# Patient Record
Sex: MALE | Race: WHITE | NOT HISPANIC OR LATINO | Employment: OTHER | URBAN - NONMETROPOLITAN AREA
[De-identification: names, ages, dates, MRNs, and addresses within clinical notes are randomized per-mention and may not be internally consistent; named-entity substitution may affect disease eponyms.]

---

## 2019-09-28 ENCOUNTER — APPOINTMENT (OUTPATIENT)
Dept: GENERAL RADIOLOGY | Facility: HOSPITAL | Age: 69
End: 2019-09-28

## 2019-09-28 ENCOUNTER — HOSPITAL ENCOUNTER (INPATIENT)
Facility: HOSPITAL | Age: 69
LOS: 6 days | Discharge: NURSING, REHAB OR LONG-TERM CARE | End: 2019-10-04
Attending: FAMILY MEDICINE | Admitting: INTERNAL MEDICINE

## 2019-09-28 DIAGNOSIS — J44.1 COPD WITH ACUTE EXACERBATION (HCC): ICD-10-CM

## 2019-09-28 DIAGNOSIS — I48.92 ATRIAL FLUTTER, UNSPECIFIED TYPE (HCC): Primary | ICD-10-CM

## 2019-09-28 DIAGNOSIS — R06.00 DYSPNEA, UNSPECIFIED TYPE: ICD-10-CM

## 2019-09-28 DIAGNOSIS — I42.0 DILATED CARDIOMYOPATHY (HCC): ICD-10-CM

## 2019-09-28 LAB
A-A DO2: 71.5 MMHG (ref 0–300)
ALBUMIN SERPL-MCNC: 3.83 G/DL (ref 3.5–5.2)
ALBUMIN/GLOB SERPL: 1.1 G/DL
ALP SERPL-CCNC: 90 U/L (ref 39–117)
ALT SERPL W P-5'-P-CCNC: 16 U/L (ref 1–41)
ANION GAP SERPL CALCULATED.3IONS-SCNC: 11.1 MMOL/L (ref 5–15)
ARTERIAL PATENCY WRIST A: ABNORMAL
AST SERPL-CCNC: 18 U/L (ref 1–40)
ATMOSPHERIC PRESS: 729 MMHG
BASE EXCESS BLDA CALC-SCNC: -0.1 MMOL/L
BASOPHILS # BLD AUTO: 0.04 10*3/MM3 (ref 0–0.2)
BASOPHILS NFR BLD AUTO: 0.6 % (ref 0–1.5)
BDY SITE: ABNORMAL
BILIRUB SERPL-MCNC: 0.8 MG/DL (ref 0.2–1.2)
BODY TEMPERATURE: 98.6 C
BUN BLD-MCNC: 20 MG/DL (ref 8–23)
BUN/CREAT SERPL: 17.2 (ref 7–25)
CALCIUM SPEC-SCNC: 9 MG/DL (ref 8.6–10.5)
CHLORIDE SERPL-SCNC: 101 MMOL/L (ref 98–107)
CK SERPL-CCNC: 102 U/L (ref 20–200)
CO2 SERPL-SCNC: 27.9 MMOL/L (ref 22–29)
COHGB MFR BLD: 3.5 % (ref 0–5)
CREAT BLD-MCNC: 1.16 MG/DL (ref 0.76–1.27)
CRP SERPL-MCNC: 0.5 MG/DL (ref 0–0.5)
DEPRECATED RDW RBC AUTO: 48.8 FL (ref 37–54)
EOSINOPHIL # BLD AUTO: 0.44 10*3/MM3 (ref 0–0.4)
EOSINOPHIL NFR BLD AUTO: 6.6 % (ref 0.3–6.2)
ERYTHROCYTE [DISTWIDTH] IN BLOOD BY AUTOMATED COUNT: 15.2 % (ref 12.3–15.4)
GFR SERPL CREATININE-BSD FRML MDRD: 63 ML/MIN/1.73
GLOBULIN UR ELPH-MCNC: 3.4 GM/DL
GLUCOSE BLD-MCNC: 137 MG/DL (ref 65–99)
HCO3 BLDA-SCNC: 24.1 MMOL/L (ref 22–26)
HCT VFR BLD AUTO: 46.1 % (ref 37.5–51)
HCT VFR BLD CALC: 45 % (ref 42–52)
HGB BLD-MCNC: 14.8 G/DL (ref 13–17.7)
HGB BLDA-MCNC: 15.3 G/DL (ref 12–16)
HOLD SPECIMEN: NORMAL
HOLD SPECIMEN: NORMAL
HOROWITZ INDEX BLD+IHG-RTO: 28 %
IMM GRANULOCYTES # BLD AUTO: 0.01 10*3/MM3 (ref 0–0.05)
IMM GRANULOCYTES NFR BLD AUTO: 0.1 % (ref 0–0.5)
LYMPHOCYTES # BLD AUTO: 1.24 10*3/MM3 (ref 0.7–3.1)
LYMPHOCYTES NFR BLD AUTO: 18.5 % (ref 19.6–45.3)
MAGNESIUM SERPL-MCNC: 2 MG/DL (ref 1.6–2.4)
MCH RBC QN AUTO: 29 PG (ref 26.6–33)
MCHC RBC AUTO-ENTMCNC: 32.1 G/DL (ref 31.5–35.7)
MCV RBC AUTO: 90.4 FL (ref 79–97)
METHGB BLD QL: 0.3 % (ref 0–3)
MODALITY: ABNORMAL
MONOCYTES # BLD AUTO: 0.64 10*3/MM3 (ref 0.1–0.9)
MONOCYTES NFR BLD AUTO: 9.5 % (ref 5–12)
NEUTROPHILS # BLD AUTO: 4.34 10*3/MM3 (ref 1.7–7)
NEUTROPHILS NFR BLD AUTO: 64.7 % (ref 42.7–76)
NT-PROBNP SERPL-MCNC: 9708 PG/ML (ref 5–900)
OXYHGB MFR BLDV: 91 % (ref 85–100)
PCO2 BLDA: 38.3 MM HG (ref 35–45)
PH BLDA: 7.42 PH UNITS (ref 7.35–7.45)
PLATELET # BLD AUTO: 154 10*3/MM3 (ref 140–450)
PMV BLD AUTO: 11.8 FL (ref 6–12)
PO2 BLDA: 74.3 MM HG (ref 80–100)
POTASSIUM BLD-SCNC: 4 MMOL/L (ref 3.5–5.2)
PROT SERPL-MCNC: 7.2 G/DL (ref 6–8.5)
RBC # BLD AUTO: 5.1 10*6/MM3 (ref 4.14–5.8)
SAO2 % BLDCOA: 94.6 % (ref 90–100)
SODIUM BLD-SCNC: 140 MMOL/L (ref 136–145)
TROPONIN T SERPL-MCNC: 0.02 NG/ML (ref 0–0.03)
TSH SERPL DL<=0.05 MIU/L-ACNC: 2.54 UIU/ML (ref 0.27–4.2)
WBC NRBC COR # BLD: 6.71 10*3/MM3 (ref 3.4–10.8)
WHOLE BLOOD HOLD SPECIMEN: NORMAL
WHOLE BLOOD HOLD SPECIMEN: NORMAL

## 2019-09-28 PROCEDURE — 85025 COMPLETE CBC W/AUTO DIFF WBC: CPT | Performed by: EMERGENCY MEDICINE

## 2019-09-28 PROCEDURE — 83880 ASSAY OF NATRIURETIC PEPTIDE: CPT | Performed by: EMERGENCY MEDICINE

## 2019-09-28 PROCEDURE — 99223 1ST HOSP IP/OBS HIGH 75: CPT | Performed by: INTERNAL MEDICINE

## 2019-09-28 PROCEDURE — 25010000002 ENOXAPARIN PER 10 MG: Performed by: INTERNAL MEDICINE

## 2019-09-28 PROCEDURE — 94799 UNLISTED PULMONARY SVC/PX: CPT

## 2019-09-28 PROCEDURE — 84443 ASSAY THYROID STIM HORMONE: CPT | Performed by: FAMILY MEDICINE

## 2019-09-28 PROCEDURE — 25010000002 CEFTRIAXONE: Performed by: FAMILY MEDICINE

## 2019-09-28 PROCEDURE — 71046 X-RAY EXAM CHEST 2 VIEWS: CPT | Performed by: RADIOLOGY

## 2019-09-28 PROCEDURE — 82805 BLOOD GASES W/O2 SATURATION: CPT | Performed by: FAMILY MEDICINE

## 2019-09-28 PROCEDURE — 83050 HGB METHEMOGLOBIN QUAN: CPT | Performed by: FAMILY MEDICINE

## 2019-09-28 PROCEDURE — 71046 X-RAY EXAM CHEST 2 VIEWS: CPT

## 2019-09-28 PROCEDURE — 84145 PROCALCITONIN (PCT): CPT | Performed by: INTERNAL MEDICINE

## 2019-09-28 PROCEDURE — 25010000002 AZITHROMYCIN: Performed by: FAMILY MEDICINE

## 2019-09-28 PROCEDURE — 86140 C-REACTIVE PROTEIN: CPT | Performed by: FAMILY MEDICINE

## 2019-09-28 PROCEDURE — 83735 ASSAY OF MAGNESIUM: CPT | Performed by: FAMILY MEDICINE

## 2019-09-28 PROCEDURE — 82550 ASSAY OF CK (CPK): CPT | Performed by: FAMILY MEDICINE

## 2019-09-28 PROCEDURE — 84484 ASSAY OF TROPONIN QUANT: CPT | Performed by: EMERGENCY MEDICINE

## 2019-09-28 PROCEDURE — 82375 ASSAY CARBOXYHB QUANT: CPT | Performed by: FAMILY MEDICINE

## 2019-09-28 PROCEDURE — 93010 ELECTROCARDIOGRAM REPORT: CPT | Performed by: INTERNAL MEDICINE

## 2019-09-28 PROCEDURE — 80053 COMPREHEN METABOLIC PANEL: CPT | Performed by: EMERGENCY MEDICINE

## 2019-09-28 PROCEDURE — 93005 ELECTROCARDIOGRAM TRACING: CPT | Performed by: FAMILY MEDICINE

## 2019-09-28 PROCEDURE — 36415 COLL VENOUS BLD VENIPUNCTURE: CPT

## 2019-09-28 PROCEDURE — 99284 EMERGENCY DEPT VISIT MOD MDM: CPT

## 2019-09-28 PROCEDURE — 36600 WITHDRAWAL OF ARTERIAL BLOOD: CPT | Performed by: FAMILY MEDICINE

## 2019-09-28 PROCEDURE — 93005 ELECTROCARDIOGRAM TRACING: CPT | Performed by: EMERGENCY MEDICINE

## 2019-09-28 PROCEDURE — 87040 BLOOD CULTURE FOR BACTERIA: CPT | Performed by: FAMILY MEDICINE

## 2019-09-28 PROCEDURE — 25010000002 MORPHINE PER 10 MG: Performed by: FAMILY MEDICINE

## 2019-09-28 PROCEDURE — 94640 AIRWAY INHALATION TREATMENT: CPT

## 2019-09-28 RX ORDER — IPRATROPIUM BROMIDE AND ALBUTEROL SULFATE 2.5; .5 MG/3ML; MG/3ML
3 SOLUTION RESPIRATORY (INHALATION)
Status: DISCONTINUED | OUTPATIENT
Start: 2019-09-28 | End: 2019-09-29

## 2019-09-28 RX ORDER — DILTIAZEM HYDROCHLORIDE 5 MG/ML
2.5 INJECTION INTRAVENOUS ONCE
Status: COMPLETED | OUTPATIENT
Start: 2019-09-28 | End: 2019-09-28

## 2019-09-28 RX ORDER — SODIUM CHLORIDE 0.9 % (FLUSH) 0.9 %
10 SYRINGE (ML) INJECTION AS NEEDED
Status: DISCONTINUED | OUTPATIENT
Start: 2019-09-28 | End: 2019-10-04 | Stop reason: HOSPADM

## 2019-09-28 RX ORDER — PREDNISONE 20 MG/1
40 TABLET ORAL
Status: DISCONTINUED | OUTPATIENT
Start: 2019-09-29 | End: 2019-10-04 | Stop reason: HOSPADM

## 2019-09-28 RX ORDER — SODIUM CHLORIDE 0.9 % (FLUSH) 0.9 %
10 SYRINGE (ML) INJECTION EVERY 12 HOURS SCHEDULED
Status: DISCONTINUED | OUTPATIENT
Start: 2019-09-28 | End: 2019-10-04 | Stop reason: HOSPADM

## 2019-09-28 RX ORDER — BUMETANIDE 0.25 MG/ML
1 INJECTION INTRAMUSCULAR; INTRAVENOUS ONCE
Status: COMPLETED | OUTPATIENT
Start: 2019-09-28 | End: 2019-09-28

## 2019-09-28 RX ORDER — NICOTINE 21 MG/24HR
1 PATCH, TRANSDERMAL 24 HOURS TRANSDERMAL DAILY
Status: DISCONTINUED | OUTPATIENT
Start: 2019-09-28 | End: 2019-10-04 | Stop reason: HOSPADM

## 2019-09-28 RX ADMIN — NICOTINE 1 PATCH: 21 PATCH, EXTENDED RELEASE TRANSDERMAL at 21:48

## 2019-09-28 RX ADMIN — MORPHINE SULFATE 4 MG: 4 INJECTION, SOLUTION INTRAMUSCULAR; INTRAVENOUS at 17:51

## 2019-09-28 RX ADMIN — IPRATROPIUM BROMIDE AND ALBUTEROL SULFATE 3 ML: .5; 3 SOLUTION RESPIRATORY (INHALATION) at 20:33

## 2019-09-28 RX ADMIN — ENOXAPARIN SODIUM 90 MG: 100 INJECTION SUBCUTANEOUS at 21:49

## 2019-09-28 RX ADMIN — AZITHROMYCIN MONOHYDRATE 500 MG: 500 INJECTION, POWDER, LYOPHILIZED, FOR SOLUTION INTRAVENOUS at 19:55

## 2019-09-28 RX ADMIN — BUMETANIDE 1 MG: 0.25 INJECTION, SOLUTION INTRAMUSCULAR; INTRAVENOUS at 17:49

## 2019-09-28 RX ADMIN — METOPROLOL TARTRATE 12.5 MG: 25 TABLET, FILM COATED ORAL at 21:49

## 2019-09-28 RX ADMIN — DILTIAZEM HYDROCHLORIDE 2.5 MG: 5 INJECTION INTRAVENOUS at 17:55

## 2019-09-28 RX ADMIN — CEFTRIAXONE 1 G: 1 INJECTION, POWDER, FOR SOLUTION INTRAMUSCULAR; INTRAVENOUS at 18:55

## 2019-09-29 ENCOUNTER — APPOINTMENT (OUTPATIENT)
Dept: CARDIOLOGY | Facility: HOSPITAL | Age: 69
End: 2019-09-29

## 2019-09-29 LAB
ALBUMIN SERPL-MCNC: 3.58 G/DL (ref 3.5–5.2)
ALBUMIN/GLOB SERPL: 1.1 G/DL
ALP SERPL-CCNC: 85 U/L (ref 39–117)
ALT SERPL W P-5'-P-CCNC: 15 U/L (ref 1–41)
ANION GAP SERPL CALCULATED.3IONS-SCNC: 14.1 MMOL/L (ref 5–15)
ANION GAP SERPL CALCULATED.3IONS-SCNC: 14.2 MMOL/L (ref 5–15)
AST SERPL-CCNC: 20 U/L (ref 1–40)
BASOPHILS # BLD AUTO: 0.08 10*3/MM3 (ref 0–0.2)
BASOPHILS NFR BLD AUTO: 1 % (ref 0–1.5)
BH CV ECHO MEAS - % IVS THICK: 15.1 %
BH CV ECHO MEAS - % LVPW THICK: -9.4 %
BH CV ECHO MEAS - ACS: 1.7 CM
BH CV ECHO MEAS - AI DEC SLOPE: 105 CM/SEC^2
BH CV ECHO MEAS - AI MAX PG: 26.2 MMHG
BH CV ECHO MEAS - AI MAX VEL: 256 CM/SEC
BH CV ECHO MEAS - AI P1/2T: 714.1 MSEC
BH CV ECHO MEAS - AO MAX PG: 2.5 MMHG
BH CV ECHO MEAS - AO MEAN PG: 1 MMHG
BH CV ECHO MEAS - AO ROOT AREA (BSA CORRECTED): 1.8
BH CV ECHO MEAS - AO ROOT AREA: 11.9 CM^2
BH CV ECHO MEAS - AO ROOT DIAM: 3.9 CM
BH CV ECHO MEAS - AO V2 MAX: 79.2 CM/SEC
BH CV ECHO MEAS - AO V2 MEAN: 53.4 CM/SEC
BH CV ECHO MEAS - AO V2 VTI: 11.9 CM
BH CV ECHO MEAS - BSA(HAYCOCK): 2.2 M^2
BH CV ECHO MEAS - BSA: 2.1 M^2
BH CV ECHO MEAS - BZI_BMI: 26 KILOGRAMS/M^2
BH CV ECHO MEAS - BZI_METRIC_HEIGHT: 185.4 CM
BH CV ECHO MEAS - BZI_METRIC_WEIGHT: 89.4 KG
BH CV ECHO MEAS - EDV(CUBED): 204.9 ML
BH CV ECHO MEAS - EDV(MOD-SP4): 95 ML
BH CV ECHO MEAS - EDV(TEICH): 172.9 ML
BH CV ECHO MEAS - EF(CUBED): 28.1 %
BH CV ECHO MEAS - EF(MOD-SP4): 42.1 %
BH CV ECHO MEAS - EF(TEICH): 22.4 %
BH CV ECHO MEAS - ESV(CUBED): 147.2 ML
BH CV ECHO MEAS - ESV(MOD-SP4): 55 ML
BH CV ECHO MEAS - ESV(TEICH): 134.2 ML
BH CV ECHO MEAS - FS: 10.4 %
BH CV ECHO MEAS - IVS/LVPW: 0.76
BH CV ECHO MEAS - IVSD: 1.3 CM
BH CV ECHO MEAS - IVSS: 1.5 CM
BH CV ECHO MEAS - LA DIMENSION: 4.9 CM
BH CV ECHO MEAS - LA/AO: 1.3
BH CV ECHO MEAS - LV DIASTOLIC VOL/BSA (35-75): 44.4 ML/M^2
BH CV ECHO MEAS - LV MASS(C)D: 399.1 GRAMS
BH CV ECHO MEAS - LV MASS(C)DI: 186.7 GRAMS/M^2
BH CV ECHO MEAS - LV MASS(C)S: 341.9 GRAMS
BH CV ECHO MEAS - LV MASS(C)SI: 159.9 GRAMS/M^2
BH CV ECHO MEAS - LV SYSTOLIC VOL/BSA (12-30): 25.7 ML/M^2
BH CV ECHO MEAS - LVIDD: 5.9 CM
BH CV ECHO MEAS - LVIDS: 5.3 CM
BH CV ECHO MEAS - LVLD AP4: 8.3 CM
BH CV ECHO MEAS - LVLS AP4: 7.7 CM
BH CV ECHO MEAS - LVOT AREA (M): 4.5 CM^2
BH CV ECHO MEAS - LVOT AREA: 4.5 CM^2
BH CV ECHO MEAS - LVOT DIAM: 2.4 CM
BH CV ECHO MEAS - LVPWD: 1.7 CM
BH CV ECHO MEAS - LVPWS: 1.5 CM
BH CV ECHO MEAS - MV E MAX VEL: 106 CM/SEC
BH CV ECHO MEAS - PA ACC SLOPE: 1367 CM/SEC^2
BH CV ECHO MEAS - PA ACC TIME: 0.04 SEC
BH CV ECHO MEAS - PA PR(ACCEL): 61.5 MMHG
BH CV ECHO MEAS - RAP SYSTOLE: 10 MMHG
BH CV ECHO MEAS - RVSP: 47.2 MMHG
BH CV ECHO MEAS - SI(AO): 66.5 ML/M^2
BH CV ECHO MEAS - SI(CUBED): 27 ML/M^2
BH CV ECHO MEAS - SI(MOD-SP4): 18.7 ML/M^2
BH CV ECHO MEAS - SI(TEICH): 18.1 ML/M^2
BH CV ECHO MEAS - SV(AO): 142.2 ML
BH CV ECHO MEAS - SV(CUBED): 57.7 ML
BH CV ECHO MEAS - SV(MOD-SP4): 40 ML
BH CV ECHO MEAS - SV(TEICH): 38.7 ML
BH CV ECHO MEAS - TR MAX VEL: 305 CM/SEC
BILIRUB SERPL-MCNC: 0.6 MG/DL (ref 0.2–1.2)
BUN BLD-MCNC: 21 MG/DL (ref 8–23)
BUN BLD-MCNC: 24 MG/DL (ref 8–23)
BUN/CREAT SERPL: 19.4 (ref 7–25)
BUN/CREAT SERPL: 20.7 (ref 7–25)
CALCIUM SPEC-SCNC: 8.7 MG/DL (ref 8.6–10.5)
CALCIUM SPEC-SCNC: 9 MG/DL (ref 8.6–10.5)
CHLORIDE SERPL-SCNC: 101 MMOL/L (ref 98–107)
CHLORIDE SERPL-SCNC: 101 MMOL/L (ref 98–107)
CHOLEST SERPL-MCNC: 141 MG/DL (ref 0–200)
CO2 SERPL-SCNC: 22.8 MMOL/L (ref 22–29)
CO2 SERPL-SCNC: 23.9 MMOL/L (ref 22–29)
CREAT BLD-MCNC: 1.08 MG/DL (ref 0.76–1.27)
CREAT BLD-MCNC: 1.16 MG/DL (ref 0.76–1.27)
DEPRECATED RDW RBC AUTO: 49.5 FL (ref 37–54)
EOSINOPHIL # BLD AUTO: 0.43 10*3/MM3 (ref 0–0.4)
EOSINOPHIL NFR BLD AUTO: 5.5 % (ref 0.3–6.2)
ERYTHROCYTE [DISTWIDTH] IN BLOOD BY AUTOMATED COUNT: 15.3 % (ref 12.3–15.4)
GFR SERPL CREATININE-BSD FRML MDRD: 63 ML/MIN/1.73
GFR SERPL CREATININE-BSD FRML MDRD: 68 ML/MIN/1.73
GLOBULIN UR ELPH-MCNC: 3.3 GM/DL
GLUCOSE BLD-MCNC: 151 MG/DL (ref 65–99)
GLUCOSE BLD-MCNC: 95 MG/DL (ref 65–99)
HBA1C MFR BLD: 5.8 % (ref 4.8–5.6)
HCT VFR BLD AUTO: 45.2 % (ref 37.5–51)
HDLC SERPL-MCNC: 40 MG/DL (ref 40–60)
HGB BLD-MCNC: 14.6 G/DL (ref 13–17.7)
IMM GRANULOCYTES # BLD AUTO: 0.03 10*3/MM3 (ref 0–0.05)
IMM GRANULOCYTES NFR BLD AUTO: 0.4 % (ref 0–0.5)
INR PPP: 1.11 (ref 0.9–1.1)
LDLC SERPL CALC-MCNC: 89 MG/DL (ref 0–100)
LDLC/HDLC SERPL: 2.22 {RATIO}
LYMPHOCYTES # BLD AUTO: 1.68 10*3/MM3 (ref 0.7–3.1)
LYMPHOCYTES NFR BLD AUTO: 21.6 % (ref 19.6–45.3)
MAGNESIUM SERPL-MCNC: 2.1 MG/DL (ref 1.6–2.4)
MAXIMAL PREDICTED HEART RATE: 152 BPM
MCH RBC QN AUTO: 29.4 PG (ref 26.6–33)
MCHC RBC AUTO-ENTMCNC: 32.3 G/DL (ref 31.5–35.7)
MCV RBC AUTO: 90.9 FL (ref 79–97)
MONOCYTES # BLD AUTO: 0.79 10*3/MM3 (ref 0.1–0.9)
MONOCYTES NFR BLD AUTO: 10.1 % (ref 5–12)
NEUTROPHILS # BLD AUTO: 4.78 10*3/MM3 (ref 1.7–7)
NEUTROPHILS NFR BLD AUTO: 61.4 % (ref 42.7–76)
PLATELET # BLD AUTO: 154 10*3/MM3 (ref 140–450)
PMV BLD AUTO: 12.5 FL (ref 6–12)
POTASSIUM BLD-SCNC: 4.3 MMOL/L (ref 3.5–5.2)
POTASSIUM BLD-SCNC: 4.4 MMOL/L (ref 3.5–5.2)
PROCALCITONIN SERPL-MCNC: <0.02 NG/ML (ref 0.1–0.25)
PROT SERPL-MCNC: 6.9 G/DL (ref 6–8.5)
PROTHROMBIN TIME: 14.9 SECONDS (ref 11–15.4)
RBC # BLD AUTO: 4.97 10*6/MM3 (ref 4.14–5.8)
SODIUM BLD-SCNC: 138 MMOL/L (ref 136–145)
SODIUM BLD-SCNC: 139 MMOL/L (ref 136–145)
STRESS TARGET HR: 129 BPM
TRIGL SERPL-MCNC: 62 MG/DL (ref 0–150)
TROPONIN T SERPL-MCNC: 0.06 NG/ML (ref 0–0.03)
TROPONIN T SERPL-MCNC: 0.08 NG/ML (ref 0–0.03)
TROPONIN T SERPL-MCNC: 0.1 NG/ML (ref 0–0.03)
VLDLC SERPL-MCNC: 12.4 MG/DL
WBC NRBC COR # BLD: 7.79 10*3/MM3 (ref 3.4–10.8)

## 2019-09-29 PROCEDURE — 63710000001 PREDNISONE PER 1 MG: Performed by: INTERNAL MEDICINE

## 2019-09-29 PROCEDURE — 83735 ASSAY OF MAGNESIUM: CPT | Performed by: PHYSICIAN ASSISTANT

## 2019-09-29 PROCEDURE — 85025 COMPLETE CBC W/AUTO DIFF WBC: CPT | Performed by: INTERNAL MEDICINE

## 2019-09-29 PROCEDURE — 93005 ELECTROCARDIOGRAM TRACING: CPT | Performed by: INTERNAL MEDICINE

## 2019-09-29 PROCEDURE — 84484 ASSAY OF TROPONIN QUANT: CPT | Performed by: INTERNAL MEDICINE

## 2019-09-29 PROCEDURE — 80053 COMPREHEN METABOLIC PANEL: CPT | Performed by: INTERNAL MEDICINE

## 2019-09-29 PROCEDURE — 80061 LIPID PANEL: CPT | Performed by: INTERNAL MEDICINE

## 2019-09-29 PROCEDURE — 85610 PROTHROMBIN TIME: CPT | Performed by: INTERNAL MEDICINE

## 2019-09-29 PROCEDURE — 99223 1ST HOSP IP/OBS HIGH 75: CPT | Performed by: INTERNAL MEDICINE

## 2019-09-29 PROCEDURE — 25010000002 ENOXAPARIN PER 10 MG: Performed by: INTERNAL MEDICINE

## 2019-09-29 PROCEDURE — 93306 TTE W/DOPPLER COMPLETE: CPT | Performed by: INTERNAL MEDICINE

## 2019-09-29 PROCEDURE — 93010 ELECTROCARDIOGRAM REPORT: CPT | Performed by: INTERNAL MEDICINE

## 2019-09-29 PROCEDURE — 93306 TTE W/DOPPLER COMPLETE: CPT

## 2019-09-29 PROCEDURE — 99233 SBSQ HOSP IP/OBS HIGH 50: CPT | Performed by: INTERNAL MEDICINE

## 2019-09-29 PROCEDURE — 83036 HEMOGLOBIN GLYCOSYLATED A1C: CPT | Performed by: INTERNAL MEDICINE

## 2019-09-29 PROCEDURE — 94799 UNLISTED PULMONARY SVC/PX: CPT

## 2019-09-29 PROCEDURE — 84484 ASSAY OF TROPONIN QUANT: CPT | Performed by: PHYSICIAN ASSISTANT

## 2019-09-29 RX ORDER — ASPIRIN 325 MG
325 TABLET ORAL ONCE
Status: COMPLETED | OUTPATIENT
Start: 2019-09-29 | End: 2019-09-29

## 2019-09-29 RX ORDER — BUMETANIDE 0.25 MG/ML
1 INJECTION INTRAMUSCULAR; INTRAVENOUS EVERY 12 HOURS
Status: DISCONTINUED | OUTPATIENT
Start: 2019-09-29 | End: 2019-09-29

## 2019-09-29 RX ORDER — IPRATROPIUM BROMIDE AND ALBUTEROL SULFATE 2.5; .5 MG/3ML; MG/3ML
3 SOLUTION RESPIRATORY (INHALATION) EVERY 6 HOURS PRN
Status: DISCONTINUED | OUTPATIENT
Start: 2019-09-29 | End: 2019-10-04 | Stop reason: HOSPADM

## 2019-09-29 RX ORDER — BUMETANIDE 0.25 MG/ML
1 INJECTION INTRAMUSCULAR; INTRAVENOUS EVERY 6 HOURS
Status: COMPLETED | OUTPATIENT
Start: 2019-09-29 | End: 2019-09-29

## 2019-09-29 RX ORDER — BACITRACIN ZINC 500 [USP'U]/G
OINTMENT TOPICAL EVERY 12 HOURS SCHEDULED
Status: DISCONTINUED | OUTPATIENT
Start: 2019-09-29 | End: 2019-10-04 | Stop reason: HOSPADM

## 2019-09-29 RX ORDER — BUMETANIDE 0.25 MG/ML
2 INJECTION INTRAMUSCULAR; INTRAVENOUS EVERY 12 HOURS
Status: DISCONTINUED | OUTPATIENT
Start: 2019-09-30 | End: 2019-10-02

## 2019-09-29 RX ADMIN — BACITRACIN: 500 OINTMENT TOPICAL at 14:43

## 2019-09-29 RX ADMIN — METOPROLOL TARTRATE 25 MG: 25 TABLET, FILM COATED ORAL at 21:22

## 2019-09-29 RX ADMIN — ENOXAPARIN SODIUM 90 MG: 100 INJECTION SUBCUTANEOUS at 08:53

## 2019-09-29 RX ADMIN — METOPROLOL TARTRATE 12.5 MG: 25 TABLET, FILM COATED ORAL at 08:53

## 2019-09-29 RX ADMIN — ENOXAPARIN SODIUM 90 MG: 100 INJECTION SUBCUTANEOUS at 21:23

## 2019-09-29 RX ADMIN — BUMETANIDE 1 MG: 0.25 INJECTION, SOLUTION INTRAMUSCULAR; INTRAVENOUS at 16:00

## 2019-09-29 RX ADMIN — BACITRACIN: 500 OINTMENT TOPICAL at 21:23

## 2019-09-29 RX ADMIN — ASPIRIN 325 MG: 325 TABLET ORAL at 09:42

## 2019-09-29 RX ADMIN — BUMETANIDE 1 MG: 0.25 INJECTION, SOLUTION INTRAMUSCULAR; INTRAVENOUS at 21:22

## 2019-09-29 RX ADMIN — PREDNISONE 40 MG: 20 TABLET ORAL at 08:53

## 2019-09-29 RX ADMIN — SACUBITRIL AND VALSARTAN 1 TABLET: 24; 26 TABLET, FILM COATED ORAL at 21:22

## 2019-09-29 RX ADMIN — NICOTINE 1 PATCH: 21 PATCH, EXTENDED RELEASE TRANSDERMAL at 08:53

## 2019-09-30 ENCOUNTER — ANESTHESIA (OUTPATIENT)
Dept: CARDIOLOGY | Facility: HOSPITAL | Age: 69
End: 2019-09-30

## 2019-09-30 ENCOUNTER — ANESTHESIA EVENT (OUTPATIENT)
Dept: CARDIOLOGY | Facility: HOSPITAL | Age: 69
End: 2019-09-30

## 2019-09-30 ENCOUNTER — APPOINTMENT (OUTPATIENT)
Dept: CARDIOLOGY | Facility: HOSPITAL | Age: 69
End: 2019-09-30

## 2019-09-30 LAB
ANION GAP SERPL CALCULATED.3IONS-SCNC: 15.3 MMOL/L (ref 5–15)
APTT PPP: 29.4 SECONDS (ref 23.8–36.1)
APTT PPP: 45.5 SECONDS (ref 23.8–36.1)
BASOPHILS # BLD AUTO: 0.01 10*3/MM3 (ref 0–0.2)
BASOPHILS # BLD AUTO: 0.03 10*3/MM3 (ref 0–0.2)
BASOPHILS NFR BLD AUTO: 0.1 % (ref 0–1.5)
BASOPHILS NFR BLD AUTO: 0.2 % (ref 0–1.5)
BUN BLD-MCNC: 21 MG/DL (ref 8–23)
BUN/CREAT SERPL: 19.4 (ref 7–25)
CALCIUM SPEC-SCNC: 8.9 MG/DL (ref 8.6–10.5)
CHLORIDE SERPL-SCNC: 101 MMOL/L (ref 98–107)
CO2 SERPL-SCNC: 23.7 MMOL/L (ref 22–29)
CREAT BLD-MCNC: 1.08 MG/DL (ref 0.76–1.27)
DEPRECATED RDW RBC AUTO: 48.7 FL (ref 37–54)
DEPRECATED RDW RBC AUTO: 49.3 FL (ref 37–54)
EOSINOPHIL # BLD AUTO: 0.05 10*3/MM3 (ref 0–0.4)
EOSINOPHIL # BLD AUTO: 0.14 10*3/MM3 (ref 0–0.4)
EOSINOPHIL NFR BLD AUTO: 0.4 % (ref 0.3–6.2)
EOSINOPHIL NFR BLD AUTO: 1 % (ref 0.3–6.2)
ERYTHROCYTE [DISTWIDTH] IN BLOOD BY AUTOMATED COUNT: 14.9 % (ref 12.3–15.4)
ERYTHROCYTE [DISTWIDTH] IN BLOOD BY AUTOMATED COUNT: 15.1 % (ref 12.3–15.4)
GFR SERPL CREATININE-BSD FRML MDRD: 68 ML/MIN/1.73
GLUCOSE BLD-MCNC: 106 MG/DL (ref 65–99)
HCT VFR BLD AUTO: 48.1 % (ref 37.5–51)
HCT VFR BLD AUTO: 49.5 % (ref 37.5–51)
HGB BLD-MCNC: 15.6 G/DL (ref 13–17.7)
HGB BLD-MCNC: 16.1 G/DL (ref 13–17.7)
IMM GRANULOCYTES # BLD AUTO: 0.03 10*3/MM3 (ref 0–0.05)
IMM GRANULOCYTES # BLD AUTO: 0.04 10*3/MM3 (ref 0–0.05)
IMM GRANULOCYTES NFR BLD AUTO: 0.2 % (ref 0–0.5)
IMM GRANULOCYTES NFR BLD AUTO: 0.4 % (ref 0–0.5)
INR PPP: 1.07 (ref 0.9–1.1)
LYMPHOCYTES # BLD AUTO: 1.37 10*3/MM3 (ref 0.7–3.1)
LYMPHOCYTES # BLD AUTO: 1.62 10*3/MM3 (ref 0.7–3.1)
LYMPHOCYTES NFR BLD AUTO: 10.2 % (ref 19.6–45.3)
LYMPHOCYTES NFR BLD AUTO: 14.3 % (ref 19.6–45.3)
MAGNESIUM SERPL-MCNC: 1.9 MG/DL (ref 1.6–2.4)
MCH RBC QN AUTO: 29 PG (ref 26.6–33)
MCH RBC QN AUTO: 29.1 PG (ref 26.6–33)
MCHC RBC AUTO-ENTMCNC: 32.4 G/DL (ref 31.5–35.7)
MCHC RBC AUTO-ENTMCNC: 32.5 G/DL (ref 31.5–35.7)
MCV RBC AUTO: 89.4 FL (ref 79–97)
MCV RBC AUTO: 89.5 FL (ref 79–97)
MONOCYTES # BLD AUTO: 0.65 10*3/MM3 (ref 0.1–0.9)
MONOCYTES # BLD AUTO: 1.11 10*3/MM3 (ref 0.1–0.9)
MONOCYTES NFR BLD AUTO: 4.9 % (ref 5–12)
MONOCYTES NFR BLD AUTO: 9.8 % (ref 5–12)
NEUTROPHILS # BLD AUTO: 11.17 10*3/MM3 (ref 1.7–7)
NEUTROPHILS # BLD AUTO: 8.49 10*3/MM3 (ref 1.7–7)
NEUTROPHILS NFR BLD AUTO: 75 % (ref 42.7–76)
NEUTROPHILS NFR BLD AUTO: 83.5 % (ref 42.7–76)
PLATELET # BLD AUTO: 165 10*3/MM3 (ref 140–450)
PLATELET # BLD AUTO: 168 10*3/MM3 (ref 140–450)
PMV BLD AUTO: 12.4 FL (ref 6–12)
PMV BLD AUTO: 12.6 FL (ref 6–12)
POTASSIUM BLD-SCNC: 3.6 MMOL/L (ref 3.5–5.2)
PROTHROMBIN TIME: 14.5 SECONDS (ref 11–15.4)
RBC # BLD AUTO: 5.38 10*6/MM3 (ref 4.14–5.8)
RBC # BLD AUTO: 5.53 10*6/MM3 (ref 4.14–5.8)
SODIUM BLD-SCNC: 140 MMOL/L (ref 136–145)
WBC NRBC COR # BLD: 11.32 10*3/MM3 (ref 3.4–10.8)
WBC NRBC COR # BLD: 13.39 10*3/MM3 (ref 3.4–10.8)

## 2019-09-30 PROCEDURE — 85730 THROMBOPLASTIN TIME PARTIAL: CPT | Performed by: INTERNAL MEDICINE

## 2019-09-30 PROCEDURE — B24BZZ4 ULTRASONOGRAPHY OF HEART WITH AORTA, TRANSESOPHAGEAL: ICD-10-PCS | Performed by: INTERNAL MEDICINE

## 2019-09-30 PROCEDURE — 85610 PROTHROMBIN TIME: CPT | Performed by: INTERNAL MEDICINE

## 2019-09-30 PROCEDURE — 93312 ECHO TRANSESOPHAGEAL: CPT

## 2019-09-30 PROCEDURE — 25010000002 HEPARIN (PORCINE) PER 1000 UNITS: Performed by: INTERNAL MEDICINE

## 2019-09-30 PROCEDURE — 25010000002 PROPOFOL 10 MG/ML EMULSION: Performed by: NURSE ANESTHETIST, CERTIFIED REGISTERED

## 2019-09-30 PROCEDURE — 99232 SBSQ HOSP IP/OBS MODERATE 35: CPT | Performed by: FAMILY MEDICINE

## 2019-09-30 PROCEDURE — 93325 DOPPLER ECHO COLOR FLOW MAPG: CPT | Performed by: INTERNAL MEDICINE

## 2019-09-30 PROCEDURE — 93321 DOPPLER ECHO F-UP/LMTD STD: CPT

## 2019-09-30 PROCEDURE — 93321 DOPPLER ECHO F-UP/LMTD STD: CPT | Performed by: INTERNAL MEDICINE

## 2019-09-30 PROCEDURE — 93312 ECHO TRANSESOPHAGEAL: CPT | Performed by: INTERNAL MEDICINE

## 2019-09-30 PROCEDURE — 85025 COMPLETE CBC W/AUTO DIFF WBC: CPT | Performed by: INTERNAL MEDICINE

## 2019-09-30 PROCEDURE — 25010000002 FENTANYL CITRATE (PF) 100 MCG/2ML SOLUTION: Performed by: NURSE ANESTHETIST, CERTIFIED REGISTERED

## 2019-09-30 PROCEDURE — 83735 ASSAY OF MAGNESIUM: CPT | Performed by: INTERNAL MEDICINE

## 2019-09-30 PROCEDURE — 63710000001 PREDNISONE PER 1 MG: Performed by: INTERNAL MEDICINE

## 2019-09-30 PROCEDURE — 80048 BASIC METABOLIC PNL TOTAL CA: CPT | Performed by: INTERNAL MEDICINE

## 2019-09-30 PROCEDURE — 94799 UNLISTED PULMONARY SVC/PX: CPT

## 2019-09-30 PROCEDURE — 85730 THROMBOPLASTIN TIME PARTIAL: CPT | Performed by: FAMILY MEDICINE

## 2019-09-30 PROCEDURE — 93325 DOPPLER ECHO COLOR FLOW MAPG: CPT

## 2019-09-30 RX ORDER — HEPARIN SODIUM 10000 [USP'U]/100ML
11.2 INJECTION, SOLUTION INTRAVENOUS
Status: DISCONTINUED | OUTPATIENT
Start: 2019-09-30 | End: 2019-10-03

## 2019-09-30 RX ORDER — HEPARIN SODIUM 5000 [USP'U]/ML
2500 INJECTION, SOLUTION INTRAVENOUS; SUBCUTANEOUS AS NEEDED
Status: DISCONTINUED | OUTPATIENT
Start: 2019-09-30 | End: 2019-10-03

## 2019-09-30 RX ORDER — HEPARIN SODIUM 5000 [USP'U]/ML
5000 INJECTION, SOLUTION INTRAVENOUS; SUBCUTANEOUS AS NEEDED
Status: DISCONTINUED | OUTPATIENT
Start: 2019-09-30 | End: 2019-10-03

## 2019-09-30 RX ORDER — FENTANYL CITRATE 50 UG/ML
INJECTION, SOLUTION INTRAMUSCULAR; INTRAVENOUS AS NEEDED
Status: DISCONTINUED | OUTPATIENT
Start: 2019-09-30 | End: 2019-09-30 | Stop reason: SURG

## 2019-09-30 RX ORDER — SODIUM CHLORIDE 9 MG/ML
INJECTION, SOLUTION INTRAVENOUS CONTINUOUS PRN
Status: DISCONTINUED | OUTPATIENT
Start: 2019-09-30 | End: 2019-09-30 | Stop reason: SURG

## 2019-09-30 RX ORDER — LIDOCAINE HYDROCHLORIDE 20 MG/ML
INJECTION, SOLUTION INFILTRATION; PERINEURAL AS NEEDED
Status: DISCONTINUED | OUTPATIENT
Start: 2019-09-30 | End: 2019-09-30 | Stop reason: SURG

## 2019-09-30 RX ORDER — HEPARIN SODIUM 5000 [USP'U]/ML
5000 INJECTION, SOLUTION INTRAVENOUS; SUBCUTANEOUS ONCE
Status: COMPLETED | OUTPATIENT
Start: 2019-09-30 | End: 2019-09-30

## 2019-09-30 RX ORDER — PROPOFOL 10 MG/ML
VIAL (ML) INTRAVENOUS AS NEEDED
Status: DISCONTINUED | OUTPATIENT
Start: 2019-09-30 | End: 2019-09-30 | Stop reason: SURG

## 2019-09-30 RX ADMIN — BUMETANIDE 2 MG: 0.25 INJECTION INTRAMUSCULAR; INTRAVENOUS at 14:35

## 2019-09-30 RX ADMIN — FENTANYL CITRATE 100 MCG: 50 INJECTION INTRAMUSCULAR; INTRAVENOUS at 11:34

## 2019-09-30 RX ADMIN — SODIUM CHLORIDE: 0.9 INJECTION, SOLUTION INTRAVENOUS at 11:34

## 2019-09-30 RX ADMIN — PROPOFOL 80 MG: 10 INJECTION, EMULSION INTRAVENOUS at 11:35

## 2019-09-30 RX ADMIN — LIDOCAINE HYDROCHLORIDE 100 MG: 20 INJECTION, SOLUTION INFILTRATION; PERINEURAL at 11:34

## 2019-09-30 RX ADMIN — HEPARIN SODIUM 5000 UNITS: 5000 INJECTION INTRAVENOUS; SUBCUTANEOUS at 12:00

## 2019-09-30 RX ADMIN — PREDNISONE 40 MG: 20 TABLET ORAL at 08:37

## 2019-09-30 RX ADMIN — SODIUM CHLORIDE, PRESERVATIVE FREE 10 ML: 5 INJECTION INTRAVENOUS at 08:40

## 2019-09-30 RX ADMIN — HEPARIN SODIUM 15.2 UNITS/KG/HR: 10000 INJECTION, SOLUTION INTRAVENOUS at 14:35

## 2019-09-30 RX ADMIN — SODIUM CHLORIDE, PRESERVATIVE FREE 10 ML: 5 INJECTION INTRAVENOUS at 20:21

## 2019-09-30 RX ADMIN — METOPROLOL TARTRATE 25 MG: 25 TABLET, FILM COATED ORAL at 20:14

## 2019-09-30 RX ADMIN — NICOTINE 1 PATCH: 21 PATCH, EXTENDED RELEASE TRANSDERMAL at 08:38

## 2019-09-30 RX ADMIN — BACITRACIN: 500 OINTMENT TOPICAL at 20:19

## 2019-09-30 RX ADMIN — SACUBITRIL AND VALSARTAN 1 TABLET: 24; 26 TABLET, FILM COATED ORAL at 08:37

## 2019-09-30 RX ADMIN — METOPROLOL TARTRATE 25 MG: 25 TABLET, FILM COATED ORAL at 08:37

## 2019-09-30 RX ADMIN — BACITRACIN: 500 OINTMENT TOPICAL at 08:39

## 2019-10-01 LAB
APTT PPP: 57.3 SECONDS (ref 23.8–36.1)
APTT PPP: 59.3 SECONDS (ref 23.8–36.1)
BH CV ECHO MEAS - AO ROOT AREA (BSA CORRECTED): 1.6
BH CV ECHO MEAS - AO ROOT AREA: 9.3 CM^2
BH CV ECHO MEAS - AO ROOT DIAM: 3.4 CM
BH CV ECHO MEAS - BSA(HAYCOCK): 2.2 M^2
BH CV ECHO MEAS - BSA: 2.1 M^2
BH CV ECHO MEAS - BZI_BMI: 26 KILOGRAMS/M^2
BH CV ECHO MEAS - BZI_METRIC_HEIGHT: 185.4 CM
BH CV ECHO MEAS - BZI_METRIC_WEIGHT: 89.4 KG
BH CV ECHO MEAS - LVOT AREA (M): 5.3 CM^2
BH CV ECHO MEAS - LVOT AREA: 5.1 CM^2
BH CV ECHO MEAS - LVOT DIAM: 2.6 CM

## 2019-10-01 PROCEDURE — 99232 SBSQ HOSP IP/OBS MODERATE 35: CPT | Performed by: FAMILY MEDICINE

## 2019-10-01 PROCEDURE — 94799 UNLISTED PULMONARY SVC/PX: CPT

## 2019-10-01 PROCEDURE — 63710000001 PREDNISONE PER 1 MG: Performed by: INTERNAL MEDICINE

## 2019-10-01 PROCEDURE — 99222 1ST HOSP IP/OBS MODERATE 55: CPT | Performed by: NURSE PRACTITIONER

## 2019-10-01 PROCEDURE — 85730 THROMBOPLASTIN TIME PARTIAL: CPT | Performed by: HOSPITALIST

## 2019-10-01 RX ADMIN — IPRATROPIUM BROMIDE AND ALBUTEROL SULFATE 3 ML: .5; 3 SOLUTION RESPIRATORY (INHALATION) at 06:47

## 2019-10-01 RX ADMIN — SACUBITRIL AND VALSARTAN 1 TABLET: 24; 26 TABLET, FILM COATED ORAL at 21:27

## 2019-10-01 RX ADMIN — BACITRACIN: 500 OINTMENT TOPICAL at 07:51

## 2019-10-01 RX ADMIN — SODIUM CHLORIDE, PRESERVATIVE FREE 10 ML: 5 INJECTION INTRAVENOUS at 21:27

## 2019-10-01 RX ADMIN — NICOTINE 1 PATCH: 21 PATCH, EXTENDED RELEASE TRANSDERMAL at 07:44

## 2019-10-01 RX ADMIN — SACUBITRIL AND VALSARTAN 1 TABLET: 24; 26 TABLET, FILM COATED ORAL at 07:44

## 2019-10-01 RX ADMIN — IPRATROPIUM BROMIDE AND ALBUTEROL SULFATE 3 ML: .5; 3 SOLUTION RESPIRATORY (INHALATION) at 18:35

## 2019-10-01 RX ADMIN — METOPROLOL TARTRATE 25 MG: 25 TABLET, FILM COATED ORAL at 21:27

## 2019-10-01 RX ADMIN — METOPROLOL TARTRATE 25 MG: 25 TABLET, FILM COATED ORAL at 07:44

## 2019-10-01 RX ADMIN — BUMETANIDE 2 MG: 0.25 INJECTION INTRAMUSCULAR; INTRAVENOUS at 17:19

## 2019-10-01 RX ADMIN — SODIUM CHLORIDE, PRESERVATIVE FREE 10 ML: 5 INJECTION INTRAVENOUS at 07:45

## 2019-10-01 RX ADMIN — BACITRACIN: 500 OINTMENT TOPICAL at 21:27

## 2019-10-01 RX ADMIN — BUMETANIDE 2 MG: 0.25 INJECTION INTRAMUSCULAR; INTRAVENOUS at 03:48

## 2019-10-01 RX ADMIN — PREDNISONE 40 MG: 20 TABLET ORAL at 07:45

## 2019-10-01 NOTE — PROGRESS NOTES
Discharge Planning Assessment   Joey     Patient Name: Zaid Galarza  MRN: [id number]  Today's Date: 10/1/2019    Admit Date: 9/28/2019        Discharge Plan     Row Name 10/01/19 1654       Plan    Plan  SS noted Physician order for Life Vest.  SS faxed pt information to [facility] and notified Eligio.  SS will follow.             MAGEN Roper

## 2019-10-01 NOTE — PROGRESS NOTES
[facility] HOSPITALIST    PROGRESS NOTE    Name:  Zaid Galarza   Age:  69 y.o.  Sex:  male  :  1950  MRN:  [id number]   Visit Number:  [id number]  Admission Date:  2019  Date Of Service:  10/01/19  Primary Care Physician:  Jonah Booth APRN     LOS: 3 days :  Patient Care Team:  Jonah Booth APRN as PCP - General (Nurse Practitioner)  Provider, No Known as PCP - Family Medicine:    Chief Complaint:      Lethargy  Shortness of breath    Subjective / Interval History:     Patient seen and examined at bedside  No adverse events overnight  Reports some improvement in shortness of breath      Review of Systems:     General ROS: Patient denies any fevers, chills or loss of consciousness.  Respiratory ROS: Denies cough or shortness of breath.  Cardiovascular ROS: Denies chest pain or palpitations. No history of exertional chest pain.  Gastrointestinal ROS: Denies nausea and vomiting. Denies any abdominal pain. No diarrhea.  Neurological ROS: Denies any focal weakness. No loss of consciousness. Denies any numbness. Denies neck pain.  Dermatological ROS: Denies any redness or pruritis.    Vital Signs:    Temp:  [97.5 °F (36.4 °C)-98.4 °F (36.9 °C)] 97.5 °F (36.4 °C)  Heart Rate:  [58-88] 85  Resp:  [14-20] 18  BP: ()/(40-87) 116/71    Intake and output:    I/O last 3 completed shifts:  In: 410 [P.O.:360; I.V.:50]  Out: 4225 [Urine:4225]  I/O this shift:  In: 360 [P.O.:360]  Out: 600 [Urine:600]    Physical Examination:    General Appearance:  Alert and cooperative, not in any acute distress.   Head:  Atraumatic and normocephalic, without obvious abnormality.   Eyes:          PERRLA, conjunctivae and sclerae normal, no Icterus. No pallor. Extra-occular movements are within normal limits.   Neck: Supple, trachea midline, no thyromegaly, no carotid bruit.   Lungs:   Chest shape is normal. Breath sounds heard bilaterally equally.  No crackles + wheezing. No Pleural rub  or bronchial breathing.   Heart:  Normal S1 and S2, no murmur, no gallop, no rub. No JVD   Abdomen:   Normal bowel sounds, no masses, no organomegaly. Soft       non-tender, non-distended, no guarding, no rebound tenderness.   Extremities: Moves all extremities well, no edema, no cyanosis, no            clubbing.   Skin: No bleeding, bruising or rash.   Neurologic: Awake, alert and oriented times 3. Moves all 4 extremities equally.   Laboratory results:    Results from last 7 days   Lab Units 09/30/19  0357 09/29/19  1533 09/29/19  0049 09/28/19  1537   SODIUM mmol/L 140 138 139 140   POTASSIUM mmol/L 3.6 4.4 4.3 4.0   CHLORIDE mmol/L 101 101 101 101   CO2 mmol/L 23.7 22.8 23.9 27.9   BUN mg/dL 21 24* 21 20   CREATININE mg/dL 1.08 1.16 1.08 1.16   CALCIUM mg/dL 8.9 9.0 8.7 9.0   BILIRUBIN mg/dL  --   --  0.6 0.8   ALK PHOS U/L  --   --  85 90   ALT (SGPT) U/L  --   --  15 16   AST (SGOT) U/L  --   --  20 18   GLUCOSE mg/dL 106* 151* 95 137*     Results from last 7 days   Lab Units 09/30/19  1229 09/30/19  0357 09/29/19  0049   WBC 10*3/mm3 13.39* 11.32* 7.79   HEMOGLOBIN g/dL 16.1 15.6 14.6   HEMATOCRIT % 49.5 48.1 45.2   PLATELETS 10*3/mm3 168 165 154     Results from last 7 days   Lab Units 09/30/19  1229 09/29/19  1533   INR  1.07 1.11*     Results from last 7 days   Lab Units 09/29/19  2022 09/29/19  1227 09/29/19  0740 09/28/19  1537   CK TOTAL U/L  --   --   --  102   TROPONIN T ng/mL 0.060* 0.097* 0.078* 0.019     Results from last 7 days   Lab Units 09/28/19  1814   BLOODCX  No growth at 2 days  No growth at 2 days       I have reviewed the patient's laboratory results.    Radiology results:    Imaging Results (last 24 hours)     ** No results found for the last 24 hours. **          I have reviewed the patient's radiology reports.    Medication Review:     I have reviewed the patients active and prn medications.       Atrial flutter (CMS/HCC)      Assessment:   Plan:    Per Cardiology yesterday  "afternoon  \"Pt had ANGELITO Earlier    Found to have significant spontaneous echo contrast in LA and also fresh thrombus in the JAYASHREE and decided to abort cardioversion.   He will be medically managed for his Aflutter and CHF   Will stop Lovenox and start him on Heparin drip   Will need ischemic evaluation\".    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  Placed on Entresto.  Patient had not seen a physician in three years.  Was on no meds at home.  Heart cath pending until diuresis improves heart function.     2) acute exacerbation of #1:  bumex IV 1 mg increased to 2 mg daily.     3) new onset atrial flutter:  Heparin gtt.      4) COPD: chronic, not on O2 at home: will evaluate after all testing and therapies are completed will likely need portable, home and 24/7 supplementation.  Quit smoking in 2009     5)  Lethargy: likely 2/2 1-4.  Ineffective CV output.        6)  Elevated troponin:  Trending downward now 0.06  0.078   0.097    Heart cath after diuresis complete.  Cardiology on board case.          Oracio Slater DO  10/01/19  10:53 AM    "

## 2019-10-01 NOTE — PROGRESS NOTES
LOS: 3 days     Name: Zaid Galarza  Age/Sex: 69 y.o. male  :  1950        PCP: Jonah Booth APRN    Active Problems:    Atrial flutter (CMS/[facility])      Admission Information: Zaid Galarza is a 69 y.o. male with a past medical history significant for AAA, status post repair, uncertain date and surgeon and no regular follow-up with a physician.  He states that last time he saw a provider was 3 to 4 years ago for viral illness he presented to the ED with increased shortness of breath.  He was admitted with new onset heart failure with reduced LVEF of 26 to 30% and atrial flutter with variable conduction.  Cardiology was consulted.    Chief Complaint: Follow-up heart failure and atrial flutter    Interval history: Patient underwent ANGELITO for possible cardioversion on 2019 per Dr. Vamsi Hester.  A fresh thrombus was found in the left atrial appendage.  Cardioversion was aborted.  Patient was placed on heparin.      Pt seen and examined.  He denies CP.  He states that he has had progressively worsening shortness of breath and dyspnea on exertion over the last few months.  He does not recall having any CP during this period of time.        Subjective     Review of Systems   Constitution: Negative for weakness and malaise/fatigue.   Cardiovascular: Negative for chest pain, dyspnea on exertion, irregular heartbeat, leg swelling, near-syncope, orthopnea, palpitations, paroxysmal nocturnal dyspnea and syncope.   Respiratory: Negative for shortness of breath.    Hematologic/Lymphatic: Does not bruise/bleed easily.   Neurological: Negative for dizziness and light-headedness.       Vital Signs  Vital Signs (last 72 hrs)        07  -   0659  07  -   0659  07  -  10/01 0659 10/01 07  -  10/01 1508   Most Recent    Temp (°F) 97.6 -  97.8    97.4 -  98.1    97.7 -  98.4      97.5     97.5 (36.4)    Heart Rate 65 -  110    63 -  111    58 -  88    78 -  85     78     Resp 18 -  20    18 -  20    14 -  20      18     18    /86 -  159/97    120/73 -  159/86    (!)88/62 -  119/72    102/68 -  116/71     102/68    SpO2 (%) 96 -  99    94 -  99    92 -  100    95 -  98     95        Temp:  [97.5 °F (36.4 °C)-98.4 °F (36.9 °C)] 97.5 °F (36.4 °C)  Heart Rate:  [78-88] 78  Resp:  [18-20] 18  BP: ()/(40-82) 102/68  Body mass index is 26.03 kg/m².      Intake/Output Summary (Last 24 hours) at 10/1/2019 1508  Last data filed at 10/1/2019 1239  Gross per 24 hour   Intake 360 ml   Output 2975 ml   Net -2615 ml       Physical Exam   Constitutional: He is oriented to person, place, and time. He appears well-developed and well-nourished. Nasal cannula in place.   Neck: No JVD present. Carotid bruit is not present.   Cardiovascular: Normal rate and regular rhythm.   No lower extremity edema   Pulmonary/Chest: Effort normal. No respiratory distress. He has decreased breath sounds. He has no wheezes. He has no rales.   Abdominal: Soft. Bowel sounds are normal. He exhibits no distension. There is no tenderness.   Neurological: He is alert and oriented to person, place, and time.   Psychiatric: He has a normal mood and affect. Cognition and memory are normal.   Vitals reviewed.      Telemetry:  A flutter 80-90s       Results Review:     Results from last 7 days   Lab Units 09/30/19  1229 09/30/19  0357 09/29/19  0049 09/28/19  1537   WBC 10*3/mm3 13.39* 11.32* 7.79 6.71   HEMOGLOBIN g/dL 16.1 15.6 14.6 14.8   PLATELETS 10*3/mm3 168 165 154 154     Results from last 7 days   Lab Units 09/30/19  0357 09/29/19  1533 09/29/19  0049 09/28/19  1537   SODIUM mmol/L 140 138 139 140   POTASSIUM mmol/L 3.6 4.4 4.3 4.0   CHLORIDE mmol/L 101 101 101 101   CO2 mmol/L 23.7 22.8 23.9 27.9   BUN mg/dL 21 24* 21 20   CREATININE mg/dL 1.08 1.16 1.08 1.16   CALCIUM mg/dL 8.9 9.0 8.7 9.0   GLUCOSE mg/dL 106* 151* 95 137*     Results from last 7 days   Lab Units 09/29/19 2022 09/29/19  1227 09/29/19  0740  09/28/19  1537   CK TOTAL U/L  --   --   --  102   TROPONIN T ng/mL 0.060* 0.097* 0.078* 0.019       Results from last 7 days   Lab Units 09/30/19  1229 09/29/19  1533   INR  1.07 1.11*       I reviewed the patient's new clinical results.  I reviewed the patient's new imaging results and agree with the interpretation.  I personally viewed and interpreted the patient's EKG/Telemetry data      Medication Review:     bacitracin  Topical Q12H   bumetanide 2 mg Intravenous Q12H   metoprolol tartrate 25 mg Oral Q12H   nicotine 1 patch Transdermal Daily   predniSONE 40 mg Oral Daily With Breakfast   sacubitril-valsartan 1 tablet Oral Q12H   sodium chloride 10 mL Intravenous Q12H       heparin (porcine) 11.2 Units/kg/hr Last Rate: 17.2 Units/kg/hr (09/30/19 2023)   Pharmacy Consult         Assessment:  1. New onset atrial flutter, rate controlled.  Patient currently anticoagulated on heparin.  2. NYHA class IV acute combined systolic and diastolic failure with reduced LVEF of 26 to 30% and grade 3 diastolic dysfunction and reduced right ventricular function.  3. Run of nonsustained V. Tach, likely secondary to cardiomyopathy.  Patient has not had any events in the last 24 hours.  4. History of abdominal aortic aneurysm with repair  5. Tobacco use, greater than 3 packs a day.      Recommendations:  1. ANGELITO was initiated for possible cardioversion.  Cardioversion was aborted due to the presence of a thrombus in the left atrial appendage.  Will plan on keeping patient on anticoagulation therapy and rate control for 1 month and then re-try ANGELITO/cardioversion.  2. From a heart failure standpoint, the patient does appear to be compensated.  He has no significant pedal edema.    3. With combined onset of atrial fibrillation/flutter and decreased LVEF patient would benefit from ischemic evaluation.  We will plan to proceed with cardiac catheterization on Thursday.  4. Risks/benefits/alternatives of catheterization were discussed with  the patient including the risk of allergic reaction, kidney injury, hemorrhage, arrhythmia, heart attack and death.  Patient elected to proceed.  5. Pt is on Entresto, tolerating well.  Pt also on metoprolol.  Will add ASA and statin    I discussed the patients findings and my recommendations with patient and family      Christy Malone, TIFFANIE  10/01/19  3:08 PM    Addendum:

## 2019-10-01 NOTE — PLAN OF CARE
Problem: Patient Care Overview  Goal: Plan of Care Review  Outcome: Ongoing (interventions implemented as appropriate)    Goal: Individualization and Mutuality  Outcome: Ongoing (interventions implemented as appropriate)    Goal: Discharge Needs Assessment  Outcome: Ongoing (interventions implemented as appropriate)    Goal: Interprofessional Rounds/Family Conf  Outcome: Ongoing (interventions implemented as appropriate)      Problem: Fall Risk (Adult)  Goal: Identify Related Risk Factors and Signs and Symptoms  Outcome: Ongoing (interventions implemented as appropriate)    Goal: Absence of Fall  Outcome: Ongoing (interventions implemented as appropriate)      Problem: Skin Injury Risk (Adult)  Goal: Identify Related Risk Factors and Signs and Symptoms  Outcome: Ongoing (interventions implemented as appropriate)    Goal: Skin Health and Integrity  Outcome: Ongoing (interventions implemented as appropriate)      Problem: Wound (Includes Pressure Injury) (Adult)  Goal: Signs and Symptoms of Listed Potential Problems Will be Absent, Minimized or Managed (Wound)  Outcome: Ongoing (interventions implemented as appropriate)

## 2019-10-01 NOTE — NURSING NOTE
"Transitional Care Note    Enrolled in [facility] Transitional Care Note    Enrolled in [facility] Transitional Care Services under theTCM Model to be followed for 6 weeks post discharge.  [facility] will assist with support and education at time of transition home from hospital.  Hospital  will follow throughout the stay at [facility].  Home  will visit within 48 hours of discharge and follow with home vitals and telephone contact for 6 weeks.      Patient admitted to [facility] via Emergency Department, complaints of shortness of breath.  Admitted for treatment of Atrial Flutter.    Patient lying in  Bed.  Pleasant and talkative.    Explained transition to home program and Patient is agreeable to home visits.  Home  will be Andreina Gaxiola RN    Clinical Assessment Instrument Scores:  >Short Portable Mental Status 10, normal mental function  >Geriatric Depression Scale 3, normal  >IADL 7, Independent with ADL's  >[place]UZ-ADL 6, Independent with self-care  >Overall Quality of Life \"fair\"  >Subjective Health Rating \"fair\"  >Symptom Bother Scale 17  >General Anxiety Scale  3  >REAL SF 7 indicates reads on 12th grade level   "

## 2019-10-01 NOTE — PLAN OF CARE
Problem: Patient Care Overview  Goal: Plan of Care Review  Outcome: Ongoing (interventions implemented as appropriate)    Goal: Individualization and Mutuality  Outcome: Ongoing (interventions implemented as appropriate)    Goal: Interprofessional Rounds/Family Conf  Outcome: Ongoing (interventions implemented as appropriate)      Problem: Fall Risk (Adult)  Goal: Identify Related Risk Factors and Signs and Symptoms  Outcome: Ongoing (interventions implemented as appropriate)    Goal: Absence of Fall  Outcome: Ongoing (interventions implemented as appropriate)      Problem: Skin Injury Risk (Adult)  Goal: Identify Related Risk Factors and Signs and Symptoms  Outcome: Ongoing (interventions implemented as appropriate)    Goal: Skin Health and Integrity  Outcome: Ongoing (interventions implemented as appropriate)

## 2019-10-02 PROBLEM — I42.0 DILATED CARDIOMYOPATHY (HCC): Status: ACTIVE | Noted: 2019-09-28

## 2019-10-02 LAB
ANION GAP SERPL CALCULATED.3IONS-SCNC: 13.5 MMOL/L (ref 5–15)
APTT PPP: 60.9 SECONDS (ref 23.8–36.1)
BASOPHILS # BLD AUTO: 0.02 10*3/MM3 (ref 0–0.2)
BASOPHILS NFR BLD AUTO: 0.2 % (ref 0–1.5)
BUN BLD-MCNC: 34 MG/DL (ref 8–23)
BUN/CREAT SERPL: 23.8 (ref 7–25)
CALCIUM SPEC-SCNC: 9.2 MG/DL (ref 8.6–10.5)
CHLORIDE SERPL-SCNC: 98 MMOL/L (ref 98–107)
CO2 SERPL-SCNC: 29.5 MMOL/L (ref 22–29)
CREAT BLD-MCNC: 1.43 MG/DL (ref 0.76–1.27)
DEPRECATED RDW RBC AUTO: 49.6 FL (ref 37–54)
EOSINOPHIL # BLD AUTO: 0.07 10*3/MM3 (ref 0–0.4)
EOSINOPHIL NFR BLD AUTO: 0.7 % (ref 0.3–6.2)
ERYTHROCYTE [DISTWIDTH] IN BLOOD BY AUTOMATED COUNT: 15.3 % (ref 12.3–15.4)
GFR SERPL CREATININE-BSD FRML MDRD: 49 ML/MIN/1.73
GLUCOSE BLD-MCNC: 102 MG/DL (ref 65–99)
HCT VFR BLD AUTO: 52.3 % (ref 37.5–51)
HGB BLD-MCNC: 17.2 G/DL (ref 13–17.7)
IMM GRANULOCYTES # BLD AUTO: 0.03 10*3/MM3 (ref 0–0.05)
IMM GRANULOCYTES NFR BLD AUTO: 0.3 % (ref 0–0.5)
LYMPHOCYTES # BLD AUTO: 2.02 10*3/MM3 (ref 0.7–3.1)
LYMPHOCYTES NFR BLD AUTO: 19.3 % (ref 19.6–45.3)
MCH RBC QN AUTO: 29 PG (ref 26.6–33)
MCHC RBC AUTO-ENTMCNC: 32.9 G/DL (ref 31.5–35.7)
MCV RBC AUTO: 88.2 FL (ref 79–97)
MONOCYTES # BLD AUTO: 0.95 10*3/MM3 (ref 0.1–0.9)
MONOCYTES NFR BLD AUTO: 9.1 % (ref 5–12)
NEUTROPHILS # BLD AUTO: 7.36 10*3/MM3 (ref 1.7–7)
NEUTROPHILS NFR BLD AUTO: 70.4 % (ref 42.7–76)
PLATELET # BLD AUTO: 205 10*3/MM3 (ref 140–450)
PMV BLD AUTO: 12.2 FL (ref 6–12)
POTASSIUM BLD-SCNC: 3.3 MMOL/L (ref 3.5–5.2)
RBC # BLD AUTO: 5.93 10*6/MM3 (ref 4.14–5.8)
SODIUM BLD-SCNC: 141 MMOL/L (ref 136–145)
WBC NRBC COR # BLD: 10.45 10*3/MM3 (ref 3.4–10.8)

## 2019-10-02 PROCEDURE — 80048 BASIC METABOLIC PNL TOTAL CA: CPT | Performed by: FAMILY MEDICINE

## 2019-10-02 PROCEDURE — 99232 SBSQ HOSP IP/OBS MODERATE 35: CPT | Performed by: FAMILY MEDICINE

## 2019-10-02 PROCEDURE — 25010000002 HEPARIN (PORCINE) PER 1000 UNITS: Performed by: INTERNAL MEDICINE

## 2019-10-02 PROCEDURE — 63710000001 PREDNISONE PER 5 MG: Performed by: INTERNAL MEDICINE

## 2019-10-02 PROCEDURE — 85730 THROMBOPLASTIN TIME PARTIAL: CPT | Performed by: FAMILY MEDICINE

## 2019-10-02 PROCEDURE — 94799 UNLISTED PULMONARY SVC/PX: CPT

## 2019-10-02 PROCEDURE — 85025 COMPLETE CBC W/AUTO DIFF WBC: CPT | Performed by: FAMILY MEDICINE

## 2019-10-02 RX ORDER — ASPIRIN 81 MG/1
81 TABLET ORAL DAILY
Status: DISCONTINUED | OUTPATIENT
Start: 2019-10-02 | End: 2019-10-04 | Stop reason: HOSPADM

## 2019-10-02 RX ORDER — ROSUVASTATIN CALCIUM 20 MG/1
20 TABLET, COATED ORAL NIGHTLY
Status: DISCONTINUED | OUTPATIENT
Start: 2019-10-02 | End: 2019-10-04 | Stop reason: HOSPADM

## 2019-10-02 RX ADMIN — BACITRACIN: 500 OINTMENT TOPICAL at 08:37

## 2019-10-02 RX ADMIN — SACUBITRIL AND VALSARTAN 1 TABLET: 24; 26 TABLET, FILM COATED ORAL at 08:34

## 2019-10-02 RX ADMIN — ROSUVASTATIN CALCIUM 20 MG: 20 TABLET, FILM COATED ORAL at 20:10

## 2019-10-02 RX ADMIN — BUMETANIDE 2 MG: 0.25 INJECTION INTRAMUSCULAR; INTRAVENOUS at 03:04

## 2019-10-02 RX ADMIN — METOPROLOL TARTRATE 25 MG: 25 TABLET, FILM COATED ORAL at 08:34

## 2019-10-02 RX ADMIN — PREDNISONE 40 MG: 20 TABLET ORAL at 11:20

## 2019-10-02 RX ADMIN — METOPROLOL TARTRATE 25 MG: 25 TABLET, FILM COATED ORAL at 20:09

## 2019-10-02 RX ADMIN — SACUBITRIL AND VALSARTAN 1 TABLET: 24; 26 TABLET, FILM COATED ORAL at 20:10

## 2019-10-02 RX ADMIN — HEPARIN SODIUM 11.2 UNITS/KG/HR: 10000 INJECTION, SOLUTION INTRAVENOUS at 09:16

## 2019-10-02 RX ADMIN — BACITRACIN: 500 OINTMENT TOPICAL at 20:10

## 2019-10-02 RX ADMIN — SODIUM CHLORIDE 1000 ML: 9 INJECTION, SOLUTION INTRAVENOUS at 13:30

## 2019-10-02 RX ADMIN — SODIUM CHLORIDE, PRESERVATIVE FREE 10 ML: 5 INJECTION INTRAVENOUS at 20:10

## 2019-10-02 RX ADMIN — NICOTINE 1 PATCH: 21 PATCH, EXTENDED RELEASE TRANSDERMAL at 08:35

## 2019-10-02 RX ADMIN — ASPIRIN 81 MG: 81 TABLET, COATED ORAL at 12:33

## 2019-10-02 NOTE — PROGRESS NOTES
Discharge Planning Assessment  SCOTT Cook     Patient Name: Zaid Galarza  MRN: [id number]  Today's Date: 10/2/2019    Admit Date: 9/28/2019      Discharge Plan     Row Name 10/02/19 1503       Plan    Plan  SS spoke with pt on this day. Pt states that he doesn't feel that he will be able to return home alone at discharge, and that he will need to go to a SNF facility for rehab. Pt is requesting [facility]. SS contacted Mary Carmen who requested pt's information. SS sent information to Mary Carmen. SS will follow and assist as needed.     Patient/Family in Agreement with Plan  yes        Destination      Service Provider Request Status Selected Services Address Phone Number Fax Number    [facility] Pending - Request Sent N/A [id number] [place] [address] [phone]          [facility]ann[facility]

## 2019-10-02 NOTE — PROGRESS NOTES
[facility] HOSPITALIST    PROGRESS NOTE    Name:  Zaid Galarza   Age:  69 y.o.  Sex:  male  :  1950  MRN:  [id number]   Visit Number:  [id number]  Admission Date:  2019  Date Of Service:  10/02/19  Primary Care Physician:  Jonah Booth APRN     LOS: 4 days :  Patient Care Team:  Jonah Booth APRN as PCP - General (Nurse Practitioner)  Provider, No Known as PCP - Family Medicine:    Chief Complaint:      Shortness of breath    Subjective / Interval History:     Patient seen and examined at bedside  No adverse events overnight however nursing informed me at 11:30 am his blood pressure is very low 80/40 he is being diuresed aggressively for CHF with 2 mg bumex IV bid, will back off now and bolus with 1L IVNS and recheck  Tolerating meals  Improved breathing    Review of Systems:     General ROS: Patient denies any fevers, chills or loss of consciousness.  Respiratory ROS: Denies cough + shortness of breath.  Cardiovascular ROS: Denies chest pain or palpitations. No history of exertional chest pain.  Gastrointestinal ROS: Denies nausea and vomiting. Denies any abdominal pain. No diarrhea.  Neurological ROS: Denies any focal weakness. No loss of consciousness. Denies any numbness. Denies neck pain.  Dermatological ROS: Denies any redness or pruritis.    Vital Signs:    Temp:  [97.3 °F (36.3 °C)-97.6 °F (36.4 °C)] 97.5 °F (36.4 °C)  Heart Rate:  [63-84] 84  Resp:  [18-20] 18  BP: ()/(54-68) 110/62    Intake and output:    I/O last 3 completed shifts:  In: 960 [P.O.:960]  Out: 3125 [Urine:3125]  I/O this shift:  In: 120 [P.O.:120]  Out: -     Physical Examination:    General Appearance:  Alert and cooperative, not in any acute distress.   Head:  Atraumatic and normocephalic, without obvious abnormality.   Eyes:          PERRLA, conjunctivae and sclerae normal, no Icterus. No pallor. Extra-occular movements are within normal limits.   Neck: Supple, trachea midline,  no thyromegaly, no carotid bruit.   Lungs:   Chest shape is normal. Breath sounds heard bilaterally equally.  No crackles + wheezing. No Pleural rub or bronchial breathing.   Heart:  Normal S1 and S2, no murmur, no gallop, no rub. No JVD   Abdomen:   Normal bowel sounds, no masses, no organomegaly. Soft       non-tender, non-distended, no guarding, no rebound tenderness.   Extremities: Moves all extremities well, no edema, no cyanosis, no            clubbing.   Skin: No bleeding, bruising or rash.   Neurologic: Awake, alert and oriented times 3. Moves all 4 extremities equally.   Laboratory results:    Results from last 7 days   Lab Units 09/30/19  0357 09/29/19  1533 09/29/19  0049 09/28/19  1537   SODIUM mmol/L 140 138 139 140   POTASSIUM mmol/L 3.6 4.4 4.3 4.0   CHLORIDE mmol/L 101 101 101 101   CO2 mmol/L 23.7 22.8 23.9 27.9   BUN mg/dL 21 24* 21 20   CREATININE mg/dL 1.08 1.16 1.08 1.16   CALCIUM mg/dL 8.9 9.0 8.7 9.0   BILIRUBIN mg/dL  --   --  0.6 0.8   ALK PHOS U/L  --   --  85 90   ALT (SGPT) U/L  --   --  15 16   AST (SGOT) U/L  --   --  20 18   GLUCOSE mg/dL 106* 151* 95 137*     Results from last 7 days   Lab Units 09/30/19  1229 09/30/19  0357 09/29/19  0049   WBC 10*3/mm3 13.39* 11.32* 7.79   HEMOGLOBIN g/dL 16.1 15.6 14.6   HEMATOCRIT % 49.5 48.1 45.2   PLATELETS 10*3/mm3 168 165 154     Results from last 7 days   Lab Units 09/30/19  1229 09/29/19  1533   INR  1.07 1.11*     Results from last 7 days   Lab Units 09/29/19  2022 09/29/19  1227 09/29/19  0740 09/28/19  1537   CK TOTAL U/L  --   --   --  102   TROPONIN T ng/mL 0.060* 0.097* 0.078* 0.019     Results from last 7 days   Lab Units 09/28/19  1814   BLOODCX  No growth at 3 days  No growth at 3 days       I have reviewed the patient's laboratory results.    Radiology results:    Imaging Results (last 24 hours)     ** No results found for the last 24 hours. **          I have reviewed the patient's radiology reports.    Medication Review:     I  have reviewed the patients active and prn medications.       Atrial flutter (CMS/HCC)      Assessment:    Plan:      ANGELITO showed:  significant spontaneous echo contrast in LA and also fresh thrombus in the JAYASHREE and decided to abort cardioversion.   He will be medically managed for his Aflutter and CHF    Heparin drip still going  Heart cath tomorrow       1) combined systolic and diastolic heart failure newly diagnosed: see Dr Hester note from 9/29/19:  EF 26-30% with grade 3 diastolic dysfunction.  Placed on Entresto.  Patient had not seen a physician in three years.  Was on no meds at home.  Heart cath pending until diuresis improves heart function.     2) acute exacerbation of #1:  bumex IV 1 mg increased to 2 mg daily.     3) new onset atrial flutter:  Heparin gtt.      4) COPD: chronic, not on O2 at home: will evaluate after all testing and therapies are completed will likely need portable, home and 24/7 supplementation.  Quit smoking in 2009     5)  Lethargy: likely 2/2 1-4.  Ineffective CV output.        6)  Elevated troponin:  Trending downward now 0.06  0.078   0.097    Heart cath after diuresis complete.  Cardiology on board case.              Oracio Slater DO  10/02/19  10:59 AM

## 2019-10-02 NOTE — DISCHARGE PLACEMENT REQUEST
"Zaid Salvador (69 y.o. Male)     Date of Birth Social Security Number Address Home Phone MRN    1950  [address] [facility] [address] [phone] [id number]    Confucianist Marital Status          None Single       Admission Date Admission Type Admitting Provider Attending Provider Department, Room/Bed    9/28/19 Emergency Asad Rodgers MD Likens, Dwayne, DO [facility] 3 [facility], 3305/1S    Discharge Date Discharge Disposition Discharge Destination                       Attending Provider:  Oracio Slater DO    Allergies:  No Known Allergies    Isolation:  None   Infection:  None   Code Status:  CPR    Ht:  185.4 cm (73\")   Wt:  86.3 kg (190 lb 3.2 oz)    Admission Cmt:  None   Principal Problem:  Dilated cardiomyopathy (CMS/HCC) [I42.0] More...                 Active Insurance as of 9/28/2019     Primary Coverage     Payor Plan Insurance Group Employer/Plan Group    MEDICARE MEDICARE A & B      Payor Plan Address Payor Plan Phone Number Payor Plan Fax Number Effective Dates    [address] [phone]  9/1/2016 - None Entered    [place] [phone]       Subscriber Name Subscriber Birth Date Member ID       ZAID SALVADOR 1950 [id number]                 Emergency Contacts      (Rel.) Home Phone Work Phone Mobile Phone    EVER MG (Sister) [phone] -- --            Emergency Contact Information     Name Relation Home Work Mobile    EVER MG Sister [phone]            Insurance Information                MEDICARE/MEDICARE A & B Phone: [phone]    Subscriber: Zaid Salvador Subscriber#: [id number]    Group#:  Precert#:           Treatment Team     Provider Relationship Specialty Contact    Oracio Slater DO Attending -- [phone]    Bailey May APRN Nurse Practitioner Nurse Practitioner [phone]    Rosa Wilson, RN Registered Nurse --     Ioana Martínez PCT Patient Care Technician --     Kourtney Phan RN " Registered Nurse --     Asad Rodgers MD Consulting Physician Internal Medicine [phone]    Jackie Sanchez, RN Registered Nurse --     Esperanza Loaiza Patient Care Technician -- [phone]    Zahra Greer, RRT Respiratory Therapist -- [phone]    Vamsi Hester MD Consulting Physician Interventional Cardiology [phone]          Problem List           Codes Noted - [facility]       Hospital    Atrial flutter (CMS/HCC) ICD-10-CM: I48.92  ICD-9-CM: 427.32 2019 - Present    * (Principal) Dilated cardiomyopathy (CMS/HCC) ICD-10-CM: I42.0  ICD-9-CM: 425.4 2019 - Present             History & Physical      Asad Rodgers MD at 19 1858              [facility] HOSPITALIST HISTORY AND PHYSICAL    Patient Identification:  Name:  Zaid Galarza  Age:  69 y.o.  Sex:  male  :  1950  MRN:  [id number]   Visit Number:  [id number]  Room number:  117/17  Primary Care Physician:  Jonah Booth APRN     Subjective     Chief complaint:    Chief Complaint   Patient presents with   • Shortness of Breath       History of presenting illness:  69 y.o. male with hx of AAA repair, 100 pack year smoking history who presents with 3 weeks of worsening shortness of breath. Worse on exertion and some mild orthopnea. Patient has had infrequent palpitations lasting in the minutes. No known history of any heart problems or arhythmias. Patient reports chronic dry cough.     Patient has not seen a medical provider in many years and has had very poor follow-up.     Sisters supportive bedside helping provide history.     Patient has 100 pack year history of smoking and no known diagnosis of COPD.     Denies chest pain. Has not noticed any swelling.     In the ED patient was found to have rate controlled atrial flutter. He was given ceftriaxone and bumex for concern of pneumonia and heart failure.    ---------------------------------------------------------------------------------------------------------------------   Review of Systems   Constitutional: Negative.    HENT: Negative.    Respiratory: Positive for cough and shortness of breath.    Cardiovascular: Negative.    Gastrointestinal: Negative.    Endocrine: Negative.    Genitourinary: Negative.    Musculoskeletal: Negative.    Skin: Negative.    Allergic/Immunologic: Negative.    Neurological: Negative.    Hematological: Negative.    Psychiatric/Behavioral: Negative.      ---------------------------------------------------------------------------------------------------------------------   No past medical history on file.  No past surgical history on file.  No family history on file.  Social History     Socioeconomic History   • Marital status: Single     Spouse name: Not on file   • Number of children: Not on file   • Years of education: Not on file   • Highest education level: Not on file     ---------------------------------------------------------------------------------------------------------------------   Allergies:  Patient has no known allergies.  ---------------------------------------------------------------------------------------------------------------------   Medications below are reported home medications pulling from within the system; at this time, these medications have not been reconciled unless otherwise specified and are in the verification process for further verifcation as current home medications.    Prior to Admission Medications     None        Objective     Vital Signs:  Temp:  [97.6 °F (36.4 °C)] 97.6 °F (36.4 °C)  Heart Rate:  [65-82] 66  Resp:  [18] 18  BP: (133-144)/(80-92) 133/80    No data found.  SpO2:  [99 %] 99 %  on   ;      Body mass index is 24.14 kg/m².    Wt Readings from Last 3 Encounters:   09/28/19 80.7 kg (178 lb)       ---------------------------------------------------------------------------------------------------------------------   Physical Exam:  Constitutional:  Well-developed and well-nourished.  No respiratory distress.      HENT:  Head: Normocephalic and atraumatic.  Mouth:  Moist mucous membranes.    Eyes:  Conjunctivae and EOM are normal.  Pupils are equal, round, and reactive to light.  No scleral icterus.  Cardiovascular:  Normal rate, regular rhythm and normal heart sounds with no murmur.  Pulmonary/Chest:  Mild distress, bilateral expiratory wheezing, no crackles   Abdominal:  Soft. Bowel sounds are normal.  No distension and no tenderness.   Musculoskeletal:  no tenderness, and no deformity.  No red or swollen joints anywhere.    Neurological:  Alert and oriented to person, place, and time.  No cranial nerve deficit.  No focal neurological deficit.   Skin:  Skin is warm and dry.  No rash noted.  No pallor.   Peripheral vascular:  1+ pitting edema, and strong pulses on all 4 extremities.    ---------------------------------------------------------------------------------------------------------------------  EKG:  Personally reviewed. No stemi. Rate controlled atrial flutter.   ECG 12 Lead             Telemetry:      I have personally looked at both the EKG and the telemetry strips.    Last echocardiogram:     --------------------------------------------------------------------------------------------------------------------  Labs:  Results from last 7 days   Lab Units 09/28/19  1537   CRP mg/dL 0.50   WBC 10*3/mm3 6.71   HEMOGLOBIN g/dL 14.8   HEMATOCRIT % 46.1   MCV fL 90.4   MCHC g/dL 32.1   PLATELETS 10*3/mm3 154     Results from last 7 days   Lab Units 09/28/19  1805   PH, ARTERIAL pH units 7.417   PO2 ART mm Hg 74.3*   PCO2, ARTERIAL mm Hg 38.3   HCO3 ART mmol/L 24.1     Results from last 7 days   Lab Units 09/28/19  1537   SODIUM mmol/L 140   POTASSIUM mmol/L 4.0   MAGNESIUM mg/dL 2.0   CHLORIDE mmol/L 101    CO2 mmol/L 27.9   BUN mg/dL 20   CREATININE mg/dL 1.16   EGFR IF NONAFRICN AM mL/min/1.73 63   CALCIUM mg/dL 9.0   GLUCOSE mg/dL 137*   ALBUMIN g/dL 3.83   BILIRUBIN mg/dL 0.8   ALK PHOS U/L 90   AST (SGOT) U/L 18   ALT (SGPT) U/L 16   Estimated Creatinine Clearance: 69.6 mL/min (by C-G formula based on SCr of 1.16 mg/dL).    No results found for: AMMONIA  Results from last 7 days   Lab Units 09/28/19  1537   CK TOTAL U/L 102   TROPONIN T ng/mL 0.019   PROBNP pg/mL 9,708.0*         No results found for: HGBA1C, POCGLU  Lab Results   Component Value Date    TSH 2.540 09/28/2019     No results found for: PREGTESTUR, PREGSERUM, HCG, HCGQUANT  Pain Management Panel     Pain Management Panel Latest Ref Rng & Units 3/8/2016    AMPHETAMINES SCREEN, URINE Negative Negative    BARBITURATES SCREEN Negative Negative    BENZODIAZEPINE SCREEN, URINE Negative Negative    COCAINE SCREEN, URINE Negative Negative    METHADONE SCREEN, URINE Negative Negative        Brief Urine Lab Results     None                      I have personally looked at the labs and they are summarized above.  ----------------------------------------------------------------------------------------------------------------------  Detailed radiology reports for the last 24 hours:    Imaging Results (last 24 hours)     Procedure Component Value Units Date/Time    XR Chest 2 View [[id number]] Collected:  09/28/19 1556     Updated:  09/28/19 9073    Narrative:       EXAMINATION: XR CHEST 2 VW-      CLINICAL INDICATION: SOA Triage Protocol        COMPARISON: None available      TECHNIQUE: PA lateral chest      FINDINGS:   Trace left effusion and bibasilar airspace disease. The appearance is  concerning for pneumonia, particularly in the right lung   Heart and mediastinal contours are unremarkable.   No pneumothorax.   Bony and soft tissue structures are unremarkable.          Impression:       Trace left effusion and bibasilar consolidation     This report was  finalized on 9/28/2019 3:57 PM by Dr. Hossein Lenz MD.           Final impressions for the last 30 days of radiology reports:    Xr Chest 2 View    Result Date: 9/28/2019  Trace left effusion and bibasilar consolidation  This report was finalized on 9/28/2019 3:57 PM by Dr. Hossein Lenz MD.      I have personally looked at the radiology images and read the final radiology report.    Assessment & Plan        #New onset aflutter   - Rate controlled with 4:1 block   - TSH normal, electrolytes normal   - [id number]: 1-2 pending heart failure work-up   - Will start TLov while inpatient as patient may end up needing cardioversion if still dyspnic after treating other potential causes   - Will get echocardiogram   - Will start low dose metoprolol 12.5 mg BID   - Monitor on tele   - If rhythm control needed prior to discharge, will consult cardiology     #Dyspnea   #COPD exacerbation   #Volume overload   - Potential causes of dyspnea: aflutter, heart failure, COPD exacerbation   - ProBNP elevated, mild volume overload on exam and effusion on chest x-ray   - Wheezing on exam along with 100 pack-year smoking history   - Patient may have cardiomyopathy from aflutter, echo pending as above   - 1mg IV bumex given in ED, will monitor response along with renal function and electrolytes   - Will treat for COPD exacerbation with scheduled duonebs, steroids. (Azithromycin held for prolonged QTC)    #Concern for pneumonia   - Bibasilar airspace disease and effusion noted on chest x-ray  - No leukocytosis or fever, no purulent sputum  - Very low suspicion for pneumonia at this juncture  - Will get procalcitonin   - Will not continue antibiotics     #Prolonged QTC  -   - Avoid QT prolonging agents when able     #Thick toenails   - Outpatient f/u     #Tobacco use   - Recommend cessation   - Will offer NRT     #Hx of AAA s/p repair     F: PO  E: Replace as needed   N: Cardiac     Code status: Full     Dispo: Admit to tele     VTE  Prophylaxis:   Mechanical Order History:     None      Pharmalogical Order History:     Ordered     Dose Route Frequency Stop    Signed and Held  Pharmacy to Dose enoxaparin (LOVENOX)      -- XX Continuous PRN --          The patient is high risk due to the following diagnoses/reasons:  New onset aflutter, dyspnea     Asad Rodgers MD  [facility]  09/28/19  6:58 PM    Electronically signed by Asad Rodgers MD at 09/28/19 1921       Vital Signs (last day)     Date/Time   Temp   Temp src   Pulse   Resp   BP   Patient Position   SpO2    10/02/19 1437   97.7 (36.5)   Oral   72   20   131/93   Lying   95    10/02/19 1057   97.7 (36.5)   Oral   71   20   60/40  (Abnormal)    Lying   95    10/02/19 0649   97.5 (36.4)   Oral   84   18   110/62   Lying   97    10/02/19 0642   --   --   --   --   --   --   98    10/02/19 0300   97.6 (36.4)   Oral   63   18   91/54   Lying   97    10/01/19 1852   97.3 (36.3)   Oral   81   18   110/63   Lying   94    10/01/19 1846   --   --   70   20   --   --   96    10/01/19 1835   --   --   71   20   --   --   96    10/01/19 1426   97.5 (36.4)   Oral   78   18   102/68   Lying   95    10/01/19 1032   97.5 (36.4)   Oral   85   18   116/71   Lying   98    10/01/19 0657   --   --   88   20   --   Lying   97    10/01/19 0647   --   --   87   20   --   Lying   97    10/01/19 0620   98.4 (36.9)   Oral   83   20   96/63   Lying   98    10/01/19 0434   --   --   79   20   100/80   Lying   --    10/01/19 0314   98.2 (36.8)   Oral   80   20   114/75   Lying   92              Intake & Output (last day)       10/01 0701 - 10/02 0700 10/02 0701 - 10/03 0700    P.O. 960 360    I.V. (mL/kg)      Total Intake(mL/kg) 960 (11.1) 360 (4.2)    Urine (mL/kg/hr) 1850 (0.9)     Total Output 1850     Net -890 +360              Hospital Medications (active)       Dose Frequency Start End    aspirin EC tablet 81 mg 81 mg Daily 10/2/2019     Sig - Route: Take 1 tablet by mouth Daily. - Oral     bacitracin ointment  Every 12 Hours Scheduled 9/29/2019     Sig - Route: Apply  topically to the appropriate area as directed Every 12 (Twelve) Hours. - Topical    heparin (porcine) 5000 UNIT/ML injection 2,500 Units 2,500 Units As Needed 9/30/2019     Sig - Route: Infuse 0.5 mL into a venous catheter As Needed (Per Heparin Nomogram For PTT 45-55). - Intravenous    heparin (porcine) 5000 UNIT/ML injection 5,000 Units 5,000 Units As Needed 9/30/2019     Sig - Route: Infuse 1 mL into a venous catheter As Needed (Per Heparin Nomogram For PTT Less Than 45). - Intravenous    heparin 31607 units/250 mL (100 units/mL) in 0.45 % NaCl infusion 11.2 Units/kg/hr × 89 kg Titrated 9/30/2019     Sig - Route: Infuse 996 Units/hr into a venous catheter Dose Adjusted By Provider As Needed. - Intravenous    ipratropium-albuterol (DUO-NEB) nebulizer solution 3 mL 3 mL Every 6 Hours PRN 9/29/2019     Sig - Route: Take 3 mL by nebulization Every 6 (Six) Hours As Needed for Shortness of Air. - Nebulization    Cosign for Ordering: Accepted by Asad Rodgers MD on 9/29/2019  7:19 AM    metoprolol tartrate (LOPRESSOR) tablet 25 mg 25 mg Every 12 Hours Scheduled 9/29/2019     Sig - Route: Take 1 tablet by mouth Every 12 (Twelve) Hours. - Oral    nicotine (NICODERM CQ) 21 MG/24HR patch 1 patch 1 patch Daily 9/28/2019     Sig - Route: Place 1 patch on the skin as directed by provider Daily. - Transdermal    predniSONE (DELTASONE) tablet 40 mg 40 mg Daily With Breakfast 9/29/2019     Sig - Route: Take 4 tablets by mouth Daily With Breakfast. - Oral    rosuvastatin (CRESTOR) tablet 20 mg 20 mg Nightly 10/2/2019     Sig - Route: Take 1 tablet by mouth Every Night. - Oral    sacubitril-valsartan (ENTRESTO) 24-26 MG tablet 1 tablet 1 tablet Every 12 Hours Scheduled 9/29/2019     Sig - Route: Take 1 tablet by mouth Every 12 (Twelve) Hours. - Oral    Cosign for Ordering: Accepted by Vamsi Hester MD on 9/30/2019  1:35 PM    sodium  chloride 0.9 % bolus 1,000 mL 1,000 mL Once 10/2/2019     Sig - Route: Infuse 1,000 mL into a venous catheter 1 (One) Time. - Intravenous    sodium chloride 0.9 % flush 10 mL 10 mL As Needed 9/28/2019     Sig - Route: Infuse 10 mL into a venous catheter As Needed for Line Care. - Intravenous    Cosign for Ordering: Accepted by Betina Bhakta DO on 9/29/2019 12:23 AM    sodium chloride 0.9 % flush 10 mL 10 mL Every 12 Hours Scheduled 9/28/2019     Sig - Route: Infuse 10 mL into a venous catheter Every 12 (Twelve) Hours. - Intravenous    sodium chloride 0.9 % flush 10 mL 10 mL As Needed 9/28/2019     Sig - Route: Infuse 10 mL into a venous catheter As Needed for Line Care. - Intravenous    bumetanide (BUMEX) injection 2 mg (Discontinued) 2 mg Every 12 Hours 9/30/2019 10/2/2019    Sig - Route: Infuse 8 mL into a venous catheter Every 12 (Twelve) Hours. - Intravenous    Cosign for Ordering: Accepted by Vamsi Hester MD on 9/30/2019  1:35 PM    Pharmacy Consult (Discontinued)  Continuous PRN 9/29/2019 10/1/2019    Sig - Route: Continuous As Needed for Consult. - Does not apply    Cosign for Ordering: Accepted by Vamsi Hester MD on 9/30/2019  1:35 PM    Pharmacy Consult (Discontinued)  Continuous PRN 10/1/2019 10/1/2019    Sig - Route: Continuous As Needed for Consult. - Does not apply            Lab Results (last 24 hours)     Procedure Component Value Units Date/Time    Basic Metabolic Panel [[id number]]  (Abnormal) Collected:  10/02/19 1115    Specimen:  Blood Updated:  10/02/19 1149     Glucose 102 mg/dL      BUN 34 mg/dL      Creatinine 1.43 mg/dL      Sodium 141 mmol/L      Potassium 3.3 mmol/L      Chloride 98 mmol/L      CO2 29.5 mmol/L      Calcium 9.2 mg/dL      eGFR Non African Amer 49 mL/min/1.73      BUN/Creatinine Ratio 23.8     Anion Gap 13.5 mmol/L     Narrative:       GFR Normal >60  Chronic Kidney Disease <60  Kidney Failure <15    CBC & Differential [[id number]]  Collected:  10/02/19 1115    Specimen:  Blood Updated:  10/02/19 1126    Narrative:       The following orders were created for panel order CBC & Differential.  Procedure                               Abnormality         Status                     ---------                               -----------         ------                     CBC Auto Differential[[id number]]        Abnormal            Final result                 Please view results for these tests on the individual orders.    CBC Auto Differential [[id number]]  (Abnormal) Collected:  10/02/19 1115    Specimen:  Blood Updated:  10/02/19 1126     WBC 10.45 10*3/mm3      RBC 5.93 10*6/mm3      Hemoglobin 17.2 g/dL      Hematocrit 52.3 %      MCV 88.2 fL      MCH 29.0 pg      MCHC 32.9 g/dL      RDW 15.3 %      RDW-SD 49.6 fl      MPV 12.2 fL      Platelets 205 10*3/mm3      Neutrophil % 70.4 %      Lymphocyte % 19.3 %      Monocyte % 9.1 %      Eosinophil % 0.7 %      Basophil % 0.2 %      Immature Grans % 0.3 %      Neutrophils, Absolute 7.36 10*3/mm3      Lymphocytes, Absolute 2.02 10*3/mm3      Monocytes, Absolute 0.95 10*3/mm3      Eosinophils, Absolute 0.07 10*3/mm3      Basophils, Absolute 0.02 10*3/mm3      Immature Grans, Absolute 0.03 10*3/mm3     aPTT [[id number]]  (Abnormal) Collected:  10/02/19 0554    Specimen:  Blood Updated:  10/02/19 0634     PTT 60.9 seconds     Narrative:       PTT Heparin Therapeutic Range:  59 - 95 seconds    Blood Culture - Blood, Arm, Right [[id number]] Collected:  09/28/19 1814    Specimen:  Blood from Arm, Right Updated:  10/01/19 1831     Blood Culture No growth at 3 days    Blood Culture - Blood, Arm, Right [[id number]] Collected:  09/28/19 1814    Specimen:  Blood from Arm, Right Updated:  10/01/19 1831     Blood Culture No growth at 3 days        Imaging Results (last 24 hours)     ** No results found for the last 24 hours. **        ECG/EMG Results (last 24 hours)     ** No results found for the last 24 hours. **         Orders (last 24 hrs)     Start     Ordered    10/03/19 0001  NPO Diet  Diet Effective Midnight      10/02/19 1219    10/02/19 2100  rosuvastatin (CRESTOR) tablet 20 mg  Nightly      10/02/19 1146    10/02/19 1310  Cardiac Catheterization/Vascular Study  Once      10/02/19 1309    10/02/19 1300  aspirin EC tablet 81 mg  Daily      10/02/19 1145    10/02/19 1230  sodium chloride 0.9 % bolus 1,000 mL  Once      10/02/19 1141    10/02/19 1220  IV module  Once      10/02/19 1219    10/02/19 1218  Mr Galarza will be getting a cath tomorrow (Thursday) morning.  Please discontinue the heparin drip at 4am Thursday morning. Thanks.  Nursing Communication  Once     Comments:  Mr Galarza will be getting a cath tomorrow (Thursday) morning.  Please discontinue the heparin drip at 4am Thursday morning. Thanks.    10/02/19 1219    10/02/19 1216  Case Request Cath Lab: Left Heart Cath  Once      10/02/19 1219    10/02/19 1100  CBC & Differential  Daily      10/02/19 1059    10/02/19 1100  Basic Metabolic Panel  Daily      10/02/19 1059    10/02/19 1100  CBC Auto Differential  PROCEDURE ONCE      10/02/19 1059    10/02/19 0600  aPTT  Morning Draw      10/01/19 1947    10/01/19 1521  Pharmacy Consult  Continuous PRN,   Status:  Discontinued      10/01/19 1521    10/01/19 1521  Wearable Cardioverter-Defibrillator  Until Discontinued     Comments:  TIFFANIE Kaur    NPI:  [id number]    10/01/19 1521    09/30/19 1500  bumetanide (BUMEX) injection 2 mg  Every 12 Hours,   Status:  Discontinued      09/29/19 1420    09/30/19 1300  heparin 29742 units/250 mL (100 units/mL) in 0.45 % NaCl infusion  Titrated      09/30/19 1204    09/30/19 1203  CBC & Differential  Every Third Day      09/30/19 1204    09/30/19 1202  heparin (porcine) 5000 UNIT/ML injection 5,000 Units  As Needed      09/30/19 1204    09/30/19 1202  heparin (porcine) 5000 UNIT/ML injection 2,500 Units  As Needed      09/30/19 1204    09/29/19 2100   sacubitril-valsartan (ENTRESTO) 24-26 MG tablet 1 tablet  Every 12 Hours Scheduled      19 1428    19 2100  metoprolol tartrate (LOPRESSOR) tablet 25 mg  Every 12 Hours Scheduled      19 1450    19 1441  Pharmacy Consult  Continuous PRN,   Status:  Discontinued      19 1441    19 1330  bacitracin ointment  Every 12 Hours Scheduled      19 1214    19 0800  predniSONE (DELTASONE) tablet 40 mg  Daily With Breakfast      19 0630  ipratropium-albuterol (DUO-NEB) nebulizer solution 3 mL  Every 6 Hours PRN      19 0627    19 2115  sodium chloride 0.9 % flush 10 mL  Every 12 Hours Scheduled      19 2030  nicotine (NICODERM CQ) 21 MG/24HR patch 1 patch  Daily      19 202  sodium chloride 0.9 % flush 10 mL  As Needed      19 20219 1518  sodium chloride 0.9 % flush 10 mL  As Needed      19 1519    Unscheduled  Up With Assistance  As Needed      19    Unscheduled  aPTT  As Needed      19 1204    Unscheduled  After 2 Consecutive Therapeutic aPTTs, Obtain aPTT Daily.  If a Rate Adjustment is Necessary, Resume Every 6 Hour aPTT Draws  As Needed      19 1204             Physician Progress Notes (last 24 hours) (Notes from 10/01/19 1501 through 10/02/19 1501)      Oracio Slater, DO at 10/02/19 1059              [facility] HOSPITALIST    PROGRESS NOTE    Name:  Zaid Galarza   Age:  69 y.o.  Sex:  male  :  1950  MRN:  [id number]   Visit Number:  [id number]  Admission Date:  2019  Date Of Service:  10/02/19  Primary Care Physician:  Jonah Booth APRN     LOS: 4 days :  Patient Care Team:  Jonah Booth APRN as PCP - General (Nurse Practitioner)  Provider, No Known as PCP - Family Medicine:    Chief Complaint:      Shortness of breath    Subjective / Interval History:     Patient seen and examined at bedside  No  adverse events overnight however nursing informed me at 11:30 am his blood pressure is very low 80/40 he is being diuresed aggressively for CHF with 2 mg bumex IV bid, will back off now and bolus with 1L IVNS and recheck  Tolerating meals  Improved breathing    Review of Systems:     General ROS: Patient denies any fevers, chills or loss of consciousness.  Respiratory ROS: Denies cough + shortness of breath.  Cardiovascular ROS: Denies chest pain or palpitations. No history of exertional chest pain.  Gastrointestinal ROS: Denies nausea and vomiting. Denies any abdominal pain. No diarrhea.  Neurological ROS: Denies any focal weakness. No loss of consciousness. Denies any numbness. Denies neck pain.  Dermatological ROS: Denies any redness or pruritis.    Vital Signs:    Temp:  [97.3 °F (36.3 °C)-97.6 °F (36.4 °C)] 97.5 °F (36.4 °C)  Heart Rate:  [63-84] 84  Resp:  [18-20] 18  BP: ()/(54-68) 110/62    Intake and output:    I/O last 3 completed shifts:  In: 960 [P.O.:960]  Out: 3125 [Urine:3125]  I/O this shift:  In: 120 [P.O.:120]  Out: -     Physical Examination:    General Appearance:  Alert and cooperative, not in any acute distress.   Head:  Atraumatic and normocephalic, without obvious abnormality.   Eyes:          PERRLA, conjunctivae and sclerae normal, no Icterus. No pallor. Extra-occular movements are within normal limits.   Neck: Supple, trachea midline, no thyromegaly, no carotid bruit.   Lungs:   Chest shape is normal. Breath sounds heard bilaterally equally.  No crackles + wheezing. No Pleural rub or bronchial breathing.   Heart:  Normal S1 and S2, no murmur, no gallop, no rub. No JVD   Abdomen:   Normal bowel sounds, no masses, no organomegaly. Soft       non-tender, non-distended, no guarding, no rebound tenderness.   Extremities: Moves all extremities well, no edema, no cyanosis, no            clubbing.   Skin: No bleeding, bruising or rash.   Neurologic: Awake, alert and oriented times 3. Moves  all 4 extremities equally.   Laboratory results:    Results from last 7 days   Lab Units 09/30/19  0357 09/29/19  1533 09/29/19  0049 09/28/19  1537   SODIUM mmol/L 140 138 139 140   POTASSIUM mmol/L 3.6 4.4 4.3 4.0   CHLORIDE mmol/L 101 101 101 101   CO2 mmol/L 23.7 22.8 23.9 27.9   BUN mg/dL 21 24* 21 20   CREATININE mg/dL 1.08 1.16 1.08 1.16   CALCIUM mg/dL 8.9 9.0 8.7 9.0   BILIRUBIN mg/dL  --   --  0.6 0.8   ALK PHOS U/L  --   --  85 90   ALT (SGPT) U/L  --   --  15 16   AST (SGOT) U/L  --   --  20 18   GLUCOSE mg/dL 106* 151* 95 137*     Results from last 7 days   Lab Units 09/30/19  1229 09/30/19  0357 09/29/19  0049   WBC 10*3/mm3 13.39* 11.32* 7.79   HEMOGLOBIN g/dL 16.1 15.6 14.6   HEMATOCRIT % 49.5 48.1 45.2   PLATELETS 10*3/mm3 168 165 154     Results from last 7 days   Lab Units 09/30/19  1229 09/29/19  1533   INR  1.07 1.11*     Results from last 7 days   Lab Units 09/29/19  2022 09/29/19  1227 09/29/19  0740 09/28/19  1537   CK TOTAL U/L  --   --   --  102   TROPONIN T ng/mL 0.060* 0.097* 0.078* 0.019     Results from last 7 days   Lab Units 09/28/19  1814   BLOODCX  No growth at 3 days  No growth at 3 days       I have reviewed the patient's laboratory results.    Radiology results:    Imaging Results (last 24 hours)     ** No results found for the last 24 hours. **          I have reviewed the patient's radiology reports.    Medication Review:     I have reviewed the patients active and prn medications.       Atrial flutter (CMS/HCC)      Assessment:    Plan:      ANGELITO showed:  significant spontaneous echo contrast in LA and also fresh thrombus in the JAYASHREE and decided to abort cardioversion.   He will be medically managed for his Aflutter and CHF    Heparin drip  still going  Heart cath tomorrow       1) combined systolic and diastolic heart failure newly diagnosed: see Dr Hester note from 9/29/19:  EF 26-30% with grade 3 diastolic dysfunction.  Placed on Entresto.  Patient had not seen a  physician in three years.  Was on no meds at home.  Heart cath pending until diuresis improves heart function.     2) acute exacerbation of #1:  bumex IV 1 mg increased to 2 mg daily.     3) new onset atrial flutter:  Heparin gtt.      4) COPD: chronic, not on O2 at home: will evaluate after all testing and therapies are completed will likely need portable, home and 24/7 supplementation.  Quit smoking in      5)  Lethargy: likely 2/2 1-4.  Ineffective CV output.        6)  Elevated troponin:  Trending downward now 0.06  0.078   0.097    Heart cath after diuresis complete.  Cardiology on board case.              Oracio Slater DO  10/02/19  10:59 AM      Electronically signed by Oracio Slater DO at 10/02/19 1143     Christy Malone APRN at 10/01/19 1507             LOS: 3 days     Name: Zaid Galarza  Age/Sex: 69 y.o. male  :  1950        PCP: Jonah Booth APRN    Active Problems:    Atrial flutter (CMS/[facility])      Admission Information: Zaid Galarza is a 69 y.o. male with a past medical history significant for AAA, status post repair, uncertain date and surgeon and no regular follow-up with a physician.  He states that last time he saw a provider was 3 to 4 years ago for viral illness he presented to the ED with increased shortness of breath.  He was admitted with new onset heart failure with reduced LVEF of 26 to 30% and atrial flutter with variable conduction.  Cardiology was consulted.    Chief Complaint: Follow-up heart failure and atrial flutter    Interval history: Patient underwent ANGELITO for possible cardioversion on 2019 per Dr. Vamsi Hester.  A fresh thrombus was found in the left atrial appendage.  Cardioversion was aborted.  Patient was placed on heparin.      Pt seen and examined.  He denies CP.  He states that he has had progressively worsening shortness of breath and dyspnea on exertion over the last few months.  He does not recall having any CP during this period  of time.        Subjective     Review of Systems   Constitution: Negative for weakness and malaise/fatigue.   Cardiovascular: Negative for chest pain, dyspnea on exertion, irregular heartbeat, leg swelling, near-syncope, orthopnea, palpitations, paroxysmal nocturnal dyspnea and syncope.   Respiratory: Negative for shortness of breath.    Hematologic/Lymphatic: Does not bruise/bleed easily.   Neurological: Negative for dizziness and light-headedness.       Vital Signs  Vital Signs (last 72 hrs)       09/28 0700  -  09/29 0659 09/29 0700  -  09/30 0659 09/30 0700  -  10/01 0659 10/01 0700  -  10/01 1508   Most Recent    Temp (°F) 97.6 -  97.8    97.4 -  98.1    97.7 -  98.4      97.5     97.5 (36.4)    Heart Rate 65 -  110    63 -  111    58 -  88    78 -  85     78    Resp 18 -  20    18 -  20    14 -  20      18     18    /86 -  159/97    120/73 -  159/86    (!)88/62 -  119/72    102/68 -  116/71     102/68    SpO2 (%) 96 -  99    94 -  99    92 -  100    95 -  98     95        Temp:  [97.5 °F (36.4 °C)-98.4 °F (36.9 °C)] 97.5 °F (36.4 °C)  Heart Rate:  [78-88] 78  Resp:  [18-20] 18  BP: ()/(40-82) 102/68  Body mass index is 26.03 kg/m².      Intake/Output Summary (Last 24 hours) at 10/1/2019 1508  Last data filed at 10/1/2019 1239  Gross per 24 hour   Intake 360 ml   Output 2975 ml   Net -2615 ml       Physical Exam   Constitutional: He is oriented to person, place, and time. He appears well-developed and well-nourished. Nasal cannula in place.   Neck: No JVD present. Carotid bruit is not present.   Cardiovascular: Normal rate and regular rhythm.   No lower extremity edema   Pulmonary/Chest: Effort normal. No respiratory distress. He has decreased breath sounds. He has no wheezes. He has no rales.   Abdominal: Soft. Bowel sounds are normal. He exhibits no distension. There is no tenderness.   Neurological: He is alert and oriented to person, place, and time.   Psychiatric: He has a normal mood and  affect. Cognition and memory are normal.   Vitals reviewed.      Telemetry:  A flutter 80-90s       Results Review:     Results from last 7 days   Lab Units 09/30/19  1229 09/30/19  0357 09/29/19  0049 09/28/19  1537   WBC 10*3/mm3 13.39* 11.32* 7.79 6.71   HEMOGLOBIN g/dL 16.1 15.6 14.6 14.8   PLATELETS 10*3/mm3 168 165 154 154     Results from last 7 days   Lab Units 09/30/19  0357 09/29/19  1533 09/29/19  0049 09/28/19  1537   SODIUM mmol/L 140 138 139 140   POTASSIUM mmol/L 3.6 4.4 4.3 4.0   CHLORIDE mmol/L 101 101 101 101   CO2 mmol/L 23.7 22.8 23.9 27.9   BUN mg/dL 21 24* 21 20   CREATININE mg/dL 1.08 1.16 1.08 1.16   CALCIUM mg/dL 8.9 9.0 8.7 9.0   GLUCOSE mg/dL 106* 151* 95 137*     Results from last 7 days   Lab Units 09/29/19 2022 09/29/19  1227 09/29/19  0740 09/28/19  1537   CK TOTAL U/L  --   --   --  102   TROPONIN T ng/mL 0.060* 0.097* 0.078* 0.019       Results from last 7 days   Lab Units 09/30/19  1229 09/29/19  1533   INR  1.07 1.11*       I reviewed the patient's new clinical results.  I reviewed the patient's new imaging results and agree with the interpretation.  I personally viewed and interpreted the patient's EKG/Telemetry data      Medication Review:     bacitracin  Topical Q12H   bumetanide 2 mg Intravenous Q12H   metoprolol tartrate 25 mg Oral Q12H   nicotine 1 patch Transdermal Daily   predniSONE 40 mg Oral Daily With Breakfast   sacubitril-valsartan 1 tablet Oral Q12H   sodium chloride 10 mL Intravenous Q12H       heparin (porcine) 11.2 Units/kg/hr Last Rate: 17.2 Units/kg/hr (09/30/19 2023)   Pharmacy Consult         Assessment:  1. New onset atrial flutter, rate controlled.  Patient currently anticoagulated on heparin.  2. NYHA class IV acute combined systolic and diastolic failure with reduced LVEF of 26 to 30% and grade 3 diastolic dysfunction and reduced right ventricular function.  3. Run of nonsustained V. Tach, likely secondary to cardiomyopathy.  Patient has not had any events  in the last 24 hours.  4. History of abdominal aortic aneurysm with repair  5. Tobacco use, greater than 3 packs a day.      Recommendations:  1. ANGELITO was initiated for possible cardioversion.  Cardioversion was aborted due to the presence of a thrombus in the left atrial appendage.  Will plan on keeping patient on anticoagulation therapy and rate control for 1 month and then re-try ANGELITO/cardioversion.  2. From a heart failure standpoint, the patient does appear to be compensated.  He has no significant pedal edema.    3. With combined onset of atrial fibrillation/flutter and decreased LVEF patient would benefit from ischemic evaluation.  We will plan to proceed with cardiac catheterization on Thursday.  4. Risks/benefits/alternatives of catheterization were discussed with the patient including the risk of allergic reaction, kidney injury, hemorrhage, arrhythmia, heart attack and death.  Patient elected to proceed.  5. Pt is on Entresto, tolerating well.  Pt also on metoprolol.  Will add ASA and statin    I discussed the patients findings and my recommendations with patient and family      TIFFANIE Kaur  10/01/19  3:08 PM    Addendum:           Electronically signed by Christy Malone APRN at 10/02/19 3323       Physical Therapy Notes (most recent note)     No notes of this type exist for this encounter.        Occupational Therapy Notes (most recent note)     No notes of this type exist for this encounter.

## 2019-10-02 NOTE — PLAN OF CARE
Problem: Patient Care Overview  Goal: Plan of Care Review  Outcome: Ongoing (interventions implemented as appropriate)   10/02/19 0730   Coping/Psychosocial   Plan of Care Reviewed With patient     Goal: Individualization and Mutuality  Outcome: Ongoing (interventions implemented as appropriate)    Goal: Discharge Needs Assessment  Outcome: Ongoing (interventions implemented as appropriate)   09/28/19 1910 09/28/19 2047 09/28/19 2053   Discharge Needs Assessment   Readmission Within the Last 30 Days --  --  --    Concerns to be Addressed no discharge needs identified;denies needs/concerns at this time --  --    Patient/Family Anticipates Transition to --  --  home   Patient/Family Anticipated Services at Transition --  --     Transportation Anticipated --  --  family or friend will provide   Anticipated Changes Related to Illness none --  --    Equipment Needed After Discharge none --  --    Disability   Equipment Currently Used at Home --  none --     09/30/19 0733   Discharge Needs Assessment   Readmission Within the Last 30 Days no previous admission in last 30 days   Concerns to be Addressed --    Patient/Family Anticipates Transition to --    Patient/Family Anticipated Services at Transition --    Transportation Anticipated --    Anticipated Changes Related to Illness --    Equipment Needed After Discharge --    Disability   Equipment Currently Used at Home --      Goal: Interprofessional Rounds/Family Conf  Outcome: Ongoing (interventions implemented as appropriate)      Problem: Fall Risk (Adult)  Goal: Identify Related Risk Factors and Signs and Symptoms  Outcome: Ongoing (interventions implemented as appropriate)    Goal: Absence of Fall  Outcome: Ongoing (interventions implemented as appropriate)   10/01/19 1934   Fall Risk (Adult)   Absence of Fall making progress toward outcome       Problem: Skin Injury Risk (Adult)  Goal: Identify Related Risk Factors and Signs and Symptoms  Outcome:  Ongoing (interventions implemented as appropriate)    Goal: Skin Health and Integrity  Outcome: Ongoing (interventions implemented as appropriate)   10/01/19 1934   Skin Injury Risk (Adult)   Skin Health and Integrity making progress toward outcome       Problem: Wound (Includes Pressure Injury) (Adult)  Goal: Signs and Symptoms of Listed Potential Problems Will be Absent, Minimized or Managed (Wound)  Outcome: Ongoing (interventions implemented as appropriate)

## 2019-10-03 LAB
ANION GAP SERPL CALCULATED.3IONS-SCNC: 13.3 MMOL/L (ref 5–15)
APTT PPP: 29.3 SECONDS (ref 23.8–36.1)
BACTERIA SPEC AEROBE CULT: NORMAL
BACTERIA SPEC AEROBE CULT: NORMAL
BASOPHILS # BLD AUTO: 0.03 10*3/MM3 (ref 0–0.2)
BASOPHILS NFR BLD AUTO: 0.3 % (ref 0–1.5)
BUN BLD-MCNC: 30 MG/DL (ref 8–23)
BUN/CREAT SERPL: 26.3 (ref 7–25)
CALCIUM SPEC-SCNC: 8.6 MG/DL (ref 8.6–10.5)
CHLORIDE SERPL-SCNC: 99 MMOL/L (ref 98–107)
CO2 SERPL-SCNC: 24.7 MMOL/L (ref 22–29)
CREAT BLD-MCNC: 1.14 MG/DL (ref 0.76–1.27)
DEPRECATED RDW RBC AUTO: 48.5 FL (ref 37–54)
EOSINOPHIL # BLD AUTO: 0.05 10*3/MM3 (ref 0–0.4)
EOSINOPHIL NFR BLD AUTO: 0.4 % (ref 0.3–6.2)
ERYTHROCYTE [DISTWIDTH] IN BLOOD BY AUTOMATED COUNT: 15 % (ref 12.3–15.4)
GFR SERPL CREATININE-BSD FRML MDRD: 64 ML/MIN/1.73
GLUCOSE BLD-MCNC: 96 MG/DL (ref 65–99)
HCT VFR BLD AUTO: 49.4 % (ref 37.5–51)
HGB BLD-MCNC: 16.1 G/DL (ref 13–17.7)
IMM GRANULOCYTES # BLD AUTO: 0.04 10*3/MM3 (ref 0–0.05)
IMM GRANULOCYTES NFR BLD AUTO: 0.3 % (ref 0–0.5)
LYMPHOCYTES # BLD AUTO: 2.58 10*3/MM3 (ref 0.7–3.1)
LYMPHOCYTES NFR BLD AUTO: 22.1 % (ref 19.6–45.3)
MCH RBC QN AUTO: 28.9 PG (ref 26.6–33)
MCHC RBC AUTO-ENTMCNC: 32.6 G/DL (ref 31.5–35.7)
MCV RBC AUTO: 88.7 FL (ref 79–97)
MONOCYTES # BLD AUTO: 1.12 10*3/MM3 (ref 0.1–0.9)
MONOCYTES NFR BLD AUTO: 9.6 % (ref 5–12)
NEUTROPHILS # BLD AUTO: 7.85 10*3/MM3 (ref 1.7–7)
NEUTROPHILS NFR BLD AUTO: 67.3 % (ref 42.7–76)
PLATELET # BLD AUTO: 170 10*3/MM3 (ref 140–450)
PMV BLD AUTO: 12.8 FL (ref 6–12)
POTASSIUM BLD-SCNC: 3.6 MMOL/L (ref 3.5–5.2)
RBC # BLD AUTO: 5.57 10*6/MM3 (ref 4.14–5.8)
SODIUM BLD-SCNC: 137 MMOL/L (ref 136–145)
WBC NRBC COR # BLD: 11.67 10*3/MM3 (ref 3.4–10.8)

## 2019-10-03 PROCEDURE — 25010000002 FENTANYL CITRATE (PF) 100 MCG/2ML SOLUTION: Performed by: INTERNAL MEDICINE

## 2019-10-03 PROCEDURE — B2111ZZ FLUOROSCOPY OF MULTIPLE CORONARY ARTERIES USING LOW OSMOLAR CONTRAST: ICD-10-PCS | Performed by: INTERNAL MEDICINE

## 2019-10-03 PROCEDURE — 93458 L HRT ARTERY/VENTRICLE ANGIO: CPT | Performed by: INTERNAL MEDICINE

## 2019-10-03 PROCEDURE — 94799 UNLISTED PULMONARY SVC/PX: CPT

## 2019-10-03 PROCEDURE — 63710000001 PREDNISONE PER 5 MG: Performed by: INTERNAL MEDICINE

## 2019-10-03 PROCEDURE — 0 IOPAMIDOL PER 1 ML: Performed by: INTERNAL MEDICINE

## 2019-10-03 PROCEDURE — 99152 MOD SED SAME PHYS/QHP 5/>YRS: CPT | Performed by: INTERNAL MEDICINE

## 2019-10-03 PROCEDURE — 85025 COMPLETE CBC W/AUTO DIFF WBC: CPT | Performed by: FAMILY MEDICINE

## 2019-10-03 PROCEDURE — 25010000002 MIDAZOLAM PER 1 MG: Performed by: INTERNAL MEDICINE

## 2019-10-03 PROCEDURE — 85730 THROMBOPLASTIN TIME PARTIAL: CPT | Performed by: FAMILY MEDICINE

## 2019-10-03 PROCEDURE — C1760 CLOSURE DEV, VASC: HCPCS | Performed by: INTERNAL MEDICINE

## 2019-10-03 PROCEDURE — 80048 BASIC METABOLIC PNL TOTAL CA: CPT | Performed by: FAMILY MEDICINE

## 2019-10-03 PROCEDURE — C1894 INTRO/SHEATH, NON-LASER: HCPCS | Performed by: INTERNAL MEDICINE

## 2019-10-03 PROCEDURE — 4A023N7 MEASUREMENT OF CARDIAC SAMPLING AND PRESSURE, LEFT HEART, PERCUTANEOUS APPROACH: ICD-10-PCS | Performed by: INTERNAL MEDICINE

## 2019-10-03 PROCEDURE — C1769 GUIDE WIRE: HCPCS | Performed by: INTERNAL MEDICINE

## 2019-10-03 PROCEDURE — 99232 SBSQ HOSP IP/OBS MODERATE 35: CPT | Performed by: FAMILY MEDICINE

## 2019-10-03 RX ORDER — FENTANYL CITRATE 50 UG/ML
INJECTION, SOLUTION INTRAMUSCULAR; INTRAVENOUS AS NEEDED
Status: DISCONTINUED | OUTPATIENT
Start: 2019-10-03 | End: 2019-10-03 | Stop reason: HOSPADM

## 2019-10-03 RX ORDER — SODIUM CHLORIDE 9 MG/ML
INJECTION, SOLUTION INTRAVENOUS CONTINUOUS PRN
Status: COMPLETED | OUTPATIENT
Start: 2019-10-03 | End: 2019-10-03

## 2019-10-03 RX ORDER — SODIUM CHLORIDE 9 MG/ML
100 INJECTION, SOLUTION INTRAVENOUS CONTINUOUS
Status: DISCONTINUED | OUTPATIENT
Start: 2019-10-03 | End: 2019-10-04 | Stop reason: HOSPADM

## 2019-10-03 RX ORDER — LIDOCAINE HYDROCHLORIDE 20 MG/ML
INJECTION, SOLUTION INFILTRATION; PERINEURAL AS NEEDED
Status: DISCONTINUED | OUTPATIENT
Start: 2019-10-03 | End: 2019-10-03 | Stop reason: HOSPADM

## 2019-10-03 RX ORDER — SODIUM CHLORIDE 9 MG/ML
3 INJECTION, SOLUTION INTRAVENOUS CONTINUOUS
Status: ACTIVE | OUTPATIENT
Start: 2019-10-03 | End: 2019-10-03

## 2019-10-03 RX ORDER — MIDAZOLAM HYDROCHLORIDE 1 MG/ML
INJECTION INTRAMUSCULAR; INTRAVENOUS AS NEEDED
Status: DISCONTINUED | OUTPATIENT
Start: 2019-10-03 | End: 2019-10-03 | Stop reason: HOSPADM

## 2019-10-03 RX ADMIN — NICOTINE 1 PATCH: 21 PATCH, EXTENDED RELEASE TRANSDERMAL at 12:36

## 2019-10-03 RX ADMIN — METOPROLOL TARTRATE 25 MG: 25 TABLET, FILM COATED ORAL at 12:34

## 2019-10-03 RX ADMIN — SACUBITRIL AND VALSARTAN 1 TABLET: 24; 26 TABLET, FILM COATED ORAL at 12:35

## 2019-10-03 RX ADMIN — SODIUM CHLORIDE, PRESERVATIVE FREE 10 ML: 5 INJECTION INTRAVENOUS at 20:45

## 2019-10-03 RX ADMIN — ROSUVASTATIN CALCIUM 20 MG: 20 TABLET, FILM COATED ORAL at 20:45

## 2019-10-03 RX ADMIN — BACITRACIN: 500 OINTMENT TOPICAL at 20:45

## 2019-10-03 RX ADMIN — PREDNISONE 40 MG: 20 TABLET ORAL at 12:35

## 2019-10-03 RX ADMIN — SACUBITRIL AND VALSARTAN 1 TABLET: 24; 26 TABLET, FILM COATED ORAL at 20:45

## 2019-10-03 RX ADMIN — APIXABAN 5 MG: 5 TABLET, FILM COATED ORAL at 20:45

## 2019-10-03 RX ADMIN — METOPROLOL TARTRATE 25 MG: 25 TABLET, FILM COATED ORAL at 20:45

## 2019-10-03 RX ADMIN — APIXABAN 5 MG: 5 TABLET, FILM COATED ORAL at 13:26

## 2019-10-03 RX ADMIN — SODIUM CHLORIDE 3 ML/KG/HR: 9 INJECTION, SOLUTION INTRAVENOUS at 12:36

## 2019-10-03 RX ADMIN — BACITRACIN: 500 OINTMENT TOPICAL at 13:26

## 2019-10-03 NOTE — PROGRESS NOTES
[facility] HOSPITALIST    PROGRESS NOTE    Name:  Zaid Galarza   Age:  69 y.o.  Sex:  male  :  1950  MRN:  [id number]   Visit Number:  [id number]  Admission Date:  2019  Date Of Service:  10/03/19  Primary Care Physician:  Jonah Booth APRN     LOS: 5 days :  Patient Care Team:  Jonah Booth APRN as PCP - General (Nurse Practitioner)  Provider, No Known as PCP - Family Medicine:    Chief Complaint:      Shortness of breath    Subjective / Interval History:     Patient seen and examined at bedside  No adverse events overnight  Had his heart cath this morning.    Review of Systems:     General ROS: Patient denies any fevers, chills or loss of consciousness.  Respiratory ROS: Denies cough or shortness of breath.  Cardiovascular ROS: Denies chest pain or palpitations. No history of exertional chest pain.  Gastrointestinal ROS: Denies nausea and vomiting. Denies any abdominal pain. No diarrhea.  Neurological ROS: Denies any focal weakness. No loss of consciousness. Denies any numbness. Denies neck pain.  Dermatological ROS: Denies any redness or pruritis.    Vital Signs:    Temp:  [97.3 °F (36.3 °C)-97.8 °F (36.6 °C)] 97.8 °F (36.6 °C)  Heart Rate:  [68-84] 82  Resp:  [16-20] 18  BP: ()/(40-93) 124/87    Intake and output:    I/O last 3 completed shifts:  In: 840 [P.O.:840]  Out: 1800 [Urine:1800]  I/O this shift:  In: 288.6 [I.V.:288.6]  Out: -     Physical Examination:    General Appearance:  Alert and cooperative, not in any acute distress.   Head:  Atraumatic and normocephalic, without obvious abnormality.   Eyes:          PERRLA, conjunctivae and sclerae normal, no Icterus. No pallor. Extra-occular movements are within normal limits.   Neck: Supple, trachea midline, no thyromegaly, no carotid bruit.   Lungs:   Chest shape is normal. Breath sounds heard bilaterally equally.  No crackles or wheezing. No Pleural rub or bronchial breathing.   Heart:  Normal S1  and S2, no murmur, no gallop, no rub. No JVD   Abdomen:   Normal bowel sounds, no masses, no organomegaly. Soft       non-tender, non-distended, no guarding, no rebound tenderness.   Extremities: Moves all extremities well, no edema, no cyanosis, no            clubbing.   Skin: No bleeding, bruising or rash.   Neurologic: Awake, alert and oriented times 3. Moves all 4 extremities equally.   Laboratory results:    Results from last 7 days   Lab Units 10/03/19  0529 10/02/19  1115 09/30/19  0357  09/29/19  0049 09/28/19  1537   SODIUM mmol/L 137 141 140   < > 139 140   POTASSIUM mmol/L 3.6 3.3* 3.6   < > 4.3 4.0   CHLORIDE mmol/L 99 98 101   < > 101 101   CO2 mmol/L 24.7 29.5* 23.7   < > 23.9 27.9   BUN mg/dL 30* 34* 21   < > 21 20   CREATININE mg/dL 1.14 1.43* 1.08   < > 1.08 1.16   CALCIUM mg/dL 8.6 9.2 8.9   < > 8.7 9.0   BILIRUBIN mg/dL  --   --   --   --  0.6 0.8   ALK PHOS U/L  --   --   --   --  85 90   ALT (SGPT) U/L  --   --   --   --  15 16   AST (SGOT) U/L  --   --   --   --  20 18   GLUCOSE mg/dL 96 102* 106*   < > 95 137*    < > = values in this interval not displayed.     Results from last 7 days   Lab Units 10/03/19  0529 10/02/19  1115 09/30/19  1229   WBC 10*3/mm3 11.67* 10.45 13.39*   HEMOGLOBIN g/dL 16.1 17.2 16.1   HEMATOCRIT % 49.4 52.3* 49.5   PLATELETS 10*3/mm3 170 205 168     Results from last 7 days   Lab Units 09/30/19  1229 09/29/19  1533   INR  1.07 1.11*     Results from last 7 days   Lab Units 09/29/19  2022 09/29/19  1227 09/29/19  0740 09/28/19  1537   CK TOTAL U/L  --   --   --  102   TROPONIN T ng/mL 0.060* 0.097* 0.078* 0.019     Results from last 7 days   Lab Units 09/28/19  1814   BLOODCX  No growth at 4 days  No growth at 4 days       I have reviewed the patient's laboratory results.    Radiology results:    Imaging Results (last 24 hours)     ** No results found for the last 24 hours. **          I have reviewed the patient's radiology reports.    Medication Review:     I have  reviewed the patients active and prn medications.       Dilated cardiomyopathy (CMS/HCC)    Atrial flutter (CMS/HCC)      Assessment:   Plan:    Wants to go to [facility] for rehab  His mom was there at one point he likes their staff and rehab program    ANGELITO showed:  significant spontaneous echo contrast in LA and also fresh thrombus in the JAYASHREE and decided to abort cardioversion.   He will be medically managed for his Aflutter and CHF    Heparin drip still going  Heart cath today shows  Left main: Normal     LAD: 20 to 30% diffuse disease in LADLeft circumflex: 30% focal mid circumflex   RCA: Large size dominant vessel proximal 40% and distal 40% focal stenosisPDA: Mild 20%     Final impression:   Mild to moderate CAD   3+ mitral regurgitation  RECOMMENDATIONS:   Aspirin  Beta-blocker  Statin  ACE inhibitor  Continue anticoagulant  Repeat ANGELITO after 6 to 8 weeks  If atrial thrombus resolved then proceed with  Ablation  I believe once the arrhythmia is controlled patient ejection fraction will tend to increase        1) combined systolic and diastolic heart failure newly diagnosed: see Dr Hester note from 9/29/19:  EF 26-30% with grade 3 diastolic dysfunction.  Placed on Entresto.  Patient had not seen a physician in three years.  Was on no meds at home.  Heart cath pending until diuresis improves heart function.     2) acute exacerbation of #1:  bumex IV 1 mg increased to 2 mg daily.     3) new onset atrial flutter:  Heparin gtt.      4) COPD: chronic, not on O2 at home: will evaluate after all testing and therapies are completed will likely need portable, home and 24/7 supplementation.  Quit smoking in 2009     5)  Lethargy: likely 2/2 1-4.  Ineffective CV output.        6)  Elevated troponin:  Trending downward now 0.06  0.078   0.097    Heart cath after diuresis complete.  Cardiology on board case.            Oracio Slater DO  10/03/19  10:52 AM

## 2019-10-03 NOTE — NURSING NOTE
Cardiac Rehab referral received on patient. After reviewing patient information there is no qualifying diagnosis noted at this time .Qualifications for Cardiac Rehab include Coronary Stenting, AMI (NSTEMI, STEMI), Stable Angina, CABG, Heart Valve Repair/Replacement, Heart Transplant or Stable CHF (with these specific qualifications, Left Ventricular Ejection Fraction of 35% or less, NYHA class II to IV symptoms despite optimal heart failure therapy for at least 6 weeks and has not had a recent (less than or equal to 6 weeks) or planned (less than or equal to 6 months) major cardiovascular hospitalizations or procedures. Due to patient being in the hospital, does not meet criteria at this time) Please contact us at [phone] with questions.    If the diagnosis is CHF when the patient meets the CHF criteria for Cardiac Rehab with a new order we will gladly schedule them.

## 2019-10-03 NOTE — PROGRESS NOTES
CARDIAC CATHETERIZATION / INTERVENTION REPORT     DATE OF PROCEDURE: 10/3/2019     INDICATION FOR PROCEDURE: {Cardiomyopathy, severe LV dysfunction, fib , flutter    PROCEDURE PERFORMED:     1. Coronary Angiogram  2. Left heart catheretization       PROCEDURE COMMENTS:    Zaid Galarza was brought to the cath lab and placed on the cardiac catherization table in the postabsortive state. The patient was prepped and draped according to the cath lab protocol under strict aseptic and sterile condition. Patient's right femoral artery sight was prepped and draped. Under fluoroscopic guidance the right femoral artery was punctured using a 21 gauge needle utilizing the modified Seldinger technique. 6 Turkmen sheath was introduced over a wire. After aspirating for blood it was flushed with heparinized saline.     We advanced Torres type diagnostic catheters over the wire. The  left and right coronary arteries  were selectively engaged. Left and right coronary angiogram were obtained in multiple orthogonal projections.     After completion of the procedure the femoral sheath was removed in the cath lab and hemostasis was achieved by Angioseal. The patient tolerated the procedure without complications.         FINDINGS:     1. HEMODYNAMICS:  LVEDP 10 mmHg, no gradient across the aortic valve.       2. CORONARY ANGIOGRAPHY: Dominant dominant coronary artery system.    The left main coronary artery bifurcated into the LAD and left circumflex coronary.  The LAD coursed in the anterior interventricular groove, gave rise to diagonal branches and reached the apex.    Left circumflex coursed in the left atrial ventricular groove and gives rise to several marginal branches.    The right coronary artery course in the right atrial ventricular groove I gave rise to several acute marginal branches.     Left main: Normal     LAD: 20 to 30% diffuse disease in LADLeft circumflex: 30% focal mid circumflex   RCA: Large size dominant vessel  proximal 40% and distal 40% focal stenosisPDA: Mild 20%     Final impression:   Mild to moderate CAD   3+ mitral regurgitation  RECOMMENDATIONS:   Aspirin  Beta-blocker  Statin  ACE inhibitor  Continue anticoagulant  Repeat ANGELITO after 6 to 8 weeks  If atrial thrombus resolved then proceed with  Ablation  I believe once the arrhythmia is controlled patient ejection fraction will tend to increase    Vincent Phillips MD  10/03/19  9:38 AM

## 2019-10-03 NOTE — PROGRESS NOTES
Discharge Planning Assessment   [place]     Patient Name: Zaid Galarza  MRN: [id number]  Today's Date: 10/3/2019    Admit Date: 9/28/2019        Discharge Plan     Row Name 10/03/19 1143       Plan    Plan  SS faxed pt updated information to [facility] for review and notified Mary Carmen.  SS will follow.     Patient/Family in Agreement with Plan  yes        Destination      Service Provider Request Status Selected Services Address Phone Number Fax Number    [facility] Pending - Request Sent N/A [id number] [place] [address] [phone]   Dialysis/Infusion      No service coordination in this encounter.      Home Medical Care      No service coordination in this encounter.      Therapy      No service coordination in this encounter.      Community Resources      No service coordination in this encounter.          Demographic Summary    No documentation.       Functional Status    No documentation.       Psychosocial    No documentation.       Abuse/Neglect    No documentation.       Legal    No documentation.       Substance Abuse    No documentation.       Patient Forms    No documentation.           Maddy Ortega, NGOCW

## 2019-10-03 NOTE — DISCHARGE PLACEMENT REQUEST
"Zaid Salvador (69 y.o. Male)     Date of Birth Social Security Number Address Home Phone MRN    1950  [address] [id number] [phone] [id number]    [facility] Marital Status          None Single       Admission Date Admission Type Admitting Provider Attending Provider Department, Room/Bed    9/28/19 Emergency Asad Rodgers MD Likens, Dwayne, DO [facility] 3 [facility], 3305/1S    Discharge Date Discharge Disposition Discharge Destination                       Attending Provider:  Oracio Slater DO    Allergies:  No Known Allergies    Isolation:  None   Infection:  None   Code Status:  CPR    Ht:  185.4 cm (73\")   Wt:  86.5 kg (190 lb 9.6 oz)    Admission Cmt:  None   Principal Problem:  Dilated cardiomyopathy (CMS/HCC) [I42.0] More...                 Active Insurance as of 9/28/2019     Primary Coverage     Payor Plan Insurance Group Employer/Plan Group    MEDICARE MEDICARE A & B      Payor Plan Address Payor Plan Phone Number Payor Plan Fax Number Effective Dates    [address] [phone]  9/1/2016 - None Entered    [place] [address]       Subscriber Name Subscriber Birth Date Member ID       ZAID SALVADOR 1950 [id number]                 Emergency Contacts      (Rel.) Home Phone Work Phone Mobile Phone    EVER MG (Sister) [phone] -- --    Esequiel Salvador (Brother) -- -- [phone]            Emergency Contact Information     Name Relation Home Work Mobile    EVER MG Sister [phone]      Esequiel Salvador Brother   [phone]          Insurance Information                MEDICARE/MEDICARE A & B Phone: [phone]    Subscriber: Zaid Salvador Subscriber#: [id number]    Group#:  Precert#:           Treatment Team     Provider Relationship Specialty Contact    Oracio Slater DO Attending -- [phone]    Bailey May APRN Nurse Practitioner Nurse Practitioner [phone]    Rosa Wilson RN " Registered Nurse --     Asad Rodgers MD Consulting Physician Internal Medicine [phone]    Esperanza Loaiza Patient Care Technician -- [phone]    Zahra Greer, RRT Respiratory Therapist -- [phone]    Vamsi Hester MD Consulting Physician Interventional Cardiology [phone]    Debby Chahal, RN Registered Nurse --           Problem List           Codes Noted - [facility]       Hospital    Atrial flutter (CMS/HCC) ICD-10-CM: I48.92  ICD-9-CM: 427.32 2019 - Present    * (Principal) Dilated cardiomyopathy (CMS/HCC) ICD-10-CM: I42.0  ICD-9-CM: 425.4 2019 - Present             History & Physical      Asad Rodgers MD at 19 1858              [facility]    Patient Identification:  Name:  Zaid Galarza  Age:  69 y.o.  Sex:  male  :  1950  MRN:  [id number]   Visit Number:  [id number]  Room number:  117/17  Primary Care Physician:  Jonah Booth APRN     Subjective     Chief complaint:    Chief Complaint   Patient presents with   • Shortness of Breath       History of presenting illness:  69 y.o. male with hx of AAA repair, 100 pack year smoking history who presents with 3 weeks of worsening shortness of breath. Worse on exertion and some mild orthopnea. Patient has had infrequent palpitations lasting in the minutes. No known history of any heart problems or arhythmias. Patient reports chronic dry cough.     Patient has not seen a medical provider in many years and has had very poor follow-up.     Sisters supportive bedside helping provide history.     Patient has 100 pack year history of smoking and no known diagnosis of COPD.     Denies chest pain. Has not noticed any swelling.     In the ED patient was found to have rate controlled atrial flutter. He was given ceftriaxone and bumex for concern of pneumonia and heart failure.    ---------------------------------------------------------------------------------------------------------------------   Review of Systems   Constitutional: Negative.    HENT: Negative.    Respiratory: Positive for cough and shortness of breath.    Cardiovascular: Negative.    Gastrointestinal: Negative.    Endocrine: Negative.    Genitourinary: Negative.    Musculoskeletal: Negative.    Skin: Negative.    Allergic/Immunologic: Negative.    Neurological: Negative.    Hematological: Negative.    Psychiatric/Behavioral: Negative.      ---------------------------------------------------------------------------------------------------------------------   No past medical history on file.  No past surgical history on file.  No family history on file.  Social History     Socioeconomic History   • Marital status: Single     Spouse name: Not on file   • Number of children: Not on file   • Years of education: Not on file   • Highest education level: Not on file     ---------------------------------------------------------------------------------------------------------------------   Allergies:  Patient has no known allergies.  ---------------------------------------------------------------------------------------------------------------------   Medications below are reported home medications pulling from within the system; at this time, these medications have not been reconciled unless otherwise specified and are in the verification process for further verifcation as current home medications.    Prior to Admission Medications     None        Objective     Vital Signs:  Temp:  [97.6 °F (36.4 °C)] 97.6 °F (36.4 °C)  Heart Rate:  [65-82] 66  Resp:  [18] 18  BP: (133-144)/(80-92) 133/80    No data found.  SpO2:  [99 %] 99 %  on   ;      Body mass index is 24.14 kg/m².    Wt Readings from Last 3 Encounters:   09/28/19 80.7 kg (178 lb)       ---------------------------------------------------------------------------------------------------------------------   Physical Exam:  Constitutional:  Well-developed and well-nourished.  No respiratory distress.      HENT:  Head: Normocephalic and atraumatic.  Mouth:  Moist mucous membranes.    Eyes:  Conjunctivae and EOM are normal.  Pupils are equal, round, and reactive to light.  No scleral icterus.  Cardiovascular:  Normal rate, regular rhythm and normal heart sounds with no murmur.  Pulmonary/Chest:  Mild distress, bilateral expiratory wheezing, no crackles   Abdominal:  Soft. Bowel sounds are normal.  No distension and no tenderness.   Musculoskeletal:  no tenderness, and no deformity.  No red or swollen joints anywhere.    Neurological:  Alert and oriented to person, place, and time.  No cranial nerve deficit.  No focal neurological deficit.   Skin:  Skin is warm and dry.  No rash noted.  No pallor.   Peripheral vascular:  1+ pitting edema, and strong pulses on all 4 extremities.    ---------------------------------------------------------------------------------------------------------------------  EKG:  Personally reviewed. No stemi. Rate controlled atrial flutter.   ECG 12 Lead             Telemetry:      I have personally looked at both the EKG and the telemetry strips.    Last echocardiogram:     --------------------------------------------------------------------------------------------------------------------  Labs:  Results from last 7 days   Lab Units 09/28/19  1537   CRP mg/dL 0.50   WBC 10*3/mm3 6.71   HEMOGLOBIN g/dL 14.8   HEMATOCRIT % 46.1   MCV fL 90.4   MCHC g/dL 32.1   PLATELETS 10*3/mm3 154     Results from last 7 days   Lab Units 09/28/19  1805   PH, ARTERIAL pH units 7.417   PO2 ART mm Hg 74.3*   PCO2, ARTERIAL mm Hg 38.3   HCO3 ART mmol/L 24.1     Results from last 7 days   Lab Units 09/28/19  1537   SODIUM mmol/L 140   POTASSIUM mmol/L 4.0   MAGNESIUM mg/dL 2.0   CHLORIDE mmol/L 101    CO2 mmol/L 27.9   BUN mg/dL 20   CREATININE mg/dL 1.16   EGFR IF NONAFRICN AM mL/min/1.73 63   CALCIUM mg/dL 9.0   GLUCOSE mg/dL 137*   ALBUMIN g/dL 3.83   BILIRUBIN mg/dL 0.8   ALK PHOS U/L 90   AST (SGOT) U/L 18   ALT (SGPT) U/L 16   Estimated Creatinine Clearance: 69.6 mL/min (by C-G formula based on SCr of 1.16 mg/dL).    No results found for: AMMONIA  Results from last 7 days   Lab Units 09/28/19  1537   CK TOTAL U/L 102   TROPONIN T ng/mL 0.019   PROBNP pg/mL 9,708.0*         No results found for: HGBA1C, POCGLU  Lab Results   Component Value Date    TSH 2.540 09/28/2019     No results found for: PREGTESTUR, PREGSERUM, HCG, HCGQUANT  Pain Management Panel     Pain Management Panel Latest Ref Rng & Units 3/8/2016    AMPHETAMINES SCREEN, URINE Negative Negative    BARBITURATES SCREEN Negative Negative    BENZODIAZEPINE SCREEN, URINE Negative Negative    COCAINE SCREEN, URINE Negative Negative    METHADONE SCREEN, URINE Negative Negative        Brief Urine Lab Results     None                      I have personally looked at the labs and they are summarized above.  ----------------------------------------------------------------------------------------------------------------------  Detailed radiology reports for the last 24 hours:    Imaging Results (last 24 hours)     Procedure Component Value Units Date/Time    XR Chest 2 View [[id number]] Collected:  09/28/19 1556     Updated:  09/28/19 3986    Narrative:       EXAMINATION: XR CHEST 2 VW-      CLINICAL INDICATION: SOA Triage Protocol        COMPARISON: None available      TECHNIQUE: PA lateral chest      FINDINGS:   Trace left effusion and bibasilar airspace disease. The appearance is  concerning for pneumonia, particularly in the right lung   Heart and mediastinal contours are unremarkable.   No pneumothorax.   Bony and soft tissue structures are unremarkable.          Impression:       Trace left effusion and bibasilar consolidation     This report was  finalized on 9/28/2019 3:57 PM by Dr. Hossein Lenz MD.           Final impressions for the last 30 days of radiology reports:    Xr Chest 2 View    Result Date: 9/28/2019  Trace left effusion and bibasilar consolidation  This report was finalized on 9/28/2019 3:57 PM by Dr. Hossein Lenz MD.      I have personally looked at the radiology images and read the final radiology report.    Assessment & Plan        #New onset aflutter   - Rate controlled with 4:1 block   - TSH normal, electrolytes normal   - [id number]: 1-2 pending heart failure work-up   - Will start TLov while inpatient as patient may end up needing cardioversion if still dyspnic after treating other potential causes   - Will get echocardiogram   - Will start low dose metoprolol 12.5 mg BID   - Monitor on tele   - If rhythm control needed prior to discharge, will consult cardiology     #Dyspnea   #COPD exacerbation   #Volume overload   - Potential causes of dyspnea: aflutter, heart failure, COPD exacerbation   - ProBNP elevated, mild volume overload on exam and effusion on chest x-ray   - Wheezing on exam along with 100 pack-year smoking history   - Patient may have cardiomyopathy from aflutter, echo pending as above   - 1mg IV bumex given in ED, will monitor response along with renal function and electrolytes   - Will treat for COPD exacerbation with scheduled duonebs, steroids. (Azithromycin held for prolonged QTC)    #Concern for pneumonia   - Bibasilar airspace disease and effusion noted on chest x-ray  - No leukocytosis or fever, no purulent sputum  - Very low suspicion for pneumonia at this juncture  - Will get procalcitonin   - Will not continue antibiotics     #Prolonged QTC  -   - Avoid QT prolonging agents when able     #Thick toenails   - Outpatient f/u     #Tobacco use   - Recommend cessation   - Will offer NRT     #Hx of AAA s/p repair     F: PO  E: Replace as needed   N: Cardiac     Code status: Full     Dispo: Admit to tele     VTE  Prophylaxis:   Mechanical Order History:     None      Pharmalogical Order History:     Ordered     Dose Route Frequency Stop    Signed and Held  Pharmacy to Dose enoxaparin (LOVENOX)      -- XX Continuous PRN --          The patient is high risk due to the following diagnoses/reasons:  New onset aflutter, dyspnea     Asad Rodgers MD  [facility]  09/28/19  6:58 PM    Electronically signed by Asad Rodgers MD at 09/28/19 1921       Vital Signs (last day)     Date/Time   Temp   Temp src   Pulse   Resp   BP   Patient Position   SpO2    10/03/19 1123   97.5 (36.4)   Oral   80   18   148/95   Lying   99    10/03/19 1045   97.8 (36.6)   Oral   82   18   124/87   Lying   97    10/03/19 1032   97.4 (36.3)   Oral   79   18   125/88   Lying   99    10/03/19 1015   97.5 (36.4)   Oral   84   18   115/83   Lying   99    10/03/19 0955   97.7 (36.5)   Oral   77   16   111/72   Lying   99    10/03/19 0935   --   --   --   16   --   --   --    10/03/19 0930   --   --   --   16   --   --   --    10/03/19 09:25:27   --   --   --   16   --   --   --    10/03/19 09:15:21   --   --   --   16   --   --   --    10/03/19 09:10:43   --   --   --   18   --   --   --    10/03/19 09:00:17   --   --   --   18   --   --   --    10/03/19 0855   --   --   --   18   --   --   --    10/03/19 08:51:55   --   --   --   20   125/87   --   --    10/03/19 0642   97.4 (36.3)   Oral   68   20   113/73   Lying   96    10/03/19 0634   --   --   --   --   --   --   96    10/03/19 0300   97.3 (36.3)   Oral   82   20   133/87   Lying   96    10/02/19 1900   97.8 (36.6)   Oral   81   20   117/76   Lying   95    10/02/19 1832   --   --   --   --   --   --   95    10/02/19 1437   97.7 (36.5)   Oral   72   20   131/93   Lying   95    10/02/19 1057   97.7 (36.5)   Oral   71   20   60/40  (Abnormal)    Lying   95    10/02/19 0649   97.5 (36.4)   Oral   84   18   110/62   Lying   97    10/02/19 0642   --   --   --   --   --   --   98     10/02/19 0300   97.6 (36.4)   Oral   63   18   91/54   Lying   97              Lines, Drains & Airways    Active LDAs     Name:   Placement date:   Placement time:   Site:   Days:    Peripheral IV 10/02/19 0200 Posterior;Right Hand   10/02/19    0200    Hand   1                Hospital Medications (active)       Dose Frequency Start End    aspirin EC tablet 81 mg (MAR Hold) ((MAR Hold) since 10/3/2019  8:40 AM) 81 mg Daily 10/2/2019     Sig - Route: Take 1 tablet by mouth Daily. - Oral    bacitracin ointment (MAR Hold) ((MAR Hold) since 10/3/2019  8:40 AM)  Every 12 Hours Scheduled 9/29/2019     Sig - Route: Apply  topically to the appropriate area as directed Every 12 (Twelve) Hours. - Topical    heparin (porcine) 5000 UNIT/ML injection 2,500 Units (MAR Hold) ((MAR Hold) since 10/3/2019  8:40 AM) 2,500 Units As Needed 9/30/2019     Sig - Route: Infuse 0.5 mL into a venous catheter As Needed (Per Heparin Nomogram For PTT 45-55). - Intravenous    heparin (porcine) 5000 UNIT/ML injection 5,000 Units (MAR Hold) ((MAR Hold) since 10/3/2019  8:40 AM) 5,000 Units As Needed 9/30/2019     Sig - Route: Infuse 1 mL into a venous catheter As Needed (Per Heparin Nomogram For PTT Less Than 45). - Intravenous    heparin 72747 units/250 mL (100 units/mL) in 0.45 % NaCl infusion 11.2 Units/kg/hr × 89 kg Titrated 9/30/2019     Sig - Route: Infuse 996 Units/hr into a venous catheter Dose Adjusted By Provider As Needed. - Intravenous    ipratropium-albuterol (DUO-NEB) nebulizer solution 3 mL (MAR Hold) ((MAR Hold) since 10/3/2019  8:40 AM) 3 mL Every 6 Hours PRN 9/29/2019     Sig - Route: Take 3 mL by nebulization Every 6 (Six) Hours As Needed for Shortness of Air. - Nebulization    Cosign for Ordering: Accepted by Asad Rodgers MD on 9/29/2019  7:19 AM    metoprolol tartrate (LOPRESSOR) tablet 25 mg 25 mg Every 12 Hours Scheduled 9/29/2019     Sig - Route: Take 1 tablet by mouth Every 12 (Twelve) Hours. - Oral    nicotine (NICODERM  CQ) 21 MG/24HR patch 1 patch (MAR Hold) ((MAR Hold) since 10/3/2019  8:40 AM) 1 patch Daily 9/28/2019     Sig - Route: Place 1 patch on the skin as directed by provider Daily. - Transdermal    predniSONE (DELTASONE) tablet 40 mg (MAR Hold) ((MAR Hold) since 10/3/2019  8:40 AM) 40 mg Daily With Breakfast 9/29/2019     Sig - Route: Take 2 tablets by mouth Daily With Breakfast. - Oral    rosuvastatin (CRESTOR) tablet 20 mg (MAR Hold) ((MAR Hold) since 10/3/2019  8:40 AM) 20 mg Nightly 10/2/2019     Sig - Route: Take 1 tablet by mouth Every Night. - Oral    sacubitril-valsartan (ENTRESTO) 24-26 MG tablet 1 tablet (MAR Hold) ((MAR Hold) since 10/3/2019  8:40 AM) 1 tablet Every 12 Hours Scheduled 9/29/2019     Sig - Route: Take 1 tablet by mouth Every 12 (Twelve) Hours. - Oral    Cosign for Ordering: Accepted by Vamsi Hester MD on 9/30/2019  1:35 PM    sodium chloride 0.9 % bolus 1,000 mL 1,000 mL Once 10/2/2019 10/2/2019    Sig - Route: Infuse 1,000 mL into a venous catheter 1 (One) Time. - Intravenous    sodium chloride 0.9 % flush 10 mL (MAR Hold) ((MAR Hold) since 10/3/2019  8:40 AM) 10 mL As Needed 9/28/2019     Sig - Route: Infuse 10 mL into a venous catheter As Needed for Line Care. - Intravenous    Cosign for Ordering: Accepted by Betina Bhakta DO on 9/29/2019 12:23 AM    sodium chloride 0.9 % flush 10 mL (MAR Hold) ((MAR Hold) since 10/3/2019  8:40 AM) 10 mL Every 12 Hours Scheduled 9/28/2019     Sig - Route: Infuse 10 mL into a venous catheter Every 12 (Twelve) Hours. - Intravenous    sodium chloride 0.9 % flush 10 mL (MAR Hold) ((MAR Hold) since 10/3/2019  8:40 AM) 10 mL As Needed 9/28/2019     Sig - Route: Infuse 10 mL into a venous catheter As Needed for Line Care. - Intravenous    sodium chloride 0.9 % infusion  Continuous PRN 10/3/2019 10/3/2019    Sig: Continuous As Needed.    sodium chloride 0.9 % infusion 100 mL/hr Continuous 10/3/2019     Sig - Route: Infuse 100 mL/hr into a venous  catheter Continuous. - Intravenous    sodium chloride 0.9 % infusion 3 mL/kg/hr × 86.5 kg Continuous 10/3/2019 10/3/2019    Sig - Route: Infuse 259.5 mL/hr into a venous catheter Continuous. - Intravenous    fentaNYL citrate (PF) (SUBLIMAZE) injection (Discontinued)  As Needed 10/3/2019 10/3/2019    Sig: As Needed.    Reason for Discontinue: Patient Discharge    iopamidol (ISOVUE-370) 76 % injection (Discontinued)  As Needed 10/3/2019 10/3/2019    Sig: As Needed.    Reason for Discontinue: Patient Discharge    lidocaine (XYLOCAINE) 2% injection (Discontinued)  As Needed 10/3/2019 10/3/2019    Sig: As Needed.    Reason for Discontinue: Patient Discharge    midazolam (VERSED) injection (Discontinued)  As Needed 10/3/2019 10/3/2019    Sig: As Needed.    Reason for Discontinue: Patient Discharge            Lab Results (last 24 hours)     Procedure Component Value Units Date/Time    Basic Metabolic Panel [[id number]]  (Abnormal) Collected:  10/03/19 0529    Specimen:  Blood Updated:  10/03/19 0638     Glucose 96 mg/dL      BUN 30 mg/dL      Creatinine 1.14 mg/dL      Sodium 137 mmol/L      Potassium 3.6 mmol/L      Chloride 99 mmol/L      CO2 24.7 mmol/L      Calcium 8.6 mg/dL      eGFR Non African Amer 64 mL/min/1.73      BUN/Creatinine Ratio 26.3     Anion Gap 13.3 mmol/L     Narrative:       GFR Normal >60  Chronic Kidney Disease <60  Kidney Failure <15    aPTT [[id number]]  (Normal) Collected:  10/03/19 0529    Specimen:  Blood Updated:  10/03/19 0628     PTT 29.3 seconds     Narrative:       PTT Heparin Therapeutic Range:  59 - 95 seconds    CBC & Differential [[id number]] Collected:  10/03/19 0529    Specimen:  Blood Updated:  10/03/19 0619    Narrative:       The following orders were created for panel order CBC & Differential.  Procedure                               Abnormality         Status                     ---------                               -----------         ------                     CBC Auto  Differential[[id number]]        Abnormal            Final result                 Please view results for these tests on the individual orders.    CBC Auto Differential [[id number]]  (Abnormal) Collected:  10/03/19 0529    Specimen:  Blood Updated:  10/03/19 0619     WBC 11.67 10*3/mm3      RBC 5.57 10*6/mm3      Hemoglobin 16.1 g/dL      Hematocrit 49.4 %      MCV 88.7 fL      MCH 28.9 pg      MCHC 32.6 g/dL      RDW 15.0 %      RDW-SD 48.5 fl      MPV 12.8 fL      Platelets 170 10*3/mm3      Neutrophil % 67.3 %      Lymphocyte % 22.1 %      Monocyte % 9.6 %      Eosinophil % 0.4 %      Basophil % 0.3 %      Immature Grans % 0.3 %      Neutrophils, Absolute 7.85 10*3/mm3      Lymphocytes, Absolute 2.58 10*3/mm3      Monocytes, Absolute 1.12 10*3/mm3      Eosinophils, Absolute 0.05 10*3/mm3      Basophils, Absolute 0.03 10*3/mm3      Immature Grans, Absolute 0.04 10*3/mm3     Blood Culture - Blood, Arm, Right [[id number]] Collected:  09/28/19 1814    Specimen:  Blood from Arm, Right Updated:  10/02/19 1845     Blood Culture No growth at 4 days    Blood Culture - Blood, Arm, Right [[id number]] Collected:  09/28/19 1814    Specimen:  Blood from Arm, Right Updated:  10/02/19 1845     Blood Culture No growth at 4 days    Basic Metabolic Panel [[id number]]  (Abnormal) Collected:  10/02/19 1115    Specimen:  Blood Updated:  10/02/19 1149     Glucose 102 mg/dL      BUN 34 mg/dL      Creatinine 1.43 mg/dL      Sodium 141 mmol/L      Potassium 3.3 mmol/L      Chloride 98 mmol/L      CO2 29.5 mmol/L      Calcium 9.2 mg/dL      eGFR Non African Amer 49 mL/min/1.73      BUN/Creatinine Ratio 23.8     Anion Gap 13.5 mmol/L     Narrative:       GFR Normal >60  Chronic Kidney Disease <60  Kidney Failure <15        Imaging Results (last 24 hours)     ** No results found for the last 24 hours. **        ECG/EMG Results (last 24 hours)     ** No results found for the last 24 hours. **        Orders (last 24 hrs)     Start     Ordered     10/04/19 0600  CBC (No Diff)  Morning Draw      10/03/19 1000    10/04/19 0600  Basic Metabolic Panel  Morning Draw      10/03/19 1000    10/04/19 0600  Hemoglobin A1c  Morning Draw      10/03/19 1000    10/04/19 0600  Lipid Panel  Morning Draw     Comments:  Fasting      10/03/19 1000    10/03/19 1200  Strict intake and output  Every 4 Hours      10/03/19 0938    10/03/19 1045  sodium chloride 0.9 % infusion  Continuous      10/03/19 1000    10/03/19 1000  Verify Discontinuation of enoxaparin (LOVENOX) and / or heparin  Once      10/03/19 1000    10/03/19 1000  Oxygen Therapy- Nasal Cannula; Titrate for SPO2: equal to or greater than, 92%, per policy  Continuous      10/03/19 1000    10/03/19 1000  Continuous Pulse Oximetry  Continuous      10/03/19 1000    10/03/19 1000  Incentive Spirometry  Every 2 Hours While Awake      10/03/19 1000    10/03/19 1000  Advance Diet as Tolerated  Until Discontinued      10/03/19 1000    10/03/19 1000  Diet Regular; Consistent Carbohydrate, Cardiac  Diet Effective Now      10/03/19 1000    10/03/19 0940  sodium chloride 0.9 % infusion  Continuous      10/03/19 0938    10/03/19 0937  Closure Device Used  Once      10/03/19 0938    10/03/19 0937  Assess Puncture Site, Vital Signs, Distal Pulses & Observe Site for Bleeding or Swelling  Per Order Details     Comments:  Every 15 Minutes x4, Every 30 Minutes x4, & Every 1 Hour x2    10/03/19 0938    10/03/19 0936  Vital Signs  Per Hospital Policy      10/03/19 0938    10/03/19 0935  Vital Signs and Check distal extremity for warmth, color, sensation and pulses with each vital sign and site check.  Per Order Details     Comments:  Check distal extremity for warmth, color, sensation and pulses with each vital sign and site check.    10/03/19 0938    10/03/19 0935  Obtain STAT EKG during chest pain. Notify MD of any change in rhythm, ST segments or complaints of chest pain.  Until Discontinued      10/03/19 0938    10/03/19 0935  Encourage  fluids  Until Discontinued      10/03/19 0938    10/03/19 0935  Notify MD if platelet count is less than 100,000, is less than 1/2 baseline, or if Hgb drops by more than 3mg/dl.  Until Discontinued      10/03/19 0938    10/03/19 0935  Notify MD of hypotension (SBP less than 95), bleeding, or dysrythmia and follow Sheath Removal Policy if needed.  Continuous      10/03/19 0938    10/03/19 0935  Cardiac Rehab Evaluation and Enrollment  Once     Provider:  (Not yet assigned)    10/03/19 0938    10/03/19 0926  iopamidol (ISOVUE-370) 76 % injection  As Needed,   Status:  Discontinued      10/03/19 0926    10/03/19 0908  fentaNYL citrate (PF) (SUBLIMAZE) injection  As Needed,   Status:  Discontinued      10/03/19 0908    10/03/19 0908  midazolam (VERSED) injection  As Needed,   Status:  Discontinued      10/03/19 0908    10/03/19 0906  lidocaine (XYLOCAINE) 2% injection  As Needed,   Status:  Discontinued      10/03/19 0906    10/03/19 0853  sodium chloride 0.9 % infusion  Continuous PRN      10/03/19 0853    10/03/19 0600  aPTT  Morning Draw      10/02/19 1755    10/03/19 0600  CBC Auto Differential  PROCEDURE ONCE      10/03/19 0002    10/03/19 0001  NPO Diet  Diet Effective Midnight,   Status:  Canceled      10/02/19 1219    10/02/19 2100  [MAR Hold]  rosuvastatin (CRESTOR) tablet 20 mg  Nightly     (MAR Hold since 10/03/19 0840)    10/02/19 1146    10/02/19 1310  Cardiac Catheterization/Vascular Study  Once      10/02/19 1309    10/02/19 1300  [MAR Hold]  aspirin EC tablet 81 mg  Daily     (MAR Hold since 10/03/19 0840)    10/02/19 1145    10/02/19 1230  sodium chloride 0.9 % bolus 1,000 mL  Once      10/02/19 1141    10/02/19 1220  IV module  Once      10/02/19 1219    10/02/19 1218  Mr Galarza will be getting a cath tomorrow (Thursday) morning.  Please discontinue the heparin drip at 4am Thursday morning. Thanks.  Nursing Communication  Once     Comments:  Mr Galarza will be getting a cath tomorrow (Thursday)  morning.  Please discontinue the heparin drip at 4am Thursday morning. Thanks.    10/02/19 1219    10/02/19 1216  Case Request Cath Lab: Left Heart Cath  Once      10/02/19 1219    10/02/19 1100  CBC & Differential  Daily      10/02/19 1059    10/02/19 1100  Basic Metabolic Panel  Daily      10/02/19 1059    09/30/19 1300  heparin 45491 units/250 mL (100 units/mL) in 0.45 % NaCl infusion  Titrated      09/30/19 1204    09/30/19 1203  CBC & Differential  Every Third Day      09/30/19 1204    09/30/19 1202  [MAR Hold]  heparin (porcine) 5000 UNIT/ML injection 5,000 Units  As Needed     (MAR Hold since 10/03/19 0840)    09/30/19 1204    09/30/19 1202  [MAR Hold]  heparin (porcine) 5000 UNIT/ML injection 2,500 Units  As Needed     (MAR Hold since 10/03/19 0840)    09/30/19 1204    09/29/19 2100  [MAR Hold]  sacubitril-valsartan (ENTRESTO) 24-26 MG tablet 1 tablet  Every 12 Hours Scheduled     (MAR Hold since 10/03/19 0840)    09/29/19 1428    09/29/19 2100  metoprolol tartrate (LOPRESSOR) tablet 25 mg  Every 12 Hours Scheduled      09/29/19 1450    09/29/19 1330  [MAR Hold]  bacitracin ointment  Every 12 Hours Scheduled     (MAR Hold since 10/03/19 0840)    09/29/19 1214    09/29/19 0800  [MAR Hold]  predniSONE (DELTASONE) tablet 40 mg  Daily With Breakfast     (MAR Hold since 10/03/19 0840)    09/28/19 2021 09/29/19 0630  [MAR Hold]  ipratropium-albuterol (DUO-NEB) nebulizer solution 3 mL  Every 6 Hours PRN     (MAR Hold since 10/03/19 0840)    09/29/19 0627 09/28/19 2115  [MAR Hold]  sodium chloride 0.9 % flush 10 mL  Every 12 Hours Scheduled     (MAR Hold since 10/03/19 0840)    09/28/19 2021 09/28/19 2030  [MAR Hold]  nicotine (NICODERM CQ) 21 MG/24HR patch 1 patch  Daily     (MAR Hold since 10/03/19 0840)    09/28/19 2021 09/28/19 2021  [MAR Hold]  sodium chloride 0.9 % flush 10 mL  As Needed     (MAR Hold since 10/03/19 0840)    09/28/19 2021 09/28/19 1518  [MAR Hold]  sodium chloride 0.9 % flush 10  mL  As Needed     (MAR Hold since 10/03/19 0840)    19 1519    Unscheduled  Up With Assistance  As Needed      19    Unscheduled  aPTT  As Needed      19 1204    Unscheduled  After 2 Consecutive Therapeutic aPTTs, Obtain aPTT Daily.  If a Rate Adjustment is Necessary, Resume Every 6 Hour aPTT Draws  As Needed      19 1204    Unscheduled  Change site dressing  As Needed      10/03/19 0938    Unscheduled  Head of Bed: Flat; 2 Hours; Elevate Head of Bed: 30 Degrees; Keep Affected Extremity/ Extremities Straight  As Needed      10/03/19 0938          Operative/Procedure Notes (last 24 hours) (Notes from 10/02/19 1141 through 10/03/19 1141)     No notes of this type exist for this encounter.           Physician Progress Notes (last 24 hours) (Notes from 10/02/19 1141 through 10/03/19 1141)      Oracio Slater DO at 10/03/19 1052              [facility]    [facility]ESS [facility]    Name:  Zaid Galarza   Age:  69 y.o.  Sex:  male  :  1950  MRN:  [id number]   Visit Number:  [id number]  Admission Date:  2019  Date Of Service:  10/03/19  Primary Care Physician:  Jonah Booth APRN     LOS: 5 days :  Patient Care Team:  Jonah Booth APRN as PCP - General (Nurse Practitioner)  Provider, No Known as PCP - Family Medicine:    Chief Complaint:      Shortness of breath    Subjective / Interval History:     Patient seen and examined at bedside  No adverse events overnight  Had his heart cath this morning.    Review of Systems:     General ROS: Patient denies any fevers, chills or loss of consciousness.  Respiratory ROS: Denies cough or shortness of breath.  Cardiovascular ROS: Denies chest pain or palpitations. No history of exertional chest pain.  Gastrointestinal ROS: Denies nausea and vomiting. Denies any abdominal pain. No diarrhea.  Neurological ROS: Denies any focal weakness. No loss of consciousness. Denies any numbness. Denies neck  pain.  Dermatological ROS: Denies any redness or pruritis.    Vital Signs:    Temp:  [97.3 °F (36.3 °C)-97.8 °F (36.6 °C)] 97.8 °F (36.6 °C)  Heart Rate:  [68-84] 82  Resp:  [16-20] 18  BP: ()/(40-93) 124/87    Intake and output:    I/O last 3 completed shifts:  In: 840 [P.O.:840]  Out: 1800 [Urine:1800]  I/O this shift:  In: 288.6 [I.V.:288.6]  Out: -     Physical Examination:    General Appearance:  Alert and cooperative, not in any acute distress.   Head:  Atraumatic and normocephalic, without obvious abnormality.   Eyes:          PERRLA, conjunctivae and sclerae normal, no Icterus. No pallor. Extra-occular movements are within normal limits.   Neck: Supple, trachea midline, no thyromegaly, no carotid bruit.   Lungs:   Chest shape is normal. Breath sounds heard bilaterally equally.  No crackles or wheezing. No Pleural rub or bronchial breathing.   Heart:  Normal S1 and S2, no murmur, no gallop, no rub. No JVD   Abdomen:   Normal bowel sounds, no masses, no organomegaly. Soft       non-tender, non-distended, no guarding, no rebound tenderness.   Extremities: Moves all extremities well, no edema, no cyanosis, no            clubbing.   Skin: No bleeding, bruising or rash.   Neurologic: Awake, alert and oriented times 3. Moves all 4 extremities equally.   Laboratory results:    Results from last 7 days   Lab Units 10/03/19  0529 10/02/19  1115 09/30/19  0357  09/29/19  0049 09/28/19  1537   SODIUM mmol/L 137 141 140   < > 139 140   POTASSIUM mmol/L 3.6 3.3* 3.6   < > 4.3 4.0   CHLORIDE mmol/L 99 98 101   < > 101 101   CO2 mmol/L 24.7 29.5* 23.7   < > 23.9 27.9   BUN mg/dL 30* 34* 21   < > 21 20   CREATININE mg/dL 1.14 1.43* 1.08   < > 1.08 1.16   CALCIUM mg/dL 8.6 9.2 8.9   < > 8.7 9.0   BILIRUBIN mg/dL  --   --   --   --  0.6 0.8   ALK PHOS U/L  --   --   --   --  85 90   ALT (SGPT) U/L  --   --   --   --  15 16   AST (SGOT) U/L  --   --   --   --  20 18   GLUCOSE mg/dL 96 102* 106*   < > 95 137*    < > =  values in this interval not displayed.     Results from last 7 days   Lab Units 10/03/19  0529 10/02/19  1115 09/30/19  1229   WBC 10*3/mm3 11.67* 10.45 13.39*   HEMOGLOBIN g/dL 16.1 17.2 16.1   HEMATOCRIT % 49.4 52.3* 49.5   PLATELETS 10*3/mm3 170 205 168     Results from last 7 days   Lab Units 09/30/19  1229 09/29/19  1533   INR  1.07 1.11*     Results from last 7 days   Lab Units 09/29/19  2022 09/29/19  1227 09/29/19  0740 09/28/19  1537   CK TOTAL U/L  --   --   --  102   TROPONIN T ng/mL 0.060* 0.097* 0.078* 0.019     Results from last 7 days   Lab Units 09/28/19  1814   BLOODCX  No growth at 4 days  No growth at 4 days       I have reviewed the patient's laboratory results.    Radiology results:    Imaging Results (last 24 hours)     ** No results found for the last 24 hours. **          I have reviewed the patient's radiology reports.    Medication Review:     I have reviewed the patients active and prn medications.       Dilated cardiomyopathy (CMS/HCC)    Atrial flutter (CMS/HCC)      Assessment:   Plan:    Wants to go to [facility] for rehab  His mom was there at one point he likes their staff and rehab program    ANGELITO showed:  significant spontaneous echo contrast in LA and also fresh thrombus in the JAYASHREE and decided to abort cardioversion.   He will be medically managed for his Aflutter and CHF    Heparin drip still going  Heart cath today shows  Left main: Normal     LAD: 20 to 30% diffuse disease in LADLeft circumflex: 30% focal mid circumflex   RCA: Large size dominant vessel proximal 40% and distal 40% focal stenosisPDA: Mild 20%     Final impression:   Mild to moderate CAD   3+ mitral regurgitation  RECOMMENDATIONS:   Aspirin  Beta-blocker  Statin  ACE inhibitor  Continue anticoagulant  Repeat ANGELITO after 6 to 8 weeks  If atrial thrombus resolved then proceed with  Ablation  I believe once the arrhythmia is controlled patient ejection fraction will tend to increase        1) 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  Placed on Entresto.  Patient had not seen a physician in three years.  Was on no meds at home.  Heart cath pending until diuresis improves heart function.     2) acute exacerbation of #1:  bumex IV 1 mg increased to 2 mg daily.     3) new onset atrial flutter:  Heparin gtt.      4) COPD: chronic, not on O2 at home: will evaluate after all testing and therapies are completed will likely need portable, home and 24/7 supplementation.  Quit smoking in 2009     5)  Lethargy: likely 2/2 1-4.  Ineffective CV output.        6)  Elevated troponin:  Trending downward now 0.06  0.078   0.097    Heart cath after diuresis complete.  Cardiology on board case.            Oracio Slater DO  10/03/19  10:52 AM      Electronically signed by Oracio Slater DO at 10/03/19 4216     Vincent Phillips MD at 10/03/19 0903        CARDIAC CATHETERIZATION / INTERVENTION REPORT     DATE OF PROCEDURE: 10/3/2019     INDICATION FOR PROCEDURE: { Cardiomyopathy, severe LV dysfunction, fib , flutter    PROCEDURE PERFORMED:     1. Coronary Angiogram  2. Left heart catheretization       PROCEDURE COMMENTS:    Zaid Galarza was brought to the cath lab and placed on the cardiac catherization table in the postabsortive state. The patient was prepped and draped according to the cath lab protocol under strict aseptic and sterile condition. Patient's right femoral artery sight was prepped and draped. Under fluoroscopic guidance the right femoral artery was punctured using a 21 gauge needle utilizing the modified Seldinger technique. 6 Malay sheath was introduced over a wire. After aspirating for blood it was flushed with heparinized saline.     We advanced Torres type diagnostic catheters over the wire. The  left and right coronary arteries  were selectively engaged. Left and right coronary angiogram were obtained in multiple  orthogonal projections.     After completion of the procedure the femoral sheath was removed in the cath lab and hemostasis was achieved by Angioseal. The patient tolerated the procedure without complications.         FINDINGS:     1. HEMODYNAMICS:  LVEDP 10 mmHg, no gradient across the aortic valve.       2. CORONARY ANGIOGRAPHY: Dominant dominant coronary artery system.    The left main coronary artery bifurcated into the LAD and left circumflex coronary.  The LAD coursed in the anterior interventricular groove, gave rise to diagonal branches and reached the apex.    Left circumflex coursed in the left atrial ventricular groove and gives rise to several marginal branches.    The right coronary artery course in the right atrial ventricular groove I gave rise to several acute marginal branches.     Left main: Normal     LAD: 20 to 30% diffuse disease in LADLeft circumflex: 30% focal mid circumflex   RCA: Large size dominant vessel proximal 40% and distal 40% focal stenosisPDA: Mild 20%     Final impression:   Mild to moderate CAD   3+ mitral regurgitation  RECOMMENDATIONS:   Aspirin  Beta-blocker  Statin  ACE inhibitor  Continue anticoagulant  Repeat ANGELITO after 6 to 8 weeks  If atrial thrombus resolved then proceed with  Ablation  I believe once the arrhythmia is controlled patient ejection fraction will tend to increase    Vincent Phillips MD  10/03/19  9:38 AM      Electronically signed by Vincent Phillips MD at 10/03/19 0954       Consult Notes (last 24 hours) (Notes from 10/02/19 1141 through 10/03/19 1141)     No notes of this type exist for this encounter.        Physical Therapy Notes (last 24 hours) (Notes from 10/02/19 1141 through 10/03/19 1141)     No notes of this type exist for this encounter.        Occupational Therapy Notes (last 24 hours) (Notes from 10/02/19 1141 through 10/03/19 1141)     No notes of this type exist for this encounter.        Speech Language Pathology Notes (last 24 hours) (Notes from  10/02/19 1141 through 10/03/19 1141)     No notes of this type exist for this encounter.

## 2019-10-03 NOTE — PROGRESS NOTES
Discharge Planning Assessment   Joey     Patient Name: Zaid Galarza  MRN: [id number]  Today's Date: 10/3/2019    Admit Date: 9/28/2019      Discharge Plan     Row Name 10/03/19 1623       Plan    Plan  [facility] may have bed available in am per Mary Carmen.  Mary Carmen attempting to locate accepting Physician.  SS will follow.         Destination      Service Provider Request Status Selected Services Address Phone Number Fax Number    [facility] Pending - Request Sent N/A [id number] [place] [address] [phone]                 Maddy Ortega, NGOCW

## 2019-10-03 NOTE — PLAN OF CARE
Problem: Patient Care Overview  Goal: Plan of Care Review  Outcome: Ongoing (interventions implemented as appropriate)   10/03/19 0730   Coping/Psychosocial   Plan of Care Reviewed With patient     Goal: Discharge Needs Assessment  Outcome: Ongoing (interventions implemented as appropriate)   09/28/19 1910 09/28/19 2047 09/28/19 2053   Discharge Needs Assessment   Readmission Within the Last 30 Days --  --  --    Concerns to be Addressed no discharge needs identified;denies needs/concerns at this time --  --    Patient/Family Anticipates Transition to --  --  home   Patient/Family Anticipated Services at Transition --  --     Transportation Anticipated --  --  family or friend will provide   Anticipated Changes Related to Illness none --  --    Equipment Needed After Discharge none --  --    Disability   Equipment Currently Used at Home --  none --     09/30/19 0733   Discharge Needs Assessment   Readmission Within the Last 30 Days no previous admission in last 30 days   Concerns to be Addressed --    Patient/Family Anticipates Transition to --    Patient/Family Anticipated Services at Transition --    Transportation Anticipated --    Anticipated Changes Related to Illness --    Equipment Needed After Discharge --    Disability   Equipment Currently Used at Home --        Problem: Fall Risk (Adult)  Goal: Identify Related Risk Factors and Signs and Symptoms  Outcome: Ongoing (interventions implemented as appropriate)   10/03/19 1154   Fall Risk (Adult)   Related Risk Factors (Fall Risk) gait/mobility problems;environment unfamiliar   Signs and Symptoms (Fall Risk) presence of risk factors     Goal: Absence of Fall  Outcome: Ongoing (interventions implemented as appropriate)   10/02/19 2324   Fall Risk (Adult)   Absence of Fall making progress toward outcome       Problem: Skin Injury Risk (Adult)  Goal: Identify Related Risk Factors and Signs and Symptoms  Outcome: Ongoing (interventions implemented as  appropriate)    Goal: Skin Health and Integrity  Outcome: Ongoing (interventions implemented as appropriate)   10/03/19 0944   Skin Injury Risk (Adult)   Skin Health and Integrity making progress toward outcome       Problem: Wound (Includes Pressure Injury) (Adult)  Goal: Signs and Symptoms of Listed Potential Problems Will be Absent, Minimized or Managed (Wound)  Outcome: Ongoing (interventions implemented as appropriate)   10/03/19 0944   Goal/Outcome Evaluation   Problems Assessed (Wound) all

## 2019-10-04 VITALS
HEIGHT: 73 IN | HEART RATE: 76 BPM | SYSTOLIC BLOOD PRESSURE: 129 MMHG | WEIGHT: 192 LBS | BODY MASS INDEX: 25.45 KG/M2 | OXYGEN SATURATION: 97 % | DIASTOLIC BLOOD PRESSURE: 70 MMHG | RESPIRATION RATE: 18 BRPM | TEMPERATURE: 97.6 F

## 2019-10-04 LAB
ANION GAP SERPL CALCULATED.3IONS-SCNC: 8.8 MMOL/L (ref 5–15)
BASOPHILS # BLD AUTO: 0.01 10*3/MM3 (ref 0–0.2)
BASOPHILS NFR BLD AUTO: 0.1 % (ref 0–1.5)
BUN BLD-MCNC: 29 MG/DL (ref 8–23)
BUN/CREAT SERPL: 26.4 (ref 7–25)
CALCIUM SPEC-SCNC: 8.4 MG/DL (ref 8.6–10.5)
CHLORIDE SERPL-SCNC: 104 MMOL/L (ref 98–107)
CHOLEST SERPL-MCNC: 129 MG/DL (ref 0–200)
CO2 SERPL-SCNC: 24.2 MMOL/L (ref 22–29)
CREAT BLD-MCNC: 1.1 MG/DL (ref 0.76–1.27)
DEPRECATED RDW RBC AUTO: 49.5 FL (ref 37–54)
EOSINOPHIL # BLD AUTO: 0.01 10*3/MM3 (ref 0–0.4)
EOSINOPHIL NFR BLD AUTO: 0.1 % (ref 0.3–6.2)
ERYTHROCYTE [DISTWIDTH] IN BLOOD BY AUTOMATED COUNT: 15.2 % (ref 12.3–15.4)
GFR SERPL CREATININE-BSD FRML MDRD: 67 ML/MIN/1.73
GLUCOSE BLD-MCNC: 121 MG/DL (ref 65–99)
HCT VFR BLD AUTO: 46.6 % (ref 37.5–51)
HDLC SERPL-MCNC: 46 MG/DL (ref 40–60)
HGB BLD-MCNC: 14.8 G/DL (ref 13–17.7)
IMM GRANULOCYTES # BLD AUTO: 0.05 10*3/MM3 (ref 0–0.05)
IMM GRANULOCYTES NFR BLD AUTO: 0.4 % (ref 0–0.5)
LDLC SERPL CALC-MCNC: 72 MG/DL (ref 0–100)
LDLC/HDLC SERPL: 1.56 {RATIO}
LYMPHOCYTES # BLD AUTO: 1.71 10*3/MM3 (ref 0.7–3.1)
LYMPHOCYTES NFR BLD AUTO: 13.7 % (ref 19.6–45.3)
MCH RBC QN AUTO: 28.9 PG (ref 26.6–33)
MCHC RBC AUTO-ENTMCNC: 31.8 G/DL (ref 31.5–35.7)
MCV RBC AUTO: 91 FL (ref 79–97)
MONOCYTES # BLD AUTO: 1.06 10*3/MM3 (ref 0.1–0.9)
MONOCYTES NFR BLD AUTO: 8.5 % (ref 5–12)
NEUTROPHILS # BLD AUTO: 9.61 10*3/MM3 (ref 1.7–7)
NEUTROPHILS NFR BLD AUTO: 77.2 % (ref 42.7–76)
PLATELET # BLD AUTO: 163 10*3/MM3 (ref 140–450)
PMV BLD AUTO: 12.3 FL (ref 6–12)
POTASSIUM BLD-SCNC: 4.5 MMOL/L (ref 3.5–5.2)
RBC # BLD AUTO: 5.12 10*6/MM3 (ref 4.14–5.8)
SODIUM BLD-SCNC: 137 MMOL/L (ref 136–145)
TRIGL SERPL-MCNC: 57 MG/DL (ref 0–150)
VLDLC SERPL-MCNC: 11.4 MG/DL
WBC NRBC COR # BLD: 12.45 10*3/MM3 (ref 3.4–10.8)

## 2019-10-04 PROCEDURE — 80061 LIPID PANEL: CPT | Performed by: INTERNAL MEDICINE

## 2019-10-04 PROCEDURE — 94799 UNLISTED PULMONARY SVC/PX: CPT

## 2019-10-04 PROCEDURE — 85025 COMPLETE CBC W/AUTO DIFF WBC: CPT | Performed by: FAMILY MEDICINE

## 2019-10-04 PROCEDURE — 90674 CCIIV4 VAC NO PRSV 0.5 ML IM: CPT | Performed by: FAMILY MEDICINE

## 2019-10-04 PROCEDURE — 99239 HOSP IP/OBS DSCHRG MGMT >30: CPT | Performed by: FAMILY MEDICINE

## 2019-10-04 PROCEDURE — 25010000002 INFLUENZA VAC SUBUNIT QUAD 0.5 ML SUSPENSION PREFILLED SYRINGE: Performed by: FAMILY MEDICINE

## 2019-10-04 PROCEDURE — 80048 BASIC METABOLIC PNL TOTAL CA: CPT | Performed by: FAMILY MEDICINE

## 2019-10-04 PROCEDURE — G0008 ADMIN INFLUENZA VIRUS VAC: HCPCS | Performed by: FAMILY MEDICINE

## 2019-10-04 PROCEDURE — 63710000001 PREDNISONE PER 1 MG: Performed by: INTERNAL MEDICINE

## 2019-10-04 RX ORDER — ROSUVASTATIN CALCIUM 20 MG/1
20 TABLET, COATED ORAL NIGHTLY
Qty: 30 TABLET | Refills: 0 | Status: SHIPPED | OUTPATIENT
Start: 2019-10-04 | End: 2020-02-11 | Stop reason: SDUPTHER

## 2019-10-04 RX ORDER — POTASSIUM CHLORIDE 20 MEQ/1
20 TABLET, EXTENDED RELEASE ORAL
Qty: 15 TABLET | Refills: 0 | Status: SHIPPED | OUTPATIENT
Start: 2019-10-04 | End: 2019-11-03

## 2019-10-04 RX ORDER — BUDESONIDE AND FORMOTEROL FUMARATE DIHYDRATE 160; 4.5 UG/1; UG/1
2 AEROSOL RESPIRATORY (INHALATION)
Qty: 20.4 G | Refills: 12 | Status: ON HOLD | OUTPATIENT
Start: 2019-10-04 | End: 2022-09-02

## 2019-10-04 RX ORDER — ALBUTEROL SULFATE 0.63 MG/3ML
1 SOLUTION RESPIRATORY (INHALATION) EVERY 6 HOURS PRN
Qty: 360 ML | Refills: 12 | Status: SHIPPED | OUTPATIENT
Start: 2019-10-04 | End: 2019-11-03

## 2019-10-04 RX ORDER — PREDNISONE 10 MG/1
TABLET ORAL
Qty: 48 TABLET | Refills: 0 | Status: SHIPPED | OUTPATIENT
Start: 2019-10-04 | End: 2021-08-10 | Stop reason: ALTCHOICE

## 2019-10-04 RX ORDER — ASPIRIN 81 MG/1
81 TABLET ORAL DAILY
Qty: 30 TABLET | Refills: 0 | Status: SHIPPED | OUTPATIENT
Start: 2019-10-05 | End: 2020-02-11 | Stop reason: SDUPTHER

## 2019-10-04 RX ORDER — FUROSEMIDE 20 MG/1
20 TABLET ORAL DAILY
Qty: 30 TABLET | Refills: 0 | Status: SHIPPED | OUTPATIENT
Start: 2019-10-04 | End: 2020-02-11 | Stop reason: SDUPTHER

## 2019-10-04 RX ORDER — NICOTINE 21 MG/24HR
1 PATCH, TRANSDERMAL 24 HOURS TRANSDERMAL DAILY
Qty: 30 PATCH | Refills: 2 | Status: SHIPPED | OUTPATIENT
Start: 2019-10-05 | End: 2020-02-20 | Stop reason: SDUPTHER

## 2019-10-04 RX ORDER — DILTIAZEM HYDROCHLORIDE 120 MG/1
120 CAPSULE, COATED, EXTENDED RELEASE ORAL DAILY
Qty: 30 CAPSULE | Refills: 0 | Status: SHIPPED | OUTPATIENT
Start: 2019-10-04 | End: 2019-11-03

## 2019-10-04 RX ADMIN — SODIUM CHLORIDE, PRESERVATIVE FREE 10 ML: 5 INJECTION INTRAVENOUS at 08:05

## 2019-10-04 RX ADMIN — METOPROLOL TARTRATE 25 MG: 25 TABLET, FILM COATED ORAL at 08:04

## 2019-10-04 RX ADMIN — BACITRACIN: 500 OINTMENT TOPICAL at 08:05

## 2019-10-04 RX ADMIN — NICOTINE 1 PATCH: 21 PATCH, EXTENDED RELEASE TRANSDERMAL at 08:02

## 2019-10-04 RX ADMIN — SACUBITRIL AND VALSARTAN 1 TABLET: 24; 26 TABLET, FILM COATED ORAL at 08:03

## 2019-10-04 RX ADMIN — PREDNISONE 40 MG: 20 TABLET ORAL at 08:05

## 2019-10-04 RX ADMIN — INFLUENZA A VIRUS A/IDAHO/07/2018 (H1N1) ANTIGEN (MDCK CELL DERIVED, PROPIOLACTONE INACTIVATED, INFLUENZA A VIRUS A/INDIANA/08/2018 (H3N2) ANTIGEN (MDCK CELL DERIVED, PROPIOLACTONE INACTIVATED), INFLUENZA B VIRUS B/SINGAPORE/INFTT-16-0610/2016 ANTIGEN (MDCK CELL DERIVED, PROPIOLACTONE INACTIVATED), INFLUENZA B VIRUS B/IOWA/06/2017 ANTIGEN (MDCK CELL DERIVED, PROPIOLACTONE INACTIVATED) 0.5 ML: 15; 15; 15; 15 INJECTION, SUSPENSION INTRAMUSCULAR at 15:02

## 2019-10-04 RX ADMIN — APIXABAN 5 MG: 5 TABLET, FILM COATED ORAL at 08:03

## 2019-10-04 RX ADMIN — ASPIRIN 81 MG: 81 TABLET, COATED ORAL at 08:02

## 2019-10-04 NOTE — PHARMACY PATIENT ASSISTANCE
Pharmacy has confirmed patient's co-pay for Eliquis as $91.30. Pharmacy has spoken to patient about possible financial assistance based on income. Patient stated he will bring income statements to apply for assistance once discharged.   Patient is going to nursing home for rehab.  We will give his pharmacy a trial card to cover the cost when he gets the prescription at discharge from the nursing home rehab.  No further needs identified at this time.     Thank you,    Jamey Rome, Pharmacy Intern  10/04/19  3:22 PM

## 2019-10-04 NOTE — DISCHARGE SUMMARY
[facility] HOSPITALIST   DISCHARGE SUMMARY      Name:  Zaid Galarza   Age:  69 y.o.  Sex:  male  :  1950  MRN:  [id number]   Visit Number:  [id number]    Admission Date:  2019  Date of Discharge:  10/4/2019  Primary Care Physician:  Jonah Booth APRN    Discharge Diagnoses:   Systolic heart failure and exacerbation on admission  COPD  Tobacco dependence continuous        Dilated cardiomyopathy (CMS/HCC)    Atrial flutter (CMS/HCC)      Presenting Problem:    Atrial flutter, unspecified type (CMS/HCC) [I48.92]     Consults:     Consults     Date and Time Order Name Status Description    2019 1222 Inpatient Cardiology Consult          Consulting Physician(s)     Provider Relationship Specialty    Asad Rodgers MD Consulting Physician Internal Medicine    Vamsi Hester MD Consulting Physician Interventional Cardiology          Procedures Performed:    Procedure(s):  Left Heart Cath         History of presenting illness:    69 y.o. male with hx of AAA repair, 100 pack year smoking history who presents with 3 weeks of worsening shortness of breath. Worse on exertion and some mild orthopnea. Patient has had infrequent palpitations lasting in the minutes. No known history of any heart problems or arhythmias. Patient reports chronic dry cough.      Patient has not seen a medical provider in many years and has had very poor follow-up.      Sisters supportive bedside helping provide history.      Patient has 100 pack year history of smoking and no known diagnosis of COPD.      Denies chest pain. Has not noticed any swelling.      In the ED patient was found to have rate controlled atrial flutter. He was given ceftriaxone and bumex for concern of pneumonia and heart failure.     Hospital Course:  Patient was on no home medicines prior to coming to the hospital  He was a  and smoked for more than 50 years.  He is never been treated by  for COPD or  any heart problems.  He was found to be in overt heart failure upon admission to the hospital was aggressively diuresed seen by cardiology had an echocardiogram that showed severely reduced ejection fraction of 30%  Was started on Entresto  1.  Significant a spontaneous echo contrast with evidence of fresh thrombi in the left atrial appendage.  Cardioversion was aborted secondary due to the presence of left atrial appendage thrombus.  2.  Severely reduced LV systolic function.  EF 30%  3.  No significant functional valvular abnormalities noted.  4.  No evidence of pericardial effusion.  1. HEMODYNAMICS:  LVEDP 10 mmHg, no gradient across the aortic valve.        2. CORONARY ANGIOGRAPHY: Dominant dominant coronary artery system.     The left main coronary artery bifurcated into the LAD and left circumflex coronary.  The LAD coursed in the anterior interventricular groove, gave rise to diagonal branches and reached the apex.     Left circumflex coursed in the left atrial ventricular groove and gives rise to several marginal branches.     The right coronary artery course in the right atrial ventricular groove I gave rise to several acute marginal branches.     Left main: Normal     LAD: 20 to 30% diffuse disease in LADLeft circumflex: 30% focal mid circumflex   RCA: Large size dominant vessel proximal 40% and distal 40% focal stenosisPDA: Mild 20%     Final impression:   Mild to moderate CAD   3+ mitral regurgitation  RECOMMENDATIONS:   Aspirin  Beta-blocker  Statin  ACE inhibitor  Continue anticoagulant  Repeat ANGELITO after 6 to 8 weeks  If atrial thrombus resolved then proceed with  Ablation  I believe once the arrhythmia is controlled patient ejection fraction will tend to increase     Vincent Phillips MD  10/03/19  9:38 AM             Vital Signs:    Temp:  [97.4 °F (36.3 °C)-98.4 °F (36.9 °C)] 97.9 °F (36.6 °C)  Heart Rate:  [62-87] 76  Resp:  [18-20] 18  BP: ()/(52-76) 137/74    Physical Exam:    General  Appearance:  Alert and cooperative, not in any acute distress.   Head:  Atraumatic and normocephalic, without obvious abnormality.   Eyes:          PERRLA, conjunctivae and sclerae normal, no Icterus. No pallor. Extra-occular movements are within normal limits.   Ears:  Ears appear intact with no abnormalities noted.   Throat: No oral lesions, no thrush, oral mucosa moist.   Neck: Supple, trachea midline, no thyromegaly, no carotid bruit.   Back:   No kyphoscoliosis present. No tenderness to palpation,   range of motion normal.   Lungs:   Chest shape is normal. Breath sounds heard bilaterally equally.  Diminished with wheezing. No Pleural rub or bronchial breathing.   Heart:  Normal S1 and S2, no murmur, no gallop, no rub. No JVD.   Abdomen:   Normal bowel sounds, no masses, no organomegaly. Soft     non-tender, non-distended, no guarding, no rebound tenderness.   Extremities: Moves all extremities well, no edema, no cyanosis, no clubbing.   Pulses: Pulses palpable and equal bilaterally.   Skin: No bleeding, bruising or rash.  Multiple tattoos chest arms   Lymph nodes: No palpable adenopathy.   Neurologic: Alert and oriented x 3. Moves all four limbs equally. No tremors. No facial asymetry.       Pertinent Lab Results:     Results from last 7 days   Lab Units 10/04/19  0258 10/03/19  0529 10/02/19  1115  09/29/19  0049 09/28/19  1537   SODIUM mmol/L 137 137 141   < > 139 140   POTASSIUM mmol/L 4.5 3.6 3.3*   < > 4.3 4.0   CHLORIDE mmol/L 104 99 98   < > 101 101   CO2 mmol/L 24.2 24.7 29.5*   < > 23.9 27.9   BUN mg/dL 29* 30* 34*   < > 21 20   CREATININE mg/dL 1.10 1.14 1.43*   < > 1.08 1.16   CALCIUM mg/dL 8.4* 8.6 9.2   < > 8.7 9.0   BILIRUBIN mg/dL  --   --   --   --  0.6 0.8   ALK PHOS U/L  --   --   --   --  85 90   ALT (SGPT) U/L  --   --   --   --  15 16   AST (SGOT) U/L  --   --   --   --  20 18   GLUCOSE mg/dL 121* 96 102*   < > 95 137*    < > = values in this interval not displayed.     Results from last 7  days   Lab Units 10/04/19  0258 10/03/19  0529 10/02/19  1115   WBC 10*3/mm3 12.45* 11.67* 10.45   HEMOGLOBIN g/dL 14.8 16.1 17.2   HEMATOCRIT % 46.6 49.4 52.3*   PLATELETS 10*3/mm3 163 170 205     Results from last 7 days   Lab Units 09/30/19  1229 09/29/19  1533   INR  1.07 1.11*     Results from last 7 days   Lab Units 09/29/19  2022 09/29/19  1227 09/29/19  0740 09/28/19  1537   CK TOTAL U/L  --   --   --  102   TROPONIN T ng/mL 0.060* 0.097* 0.078* 0.019     Results from last 7 days   Lab Units 09/28/19  1537   PROBNP pg/mL 9,708.0*             Results from last 7 days   Lab Units 09/28/19  1805   PH, ARTERIAL pH units 7.417   PO2 ART mm Hg 74.3*   PCO2, ARTERIAL mm Hg 38.3   HCO3 ART mmol/L 24.1           Invalid input(s): USDES,  BLOODU, NITRITITE, BACT, EP  Pain Management Panel     Pain Management Panel Latest Ref Rng & Units 3/8/2016    AMPHETAMINES SCREEN, URINE Negative Negative    BARBITURATES SCREEN Negative Negative    BENZODIAZEPINE SCREEN, URINE Negative Negative    COCAINE SCREEN, URINE Negative Negative    METHADONE SCREEN, URINE Negative Negative        Results from last 7 days   Lab Units 09/28/19  1814   BLOODCX  No growth at 5 days  No growth at 5 days       Pertinent Radiology Results:    Imaging Results (all)     Procedure Component Value Units Date/Time    XR Chest 2 View [[id number]] Collected:  09/28/19 1556     Updated:  09/28/19 1559    Narrative:       EXAMINATION: XR CHEST 2 VW-      CLINICAL INDICATION: SOA Triage Protocol        COMPARISON: None available      TECHNIQUE: PA lateral chest      FINDINGS:   Trace left effusion and bibasilar airspace disease. The appearance is  concerning for pneumonia, particularly in the right lung   Heart and mediastinal contours are unremarkable.   No pneumothorax.   Bony and soft tissue structures are unremarkable.          Impression:       Trace left effusion and bibasilar consolidation     This report was finalized on 9/28/2019 3:57 PM by   Hossein Lenz MD.             Condition on Discharge:      Stable.    Code status during the hospital stay:    Code Status and Medical Interventions:   Ordered at: 09/28/19 8552     Level Of Support Discussed With:    Patient     Code Status:    CPR     Medical Interventions (Level of Support Prior to Arrest):    Full       Discharge Disposition:    Skilled Nursing Facility (DC - External)    Discharge Medications:       Discharge Medications      New Medications      Instructions Start Date   albuterol 0.63 MG/3ML nebulizer solution  Commonly known as:  ACCUNEB   1 ampule, Nebulization, Every 6 Hours PRN      aspirin 81 MG EC tablet   81 mg, Oral, Daily   Start Date:  10/5/2019     budesonide-formoterol 160-4.5 MCG/ACT inhaler  Commonly known as:  SYMBICORT   2 puffs, Inhalation, 2 Times Daily - RT      dilTIAZem  MG 24 hr capsule  Commonly known as:  CARDIZEM CD   120 mg, Oral, Daily      furosemide 20 MG tablet  Commonly known as:  LASIX   20 mg, Oral, Daily      metoprolol tartrate 25 MG tablet  Commonly known as:  LOPRESSOR   25 mg, Oral, Every 12 Hours Scheduled      nicotine 21 MG/24HR patch  Commonly known as:  NICODERM CQ   1 patch, Transdermal, Daily   Start Date:  10/5/2019     potassium chloride 20 MEQ CR tablet  Commonly known as:  K-DUR,KLOR-CON   20 mEq, Oral, Every 48 Hours      predniSONE 10 MG (48) tablet pack  Commonly known as:  DELTASONE   Take as directed      rosuvastatin 20 MG tablet  Commonly known as:  CRESTOR   20 mg, Oral, Nightly      sacubitril-valsartan 24-26 MG tablet  Commonly known as:  ENTRESTO   1 tablet, Oral, Every 12 Hours Scheduled             Discharge Diet:     Cardiac low sodium  Fluid restriction 1.5 L per day      Activity at Discharge:     as tolerated    Follow-up Appointments:    Follow-up Information     Jonah Booth, APRN .    Specialty:  Nurse Practitioner  Contact information:  [address]  [facility] [address] [phone]              Provider, No Known .    Contact information:  [facility] [address]                   No future appointments.    2 weeks follow up with Dr Phillips cardiologist    Test Results Pending at Discharge:    none       Oracio Slater DO  10/04/19  12:00 PM    Time spent: 45 min

## 2019-10-04 NOTE — PROGRESS NOTES
Discharge Planning Assessment   Joey     Patient Name: Zaid Galraza  MRN: [id number]  Today's Date: 10/4/2019    Admit Date: 9/28/2019        Discharge Plan     Row Name 10/04/19 1432       Plan    Final Discharge Disposition Code  03 - skilled nursing facility (SNF)    Final Note  Pt to be discharged to [facility] on this date.  Facility aware and agreeable per Mary Carmen.  Pt and family aware and agreeable.  Pt to be transported via private auto per family.          Destination      Service Provider Request Status Selected Services Address Phone Number Fax Number    [facility] Pending - Request Sent N/A [address] [place] [address] [phone]       [facility]

## 2019-10-14 NOTE — ANESTHESIA POSTPROCEDURE EVALUATION
Patient: Zaid Galarza    Procedure Summary     Date:  09/30/19 Room / Location:  [facility] NONINVASIVE LAB    Anesthesia Start:  1134 Anesthesia Stop:  1151    Procedure:  ADULT TRANSESOPHAGEAL ECHO (ANGELITO) W/ CONT IF NECESSARY PER PROTOCOL Diagnosis:  (Cardiac Source of Emboli)    Scheduled Providers:  Vamsi Hester MD Provider:  Librado Keita MD    Anesthesia Type:  general ASA Status:  3          Anesthesia Type: general  Last vitals  BP   129/70 (10/04/19 1445)   Temp   97.6 °F (36.4 °C) (10/04/19 1445)   Pulse   76 (10/04/19 1053)   Resp   18 (10/04/19 1445)     SpO2   97 % (10/04/19 1053)     Post Anesthesia Care and Evaluation    Patient location during evaluation: PHASE II  Patient participation: complete - patient participated  Level of consciousness: awake and alert  Pain score: 0  Pain management: adequate  Airway patency: patent  Anesthetic complications: No anesthetic complications    Cardiovascular status: acceptable  Respiratory status: acceptable  Hydration status: acceptable

## 2019-11-04 ENCOUNTER — TRANSCRIBE ORDERS (OUTPATIENT)
Dept: ADMINISTRATIVE | Facility: HOSPITAL | Age: 69
End: 2019-11-04

## 2019-11-04 DIAGNOSIS — I71.40 ABDOMINAL AORTIC ANEURYSM WITHOUT RUPTURE (HCC): Primary | ICD-10-CM

## 2019-11-05 ENCOUNTER — HOSPITAL ENCOUNTER (OUTPATIENT)
Dept: ULTRASOUND IMAGING | Facility: HOSPITAL | Age: 69
Discharge: HOME | End: 2019-11-05
Admitting: NURSE PRACTITIONER

## 2019-11-05 DIAGNOSIS — I71.40 ABDOMINAL AORTIC ANEURYSM WITHOUT RUPTURE (HCC): ICD-10-CM

## 2019-11-05 PROCEDURE — 76706 US ABDL AORTA SCREEN AAA: CPT

## 2019-11-05 PROCEDURE — 76706 US ABDL AORTA SCREEN AAA: CPT | Performed by: RADIOLOGY

## 2020-02-11 ENCOUNTER — OFFICE VISIT (OUTPATIENT)
Dept: CARDIOLOGY | Facility: CLINIC | Age: 70
End: 2020-02-11

## 2020-02-11 VITALS
SYSTOLIC BLOOD PRESSURE: 142 MMHG | DIASTOLIC BLOOD PRESSURE: 78 MMHG | BODY MASS INDEX: 27.96 KG/M2 | HEART RATE: 52 BPM | HEIGHT: 72 IN | OXYGEN SATURATION: 98 % | WEIGHT: 206.4 LBS

## 2020-02-11 DIAGNOSIS — E78.5 DYSLIPIDEMIA: ICD-10-CM

## 2020-02-11 DIAGNOSIS — I48.3 TYPICAL ATRIAL FLUTTER (HCC): ICD-10-CM

## 2020-02-11 DIAGNOSIS — I42.0 DILATED CARDIOMYOPATHY (HCC): Primary | ICD-10-CM

## 2020-02-11 PROCEDURE — 99214 OFFICE O/P EST MOD 30 MIN: CPT | Performed by: NURSE PRACTITIONER

## 2020-02-11 RX ORDER — ASPIRIN 81 MG/1
81 TABLET ORAL DAILY
Qty: 30 TABLET | Refills: 0 | Status: SHIPPED | OUTPATIENT
Start: 2020-02-11 | End: 2020-05-04 | Stop reason: SDUPTHER

## 2020-02-11 RX ORDER — ROSUVASTATIN CALCIUM 20 MG/1
20 TABLET, COATED ORAL NIGHTLY
Qty: 30 TABLET | Refills: 0 | Status: SHIPPED | OUTPATIENT
Start: 2020-02-11 | End: 2020-05-04 | Stop reason: SDUPTHER

## 2020-02-11 RX ORDER — FUROSEMIDE 20 MG/1
20 TABLET ORAL DAILY
Qty: 30 TABLET | Refills: 0 | Status: SHIPPED | OUTPATIENT
Start: 2020-02-11 | End: 2020-05-04 | Stop reason: SDUPTHER

## 2020-02-11 NOTE — PROGRESS NOTES
Subjective     Chief Complaint: Cardiomyopathy; Shortness of Breath; and Atrial Flutter    History of Present Illness   Zaid Galarza is a 69 y.o. male who presents with a past medical history significant for AAA, status post repair, dilated cardiomyopathy with LVEF of 26-30% per echo of 9/29/2019, atrial flutter with RVR, left atrial appendage thrombus noted on ANGELITO of 9/30/2019, mild to moderate CAD per catheterization of 10/3/2019,     Pt states that he has not been able to afford his medications and has not taken any medications for at least two months, perhaps a little longer. He has not worn his LifeVest, states that his PCP said he did not need it.             Current Outpatient Medications:   •  apixaban (ELIQUIS) 5 MG tablet tablet, Take 1 tablet by mouth Every 12 (Twelve) Hours., Disp: 60 tablet, Rfl: 0  •  aspirin 81 MG EC tablet, Take 1 tablet by mouth Daily., Disp: 30 tablet, Rfl: 0  •  budesonide-formoterol (SYMBICORT) 160-4.5 MCG/ACT inhaler, Inhale 2 puffs by mouth 2 (Two) Times a Day., Disp: 20.4 g, Rfl: 12  •  furosemide (LASIX) 20 MG tablet, Take 1 tablet by mouth Daily., Disp: 30 tablet, Rfl: 0  •  nicotine (NICODERM CQ) 21 MG/24HR patch, Place 1 patch on the skin as directed by provider Daily., Disp: 30 patch, Rfl: 2  •  predniSONE (DELTASONE) 10 MG tablet, Take as directed on paper., Disp: 48 tablet, Rfl: 0  •  rosuvastatin (CRESTOR) 20 MG tablet, Take 1 tablet by mouth Every Night., Disp: 30 tablet, Rfl: 0     On this date:  The following portions of the patient's history were reviewed and updated as appropriate: allergies, current medications, past family history, past medical history, past social history, past surgical history and problem list.    Review of Systems   Constitution: Positive for decreased appetite.   HENT: Negative.    Eyes: Negative.    Cardiovascular: Negative.    Respiratory: Positive for shortness of breath.    Endocrine: Negative.    Skin: Negative.    Musculoskeletal:  "Negative.    Gastrointestinal: Negative.    Genitourinary: Negative.    Neurological: Positive for weakness.   Psychiatric/Behavioral: Negative.    Allergic/Immunologic: Negative.          Objective     /78 (BP Location: Left arm)   Pulse 52   Ht 182.9 cm (72\")   Wt 93.6 kg (206 lb 6.4 oz)   SpO2 98%   BMI 27.99 kg/m²     Physical Exam   Constitutional: He is oriented to person, place, and time. He appears well-developed and well-nourished.   HENT:   Head: Normocephalic and atraumatic.   Eyes: Pupils are equal, round, and reactive to light.   Neck: No JVD present.   Cardiovascular: Normal rate, regular rhythm and intact distal pulses. Exam reveals no gallop and no friction rub.   No murmur heard.  No lower extremity swelling   Pulmonary/Chest: Effort normal. No respiratory distress. He has no wheezes. He has rhonchi. He has no rales.   Neurological: He is alert and oriented to person, place, and time.   Skin: Skin is warm and dry.   Psychiatric: He has a normal mood and affect.   Vitals reviewed.      Procedures      Assessment/Plan       Zaid was seen today for cardiomyopathy, shortness of breath and atrial flutter.    Diagnoses and all orders for this visit:    Dilated cardiomyopathy (CMS/HCC)  -     Adult Transthoracic Echo Limited W/ Cont if Necessary Per Protocol; Future  -     furosemide (LASIX) 20 MG tablet; Take 1 tablet by mouth Daily.  -     aspirin 81 MG EC tablet; Take 1 tablet by mouth Daily.    Typical atrial flutter (CMS/HCC)    Dyslipidemia  -     rosuvastatin (CRESTOR) 20 MG tablet; Take 1 tablet by mouth Every Night.          Plan of care was reviewed.  Medication changes were made as noted below.  Patient agreed with plan of care.    I reviewed his medications with him and explained to him the importance of continuing all of them.  I explained that the purpose of the Eliquis for stroke prophylaxis in the presence of atrial fibrillation/flutter.    We have checked with his insurance " company on the cost of his medications.  He has a $4 co-pay on his one-month supply of rosuvastatin and a $90 co-pay of his Eliquis for 1 month or $95 for 3 months.  This information was given to Mr. Galarza and his siblings who accompanied him to today's visit.  Samples of Eliquis were given and a prescription was sent to [facility].  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  The patient and his siblings were in agreement with this plan.    He is to return has LifeVest to the company since he is not wearing it.    I have ordered a echocardiogram to evaluate LVEF.  He will need referral to EP.  This was discussed with the patient and his siblings.    I will see him next week in clinic to check on his compliance and review his medications with him again.    With regard to patient's cardiomyopathy, he appears to be well compensated, there is no swelling and no JVD.    Risk factor modification: Smoking cessation counseling was given.  Patient advised regarding the correlation between cigarette smoking and the use of e-cigarettes/vaping and coronary artery disease, as well as lung disease, cancer.  Patient was offered strategies to quit, including medication.  The patient is  interested in quitting.      Return in about 1 week (around 2/18/2020).      TIFFANIE Walker

## 2020-02-14 ENCOUNTER — HOSPITAL ENCOUNTER (OUTPATIENT)
Dept: CARDIOLOGY | Facility: HOSPITAL | Age: 70
Discharge: HOME | End: 2020-02-14
Admitting: NURSE PRACTITIONER

## 2020-02-14 DIAGNOSIS — I42.0 DILATED CARDIOMYOPATHY (HCC): ICD-10-CM

## 2020-02-14 PROCEDURE — 93308 TTE F-UP OR LMTD: CPT

## 2020-02-14 PROCEDURE — 93321 DOPPLER ECHO F-UP/LMTD STD: CPT

## 2020-02-14 PROCEDURE — 93308 TTE F-UP OR LMTD: CPT | Performed by: INTERNAL MEDICINE

## 2020-02-14 PROCEDURE — 93321 DOPPLER ECHO F-UP/LMTD STD: CPT | Performed by: INTERNAL MEDICINE

## 2020-02-17 LAB
BH CV ECHO MEAS - % IVS THICK: -13.5 %
BH CV ECHO MEAS - % LVPW THICK: 0.96 %
BH CV ECHO MEAS - BSA(HAYCOCK): 2.2 M^2
BH CV ECHO MEAS - BSA: 2.2 M^2
BH CV ECHO MEAS - BZI_BMI: 27.9 KILOGRAMS/M^2
BH CV ECHO MEAS - BZI_METRIC_HEIGHT: 182.9 CM
BH CV ECHO MEAS - BZI_METRIC_WEIGHT: 93.4 KG
BH CV ECHO MEAS - EDV(CUBED): 151.7 ML
BH CV ECHO MEAS - EDV(MOD-SP4): 37.8 ML
BH CV ECHO MEAS - EDV(TEICH): 137.3 ML
BH CV ECHO MEAS - EF(CUBED): 41.3 %
BH CV ECHO MEAS - EF(MOD-SP4): 34.9 %
BH CV ECHO MEAS - EF(TEICH): 33.8 %
BH CV ECHO MEAS - ESV(CUBED): 89.1 ML
BH CV ECHO MEAS - ESV(MOD-SP4): 24.6 ML
BH CV ECHO MEAS - ESV(TEICH): 90.8 ML
BH CV ECHO MEAS - FS: 16.3 %
BH CV ECHO MEAS - IVS/LVPW: 0.87
BH CV ECHO MEAS - IVSD: 1.4 CM
BH CV ECHO MEAS - IVSS: 1.2 CM
BH CV ECHO MEAS - LV DIASTOLIC VOL/BSA (35-75): 17.5 ML/M^2
BH CV ECHO MEAS - LV MASS(C)D: 345.5 GRAMS
BH CV ECHO MEAS - LV MASS(C)DI: 160.2 GRAMS/M^2
BH CV ECHO MEAS - LV MASS(C)S: 241.8 GRAMS
BH CV ECHO MEAS - LV MASS(C)SI: 112.1 GRAMS/M^2
BH CV ECHO MEAS - LV SYSTOLIC VOL/BSA (12-30): 11.4 ML/M^2
BH CV ECHO MEAS - LVIDD: 5.3 CM
BH CV ECHO MEAS - LVIDS: 4.5 CM
BH CV ECHO MEAS - LVLD AP4: 5.8 CM
BH CV ECHO MEAS - LVLS AP4: 5.9 CM
BH CV ECHO MEAS - LVPWD: 1.6 CM
BH CV ECHO MEAS - LVPWS: 1.6 CM
BH CV ECHO MEAS - SI(CUBED): 29 ML/M^2
BH CV ECHO MEAS - SI(MOD-SP4): 6.1 ML/M^2
BH CV ECHO MEAS - SI(TEICH): 21.5 ML/M^2
BH CV ECHO MEAS - SV(CUBED): 62.6 ML
BH CV ECHO MEAS - SV(MOD-SP4): 13.2 ML
BH CV ECHO MEAS - SV(TEICH): 46.5 ML
MAXIMAL PREDICTED HEART RATE: 152 BPM
STRESS TARGET HR: 129 BPM

## 2020-02-20 ENCOUNTER — OFFICE VISIT (OUTPATIENT)
Dept: CARDIOLOGY | Facility: CLINIC | Age: 70
End: 2020-02-20

## 2020-02-20 ENCOUNTER — TELEPHONE (OUTPATIENT)
Dept: CARDIOLOGY | Facility: CLINIC | Age: 70
End: 2020-02-20

## 2020-02-20 VITALS
BODY MASS INDEX: 29.09 KG/M2 | SYSTOLIC BLOOD PRESSURE: 155 MMHG | WEIGHT: 214.8 LBS | OXYGEN SATURATION: 96 % | DIASTOLIC BLOOD PRESSURE: 88 MMHG | HEIGHT: 72 IN | HEART RATE: 87 BPM

## 2020-02-20 DIAGNOSIS — I42.0 DILATED CARDIOMYOPATHY (HCC): Primary | ICD-10-CM

## 2020-02-20 DIAGNOSIS — I10 ESSENTIAL HYPERTENSION: ICD-10-CM

## 2020-02-20 DIAGNOSIS — I48.3 TYPICAL ATRIAL FLUTTER (HCC): ICD-10-CM

## 2020-02-20 DIAGNOSIS — Z72.0 TOBACCO USE: ICD-10-CM

## 2020-02-20 DIAGNOSIS — I25.10 CORONARY ARTERY DISEASE INVOLVING NATIVE CORONARY ARTERY OF NATIVE HEART WITHOUT ANGINA PECTORIS: ICD-10-CM

## 2020-02-20 PROCEDURE — 99214 OFFICE O/P EST MOD 30 MIN: CPT | Performed by: NURSE PRACTITIONER

## 2020-02-20 RX ORDER — POTASSIUM CHLORIDE 750 MG/1
10 TABLET, FILM COATED, EXTENDED RELEASE ORAL 2 TIMES DAILY
Qty: 60 TABLET | Refills: 11 | Status: SHIPPED | OUTPATIENT
Start: 2020-02-20 | End: 2020-05-04 | Stop reason: SDUPTHER

## 2020-02-20 RX ORDER — NICOTINE 21 MG/24HR
1 PATCH, TRANSDERMAL 24 HOURS TRANSDERMAL DAILY
Qty: 84 PATCH | Refills: 1 | Status: SHIPPED | OUTPATIENT
Start: 2020-02-20 | End: 2021-08-10 | Stop reason: ALTCHOICE

## 2020-02-20 NOTE — TELEPHONE ENCOUNTER
----- Message from TIFFANIE Cox sent at 2/18/2020  6:03 PM EST -----  These call the patient.  There has been a slight improvement in the pumping action of his heart.  He will not need referral to electrophysiologist for a implanted defibrillator.     Also check to see if he has brought in his financial statements so that he can get financial assistance for his medications.  Remind him of his appointment this week with me.  You may need to call his sister with this information regarding his echocardiogram and the financial papers.    Thanks, Christy

## 2020-02-20 NOTE — TELEPHONE ENCOUNTER
Patient was seen today in office 2.20.20 and results were discussed. Sister also brought in financial documents after appointment for me to make copies. I have sent these in to [email or web address] today.

## 2020-02-20 NOTE — PROGRESS NOTES
Subjective     Chief Complaint: Cardiomyopathy; Dyslipidemia; and Atrial Flutter    History of Present Illness   aZid Galarza is a 69 y.o. male who presents with a past medical history significant for AAA, status post repair, dilated cardiomyopathy with mildly recovered LVEF of 36-40% per echo of 2/14/2020, atrial flutter with RVR, left atrial appendage thrombus noted on ANGELITO of 9/30/2019, mild to moderate CAD per catheterization of 10/3/2019, and tobacco use.  He presents today for follow up.      He denies chest pain, palpitations.  He reports shortness of breath and cough, chronic and non worsening.      Current Outpatient Medications:   •  apixaban (ELIQUIS) 5 MG tablet tablet, Take 1 tablet by mouth Every 12 (Twelve) Hours., Disp: 60 tablet, Rfl: 0  •  aspirin 81 MG EC tablet, Take 1 tablet by mouth Daily., Disp: 30 tablet, Rfl: 0  •  budesonide-formoterol (SYMBICORT) 160-4.5 MCG/ACT inhaler, Inhale 2 puffs by mouth 2 (Two) Times a Day., Disp: 20.4 g, Rfl: 12  •  furosemide (LASIX) 20 MG tablet, Take 1 tablet by mouth Daily., Disp: 30 tablet, Rfl: 0  •  nicotine (NICODERM CQ) 21 MG/24HR patch, Place 1 patch on the skin as directed by provider Daily., Disp: 84 patch, Rfl: 1  •  predniSONE (DELTASONE) 10 MG tablet, Take as directed on paper., Disp: 48 tablet, Rfl: 0  •  rosuvastatin (CRESTOR) 20 MG tablet, Take 1 tablet by mouth Every Night., Disp: 30 tablet, Rfl: 0  •  sacubitril-valsartan (ENTRESTO) 24-26 MG tablet, Take 1 tablet by mouth 2 (Two) Times a Day., Disp: 60 tablet, Rfl: 11  •  potassium chloride (K-DUR) 10 MEQ CR tablet, Take 1 tablet by mouth 2 (Two) Times a Day., Disp: 60 tablet, Rfl: 11     On this date:  The following portions of the patient's history were reviewed and updated as appropriate: allergies, current medications, past family history, past medical history, past social history, past surgical history and problem list.    Review of Systems   Constitution: Negative for malaise/fatigue.  "  Cardiovascular: Negative for chest pain, dyspnea on exertion, irregular heartbeat, leg swelling, near-syncope, orthopnea, palpitations, paroxysmal nocturnal dyspnea and syncope.   Respiratory: Positive for cough and shortness of breath.    Hematologic/Lymphatic: Does not bruise/bleed easily.   Neurological: Negative for dizziness, light-headedness and weakness.         Objective     /88 (BP Location: Right arm)   Pulse 87   Ht 182.9 cm (72\")   Wt 97.4 kg (214 lb 12.8 oz)   SpO2 96%   BMI 29.13 kg/m²     Physical Exam   Constitutional: He is oriented to person, place, and time. He appears well-developed and well-nourished.   HENT:   Head: Normocephalic and atraumatic.   Eyes: Pupils are equal, round, and reactive to light.   Neck: No JVD present.   Cardiovascular: Normal rate, regular rhythm and intact distal pulses. Exam reveals no gallop and no friction rub.   No murmur heard.  Pulmonary/Chest: Effort normal. No respiratory distress. He has decreased breath sounds. He has wheezes. He has no rales.   Neurological: He is alert and oriented to person, place, and time.   Skin: Skin is warm and dry.   Psychiatric: He has a normal mood and affect.   Vitals reviewed.      Procedures      Assessment/Plan       Zaid was seen today for cardiomyopathy, dyslipidemia and atrial flutter.    Diagnoses and all orders for this visit:    Dilated cardiomyopathy (CMS/HCC)  -     potassium chloride (K-DUR) 10 MEQ CR tablet; Take 1 tablet by mouth 2 (Two) Times a Day.  -     sacubitril-valsartan (ENTRESTO) 24-26 MG tablet; Take 1 tablet by mouth 2 (Two) Times a Day.    Nonobstructive CAD per cath 10/3/2019    Typical atrial flutter (CMS/HCC)    Tobacco use  -     nicotine (NICODERM CQ) 21 MG/24HR patch; Place 1 patch on the skin as directed by provider Daily.    Essential hypertension          Plan of care was reviewed.  Medication changes were made as noted below.  Patient agreed with plan of care.    Cardiomyopathy is " stable.  Continue Entresto.  Will increase after next visit.  Continue Lasix, added potassium supplement.      CAD is stable.  Continue ASA, rosuvastatin.     Atrial flutter.  Continue Eliquis for stroke prophylaxis.  Rate controlled.    Hypertension.  Monitor BP at home.      Risk factor modification: Patient counseled regarding diet and exercise.  Patient encouraged to follow Mediterranean diet.  Handout given.  Patient counseled to participate in physical activity 30 minutes a day most days of the week.    Risk factor modification: Smoking cessation counseling was given.  Patient advised regarding the correlation between cigarette smoking and coronary artery disease, as well as lung disease, cancer.  Patient was offered strategies to quit, including medication.  The patient is  interested in quitting.  Nicotine patch given.  Strategies for quitting discussed.      Return in about 2 weeks (around 3/5/2020).      TIFFANIE Walker

## 2020-02-20 NOTE — TELEPHONE ENCOUNTER
Called insurance plan to get price estimates for [facility] mail [facility] versus local pharmacy    ASA: $0 @ local  Crestor: $0 @ local or [facility]  Lasix: $3 @ [facility]  Eliquis: $90 for 90 day supply @ [facility] vs $95 for 30 day supply

## 2020-02-22 PROBLEM — Z72.0 TOBACCO USE: Status: ACTIVE | Noted: 2020-02-22

## 2020-02-22 PROBLEM — F41.9 ANXIETY DISORDER: Status: ACTIVE | Noted: 2020-02-22

## 2020-02-22 PROBLEM — I10 ESSENTIAL HYPERTENSION: Status: ACTIVE | Noted: 2020-02-22

## 2020-03-03 ENCOUNTER — OFFICE VISIT (OUTPATIENT)
Dept: CARDIOLOGY | Facility: CLINIC | Age: 70
End: 2020-03-03

## 2020-03-03 VITALS
BODY MASS INDEX: 28.06 KG/M2 | WEIGHT: 207.2 LBS | HEIGHT: 72 IN | DIASTOLIC BLOOD PRESSURE: 68 MMHG | HEART RATE: 82 BPM | OXYGEN SATURATION: 92 % | SYSTOLIC BLOOD PRESSURE: 116 MMHG

## 2020-03-03 DIAGNOSIS — I25.10 CORONARY ARTERY DISEASE INVOLVING NATIVE CORONARY ARTERY OF NATIVE HEART WITHOUT ANGINA PECTORIS: ICD-10-CM

## 2020-03-03 DIAGNOSIS — I42.0 DILATED CARDIOMYOPATHY (HCC): Primary | ICD-10-CM

## 2020-03-03 DIAGNOSIS — I10 ESSENTIAL HYPERTENSION: ICD-10-CM

## 2020-03-03 DIAGNOSIS — I48.3 TYPICAL ATRIAL FLUTTER (HCC): ICD-10-CM

## 2020-03-03 PROCEDURE — 99213 OFFICE O/P EST LOW 20 MIN: CPT | Performed by: NURSE PRACTITIONER

## 2020-03-03 RX ORDER — DILTIAZEM HYDROCHLORIDE 120 MG/1
CAPSULE, EXTENDED RELEASE ORAL
COMMUNITY
Start: 2020-02-19 | End: 2020-05-04 | Stop reason: SDUPTHER

## 2020-03-03 NOTE — PROGRESS NOTES
Subjective     Chief Complaint: Cardiomyopathy; Coronary Artery Disease; and Hypertension    History of Present Illness   Zaid Galarza is a 69 y.o. male who presents with a past medical history significant for AAA, status post repair, dilated cardiomyopathy with mildly recovered LVEF of 36-40% per echo of 2/14/2020, atrial flutter with RVR, left atrial appendage thrombus noted on ANGELITO of 9/30/2019, mild to moderate CAD per catheterization of 10/3/2019, and tobacco use.  He presents today for follow up.      Mr Galarza is accompanied by his brother for today's visit.  Mr Galarza denies CP, SOA, LOERA, swelling.  He has been doing well.        Current Outpatient Medications:   •  apixaban (ELIQUIS) 5 MG tablet tablet, Take 1 tablet by mouth Every 12 (Twelve) Hours., Disp: 60 tablet, Rfl: 0  •  aspirin 81 MG EC tablet, Take 1 tablet by mouth Daily., Disp: 30 tablet, Rfl: 0  •  budesonide-formoterol (SYMBICORT) 160-4.5 MCG/ACT inhaler, Inhale 2 puffs by mouth 2 (Two) Times a Day., Disp: 20.4 g, Rfl: 12  •  dilTIAZem SR (CARDIZEM SR) 120 MG 12 hr capsule, , Disp: , Rfl:   •  furosemide (LASIX) 20 MG tablet, Take 1 tablet by mouth Daily., Disp: 30 tablet, Rfl: 0  •  nicotine (NICODERM CQ) 21 MG/24HR patch, Place 1 patch on the skin as directed by provider Daily., Disp: 84 patch, Rfl: 1  •  potassium chloride (K-DUR) 10 MEQ CR tablet, Take 1 tablet by mouth 2 (Two) Times a Day., Disp: 60 tablet, Rfl: 11  •  predniSONE (DELTASONE) 10 MG tablet, Take as directed on paper., Disp: 48 tablet, Rfl: 0  •  rosuvastatin (CRESTOR) 20 MG tablet, Take 1 tablet by mouth Every Night., Disp: 30 tablet, Rfl: 0  •  sacubitril-valsartan (ENTRESTO) 49-51 MG tablet, Take 1 tablet by mouth 2 (Two) Times a Day., Disp: , Rfl:      On this date:  The following portions of the patient's history were reviewed and updated as appropriate: allergies, current medications, past family history, past medical history, past social history, past surgical  "history and problem list.    Review of Systems   Constitution: Negative for malaise/fatigue.   Cardiovascular: Negative for chest pain, dyspnea on exertion, irregular heartbeat, leg swelling, near-syncope, orthopnea, palpitations, paroxysmal nocturnal dyspnea and syncope.   Respiratory: Negative for shortness of breath.    Hematologic/Lymphatic: Does not bruise/bleed easily.   Neurological: Negative for dizziness, light-headedness and weakness.         Objective     /68 (BP Location: Left arm, Patient Position: Sitting)   Pulse 82   Ht 182.9 cm (72\")   Wt 94 kg (207 lb 3.2 oz)   SpO2 92%   BMI 28.10 kg/m²     Physical Exam   Constitutional: He is oriented to person, place, and time. He appears well-developed and well-nourished.   HENT:   Head: Normocephalic and atraumatic.   Eyes: Pupils are equal, round, and reactive to light.   Neck: No JVD present.   Cardiovascular: Normal rate, regular rhythm and intact distal pulses. Exam reveals no gallop and no friction rub.   No murmur heard.  Pulmonary/Chest: Effort normal and breath sounds normal. No respiratory distress. He has no wheezes. He has no rales.   Abdominal: Soft. He exhibits no mass. There is no tenderness. No hernia.   Neurological: He is alert and oriented to person, place, and time.   Skin: Skin is warm and dry.   Psychiatric: He has a normal mood and affect.   Vitals reviewed.      Procedures      Assessment/Plan       Zaid was seen today for cardiomyopathy, coronary artery disease and hypertension.    Diagnoses and all orders for this visit:    Dilated cardiomyopathy (CMS/HCC)    Nonobstructive CAD per cath 10/3/2019    Essential hypertension    Typical atrial flutter (CMS/HCC)          Plan of care was reviewed.  Medication changes were made as noted below.  Patient agreed with plan of care.    Cardiomyopathy is stable.  Continue Entresto, Lasix, potassium    Non obstructive CAD, stable. Continue ASA and rosuvastatin    Atrial flutter, " continue Eliquis.    Hypertension, controlled    Risk factor modification: Smoking cessation counseling was given.  Patient advised regarding the correlation between cigarette smoking and coronary artery disease, as well as lung disease, cancer.  Patient was offered strategies to quit, including medication.  The patient is interested in quitting.    Return in about 2 months (around 5/3/2020).      Christy Malone, APRN

## 2020-05-04 ENCOUNTER — OFFICE VISIT (OUTPATIENT)
Dept: CARDIOLOGY | Facility: CLINIC | Age: 70
End: 2020-05-04

## 2020-05-04 DIAGNOSIS — I10 ESSENTIAL HYPERTENSION: ICD-10-CM

## 2020-05-04 DIAGNOSIS — I42.0 DILATED CARDIOMYOPATHY (HCC): Primary | ICD-10-CM

## 2020-05-04 DIAGNOSIS — E78.5 DYSLIPIDEMIA: ICD-10-CM

## 2020-05-04 DIAGNOSIS — I48.3 TYPICAL ATRIAL FLUTTER (HCC): ICD-10-CM

## 2020-05-04 DIAGNOSIS — I25.10 CORONARY ARTERY DISEASE INVOLVING NATIVE CORONARY ARTERY OF NATIVE HEART WITHOUT ANGINA PECTORIS: ICD-10-CM

## 2020-05-04 PROCEDURE — 99442 PR PHYS/QHP TELEPHONE EVALUATION 11-20 MIN: CPT | Performed by: NURSE PRACTITIONER

## 2020-05-04 RX ORDER — POTASSIUM CHLORIDE 750 MG/1
10 TABLET, FILM COATED, EXTENDED RELEASE ORAL 2 TIMES DAILY
Qty: 180 TABLET | Refills: 3 | Status: SHIPPED | OUTPATIENT
Start: 2020-05-04 | End: 2020-08-10 | Stop reason: SDUPTHER

## 2020-05-04 RX ORDER — ASPIRIN 81 MG/1
81 TABLET ORAL DAILY
Qty: 90 TABLET | Refills: 3 | Status: SHIPPED | OUTPATIENT
Start: 2020-05-04 | End: 2020-08-10 | Stop reason: SDUPTHER

## 2020-05-04 RX ORDER — FUROSEMIDE 20 MG/1
20 TABLET ORAL DAILY
Qty: 90 TABLET | Refills: 3 | Status: SHIPPED | OUTPATIENT
Start: 2020-05-04 | End: 2020-08-10 | Stop reason: SDUPTHER

## 2020-05-04 RX ORDER — ROSUVASTATIN CALCIUM 20 MG/1
20 TABLET, COATED ORAL NIGHTLY
Qty: 90 TABLET | Refills: 3 | Status: SHIPPED | OUTPATIENT
Start: 2020-05-04 | End: 2020-08-10 | Stop reason: SDUPTHER

## 2020-05-04 RX ORDER — DILTIAZEM HYDROCHLORIDE 120 MG/1
120 CAPSULE, EXTENDED RELEASE ORAL 2 TIMES DAILY
Qty: 180 CAPSULE | Refills: 3 | Status: SHIPPED | OUTPATIENT
Start: 2020-05-04 | End: 2020-08-10 | Stop reason: SDUPTHER

## 2020-05-04 NOTE — PROGRESS NOTES
"Subjective       1. You have chosen to receive care through the use of telemedicine. Telemedicine enables health care providers at different locations to provide safe, effective, and convenient care through the use of technology. As with any health care service, there are risks associated with the use of telemedicine, including equipment failure, poor connections, and  issues.       2. Do you understand the risks and benefits of telemedicine as I have explained them to you?  YES      3. Have your questions regarding telemedicine been answered?  YES      4. Do you consent to the use of telemedicine in your medical care today?  YES      Chief Complaint: Coronary Artery Disease and Cardiomyopathy    History of Present Illness   Zaid Galarza is a 69 y.o. male who presents with a past medical history significant for AAA, status post repair, dilated cardiomyopathy with mildly recovered LVEF of 36-40% per echo of 2/14/2020, atrial flutter with RVR, left atrial appendage thrombus noted on ANGELITO of 9/30/2019, mild to moderate CAD per catheterization of 10/3/2019, and tobacco use.  He presents today via phone for follow up.      Mr Galarza denies chest pain.  He does state that he has shortness of breath with exertion.  He is not exercising regularly.  He reports that in the last 4 weeks he has missed his medication \"once or twice\".        Current Outpatient Medications:   •  apixaban (ELIQUIS) 5 MG tablet tablet, Take 1 tablet by mouth Every 12 (Twelve) Hours., Disp: 60 tablet, Rfl: 0  •  aspirin 81 MG EC tablet, Take 1 tablet by mouth Daily., Disp: 90 tablet, Rfl: 3  •  budesonide-formoterol (SYMBICORT) 160-4.5 MCG/ACT inhaler, Inhale 2 puffs by mouth 2 (Two) Times a Day., Disp: 20.4 g, Rfl: 12  •  dilTIAZem SR (CARDIZEM SR) 120 MG 12 hr capsule, Take 1 capsule by mouth 2 (Two) Times a Day., Disp: 180 capsule, Rfl: 3  •  furosemide (Lasix) 20 MG tablet, Take 1 tablet by mouth Daily., Disp: 90 tablet, " Rfl: 3  •  nicotine (NICODERM CQ) 21 MG/24HR patch, Place 1 patch on the skin as directed by provider Daily., Disp: 84 patch, Rfl: 1  •  potassium chloride (K-DUR) 10 MEQ CR tablet, Take 1 tablet by mouth 2 (Two) Times a Day., Disp: 180 tablet, Rfl: 3  •  predniSONE (DELTASONE) 10 MG tablet, Take as directed on paper., Disp: 48 tablet, Rfl: 0  •  rosuvastatin (CRESTOR) 20 MG tablet, Take 1 tablet by mouth Every Night., Disp: 90 tablet, Rfl: 3  •  sacubitril-valsartan (ENTRESTO) 49-51 MG tablet, Take 1 tablet by mouth 2 (Two) Times a Day., Disp: 180 tablet, Rfl: 3     On this date:  The following portions of the patient's history were reviewed and updated as appropriate: allergies, current medications, past family history, past medical history, past social history, past surgical history and problem list.    Review of Systems   Constitution: Negative for malaise/fatigue.   Cardiovascular: Positive for dyspnea on exertion. Negative for chest pain, irregular heartbeat, leg swelling, near-syncope, orthopnea, palpitations, paroxysmal nocturnal dyspnea and syncope.   Respiratory: Negative for shortness of breath.    Hematologic/Lymphatic: Does not bruise/bleed easily.   Neurological: Negative for dizziness, light-headedness and weakness.         Objective     There were no vitals taken for this visit.    Physical Exam    This was a telephone encounter.     Procedures      Assessment/Plan       Zaid was seen today for coronary artery disease and cardiomyopathy.    Diagnoses and all orders for this visit:    Dilated cardiomyopathy (CMS/HCC)  -     Adult Transthoracic Echo Limited W/ Cont if Necessary Per Protocol; Future  -     potassium chloride (K-DUR) 10 MEQ CR tablet; Take 1 tablet by mouth 2 (Two) Times a Day.  -     furosemide (Lasix) 20 MG tablet; Take 1 tablet by mouth Daily.  -     aspirin 81 MG EC tablet; Take 1 tablet by mouth Daily.  -     sacubitril-valsartan (ENTRESTO) 49-51 MG tablet; Take 1 tablet by mouth 2  (Two) Times a Day.    Typical atrial flutter (CMS/HCC)  -     dilTIAZem SR (CARDIZEM SR) 120 MG 12 hr capsule; Take 1 capsule by mouth 2 (Two) Times a Day.    Nonobstructive CAD per cath 10/3/2019    Essential hypertension    Dyslipidemia  -     rosuvastatin (CRESTOR) 20 MG tablet; Take 1 tablet by mouth Every Night.          Plan of care was reviewed.  Medication changes were made as noted below.  Patient agreed with plan of care.    For dilated cardiomyopathy, will order limited echocardiogram to evaluate LVEF.  Patient is to continue Entresto, Lasix, potassium.    Patient's CHADsVASc is at least 4 for CHF, hypertension, CAD, age.  He is to continue Eliquis.  Continue Cardizem.    Nonobstructive CAD, stable.  Patient to continue aspirin.  He does need a beta-blocker, however since he is unable to report his blood pressures, I do not feel as though I can add this to his medication regimen at this time.  Hopefully, when he returns in 3 months, I will be able to add beta-blocker.    Continue rosuvastatin for dyslipidemia.  Will need labs drawn after next visit.    Hypertension, controlled with Entresto and diltiazem.  As stated above he will need a beta-blocker.  However I do not feel comfortable adding this medication to his regimen without knowing his blood pressure.  We will plan on adding low-dose when he returns in 3 months.    Risk factor modification: Smoking cessation counseling was given.  Patient advised regarding the correlation between cigarette smoking and coronary artery disease, as well as lung disease, cancer.  Patient was offered strategies to quit, including medication.  The patient is trying to quit smoking.      This telephone encounter lasted 15 minutes    Return in about 3 months (around 8/4/2020).      TIFFANIE Walker

## 2020-05-06 ENCOUNTER — HOSPITAL ENCOUNTER (OUTPATIENT)
Dept: CARDIOLOGY | Facility: HOSPITAL | Age: 70
Discharge: HOME | End: 2020-05-06
Admitting: NURSE PRACTITIONER

## 2020-05-06 DIAGNOSIS — I42.0 DILATED CARDIOMYOPATHY (HCC): ICD-10-CM

## 2020-05-06 LAB
BH CV ECHO MEAS - % IVS THICK: -12.7 %
BH CV ECHO MEAS - % LVPW THICK: 5.7 %
BH CV ECHO MEAS - BSA(HAYCOCK): 2.2 M^2
BH CV ECHO MEAS - BSA: 2.2 M^2
BH CV ECHO MEAS - BZI_BMI: 28.1 KILOGRAMS/M^2
BH CV ECHO MEAS - BZI_METRIC_HEIGHT: 182.9 CM
BH CV ECHO MEAS - BZI_METRIC_WEIGHT: 93.9 KG
BH CV ECHO MEAS - EDV(CUBED): 162.3 ML
BH CV ECHO MEAS - EDV(MOD-SP4): 61.2 ML
BH CV ECHO MEAS - EDV(TEICH): 144.7 ML
BH CV ECHO MEAS - EF(CUBED): 50 %
BH CV ECHO MEAS - EF(MOD-SP4): 37.9 %
BH CV ECHO MEAS - EF(TEICH): 41.6 %
BH CV ECHO MEAS - ESV(CUBED): 81.2 ML
BH CV ECHO MEAS - ESV(MOD-SP4): 38 ML
BH CV ECHO MEAS - ESV(TEICH): 84.4 ML
BH CV ECHO MEAS - FS: 20.6 %
BH CV ECHO MEAS - IVS/LVPW: 0.98
BH CV ECHO MEAS - IVSD: 1.3 CM
BH CV ECHO MEAS - IVSS: 1.1 CM
BH CV ECHO MEAS - LV DIASTOLIC VOL/BSA (35-75): 28.3 ML/M^2
BH CV ECHO MEAS - LV MASS(C)D: 301.9 GRAMS
BH CV ECHO MEAS - LV MASS(C)DI: 139.7 GRAMS/M^2
BH CV ECHO MEAS - LV MASS(C)S: 200.5 GRAMS
BH CV ECHO MEAS - LV MASS(C)SI: 92.7 GRAMS/M^2
BH CV ECHO MEAS - LV SYSTOLIC VOL/BSA (12-30): 17.6 ML/M^2
BH CV ECHO MEAS - LVIDD: 5.5 CM
BH CV ECHO MEAS - LVIDS: 4.3 CM
BH CV ECHO MEAS - LVLD AP4: 7 CM
BH CV ECHO MEAS - LVLS AP4: 6.5 CM
BH CV ECHO MEAS - LVPWD: 1.3 CM
BH CV ECHO MEAS - LVPWS: 1.4 CM
BH CV ECHO MEAS - SI(CUBED): 37.5 ML/M^2
BH CV ECHO MEAS - SI(MOD-SP4): 10.7 ML/M^2
BH CV ECHO MEAS - SI(TEICH): 27.9 ML/M^2
BH CV ECHO MEAS - SV(CUBED): 81.1 ML
BH CV ECHO MEAS - SV(MOD-SP4): 23.2 ML
BH CV ECHO MEAS - SV(TEICH): 60.2 ML

## 2020-05-06 PROCEDURE — 93321 DOPPLER ECHO F-UP/LMTD STD: CPT

## 2020-05-06 PROCEDURE — 93308 TTE F-UP OR LMTD: CPT | Performed by: INTERNAL MEDICINE

## 2020-05-06 PROCEDURE — 93321 DOPPLER ECHO F-UP/LMTD STD: CPT | Performed by: INTERNAL MEDICINE

## 2020-05-06 PROCEDURE — 93308 TTE F-UP OR LMTD: CPT

## 2020-05-21 ENCOUNTER — TELEPHONE (OUTPATIENT)
Dept: CARDIOLOGY | Facility: CLINIC | Age: 70
End: 2020-05-21

## 2020-05-21 NOTE — TELEPHONE ENCOUNTER
----- Message from TIFFANIE Cox sent at 5/8/2020  8:56 AM EDT -----  Please call patient.  Normal results.  Heart pumping function has now improved significantly and is the normal range.  Please continue to take all medications as prescribed.      Thanks, Christy      Patient notified of results. 5- 5:04 pm/rayshawn

## 2020-08-10 ENCOUNTER — OFFICE VISIT (OUTPATIENT)
Dept: CARDIOLOGY | Facility: CLINIC | Age: 70
End: 2020-08-10

## 2020-08-10 VITALS
HEART RATE: 48 BPM | BODY MASS INDEX: 27.44 KG/M2 | DIASTOLIC BLOOD PRESSURE: 76 MMHG | HEIGHT: 72 IN | WEIGHT: 202.6 LBS | OXYGEN SATURATION: 93 % | SYSTOLIC BLOOD PRESSURE: 112 MMHG

## 2020-08-10 DIAGNOSIS — I10 ESSENTIAL HYPERTENSION: ICD-10-CM

## 2020-08-10 DIAGNOSIS — E78.5 DYSLIPIDEMIA: ICD-10-CM

## 2020-08-10 DIAGNOSIS — I48.3 TYPICAL ATRIAL FLUTTER (HCC): ICD-10-CM

## 2020-08-10 DIAGNOSIS — I25.10 CORONARY ARTERY DISEASE INVOLVING NATIVE CORONARY ARTERY OF NATIVE HEART WITHOUT ANGINA PECTORIS: ICD-10-CM

## 2020-08-10 DIAGNOSIS — Z72.0 TOBACCO USE: ICD-10-CM

## 2020-08-10 DIAGNOSIS — I42.0 DILATED CARDIOMYOPATHY (HCC): Primary | ICD-10-CM

## 2020-08-10 PROCEDURE — 99213 OFFICE O/P EST LOW 20 MIN: CPT | Performed by: NURSE PRACTITIONER

## 2020-08-10 RX ORDER — ROSUVASTATIN CALCIUM 20 MG/1
20 TABLET, COATED ORAL NIGHTLY
Qty: 90 TABLET | Refills: 3 | Status: SHIPPED | OUTPATIENT
Start: 2020-08-10 | End: 2021-08-09 | Stop reason: SDUPTHER

## 2020-08-10 RX ORDER — POTASSIUM CHLORIDE 750 MG/1
10 TABLET, FILM COATED, EXTENDED RELEASE ORAL 2 TIMES DAILY
Qty: 180 TABLET | Refills: 3 | Status: SHIPPED | OUTPATIENT
Start: 2020-08-10 | End: 2021-08-10 | Stop reason: ALTCHOICE

## 2020-08-10 RX ORDER — DILTIAZEM HYDROCHLORIDE 120 MG/1
120 CAPSULE, EXTENDED RELEASE ORAL 2 TIMES DAILY
Qty: 180 CAPSULE | Refills: 3 | Status: SHIPPED | OUTPATIENT
Start: 2020-08-10 | End: 2021-08-09 | Stop reason: SDUPTHER

## 2020-08-10 RX ORDER — ASPIRIN 81 MG/1
81 TABLET ORAL DAILY
Qty: 90 TABLET | Refills: 3 | Status: SHIPPED | OUTPATIENT
Start: 2020-08-10 | End: 2021-08-09 | Stop reason: SDUPTHER

## 2020-08-10 RX ORDER — FUROSEMIDE 20 MG/1
20 TABLET ORAL DAILY
Qty: 90 TABLET | Refills: 3 | Status: SHIPPED | OUTPATIENT
Start: 2020-08-10 | End: 2021-08-09 | Stop reason: SDUPTHER

## 2020-08-10 NOTE — PROGRESS NOTES
Subjective     Chief Complaint: Cardiomyopathy; Atrial Flutter; Coronary Artery Disease; Hypertension; and Dyslipidemia    History of Present Illness   Zaid Galarza is a 69 y.o. male who presents with a past medical history significant for AAA, status post repair, dilated cardiomyopathy with mildly recovered LVEF of 36-40% per echo of 2/14/2020, atrial flutter with RVR, left atrial appendage thrombus noted on ANGELITO of 9/30/2019, mild to moderate CAD per catheterization of 10/3/2019, and tobacco use.  He presents today via phone for follow up.      At today's visit Mr Galarza denies chest pain, palpitations.  He does report shortness of breath and dyspnea on exertion; this is chronic and non worsening.      Current Outpatient Medications:   •  apixaban (ELIQUIS) 5 MG tablet tablet, Take 1 tablet by mouth Every 12 (Twelve) Hours. Indications: DVT/PE (active thrombosis), Disp: 60 tablet, Rfl: 0  •  aspirin (aspirin) 81 MG EC tablet, Take 1 tablet by mouth Daily., Disp: 90 tablet, Rfl: 3  •  budesonide-formoterol (SYMBICORT) 160-4.5 MCG/ACT inhaler, Inhale 2 puffs by mouth 2 (Two) Times a Day., Disp: 20.4 g, Rfl: 12  •  dilTIAZem SR (CARDIZEM SR) 120 MG 12 hr capsule, Take 1 capsule by mouth 2 (Two) Times a Day., Disp: 180 capsule, Rfl: 3  •  furosemide (Lasix) 20 MG tablet, Take 1 tablet by mouth Daily., Disp: 90 tablet, Rfl: 3  •  nicotine (NICODERM CQ) 21 MG/24HR patch, Place 1 patch on the skin as directed by provider Daily., Disp: 84 patch, Rfl: 1  •  potassium chloride (K-DUR) 10 MEQ CR tablet, Take 1 tablet by mouth 2 (Two) Times a Day., Disp: 180 tablet, Rfl: 3  •  predniSONE (DELTASONE) 10 MG tablet, Take as directed on paper., Disp: 48 tablet, Rfl: 0  •  rosuvastatin (CRESTOR) 20 MG tablet, Take 1 tablet by mouth Every Night., Disp: 90 tablet, Rfl: 3  •  sacubitril-valsartan (ENTRESTO) 49-51 MG tablet, Take 1 tablet by mouth 2 (Two) Times a Day., Disp: 180 tablet, Rfl: 3     On this date:  The following  "portions of the patient's history were reviewed and updated as appropriate: allergies, current medications, past family history, past medical history, past social history, past surgical history and problem list.    Review of Systems   Constitution: Negative for malaise/fatigue.   Cardiovascular: Positive for dyspnea on exertion. Negative for chest pain, irregular heartbeat, leg swelling, near-syncope, orthopnea, palpitations, paroxysmal nocturnal dyspnea and syncope.   Respiratory: Positive for shortness of breath.    Hematologic/Lymphatic: Does not bruise/bleed easily.   Neurological: Negative for dizziness, light-headedness and weakness.         Objective     /76 (BP Location: Left arm)   Pulse (!) 48   Ht 182.9 cm (72\")   Wt 91.9 kg (202 lb 9.6 oz)   SpO2 93%   BMI 27.48 kg/m²     Physical Exam   Constitutional: He is oriented to person, place, and time. He appears well-developed and well-nourished.   HENT:   Head: Normocephalic and atraumatic.   Eyes: Pupils are equal, round, and reactive to light.   Neck: No JVD present.   Cardiovascular: Normal rate, regular rhythm and intact distal pulses. Exam reveals no gallop and no friction rub.   No murmur heard.  Pulmonary/Chest: Effort normal and breath sounds normal. No respiratory distress. He has no wheezes. He has no rales.   Neurological: He is alert and oriented to person, place, and time.   Skin: Skin is warm and dry.   Psychiatric: He has a normal mood and affect.       Procedures      Assessment/Plan       Zaid was seen today for cardiomyopathy, atrial flutter, coronary artery disease, hypertension and dyslipidemia.    Diagnoses and all orders for this visit:    Dilated cardiomyopathy (CMS/HCC)  -     sacubitril-valsartan (ENTRESTO) 49-51 MG tablet; Take 1 tablet by mouth 2 (Two) Times a Day.  -     potassium chloride (K-DUR) 10 MEQ CR tablet; Take 1 tablet by mouth 2 (Two) Times a Day.  -     furosemide (Lasix) 20 MG tablet; Take 1 tablet by mouth " Daily.  -     aspirin (aspirin) 81 MG EC tablet; Take 1 tablet by mouth Daily.    Nonobstructive CAD per cath 10/3/2019    Typical atrial flutter (CMS/HCC)  -     dilTIAZem SR (CARDIZEM SR) 120 MG 12 hr capsule; Take 1 capsule by mouth 2 (Two) Times a Day.  -     apixaban (ELIQUIS) 5 MG tablet tablet; Take 1 tablet by mouth Every 12 (Twelve) Hours. Indications: DVT/PE (active thrombosis)    Essential hypertension    Tobacco use    Dyslipidemia  -     rosuvastatin (CRESTOR) 20 MG tablet; Take 1 tablet by mouth Every Night.          Plan of care was reviewed.  Medication changes were made as noted below.  Patient agreed with plan of care.    With regard to patient's dilated cardiomyopathy, he does appear euvolemic today.  He is to continue Entresto, Lasix, potassium supplement.    With regard to his nonobstructive coronary artery disease, continue aspirin    Patient will continue Cardizem 120 mg for his atrial flutter.  He is also to continue Eliquis.    Hypertension is controlled.    With regard to dyslipidemia, on 10/4/2019 total cholesterol is 129, triglycerides 57 and LDL cholesterol 72.  We will plan on repeat lipid panel at next visit.    Risk factor modification: Tobacco cessation counseling was given.  Patient advised regarding the correlation between smoking cigarettes and coronary artery disease, as well as lung disease, cancer.  Patient was offered strategies to quit, including medication.  The patient is interested in quitting.  He is currently wear ing a patch.      Return in about 6 months (around 2/10/2021).      TIFFANIE Walker

## 2021-02-08 PROBLEM — I25.10 CORONARY ARTERY DISEASE INVOLVING NATIVE CORONARY ARTERY OF NATIVE HEART WITHOUT ANGINA PECTORIS: Chronic | Status: ACTIVE | Noted: 2020-02-20

## 2021-02-08 PROBLEM — I48.92 ATRIAL FLUTTER: Chronic | Status: ACTIVE | Noted: 2019-09-28

## 2021-02-08 PROBLEM — I42.0 DILATED CARDIOMYOPATHY (HCC): Chronic | Status: ACTIVE | Noted: 2019-09-28

## 2021-02-08 PROBLEM — Z72.0 TOBACCO USE: Chronic | Status: ACTIVE | Noted: 2020-02-22

## 2021-02-08 PROBLEM — I10 ESSENTIAL HYPERTENSION: Chronic | Status: ACTIVE | Noted: 2020-02-22

## 2021-02-08 NOTE — PROGRESS NOTES
"Chief Complaint  Coronary Artery Disease, Atrial Fibrillation, Hypertension, Dyslipidemia, and Cardiomyopathy    Subjective          Zaid Galarza presents to [facility] for his usual follow up.    History of Present Illness    Patient denies chest pain.  He denies palpitations and shortness of breath.  He denies lower extremity swelling.  He does report that he occasionally will stand up quickly and will get dizzy.    Objective     Vital Signs:   /64 (BP Location: Right arm)   Pulse 73   Ht 182.9 cm (72\")   Wt 91.8 kg (202 lb 6.4 oz)   SpO2 95%   BMI 27.45 kg/m²       Physical Exam  Constitutional:       General: He is not in acute distress.  Eyes:      General: Lids are normal.      Conjunctiva/sclera: Conjunctivae normal.   Cardiovascular:      Rate and Rhythm: Normal rate.   Pulmonary:      Effort: Pulmonary effort is normal.   Neurological:      Mental Status: He is alert.          Result Review :                     Assessment and Plan    Problem List Items Addressed This Visit        Cardiac and Vasculature    Dilated cardiomyopathy (CMS/HCC) - Primary (Chronic)    Overview     · TTE: LVEF 26 to 30%, RV and LV mildly dilated, grade 3 diastolic dysfunction consistent with fixed restrictive pattern, mild TR, left atrial cavity mildly dilated right atrial cavity moderately dilated, moderate pulmonary hypertension with RVSP 47 mmHg  · 9/30/2019 ANGELITO: Severe cardiomyopathy likely from atrial flutter  · 2/11/2020 TTE LVEF 36 to 40%  · 5/6/2020 TTE LVEF 56 to 60%, mild concentric LVH         Atrial flutter (CMS/HCC) (Chronic)    Nonobstructive CAD per cath 10/3/2019 (Chronic)    Overview     · 10/4/2019 LHC: Nonobstructive coronary artery disease         Relevant Orders    CBC (No Diff) (Completed)    Comprehensive Metabolic Panel    Lipid Panel    Essential hypertension (Chronic)    Abdominal aortic aneurysm, without rupture (CMS/HCC)    Relevant Orders    US AAA Screen " Limited       Tobacco    Tobacco use (Chronic)            Follow Up     Continue current medications.  Patient is not currently on a beta-blocker.  He is on Entresto.  I hesitate to add a beta-blocker due to his already low blood pressure and his report of dizziness when rising quickly.    Patient has history of a abdominal aortic aneurysm.  It has been over a year since his last ultrasound scan.  Jethro mccartney.    Labs ordered and patient instructed to have labs drawn when he has his ultrasound.    Return in about 6 months (around 8/9/2021).  Patient was given instructions and counseling regarding his condition or for health maintenance advice. Please see specific information pulled into the AVS if appropriate.

## 2021-02-09 ENCOUNTER — LAB (OUTPATIENT)
Dept: LAB | Facility: HOSPITAL | Age: 71
End: 2021-02-09

## 2021-02-09 ENCOUNTER — OFFICE VISIT (OUTPATIENT)
Dept: CARDIOLOGY | Facility: CLINIC | Age: 71
End: 2021-02-09

## 2021-02-09 ENCOUNTER — TELEPHONE (OUTPATIENT)
Dept: CARDIOLOGY | Facility: CLINIC | Age: 71
End: 2021-02-09

## 2021-02-09 VITALS
HEART RATE: 73 BPM | OXYGEN SATURATION: 95 % | WEIGHT: 202.4 LBS | SYSTOLIC BLOOD PRESSURE: 117 MMHG | DIASTOLIC BLOOD PRESSURE: 64 MMHG | BODY MASS INDEX: 27.41 KG/M2 | HEIGHT: 72 IN

## 2021-02-09 DIAGNOSIS — I48.3 TYPICAL ATRIAL FLUTTER (HCC): Chronic | ICD-10-CM

## 2021-02-09 DIAGNOSIS — Z72.0 TOBACCO USE: Chronic | ICD-10-CM

## 2021-02-09 DIAGNOSIS — I25.10 CORONARY ARTERY DISEASE INVOLVING NATIVE CORONARY ARTERY OF NATIVE HEART WITHOUT ANGINA PECTORIS: Chronic | ICD-10-CM

## 2021-02-09 DIAGNOSIS — I42.0 DILATED CARDIOMYOPATHY (HCC): Primary | Chronic | ICD-10-CM

## 2021-02-09 DIAGNOSIS — I10 ESSENTIAL HYPERTENSION: Chronic | ICD-10-CM

## 2021-02-09 DIAGNOSIS — I71.40 ABDOMINAL AORTIC ANEURYSM, WITHOUT RUPTURE: ICD-10-CM

## 2021-02-09 LAB
ALBUMIN SERPL-MCNC: 3.62 G/DL (ref 3.5–5.2)
ALBUMIN/GLOB SERPL: 0.9 G/DL
ALP SERPL-CCNC: 127 U/L (ref 39–117)
ALT SERPL W P-5'-P-CCNC: 6 U/L (ref 1–41)
ANION GAP SERPL CALCULATED.3IONS-SCNC: 9.4 MMOL/L (ref 5–15)
AST SERPL-CCNC: 12 U/L (ref 1–40)
BILIRUB SERPL-MCNC: 0.3 MG/DL (ref 0–1.2)
BUN SERPL-MCNC: 18 MG/DL (ref 8–23)
BUN/CREAT SERPL: 15.3 (ref 7–25)
CALCIUM SPEC-SCNC: 9 MG/DL (ref 8.6–10.5)
CHLORIDE SERPL-SCNC: 103 MMOL/L (ref 98–107)
CHOLEST SERPL-MCNC: 160 MG/DL (ref 0–200)
CO2 SERPL-SCNC: 24.6 MMOL/L (ref 22–29)
CREAT SERPL-MCNC: 1.18 MG/DL (ref 0.76–1.27)
DEPRECATED RDW RBC AUTO: 45 FL (ref 37–54)
ERYTHROCYTE [DISTWIDTH] IN BLOOD BY AUTOMATED COUNT: 14.6 % (ref 12.3–15.4)
GFR SERPL CREATININE-BSD FRML MDRD: 61 ML/MIN/1.73
GLOBULIN UR ELPH-MCNC: 4.1 GM/DL
GLUCOSE SERPL-MCNC: 108 MG/DL (ref 65–99)
HCT VFR BLD AUTO: 43.4 % (ref 37.5–51)
HDLC SERPL-MCNC: 42 MG/DL (ref 40–60)
HGB BLD-MCNC: 13.9 G/DL (ref 13–17.7)
LDLC SERPL CALC-MCNC: 97 MG/DL (ref 0–100)
LDLC/HDLC SERPL: 2.27 {RATIO}
MCH RBC QN AUTO: 27 PG (ref 26.6–33)
MCHC RBC AUTO-ENTMCNC: 32 G/DL (ref 31.5–35.7)
MCV RBC AUTO: 84.4 FL (ref 79–97)
PLATELET # BLD AUTO: 245 10*3/MM3 (ref 140–450)
PMV BLD AUTO: 11.4 FL (ref 6–12)
POTASSIUM SERPL-SCNC: 4.2 MMOL/L (ref 3.5–5.2)
PROT SERPL-MCNC: 7.7 G/DL (ref 6–8.5)
RBC # BLD AUTO: 5.14 10*6/MM3 (ref 4.14–5.8)
SODIUM SERPL-SCNC: 137 MMOL/L (ref 136–145)
TRIGL SERPL-MCNC: 113 MG/DL (ref 0–150)
VLDLC SERPL-MCNC: 21 MG/DL (ref 5–40)
WBC # BLD AUTO: 10.32 10*3/MM3 (ref 3.4–10.8)

## 2021-02-09 PROCEDURE — 80061 LIPID PANEL: CPT | Performed by: NURSE PRACTITIONER

## 2021-02-09 PROCEDURE — 80053 COMPREHEN METABOLIC PANEL: CPT

## 2021-02-09 PROCEDURE — 85027 COMPLETE CBC AUTOMATED: CPT

## 2021-02-09 PROCEDURE — 36415 COLL VENOUS BLD VENIPUNCTURE: CPT

## 2021-02-09 PROCEDURE — 99214 OFFICE O/P EST MOD 30 MIN: CPT | Performed by: NURSE PRACTITIONER

## 2021-02-09 RX ORDER — EZETIMIBE 10 MG/1
10 TABLET ORAL DAILY
Qty: 30 TABLET | Refills: 11 | Status: SHIPPED | OUTPATIENT
Start: 2021-02-09 | End: 2021-08-09 | Stop reason: SDUPTHER

## 2021-02-09 NOTE — TELEPHONE ENCOUNTER
Called patient and was able to let him know that his CBC and CMP were both normal and that is lipid panel was a little abnormal because of his LDL cholesterol being elevated per Christy. I was able to let him know that his total cholesterol was normal at 160 and his triglycerides were normal at 113. She wanted him to start a new medication called zetia and she had already called it in to the pharmacy. He was understanding.

## 2021-02-09 NOTE — TELEPHONE ENCOUNTER
----- Message from TIFFANIE Cox sent at 2/9/2021  1:49 PM EST -----  Please call the patient regarding his abnormal result.    Total cholesterol is normal at 160, goal is for it to be less than 200, triglycerides are normal at 113, goal is for it to be less than 150, LDL cholesterol however is elevated at 97 and we would like this to be less than 70.  I am adding a new drug, called Zetia.  I have sent the Rx to [facility].  If he has any trouble getting it, please tell him to call us.      Thanks, Christy

## 2021-02-25 DIAGNOSIS — Z23 IMMUNIZATION DUE: ICD-10-CM

## 2021-06-24 DIAGNOSIS — I42.0 DILATED CARDIOMYOPATHY (HCC): ICD-10-CM

## 2021-07-08 ENCOUNTER — HOSPITAL ENCOUNTER (OUTPATIENT)
Dept: ULTRASOUND IMAGING | Facility: HOSPITAL | Age: 71
Discharge: HOME | End: 2021-07-08
Admitting: NURSE PRACTITIONER

## 2021-07-08 DIAGNOSIS — I71.40 ABDOMINAL AORTIC ANEURYSM, WITHOUT RUPTURE: ICD-10-CM

## 2021-07-08 PROCEDURE — 76706 US ABDL AORTA SCREEN AAA: CPT

## 2021-07-08 PROCEDURE — 76706 US ABDL AORTA SCREEN AAA: CPT | Performed by: RADIOLOGY

## 2021-08-09 ENCOUNTER — OFFICE VISIT (OUTPATIENT)
Dept: CARDIOLOGY | Facility: CLINIC | Age: 71
End: 2021-08-09

## 2021-08-09 VITALS
BODY MASS INDEX: 27.93 KG/M2 | HEART RATE: 80 BPM | DIASTOLIC BLOOD PRESSURE: 113 MMHG | WEIGHT: 206.2 LBS | SYSTOLIC BLOOD PRESSURE: 159 MMHG | HEIGHT: 72 IN | OXYGEN SATURATION: 97 %

## 2021-08-09 DIAGNOSIS — I25.10 CORONARY ARTERY DISEASE INVOLVING NATIVE CORONARY ARTERY OF NATIVE HEART WITHOUT ANGINA PECTORIS: Primary | Chronic | ICD-10-CM

## 2021-08-09 DIAGNOSIS — E78.5 DYSLIPIDEMIA: ICD-10-CM

## 2021-08-09 DIAGNOSIS — I48.3 TYPICAL ATRIAL FLUTTER (HCC): Chronic | ICD-10-CM

## 2021-08-09 DIAGNOSIS — I71.40 ABDOMINAL AORTIC ANEURYSM, WITHOUT RUPTURE: Chronic | ICD-10-CM

## 2021-08-09 DIAGNOSIS — I42.0 DILATED CARDIOMYOPATHY (HCC): Chronic | ICD-10-CM

## 2021-08-09 DIAGNOSIS — I10 ESSENTIAL HYPERTENSION: Chronic | ICD-10-CM

## 2021-08-09 PROCEDURE — 99214 OFFICE O/P EST MOD 30 MIN: CPT | Performed by: NURSE PRACTITIONER

## 2021-08-09 RX ORDER — METOPROLOL SUCCINATE 25 MG/1
25 TABLET, EXTENDED RELEASE ORAL DAILY
Qty: 90 TABLET | Refills: 2 | Status: SHIPPED | OUTPATIENT
Start: 2021-08-09 | End: 2021-09-09 | Stop reason: SDUPTHER

## 2021-08-09 RX ORDER — ROSUVASTATIN CALCIUM 20 MG/1
20 TABLET, COATED ORAL NIGHTLY
Qty: 90 TABLET | Refills: 2 | Status: SHIPPED | OUTPATIENT
Start: 2021-08-09 | End: 2021-09-09 | Stop reason: SDUPTHER

## 2021-08-09 RX ORDER — ASPIRIN 81 MG/1
81 TABLET ORAL DAILY
Qty: 90 TABLET | Refills: 2 | Status: SHIPPED | OUTPATIENT
Start: 2021-08-09 | End: 2021-09-09 | Stop reason: SDUPTHER

## 2021-08-09 RX ORDER — FUROSEMIDE 20 MG/1
20 TABLET ORAL DAILY
Qty: 90 TABLET | Refills: 2 | Status: SHIPPED | OUTPATIENT
Start: 2021-08-09 | End: 2021-09-09 | Stop reason: SDUPTHER

## 2021-08-09 RX ORDER — DILTIAZEM HYDROCHLORIDE 120 MG/1
120 CAPSULE, EXTENDED RELEASE ORAL 2 TIMES DAILY
Qty: 180 CAPSULE | Refills: 2 | Status: SHIPPED | OUTPATIENT
Start: 2021-08-09 | End: 2021-09-09 | Stop reason: SDUPTHER

## 2021-08-09 RX ORDER — EZETIMIBE 10 MG/1
10 TABLET ORAL DAILY
Qty: 90 TABLET | Refills: 2 | Status: SHIPPED | OUTPATIENT
Start: 2021-08-09 | End: 2021-09-09 | Stop reason: SDUPTHER

## 2021-08-09 NOTE — PROGRESS NOTES
"Chief Complaint  Cardiomyopathy    Subjective          Zaid Galarza presents to [facility] for follow-up.    History of Present Illness    Mr. Galarza denies any chest pain, palpitations, shortness of breath and dyspnea on exertion.  He denies orthopnea and PND.  He does report to me that he has not had his Entresto in several months.    Objective     Vital Signs:   BP (!) 159/113 (BP Location: Right arm, Patient Position: Sitting)   Pulse 80   Ht 182.9 cm (72\")   Wt 93.5 kg (206 lb 3.2 oz)   SpO2 97%   BMI 27.97 kg/m²       Physical Exam  Constitutional:       Appearance: Normal appearance. He is well-developed.   Cardiovascular:      Rate and Rhythm: Normal rate and regular rhythm.      Heart sounds: No murmur heard.   No friction rub. No gallop.    Pulmonary:      Effort: Pulmonary effort is normal. No respiratory distress.      Breath sounds: Normal breath sounds. No wheezing or rales.   Skin:     General: Skin is warm and dry.   Neurological:      Mental Status: He is alert and oriented to person, place, and time.   Psychiatric:         Mood and Affect: Mood normal.         Behavior: Behavior normal.          Result Review :            ECG 12 Lead    Date/Time: 8/12/2021 9:21 AM  Performed by: Christy Malone APRN  Authorized by: Christy Malone APRN   Comparison: compared with previous ECG from 9/29/2019  Comparison to previous ECG: Atrial flutter with variable AV block  Rhythm: atrial flutter  BPM: 78  Comments: Nonspecific ST and T wave abnormality,              Current Outpatient Medications   Medication Sig Dispense Refill   • apixaban (ELIQUIS) 5 MG tablet tablet Take 1 tablet by mouth Every 12 (Twelve) Hours. Indications: DVT/PE (active thrombosis) 180 tablet 2   • aspirin (aspirin) 81 MG EC tablet Take 1 tablet by mouth Daily. 90 tablet 2   • budesonide-formoterol (SYMBICORT) 160-4.5 MCG/ACT inhaler Inhale 2 puffs by mouth 2 (Two) Times a Day. 20.4 g 12 "   • dilTIAZem SR (CARDIZEM SR) 120 MG 12 hr capsule Take 1 capsule by mouth 2 (Two) Times a Day. 180 capsule 2   • ezetimibe (ZETIA) 10 MG tablet Take 1 tablet by mouth Daily. 90 tablet 2   • furosemide (Lasix) 20 MG tablet Take 1 tablet by mouth Daily. 90 tablet 2   • rosuvastatin (CRESTOR) 20 MG tablet Take 1 tablet by mouth Every Night. 90 tablet 2   • sacubitril-valsartan (ENTRESTO) 49-51 MG tablet Take 1 tablet by mouth 2 (Two) Times a Day. 180 tablet 2   • metoprolol succinate XL (TOPROL-XL) 25 MG 24 hr tablet Take 1 tablet by mouth Daily. 90 tablet 2     No current facility-administered medications for this visit.            Assessment and Plan    Problem List Items Addressed This Visit        Cardiac and Vasculature    Dilated cardiomyopathy (CMS/HCC) (Chronic)    Overview     · TTE: LVEF 26 to 30%, RV and LV mildly dilated, grade 3 diastolic dysfunction consistent with fixed restrictive pattern, mild TR, left atrial cavity mildly dilated right atrial cavity moderately dilated, moderate pulmonary hypertension with RVSP 47 mmHg  · 9/30/2019 [facility]: Severe cardiomyopathy likely from atrial flutter  · 2/11/2020 TTE LVEF 36 to 40%  · 5/6/2020 TTE LVEF 56 to 60%, mild concentric LVH         Relevant Medications    sacubitril-valsartan (ENTRESTO) 49-51 MG tablet    furosemide (Lasix) 20 MG tablet    aspirin (aspirin) 81 MG EC tablet    dilTIAZem SR (CARDIZEM SR) 120 MG 12 hr capsule    metoprolol succinate XL (TOPROL-XL) 25 MG 24 hr tablet    Atrial flutter (CMS/HCC) (Chronic)    Relevant Medications    sacubitril-valsartan (ENTRESTO) 49-51 MG tablet    dilTIAZem SR (CARDIZEM SR) 120 MG 12 hr capsule    apixaban (ELIQUIS) 5 MG tablet tablet    metoprolol succinate XL (TOPROL-XL) 25 MG 24 hr tablet    Nonobstructive CAD per cath 10/3/2019 - Primary (Chronic)    Overview     · 10/4/2019 [facility]: Nonobstructive coronary artery disease         Relevant Medications    sacubitril-valsartan (ENTRESTO) 49-51 MG tablet     dilTIAZem SR (CARDIZEM SR) 120 MG 12 hr capsule    metoprolol succinate XL (TOPROL-XL) 25 MG 24 hr tablet    Essential hypertension (Chronic)    Relevant Medications    furosemide (Lasix) 20 MG tablet    dilTIAZem SR (CARDIZEM SR) 120 MG 12 hr capsule    metoprolol succinate XL (TOPROL-XL) 25 MG 24 hr tablet    Abdominal aortic aneurysm, without rupture (CMS/HCC) (Chronic)      Other Visit Diagnoses     Dyslipidemia        Relevant Medications    rosuvastatin (CRESTOR) 20 MG tablet    ezetimibe (ZETIA) 10 MG tablet              Follow Up     Medications were reviewed with the patient.    I have added back his Entresto.  I have instructed the patient that if he is unable to get the Entresto due to financial concerns, he is to call our office.  He stated understanding.    I have also started him on a low-dose of metoprolol succinate.    He is to continue his aspirin and Zetia.  Continue rosuvastatin and diltiazem.  Continue Eliquis.    Risk factor modification: Smoking cessation counseling was given.  Patient advised regarding the correlation between cigarette smoking and coronary artery disease, as well as lung disease, cancer.  Patient was offered strategies to quit, including medication.  The patient is not interested in quitting.    Return in about 4 weeks (around 9/6/2021).    Patient was given instructions and counseling regarding his condition or for health maintenance advice. Please see specific information pulled into the AVS if appropriate.        negative

## 2021-08-12 PROCEDURE — 93000 ELECTROCARDIOGRAM COMPLETE: CPT | Performed by: NURSE PRACTITIONER

## 2021-09-09 ENCOUNTER — OFFICE VISIT (OUTPATIENT)
Dept: CARDIOLOGY | Facility: CLINIC | Age: 71
End: 2021-09-09

## 2021-09-09 VITALS
HEART RATE: 82 BPM | OXYGEN SATURATION: 98 % | BODY MASS INDEX: 27.63 KG/M2 | DIASTOLIC BLOOD PRESSURE: 81 MMHG | SYSTOLIC BLOOD PRESSURE: 153 MMHG | WEIGHT: 204 LBS | HEIGHT: 72 IN

## 2021-09-09 DIAGNOSIS — I42.0 DILATED CARDIOMYOPATHY (HCC): Primary | Chronic | ICD-10-CM

## 2021-09-09 DIAGNOSIS — I71.40 ABDOMINAL AORTIC ANEURYSM, WITHOUT RUPTURE: Chronic | ICD-10-CM

## 2021-09-09 DIAGNOSIS — E78.5 DYSLIPIDEMIA: ICD-10-CM

## 2021-09-09 DIAGNOSIS — Z72.0 TOBACCO USE: Chronic | ICD-10-CM

## 2021-09-09 DIAGNOSIS — I10 ESSENTIAL HYPERTENSION: Chronic | ICD-10-CM

## 2021-09-09 DIAGNOSIS — I48.3 TYPICAL ATRIAL FLUTTER (HCC): Chronic | ICD-10-CM

## 2021-09-09 DIAGNOSIS — I25.10 CORONARY ARTERY DISEASE INVOLVING NATIVE CORONARY ARTERY OF NATIVE HEART WITHOUT ANGINA PECTORIS: Chronic | ICD-10-CM

## 2021-09-09 PROCEDURE — 99214 OFFICE O/P EST MOD 30 MIN: CPT | Performed by: NURSE PRACTITIONER

## 2021-09-09 PROCEDURE — 93000 ELECTROCARDIOGRAM COMPLETE: CPT | Performed by: NURSE PRACTITIONER

## 2021-09-09 RX ORDER — METOPROLOL SUCCINATE 25 MG/1
12.5 TABLET, EXTENDED RELEASE ORAL DAILY
Qty: 45 TABLET | Refills: 3 | Status: SHIPPED | OUTPATIENT
Start: 2021-09-09 | End: 2021-12-09 | Stop reason: SDUPTHER

## 2021-09-09 RX ORDER — FUROSEMIDE 20 MG/1
20 TABLET ORAL DAILY
Qty: 90 TABLET | Refills: 3 | Status: SHIPPED | OUTPATIENT
Start: 2021-09-09 | End: 2021-12-09 | Stop reason: SDUPTHER

## 2021-09-09 RX ORDER — DILTIAZEM HYDROCHLORIDE 120 MG/1
120 CAPSULE, EXTENDED RELEASE ORAL 2 TIMES DAILY
Qty: 180 CAPSULE | Refills: 3 | Status: SHIPPED | OUTPATIENT
Start: 2021-09-09 | End: 2021-12-09 | Stop reason: SDUPTHER

## 2021-09-09 RX ORDER — ASPIRIN 81 MG/1
81 TABLET ORAL DAILY
Qty: 90 TABLET | Refills: 3 | Status: SHIPPED | OUTPATIENT
Start: 2021-09-09 | End: 2021-12-09 | Stop reason: SDUPTHER

## 2021-09-09 RX ORDER — ROSUVASTATIN CALCIUM 20 MG/1
20 TABLET, COATED ORAL NIGHTLY
Qty: 90 TABLET | Refills: 3 | Status: SHIPPED | OUTPATIENT
Start: 2021-09-09 | End: 2021-12-09 | Stop reason: SDUPTHER

## 2021-09-09 RX ORDER — EZETIMIBE 10 MG/1
10 TABLET ORAL DAILY
Qty: 90 TABLET | Refills: 3 | Status: SHIPPED | OUTPATIENT
Start: 2021-09-09 | End: 2021-12-09 | Stop reason: SDUPTHER

## 2021-09-09 NOTE — PROGRESS NOTES
"Chief Complaint  Follow-up (Patient didn't bring medications) and Hypertension (Patient states no changes in medications)    Subjective          Zaid Galarza presents to [facility] for follow up of his blood pressure    History of Present Illness     At today's visit Zaid denies any chest pain or palpitations.  He does report occasional lightheadedness.  He denies any syncope or near syncopal event.  He does report that he has been compliant with his medications.  He reports blood pressures at home in the 150s and 160s systolically.  And, he also reports low heart rate at home.    Objective     Vital Signs:   /81 (BP Location: Left arm, Patient Position: Sitting, Cuff Size: Adult)   Pulse 82   Ht 182.9 cm (72\")   Wt 92.5 kg (204 lb)   SpO2 98%   BMI 27.67 kg/m²       Physical Exam  Constitutional:       Appearance: Normal appearance. He is well-developed.   Cardiovascular:      Rate and Rhythm: Normal rate and regular rhythm.      Heart sounds: No murmur heard.   No friction rub. No gallop.    Pulmonary:      Effort: Pulmonary effort is normal. No respiratory distress.      Breath sounds: Normal breath sounds. No wheezing or rales.   Skin:     General: Skin is warm and dry.   Neurological:      Mental Status: He is alert and oriented to person, place, and time.   Psychiatric:         Mood and Affect: Mood normal.         Behavior: Behavior normal.          Result Review :            ECG 12 Lead    Date/Time: 9/9/2021 10:33 AM  Performed by: Christy Malone APRN  Authorized by: Christy Malone APRN   Comparison: compared with previous ECG from 8/9/2021  Comparison to previous ECG: Atrial flutter  Rhythm: sinus bradycardia  Rate: bradycardic  BPM: 48  ST Depression: III  ST Flattening: V2 and V3  Comments: Sinus bradycardia with sinus arrhythmia and a shortened WA interval has replaced atrial flutter             Current Outpatient Medications   Medication Sig Dispense " Refill   • apixaban (ELIQUIS) 5 MG tablet tablet Take 1 tablet by mouth Every 12 (Twelve) Hours. Indications: DVT/PE (active thrombosis) 180 tablet 3   • aspirin (aspirin) 81 MG EC tablet Take 1 tablet by mouth Daily. 90 tablet 3   • budesonide-formoterol (SYMBICORT) 160-4.5 MCG/ACT inhaler Inhale 2 puffs by mouth 2 (Two) Times a Day. 20.4 g 12   • dilTIAZem SR (CARDIZEM SR) 120 MG 12 hr capsule Take 1 capsule by mouth 2 (Two) Times a Day. 180 capsule 3   • ezetimibe (ZETIA) 10 MG tablet Take 1 tablet by mouth Daily. 90 tablet 3   • furosemide (Lasix) 20 MG tablet Take 1 tablet by mouth Daily. 90 tablet 3   • metoprolol succinate XL (TOPROL-XL) 25 MG 24 hr tablet Take 0.5 tablets by mouth Daily. 45 tablet 3   • rosuvastatin (CRESTOR) 20 MG tablet Take 1 tablet by mouth Every Night. 90 tablet 3   • sacubitril-valsartan (ENTRESTO)  MG tablet Take 1 tablet by mouth 2 (Two) Times a Day. 180 tablet 3     No current facility-administered medications for this visit.            Assessment and Plan    Problem List Items Addressed This Visit        Cardiac and Vasculature    Dilated cardiomyopathy (CMS/HCC) - Primary (Chronic)    Overview     · TTE: LVEF 26 to 30%, RV and LV mildly dilated, grade 3 diastolic dysfunction consistent with fixed restrictive pattern, mild TR, left atrial cavity mildly dilated right atrial cavity moderately dilated, moderate pulmonary hypertension with RVSP 47 mmHg  · 9/30/2019 [facility]: Severe cardiomyopathy likely from atrial flutter  · 2/11/2020 TTE LVEF 36 to 40%  · 5/6/2020 TTE LVEF 56 to 60%, mild concentric LVH         Relevant Medications    metoprolol succinate XL (TOPROL-XL) 25 MG 24 hr tablet    sacubitril-valsartan (ENTRESTO)  MG tablet    dilTIAZem SR (CARDIZEM SR) 120 MG 12 hr capsule    furosemide (Lasix) 20 MG tablet    aspirin (aspirin) 81 MG EC tablet    Atrial flutter (CMS/HCC) (Chronic)    Relevant Medications    metoprolol succinate XL (TOPROL-XL) 25 MG 24 hr tablet     sacubitril-valsartan (ENTRESTO)  MG tablet    apixaban (ELIQUIS) 5 MG tablet tablet    dilTIAZem SR (CARDIZEM SR) 120 MG 12 hr capsule    Other Relevant Orders    ECG 12 Lead    Nonobstructive CAD per cath 10/3/2019 (Chronic)    Overview     · 10/4/2019 [facility]: Nonobstructive coronary artery disease         Relevant Medications    metoprolol succinate XL (TOPROL-XL) 25 MG 24 hr tablet    sacubitril-valsartan (ENTRESTO)  MG tablet    dilTIAZem SR (CARDIZEM SR) 120 MG 12 hr capsule    Essential hypertension (Chronic)    Relevant Medications    metoprolol succinate XL (TOPROL-XL) 25 MG 24 hr tablet    dilTIAZem SR (CARDIZEM SR) 120 MG 12 hr capsule    furosemide (Lasix) 20 MG tablet    Abdominal aortic aneurysm, without rupture (CMS/HCC) (Chronic)       Tobacco    Tobacco use (Chronic)      Other Visit Diagnoses     Dyslipidemia        Relevant Medications    rosuvastatin (CRESTOR) 20 MG tablet    ezetimibe (ZETIA) 10 MG tablet              Follow Up     Medications were reviewed with the patient.    I am decreasing Zaid's metoprolol today and increasing his Entresto.  He is to continue Cardizem, Zetia, Crestor, aspirin, and Eliquis.    Risk factor modification: Smoking cessation counseling was given.  Patient advised regarding the correlation between cigarette smoking and coronary artery disease, as well as lung disease, cancer.  Patient was offered strategies to quit, including medication.  The patient is not interested in quitting.    Return in about 3 months (around 12/9/2021).    Patient was given instructions and counseling regarding his condition or for health maintenance advice. Please see specific information pulled into the AVS if appropriate.

## 2021-11-02 ENCOUNTER — TELEPHONE (OUTPATIENT)
Dept: CARDIOLOGY | Facility: CLINIC | Age: 71
End: 2021-11-02

## 2021-11-02 NOTE — TELEPHONE ENCOUNTER
Tried to call Pt, unable to leave message. Pt needs to bring in  A copy of his SS, monthly household income for Assistance program, I will try to call PTs sisters

## 2021-11-02 NOTE — TELEPHONE ENCOUNTER
PATIENT CALLED BACK AND GAVE HIM THE MESSAGE TO BRING IN A COPY OF SS, AND INCOME FOR HOUSEHOLD. PATIENT STATES HE WILL BRING IN ASAP.

## 2021-12-09 ENCOUNTER — OFFICE VISIT (OUTPATIENT)
Dept: CARDIOLOGY | Facility: CLINIC | Age: 71
End: 2021-12-09

## 2021-12-09 VITALS
DIASTOLIC BLOOD PRESSURE: 72 MMHG | HEIGHT: 72 IN | HEART RATE: 85 BPM | SYSTOLIC BLOOD PRESSURE: 115 MMHG | BODY MASS INDEX: 29.53 KG/M2 | WEIGHT: 218 LBS | OXYGEN SATURATION: 98 %

## 2021-12-09 DIAGNOSIS — I10 ESSENTIAL HYPERTENSION: Chronic | ICD-10-CM

## 2021-12-09 DIAGNOSIS — Z72.0 TOBACCO USE: Chronic | ICD-10-CM

## 2021-12-09 DIAGNOSIS — E78.5 DYSLIPIDEMIA, GOAL LDL BELOW 100: Chronic | ICD-10-CM

## 2021-12-09 DIAGNOSIS — I25.10 CORONARY ARTERY DISEASE INVOLVING NATIVE CORONARY ARTERY OF NATIVE HEART WITHOUT ANGINA PECTORIS: Primary | Chronic | ICD-10-CM

## 2021-12-09 DIAGNOSIS — I48.3 TYPICAL ATRIAL FLUTTER (HCC): Chronic | ICD-10-CM

## 2021-12-09 DIAGNOSIS — I42.0 DILATED CARDIOMYOPATHY (HCC): Chronic | ICD-10-CM

## 2021-12-09 DIAGNOSIS — Z98.890 HISTORY OF ABDOMINAL AORTIC ANEURYSM (AAA) REPAIR: ICD-10-CM

## 2021-12-09 DIAGNOSIS — E78.5 DYSLIPIDEMIA: ICD-10-CM

## 2021-12-09 PROCEDURE — 99214 OFFICE O/P EST MOD 30 MIN: CPT | Performed by: NURSE PRACTITIONER

## 2021-12-09 RX ORDER — POTASSIUM CHLORIDE 750 MG/1
10 TABLET, FILM COATED, EXTENDED RELEASE ORAL DAILY
COMMUNITY
End: 2022-09-14 | Stop reason: HOSPADM

## 2021-12-09 RX ORDER — FUROSEMIDE 20 MG/1
20 TABLET ORAL DAILY
Qty: 90 TABLET | Refills: 3 | Status: SHIPPED | OUTPATIENT
Start: 2021-12-09 | End: 2022-06-09 | Stop reason: SDUPTHER

## 2021-12-09 RX ORDER — METOPROLOL SUCCINATE 25 MG/1
12.5 TABLET, EXTENDED RELEASE ORAL DAILY
Qty: 45 TABLET | Refills: 3 | Status: SHIPPED | OUTPATIENT
Start: 2021-12-09 | End: 2022-06-09 | Stop reason: SDUPTHER

## 2021-12-09 RX ORDER — DILTIAZEM HYDROCHLORIDE 120 MG/1
120 CAPSULE, EXTENDED RELEASE ORAL 2 TIMES DAILY
Qty: 180 CAPSULE | Refills: 3 | Status: SHIPPED | OUTPATIENT
Start: 2021-12-09 | End: 2022-06-09 | Stop reason: SDUPTHER

## 2021-12-09 RX ORDER — ASPIRIN 81 MG/1
81 TABLET ORAL DAILY
Qty: 90 TABLET | Refills: 3 | Status: SHIPPED | OUTPATIENT
Start: 2021-12-09 | End: 2022-06-09 | Stop reason: SDUPTHER

## 2021-12-09 RX ORDER — PREDNISONE 10 MG/1
10 TABLET ORAL DAILY
COMMUNITY
End: 2023-02-08

## 2021-12-09 RX ORDER — ROSUVASTATIN CALCIUM 20 MG/1
20 TABLET, COATED ORAL NIGHTLY
Qty: 90 TABLET | Refills: 3 | Status: SHIPPED | OUTPATIENT
Start: 2021-12-09 | End: 2022-06-09 | Stop reason: SDUPTHER

## 2021-12-09 RX ORDER — NICOTINE 21 MG/24HR
1 PATCH, TRANSDERMAL 24 HOURS TRANSDERMAL EVERY 24 HOURS
Status: ON HOLD | COMMUNITY
End: 2022-09-01

## 2021-12-09 RX ORDER — EZETIMIBE 10 MG/1
10 TABLET ORAL DAILY
Qty: 90 TABLET | Refills: 3 | Status: SHIPPED | OUTPATIENT
Start: 2021-12-09 | End: 2022-06-09 | Stop reason: SDUPTHER

## 2021-12-09 NOTE — PROGRESS NOTES
"Chief Complaint  Follow-up    Subjective          Zaid Galarza presents to [facility] for FOLLOW UP.    History of Present Illness    She was last seen on 9/9/2021.  His last visit his beta-blocker was decreased due to low heart rate and his Entresto was increased.    At today's visit Mr. Galarza denies complaint.  He denies any chest pain or palpitations.  He denies any shortness of breath or dyspnea on exertion.  He denies syncope or near syncopal event.  He denies dizziness or lightheadedness.  He states that he has been well.  He reports medication compliance.    Objective     Vital Signs:   /72 (BP Location: Left arm, Patient Position: Sitting, Cuff Size: Adult)   Pulse 85   Ht 182.9 cm (72\")   Wt 98.9 kg (218 lb)   SpO2 98%   BMI 29.57 kg/m²       Physical Exam  Constitutional:       Appearance: Normal appearance. He is well-developed.   Cardiovascular:      Rate and Rhythm: Normal rate and regular rhythm.      Heart sounds: No murmur heard.  No friction rub. No gallop.    Pulmonary:      Effort: Pulmonary effort is normal. No respiratory distress.      Breath sounds: Normal breath sounds. No wheezing or rales.   Skin:     General: Skin is warm and dry.   Neurological:      Mental Status: He is alert and oriented to person, place, and time.   Psychiatric:         Mood and Affect: Mood normal.         Behavior: Behavior normal.          Result Review :                Current Outpatient Medications   Medication Sig Dispense Refill   • apixaban (ELIQUIS) 5 MG tablet tablet Take 1 tablet by mouth Every 12 (Twelve) Hours. Indications: DVT/PE (active thrombosis) 180 tablet 3   • aspirin (aspirin) 81 MG EC tablet Take 1 tablet by mouth Daily. 90 tablet 3   • budesonide-formoterol (SYMBICORT) 160-4.5 MCG/ACT inhaler Inhale 2 puffs by mouth 2 (Two) Times a Day. 20.4 g 12   • dilTIAZem SR (CARDIZEM SR) 120 MG 12 hr capsule Take 1 capsule by mouth 2 (Two) Times a Day. 180 " capsule 3   • ezetimibe (ZETIA) 10 MG tablet Take 1 tablet by mouth Daily. 90 tablet 3   • furosemide (Lasix) 20 MG tablet Take 1 tablet by mouth Daily. 90 tablet 3   • metoprolol succinate XL (TOPROL-XL) 25 MG 24 hr tablet Take 0.5 tablets by mouth Daily. 45 tablet 3   • nicotine (NICODERM CQ) 21 MG/24HR patch Place 1 patch on the skin as directed by provider Daily.     • potassium chloride 10 MEQ CR tablet Take 10 mEq by mouth 2 (Two) Times a Day.     • predniSONE (DELTASONE) 10 MG tablet Take 10 mg by mouth Daily.     • rosuvastatin (CRESTOR) 20 MG tablet Take 1 tablet by mouth Every Night. 90 tablet 3   • sacubitril-valsartan (ENTRESTO)  MG tablet Take 1 tablet by mouth 2 (Two) Times a Day. 180 tablet 3     No current facility-administered medications for this visit.            Assessment and Plan    Problem List Items Addressed This Visit        Cardiac and Vasculature    Dilated cardiomyopathy (HCC) (Chronic)    Overview     · TTE: LVEF 26 to 30%, RV and LV mildly dilated, grade 3 diastolic dysfunction consistent with fixed restrictive pattern, mild TR, left atrial cavity mildly dilated right atrial cavity moderately dilated, moderate pulmonary hypertension with RVSP 47 mmHg  · 9/30/2019 [facility]: Severe cardiomyopathy likely from atrial flutter  · 2/11/2020 TTE LVEF 36 to 40%  · 5/6/2020 TTE LVEF 56 to 60%, mild concentric LVH         Relevant Medications    furosemide (Lasix) 20 MG tablet    aspirin (aspirin) 81 MG EC tablet    metoprolol succinate XL (TOPROL-XL) 25 MG 24 hr tablet    dilTIAZem SR (CARDIZEM SR) 120 MG 12 hr capsule    sacubitril-valsartan (ENTRESTO)  MG tablet    Atrial flutter (HCC) (Chronic)    Relevant Medications    metoprolol succinate XL (TOPROL-XL) 25 MG 24 hr tablet    dilTIAZem SR (CARDIZEM SR) 120 MG 12 hr capsule    sacubitril-valsartan (ENTRESTO)  MG tablet    Nonobstructive CAD per cath 10/3/2019 - Primary (Chronic)    Overview     · 10/4/2019 [facility]: Nonobstructive  coronary artery disease         Relevant Medications    metoprolol succinate XL (TOPROL-XL) 25 MG 24 hr tablet    dilTIAZem SR (CARDIZEM SR) 120 MG 12 hr capsule    sacubitril-valsartan (ENTRESTO)  MG tablet    Essential hypertension (Chronic)    Relevant Medications    furosemide (Lasix) 20 MG tablet    metoprolol succinate XL (TOPROL-XL) 25 MG 24 hr tablet    dilTIAZem SR (CARDIZEM SR) 120 MG 12 hr capsule    History of abdominal aortic aneurysm (AAA) repair (Chronic)    Dyslipidemia, goal LDL below 100 (Chronic)    Relevant Medications    ezetimibe (ZETIA) 10 MG tablet    rosuvastatin (CRESTOR) 20 MG tablet       Tobacco    Tobacco use (Chronic)      Other Visit Diagnoses     Dyslipidemia        Relevant Medications    ezetimibe (ZETIA) 10 MG tablet    rosuvastatin (CRESTOR) 20 MG tablet              Follow Up     Medications were reviewed with the patient.    Cardiomyopathy is stable.  Patient appears to be euvolemic.  Continue Entresto, metoprolol succinate, and Lasix.    Hypertension is well controlled.    Nonobstructive coronary artery disease is stable.  Continue aspirin, rosuvastatin and Zetia.    With regard to dyslipidemia, continue rosuvastatin and Zetia.  Will redraw labs at next visit.    Atrial flutter is stable.  Patient to continue Eliquis for stroke prophylaxis and diltiazem for rate control    Risk factor modification: Smoking cessation counseling was given.  Patient advised regarding the correlation between cigarette smoking and coronary artery disease, as well as lung disease, cancer.  Patient was offered strategies to quit, including medication.  The patient is trying to quit.    Return in about 6 months (around 6/9/2022).    Patient was given instructions and counseling regarding his condition or for health maintenance advice. Please see specific information pulled into the AVS if appropriate.

## 2021-12-11 PROBLEM — E78.5 DYSLIPIDEMIA, GOAL LDL BELOW 100: Status: ACTIVE | Noted: 2021-12-11

## 2021-12-11 PROBLEM — Z98.890 HISTORY OF ABDOMINAL AORTIC ANEURYSM (AAA) REPAIR: Chronic | Status: ACTIVE | Noted: 2021-02-09

## 2021-12-11 PROBLEM — Z98.890 HISTORY OF ABDOMINAL AORTIC ANEURYSM (AAA) REPAIR: Status: ACTIVE | Noted: 2021-02-09

## 2021-12-11 PROBLEM — E78.5 DYSLIPIDEMIA, GOAL LDL BELOW 100: Chronic | Status: ACTIVE | Noted: 2021-12-11

## 2022-06-09 ENCOUNTER — OFFICE VISIT (OUTPATIENT)
Dept: CARDIOLOGY | Facility: CLINIC | Age: 72
End: 2022-06-09

## 2022-06-09 VITALS
SYSTOLIC BLOOD PRESSURE: 176 MMHG | HEIGHT: 72 IN | WEIGHT: 239.4 LBS | BODY MASS INDEX: 32.43 KG/M2 | DIASTOLIC BLOOD PRESSURE: 102 MMHG | HEART RATE: 90 BPM | OXYGEN SATURATION: 98 %

## 2022-06-09 DIAGNOSIS — I48.3 TYPICAL ATRIAL FLUTTER: Chronic | ICD-10-CM

## 2022-06-09 DIAGNOSIS — I10 ESSENTIAL HYPERTENSION: Chronic | ICD-10-CM

## 2022-06-09 DIAGNOSIS — E78.5 DYSLIPIDEMIA: ICD-10-CM

## 2022-06-09 DIAGNOSIS — I42.0 DILATED CARDIOMYOPATHY: Chronic | ICD-10-CM

## 2022-06-09 DIAGNOSIS — I42.0 DILATED CARDIOMYOPATHY: Primary | ICD-10-CM

## 2022-06-09 PROCEDURE — 99214 OFFICE O/P EST MOD 30 MIN: CPT | Performed by: NURSE PRACTITIONER

## 2022-06-09 RX ORDER — DILTIAZEM HYDROCHLORIDE 120 MG/1
120 CAPSULE, EXTENDED RELEASE ORAL 2 TIMES DAILY
Qty: 180 CAPSULE | Refills: 3 | Status: SHIPPED | OUTPATIENT
Start: 2022-06-09 | End: 2022-09-14 | Stop reason: HOSPADM

## 2022-06-09 RX ORDER — EZETIMIBE 10 MG/1
10 TABLET ORAL DAILY
Qty: 90 TABLET | Refills: 3 | Status: SHIPPED | OUTPATIENT
Start: 2022-06-09 | End: 2022-09-14 | Stop reason: HOSPADM

## 2022-06-09 RX ORDER — ASPIRIN 81 MG/1
81 TABLET ORAL DAILY
Qty: 90 TABLET | Refills: 3 | Status: SHIPPED | OUTPATIENT
Start: 2022-06-09 | End: 2023-02-11 | Stop reason: HOSPADM

## 2022-06-09 RX ORDER — ROSUVASTATIN CALCIUM 20 MG/1
20 TABLET, COATED ORAL NIGHTLY
Qty: 90 TABLET | Refills: 3 | Status: SHIPPED | OUTPATIENT
Start: 2022-06-09 | End: 2022-09-14 | Stop reason: HOSPADM

## 2022-06-09 RX ORDER — FUROSEMIDE 20 MG/1
20 TABLET ORAL DAILY
Qty: 90 TABLET | Refills: 3 | Status: SHIPPED | OUTPATIENT
Start: 2022-06-09 | End: 2022-09-14 | Stop reason: HOSPADM

## 2022-06-09 RX ORDER — METOPROLOL SUCCINATE 25 MG/1
12.5 TABLET, EXTENDED RELEASE ORAL DAILY
Qty: 45 TABLET | Refills: 3 | Status: SHIPPED | OUTPATIENT
Start: 2022-06-09 | End: 2022-09-14 | Stop reason: HOSPADM

## 2022-06-09 NOTE — PROGRESS NOTES
"Chief Complaint  Coronary Artery Disease (Patient here for 6 month follow up  patient states short of breath , weight gain, bruises easily )    Subjective          Zaid Galarza presents to [facility] for follow up.    History of Present Illness    Mr. Galarza was last seen in clinic on 12/9/2021.  Patient was doing well at that visit and no changes were made to his medications.    At today's visit Mr. Galarza reports that he has not had his Entresto for about 1 month.  He denies any chest pain or palpitations.  He does report some shortness of breath.  He denies lower extremity swelling.  He states that he bruises easily however denies bright red blood per rectum or black tarry stools.    Objective     Vital Signs:   BP (!) 176/102 (BP Location: Left arm, Patient Position: Sitting, Cuff Size: Adult)   Pulse 90   Ht 182.9 cm (72\")   Wt 109 kg (239 lb 6.4 oz)   SpO2 98%   BMI 32.47 kg/m²       Physical Exam  Vitals reviewed.   Constitutional:       Appearance: Normal appearance. He is well-developed.   Cardiovascular:      Rate and Rhythm: Normal rate and regular rhythm.      Heart sounds: No murmur heard.    No friction rub. No gallop.   Pulmonary:      Effort: Pulmonary effort is normal. No respiratory distress.      Breath sounds: Normal breath sounds. No wheezing or rales.   Skin:     General: Skin is warm and dry.   Neurological:      Mental Status: He is alert and oriented to person, place, and time.   Psychiatric:         Mood and Affect: Mood normal.         Behavior: Behavior normal.          Result Review :                Current Outpatient Medications   Medication Sig Dispense Refill   • apixaban (ELIQUIS) 5 MG tablet tablet Take 1 tablet by mouth Every 12 (Twelve) Hours. Indications: DVT/PE (active thrombosis) 180 tablet 3   • aspirin (aspirin) 81 MG EC tablet Take 1 tablet by mouth Daily. 90 tablet 3   • budesonide-formoterol (SYMBICORT) 160-4.5 MCG/ACT inhaler " Inhale 2 puffs by mouth 2 (Two) Times a Day. 20.4 g 12   • dilTIAZem SR (CARDIZEM SR) 120 MG 12 hr capsule Take 1 capsule by mouth 2 (Two) Times a Day. 180 capsule 3   • ezetimibe (ZETIA) 10 MG tablet Take 1 tablet by mouth Daily. 90 tablet 3   • furosemide (Lasix) 20 MG tablet Take 1 tablet by mouth Daily. 90 tablet 3   • metoprolol succinate XL (TOPROL-XL) 25 MG 24 hr tablet Take 0.5 tablets by mouth Daily. 45 tablet 3   • potassium chloride 10 MEQ CR tablet Take 10 mEq by mouth 2 (Two) Times a Day.     • predniSONE (DELTASONE) 10 MG tablet Take 10 mg by mouth Daily.     • rosuvastatin (CRESTOR) 20 MG tablet Take 1 tablet by mouth Every Night. 90 tablet 3   • sacubitril-valsartan (ENTRESTO)  MG tablet Take 1 tablet by mouth 2 (Two) Times a Day. 180 tablet 3   • nicotine (NICODERM CQ) 21 MG/24HR patch Place 1 patch on the skin as directed by provider Daily.       No current facility-administered medications for this visit.            Assessment and Plan    Problem List Items Addressed This Visit        Cardiac and Vasculature    Dilated cardiomyopathy (HCC) (Chronic)    Overview     · TTE: LVEF 26 to 30%, RV and LV mildly dilated, grade 3 diastolic dysfunction consistent with fixed restrictive pattern, mild TR, left atrial cavity mildly dilated right atrial cavity moderately dilated, moderate pulmonary hypertension with RVSP 47 mmHg  · 9/30/2019 [facility]: Severe cardiomyopathy likely from atrial flutter  · 2/11/2020 TTE LVEF 36 to 40%  · 5/6/2020 TTE LVEF 56 to 60%, mild concentric LVH           Relevant Medications    sacubitril-valsartan (ENTRESTO)  MG tablet    aspirin (aspirin) 81 MG EC tablet    furosemide (Lasix) 20 MG tablet    metoprolol succinate XL (TOPROL-XL) 25 MG 24 hr tablet    dilTIAZem SR (CARDIZEM SR) 120 MG 12 hr capsule    Atrial flutter (HCC) (Chronic)    Relevant Medications    sacubitril-valsartan (ENTRESTO)  MG tablet    metoprolol succinate XL (TOPROL-XL) 25 MG 24 hr tablet     apixaban (ELIQUIS) 5 MG tablet tablet    dilTIAZem SR (CARDIZEM SR) 120 MG 12 hr capsule    Essential hypertension (Chronic)    Relevant Medications    furosemide (Lasix) 20 MG tablet    metoprolol succinate XL (TOPROL-XL) 25 MG 24 hr tablet    dilTIAZem SR (CARDIZEM SR) 120 MG 12 hr capsule      Other Visit Diagnoses     Dyslipidemia        Relevant Medications    ezetimibe (ZETIA) 10 MG tablet    rosuvastatin (CRESTOR) 20 MG tablet              Follow Up     Medications were reviewed with the patient.    Atrial flutter is stable.  Patient is to continue Eliquis for stroke prophylaxis.    With regard to patient's nonischemic cardiomyopathy, I do suspect that he has shortness of breath is likely secondary to not only deconditioning but possibly due to the fact that he has not had Entresto for about 1 month.  He does appear to be euvolemic today otherwise.    Hypertension is not well controlled, likely secondary to not having had Entresto for a month.  New prescription was written.  Unfortunately we do not have samples of his prescription.    Continue Crestor and Zetia for dyslipidemia.    I did discuss with him the importance of medication compliance.  It appears that he may not have picked up his Entresto due to cost.  I will refer him to the heart failure clinic for assistance.    Return in about 6 months (around 12/9/2022).    Patient was given instructions and counseling regarding his condition or for health maintenance advice. Please see specific information pulled into the AVS if appropriate.

## 2022-09-01 ENCOUNTER — APPOINTMENT (OUTPATIENT)
Dept: CT IMAGING | Facility: HOSPITAL | Age: 72
End: 2022-09-01

## 2022-09-01 ENCOUNTER — APPOINTMENT (OUTPATIENT)
Dept: GENERAL RADIOLOGY | Facility: HOSPITAL | Age: 72
End: 2022-09-01

## 2022-09-01 ENCOUNTER — HOSPITAL ENCOUNTER (INPATIENT)
Facility: HOSPITAL | Age: 72
LOS: 4 days | End: 2022-09-07
Attending: STUDENT IN AN ORGANIZED HEALTH CARE EDUCATION/TRAINING PROGRAM | Admitting: INTERNAL MEDICINE

## 2022-09-01 DIAGNOSIS — R53.1 LEFT-SIDED WEAKNESS: ICD-10-CM

## 2022-09-01 DIAGNOSIS — I63.9 CEREBROVASCULAR ACCIDENT (CVA), UNSPECIFIED MECHANISM: Primary | ICD-10-CM

## 2022-09-01 LAB
ABO GROUP BLD: NORMAL
ABO GROUP BLD: NORMAL
ALBUMIN SERPL-MCNC: 3.71 G/DL (ref 3.5–5.2)
ALBUMIN/GLOB SERPL: 1.1 G/DL
ALP SERPL-CCNC: 121 U/L (ref 39–117)
ALT SERPL W P-5'-P-CCNC: 6 U/L (ref 1–41)
ANION GAP SERPL CALCULATED.3IONS-SCNC: 8.4 MMOL/L (ref 5–15)
AST SERPL-CCNC: 11 U/L (ref 1–40)
BASOPHILS # BLD AUTO: 0.05 10*3/MM3 (ref 0–0.2)
BASOPHILS NFR BLD AUTO: 0.5 % (ref 0–1.5)
BILIRUB SERPL-MCNC: 0.6 MG/DL (ref 0–1.2)
BLD GP AB SCN SERPL QL: NEGATIVE
BUN SERPL-MCNC: 11 MG/DL (ref 8–23)
BUN/CREAT SERPL: 9.1 (ref 7–25)
CALCIUM SPEC-SCNC: 8.9 MG/DL (ref 8.6–10.5)
CHLORIDE SERPL-SCNC: 101 MMOL/L (ref 98–107)
CO2 SERPL-SCNC: 25.6 MMOL/L (ref 22–29)
CREAT BLDA-MCNC: 1.2 MG/DL (ref 0.6–1.3)
CREAT SERPL-MCNC: 1.21 MG/DL (ref 0.76–1.27)
DEPRECATED RDW RBC AUTO: 43.3 FL (ref 37–54)
EGFRCR SERPLBLD CKD-EPI 2021: 64.4 ML/MIN/1.73
EOSINOPHIL # BLD AUTO: 0.32 10*3/MM3 (ref 0–0.4)
EOSINOPHIL NFR BLD AUTO: 3.2 % (ref 0.3–6.2)
ERYTHROCYTE [DISTWIDTH] IN BLOOD BY AUTOMATED COUNT: 14 % (ref 12.3–15.4)
FLUAV RNA RESP QL NAA+PROBE: NOT DETECTED
FLUBV RNA RESP QL NAA+PROBE: NOT DETECTED
GLOBULIN UR ELPH-MCNC: 3.5 GM/DL
GLUCOSE BLDC GLUCOMTR-MCNC: 115 MG/DL (ref 70–130)
GLUCOSE BLDC GLUCOMTR-MCNC: 115 MG/DL (ref 70–130)
GLUCOSE SERPL-MCNC: 120 MG/DL (ref 65–99)
HCT VFR BLD AUTO: 43.3 % (ref 37.5–51)
HGB BLD-MCNC: 14.6 G/DL (ref 13–17.7)
HOLD SPECIMEN: NORMAL
HOLD SPECIMEN: NORMAL
IMM GRANULOCYTES # BLD AUTO: 0.04 10*3/MM3 (ref 0–0.05)
IMM GRANULOCYTES NFR BLD AUTO: 0.4 % (ref 0–0.5)
INR PPP: 1.12 (ref 0.9–1.1)
LYMPHOCYTES # BLD AUTO: 2.25 10*3/MM3 (ref 0.7–3.1)
LYMPHOCYTES NFR BLD AUTO: 22.3 % (ref 19.6–45.3)
MCH RBC QN AUTO: 29 PG (ref 26.6–33)
MCHC RBC AUTO-ENTMCNC: 33.7 G/DL (ref 31.5–35.7)
MCV RBC AUTO: 86.1 FL (ref 79–97)
MONOCYTES # BLD AUTO: 0.8 10*3/MM3 (ref 0.1–0.9)
MONOCYTES NFR BLD AUTO: 7.9 % (ref 5–12)
NEUTROPHILS NFR BLD AUTO: 6.61 10*3/MM3 (ref 1.7–7)
NEUTROPHILS NFR BLD AUTO: 65.7 % (ref 42.7–76)
NRBC BLD AUTO-RTO: 0 /100 WBC (ref 0–0.2)
PLATELET # BLD AUTO: 164 10*3/MM3 (ref 140–450)
PMV BLD AUTO: 11.6 FL (ref 6–12)
POTASSIUM SERPL-SCNC: 4 MMOL/L (ref 3.5–5.2)
PROT SERPL-MCNC: 7.2 G/DL (ref 6–8.5)
PROTHROMBIN TIME: 14.7 SECONDS (ref 12.1–14.7)
QT INTERVAL: 398 MS
QTC INTERVAL: 420 MS
RBC # BLD AUTO: 5.03 10*6/MM3 (ref 4.14–5.8)
RH BLD: POSITIVE
RH BLD: POSITIVE
SARS-COV-2 RNA RESP QL NAA+PROBE: NOT DETECTED
SODIUM SERPL-SCNC: 135 MMOL/L (ref 136–145)
T&S EXPIRATION DATE: NORMAL
TROPONIN T SERPL-MCNC: <0.01 NG/ML (ref 0–0.03)
WBC NRBC COR # BLD: 10.07 10*3/MM3 (ref 3.4–10.8)
WHOLE BLOOD HOLD COAG: NORMAL
WHOLE BLOOD HOLD SPECIMEN: NORMAL

## 2022-09-01 PROCEDURE — 70498 CT ANGIOGRAPHY NECK: CPT

## 2022-09-01 PROCEDURE — 82565 ASSAY OF CREATININE: CPT

## 2022-09-01 PROCEDURE — 86850 RBC ANTIBODY SCREEN: CPT | Performed by: STUDENT IN AN ORGANIZED HEALTH CARE EDUCATION/TRAINING PROGRAM

## 2022-09-01 PROCEDURE — 86900 BLOOD TYPING SEROLOGIC ABO: CPT

## 2022-09-01 PROCEDURE — 71045 X-RAY EXAM CHEST 1 VIEW: CPT

## 2022-09-01 PROCEDURE — 71045 X-RAY EXAM CHEST 1 VIEW: CPT | Performed by: RADIOLOGY

## 2022-09-01 PROCEDURE — 86901 BLOOD TYPING SEROLOGIC RH(D): CPT | Performed by: STUDENT IN AN ORGANIZED HEALTH CARE EDUCATION/TRAINING PROGRAM

## 2022-09-01 PROCEDURE — 93005 ELECTROCARDIOGRAM TRACING: CPT | Performed by: PHYSICIAN ASSISTANT

## 2022-09-01 PROCEDURE — 99285 EMERGENCY DEPT VISIT HI MDM: CPT

## 2022-09-01 PROCEDURE — 99223 1ST HOSP IP/OBS HIGH 75: CPT | Performed by: INTERNAL MEDICINE

## 2022-09-01 PROCEDURE — 70450 CT HEAD/BRAIN W/O DYE: CPT | Performed by: RADIOLOGY

## 2022-09-01 PROCEDURE — 87636 SARSCOV2 & INF A&B AMP PRB: CPT | Performed by: STUDENT IN AN ORGANIZED HEALTH CARE EDUCATION/TRAINING PROGRAM

## 2022-09-01 PROCEDURE — G0378 HOSPITAL OBSERVATION PER HR: HCPCS

## 2022-09-01 PROCEDURE — 85610 PROTHROMBIN TIME: CPT | Performed by: STUDENT IN AN ORGANIZED HEALTH CARE EDUCATION/TRAINING PROGRAM

## 2022-09-01 PROCEDURE — 80053 COMPREHEN METABOLIC PANEL: CPT | Performed by: STUDENT IN AN ORGANIZED HEALTH CARE EDUCATION/TRAINING PROGRAM

## 2022-09-01 PROCEDURE — 0 IOPAMIDOL PER 1 ML: Performed by: STUDENT IN AN ORGANIZED HEALTH CARE EDUCATION/TRAINING PROGRAM

## 2022-09-01 PROCEDURE — 86901 BLOOD TYPING SEROLOGIC RH(D): CPT

## 2022-09-01 PROCEDURE — 93005 ELECTROCARDIOGRAM TRACING: CPT | Performed by: STUDENT IN AN ORGANIZED HEALTH CARE EDUCATION/TRAINING PROGRAM

## 2022-09-01 PROCEDURE — 82962 GLUCOSE BLOOD TEST: CPT

## 2022-09-01 PROCEDURE — 85025 COMPLETE CBC W/AUTO DIFF WBC: CPT | Performed by: STUDENT IN AN ORGANIZED HEALTH CARE EDUCATION/TRAINING PROGRAM

## 2022-09-01 PROCEDURE — 4A03X5D MEASUREMENT OF ARTERIAL FLOW, INTRACRANIAL, EXTERNAL APPROACH: ICD-10-PCS | Performed by: RADIOLOGY

## 2022-09-01 PROCEDURE — 86900 BLOOD TYPING SEROLOGIC ABO: CPT | Performed by: STUDENT IN AN ORGANIZED HEALTH CARE EDUCATION/TRAINING PROGRAM

## 2022-09-01 PROCEDURE — 70496 CT ANGIOGRAPHY HEAD: CPT | Performed by: RADIOLOGY

## 2022-09-01 PROCEDURE — 99204 OFFICE O/P NEW MOD 45 MIN: CPT | Performed by: PSYCHIATRY & NEUROLOGY

## 2022-09-01 PROCEDURE — 70450 CT HEAD/BRAIN W/O DYE: CPT

## 2022-09-01 PROCEDURE — 70498 CT ANGIOGRAPHY NECK: CPT | Performed by: RADIOLOGY

## 2022-09-01 PROCEDURE — 70496 CT ANGIOGRAPHY HEAD: CPT

## 2022-09-01 PROCEDURE — 84484 ASSAY OF TROPONIN QUANT: CPT | Performed by: PHYSICIAN ASSISTANT

## 2022-09-01 RX ORDER — ASPIRIN 300 MG/1
300 SUPPOSITORY RECTAL DAILY
Status: DISCONTINUED | OUTPATIENT
Start: 2022-09-02 | End: 2022-09-06

## 2022-09-01 RX ORDER — SODIUM CHLORIDE 0.9 % (FLUSH) 0.9 %
10 SYRINGE (ML) INJECTION EVERY 12 HOURS SCHEDULED
Status: DISCONTINUED | OUTPATIENT
Start: 2022-09-01 | End: 2022-09-07 | Stop reason: HOSPADM

## 2022-09-01 RX ORDER — ASPIRIN 81 MG/1
81 TABLET, CHEWABLE ORAL ONCE
Status: COMPLETED | OUTPATIENT
Start: 2022-09-01 | End: 2022-09-01

## 2022-09-01 RX ORDER — ASPIRIN 81 MG/1
81 TABLET, CHEWABLE ORAL DAILY
Status: DISCONTINUED | OUTPATIENT
Start: 2022-09-02 | End: 2022-09-07 | Stop reason: HOSPADM

## 2022-09-01 RX ORDER — CLOPIDOGREL BISULFATE 75 MG/1
75 TABLET ORAL ONCE
Status: COMPLETED | OUTPATIENT
Start: 2022-09-01 | End: 2022-09-01

## 2022-09-01 RX ORDER — SODIUM CHLORIDE 0.9 % (FLUSH) 0.9 %
10 SYRINGE (ML) INJECTION AS NEEDED
Status: DISCONTINUED | OUTPATIENT
Start: 2022-09-01 | End: 2022-09-07 | Stop reason: HOSPADM

## 2022-09-01 RX ORDER — ATORVASTATIN CALCIUM 40 MG/1
80 TABLET, FILM COATED ORAL NIGHTLY
Status: DISCONTINUED | OUTPATIENT
Start: 2022-09-01 | End: 2022-09-07 | Stop reason: HOSPADM

## 2022-09-01 RX ORDER — NICOTINE 21 MG/24HR
1 PATCH, TRANSDERMAL 24 HOURS TRANSDERMAL
Status: DISCONTINUED | OUTPATIENT
Start: 2022-09-02 | End: 2022-09-07 | Stop reason: HOSPADM

## 2022-09-01 RX ORDER — ATORVASTATIN CALCIUM 40 MG/1
40 TABLET, FILM COATED ORAL ONCE
Status: COMPLETED | OUTPATIENT
Start: 2022-09-01 | End: 2022-09-01

## 2022-09-01 RX ADMIN — ATORVASTATIN CALCIUM 40 MG: 40 TABLET, FILM COATED ORAL at 18:16

## 2022-09-01 RX ADMIN — SODIUM CHLORIDE 1000 ML: 9 INJECTION, SOLUTION INTRAVENOUS at 17:12

## 2022-09-01 RX ADMIN — Medication 10 ML: at 23:50

## 2022-09-01 RX ADMIN — ASPIRIN 81 MG: 81 TABLET, CHEWABLE ORAL at 18:16

## 2022-09-01 RX ADMIN — CLOPIDOGREL 75 MG: 75 TABLET, FILM COATED ORAL at 18:16

## 2022-09-01 RX ADMIN — IOPAMIDOL 90 ML: 755 INJECTION, SOLUTION INTRAVENOUS at 16:56

## 2022-09-01 NOTE — ED PROVIDER NOTES
[facility]  emergency department encounter        Pt Name: Zaid Galarza  MRN: [id number]  Birthdate 1950  Date of evaluation: 9/1/2022    Chief Complaint   Patient presents with   • Stroke             HISTORY OF PRESENT ILLNESS:   Zaid Galarza is a 71 y.o. male presents by EMS for left-sided weakness.    Acute onset at 10 PM last night.  Endorses mild leg weakness as well but most severe in the left upper extremity.  Additionally endorses mild numbness to the left hand.  Symptoms constant.  Denies headache vision changes nausea vomiting and ROS otherwise.      No other exacerbating or alleviating factors other than as noted above.  Severity: Severe    PCP: Rosi Thacker APRN          REVIEW OF SYSTEMS:     Review of Systems   Constitutional: Negative for fever.   HENT: Negative for congestion and rhinorrhea.    Eyes: Negative for visual disturbance.   Respiratory: Negative for cough and shortness of breath.    Cardiovascular: Negative for chest pain.   Gastrointestinal: Negative for abdominal pain, nausea and vomiting.   Genitourinary: Negative for dysuria.   Musculoskeletal: Negative for myalgias.   Skin: Negative for rash.   Neurological: Positive for weakness and numbness. Negative for headaches.   Psychiatric/Behavioral: Negative for confusion.       Review of systems otherwise as per HPI.          PREVIOUS HISTORY:         Past Medical History:   Diagnosis Date   • Atrial fibrillation (HCC)    • Coronary artery disease    • Hypertension            Past Surgical History:   Procedure Laterality Date   • CARDIAC CATHETERIZATION N/A 10/3/2019    Procedure: Left Heart Cath;  Surgeon: Vincent Phillips MD;  Location: [facility] INVASIVE LOCATION;  Service: Cardiology           Social History     Socioeconomic History   • Marital status: Single   Tobacco Use   • Smoking status: Current Every Day Smoker     Packs/day: 1.00     Types: Cigarettes   • Smokeless tobacco: Never Used   •  Tobacco comment: down to 2 a day   Vaping Use   • Vaping Use: Never used   Substance and Sexual Activity   • Alcohol use: No   • Drug use: No           Family History   Family history unknown: Yes         Current Outpatient Medications   Medication Instructions   • apixaban (ELIQUIS) 5 mg, Oral, Every 12 Hours Scheduled   • aspirin 81 mg, Oral, Daily   • budesonide-formoterol (SYMBICORT) 160-4.5 MCG/ACT inhaler Inhale 2 puffs by mouth 2 (Two) Times a Day.   • dilTIAZem SR (CARDIZEM SR) 120 mg, Oral, 2 Times Daily   • ezetimibe (ZETIA) 10 mg, Oral, Daily   • furosemide (LASIX) 20 mg, Oral, Daily   • metoprolol succinate XL (TOPROL-XL) 12.5 mg, Oral, Daily   • nicotine (NICODERM CQ) 21 MG/24HR patch 1 patch, Transdermal, Every 24 Hours   • potassium chloride 10 MEQ CR tablet 10 mEq, Oral, 2 Times Daily   • predniSONE (DELTASONE) 10 mg, Oral, Daily   • rosuvastatin (CRESTOR) 20 mg, Oral, Nightly   • sacubitril-valsartan (ENTRESTO)  MG tablet 1 tablet, Oral, 2 Times Daily         Allergies:  Patient has no known allergies.    Medications, allergies and past medical, surgical, family, and social history reviewed.        PHYSICAL EXAM:     Physical Exam  Vitals and nursing note reviewed.   Constitutional:       General: He is not in acute distress.     Appearance: Normal appearance.   HENT:      Head: Normocephalic and atraumatic.   Eyes:      Extraocular Movements: Extraocular movements intact.      Conjunctiva/sclera: Conjunctivae normal.   Pulmonary:      Effort: Pulmonary effort is normal. No respiratory distress.   Abdominal:      General: Abdomen is flat. There is no distension.   Musculoskeletal:         General: No deformity. Normal range of motion.      Cervical back: Normal range of motion. No rigidity.   Neurological:      General: No focal deficit present.      Mental Status: He is alert and oriented to person, place, and time.      Comments: Patient alert and oriented to age and month.  Follows commands  well, blink  intact.  Extraocular movement intact without nystagmus.  Visual fields full.  Speech mildly slurred but reported baseline.  Object naming intact.  Repetition intact.  No facial asymmetry on smile.  Left upper extremity significant drift, does not hit bed.  No drift in bilateral lower extremities.  No ataxia on FTN or HTS.  Sensation intact light touch in all extremities without asymmetry, no extinction.   Psychiatric:         Mood and Affect: Mood normal.         Behavior: Behavior normal.             COMPLETED DIAGNOSTIC STUDIES AND INTERVENTIONS:     Lab Results (last 24 hours)     Procedure Component Value Units Date/Time    POC Glucose Once [[id number]]  (Normal) Collected: 09/01/22 1639    Specimen: Blood Updated: 09/01/22 1645     Glucose 115 mg/dL      Comment: Meter: [id number] : [id number] karyn blue       POC Creatinine [[id number]]  (Normal) Collected: 09/01/22 1642    Specimen: Blood Updated: 09/01/22 1701     Creatinine 1.20 mg/dL      Comment: Serial Number: [id number]:  [id number]       CBC & Differential [[id number]]  (Normal) Collected: 09/01/22 1643    Specimen: Blood Updated: 09/01/22 1704    Narrative:      The following orders were created for panel order CBC & Differential.  Procedure                               Abnormality         Status                     ---------                               -----------         ------                     CBC Auto Differential[[id number]]        Normal              Final result                 Please view results for these tests on the individual orders.    Comprehensive Metabolic Panel [[id number]]  (Abnormal) Collected: 09/01/22 1643    Specimen: Blood Updated: 09/01/22 1723     Glucose 120 mg/dL      BUN 11 mg/dL      Creatinine 1.21 mg/dL      Sodium 135 mmol/L      Potassium 4.0 mmol/L      Chloride 101 mmol/L      CO2 25.6 mmol/L      Calcium 8.9 mg/dL      Total Protein 7.2 g/dL      Albumin 3.71 g/dL      ALT (SGPT) 6 U/L       AST (SGOT) 11 U/L      Alkaline Phosphatase 121 U/L      Total Bilirubin 0.6 mg/dL      Globulin 3.5 gm/dL      A/G Ratio 1.1 g/dL      BUN/Creatinine Ratio 9.1     Anion Gap 8.4 mmol/L      eGFR 64.4 mL/min/1.73      Comment: National Kidney Foundation and American Society of Nephrology (ASN) Task Force recommended calculation based on the Chronic Kidney Disease Epidemiology Collaboration (CKD-EPI) equation refit without adjustment for race.       Narrative:      GFR Normal >60  Chronic Kidney Disease <60  Kidney Failure <15      Protime-INR [[id number]]  (Abnormal) Collected: 09/01/22 1643    Specimen: Blood Updated: 09/01/22 1722     Protime 14.7 Seconds      INR 1.12    Narrative:      Suggested INR therapeutic range for stable oral anticoagulant therapy:    Low Intensity therapy:   1.5-2.0  Moderate Intensity therapy:   2.0-3.0  High Intensity therapy:   2.5-4.0    CBC Auto Differential [[id number]]  (Normal) Collected: 09/01/22 1643    Specimen: Blood Updated: 09/01/22 1704     WBC 10.07 10*3/mm3      RBC 5.03 10*6/mm3      Hemoglobin 14.6 g/dL      Hematocrit 43.3 %      MCV 86.1 fL      MCH 29.0 pg      MCHC 33.7 g/dL      RDW 14.0 %      RDW-SD 43.3 fl      MPV 11.6 fL      Platelets 164 10*3/mm3      Neutrophil % 65.7 %      Lymphocyte % 22.3 %      Monocyte % 7.9 %      Eosinophil % 3.2 %      Basophil % 0.5 %      Immature Grans % 0.4 %      Neutrophils, Absolute 6.61 10*3/mm3      Lymphocytes, Absolute 2.25 10*3/mm3      Monocytes, Absolute 0.80 10*3/mm3      Eosinophils, Absolute 0.32 10*3/mm3      Basophils, Absolute 0.05 10*3/mm3      Immature Grans, Absolute 0.04 10*3/mm3      nRBC 0.0 /100 WBC     POC Glucose Once [[id number]]  (Normal) Resulted: 09/01/22 1646    Specimen: Blood Updated: 09/01/22 1646     Glucose 115 mg/dL     COVID PRE-OP / PRE-PROCEDURE SCREENING ORDER (NO ISOLATION) - Swab, Nasopharynx [[id number]]  (Normal) Collected: 09/01/22 9485    Specimen: Swab from Nasopharynx Updated:  09/01/22 1821    Narrative:      The following orders were created for panel order COVID PRE-OP / PRE-PROCEDURE SCREENING ORDER (NO ISOLATION) - Swab, Nasopharynx.  Procedure                               Abnormality         Status                     ---------                               -----------         ------                     COVID-19 and FLU A/B PCR...[[id number]]  Normal              Final result                 Please view results for these tests on the individual orders.    COVID-19 and FLU A/B PCR - Swab, Nasopharynx [[id number]]  (Normal) Collected: 09/01/22 1712    Specimen: Swab from Nasopharynx Updated: 09/01/22 1821     COVID19 Not Detected     Influenza A PCR Not Detected     Influenza B PCR Not Detected    Narrative:      Fact sheet for providers: https://www.fda.gov/media/244162/download    Fact sheet for patients: https://www.fda.gov/media/895556/download    Test performed by PCR.            XR Chest 1 View   Final Result   No radiographic evidence of acute cardiac or pulmonary disease.       This report was finalized on 9/1/2022 5:54 PM by Dr. Hossein Lenz MD.          CT Angiogram Head w AI Analysis of LVO   Final Result   No evidence of large vessel occlusion       This report was finalized on 9/1/2022 5:22 PM by Dr. Hossein Lenz MD.          CT Angiogram Neck   Final Result   Multifocal plaque, but no evidence of large vessel occlusion.       This report was finalized on 9/1/2022 5:16 PM by Dr. Hossein Lenz MD.          CT Head Without Contrast Stroke Protocol   Final Result        1. No evidence of an acute ischemic event   2. Cerebral atrophy   3. No parenchymal mass, hemorrhage, or midline shift.       Results were relayed to the emergency department at 4:37 PM       This report was finalized on 9/1/2022 4:37 PM by Dr. Hossein Lenz MD.                New Medications Ordered This Visit   Medications   • atorvastatin (LIPITOR) tablet 40 mg   • sodium chloride 0.9 % flush 10 mL   •  sodium chloride 0.9 % bolus 1,000 mL   • clopidogrel (PLAVIX) tablet 75 mg   • aspirin chewable tablet 81 mg   • iopamidol (ISOVUE-370) 76 % injection 100 mL         Procedures            MEDICAL DECISION-MAKING AND ED COURSE:     ED Course as of 09/01/22 1828   Thu Sep 01, 2022   1632 MDM: 71-year-old male with acute onset left-sided weakness NIH score 1.  Discussed with Dr. Cristina.  Recommended CT, CTAs, hold perfusion.  Recommended clopidogrel 75 mg, ASA 81 mg, full dose statin, MRI and admit.  Will see later today. []   1716 EKG at 1716 intrafibrillation 67 bpm, QRS 92, QTc 420, regular axis, no significant ST deviation or T wave abnormalities concerning for acute ischemia. []   1746 Admit to Dr. Saavedra. []      ED Course User Index  [] Dequan Ramos MD       ?      FINAL IMPRESSION:       1. Cerebrovascular accident (CVA), unspecified mechanism (HCC)    2. Left-sided weakness         The complaints listed here are new problems to this examiner.      FOLLOW-UP  No follow-up provider specified.    DISPOSITION  ED Disposition     ED Disposition   Decision to Admit    Condition   --    Comment   Level of Care: Telemetry [5]   Diagnosis: CVA (cerebral vascular accident) (HCC) [246968]   Admitting Physician: WILLIAMS SAAVEDRA [[id number]]                     This care is provided during an unprecedented national emergency due to the Novel Coronavirus (COVID-19). COVID-19 infections and transmission risks place heavy strains on healthcare resources. As this pandemic evolves, the Hospital and providers strive to respond fluidly, to remain operational, and to provide care relative to available resources and information. Outcomes are unpredictable and treatments are without well-defined guidelines. Further, the impact of COVID-19 on all aspects of emergency care, including the impact to patients seeking care for reasons other than COVID-19, is unavoidable during this national emergency.    This note was  dictated using a [id number]-maxp tool. Occasional wrong-word or 'sound-a-like' substitutions may have occurred due to the inherent limitations of voice recognition software. ?Read the chart carefully and recognize, using context, where substitutions have occurred.    Dequan Ramos MD  18:28 EDT  9/1/2022             Dequan Ramos MD  09/01/22 1822

## 2022-09-01 NOTE — H&P
[facility] HOSPITALIST HISTORY AND PHYSICAL      Admit Date: 9/1/2022       Chief complaint: Left-sided weakness    Subjective     Zaid Galarza is a 71 y.o. male who presented to the ED at [facility] for evaluation of left-sided weakness. He reported onset of symptoms at approx 10pm last night. He reports mild left leg weakness with more severe weakness in left arm with mild numbness in LUE as well. He states it felt like his face felt funny and sounded like he was talking funny. His symptoms persisted today with unsteady gait resulting in a fall. He denies head trauma or LOC. Denies CP. Reports baseline dyspnea. Denies dizziness or lightheadedness. Denies any recent missed doses of his home Eliquis. He states his neighbor visited today and told him he looked pale and unwell and he ultimately decided to come to the ED for further evaluation.     In the ED, he was afebrile with elevated BP with SBP in the 160's. EKG revealed rate controlled Afib. Routine labs were stable. CT head revealed cerebral atrophy with no acute abnormalities noted. CTA head was negative for large vessel occlusion. CTA neck revealed multifocal plaque with no evidence of large vessel occlusion. Teleneurology was contacted with recs for ASA, Plavix and a statin. He is being admitted to the telemetry floor for further evaluation and treatment.       History  Past Medical History:   Diagnosis Date   • Atrial fibrillation (HCC)    • CHF (congestive heart failure) (HCC)    • COPD (chronic obstructive pulmonary disease) (HCC)    • Coronary artery disease    • Hypertension    • Tobacco abuse      Past Surgical History:   Procedure Laterality Date   • ABDOMINAL AORTIC ANEURYSM REPAIR     • CARDIAC CATHETERIZATION N/A 10/03/2019    Procedure: Left Heart Cath;  Surgeon: Vincent Phillips MD;  Location: [facility] CATH INVASIVE LOCATION;  Service: Cardiology     Family History   Problem Relation Age of Onset   • Heart disease Mother    • Stroke  Father    • Heart disease Father      Social History     Tobacco Use   • Smoking status: Current Every Day Smoker     Packs/day: 1.00     Types: Cigarettes   • Smokeless tobacco: Never Used   • Tobacco comment: down to 2 a day   Vaping Use   • Vaping Use: Never used   Substance Use Topics   • Alcohol use: No   • Drug use: No     Medications Prior to Admission   Medication Sig Dispense Refill Last Dose   • apixaban (ELIQUIS) 5 MG tablet tablet Take 1 tablet by mouth Every 12 (Twelve) Hours. Indications: DVT/PE (active thrombosis) 180 tablet 3    • aspirin (aspirin) 81 MG EC tablet Take 1 tablet by mouth Daily. 90 tablet 3    • budesonide-formoterol (SYMBICORT) 160-4.5 MCG/ACT inhaler Inhale 2 puffs by mouth 2 (Two) Times a Day. 20.4 g 12    • dilTIAZem SR (CARDIZEM SR) 120 MG 12 hr capsule Take 1 capsule by mouth 2 (Two) Times a Day. 180 capsule 3    • ezetimibe (ZETIA) 10 MG tablet Take 1 tablet by mouth Daily. 90 tablet 3    • furosemide (Lasix) 20 MG tablet Take 1 tablet by mouth Daily. 90 tablet 3    • metoprolol succinate XL (TOPROL-XL) 25 MG 24 hr tablet Take 0.5 tablets by mouth Daily. 45 tablet 3    • nicotine (NICODERM CQ) 21 MG/24HR patch Place 1 patch on the skin as directed by provider Daily.      • potassium chloride 10 MEQ CR tablet Take 10 mEq by mouth 2 (Two) Times a Day.      • predniSONE (DELTASONE) 10 MG tablet Take 10 mg by mouth Daily.      • rosuvastatin (CRESTOR) 20 MG tablet Take 1 tablet by mouth Every Night. 90 tablet 3    • sacubitril-valsartan (ENTRESTO)  MG tablet Take 1 tablet by mouth 2 (Two) Times a Day. 180 tablet 3      Allergies:  Patient has no known allergies.      Review of Systems    Review of Systems   Constitutional: Positive for activity change. Negative for chills, diaphoresis and fever.   HENT: Negative for hearing loss, nosebleeds and trouble swallowing.    Eyes: Negative for photophobia and visual disturbance.   Respiratory: Positive for cough and shortness of  breath. Negative for chest tightness and wheezing.    Cardiovascular: Positive for palpitations and leg swelling. Negative for chest pain.   Gastrointestinal: Positive for diarrhea. Negative for abdominal pain, nausea and vomiting.   Endocrine: Negative for polydipsia, polyphagia and polyuria.   Genitourinary: Negative for dysuria, flank pain and hematuria.   Musculoskeletal: Positive for gait problem. Negative for neck pain and neck stiffness.   Skin: Negative for rash and wound.   Neurological: Positive for facial asymmetry, speech difficulty, weakness and numbness. Negative for dizziness, seizures, syncope and headaches.   Psychiatric/Behavioral: Negative for behavioral problems and confusion. The patient is not nervous/anxious.         Objective     Vital Signs  Temp:  [98.1 °F (36.7 °C)-98.2 °F (36.8 °C)] 98.1 °F (36.7 °C)  Heart Rate:  [60-80] 68  Resp:  [18] 18  BP: (140-167)/() 146/98    Physical Exam:  General:    Awake, alert, in no acute distress   Head:    Normocephalic, atraumatic   Eyes:           PERRLA, EOMI. Conjunctivae and sclerae normal. No icterus or pallor.   Ears:    Ears appear intact with no abnormalities noted.   Throat:   No oral lesions or thrush. Oral mucosa moist   Heart:      Normal S1 and S2. Irregularly irregular. No significant murmur appreciated.    Lungs:     Respirations regular, even and unlabored. Lungs clear to auscultation B/L. No wheezes, rales or rhonchi.   Abdomen:   Soft and nontender. No guarding, rebound tenderness or  organomegaly noted. Bowel sounds present x 4.   MS:   Slightly decreased  strength in left hand. Mild weakness in LUE (3-4/5). No joint swelling, deformity, or tenderness.   Extremities:  Trace bilateral lower extremity edema.    Pulses:   Pulses palpable and equal bilaterally.   Skin:   No bleeding, bruising or rash.   Lymph nodes:   No palpable adenopathy   Neurologic:   Follows commands appropriately. Mildly dysarthric. No facial asymmetry  appreciated. Sensation intact. Drift noted LUE but does not hit bed. Decreased MS in LUE (3-4/5) Sensation intact.        Results Review:     I reviewed the patient's new imaging results and agree with the interpretation.  I reviewed the patient's other test results and agree with the interpretation.    Results from last 7 days   Lab Units 09/01/22  1643   WBC 10*3/mm3 10.07   HEMOGLOBIN g/dL 14.6   PLATELETS 10*3/mm3 164     Results from last 7 days   Lab Units 09/01/22  1643 09/01/22  1642   SODIUM mmol/L 135*  --    POTASSIUM mmol/L 4.0  --    CHLORIDE mmol/L 101  --    CO2 mmol/L 25.6  --    BUN mg/dL 11  --    CREATININE mg/dL 1.21 1.20   CALCIUM mg/dL 8.9  --    GLUCOSE mg/dL 120*  --      Results from last 7 days   Lab Units 09/01/22  1643   BILIRUBIN mg/dL 0.6   ALK PHOS U/L 121*   AST (SGOT) U/L 11   ALT (SGPT) U/L 6         Results from last 7 days   Lab Units 09/01/22  1643   INR  1.12*           Imaging Results (Last 24 Hours)     Procedure Component Value Units Date/Time    XR Chest 1 View [[id number]] Collected: 09/01/22 1754     Updated: 09/01/22 1803    Narrative:      XR CHEST 1 VW-     CLINICAL INDICATION: Stroke Protocol (Onset > 12 hrs)        COMPARISON: 09/28/2019      TECHNIQUE: Single frontal view of the chest.     FINDINGS:      LUNGS: Lungs are adequately aerated.      HEART AND MEDIASTINUM: Heart and mediastinal contours are unremarkable        SKELETON: Bony and soft tissue structures are unremarkable.             Impression:      No radiographic evidence of acute cardiac or pulmonary disease.     This report was finalized on 9/1/2022 5:54 PM by Dr. Hossein Lenz MD.       CT Angiogram Head w AI Analysis of LVO [[id number]] Collected: 09/01/22 1719     Updated: 09/01/22 1724    Narrative:      EXAM:    CT Angiography Head With Intravenous Contrast     EXAM DATE:    9/1/2022 4:36 PM     CLINICAL HISTORY:    Acute Stroke     TECHNIQUE:    Axial computed tomographic angiography images of the  head with  intravenous contrast. LVO analysis was performed with [email or web address] software.   This CT exam was performed using one or more of the following dose  reduction techniques:  automated exposure control, adjustment of the mA  and/or kV according to patient size, and/or use of iterative  reconstruction technique.    MIP reconstructed images were created and reviewed.     COMPARISON:    No relevant prior studies available.     FINDINGS:    RIGHT INTERNAL CAROTID ARTERY:  No acute findings.  Intracranial  segment is patent with no significant stenosis.  No aneurysm.    RIGHT ANTERIOR CEREBRAL ARTERY:  Unremarkable.  No occlusion or  significant stenosis.  No aneurysm.    RIGHT MIDDLE CEREBRAL ARTERY:  Unremarkable.  No occlusion or  significant stenosis.  No aneurysm.    RIGHT POSTERIOR CEREBRAL ARTERY:  Unremarkable.  No occlusion or  significant stenosis.  No aneurysm.    RIGHT VERTEBRAL ARTERY:  Unremarkable as visualized.       LEFT INTERNAL CAROTID ARTERY:  No acute findings.  Intracranial  segment is patent with no significant stenosis.  No aneurysm.    LEFT ANTERIOR CEREBRAL ARTERY:  Unremarkable.  No occlusion or  significant stenosis.  No aneurysm.    LEFT MIDDLE CEREBRAL ARTERY:  Unremarkable.  No occlusion or  significant stenosis.  No aneurysm.    LEFT POSTERIOR CEREBRAL ARTERY:  Unremarkable.  No occlusion or  significant stenosis.  No aneurysm.    LEFT VERTEBRAL ARTERY:  Unremarkable as visualized.       BASILAR ARTERY:  Unremarkable.  No occlusion or significant stenosis.   No aneurysm.       Impression:      No evidence of large vessel occlusion     This report was finalized on 9/1/2022 5:22 PM by Dr. Hossein Lenz MD.       CT Angiogram Neck [[id number]] Collected: 09/01/22 1713     Updated: 09/01/22 1718    Narrative:      EXAM:    CT Angiography Neck With Intravenous Contrast     EXAM DATE:    9/1/2022 4:35 PM     CLINICAL HISTORY:    Stroke, follow up     TECHNIQUE:    Axial computed tomographic  angiography images of the neck with  intravenous contrast.  This CT exam was performed using one or more of  the following dose reduction techniques:  automated exposure control,  adjustment of the mA and/or kV according to patient size, and/or use of  iterative reconstruction technique.    MIP reconstructed images were created and reviewed.     COMPARISON:    No relevant prior studies available.     FINDINGS:      VASCULATURE:    RIGHT COMMON CAROTID ARTERY:  Unremarkable.  No significant stenosis.   No dissection or occlusion.    RIGHT INTERNAL CAROTID ARTERY:  Unremarkable.  Extracranial segment is  patent with no significant stenosis.  No dissection or occlusion.    RIGHT EXTERNAL CAROTID ARTERY:  Unremarkable.  No occlusion.    RIGHT VERTEBRAL ARTERY:  Unremarkable.  No significant stenosis.  No  dissection or occlusion.       LEFT COMMON CAROTID ARTERY:  Unremarkable.  No significant stenosis.   No dissection or occlusion.    LEFT INTERNAL CAROTID ARTERY:  Unremarkable.  Extracranial segment is  patent with no significant stenosis.  No dissection or occlusion.    LEFT EXTERNAL CAROTID ARTERY:  Unremarkable.  No occlusion.    LEFT VERTEBRAL ARTERY:  Unremarkable.  No significant stenosis.  No  dissection or occlusion.      NECK:    BONES/JOINTS:  No acute fracture.  No dislocation.    SOFT TISSUES: Subcutaneous nodule in the left parotid..      CAROTID STENOSIS REFERENCE USING NASCET CRITERIA:    % ICA stenosis = (1 - narrowest ICA diameter/diameter of distal  cervical ICA) x 100.    Mild - <50% stenosis.    Moderate - 50-69% stenosis.    Severe - 70-94% stenosis.    Near occlusion - 95-99% stenosis.    Occluded - 100% stenosis.       Impression:      Multifocal plaque, but no evidence of large vessel occlusion.     This report was finalized on 9/1/2022 5:16 PM by Dr. Hossein Lenz MD.       CT Head Without Contrast Stroke Protocol [[id number]] Collected: 09/01/22 1636     Updated: 09/01/22 1639    Narrative:       CT HEAD WO CONTRAST STROKE PROTOCOL-     CLINICAL INDICATION: Stroke, follow up        COMPARISON: 03/09/2016      TECHNIQUE: Axial images of the brain were obtained with out intravenous  contrast.  Reformatted images were created in the sagittal and coronal  planes.     DOSE:     Radiation dose reduction techniques were utilized per ALARA protocol.  Automated exposure control was initiated through either or [employer] or  [employer] software packages by  protocol.        FINDINGS:    BRAIN:  Unremarkable.  No hemorrhage.  No significant white matter  disease.  No edema.       VENTRICLES:  Atrophy    BONES/JOINTS:  Unremarkable.  No acute fracture.       SOFT TISSUES:  Unremarkable.       SINUSES:  no air fluid levels       MASTOID AIR CELLS:  Unremarkable as visualized.  No mastoid effusion.          Impression:          1. No evidence of an acute ischemic event  2. Cerebral atrophy  3. No parenchymal mass, hemorrhage, or midline shift.     Results were relayed to the emergency department at 4:37 PM     This report was finalized on 9/1/2022 4:37 PM by Dr. Hossein Lenz MD.                 Assessment & Plan   Left-sided weakness with concern for CVA: CT head revealed cerebral atrophy with no acute abnormalities noted. CTA head was negative for large vessel occlusion. CTA neck revealed multifocal plaque with no evidence of large vessel occlusion. Received ASA, Plavix and statin in the ED. Cont ASA and statin upon admission. Await further input from neurology regarding Plavix as pt is chronically anticoagulated with Eliquis. Order echo with bubble study, carotid US and MRI brain. Check lipid panel and HgbA1c. PT/OT and SLP consulted. Monitor on telemetry. Consult teleneurology.     Chronic Afib/flutter: Currently rate controlled. Plan to resume home meds including Eliquis once confirmed by pharmacy. Monitor on telemetry.     Chronic combined systolic and diastolic CHF with dilated cardiomyopathy: Echo in  5/20 revealed an EF of 56-60% (previously <30%) and mild LVH. Appears compensated at present. Review home meds. Monitor volume status.     Essential HTN: BP elevated in the ED but overall stable. Will allow persmissive HTN for now. Cont to monitor.     COPD: Appears stable without an acute exacerbation at present.     Tobacco abuse: Order NRT and encourage smoking cessation.     DVT PPX: Plan to resume home Eliquis.     I discussed the patient's findings and my recommendations with patient and family.       Disposition: Likely home when medically stable.       Liang Covarrubias DO  09/01/22  19:48 EDT

## 2022-09-02 ENCOUNTER — APPOINTMENT (OUTPATIENT)
Dept: CARDIOLOGY | Facility: HOSPITAL | Age: 72
End: 2022-09-02

## 2022-09-02 ENCOUNTER — APPOINTMENT (OUTPATIENT)
Dept: GENERAL RADIOLOGY | Facility: HOSPITAL | Age: 72
End: 2022-09-02

## 2022-09-02 ENCOUNTER — APPOINTMENT (OUTPATIENT)
Dept: MRI IMAGING | Facility: HOSPITAL | Age: 72
End: 2022-09-02

## 2022-09-02 ENCOUNTER — APPOINTMENT (OUTPATIENT)
Dept: ULTRASOUND IMAGING | Facility: HOSPITAL | Age: 72
End: 2022-09-02

## 2022-09-02 LAB
ALBUMIN SERPL-MCNC: 3.06 G/DL (ref 3.5–5.2)
ALBUMIN/GLOB SERPL: 0.9 G/DL
ALP SERPL-CCNC: 109 U/L (ref 39–117)
ALT SERPL W P-5'-P-CCNC: 5 U/L (ref 1–41)
ANION GAP SERPL CALCULATED.3IONS-SCNC: 9.8 MMOL/L (ref 5–15)
AST SERPL-CCNC: 14 U/L (ref 1–40)
BASOPHILS # BLD AUTO: 0.05 10*3/MM3 (ref 0–0.2)
BASOPHILS NFR BLD AUTO: 0.6 % (ref 0–1.5)
BH CV ECHO MEAS - ACS: 2 CM
BH CV ECHO MEAS - AO MAX PG: 4.5 MMHG
BH CV ECHO MEAS - AO MEAN PG: 3 MMHG
BH CV ECHO MEAS - AO ROOT DIAM: 4 CM
BH CV ECHO MEAS - AO V2 MAX: 106 CM/SEC
BH CV ECHO MEAS - AO V2 VTI: 21.2 CM
BH CV ECHO MEAS - EDV(CUBED): 65.9 ML
BH CV ECHO MEAS - EDV(MOD-SP4): 34.3 ML
BH CV ECHO MEAS - EF(MOD-SP4): 75.3 %
BH CV ECHO MEAS - ESV(CUBED): 23.9 ML
BH CV ECHO MEAS - ESV(MOD-SP4): 8.5 ML
BH CV ECHO MEAS - FS: 28.7 %
BH CV ECHO MEAS - IVS/LVPW: 0.9 CM
BH CV ECHO MEAS - IVSD: 1.56 CM
BH CV ECHO MEAS - LA DIMENSION: 4.5 CM
BH CV ECHO MEAS - LAT PEAK E' VEL: 4.5 CM/SEC
BH CV ECHO MEAS - LV DIASTOLIC VOL/BSA (35-75): 15.3 CM2
BH CV ECHO MEAS - LV MASS(C)D: 273.4 GRAMS
BH CV ECHO MEAS - LV SYSTOLIC VOL/BSA (12-30): 3.8 CM2
BH CV ECHO MEAS - LVIDD: 4 CM
BH CV ECHO MEAS - LVIDS: 2.9 CM
BH CV ECHO MEAS - LVOT AREA: 3.5 CM2
BH CV ECHO MEAS - LVOT DIAM: 2.1 CM
BH CV ECHO MEAS - LVPWD: 1.73 CM
BH CV ECHO MEAS - MED PEAK E' VEL: 5.3 CM/SEC
BH CV ECHO MEAS - MV A MAX VEL: 33.4 CM/SEC
BH CV ECHO MEAS - MV E MAX VEL: 75.8 CM/SEC
BH CV ECHO MEAS - MV E/A: 2.27
BH CV ECHO MEAS - SI(MOD-SP4): 11.5 ML/M2
BH CV ECHO MEAS - SV(MOD-SP4): 25.8 ML
BH CV ECHO MEAS - TAPSE (>1.6): 2.6 CM
BH CV ECHO MEASUREMENTS AVERAGE E/E' RATIO: 15.47
BILIRUB SERPL-MCNC: 0.7 MG/DL (ref 0–1.2)
BUN SERPL-MCNC: 9 MG/DL (ref 8–23)
BUN/CREAT SERPL: 8.8 (ref 7–25)
CALCIUM SPEC-SCNC: 8.5 MG/DL (ref 8.6–10.5)
CHLORIDE SERPL-SCNC: 104 MMOL/L (ref 98–107)
CHOLEST SERPL-MCNC: 104 MG/DL (ref 0–200)
CO2 SERPL-SCNC: 22.2 MMOL/L (ref 22–29)
CREAT SERPL-MCNC: 1.02 MG/DL (ref 0.76–1.27)
DEPRECATED RDW RBC AUTO: 44.7 FL (ref 37–54)
EGFRCR SERPLBLD CKD-EPI 2021: 79.1 ML/MIN/1.73
EOSINOPHIL # BLD AUTO: 0.29 10*3/MM3 (ref 0–0.4)
EOSINOPHIL NFR BLD AUTO: 3.7 % (ref 0.3–6.2)
ERYTHROCYTE [DISTWIDTH] IN BLOOD BY AUTOMATED COUNT: 14.1 % (ref 12.3–15.4)
GLOBULIN UR ELPH-MCNC: 3.4 GM/DL
GLUCOSE BLDC GLUCOMTR-MCNC: 101 MG/DL (ref 70–130)
GLUCOSE BLDC GLUCOMTR-MCNC: 110 MG/DL (ref 70–130)
GLUCOSE BLDC GLUCOMTR-MCNC: 132 MG/DL (ref 70–130)
GLUCOSE BLDC GLUCOMTR-MCNC: 211 MG/DL (ref 70–130)
GLUCOSE BLDC GLUCOMTR-MCNC: 85 MG/DL (ref 70–130)
GLUCOSE SERPL-MCNC: 86 MG/DL (ref 65–99)
HBA1C MFR BLD: 6 % (ref 4.8–5.6)
HCT VFR BLD AUTO: 44.6 % (ref 37.5–51)
HDLC SERPL-MCNC: 31 MG/DL (ref 40–60)
HGB BLD-MCNC: 14.4 G/DL (ref 13–17.7)
IMM GRANULOCYTES # BLD AUTO: 0.04 10*3/MM3 (ref 0–0.05)
IMM GRANULOCYTES NFR BLD AUTO: 0.5 % (ref 0–0.5)
LDLC SERPL CALC-MCNC: 55 MG/DL (ref 0–100)
LDLC/HDLC SERPL: 1.78 {RATIO}
LEFT ATRIUM VOLUME INDEX: 32 ML/M2
LV EF 2D ECHO EST: 55 %
LYMPHOCYTES # BLD AUTO: 1.75 10*3/MM3 (ref 0.7–3.1)
LYMPHOCYTES NFR BLD AUTO: 22.5 % (ref 19.6–45.3)
MAXIMAL PREDICTED HEART RATE: 150 BPM
MCH RBC QN AUTO: 28.4 PG (ref 26.6–33)
MCHC RBC AUTO-ENTMCNC: 32.3 G/DL (ref 31.5–35.7)
MCV RBC AUTO: 88 FL (ref 79–97)
MONOCYTES # BLD AUTO: 0.66 10*3/MM3 (ref 0.1–0.9)
MONOCYTES NFR BLD AUTO: 8.5 % (ref 5–12)
NEUTROPHILS NFR BLD AUTO: 4.99 10*3/MM3 (ref 1.7–7)
NEUTROPHILS NFR BLD AUTO: 64.2 % (ref 42.7–76)
NRBC BLD AUTO-RTO: 0 /100 WBC (ref 0–0.2)
PLATELET # BLD AUTO: 141 10*3/MM3 (ref 140–450)
PMV BLD AUTO: 12 FL (ref 6–12)
POTASSIUM SERPL-SCNC: 4 MMOL/L (ref 3.5–5.2)
PROT SERPL-MCNC: 6.5 G/DL (ref 6–8.5)
QT INTERVAL: 444 MS
QTC INTERVAL: 428 MS
RBC # BLD AUTO: 5.07 10*6/MM3 (ref 4.14–5.8)
SODIUM SERPL-SCNC: 136 MMOL/L (ref 136–145)
STRESS TARGET HR: 128 BPM
TRIGL SERPL-MCNC: 89 MG/DL (ref 0–150)
VLDLC SERPL-MCNC: 18 MG/DL (ref 5–40)
WBC NRBC COR # BLD: 7.78 10*3/MM3 (ref 3.4–10.8)

## 2022-09-02 PROCEDURE — 99214 OFFICE O/P EST MOD 30 MIN: CPT | Performed by: PSYCHIATRY & NEUROLOGY

## 2022-09-02 PROCEDURE — G0378 HOSPITAL OBSERVATION PER HR: HCPCS

## 2022-09-02 PROCEDURE — 83036 HEMOGLOBIN GLYCOSYLATED A1C: CPT | Performed by: INTERNAL MEDICINE

## 2022-09-02 PROCEDURE — 80061 LIPID PANEL: CPT | Performed by: INTERNAL MEDICINE

## 2022-09-02 PROCEDURE — 80053 COMPREHEN METABOLIC PANEL: CPT | Performed by: INTERNAL MEDICINE

## 2022-09-02 PROCEDURE — 74230 X-RAY XM SWLNG FUNCJ C+: CPT | Performed by: RADIOLOGY

## 2022-09-02 PROCEDURE — 70551 MRI BRAIN STEM W/O DYE: CPT

## 2022-09-02 PROCEDURE — 92611 MOTION FLUOROSCOPY/SWALLOW: CPT

## 2022-09-02 PROCEDURE — 99214 OFFICE O/P EST MOD 30 MIN: CPT | Performed by: NURSE PRACTITIONER

## 2022-09-02 PROCEDURE — 74230 X-RAY XM SWLNG FUNCJ C+: CPT

## 2022-09-02 PROCEDURE — 99232 SBSQ HOSP IP/OBS MODERATE 35: CPT | Performed by: INTERNAL MEDICINE

## 2022-09-02 PROCEDURE — 93306 TTE W/DOPPLER COMPLETE: CPT | Performed by: INTERNAL MEDICINE

## 2022-09-02 PROCEDURE — 63710000001 PREDNISONE PER 5 MG: Performed by: INTERNAL MEDICINE

## 2022-09-02 PROCEDURE — 85025 COMPLETE CBC W/AUTO DIFF WBC: CPT | Performed by: INTERNAL MEDICINE

## 2022-09-02 PROCEDURE — 70551 MRI BRAIN STEM W/O DYE: CPT | Performed by: RADIOLOGY

## 2022-09-02 PROCEDURE — 97162 PT EVAL MOD COMPLEX 30 MIN: CPT

## 2022-09-02 PROCEDURE — 93306 TTE W/DOPPLER COMPLETE: CPT

## 2022-09-02 PROCEDURE — 82962 GLUCOSE BLOOD TEST: CPT

## 2022-09-02 PROCEDURE — 93880 EXTRACRANIAL BILAT STUDY: CPT

## 2022-09-02 PROCEDURE — 93880 EXTRACRANIAL BILAT STUDY: CPT | Performed by: RADIOLOGY

## 2022-09-02 RX ORDER — POTASSIUM CHLORIDE 750 MG/1
10 TABLET, FILM COATED, EXTENDED RELEASE ORAL DAILY
Status: DISCONTINUED | OUTPATIENT
Start: 2022-09-02 | End: 2022-09-07 | Stop reason: HOSPADM

## 2022-09-02 RX ORDER — ASPIRIN 81 MG/1
81 TABLET ORAL DAILY
Status: CANCELLED | OUTPATIENT
Start: 2022-09-02

## 2022-09-02 RX ORDER — FUROSEMIDE 20 MG/1
20 TABLET ORAL DAILY
Status: DISCONTINUED | OUTPATIENT
Start: 2022-09-02 | End: 2022-09-06

## 2022-09-02 RX ORDER — ROSUVASTATIN CALCIUM 20 MG/1
20 TABLET, COATED ORAL NIGHTLY
Status: CANCELLED | OUTPATIENT
Start: 2022-09-02

## 2022-09-02 RX ORDER — SERTRALINE HYDROCHLORIDE 100 MG/1
100 TABLET, FILM COATED ORAL DAILY
Status: ON HOLD | COMMUNITY
End: 2022-09-14 | Stop reason: SDUPTHER

## 2022-09-02 RX ORDER — FUROSEMIDE 20 MG/1
20 TABLET ORAL DAILY
Status: CANCELLED | OUTPATIENT
Start: 2022-09-02

## 2022-09-02 RX ORDER — METOPROLOL SUCCINATE 25 MG/1
12.5 TABLET, EXTENDED RELEASE ORAL DAILY
Status: CANCELLED | OUTPATIENT
Start: 2022-09-02

## 2022-09-02 RX ORDER — DILTIAZEM HYDROCHLORIDE 240 MG/1
240 CAPSULE, COATED, EXTENDED RELEASE ORAL
Refills: 3 | Status: CANCELLED | OUTPATIENT
Start: 2022-09-02

## 2022-09-02 RX ORDER — PREDNISONE 10 MG/1
10 TABLET ORAL DAILY
Status: DISCONTINUED | OUTPATIENT
Start: 2022-09-02 | End: 2022-09-07 | Stop reason: HOSPADM

## 2022-09-02 RX ADMIN — APIXABAN 5 MG: 5 TABLET, FILM COATED ORAL at 13:58

## 2022-09-02 RX ADMIN — POTASSIUM CHLORIDE 10 MEQ: 750 TABLET, FILM COATED, EXTENDED RELEASE ORAL at 13:57

## 2022-09-02 RX ADMIN — SERTRALINE 100 MG: 50 TABLET, FILM COATED ORAL at 13:57

## 2022-09-02 RX ADMIN — ASPIRIN 81 MG: 81 TABLET, CHEWABLE ORAL at 13:58

## 2022-09-02 RX ADMIN — APIXABAN 5 MG: 5 TABLET, FILM COATED ORAL at 21:17

## 2022-09-02 RX ADMIN — Medication 10 ML: at 09:15

## 2022-09-02 RX ADMIN — Medication 10 ML: at 21:17

## 2022-09-02 RX ADMIN — PREDNISONE 10 MG: 10 TABLET ORAL at 13:58

## 2022-09-02 RX ADMIN — SACUBITRIL AND VALSARTAN 1 TABLET: 97; 103 TABLET, FILM COATED ORAL at 13:58

## 2022-09-02 RX ADMIN — SACUBITRIL AND VALSARTAN 1 TABLET: 97; 103 TABLET, FILM COATED ORAL at 21:17

## 2022-09-02 RX ADMIN — NICOTINE 1 PATCH: 14 PATCH, EXTENDED RELEASE TRANSDERMAL at 13:58

## 2022-09-02 RX ADMIN — FUROSEMIDE 20 MG: 20 TABLET ORAL at 13:58

## 2022-09-02 RX ADMIN — ATORVASTATIN CALCIUM 80 MG: 40 TABLET, FILM COATED ORAL at 21:17

## 2022-09-02 NOTE — PROGRESS NOTES
[facility] HOSPITALIST PROGRESS NOTE    Subjective     History:   Zaid Galarza is a 71 y.o. male admitted on 9/1/2022 secondary to <principal problem not specified>     Procedures: None    CC: Follow up CVA, Afib    Patient seen and examined with AYAN Rabago. Awake and alert. Continues to report left-sided weakness. Reports some improvement in his dysarthria. No reported CP, dyspnea or palpitations. No reported nausea or vomiting. Episodes of >3s pauses noted overnight. No acute events overnight per RN.     History taken from: patient, chart, and RN.      Objective     Vital Signs  Temp:  [97.6 °F (36.4 °C)-98.1 °F (36.7 °C)] 97.6 °F (36.4 °C)  Heart Rate:  [56-87] 87  Resp:  [16-20] 18  BP: (103-164)/() 150/97    Intake/Output Summary (Last 24 hours) at 9/2/2022 1816  Last data filed at 9/2/2022 0115  Gross per 24 hour   Intake --   Output 550 ml   Net -550 ml         Physical Exam:  General:    Awake, alert, in no acute distress    Heart:      Normal S1 and S2. Irregularly irregular. No significant murmur appreciated.    Lungs:     Respirations regular, even and unlabored. Lungs clear to auscultation B/L. No wheezes, rales or rhonchi.   Abdomen:   Soft and nontender. No guarding, rebound tenderness or  organomegaly noted. Bowel sounds present x 4.   Extremities:  No clubbing, cyanosis or edema noted. Mild LUE weakness but improved.      Results Review:    Results from last 7 days   Lab Units 09/02/22  0534 09/01/22  1643   WBC 10*3/mm3 7.78 10.07   HEMOGLOBIN g/dL 14.4 14.6   PLATELETS 10*3/mm3 141 164     Results from last 7 days   Lab Units 09/02/22  0534 09/01/22  1643 09/01/22  1642   SODIUM mmol/L 136 135*  --    POTASSIUM mmol/L 4.0 4.0  --    CHLORIDE mmol/L 104 101  --    CO2 mmol/L 22.2 25.6  --    BUN mg/dL 9 11  --    CREATININE mg/dL 1.02 1.21 1.20   CALCIUM mg/dL 8.5* 8.9  --    GLUCOSE mg/dL 86 120*  --      Results from last 7 days   Lab Units 09/02/22  0534 09/01/22  8711    BILIRUBIN mg/dL 0.7 0.6   ALK PHOS U/L 109 121*   AST (SGOT) U/L 14 11   ALT (SGPT) U/L 5 6         Results from last 7 days   Lab Units 09/01/22  1643   INR  1.12*     Results from last 7 days   Lab Units 09/01/22  2103   TROPONIN T ng/mL <0.010       Imaging Results (Last 24 Hours)     Procedure Component Value Units Date/Time    FL Video Swallow Single Contrast [[id number]] Collected: 09/02/22 1308     Updated: 09/02/22 1418    Narrative:      FL VIDEO SWALLOW SINGLE-CONTRAST-     CLINICAL INDICATION: aspiration r/o; I63.9-Cerebral infarction,  unspecified; R53.1-Weakness        TECHNIQUE:   Speech therapy service was present. The patient was examined in the  sitting lateral position and was given several consistencies of barium.     FINDINGS: Gross aspiration with thin liquids. Aspiration with all thin  consistencies.     FLUOROSCOPY TIME: 2.9 minutes     Images: Cine loop was acquired       Impression:      Aspiration with thin liquids.     For additional information please see the report provided by the speech  therapy service     This report was finalized on 9/2/2022 2:16 PM by Dr. Hossein Lenz MD.       MRI Brain Without Contrast [[id number]] Collected: 09/02/22 1403     Updated: 09/02/22 1418    Narrative:      MRI BRAIN WITHOUT CONTRAST-     CLINICAL INDICATION: Left-sided weakness, concern for CVA;  I63.9-Cerebral infarction, unspecified; R53.1-Weakness        COMPARISON: CT of the brain dated 09/01/2022.     PROCEDURE:  Multiple planar images of the brain were acquired in a high field  strength magnet. Several pulse sequences were used to acquire the study.  Gadolinium  was not  administered.     FINDINGS:  The diffusion weighted images show focal signal abnormality in the right  basal ganglia region. This shows diffusion restriction and is most  suggestive of recent focus of acute ischemia. It does not appear to be  hyperacute.     The remaining pulse sequences show no mass, hemorrhage, or  midline  shift.  The ventricles, cisterns, and sulci are unremarkable. There is no  hydrocephalus.     Increased T2 and FLAIR signal in the periventricular and parietal white  matter compatible with chronic microangiopathic change.     The sagittal view show no sellar or parasellar mass.            There is no tectal or pineal lesion.     Coronal imaging demonstrates a symmetric appearance of the visualized  portions of the optic nerves and extraocular muscles.       Impression:         1. Focus of recent ischemia in the right basal ganglia/internal capsule  region.  2. Chronic microangiopathic change in the periventricular and parietal  white matter.  3. No parenchymal mass, hemorrhage, or midline shift     This report was finalized on 9/2/2022 2:16 PM by Dr. Hossein Lenz MD.       US Carotid Bilateral [[id number]] Collected: 09/02/22 1247     Updated: 09/02/22 1249    Narrative:      EXAM:    US Duplex Bilateral Extracranial Arteries     CLINICAL HISTORY:    Plaque, suspected CVA; I63.9-Cerebral infarction, unspecified;  R53.1-Weakness     TECHNIQUE:    Real-time duplex ultrasound scan of the extracranial arteries  integrating B-mode two-dimensional vascular structure, Doppler spectral  analysis and color flow Doppler imaging.     COMPARISON:    No relevant prior studies available.     FINDINGS:    RIGHT COMMON CAROTID ARTERY:  Unremarkable.  No occlusion or  significant stenosis on color flow and spectral Doppler imaging.    RIGHT INTERNAL CAROTID ARTERY:  Unremarkable.  No occlusion or  significant stenosis on color flow and spectral Doppler imaging.    RIGHT EXTERNAL CAROTID ARTERY:  Unremarkable.  No occlusion or  significant stenosis on color flow and spectral Doppler imaging.    RIGHT VERTEBRAL ARTERY:  Unremarkable.  Antegrade flow.    RIGHT ICA/CCA RATIO:  Unremarkable.  Within normal limits.       LEFT COMMON CAROTID ARTERY:  Unremarkable.  No occlusion or  significant stenosis on color flow and spectral  Doppler imaging.    LEFT INTERNAL CAROTID ARTERY:  Unremarkable.  No occlusion or  significant stenosis on color flow and spectral Doppler imaging.    LEFT EXTERNAL CAROTID ARTERY:  Unremarkable.  No occlusion or  significant stenosis on color flow and spectral Doppler imaging.    LEFT VERTEBRAL ARTERY:  Unremarkable.  Antegrade flow.    LEFT ICA/CCA RATIO:  Unremarkable.  Within normal limits.       LYMPH NODES:  Unremarkable.  No lymphadenopathy.      CAROTID STENOSIS REFERENCE USING SRU CRITERIA:    Mild - <50% stenosis. ICA PSV is less than 125 cm/second and plaque or  intimal thickening is visible.    Moderate - 50-69% stenosis. ICA PSV is 125 to 230 cm/second and plaque  is visible.    Severe - 70-94% stenosis. ICA PSV is more than 230 cm/second and  visible plaque with lumen narrowing is seen.    Near occlusion - 95-99% stenosis. ICA PSV is variable and significant  plaque with luminal narrowing is seen.    Occluded - 100% stenosis. No flow identified.       Impression:        No acute findings in the arteries of the neck.     This report was finalized on 9/2/2022 12:47 PM by Dr. Daniel Bateman MD.               Medications:  apixaban, 5 mg, Oral, Q12H  aspirin, 81 mg, Oral, Daily   Or  aspirin, 300 mg, Rectal, Daily  atorvastatin, 80 mg, Oral, Nightly  furosemide, 20 mg, Oral, Daily  nicotine, 1 patch, Transdermal, Q24H  potassium chloride, 10 mEq, Oral, Daily  predniSONE, 10 mg, Oral, Daily  sacubitril-valsartan, 1 tablet, Oral, BID  sertraline, 100 mg, Oral, Daily  sodium chloride, 10 mL, Intravenous, Q12H               Assessment & Plan   Left-sided weakness with concern for CVA: CT head revealed cerebral atrophy with no acute abnormalities noted. CTA head was negative for large vessel occlusion. CTA neck revealed multifocal plaque with no evidence of large vessel occlusion. MRI obtained today revealing focus of recent ischemia in the right basal ganglia/internal capsule region in addition to chronic  microangiopathic change in the periventricular and parietal white matter. No acute findings on carotid US.  Echo reveals an EF of 51-55%, normal diastolic function and mildly enlarged left atrium. Received ASA, Plavix and statin in the ED. Cont ASA and statin. PT/OT and SLP consulted. Monitor on telemetry. Neurology input appreciated.       Chronic Afib/flutter with episodes of slow ventricular response: Episodes of SVR with >3s pauses overnight. Holding home Cardizem and metoprolol. Cardiology consulted. Monitor on telemetry. Cardiology input appreciated.      Chronic combined systolic and diastolic CHF with dilated cardiomyopathy: Echo in 5/20 revealed an EF of 56-60% (previously <30%) and mild LVH. Repeat echo reveals EF of 51-55% as above. Appears compensated at present. Cont home Entresto and Lasix. Holding beta blocker as above. Monitor volume status.      Essential HTN: BP has fluctuated but overall stable. Cont home Entresto and Lasix while holding Cardizem and metoprolol as above. Cont to monitor.      COPD: Appears stable without an acute exacerbation at present.      Tobacco abuse: Cont NRT and encourage smoking cessation.      DVT PPX: Eliquis.     Disposition: Pt currently preferring home at discharge but may benefit from short term rehab as he lives alone.      Liang Covarrubias,   09/02/22  18:16 EDT

## 2022-09-02 NOTE — THERAPY EVALUATION
Acute Care - Physical Therapy Initial Evaluation   Joey     Patient Name: Zaid Galarza  : 1950  MRN: [id number]  Today's Date: 2022   Onset of Illness/Injury or Date of Surgery: (P) 22 (admission date)  Visit Dx:     ICD-10-CM ICD-9-CM   1. Cerebrovascular accident (CVA), unspecified mechanism (HCC)  I63.9 434.91   2. Left-sided weakness  R53.1 728.87     Patient Active Problem List   Diagnosis   • Atrial flutter (HCC)   • Dilated cardiomyopathy (HCC)   • Nonobstructive CAD per cath 10/3/2019   • Anxiety disorder   • Essential hypertension   • Tobacco use   • History of abdominal aortic aneurysm (AAA) repair   • Dyslipidemia, goal LDL below 100   • CVA (cerebral vascular accident) (HCC)     Past Medical History:   Diagnosis Date   • Atrial fibrillation (HCC)    • CHF (congestive heart failure) (HCC)    • COPD (chronic obstructive pulmonary disease) (HCC)    • Coronary artery disease    • Hypertension    • Tobacco abuse      Past Surgical History:   Procedure Laterality Date   • ABDOMINAL AORTIC ANEURYSM REPAIR     • CARDIAC CATHETERIZATION N/A 10/03/2019    Procedure: Left Heart Cath;  Surgeon: Vincent Phillips MD;  Location: [facility] INVASIVE LOCATION;  Service: Cardiology     PT Assessment (last 12 hours)     PT Evaluation and Treatment     Row Name 22 1404 22 0856       Physical Therapy Time and Intention    Document Type -- (P)   -TN evaluation (P)   -TN    Mode of Treatment -- (P)   -TN physical therapy (P)   -TN    Patient Effort -- (P)   -TN good (P)   -TN    Symptoms Noted During/After Treatment -- (P)   -TN shortness of breath (P)   -TN    Comment -- Pt was cooperative and pleasant during evaluation. Pt. states he may have one neighbor he could call if he needs anything, but no family lives nearby to assist. He reports a fall one yr ago at home, another fall several days ago at home after sx began. (P)   -TN    Row Name 22 0856          General Information     "Patient Profile Reviewed yes (P)   -TN     Onset of Illness/Injury or Date of Surgery 09/01/22 (P)   admission date  -TN     General Observations of Patient Pt found supine in bed upon entry. (P)   -TN     Prior Level of Function independent:;all household mobility;community mobility (P)   -TN     Equipment Currently Used at Home shower chair (P)   -TN     Pertinent History of Current Functional Problem L sided weakness onset around 3 days ago. He states it felt like he was pulling \"a ton of weight\" during ambulation. (P)   -TN     Equipment Issued to Patient gait belt (P)   -TN     Row Name 09/02/22 0856          Previous Level of Function/Home Environm    Household Ambulation, Premorbid Functional Level independent (P)   -TN     Community Ambulation, Premorbid Functional Level independent (P)   -TN     Row Name 09/02/22 0856          Living Environment    Current Living Arrangements home (P)   -TN     Home Accessibility stairs within home (P)   -TN     Primary Care Provided by self (P)   -TN     Row Name 09/02/22 0856          Stairs Within Home, Primary    Stairs, Within Home, Primary 12 to the basement (P)   -TN     Stair Railings, Within Home, Primary railing on right side (ascending) (P)   -TN     Row Name 09/02/22 0856          Cognition    Orientation Status (Cognition) oriented x 3;oriented to;person;place;time (P)   -TN     Row Name 09/02/22 0856          Range of Motion Comprehensive    Comment, General Range of Motion ROM WFL BLE. (P)   -TN     Row Name 09/02/22 0856          Strength Comprehensive (MMT)    Comment, General Manual Muscle Testing (MMT) Assessment 5/5 MMT all planes tested. (P)   -TN     Row Name 09/02/22 0856          Bed Mobility    Bed Mobility supine-sit (P)   -TN     Supine-Sit Will (Bed Mobility) modified independence;standby assist (P)   -TN     Assistive Device (Bed Mobility) bed rails;head of bed elevated (P)   -TN     Row Name 09/02/22 0856          Transfers    Transfers " sit-stand transfer;stand-sit transfer (P)   -TN     Sit-Stand [place] (Transfers) contact guard (P)   -TN     Stand-Sit [place] (Transfers) contact guard (P)   -TN     Row Name 09/02/22 0856          Stand-Sit Transfer    Assistive Device (Stand-Sit Transfers) walker, front-wheeled (P)   -TN     Row Name 09/02/22 0856          Gait/Stairs (Locomotion)    Gait/Stairs Locomotion gait/ambulation assistive device (P)   -TN     [place] Level (Gait) contact guard;minimum assist (75% patient effort) (P)   -TN     Assistive Device (Gait) walker, front-wheeled (P)   -TN     Distance in Feet (Gait) grossly 60-65 ft (P)   -TN     Deviations/Abnormal Patterns (Gait) base of support, narrow (P)   -TN     Comment, (Gait/Stairs) Pt initially attempted ambulation w/o AD, but demonstrated unsteadiness & tried to use walls to help balance. He was then administered a RW to increase support; pt demonstrated SOA and narrow base of support while ambulation. He increased his gait speed as he became more unsteady, and was prompted by SPT to take slower, more controlled steps. (P)   -TN     Row Name 09/02/22 0856          Safety Issues, Functional Mobility    Impairments Affecting Function (Mobility) endurance/activity tolerance;balance;shortness of breath (P)   -TN     Row Name 09/02/22 0856          Balance    Balance Assessment sitting static balance;standing dynamic balance (P)   -TN     Static Sitting Balance standby assist (P)   -TN     Position, Sitting Balance sitting edge of bed (P)   -TN     Dynamic Standing Balance contact guard;minimal assist (P)   -TN     Position/Device Used, Standing Balance walker, front-wheeled (P)   -TN     Row Name 09/02/22 0856          Plan of Care Review    Outcome Evaluation Pt evaluated on this date by SPT and PT. He was pleasant and cooperative with therapy. His strength was assessed while supine in bed, then pt. ambulated in room & hallway roughly 60'. He was mod(I) for supine to  sit, CGA for sit<>stand, CGA/Jana for ambulation. He demonstrated a narrow base of support during ambulation and required a rest break after roughly 30'. Pt also demonstrated one instance of toe drag with LLE while ambulating in hallway. D/c recommendation IPR, SNF, extended care. Home w/ home health, home w/ supervision would be appropriate for this pt, however, at this time he is a fall risk and does not have anyone at home who could assist if needed. (P)   -TN     Row Name 09/02/22 0856          Positioning and Restraints    Pre-Treatment Position in bed (P)   -TN     Post Treatment Position chair (P)   -TN     In Chair sitting;call light within reach;encouraged to call for assist;with family/caregiver (P)   -TN     Row Name 09/02/22 0856          Therapy Assessment/Plan (PT)    PT Diagnosis (PT) Decreased functional mobility s/p cerebral vascular accident. (P)   -TN     Rehab Potential (PT) good, to achieve stated therapy goals (P)   -TN     Criteria for Skilled Interventions Met (PT) yes (P)   -TN     Therapy Frequency (PT) other (see comments) (P)   2-5x per week  -TN     Predicted Duration of Therapy Intervention (PT) LOS (P)   -TN     Problem List (PT) problems related to;balance;motor control;mobility (P)   -TN     Row Name 09/02/22 0856          Therapy Plan Review/Discharge Plan (PT)    Patient/Family Concerns, Equipment Needs at Discharge (PT) Front wheeled walker (P)   -TN     Row Name 09/02/22 0856          Physical Therapy Goals    Gait Training Goal Selection (PT) gait training, PT goal 1 (P)   -TN     Row Name 09/02/22 0856          Gait Training Goal 1 (PT)    Activity/Assistive Device (Gait Training Goal 1, PT) gait (walking locomotion);decrease fall risk;improve balance and speed (P)   -TN     [place] Level (Gait Training Goal 1, PT) contact guard required (P)   -TN     Distance (Gait Training Goal 1, PT) 125' (P)   -TN     Time Frame (Gait Training Goal 1, PT) long term goal (LTG) (P)   -TN            User Key  (r) = Recorded By, (t) = Taken By, (c) = Cosigned By    Initials Name Provider Type    Zeynep Mcmanus PT Student PT Student                  PT Recommendation and Plan  Anticipated Discharge Disposition (PT): (P) inpatient rehabilitation facility, skilled nursing facility, extended care facility  Planned Therapy Interventions (PT): (P) balance training, gait training  Therapy Frequency (PT): (P) other (see comments) (2-5x per week)  Outcome Evaluation: (P) Pt evaluated on this date by SPT and PT. He was pleasant and cooperative with therapy. His strength was assessed while supine in bed, then pt. ambulated in room & hallway roughly 60'. He was mod(I) for supine to sit, CGA for sit<>stand, CGA/Jana for ambulation. He demonstrated a narrow base of support during ambulation and required a rest break after roughly 30'. Pt also demonstrated one instance of toe drag with LLE while ambulating in hallway. D/c recommendation IPR, SNF, extended care. Home w/ home health, home w/ supervision would be appropriate for this pt, however, at this time he is a fall risk and does not have anyone at home who could assist if needed.       Time Calculation:    PT Charges     Row Name 09/02/22 1437             Time Calculation    PT Received On 09/02/22 (P)   -TN      PT Goal Re-Cert Due Date 09/16/22 (P)   -TN            User Key  (r) = Recorded By, (t) = Taken By, (c) = Cosigned By    Initials Name Provider Type    Zeynep Mcmanus PT Student PT Erika              Therapy Charges for Today     Code Description Service Date Service Provider Modifiers Qty    [id number] HC PT EVAL MOD COMPLEXITY 4 9/2/2022 Zeynep Perdue PT Erika GP 1               MISSY Mcdonnell  9/2/2022

## 2022-09-02 NOTE — CONSULTS
Consults  Date of Admit: 9/1/2022  Date of Consult: 09/02/22  Provider, No Known  Zaid Galarza  1950    Consulting Physician: Yefri Pratt MD    Cardiology consultation    Reason for consultation: Greater than 3-second pause on telemetry.    Assessment:  1. Atrial fibrillation with a slow ventricular rate, several greater than 3-second pauses noted on telemetry.  Patient's home meds include Cardizem and metoprolol.  2. Dilated, nonischemic cardiomyopathy with recovered LVEF of 56 to 60% per echocardiogram of 5/6/2020.  3. Nonobstructive coronary artery disease, per cardiac cath 10/4/2019  4. Essential hypertension  5. Dyslipidemia  6. Left sided weakness, admitted for possible CVA.  Management per hospitalist and neurology service      Recommendations:  1. Calcium channel blocker and beta-blocker have been discontinued.  We will continue to monitor the patient.  From his description of events it does not appear that he had a syncopal episode from possible cardiac arrhythmia or prolonged pauses.  We will continue to monitor him on telemetry.  2. Continue Entresto and atorvastatin.      History of Present Illness    Subjective     Chief Complaint   Patient presents with   • Stroke       Zaid Galarza is a 71 y.o. male with past medical history significant for nonischemic dilated cardiomyopathy with recovered LVEF, nonobstructive coronary artery disease, hypertension, dyslipidemia, COPD, and tobacco use.  Patient presented to the ED on 9/1/2022 with complaint of left sided weakness and associated fall.  Patient was admitted for possible CVA.  Cardiology was consulted due to greater than 3-second pauses noted on telemetry.    Zaid was seen and examined.  He reports that he had had approximately 1 day history of mild left sided weakness in his arm and leg.  He reported some sensation of numbness in his left upper extremity.  He states that he was unsteady at times and unable to bear weight on the left leg.   He states this resulted in a fall.  He denies any loss of consciousness or palpitations previous to his fall.  He denies any chest pain.  He reports medication compliance, including Eliquis.    Last Echo: 9/2/2022    Interpretation Summary    · Estimated left ventricular EF = 55% Left ventricular ejection fraction appears to be 51 - 55%. Left ventricular systolic function is normal.  · Left ventricular diastolic function was normal.  · No significant mitral or aortic valve regurgitation. Moderate sclerosis of both is noted.  · There is anterior pericardial fat pad but no effusion  · Left atrium is mildly enlarged  · Since prev study of 9/29/19 EF has increased from 30% to 55% and mitral, aortic and tricuspid regurgitation is no longer seen.    Last Cath: 10/3/2019    Conclusion    DATE OF PROCEDURE: 10/3/2019     INDICATION FOR PROCEDURE: {Cardiomyopathy, severe LV dysfunction, fib , flutter     PROCEDURE PERFORMED:      1. Coronary Angiogram  2. Left heart catheretization       PROCEDURE COMMENTS:     Zaid Galarza was brought to the cath lab and placed on the cardiac catherization table in the postabsortive state. The patient was prepped and draped according to the cath lab protocol under strict aseptic and sterile condition. Patient's right femoral artery sight was prepped and draped. Under fluoroscopic guidance the right femoral artery was punctured using a 21 gauge needle utilizing the modified Seldinger technique. 6 Turkish sheath was introduced over a wire. After aspirating for blood it was flushed with heparinized saline.     We advanced Torres type diagnostic catheters over the wire. The  left and right coronary arteries  were selectively engaged. Left and right coronary angiogram were obtained in multiple orthogonal projections.      After completion of the procedure the femoral sheath was removed in the cath lab and hemostasis was achieved by Angioseal. The patient tolerated the procedure without  complications.         FINDINGS:     1. HEMODYNAMICS:  LVEDP 10 mmHg, no gradient across the aortic valve.        2. CORONARY ANGIOGRAPHY: Dominant dominant coronary artery system.     The left main coronary artery bifurcated into the LAD and left circumflex coronary.  The LAD coursed in the anterior interventricular groove, gave rise to diagonal branches and reached the apex.     Left circumflex coursed in the left atrial ventricular groove and gives rise to several marginal branches.     The right coronary artery course in the right atrial ventricular groove I gave rise to several acute marginal branches.     Left main: Normal     LAD: 20 to 30% diffuse disease in LADLeft circumflex: 30% focal mid circumflex   RCA: Large size dominant vessel proximal 40% and distal 40% focal stenosisPDA: Mild 20%     Final impression:   Mild to moderate CAD   3+ mitral regurgitation  RECOMMENDATIONS:   Aspirin  Beta-blocker  Statin  ACE inhibitor  Continue anticoagulant  Repeat ANGELITO after 6 to 8 weeks  If atrial thrombus resolved then proceed with  Ablation  I believe once the arrhythmia is controlled patient ejection fraction will tend to increase     Vincent Phillips MD  10/03/19  9:38 AM                       Past Medical History:   Diagnosis Date   • Atrial fibrillation (HCC)    • CHF (congestive heart failure) (Formerly [facility])    • COPD (chronic obstructive pulmonary disease) ([facility])    • Coronary artery disease    • Hypertension    • Tobacco abuse      Past Surgical History:   Procedure Laterality Date   • ABDOMINAL AORTIC ANEURYSM REPAIR     • CARDIAC CATHETERIZATION N/A 10/03/2019    Procedure: Left Heart Cath;  Surgeon: Vincent Phillips MD;  Location: [facility] CATH INVASIVE LOCATION;  Service: Cardiology     Family History   Problem Relation Age of Onset   • Heart disease Mother    • Stroke Father    • Heart disease Father      Social History     Tobacco Use   • Smoking status: Current Every Day Smoker     Packs/day: 1.00     Types:  Cigarettes   • Smokeless tobacco: Never Used   • Tobacco comment: down to 2 a day   Vaping Use   • Vaping Use: Never used   Substance Use Topics   • Alcohol use: No   • Drug use: No     Medications Prior to Admission   Medication Sig Dispense Refill Last Dose   • apixaban (ELIQUIS) 5 MG tablet tablet Take 1 tablet by mouth Every 12 (Twelve) Hours. Indications: DVT/PE (active thrombosis) 180 tablet 3 8/31/2022 at 0830   • aspirin (aspirin) 81 MG EC tablet Take 1 tablet by mouth Daily. 90 tablet 3 8/31/2022 at 0830   • dilTIAZem SR (CARDIZEM SR) 120 MG 12 hr capsule Take 1 capsule by mouth 2 (Two) Times a Day. 180 capsule 3 8/31/2022 at 0830   • ezetimibe (ZETIA) 10 MG tablet Take 1 tablet by mouth Daily. 90 tablet 3 9/1/2022 at 0830   • furosemide (Lasix) 20 MG tablet Take 1 tablet by mouth Daily. 90 tablet 3 8/31/2022 at 0830   • metoprolol succinate XL (TOPROL-XL) 25 MG 24 hr tablet Take 0.5 tablets by mouth Daily. 45 tablet 3 8/31/2022 at 0830   • potassium chloride 10 MEQ CR tablet Take 10 mEq by mouth Daily.   8/31/2022 at 0830   • predniSONE (DELTASONE) 10 MG tablet Take 10 mg by mouth Daily.   8/31/2022 at 0830   • rosuvastatin (CRESTOR) 20 MG tablet Take 1 tablet by mouth Every Night. 90 tablet 3 8/31/2022 at Unknown time   • sacubitril-valsartan (ENTRESTO)  MG tablet Take 1 tablet by mouth 2 (Two) Times a Day. 180 tablet 3 8/31/2022 at Unknown time   • sertraline (ZOLOFT) 100 MG tablet Take 100 mg by mouth Daily.   8/31/2022 at 0830     Allergies:  Patient has no known allergies.    Review of Systems   Constitutional: Negative for fatigue.   Respiratory: Negative for shortness of breath.    Cardiovascular: Negative for chest pain, palpitations and leg swelling.   Gastrointestinal: Negative for blood in stool.   Neurological: Positive for weakness (left sided) and numbness. Negative for dizziness, syncope and light-headedness.   Hematological: Does not bruise/bleed easily.   All other systems reviewed  and are negative.        Objective      Vital Signs  Temp:  [97.6 °F (36.4 °C)-98.2 °F (36.8 °C)] 97.6 °F (36.4 °C)  Heart Rate:  [56-80] 76  Resp:  [16-20] 18  BP: (103-167)/() 126/74  Body mass index is 30.71 kg/m².    Intake/Output Summary (Last 24 hours) at 9/2/2022 1416  Last data filed at 9/2/2022 0115  Gross per 24 hour   Intake 1000 ml   Output 550 ml   Net 450 ml       Physical Exam  Constitutional:       Appearance: Normal appearance. He is well-developed.   HENT:      Head: Normocephalic and atraumatic.   Eyes:      Pupils: Pupils are equal, round, and reactive to light.   Neck:      Vascular: No JVD.   Cardiovascular:      Rate and Rhythm: Normal rate. Rhythm irregular.      Heart sounds: No murmur heard.    No friction rub. No gallop.   Pulmonary:      Effort: Pulmonary effort is normal. No respiratory distress.      Breath sounds: Normal breath sounds. No wheezing or rales.   Abdominal:      Palpations: Abdomen is soft. There is no mass.      Tenderness: There is no abdominal tenderness.      Hernia: No hernia is present.   Skin:     General: Skin is warm and dry.   Neurological:      Mental Status: He is alert and oriented to person, place, and time.   Psychiatric:         Mood and Affect: Mood normal.         Behavior: Behavior normal.         Telemetry: A. fib 80s, with multiple 2-second pauses and 3 pauses greater than 3 seconds.    Results Review:   I reviewed the patient's new clinical results.  Results from last 7 days   Lab Units 09/01/22  2103   TROPONIN T ng/mL <0.010     Results from last 7 days   Lab Units 09/02/22  0534 09/01/22  1643   WBC 10*3/mm3 7.78 10.07   HEMOGLOBIN g/dL 14.4 14.6   PLATELETS 10*3/mm3 141 164     Results from last 7 days   Lab Units 09/02/22  0534 09/01/22  1643 09/01/22  1642   SODIUM mmol/L 136 135*  --    POTASSIUM mmol/L 4.0 4.0  --    CHLORIDE mmol/L 104 101  --    CO2 mmol/L 22.2 25.6  --    BUN mg/dL 9 11  --    CREATININE mg/dL 1.02 1.21 1.20   CALCIUM  mg/dL 8.5* 8.9  --    GLUCOSE mg/dL 86 120*  --    ALT (SGPT) U/L 5 6  --    AST (SGOT) U/L 14 11  --      Lab Results   Component Value Date    INR 1.12 (H) 09/01/2022    INR 1.07 09/30/2019    INR 1.11 (H) 09/29/2019     Lab Results   Component Value Date    MG 1.9 09/30/2019    MG 2.1 09/29/2019    MG 2.0 09/28/2019     Lab Results   Component Value Date    TSH 2.540 09/28/2019    TRIG 89 09/02/2022    HDL 31 (L) 09/02/2022    LDL 55 09/02/2022      No results found for: BNP     EKG:         Imaging Results (Last 72 Hours)     Procedure Component Value Units Date/Time    MRI Brain Without Contrast [[id number]] Collected: 09/02/22 1403     Updated: 09/02/22 1403    Narrative:      MRI BRAIN WITHOUT CONTRAST-     CLINICAL INDICATION: Left-sided weakness, concern for CVA;  I63.9-Cerebral infarction, unspecified; R53.1-Weakness        COMPARISON: CT of the brain dated 09/01/2022.     PROCEDURE:  Multiple planar images of the brain were acquired in a high field  strength magnet. Several pulse sequences were used to acquire the study.  Gadolinium  was not  administered.     FINDINGS:  The diffusion weighted images show focal signal abnormality in the right  basal ganglia region. This shows diffusion restriction and is most  suggestive of recent focus of acute ischemia. It does not appear to be  hyperacute.     The remaining pulse sequences show no mass, hemorrhage, or midline  shift.  The ventricles, cisterns, and sulci are unremarkable. There is no  hydrocephalus.     Increased T2 and FLAIR signal in the periventricular and parietal white  matter compatible with chronic microangiopathic change.     The sagittal view show no sellar or parasellar mass.            There is no tectal or pineal lesion.     Coronal imaging demonstrates a symmetric appearance of the visualized  portions of the optic nerves and extraocular muscles.       Impression:         1. Focus of recent ischemia in the right basal ganglia/internal  capsule  region.  2. Chronic microangiopathic change in the periventricular and parietal  white matter.  3. No parenchymal mass, hemorrhage, or midline shift       FL Video Swallow Single Contrast [[id number]] Collected: 09/02/22 1308     Updated: 09/02/22 1308    Narrative:      FL VIDEO SWALLOW SINGLE-CONTRAST-     CLINICAL INDICATION: aspiration r/o; I63.9-Cerebral infarction,  unspecified; R53.1-Weakness        TECHNIQUE:   Speech therapy service was present. The patient was examined in the  sitting lateral position and was given several consistencies of barium.     FINDINGS: Gross aspiration with thin liquids. Aspiration with all thin  consistencies.     FLUOROSCOPY TIME: 2.9 minutes     Images: Cine loop was acquired       Impression:      Aspiration with thin liquids.     For additional information please see the report provided by the speech  therapy service       US Carotid Bilateral [[id number]] Collected: 09/02/22 1247     Updated: 09/02/22 1249    Narrative:      EXAM:    US Duplex Bilateral Extracranial Arteries     CLINICAL HISTORY:    Plaque, suspected CVA; I63.9-Cerebral infarction, unspecified;  R53.1-Weakness     TECHNIQUE:    Real-time duplex ultrasound scan of the extracranial arteries  integrating B-mode two-dimensional vascular structure, Doppler spectral  analysis and color flow Doppler imaging.     COMPARISON:    No relevant prior studies available.     FINDINGS:    RIGHT COMMON CAROTID ARTERY:  Unremarkable.  No occlusion or  significant stenosis on color flow and spectral Doppler imaging.    RIGHT INTERNAL CAROTID ARTERY:  Unremarkable.  No occlusion or  significant stenosis on color flow and spectral Doppler imaging.    RIGHT EXTERNAL CAROTID ARTERY:  Unremarkable.  No occlusion or  significant stenosis on color flow and spectral Doppler imaging.    RIGHT VERTEBRAL ARTERY:  Unremarkable.  Antegrade flow.    RIGHT ICA/CCA RATIO:  Unremarkable.  Within normal limits.       LEFT COMMON CAROTID  ARTERY:  Unremarkable.  No occlusion or  significant stenosis on color flow and spectral Doppler imaging.    LEFT INTERNAL CAROTID ARTERY:  Unremarkable.  No occlusion or  significant stenosis on color flow and spectral Doppler imaging.    LEFT EXTERNAL CAROTID ARTERY:  Unremarkable.  No occlusion or  significant stenosis on color flow and spectral Doppler imaging.    LEFT VERTEBRAL ARTERY:  Unremarkable.  Antegrade flow.    LEFT ICA/CCA RATIO:  Unremarkable.  Within normal limits.       LYMPH NODES:  Unremarkable.  No lymphadenopathy.      CAROTID STENOSIS REFERENCE USING SRU CRITERIA:    Mild - <50% stenosis. ICA PSV is less than 125 cm/second and plaque or  intimal thickening is visible.    Moderate - 50-69% stenosis. ICA PSV is 125 to 230 cm/second and plaque  is visible.    Severe - 70-94% stenosis. ICA PSV is more than 230 cm/second and  visible plaque with lumen narrowing is seen.    Near occlusion - 95-99% stenosis. ICA PSV is variable and significant  plaque with luminal narrowing is seen.    Occluded - 100% stenosis. No flow identified.       Impression:        No acute findings in the arteries of the neck.     This report was finalized on 9/2/2022 12:47 PM by Dr. Daniel Bateman MD.       XR Chest 1 View [[id number]] Collected: 09/01/22 1754     Updated: 09/01/22 1803    Narrative:      XR CHEST 1 VW-     CLINICAL INDICATION: Stroke Protocol (Onset > 12 hrs)        COMPARISON: 09/28/2019      TECHNIQUE: Single frontal view of the chest.     FINDINGS:      LUNGS: Lungs are adequately aerated.      HEART AND MEDIASTINUM: Heart and mediastinal contours are unremarkable        SKELETON: Bony and soft tissue structures are unremarkable.             Impression:      No radiographic evidence of acute cardiac or pulmonary disease.     This report was finalized on 9/1/2022 5:54 PM by Dr. Hossein Lenz MD.       CT Angiogram Head w AI Analysis of LVO [[id number]] Collected: 09/01/22 1719     Updated: 09/01/22 1724     Narrative:      EXAM:    CT Angiography Head With Intravenous Contrast     EXAM DATE:    9/1/2022 4:36 PM     CLINICAL HISTORY:    Acute Stroke     TECHNIQUE:    Axial computed tomographic angiography images of the head with  intravenous contrast. LVO analysis was performed with [employer] software.   This CT exam was performed using one or more of the following dose  reduction techniques:  automated exposure control, adjustment of the mA  and/or kV according to patient size, and/or use of iterative  reconstruction technique.    MIP reconstructed images were created and reviewed.     COMPARISON:    No relevant prior studies available.     FINDINGS:    RIGHT INTERNAL CAROTID ARTERY:  No acute findings.  Intracranial  segment is patent with no significant stenosis.  No aneurysm.    RIGHT ANTERIOR CEREBRAL ARTERY:  Unremarkable.  No occlusion or  significant stenosis.  No aneurysm.    RIGHT MIDDLE CEREBRAL ARTERY:  Unremarkable.  No occlusion or  significant stenosis.  No aneurysm.    RIGHT POSTERIOR CEREBRAL ARTERY:  Unremarkable.  No occlusion or  significant stenosis.  No aneurysm.    RIGHT VERTEBRAL ARTERY:  Unremarkable as visualized.       LEFT INTERNAL CAROTID ARTERY:  No acute findings.  Intracranial  segment is patent with no significant stenosis.  No aneurysm.    LEFT ANTERIOR CEREBRAL ARTERY:  Unremarkable.  No occlusion or  significant stenosis.  No aneurysm.    LEFT MIDDLE CEREBRAL ARTERY:  Unremarkable.  No occlusion or  significant stenosis.  No aneurysm.    LEFT POSTERIOR CEREBRAL ARTERY:  Unremarkable.  No occlusion or  significant stenosis.  No aneurysm.    LEFT VERTEBRAL ARTERY:  Unremarkable as visualized.       BASILAR ARTERY:  Unremarkable.  No occlusion or significant stenosis.   No aneurysm.       Impression:      No evidence of large vessel occlusion     This report was finalized on 9/1/2022 5:22 PM by Dr. Hossein Lenz MD.       CT Angiogram Neck [[id number]] Collected: 09/01/22 9130     Updated:  09/01/22 1718    Narrative:      EXAM:    CT Angiography Neck With Intravenous Contrast     EXAM DATE:    9/1/2022 4:35 PM     CLINICAL HISTORY:    Stroke, follow up     TECHNIQUE:    Axial computed tomographic angiography images of the neck with  intravenous contrast.  This CT exam was performed using one or more of  the following dose reduction techniques:  automated exposure control,  adjustment of the mA and/or kV according to patient size, and/or use of  iterative reconstruction technique.    MIP reconstructed images were created and reviewed.     COMPARISON:    No relevant prior studies available.     FINDINGS:      VASCULATURE:    RIGHT COMMON CAROTID ARTERY:  Unremarkable.  No significant stenosis.   No dissection or occlusion.    RIGHT INTERNAL CAROTID ARTERY:  Unremarkable.  Extracranial segment is  patent with no significant stenosis.  No dissection or occlusion.    RIGHT EXTERNAL CAROTID ARTERY:  Unremarkable.  No occlusion.    RIGHT VERTEBRAL ARTERY:  Unremarkable.  No significant stenosis.  No  dissection or occlusion.       LEFT COMMON CAROTID ARTERY:  Unremarkable.  No significant stenosis.   No dissection or occlusion.    LEFT INTERNAL CAROTID ARTERY:  Unremarkable.  Extracranial segment is  patent with no significant stenosis.  No dissection or occlusion.    LEFT EXTERNAL CAROTID ARTERY:  Unremarkable.  No occlusion.    LEFT VERTEBRAL ARTERY:  Unremarkable.  No significant stenosis.  No  dissection or occlusion.      NECK:    BONES/JOINTS:  No acute fracture.  No dislocation.    SOFT TISSUES: Subcutaneous nodule in the left parotid..      CAROTID STENOSIS REFERENCE USING NASCET CRITERIA:    % ICA stenosis = (1 - narrowest ICA diameter/diameter of distal  cervical ICA) x 100.    Mild - <50% stenosis.    Moderate - 50-69% stenosis.    Severe - 70-94% stenosis.    Near occlusion - 95-99% stenosis.    Occluded - 100% stenosis.       Impression:      Multifocal plaque, but no evidence of large vessel  occlusion.     This report was finalized on 9/1/2022 5:16 PM by Dr. Hossein Lenz MD.       CT Head Without Contrast Stroke Protocol [[id number]] Collected: 09/01/22 1636     Updated: 09/01/22 1639    Narrative:      CT HEAD WO CONTRAST STROKE PROTOCOL-     CLINICAL INDICATION: Stroke, follow up        COMPARISON: 03/09/2016      TECHNIQUE: Axial images of the brain were obtained with out intravenous  contrast.  Reformatted images were created in the sagittal and coronal  planes.     DOSE:     Radiation dose reduction techniques were utilized per ALARA protocol.  Automated exposure control was initiated through either or [employer]deco or  [employer] software packages by  protocol.        FINDINGS:    BRAIN:  Unremarkable.  No hemorrhage.  No significant white matter  disease.  No edema.       VENTRICLES:  Atrophy    BONES/JOINTS:  Unremarkable.  No acute fracture.       SOFT TISSUES:  Unremarkable.       SINUSES:  no air fluid levels       MASTOID AIR CELLS:  Unremarkable as visualized.  No mastoid effusion.          Impression:          1. No evidence of an acute ischemic event  2. Cerebral atrophy  3. No parenchymal mass, hemorrhage, or midline shift.     Results were relayed to the emergency department at 4:37 PM     This report was finalized on 9/1/2022 4:37 PM by Dr. Hossein Lenz MD.                Thank you very much for asking us to be involved in this patient's care.  We will follow along with you.    Electronically signed by TIFFANIE Cox, 09/02/22, 2:41 PM EDT.

## 2022-09-02 NOTE — MBS/VFSS/FEES
Acute Care - Speech Language Pathology   Swallow Initial Evaluation [facility]   MODIFIED BARIUM SWALLOW STUDY     Patient Name: Zaid Galarza  : 1950  MRN: [id number]  Today's Date: 2022  Onset of Illness/Injury or Date of Surgery: (P) 22 (admission date)          Admit Date: 2022      Zaid Galarza  presents to the radiology suite this am from 3323/1P to participate in an instrumental MBS to assess safety/efficacy of swallowing fnx, determine safest/least restrictive diet. He has a medical hx significant for a-fib, CHF, COPD, CAD, HTN, and tobacco abuse. He reported to [facility] ED for evaluation of L-sided weakness.     Per neurology, pt is noted w/a subcortical lacunar stroke. He is noted w/ L-side weakness, slight L facial droop however able to retract both labial edges equally. He reports intact sensation to both sides of face equally, however is unaware of oral loss from L labial edge intermittently across evaluation.         Social History     Socioeconomic History   • Marital status: Single   Tobacco Use   • Smoking status: Current Every Day Smoker     Packs/day: 1.00     Types: Cigarettes   • Smokeless tobacco: Never Used   • Tobacco comment: down to 2 a day   Vaping Use   • Vaping Use: Never used   Substance and Sexual Activity   • Alcohol use: No   • Drug use: No   • Sexual activity: Defer        XR CHEST 1 VW-     CLINICAL INDICATION: Stroke Protocol (Onset > 12 hrs)     COMPARISON: 2019      TECHNIQUE: Single frontal view of the chest.     FINDINGS:      LUNGS: Lungs are adequately aerated.      HEART AND MEDIASTINUM: Heart and mediastinal contours are unremarkable     SKELETON: Bony and soft tissue structures are unremarkable.     IMPRESSION:  No radiographic evidence of acute cardiac or pulmonary disease.     This report was finalized on 2022 5:54 PM by Dr. Hossein Lenz MD.    Diet Orders (active) (From admission, onward)     Start     Ordered    22 1206  Diet  Soft Texture; Chopped; Nectar / Syrup Thick  Diet Effective Now        Comments: Medications whole in puree/nectars    09/02/22 1207    09/02/22 1206  DIET MESSAGE Pt has been NPO. Requests breakfast items please. NECTAR thick liquids.  Once        Comments: Pt has been NPO. Requests breakfast items please. NECTAR thick liquids.    09/02/22 1207                He is observed on nasal cannula at 2L w/o complications.     Risks and benefits of the procedure are explained w/ verbalizing understanding/agreement to participate.     He is positioned upright and centered in a soft strap supportive chair to accept multiple po presentations of solid cracker, puree, honey thick, nectar thick, and thin liquids via spoon, cup and straw, along w/ whole placebo pill in puree. He is able to self feed.     All views are from the lateral plane.     Facial/oral structures are slightly asymmetrical upon observation w/o lingual deviation upon protrusion. Oral mucosa are moist, pink and clean. Secretions are clear, thin and well controlled. OROM/VANESSA is wfl to imitate oral postures. Gag is not assessed. Volitional cough is adequate in intensity, congestive in quality, productive of white foamy sputum. Vocal quality is adequate in intensity, clear in quality w/ intelligible speech. Pt is a/a and cooperative to particpate.  Pt  is oriented to person, place, and time, follows simple directives w/ some repetitions and cues required, and participates in simple conversational exchanges.     Upon po presentations, adequate bolus anticipation w/ slightly weak labial seal for bolus clearance via spoon bowl, cup rim stability and suction via straw.  Trace oral loss from L labial edge noted intermittently w/ thin liquids via cup. He is unaware. Bolus formation, manipulation, and control are generally weak overall w/ mixed phasic-rotary mastication pattern. A-p transit is timely w/ trace oral residue remaining in posterior oral cavity. Tongue base  retraction is slightly delayed w/ vallecular pooling of nectar and thin liquids w/ loss to the bilateral pyriforms w/ thin liquids intermittently and w/ nectar thick liquids via cup w/ large bolus trial. Linguavelar seal is adequate. Penetration before the swallow of nectar thick liquids via cup w/ large bolus amount only. No aspiration is evidenced before the swallow.     Pharyngeal swallow is mildly delayed w/ decreased hyolaryngeal elevation and complete epiglottic inversion. Trace penetration occurs during the swallow w/ nectar thick liquids via cup and straw and thin liquids via cup and straw. These events clear completely upon completion of the swallow. Thin liquid trials are evidenced w/ inconsistent results. Initial presentations of thin liquids via cup result in trace penetration. Repeat trials of thin liquids via cup result in ayaan gross silent aspiration during the swallow. Initial presentations of thin liquids via straw result in aspiration during the swallow, while repeat trials result in penetration w/ trace residue remaining. Single incidence of aspiration during the swallow w/ nectar thick liquids via cup w/ large bolus amount and felt to be an isolated event as this was unable to be reproduced. Pharyngeal contraction is adequate w/ trace residue remaining.      Full esophageal sweep reveals no mucosal abnormalities. Motility is wfl w/o retrograde flow noted.     Compensatory strategy of chin tuck w/ thin liquids is unable to be assessed as pt tucks chin to chest, however then is unable to swallow bolus. He then hyperextends his neck until eyes are focused on ceiling and swallows w/o aspiration noted. He reports he does not know what he is supposed to do despite max cues and demonstration.        Visit Dx:     ICD-10-CM ICD-9-CM   1. Cerebrovascular accident (CVA), unspecified mechanism (HCC)  I63.9 434.91   2. Left-sided weakness  R53.1 728.87     Patient Active Problem List   Diagnosis   • Atrial  flutter (HCC)   • Dilated cardiomyopathy (HCC)   • Nonobstructive CAD per cath 10/3/2019   • Anxiety disorder   • Essential hypertension   • Tobacco use   • History of abdominal aortic aneurysm (AAA) repair   • Dyslipidemia, goal LDL below 100   • CVA (cerebral vascular accident) (HCC)     Past Medical History:   Diagnosis Date   • Atrial fibrillation (HCC)    • CHF (congestive heart failure) (HCC)    • COPD (chronic obstructive pulmonary disease) (HCC)    • Coronary artery disease    • Hypertension    • Tobacco abuse      Past Surgical History:   Procedure Laterality Date   • ABDOMINAL AORTIC ANEURYSM REPAIR     • CARDIAC CATHETERIZATION N/A 10/03/2019    Procedure: Left Heart Cath;  Surgeon: Vincent Phillips MD;  Location: [facility] CATH INVASIVE LOCATION;  Service: Cardiology       IMPRESSION:  Mr Galarza presents w/ a mild oral dysphagia w/ trace oral loss noted from L labial edge intermittently and trace oral residue remaining in posterior oral cavity w/ all consistencies. He additionally presents w/ a moderate pharyngeal dysphagia which is inconsistent across trials. He intermittently demonstrates aspiration of thin liquids w/ varying presentation styles. These aspiration events range from mild aspiration, to ayaan gross silent aspiration.     He is noted w/ single incident of aspiration during the swallow of nectar thick liquids via cup presentations w/ large bolus amount. This is unable to be reproduced w/ repeat trials.     Mr Galarza is felt to most benefit from a modified po diet of mechanical soft solids, chopped meats, and NECTAR thck liquids w/ medications whole in puree/nectars. He will benefit from water protocol w/ thin water or ice chips provided between meals and medications.       SLP Recommendation and Plan       Recommendations:   1. Mechanical soft solids, chopped meats, NECTAR thick liquids  2. Meds whole in puree/nectars..   3. Ramiro precautions.   4. Oral care protocol.   5. Water protocol -  Thin water or ice provided between meals and medications.     No further SLP f/u warranted at this time.     D/w pt results and recommendations w/ verbal understanding and agreement.     D/w RN results and recommendations w/ verbal understanding and agreement.     Thank you for allowing me to participate in the care of your patient-  Esme Hernandez MS CCC-SLP           EDUCATION  The patient has been educated in the following areas:   Dysphagia (Swallowing Impairment) Oral Care/Hydration Modified Diet Instruction.              Time Calculation:                Esme Hernandez MS CCC-SLP  9/2/2022

## 2022-09-02 NOTE — CONSULTS
Stroke Consult Note    Patient Name: Zaid Galarza   MRN: [id number]  Age: 71 y.o.  Sex: male  : 1950    Primary Care Physician: Rosi Thacker APRN  Referring Physician:  Provider, No Known      Handedness: Right  Race: White    Chief Complaint/Reason for Consultation: Left-sided weakness and heaviness    Subjective .  HPI: 71-year-old with known diagnosis of hypertension, atrial fibrillation/flutter, congestive heart failure, coronary artery disease, smoking, noncompliant with medication (misses medications once every few days), comes in with 1 day of left arm and leg weakness and heaviness.  Denies any facial symptoms.  He did have some slurred speech, no vision changes.    Last Known Normal Date/Time: 10 PM 2022 EST     Review of Systems   Constitutional: Positive for fatigue.   Respiratory: Positive for shortness of breath (Baseline).    Cardiovascular: Positive for chest pain.   Neurological: Positive for weakness and numbness.   All other systems reviewed and are negative.     Past Medical History:   Diagnosis Date   • Atrial fibrillation (HCC)    • CHF (congestive heart failure) (HCC)    • COPD (chronic obstructive pulmonary disease) ([facility])    • Coronary artery disease    • Hypertension    • Tobacco abuse      Past Surgical History:   Procedure Laterality Date   • ABDOMINAL AORTIC ANEURYSM REPAIR     • CARDIAC CATHETERIZATION N/A 10/03/2019    Procedure: Left Heart Cath;  Surgeon: Vincent Phillips MD;  Location: [facility] CATH INVASIVE LOCATION;  Service: Cardiology     Family History   Problem Relation Age of Onset   • Heart disease Mother    • Stroke Father    • Heart disease Father      Social History     Socioeconomic History   • Marital status: Single   Tobacco Use   • Smoking status: Current Every Day Smoker     Packs/day: 1.00     Types: Cigarettes   • Smokeless tobacco: Never Used   • Tobacco comment: down to 2 a day   Vaping Use   • Vaping Use: Never used   Substance and Sexual  Activity   • Alcohol use: No   • Drug use: No   • Sexual activity: Defer     No Known Allergies  Prior to Admission medications    Medication Sig Start Date End Date Taking? Authorizing Provider   apixaban (ELIQUIS) 5 MG tablet tablet Take 1 tablet by mouth Every 12 (Twelve) Hours. Indications: DVT/PE (active thrombosis) 6/9/22   Christy Malone APRN   aspirin (aspirin) 81 MG EC tablet Take 1 tablet by mouth Daily. 6/9/22   Christy Malone APRN   budesonide-formoterol (SYMBICORT) 160-4.5 MCG/ACT inhaler Inhale 2 puffs by mouth 2 (Two) Times a Day. 10/4/19   Oracio Slater,    dilTIAZem SR (CARDIZEM SR) 120 MG 12 hr capsule Take 1 capsule by mouth 2 (Two) Times a Day. 6/9/22   Chrsity Malone APRN   ezetimibe (ZETIA) 10 MG tablet Take 1 tablet by mouth Daily. 6/9/22   Christy Malone APRN   furosemide (Lasix) 20 MG tablet Take 1 tablet by mouth Daily. 6/9/22   Christy Malone APRN   metoprolol succinate XL (TOPROL-XL) 25 MG 24 hr tablet Take 0.5 tablets by mouth Daily. 6/9/22   Christy Malone APRN   nicotine (NICODERM CQ) 21 MG/24HR patch Place 1 patch on the skin as directed by provider Daily.    ProviderIdalmis MD   potassium chloride 10 MEQ CR tablet Take 10 mEq by mouth 2 (Two) Times a Day.    Idalmis Larry MD   predniSONE (DELTASONE) 10 MG tablet Take 10 mg by mouth Daily.    Idalmis Larry MD   rosuvastatin (CRESTOR) 20 MG tablet Take 1 tablet by mouth Every Night. 6/9/22   Christy Malone APRN   sacubitril-valsartan (ENTRESTO)  MG tablet Take 1 tablet by mouth 2 (Two) Times a Day. 6/9/22   Christy Malone APRN             Objective     Temp:  [98.1 °F (36.7 °C)-98.2 °F (36.8 °C)] 98.1 °F (36.7 °C)  Heart Rate:  [60-80] 64  Resp:  [18-20] 20  BP: (140-167)/() 164/102  Neurological Exam  Mental Status  Awake, alert and oriented to person, place and time. Speech is normal. Language is fluent with no aphasia. Attention and concentration are normal. Fund of  knowledge is appropriate for level of education.    Cranial Nerves  CN II: Visual fields full to confrontation.  CN III, IV, VI: Extraocular movements intact bilaterally. Normal lids and orbits bilaterally. Pupils equal round and reactive to light bilaterally.  CN V: Facial sensation is normal.  CN VII: Full and symmetric facial movement.  CN VIII: Equal hearing bilaterally.  CN IX, X: Palate elevates symmetrically  CN XI: Shoulder shrug strength is normal.  CN XII: Tongue midline without atrophy or fasciculations.    Motor  Normal muscle bulk throughout. No fasciculations present. Strength is 5/5 throughout all four extremities.    Sensory  Equal to light touch bilaterally, but decreased to pinprick in left upper and lower extremity and left face.    Reflexes   not assessed    Coordination  No dysmetria.    Gait  Not assessed      Physical Exam  Vitals and nursing note reviewed.   Constitutional:       Appearance: Normal appearance.   HENT:      Head: Normocephalic and atraumatic.      Mouth/Throat:      Mouth: Mucous membranes are moist.      Pharynx: Oropharynx is clear.   Cardiovascular:      Rate and Rhythm: Normal rate and regular rhythm.   Pulmonary:      Effort: Pulmonary effort is normal.      Comments: Patient was slightly short of breath, but able to answer questions appropriately.  He did not look like in any distress.  This is his baseline as per the patient.  Musculoskeletal:      Cervical back: Normal range of motion and neck supple.   Neurological:      Mental Status: He is alert.   Psychiatric:         Mood and Affect: Mood normal.         Behavior: Behavior normal.         Acute Stroke Data    IV Thrombolytic (TPA/Tenecteplase) Inclusion / Exclusion Criteria    Time: 21:17 EDT  Person Administering Scale: Jared Cristina MD    Inclusion Criteria  [x]   18 years of age or greater   []   Onset of symptoms < 4.5 hours before beginning treatment (stroke onset = time patient was last seen well or  without symptoms).   []   Diagnosis of acute ischemic stroke causing measurable disabling deficit (Complete Hemianopia, Any Aphasia, Visual or Sensory Extinction, Any weakness limiting sustained effort against gravity)   []   Any remaining deficit considered potentially disabling in view of patient and practitioner   Exclusion criteria (Do not proceed with Alteplase if any are checked under exclusion criteria)  [x]   Onset unknown or GREATER than 4.5 hours   []   ICH on CT/MRI   []   CT demonstrates hypodensity representing acute or subacute infarct   []   Significant head trauma or prior stroke in the previous 3 months   []   Symptoms suggestive of subarachnoid hemorrhage   []   History of un-ruptured intracranial aneurysm GREATER than 10 mm   []   Recent intracranial or intraspinal surgery within the last 3 months   []   Arterial puncture at a non-compressible site in the previous 7 days   []   Active internal bleeding   []   Acute bleeding tendency   []   Platelet count LESS than 100,000 for known hematological diseases such as leukemia, thrombocytopenia or chronic cirrhosis   []   Current use of anticoagulant with INR GREATER than 1.7 or PT GREATER than 15 seconds, aPTT GREATER than 40 seconds   []   Heparin received within 48 hours, resulting in abnormally elevated aPTT GREATER than upper limit of normal   []   Current use of direct thrombin inhibitors or direct factor Xa inhibitors in the past 48 hours   []   Elevated blood pressure refractory to treatment (systolic GREATER than 185 mm/Hg or diastolic  GREATER than 110 mm/Hg   []   Suspected infective endocarditis and aortic arch dissection   []   Current use of therapeutic treatment dose of low-molecular-weight heparin (LMWH) within the previous 24 hours   []   Structural GI malignancy or bleed   Relative exclusion for all patients  [x]   Only minor nondisabling symptoms   []   Pregnancy   []   Seizure at onset with postictal residual neurological impairments    []   Major surgery or previous trauma within past 14 days   []   History of previous spontaneous ICH, intracranial neoplasm, or AV malformation   []   Postpartum (within previous 14 days)   []   Recent GI or urinary tract hemorrhage (within previous 21 days)   []   Recent acute MI (within previous 3 months)   []   History of unruptured intracranial aneurysm LESS than 10 mm   []   History of ruptured intracranial aneurysm   []   Blood glucose LESS than 50 mg/dL (2.7 mmol/L)   []   Dural puncture within the last 7 days   []   Known GREATER than 10 cerebral microbleeds   Additional exclusions for patients with symptoms onset between 3 and 4.5 hours.  []   Age > 80.   []   On any anticoagulants regardless of INR  >>> Warfarin (Coumadin), Heparin, Enoxaparin (Lovenox), fondaparinux (Arixtra), bivalirudin (Angiomax), Argatroban, dabigatran (Pradaxa), rivaroxaban (Xarelto), or apixaban (Eliquis)   []   Severe stroke (NIHSS > 25).   []   History of BOTH diabetes and previous ischemic stroke.   []   The risks and benefits have been discussed with the patient or family related to the administration of IV alteplase for stroke symptoms.   []   I have discussed and reviewed the patient's case and imaging with the attending prior to IV Thrombolytic (TPA/Tenecteplase).    Time Thrombolytic administered       Hospital Meds:  Scheduled- [START ON 9/2/2022] aspirin, 81 mg, Oral, Daily   Or  [START ON 9/2/2022] aspirin, 300 mg, Rectal, Daily  atorvastatin, 80 mg, Oral, Nightly  [START ON 9/2/2022] nicotine, 1 patch, Transdermal, Q24H  sodium chloride, 10 mL, Intravenous, Q12H      Infusions-     PRNs- sodium chloride  •  sodium chloride    Functional Status Prior to Current Stroke/[place] Score: 0    NIH Stroke Scale  Time: 21:17 EDT  Person Administering Scale: Jared Cristina MD    1a  Level of consciousness: 0=alert; keenly responsive   1b. LOC questions:  0=Performs both tasks correctly   1c. LOC commands: 0=Performs both tasks  correctly   2.  Best Gaze: 0=normal   3.  Visual: 0=No visual loss   4. Facial Palsy: 0=Normal symmetric movement   5a.  Motor left arm: 0=No drift, limb holds 90 (or 45) degrees for full 10 seconds   5b.  Motor right arm: 0=No drift, limb holds 90 (or 45) degrees for full 10 seconds   6a. motor left le=No drift, limb holds 90 (or 45) degrees for full 10 seconds   6b  Motor right le=No drift, limb holds 90 (or 45) degrees for full 10 seconds   7. Limb Ataxia: 0=Absent   8.  Sensory: 1=Mild to moderate sensory loss; patient feels pinprick is less sharp or is dull on the affected side; there is a loss of superficial pain with pinprick but patient is aware She is being touched   9. Best Language:  0=No aphasia, normal   10. Dysarthria: 1=Mild to moderate, patient slurs at least some words and at worst, can be understood with some difficulty   11. Extinction and Inattention: 0=No abnormality    Total:   2       Results Reviewed:  I have personally reviewed current lab, radiology, and data and agree with results.    Results for orders placed during the hospital encounter of 20    Adult Transthoracic Echo Limited W/ Cont if Necessary Per Protocol    Interpretation Summary  · Normal left ventricular cavity size noted. All left ventricular wall segments contract normally.  · Left ventricular wall thickness is consistent with mild concentric hypertrophy.  · Estimated EF appears to be in the range of 56 - 60%.  · The pericardium is normal. There is no evidence of pericardial effusion.  · Comments: LV systolic function seems to have improved since 2020 with LV ejection fraction improved from reported 35 to 40% then to about 55 to 60% now.            Assessment/Plan:      1. Subcortical stroke, lacunar stroke.  Patient likely had a small lacunar stroke in the right subcortical region.  Patient was given aspirin and Plavix in the ER.  Recommend getting MRI brain without contrast.  Plan to continue his  home medication with Eliquis 5 mg twice daily and aspirin 81 mg daily and Lipitor 80 mg daily for secondary stroke prevention.  Most important measure would be compliance by the patient.  2. Paroxysmal atrial fibrillation.  Rate and rhythm control.  Continue home medications.  Continue anticoagulation with Eliquis.  3. Congestive heart failure, unspecified.  Okay to continue his Entresto.  4. Essential hypertension.  Normal blood pressure goals.  5. Smoking greater than 40 pack years.  Encouraged to quit smoking.  6. Noncompliance with medications.  Discussed importance of taking his medications regularly.  7. Increase activity as tolerated.    Case was discussed with patient, nursing and will talk to the primary team.  Thank you for the consult.          Jared Cristina MD  September 1, 2022  21:17 EDT    Verbal consent taken.  Patient agreeable to be seen via telemedicine.    This was an audio and video enabled telemedicine encounter.

## 2022-09-02 NOTE — CASE MANAGEMENT/SOCIAL WORK
Discharge Planning Assessment   Joey     Patient Name: Zaid Galarza  MRN: [id number]  Today's Date: 9/2/2022    Admit Date: 9/1/2022     Discharge Needs Assessment     Row Name 09/02/22 1656       Living Environment    People in Home alone    Current Living Arrangements home    Primary Care Provided by self  Sisters bring him a few meals a week.    Provides Primary Care For no one    Family Caregiver if Needed sibling(s)    Family Caregiver Names Ranjana Enriquezrashmi (sister) & Susan Chaparro (sister)    Quality of Family Relationships helpful;involved;supportive       Discharge Needs Assessment    Equipment Currently Used at Home none    Concerns to be Addressed discharge planning    Discharge Facility/Level of Care Needs rehabilitation facility;nursing facility, skilled  Family request SNF/ IPR at discharge.               Discharge Plan     Row Name 09/02/22 1659       Plan    Plan Pt was admitted on 09/01/22. SS received Physician consult for stroke.  attempted to speak with Pt but was not successful. SS spoke with Pt's sister, Ranjana [phone]. Pt lives alone. Pt does not utilize home health or [facility] services. Pt's sister requests Pt go to  IPR or short term SNF placement for short term rehab. SS to follow.                  [facility] [place]onW

## 2022-09-02 NOTE — PLAN OF CARE
Goal Outcome Evaluation:  Patient is resting in bed. Patient had a swallow evaluation this shift. Patient is now on a soft, texture chopped nectar/ syrup thick liquid diet. Medications in puree and nectar thick liquids. Patient had an Echo this shift. Patient had a US carotid bilateral this shift. Patient had an MRI brain without contrast this shift. Patient has tolerated all interventions. No complaints or concerns at this time. No signs of acute distress noted. Will continue to follow plan of care.

## 2022-09-02 NOTE — PLAN OF CARE
Goal Outcome Evaluation:              Outcome Evaluation: (P) Pt evaluated on this date by SPT and PT. He was pleasant and cooperative with therapy. His strength was assessed while supine in bed, then pt. ambulated in room & hallway roughly 60'. He was mod(I) for supine to sit, CGA for sit<>stand, CGA/Jana for ambulation. He demonstrated a narrow base of support during ambulation and required a rest break after roughly 30'. Pt also demonstrated one instance of toe drag with LLE while ambulating in hallway. D/c recommendation IPR, SNF, extended care. Home w/ home health, home w/ supervision would be appropriate for this pt, however, at this time he is a fall risk and does not have anyone at home who could assist if needed.

## 2022-09-02 NOTE — PLAN OF CARE
Goal Outcome Evaluation:  Plan of Care Reviewed With: patient        Progress: no change  Outcome Evaluation: Patient with audible rhonchi when admitted. Family expressed need for patient to eat and drink. educated family and patient about need for dysphagia screen before setting a diet and risk of aspiration pneumonia r/t swallow weakness w/stroke. Patient Failed Dysphagia screen once testing with sip of water patient began to cough and clear throat shortly after. SLP consult ordered and GIN marie notified. Patient then began to have difficulty with 02 Sat probe, new probe placed on ear and patient placed on 2 L NC. titrating down during shift. at this time patient also began to complain of chest tightness. Stat EKG and Troponin ordered and provider notified. Assisted neurologist in examining patient via telehealth. patient also experiencing pauses during shift with longest being greater than 3 seconds. GIN Marie notified once aagin by Charge nurse Shwetha.

## 2022-09-02 NOTE — PROGRESS NOTES
Stroke Progress Note       Chief Complaint: Left-sided weakness and heaviness    Subjective    Subjective     Subjective:  No acute issues overnight.  Patient still feels slightly heavy on the left side.    Review of Systems   Neurological: Positive for numbness.   All other systems reviewed and are negative.           Objective    Objective      Temp:  [97.6 °F (36.4 °C)-98.2 °F (36.8 °C)] 97.6 °F (36.4 °C)  Heart Rate:  [56-80] 56  Resp:  [16-20] 16  BP: (103-167)/() 103/61    Neurological Exam  Mental Status  Awake, alert and oriented to person, place and time. Speech is normal. Language is fluent with no aphasia. Attention and concentration are normal. Fund of knowledge is appropriate for level of education.     Cranial Nerves  CN II: Visual fields full to confrontation.  CN III, IV, VI: Extraocular movements intact bilaterally. Normal lids and orbits bilaterally. Pupils equal round and reactive to light bilaterally.  CN V: Facial sensation is normal.  CN VII: Full and symmetric facial movement.  CN VIII: Equal hearing bilaterally.  CN IX, X: Palate elevates symmetrically  CN XI: Shoulder shrug strength is normal.  CN XII: Tongue midline without atrophy or fasciculations.     Motor  Normal muscle bulk throughout. No fasciculations present. Strength is 5/5 throughout all four extremities.     Sensory  Equal to light touch bilaterally, but decreased to pinprick in left upper and lower extremity and left face.     Reflexes   not assessed     Coordination  No dysmetria.     Gait  Not assessed        Physical Exam  Vitals and nursing note reviewed.   Constitutional:       Appearance: Normal appearance.   HENT:      Head: Normocephalic and atraumatic.      Mouth/Throat:      Mouth: Mucous membranes are moist.      Pharynx: Oropharynx is clear.   Cardiovascular:      Rate and Rhythm: Normal rate and regular rhythm.   Pulmonary:      Effort: Pulmonary effort is normal.      Comments: Patient was slightly short of  breath, but able to answer questions appropriately.  He did not look like in any distress.  This is his baseline as per the patient.  Musculoskeletal:      Cervical back: Normal range of motion and neck supple.   Neurological:      Mental Status: He is alert.   Psychiatric:         Mood and Affect: Mood normal.         Behavior: Behavior normal.     Results Review:    No new intracranial imaging.  His A1c is 6, LDL 55, total cholesterol 104 and triglyceride of 89.    Results for orders placed during the hospital encounter of 05/06/20    Adult Transthoracic Echo Limited W/ Cont if Necessary Per Protocol    Interpretation Summary  · Normal left ventricular cavity size noted. All left ventricular wall segments contract normally.  · Left ventricular wall thickness is consistent with mild concentric hypertrophy.  · Estimated EF appears to be in the range of 56 - 60%.  · The pericardium is normal. There is no evidence of pericardial effusion.  · Comments: LV systolic function seems to have improved since February 2020 with LV ejection fraction improved from reported 35 to 40% then to about 55 to 60% now.            Assessment/Plan     Assessment/Plan:  71-year-old with known diagnosis of hypertension, atrial fibrillation, CHF, CAD, smoking, noncompliant with medication with 1 day of left arm and leg weakness and heaviness      1. Likely lacunar stroke in the subcortical region in the right hemisphere.  Continue him on Eliquis 5 mg twice daily, aspirin 81 mg and Lipitor 40 mg daily for secondary stroke prevention.  Encourage compliance with medications.  Follow-up on MRI brain.  2. Paroxysmal atrial fibrillation.  Continue anticoagulation with Eliquis.  3. Congestive heart failure, unspecified.  2D echocardiogram.  Continue Entresto.  4. Smoking greater than 40 pack years.  Encouraged to quit smoking.  5. Noncompliance with medications.  Encourage compliance with medicines.  6. Essential hypertension.  Normal blood  pressure goals.  7. Increase activity with PT/OT.  Encourage sitting in the chair.    Case was discussed with patient, nursing and will talk to the primary team.  If cleared by therapy, okay to discharge him home with outpatient follow-up.  Thank you for the consult.          Jared Cristina MD  09/02/22  10:03 EDT    This was an audio and video enabled telemedicine encounter.

## 2022-09-02 NOTE — CONSULTS
Diabetes Education    Patient Name:  Zaid Galarza  YOB: 1950  MRN: [id number]  Admit Date:  9/1/2022        Per stroke protocol, blood glucose WNL and no history of diabetes. If any questions or concerns please re-consult or call. Thank you      Electronically signed by:  Christina Johnson RN  09/02/22 08:16 EDT

## 2022-09-03 PROBLEM — I63.9 CEREBROVASCULAR ACCIDENT (CVA), UNSPECIFIED MECHANISM: Status: ACTIVE | Noted: 2022-09-03

## 2022-09-03 LAB
GLUCOSE BLDC GLUCOMTR-MCNC: 107 MG/DL (ref 70–130)
GLUCOSE BLDC GLUCOMTR-MCNC: 123 MG/DL (ref 70–130)
GLUCOSE BLDC GLUCOMTR-MCNC: 131 MG/DL (ref 70–130)
GLUCOSE BLDC GLUCOMTR-MCNC: 159 MG/DL (ref 70–130)

## 2022-09-03 PROCEDURE — 99232 SBSQ HOSP IP/OBS MODERATE 35: CPT | Performed by: INTERNAL MEDICINE

## 2022-09-03 PROCEDURE — 82962 GLUCOSE BLOOD TEST: CPT

## 2022-09-03 PROCEDURE — 63710000001 PREDNISONE PER 5 MG: Performed by: INTERNAL MEDICINE

## 2022-09-03 PROCEDURE — 99214 OFFICE O/P EST MOD 30 MIN: CPT | Performed by: PSYCHIATRY & NEUROLOGY

## 2022-09-03 PROCEDURE — 99232 SBSQ HOSP IP/OBS MODERATE 35: CPT | Performed by: NURSE PRACTITIONER

## 2022-09-03 PROCEDURE — 92526 ORAL FUNCTION THERAPY: CPT | Performed by: SPEECH-LANGUAGE PATHOLOGIST

## 2022-09-03 PROCEDURE — 92523 SPEECH SOUND LANG COMPREHEN: CPT | Performed by: SPEECH-LANGUAGE PATHOLOGIST

## 2022-09-03 RX ADMIN — ATORVASTATIN CALCIUM 80 MG: 40 TABLET, FILM COATED ORAL at 19:56

## 2022-09-03 RX ADMIN — SERTRALINE 100 MG: 50 TABLET, FILM COATED ORAL at 08:46

## 2022-09-03 RX ADMIN — ASPIRIN 81 MG: 81 TABLET, CHEWABLE ORAL at 08:46

## 2022-09-03 RX ADMIN — APIXABAN 5 MG: 5 TABLET, FILM COATED ORAL at 08:45

## 2022-09-03 RX ADMIN — SACUBITRIL AND VALSARTAN 1 TABLET: 97; 103 TABLET, FILM COATED ORAL at 08:45

## 2022-09-03 RX ADMIN — Medication 10 ML: at 19:57

## 2022-09-03 RX ADMIN — SACUBITRIL AND VALSARTAN 1 TABLET: 97; 103 TABLET, FILM COATED ORAL at 19:56

## 2022-09-03 RX ADMIN — FUROSEMIDE 20 MG: 20 TABLET ORAL at 08:45

## 2022-09-03 RX ADMIN — POTASSIUM CHLORIDE 10 MEQ: 750 TABLET, FILM COATED, EXTENDED RELEASE ORAL at 08:45

## 2022-09-03 RX ADMIN — PREDNISONE 10 MG: 10 TABLET ORAL at 08:46

## 2022-09-03 RX ADMIN — Medication 10 ML: at 08:46

## 2022-09-03 RX ADMIN — APIXABAN 5 MG: 5 TABLET, FILM COATED ORAL at 19:56

## 2022-09-03 RX ADMIN — NICOTINE 1 PATCH: 14 PATCH, EXTENDED RELEASE TRANSDERMAL at 08:47

## 2022-09-03 NOTE — THERAPY TREATMENT NOTE
Acute Care - Speech Language Pathology   Swallow Treatment Note [facility]     Patient Name: Zaid Galarza  : 1950  MRN: [id number]  Today's Date: 9/3/2022  Onset of Illness/Injury or Date of Surgery: (P) 22 (admission date)            Admit Date: 2022    Visit Dx:     ICD-10-CM ICD-9-CM   1. Cerebrovascular accident (CVA), unspecified mechanism (HCC)  I63.9 434.91   2. Left-sided weakness  R53.1 728.87     Patient Active Problem List   Diagnosis   • Atrial flutter (HCC)   • Dilated cardiomyopathy (HCC)   • Nonobstructive CAD per cath 10/3/2019   • Anxiety disorder   • Essential hypertension   • Tobacco use   • History of abdominal aortic aneurysm (AAA) repair   • Dyslipidemia, goal LDL below 100   • CVA (cerebral vascular accident) (HCC)     Past Medical History:   Diagnosis Date   • Atrial fibrillation (HCC)    • CHF (congestive heart failure) (HCC)    • COPD (chronic obstructive pulmonary disease) (HCC)    • Coronary artery disease    • Hypertension    • Tobacco abuse      Past Surgical History:   Procedure Laterality Date   • ABDOMINAL AORTIC ANEURYSM REPAIR     • CARDIAC CATHETERIZATION N/A 10/03/2019    Procedure: Left Heart Cath;  Surgeon: Vincent Phillips MD;  Location: [facility] INVASIVE LOCATION;  Service: Cardiology       SLP Recommendation and Plan  Mr Galarza is seen this am at bedside on 3S-323. He is s/p MBSS 22 w/ recommendation for mechanical soft, chopped consistencies and NECTAR thick liquids.     Mr. Galarza accepts nectar thick apple juice this am via straw and cup presentations. He consumes approx 1 ounces w/ x1 cough response however felt likely s/t large bolus via straw. No other s/s concerning for overt aspiration or swallow difficulties.       EDUCATION  The patient has been educated in the following areas:   Dysphagia (Swallowing Impairment) Oral Care/Hydration Modified Diet Instruction.       SLP Recommendation and Plan  Recommendations:   1. Mechanical soft  solids, chopped meats, NECTAR thick liquids  2. Meds whole in puree/nectar.   3. Ramiro precautions.   4. Oral care protocol.   5. Water protocol - Thin water or ice provided between meals and medications.      Ffurther SLP f/u warranted at this time for diet tolerance/upgrade as warranted.      D/w pt results and recommendations w/ verbal understanding and agreement.        Thank you for allowing me to participate in the care of your patient-    Danilo Miguel M.A.,SHIVAM-SLP        9/3/2022    [place] License Number: [id number]  [employer] Licence Number: [id number]     Electronically Signed        Time Calculation:    Time Calculation- SLP     Row Name 09/03/22 1106             Time Calculation- SLP    SLP - Next Appointment 09/05/22  -GINA            User Key  (r) = Recorded By, (t) = Taken By, (c) = Cosigned By    Initials Name Provider Type    Rodri Liu MA,CCC-SLP Speech and Language Pathologist                Therapy Charges for Today     Code Description Service Date Service Provider Modifiers Qty    [id number]  ST EVAL SPEECH AND PROD W LANG  1 9/3/2022 Rodri Miguel MA,CCC-SLP GN 1    [id number]  ST TREATMENT SWALLOW 1 9/3/2022 Rodri Miguel MA,CCC-SLP GN 1               Rodri Miguel MA,SHIVAM-SLP  9/3/2022

## 2022-09-03 NOTE — PLAN OF CARE
Goal Outcome Evaluation:   Patient is resting in bed. Patient has tolerated all interventions. No complaints or concerns at this time. No signs of acute distress noted. Will continue to follow plan of care.

## 2022-09-03 NOTE — PROGRESS NOTES
Stroke Progress Note       Chief Complaint: Left-sided weakness and heaviness    Subjective    Subjective     Subjective:  No acute issues overnight.  Patient still feels slightly heavy on the left side, but better than yesterday.    Review of Systems   Neurological: Positive for numbness.   All other systems reviewed and are negative.           Objective    Objective      Temp:  [97.9 °F (36.6 °C)-98.2 °F (36.8 °C)] 98 °F (36.7 °C)  Heart Rate:  [60-87] 79  Resp:  [18] 18  BP: (114-150)/(74-97) 122/81    Neurological Exam  Mental Status  Awake, alert and oriented to person, place and time. Speech is normal. Language is fluent with no aphasia. Attention and concentration are normal. Fund of knowledge is appropriate for level of education.     Cranial Nerves  CN II: Visual fields full to confrontation.  CN III, IV, VI: Extraocular movements intact bilaterally. Normal lids and orbits bilaterally. Pupils equal round and reactive to light bilaterally.  CN V: Facial sensation is normal.  CN VII: Full and symmetric facial movement.  CN VIII: Equal hearing bilaterally.  CN IX, X: Palate elevates symmetrically  CN XI: Shoulder shrug strength is normal.  CN XII: Tongue midline without atrophy or fasciculations.     Motor  Normal muscle bulk throughout. No fasciculations present. Strength is 5/5 throughout all four extremities.     Sensory  Equal to light touch bilaterally, but subjectively feels heavy on left arm and leg     Reflexes   not assessed     Coordination  No dysmetria.     Gait  Not assessed        Physical Exam  Vitals and nursing note reviewed.   Constitutional:       Appearance: Normal appearance.   HENT:      Head: Normocephalic and atraumatic.      Mouth/Throat:      Mouth: Mucous membranes are moist.      Pharynx: Oropharynx is clear.   Cardiovascular:      Rate and Rhythm: Normal rate and regular rhythm.   Pulmonary:      Effort: Pulmonary effort is normal.      Comments: Patient was slightly short of  breath, but able to answer questions appropriately.  He did not look like in any distress.  This is his baseline as per the patient.  Musculoskeletal:      Cervical back: Normal range of motion and neck supple.   Neurological:      Mental Status: He is alert.   Psychiatric:         Mood and Affect: Mood normal.         Behavior: Behavior normal.     Results Review:    MRI brain shows right subcortical stroke in the basal ganglia region.  No hemorrhage, mild white matter disease.   His A1c is 6, LDL 55, total cholesterol 104 and triglyceride of 89.    Results for orders placed during the hospital encounter of 09/01/22    Adult Transthoracic Echo Complete W/ Cont if Necessary Per Protocol (With Agitated Saline)    Interpretation Summary  · Estimated left ventricular EF = 55% Left ventricular ejection fraction appears to be 51 - 55%. Left ventricular systolic function is normal.  · Left ventricular diastolic function was normal.  · No significant mitral or aortic valve regurgitation. Moderate sclerosis of both is noted.  · There is anterior pericardial fat pad but no effusion  · Left atrium is mildly enlarged  · Since prev study of 9/29/19 EF has increased from 30% to 55% and mitral, aortic and tricuspid regurgitation is no longer seen.            Assessment/Plan     Assessment/Plan:  71-year-old with known diagnosis of hypertension, atrial fibrillation, CHF, CAD, smoking, noncompliant with medication with 1 day of left arm and leg weakness and heaviness      1. Lacunar stroke in the subcortical region in the right hemisphere.  Continue him on Eliquis 5 mg twice daily, aspirin 81 mg and Lipitor 40 mg daily for secondary stroke prevention.  Encourage compliance with medications.   2. Paroxysmal atrial fibrillation.  Continue anticoagulation with Eliquis.  3. Congestive heart failure, unspecified.  2D echocardiogram shows his EF much improved from 2019.  Follow-up with cardiology and continue Entresto for  now.  4. Smoking greater than 40 pack years.  Encouraged to quit smoking.  5. Noncompliance with medications.  Encourage compliance with medicines.  6. Essential hypertension.  Normal blood pressure goals.  7. PT recommended inpatient rehab versus SNF, as patient is not safe to go home by himself.  Although patient would like to go home.  We will have PT reassess him.    Case was discussed with patient, nursing and will talk to the primary team.  If cleared by therapy, okay to discharge him home with outpatient follow-up.  We will sign off for now, please call us with questions.  Thank you for the consult.    Part of the note was copied and pasted but thoroughly reviewed for its entirety.      Jared Cristina MD  09/03/22  09:20 EDT    This was an audio and video enabled telemedicine encounter.

## 2022-09-03 NOTE — PROGRESS NOTES
[facility] HOSPITALIST PROGRESS NOTE    Subjective     History:   Zaid Galarza is a 71 y.o. male admitted on 9/1/2022 secondary to <principal problem not specified>     Procedures: None    CC: Follow up CVA, Afib    Patient seen and examined with AYAN Rabago. Awake and alert with siblings present at bedside.  Reports improvement in his dysarthria and left-sided weakness. No reported CP, dyspnea or palpitations. Denies dizziness. No reported nausea or vomiting. Continues to have episodes of bradycardia with pauses on telemetry. No acute events overnight per RN.     History taken from: patient, chart, and RN.      Objective     Vital Signs  Temp:  [97.9 °F (36.6 °C)-98.3 °F (36.8 °C)] 98.3 °F (36.8 °C)  Heart Rate:  [60-85] 83  Resp:  [18] 18  BP: (104-140)/(74-93) 118/74    Intake/Output Summary (Last 24 hours) at 9/3/2022 1716  Last data filed at 9/3/2022 0200  Gross per 24 hour   Intake 60 ml   Output --   Net 60 ml         Physical Exam:  General:    Awake, alert, in no acute distress    Heart:      Normal S1 and S2. Irregularly irregular. No significant murmur appreciated.    Lungs:     Respirations regular, even and unlabored. Lungs clear to auscultation B/L. No wheezes, rales or rhonchi.   Abdomen:   Soft and nontender. No guarding, rebound tenderness or  organomegaly noted. Bowel sounds present x 4.   Extremities:  No clubbing, cyanosis or edema noted. LUE weakness resolved.      Results Review:    Results from last 7 days   Lab Units 09/02/22  0534 09/01/22  1643   WBC 10*3/mm3 7.78 10.07   HEMOGLOBIN g/dL 14.4 14.6   PLATELETS 10*3/mm3 141 164     Results from last 7 days   Lab Units 09/02/22  0534 09/01/22  1643 09/01/22  1642   SODIUM mmol/L 136 135*  --    POTASSIUM mmol/L 4.0 4.0  --    CHLORIDE mmol/L 104 101  --    CO2 mmol/L 22.2 25.6  --    BUN mg/dL 9 11  --    CREATININE mg/dL 1.02 1.21 1.20   CALCIUM mg/dL 8.5* 8.9  --    GLUCOSE mg/dL 86 120*  --      Results from last 7 days    Lab Units 09/02/22  0534 09/01/22  1643   BILIRUBIN mg/dL 0.7 0.6   ALK PHOS U/L 109 121*   AST (SGOT) U/L 14 11   ALT (SGPT) U/L 5 6         Results from last 7 days   Lab Units 09/01/22  1643   INR  1.12*     Results from last 7 days   Lab Units 09/01/22  2103   TROPONIN T ng/mL <0.010       Imaging Results (Last 24 Hours)     ** No results found for the last 24 hours. **            Medications:  apixaban, 5 mg, Oral, Q12H  aspirin, 81 mg, Oral, Daily   Or  aspirin, 300 mg, Rectal, Daily  atorvastatin, 80 mg, Oral, Nightly  furosemide, 20 mg, Oral, Daily  nicotine, 1 patch, Transdermal, Q24H  potassium chloride, 10 mEq, Oral, Daily  predniSONE, 10 mg, Oral, Daily  sacubitril-valsartan, 1 tablet, Oral, BID  sertraline, 100 mg, Oral, Daily  sodium chloride, 10 mL, Intravenous, Q12H               Assessment & Plan   Left-sided weakness with concern for CVA: CT head revealed cerebral atrophy with no acute abnormalities noted. CTA head was negative for large vessel occlusion. CTA neck revealed multifocal plaque with no evidence of large vessel occlusion. MRI obtained revealing focus of recent ischemia in the right basal ganglia/internal capsule region in addition to chronic microangiopathic change in the periventricular and parietal white matter. No acute findings on carotid US.  Echo reveals an EF of 51-55%, normal diastolic function and mildly enlarged left atrium. Pt admits to missing doses of his home Eliquis. Received ASA, Plavix and statin in the ED. Cont ASA and statin. PT/OT and SLP consulted with therapy recommending possible inpatient rehab. Monitor on telemetry. Neurology input appreciated.       Chronic Afib/flutter with episodes of slow ventricular response: Episodes of SVR with >3s pauses noted Holding home Cardizem and metoprolol. Pt denies dizziness but reports episodes of worsening weakness with decreased energy over recent months. If episodes persist after medication washout may require pacemaker  placement. Monitor on telemetry. Cardiology input appreciated.      Chronic combined systolic and diastolic CHF with dilated cardiomyopathy: Echo in 5/20 revealed an EF of 56-60% (previously <30%) and mild LVH. Repeat echo reveals EF of 51-55% as above. Appears compensated at present. Cont home Entresto and Lasix. Holding beta blocker as above. Monitor volume status.      Essential HTN: BP has fluctuated but overall stable. Cont home Entresto and Lasix while holding Cardizem and metoprolol as above. Cont to monitor.      COPD: Appears stable without an acute exacerbation at present.      Tobacco abuse: Cont NRT and encourage smoking cessation.      DVT PPX: Eliquis     Discussed with cardiology.     Disposition: Inpatient rehab referral.        Liang Covarrubias DO  09/03/22  17:16 EDT

## 2022-09-03 NOTE — PROGRESS NOTES
LOS: 0 days     Name: Zaid Galarza  Age/Sex: 71 y.o. male  :  1950        PCP: Rosi Thacker APRN    Active Problems:    CVA (cerebral vascular accident) ([facility])      Admission Information: Zaid Galarza is a 71 y.o. male with a past medical history significant for nonischemic dilated cardiomyopathy with recovered LVEF, nonobstructive coronary artery disease, hypertension, dyslipidemia, COPD, and tobacco use.  Patient presented to the ED on 2022 with complaint of left sided weakness and associated fall.  Patient was admitted for possible CVA.  Cardiology was consulted due to greater than 3-second pauses noted on telemetry.    Chief Complaint: Follow-up 3-second pauses on telemetry    Interval history: Telemetry reviewed.  Patient continued with pauses.  Last night patient had a pause of 3.1 seconds and subsequent pauses have been close to 2 seconds.  Patient continues off of Cardizem and metoprolol.    Discussed patient with hospitalist, Dr. Meeks, and he states that patient is agreeable to physical rehabilitation placement.    Patient was seen and examined.  He denies complaint of dizziness.  He does admit today that he may have missed some of his Eliquis doses.  He also reports that in the past month or so he has noticed decrease in energy and also associated weakness.  He denies any dizziness or syncopal episode.    Subjective         Vital Signs  Vital Signs (last 72 hrs)        0700  09 0659  0700   0659  0700   0659  0700   1224   Most Recent      Temp (°F)   97.6 -  98.2    97.9 -  98.2      98.2     98.2 (36.8)  1051    Heart Rate   56 -  80    60 -  87      85     85  1051    Resp   16 -  20      18      18     18  1051    BP   103/61 -  167/131    114/75 -  150/97      104/93     104/93  1051    SpO2 (%)   94 -  100    97 -  98      97     97  1051        Temp:  [97.9 °F (36.6 °C)-98.2 °F (36.8 °C)] 98.2 °F (36.8  °C)  Heart Rate:  [60-87] 85  Resp:  [18] 18  BP: (104-150)/(75-97) 104/93  Body mass index is 31.53 kg/m².      Intake/Output Summary (Last 24 hours) at 9/3/2022 1224  Last data filed at 9/3/2022 0200  Gross per 24 hour   Intake 60 ml   Output --   Net 60 ml       Vitals reviewed.   Constitutional:       Appearance: Well-developed.   Neck:      Vascular: No carotid bruit or JVD.   Pulmonary:      Effort: Pulmonary effort is normal. No respiratory distress.      Breath sounds: Normal breath sounds. No wheezing. No rales.   Cardiovascular:      Normal rate. Irregular rhythm.      Comments: No lower extremity edema  Skin:     General: Skin is warm and dry.   Neurological:      Mental Status: Alert and oriented to person, place, and time.         Telemetry: A. fib 70s to 80s, with occasional pauses 2 to 3.1 seconds       Results Review:     Results from last 7 days   Lab Units 09/02/22  0534 09/01/22  1643   WBC 10*3/mm3 7.78 10.07   HEMOGLOBIN g/dL 14.4 14.6   PLATELETS 10*3/mm3 141 164     Results from last 7 days   Lab Units 09/02/22  0534 09/01/22  1643 09/01/22  1642   SODIUM mmol/L 136 135*  --    POTASSIUM mmol/L 4.0 4.0  --    CHLORIDE mmol/L 104 101  --    CO2 mmol/L 22.2 25.6  --    BUN mg/dL 9 11  --    CREATININE mg/dL 1.02 1.21 1.20   CALCIUM mg/dL 8.5* 8.9  --    GLUCOSE mg/dL 86 120*  --      Results from last 7 days   Lab Units 09/01/22  2103   TROPONIN T ng/mL <0.010       Results from last 7 days   Lab Units 09/01/22  1643   INR  1.12*       I reviewed the patient's new clinical results.  I reviewed the patient's new imaging results and agree with the interpretation.  I personally viewed and interpreted the patient's EKG/Telemetry data      Medication Review:   apixaban, 5 mg, Oral, Q12H  aspirin, 81 mg, Oral, Daily   Or  aspirin, 300 mg, Rectal, Daily  atorvastatin, 80 mg, Oral, Nightly  furosemide, 20 mg, Oral, Daily  nicotine, 1 patch, Transdermal, Q24H  potassium chloride, 10 mEq, Oral,  Daily  predniSONE, 10 mg, Oral, Daily  sacubitril-valsartan, 1 tablet, Oral, BID  sertraline, 100 mg, Oral, Daily  sodium chloride, 10 mL, Intravenous, Q12H           Assessment:  1. Atrial fibrillation with a slow ventricular rate, several greater than 3-second pauses noted on telemetry.  Patient's home meds include Cardizem and metoprolol.  These have been discontinued.  Patient is chronically anticoagulated on Eliquis  2. Dilated, nonischemic cardiomyopathy with recovered LVEF of 56 to 60% per echocardiogram of 5/6/2020.  Patient is on Entresto.  3. Nonobstructive coronary artery disease, per cardiac cath 10/4/2019.  Patient is on aspirin and atorvastatin  4. Essential hypertension  5. Dyslipidemia  6. Left sided weakness, admitted for possible CVA.  Management per hospitalist and neurology service        Recommendations:  1. Continue to hold calcium channel blocker and beta-blocker.  Monitor the patient for further pauses.  These medications should be washed out of patient's system in the next 24 to 48 hours.  If he continues with pauses he may need pacemaker.  Patient was made aware of this possibility today.  2. Continue Entresto, aspirin and atorvastatin.    I discussed the patients findings and my recommendations with patient and family      Electronically signed by TIFFANIE Cox, 09/03/22, 12:30 PM EDT.

## 2022-09-03 NOTE — THERAPY EVALUATION
Acute Care - Speech Language Pathology Initial Evaluation  [facility]   SPEECH LANGUAGE COGNITIVE EVALUATION         Patient Name: Zaid Galarza  : 1950  MRN: [id number]  Today's Date: 9/3/2022  Onset of Illness/Injury or Date of Surgery: (P) 22 (admission date)            Admit Date: 2022     Visit Dx:    ICD-10-CM ICD-9-CM   1. Cerebrovascular accident (CVA), unspecified mechanism (HCC)  I63.9 434.91   2. Left-sided weakness  R53.1 728.87     Patient Active Problem List   Diagnosis   • Atrial flutter (HCC)   • Dilated cardiomyopathy (HCC)   • Nonobstructive CAD per cath 10/3/2019   • Anxiety disorder   • Essential hypertension   • Tobacco use   • History of abdominal aortic aneurysm (AAA) repair   • Dyslipidemia, goal LDL below 100   • CVA (cerebral vascular accident) (HCC)     Past Medical History:   Diagnosis Date   • Atrial fibrillation (HCC)    • CHF (congestive heart failure) (HCC)    • COPD (chronic obstructive pulmonary disease) (HCC)    • Coronary artery disease    • Hypertension    • Tobacco abuse      Past Surgical History:   Procedure Laterality Date   • ABDOMINAL AORTIC ANEURYSM REPAIR     • CARDIAC CATHETERIZATION N/A 10/03/2019    Procedure: Left Heart Cath;  Surgeon: Vincent Phillips MD;  Location: [facility] INVASIVE LOCATION;  Service: Cardiology       Zaid Galarza  is seen this at bedside on  3323/1P to participate in a formal s/l and cognitive evaluation to assess safety/function in adls. Pt is positioned upright and centered in bed to cooperatively participate. All results and observations of this evaluation are felt to be representative of pt's current functional status.      Social History     Socioeconomic History   • Marital status: Single   Tobacco Use   • Smoking status: Current Every Day Smoker     Packs/day: 1.00     Types: Cigarettes   • Smokeless tobacco: Never Used   • Tobacco comment: down to 2 a day   Vaping Use   • Vaping Use: Never used   Substance and  "Sexual Activity   • Alcohol use: No   • Drug use: No   • Sexual activity: Defer        Diet Orders (active) (From admission, onward)     Start     Ordered    09/02/22 1206  Diet Soft Texture; Chopped; Nectar / Syrup Thick  Diet Effective Now        Comments: Medications whole in puree/nectars    09/02/22 1207 09/02/22 1206  DIET MESSAGE Pt has been NPO. Requests breakfast items please. NECTAR thick liquids.  Once        Comments: Pt has been NPO. Requests breakfast items please. NECTAR thick liquids.    09/02/22 1207                Receptive language skills are intact. Pt is able to id common objects and personal body parts, follow simple and multi-step directives, understand complex \"wh\" questions and participate in conversational exchange w/o difficulties.    Expressive language skills are intact. Pt is able to complete automatic speech tasks, confrontation naming tasks, complete complex oe sentences, respond to complete oe \"wh\" questions, repeat at word/sentence and multiple digit levels, as well as participate in complex conversational exchange. No aphasia, anomia or verbal apraxia evidenced. Minimal dysarthria, however pt reports no changes in expressive speech/language abilities. He was functional for all tasks and intelligible for all known and unknown productions.    Pt is independently oriented to person, place and time. Immediate, STM and LTM are wfl w/ pt recalling recent adls and providing personal information w/o delays. Thought organization, processing and problem solving are wfl w/ pt demonstrating appropriate comparing/contrasting, understanding of idiomatic language, convergent and divergent thinking skills.    Facial/oral structures reveal mild L side facial weakness, however WFL for OROM/VANESSA. Oral mucosa are moist, pink and clean. Secretions are clear, thin and well controlled. OROM/VANESSA is wfl. Speech intensity, clarity and intelligibility are wfl. Minimal dysarthria, however pt reports no " changes in expressive speech/language abilities. He was functional for all tasks and intelligible for all known and unknown productions. Concerns for ill fitting dentures which negatively impacts speech productions.    Pragmatic skills are wfl w/ appropriate eye gaze patterns and visual tracking. Language is appropriate in context w/ topic initiation and maintenance independently. No left neglect evidenced. Humor response is intact w/ appropriate affect.      Impression: WFL speech, language and cognitive skills. Pt reports no changes for speech, language or cognitive functioning skills.     Recommendations: No further formal SLP f/u recommended for s/l and cognitive skills.     D/w pt results and recommendations w/ verbal understanding and agreement.    Thank you for allowing me to participate in the care of your patient-      Danilo Miguel M.A.,SHIVAM-SLP        9/3/2022    [place] License Number: [id number]  [place] Licence Number: [id number]     Electronically Signed         EDUCATION  The patient has been educated in the following areas:     Oral Care/Hydration.        Time Calculation:      Time Calculation- SLP     Row Name 09/03/22 1106             Time Calculation- SLP    SLP - Next Appointment 09/05/22  -GINA            User Key  (r) = Recorded By, (t) = Taken By, (c) = Cosigned By    Initials Name Provider Type    KB Rodri Miguel MA,CCC-SLP Speech and Language Pathologist                Therapy Charges for Today     Code Description Service Date Service Provider Modifiers Qty    69596653290 HC ST EVAL SPEECH AND PROD W LANG  1 9/3/2022 Rodri Miguel MA,CCC-SLP GN 1    [id number] HC ST TREATMENT SWALLOW 1 9/3/2022 Rodri Miguel MA,CCC-SLP GN 1                     Rodri Miguel MA, CCC-SLP  9/3/2022

## 2022-09-04 LAB
ALBUMIN SERPL-MCNC: 3.44 G/DL (ref 3.5–5.2)
ALBUMIN/GLOB SERPL: 1.1 G/DL
ALP SERPL-CCNC: 105 U/L (ref 39–117)
ALT SERPL W P-5'-P-CCNC: 7 U/L (ref 1–41)
ANION GAP SERPL CALCULATED.3IONS-SCNC: 7.9 MMOL/L (ref 5–15)
APTT PPP: 28.7 SECONDS (ref 26.5–34.5)
AST SERPL-CCNC: 17 U/L (ref 1–40)
BASOPHILS # BLD AUTO: 0.06 10*3/MM3 (ref 0–0.2)
BASOPHILS NFR BLD AUTO: 0.6 % (ref 0–1.5)
BILIRUB SERPL-MCNC: 0.6 MG/DL (ref 0–1.2)
BUN SERPL-MCNC: 16 MG/DL (ref 8–23)
BUN/CREAT SERPL: 15 (ref 7–25)
CALCIUM SPEC-SCNC: 8.7 MG/DL (ref 8.6–10.5)
CHLORIDE SERPL-SCNC: 102 MMOL/L (ref 98–107)
CO2 SERPL-SCNC: 26.1 MMOL/L (ref 22–29)
CREAT SERPL-MCNC: 1.07 MG/DL (ref 0.76–1.27)
DEPRECATED RDW RBC AUTO: 44.8 FL (ref 37–54)
EGFRCR SERPLBLD CKD-EPI 2021: 74.7 ML/MIN/1.73
EOSINOPHIL # BLD AUTO: 0.14 10*3/MM3 (ref 0–0.4)
EOSINOPHIL NFR BLD AUTO: 1.3 % (ref 0.3–6.2)
ERYTHROCYTE [DISTWIDTH] IN BLOOD BY AUTOMATED COUNT: 14.1 % (ref 12.3–15.4)
GLOBULIN UR ELPH-MCNC: 3.2 GM/DL
GLUCOSE BLDC GLUCOMTR-MCNC: 119 MG/DL (ref 70–130)
GLUCOSE BLDC GLUCOMTR-MCNC: 119 MG/DL (ref 70–130)
GLUCOSE BLDC GLUCOMTR-MCNC: 146 MG/DL (ref 70–130)
GLUCOSE BLDC GLUCOMTR-MCNC: 95 MG/DL (ref 70–130)
GLUCOSE SERPL-MCNC: 113 MG/DL (ref 65–99)
HCT VFR BLD AUTO: 45.6 % (ref 37.5–51)
HGB BLD-MCNC: 15.2 G/DL (ref 13–17.7)
IMM GRANULOCYTES # BLD AUTO: 0.05 10*3/MM3 (ref 0–0.05)
IMM GRANULOCYTES NFR BLD AUTO: 0.5 % (ref 0–0.5)
INR PPP: 1.28 (ref 0.9–1.1)
LYMPHOCYTES # BLD AUTO: 2.02 10*3/MM3 (ref 0.7–3.1)
LYMPHOCYTES NFR BLD AUTO: 18.9 % (ref 19.6–45.3)
MCH RBC QN AUTO: 29.1 PG (ref 26.6–33)
MCHC RBC AUTO-ENTMCNC: 33.3 G/DL (ref 31.5–35.7)
MCV RBC AUTO: 87.2 FL (ref 79–97)
MONOCYTES # BLD AUTO: 0.75 10*3/MM3 (ref 0.1–0.9)
MONOCYTES NFR BLD AUTO: 7 % (ref 5–12)
NEUTROPHILS NFR BLD AUTO: 7.66 10*3/MM3 (ref 1.7–7)
NEUTROPHILS NFR BLD AUTO: 71.7 % (ref 42.7–76)
NRBC BLD AUTO-RTO: 0 /100 WBC (ref 0–0.2)
PLATELET # BLD AUTO: 143 10*3/MM3 (ref 140–450)
PMV BLD AUTO: 12.1 FL (ref 6–12)
POTASSIUM SERPL-SCNC: 4.2 MMOL/L (ref 3.5–5.2)
PROT SERPL-MCNC: 6.6 G/DL (ref 6–8.5)
PROTHROMBIN TIME: 16.3 SECONDS (ref 12.1–14.7)
RBC # BLD AUTO: 5.23 10*6/MM3 (ref 4.14–5.8)
SODIUM SERPL-SCNC: 136 MMOL/L (ref 136–145)
TSH SERPL DL<=0.05 MIU/L-ACNC: 1.7 UIU/ML (ref 0.27–4.2)
WBC NRBC COR # BLD: 10.68 10*3/MM3 (ref 3.4–10.8)

## 2022-09-04 PROCEDURE — 63710000001 PREDNISONE PER 5 MG: Performed by: INTERNAL MEDICINE

## 2022-09-04 PROCEDURE — 85610 PROTHROMBIN TIME: CPT | Performed by: NURSE PRACTITIONER

## 2022-09-04 PROCEDURE — 82962 GLUCOSE BLOOD TEST: CPT

## 2022-09-04 PROCEDURE — 25010000002 HEPARIN (PORCINE) PER 1000 UNITS: Performed by: NURSE PRACTITIONER

## 2022-09-04 PROCEDURE — 25010000002 HEPARIN (PORCINE) 25000-0.45 UT/250ML-% SOLUTION: Performed by: NURSE PRACTITIONER

## 2022-09-04 PROCEDURE — 85025 COMPLETE CBC W/AUTO DIFF WBC: CPT | Performed by: INTERNAL MEDICINE

## 2022-09-04 PROCEDURE — 99232 SBSQ HOSP IP/OBS MODERATE 35: CPT | Performed by: INTERNAL MEDICINE

## 2022-09-04 PROCEDURE — 80053 COMPREHEN METABOLIC PANEL: CPT | Performed by: INTERNAL MEDICINE

## 2022-09-04 PROCEDURE — 85730 THROMBOPLASTIN TIME PARTIAL: CPT | Performed by: NURSE PRACTITIONER

## 2022-09-04 PROCEDURE — 99232 SBSQ HOSP IP/OBS MODERATE 35: CPT | Performed by: NURSE PRACTITIONER

## 2022-09-04 PROCEDURE — 84443 ASSAY THYROID STIM HORMONE: CPT | Performed by: INTERNAL MEDICINE

## 2022-09-04 RX ORDER — HEPARIN SODIUM 5000 [USP'U]/ML
5000 INJECTION, SOLUTION INTRAVENOUS; SUBCUTANEOUS AS NEEDED
Status: DISCONTINUED | OUTPATIENT
Start: 2022-09-04 | End: 2022-09-07

## 2022-09-04 RX ORDER — HEPARIN SODIUM 5000 [USP'U]/ML
2500 INJECTION, SOLUTION INTRAVENOUS; SUBCUTANEOUS AS NEEDED
Status: DISCONTINUED | OUTPATIENT
Start: 2022-09-04 | End: 2022-09-07

## 2022-09-04 RX ORDER — CHOLECALCIFEROL (VITAMIN D3) 125 MCG
5 CAPSULE ORAL NIGHTLY PRN
Status: DISCONTINUED | OUTPATIENT
Start: 2022-09-04 | End: 2022-09-07 | Stop reason: HOSPADM

## 2022-09-04 RX ORDER — HEPARIN SODIUM 10000 [USP'U]/100ML
9.26 INJECTION, SOLUTION INTRAVENOUS
Status: DISCONTINUED | OUTPATIENT
Start: 2022-09-04 | End: 2022-09-06

## 2022-09-04 RX ORDER — HEPARIN SODIUM 5000 [USP'U]/ML
5000 INJECTION, SOLUTION INTRAVENOUS; SUBCUTANEOUS ONCE
Status: COMPLETED | OUTPATIENT
Start: 2022-09-04 | End: 2022-09-04

## 2022-09-04 RX ADMIN — Medication 10 ML: at 20:55

## 2022-09-04 RX ADMIN — ASPIRIN 81 MG: 81 TABLET, CHEWABLE ORAL at 09:30

## 2022-09-04 RX ADMIN — SACUBITRIL AND VALSARTAN 1 TABLET: 97; 103 TABLET, FILM COATED ORAL at 20:56

## 2022-09-04 RX ADMIN — HEPARIN SODIUM 9.26 UNITS/KG/HR: 10000 INJECTION, SOLUTION INTRAVENOUS at 21:06

## 2022-09-04 RX ADMIN — HEPARIN SODIUM 5000 UNITS: 5000 INJECTION INTRAVENOUS; SUBCUTANEOUS at 21:02

## 2022-09-04 RX ADMIN — ATORVASTATIN CALCIUM 80 MG: 40 TABLET, FILM COATED ORAL at 20:55

## 2022-09-04 RX ADMIN — FUROSEMIDE 20 MG: 20 TABLET ORAL at 09:30

## 2022-09-04 RX ADMIN — SACUBITRIL AND VALSARTAN 1 TABLET: 97; 103 TABLET, FILM COATED ORAL at 09:29

## 2022-09-04 RX ADMIN — PREDNISONE 10 MG: 10 TABLET ORAL at 09:30

## 2022-09-04 RX ADMIN — Medication 10 ML: at 09:30

## 2022-09-04 RX ADMIN — POTASSIUM CHLORIDE 10 MEQ: 750 TABLET, FILM COATED, EXTENDED RELEASE ORAL at 09:29

## 2022-09-04 RX ADMIN — APIXABAN 5 MG: 5 TABLET, FILM COATED ORAL at 09:30

## 2022-09-04 RX ADMIN — SERTRALINE 100 MG: 50 TABLET, FILM COATED ORAL at 09:30

## 2022-09-04 RX ADMIN — NICOTINE 1 PATCH: 14 PATCH, EXTENDED RELEASE TRANSDERMAL at 09:29

## 2022-09-04 NOTE — PLAN OF CARE
Goal Outcome Evaluation:  Pt continuing to have pauses via tele throughout the night. Britta WYATT notified. Pt otherwise has no complaints. No acute signs of distress. Will continue to follow plan of care.

## 2022-09-04 NOTE — PLAN OF CARE
Goal Outcome Evaluation: Patient has been very pleasant today. Compliant with all medications and care as ordered. He remains on 2L/NC, saturations maintaining well above 90%. Scuds remains in place as ordered. IV access was lost and regained during shift. He did have a 2.8 sec pause during shift, MD aware. No complaints or concerns at this time. He is currently resting in bed. Will continue with plan of care.

## 2022-09-04 NOTE — PROGRESS NOTES
LOS: 1 day     Name: Zaid Galarza  Age/Sex: 71 y.o. male  :  1950        PCP: Rosi Thacker APRN    Active Problems:    CVA (cerebral vascular accident) (HCC)    Cerebrovascular accident (CVA), unspecified mechanism (HCC)      Admission Information: Zaid Galarza is a 71 y.o. male with a past medical history significant for  nonischemic dilated cardiomyopathy with recovered LVEF, nonobstructive coronary artery disease, hypertension, dyslipidemia, COPD, and tobacco use.  Patient presented to the ED on 2022 with complaint of left sided weakness and associated fall.  Patient was admitted for possible CVA.  Cardiology was consulted due to greater than 3-second pauses noted on telemetry.    Chief Complaint: Follow-up pauses    Interval history: Telemetry reviewed.  Patient has been A. fib in the 60s to 80s.  Longest pause today has been 2.8, however last evening he did have a pause greater than 3 seconds.    Patient was seen and examined.  He denies any complaints.      Subjective     Vital Signs  Vital Signs (last 72 hrs)        0700   0659  0700   0659  0700   0659  0700   1405   Most Recent      Temp (°F) 97.6 -  98.2    97.9 -  98.2    98.2 -  98.4      98.1     98.1 (36.7)  1040    Heart Rate 56 -  80    60 -  87    56 -  85      69     69  1040    Resp 16 -  20      18      18      18     18  1040    /61 -  167/131    114/75 -  150/97    104/93 -  146/94      120/86     120/86  1040    SpO2 (%) 94 -  100    97 -  98    97 -  98      97     97 / 1040        Temp:  [98.1 °F (36.7 °C)-98.4 °F (36.9 °C)] 98.1 °F (36.7 °C)  Heart Rate:  [56-83] 69  Resp:  [18] 18  BP: (113-146)/(64-94) 120/86  Body mass index is 32.14 kg/m².    No intake or output data in the 24 hours ending 22 1417    Vitals reviewed.   Constitutional:       Appearance: Well-developed.   Neck:      Vascular: No carotid bruit or JVD.   Pulmonary:       Effort: Pulmonary effort is normal. No respiratory distress.      Breath sounds: Normal breath sounds. No wheezing. No rales.   Cardiovascular:      Normal rate. Irregular rhythm.      Comments: No lower extremity edema  Skin:     General: Skin is warm and dry.   Neurological:      Mental Status: Alert and oriented to person, place, and time.         Telemetry: Atrial fibrillation 60-80 with multiple  pauses.  Longest pause today has been 2.8 seconds, patient had a 3.0-second pause last night at around midnight.       Results Review:     Results from last 7 days   Lab Units 09/04/22  0838 09/02/22  0534 09/01/22  1643   WBC 10*3/mm3 10.68 7.78 10.07   HEMOGLOBIN g/dL 15.2 14.4 14.6   PLATELETS 10*3/mm3 143 141 164     Results from last 7 days   Lab Units 09/04/22  0838 09/02/22  0534 09/01/22  1643 09/01/22  1642   SODIUM mmol/L 136 136 135*  --    POTASSIUM mmol/L 4.2 4.0 4.0  --    CHLORIDE mmol/L 102 104 101  --    CO2 mmol/L 26.1 22.2 25.6  --    BUN mg/dL 16 9 11  --    CREATININE mg/dL 1.07 1.02 1.21 1.20   CALCIUM mg/dL 8.7 8.5* 8.9  --    GLUCOSE mg/dL 113* 86 120*  --      Results from last 7 days   Lab Units 09/01/22  2103   TROPONIN T ng/mL <0.010       Results from last 7 days   Lab Units 09/01/22  1643   INR  1.12*       I reviewed the patient's new clinical results.  I reviewed the patient's new imaging results and agree with the interpretation.  I personally viewed and interpreted the patient's EKG/Telemetry data      Medication Review:   aspirin, 81 mg, Oral, Daily   Or  aspirin, 300 mg, Rectal, Daily  atorvastatin, 80 mg, Oral, Nightly  furosemide, 20 mg, Oral, Daily  heparin (porcine), 5,000 Units, Intravenous, Once  nicotine, 1 patch, Transdermal, Q24H  potassium chloride, 10 mEq, Oral, Daily  predniSONE, 10 mg, Oral, Daily  sacubitril-valsartan, 1 tablet, Oral, BID  sertraline, 100 mg, Oral, Daily  sodium chloride, 10 mL, Intravenous, Q12H      heparin, 9.26  Units/kg/hr        Assessment:  1. Atrial fibrillation with slow ventricular rate, several greater than 3-second pauses noted on telemetry.  Last 3-second pause was around midnight 9/3/2022.  Patient's home meds including Cardizem and metoprolol have been discontinued.  2. Dilated, nonischemic cardiomyopathy with recovered LVEF of 56 to 60% per echocardiogram of 5/6/2020.  On 9/1/2022 LVEF is around 51 to 55%.  3. Nonobstructive coronary artery disease per cardiac cath of 10/4/2019.  Patient is on aspirin and atorvastatin.  4. Essential hypertension  5. Dyslipidemia  6. Right basal ganglia/internal capsule region CVA per MRI.  Patient with left sided weakness    Recommendations:  1. We will continue to hold calcium channel blocker and beta-blocker.  Since patient has continued to have multiple pauses and patient has not had AV ruben blocking agents for several days we anticipate possible need for pacemaker.  Will DC Eliquis and start heparin this evening just in case we need to proceed with pacemaker on Tuesday.  2. Continue Entresto, aspirin and atorvastatin.    I discussed the patients findings and my recommendations with patient and family      Electronically signed by TIFFANIE Cox, 09/04/22, 2:17 PM EDT.

## 2022-09-04 NOTE — PROGRESS NOTES
[facility] HOSPITALIST PROGRESS NOTE    Subjective     History:   Zaid Galarza is a 71 y.o. male admitted on 9/1/2022 secondary to <principal problem not specified>     Procedures: None    CC: Follow up CVA, Afib    Patient seen and examined with AYAN Dalton. Awake and alert. States he feels overall improved. Reports mild numbness in LUE but improved. States he feels like his dysarthria and LUE weakness have resolved. No reported CP or dyspnea. No reported dizziness. Continues to have episodes of bradycardia with pauses on telemetry. No acute events overnight per RN.     History taken from: patient, chart, and RN.      Objective     Vital Signs  Temp:  [98.1 °F (36.7 °C)-98.4 °F (36.9 °C)] 98.1 °F (36.7 °C)  Heart Rate:  [56-83] 69  Resp:  [18] 18  BP: (113-146)/(64-94) 120/86  No intake or output data in the 24 hours ending 09/04/22 1328      Physical Exam:  General:    Awake, alert, in no acute distress    Heart:      Normal S1 and S2. Irregularly irregular. No significant murmur appreciated.    Lungs:     Respirations regular, even and unlabored. Lungs clear to auscultation B/L. No wheezes, rales or rhonchi.   Abdomen:   Soft and nontender. No guarding, rebound tenderness or  organomegaly noted. Bowel sounds present x 4.   Extremities:  No clubbing, cyanosis or edema noted. Moves bilateral upper and lower extremities equally bilaterally.      Results Review:    Results from last 7 days   Lab Units 09/04/22  0838 09/02/22  0534 09/01/22  1643   WBC 10*3/mm3 10.68 7.78 10.07   HEMOGLOBIN g/dL 15.2 14.4 14.6   PLATELETS 10*3/mm3 143 141 164     Results from last 7 days   Lab Units 09/04/22  0838 09/02/22  0534 09/01/22  1643 09/01/22  1642   SODIUM mmol/L 136 136 135*  --    POTASSIUM mmol/L 4.2 4.0 4.0  --    CHLORIDE mmol/L 102 104 101  --    CO2 mmol/L 26.1 22.2 25.6  --    BUN mg/dL 16 9 11  --    CREATININE mg/dL 1.07 1.02 1.21 1.20   CALCIUM mg/dL 8.7 8.5* 8.9  --    GLUCOSE mg/dL 113* 86  120*  --      Results from last 7 days   Lab Units 09/04/22  0838 09/02/22  0534 09/01/22  1643   BILIRUBIN mg/dL 0.6 0.7 0.6   ALK PHOS U/L 105 109 121*   AST (SGOT) U/L 17 14 11   ALT (SGPT) U/L 7 5 6         Results from last 7 days   Lab Units 09/01/22  1643   INR  1.12*     Results from last 7 days   Lab Units 09/01/22  2103   TROPONIN T ng/mL <0.010       Imaging Results (Last 24 Hours)     ** No results found for the last 24 hours. **            Medications:  apixaban, 5 mg, Oral, Q12H  aspirin, 81 mg, Oral, Daily   Or  aspirin, 300 mg, Rectal, Daily  atorvastatin, 80 mg, Oral, Nightly  furosemide, 20 mg, Oral, Daily  nicotine, 1 patch, Transdermal, Q24H  potassium chloride, 10 mEq, Oral, Daily  predniSONE, 10 mg, Oral, Daily  sacubitril-valsartan, 1 tablet, Oral, BID  sertraline, 100 mg, Oral, Daily  sodium chloride, 10 mL, Intravenous, Q12H               Assessment & Plan   Left-sided weakness with concern for CVA: CT head revealed cerebral atrophy with no acute abnormalities noted. CTA head was negative for large vessel occlusion. CTA neck revealed multifocal plaque with no evidence of large vessel occlusion. MRI obtained revealing focus of recent ischemia in the right basal ganglia/internal capsule region in addition to chronic microangiopathic change in the periventricular and parietal white matter. No acute findings on carotid US.  Echo reveals an EF of 51-55%, normal diastolic function and mildly enlarged left atrium. Pt admits to missing doses of his home Eliquis. Received ASA, Plavix and statin in the ED. Cont ASA and statin. PT/OT and SLP consulted with therapy recommending possible inpatient rehab. Pt now agreeable to rehab placement. Monitor on telemetry. Neurology input appreciated.       Chronic Afib/flutter with episodes of slow ventricular response: Episodes of SVR with >3s pauses noted. Holding home Cardizem and metoprolol but continues to have frequent pauses. Pt denies dizziness but reports  episodes of worsening weakness with decreased energy over recent months. If episodes persist after medication washout may require pacemaker placement. Discussed with cardiology and will plan to hold Eliquis today in the event he should require PPM placement early next week. Monitor on telemetry. Cardiology input appreciated.       Chronic combined systolic and diastolic CHF with dilated cardiomyopathy: Echo in 5/20 revealed an EF of 56-60% (previously <30%) and mild LVH. Repeat echo reveals EF of 51-55% as above. Appears compensated at present. Cont home Entresto and Lasix. Holding beta blocker as above. Monitor volume status.      Essential HTN: BP has fluctuated but overall stable. Cont home Entresto and Lasix while holding Cardizem and metoprolol as above. Cont to monitor.      COPD: Appears stable without an acute exacerbation at present.      Tobacco abuse: Cont NRT and encourage smoking cessation.      DVT PPX: Hold Eliquis as above.     Discussed with cardiology.     Disposition: Inpatient rehab referral. May require PPM placement first pending clinical course.       Liang Covarrubias DO  09/04/22  13:28 EDT

## 2022-09-05 LAB
ALBUMIN SERPL-MCNC: 3.07 G/DL (ref 3.5–5.2)
ALBUMIN/GLOB SERPL: 0.9 G/DL
ALP SERPL-CCNC: 98 U/L (ref 39–117)
ALT SERPL W P-5'-P-CCNC: 9 U/L (ref 1–41)
ANION GAP SERPL CALCULATED.3IONS-SCNC: 9.7 MMOL/L (ref 5–15)
APTT PPP: 46.9 SECONDS (ref 26.5–34.5)
APTT PPP: 62.7 SECONDS (ref 26.5–34.5)
APTT PPP: 66.2 SECONDS (ref 26.5–34.5)
AST SERPL-CCNC: 19 U/L (ref 1–40)
BASOPHILS # BLD AUTO: 0.09 10*3/MM3 (ref 0–0.2)
BASOPHILS NFR BLD AUTO: 0.7 % (ref 0–1.5)
BILIRUB SERPL-MCNC: 0.7 MG/DL (ref 0–1.2)
BUN SERPL-MCNC: 19 MG/DL (ref 8–23)
BUN/CREAT SERPL: 18.3 (ref 7–25)
CALCIUM SPEC-SCNC: 8.7 MG/DL (ref 8.6–10.5)
CHLORIDE SERPL-SCNC: 103 MMOL/L (ref 98–107)
CO2 SERPL-SCNC: 20.3 MMOL/L (ref 22–29)
CREAT SERPL-MCNC: 1.04 MG/DL (ref 0.76–1.27)
DEPRECATED RDW RBC AUTO: 46.5 FL (ref 37–54)
EGFRCR SERPLBLD CKD-EPI 2021: 77.2 ML/MIN/1.73
EOSINOPHIL # BLD AUTO: 0.16 10*3/MM3 (ref 0–0.4)
EOSINOPHIL NFR BLD AUTO: 1.3 % (ref 0.3–6.2)
ERYTHROCYTE [DISTWIDTH] IN BLOOD BY AUTOMATED COUNT: 14.3 % (ref 12.3–15.4)
GLOBULIN UR ELPH-MCNC: 3.4 GM/DL
GLUCOSE BLDC GLUCOMTR-MCNC: 124 MG/DL (ref 70–130)
GLUCOSE BLDC GLUCOMTR-MCNC: 125 MG/DL (ref 70–130)
GLUCOSE BLDC GLUCOMTR-MCNC: 87 MG/DL (ref 70–130)
GLUCOSE SERPL-MCNC: 97 MG/DL (ref 65–99)
HCT VFR BLD AUTO: 48.6 % (ref 37.5–51)
HGB BLD-MCNC: 15.6 G/DL (ref 13–17.7)
IMM GRANULOCYTES # BLD AUTO: 0.08 10*3/MM3 (ref 0–0.05)
IMM GRANULOCYTES NFR BLD AUTO: 0.7 % (ref 0–0.5)
LYMPHOCYTES # BLD AUTO: 2.65 10*3/MM3 (ref 0.7–3.1)
LYMPHOCYTES NFR BLD AUTO: 21.6 % (ref 19.6–45.3)
MCH RBC QN AUTO: 28.9 PG (ref 26.6–33)
MCHC RBC AUTO-ENTMCNC: 32.1 G/DL (ref 31.5–35.7)
MCV RBC AUTO: 90.2 FL (ref 79–97)
MONOCYTES # BLD AUTO: 0.82 10*3/MM3 (ref 0.1–0.9)
MONOCYTES NFR BLD AUTO: 6.7 % (ref 5–12)
NEUTROPHILS NFR BLD AUTO: 69 % (ref 42.7–76)
NEUTROPHILS NFR BLD AUTO: 8.46 10*3/MM3 (ref 1.7–7)
NRBC BLD AUTO-RTO: 0 /100 WBC (ref 0–0.2)
PLATELET # BLD AUTO: 133 10*3/MM3 (ref 140–450)
PMV BLD AUTO: 12.2 FL (ref 6–12)
POTASSIUM SERPL-SCNC: 4.4 MMOL/L (ref 3.5–5.2)
PROT SERPL-MCNC: 6.5 G/DL (ref 6–8.5)
RBC # BLD AUTO: 5.39 10*6/MM3 (ref 4.14–5.8)
RBC MORPH BLD: NORMAL
SMALL PLATELETS BLD QL SMEAR: NORMAL
SODIUM SERPL-SCNC: 133 MMOL/L (ref 136–145)
WBC NRBC COR # BLD: 12.26 10*3/MM3 (ref 3.4–10.8)

## 2022-09-05 PROCEDURE — 85730 THROMBOPLASTIN TIME PARTIAL: CPT | Performed by: INTERNAL MEDICINE

## 2022-09-05 PROCEDURE — 93005 ELECTROCARDIOGRAM TRACING: CPT | Performed by: INTERNAL MEDICINE

## 2022-09-05 PROCEDURE — 99232 SBSQ HOSP IP/OBS MODERATE 35: CPT | Performed by: INTERNAL MEDICINE

## 2022-09-05 PROCEDURE — 97165 OT EVAL LOW COMPLEX 30 MIN: CPT

## 2022-09-05 PROCEDURE — 97530 THERAPEUTIC ACTIVITIES: CPT

## 2022-09-05 PROCEDURE — 82962 GLUCOSE BLOOD TEST: CPT

## 2022-09-05 PROCEDURE — 92526 ORAL FUNCTION THERAPY: CPT

## 2022-09-05 PROCEDURE — 80053 COMPREHEN METABOLIC PANEL: CPT | Performed by: INTERNAL MEDICINE

## 2022-09-05 PROCEDURE — 97116 GAIT TRAINING THERAPY: CPT

## 2022-09-05 PROCEDURE — 25010000002 HEPARIN (PORCINE) 25000-0.45 UT/250ML-% SOLUTION: Performed by: NURSE PRACTITIONER

## 2022-09-05 PROCEDURE — 85007 BL SMEAR W/DIFF WBC COUNT: CPT | Performed by: INTERNAL MEDICINE

## 2022-09-05 PROCEDURE — 63710000001 PREDNISONE PER 5 MG: Performed by: INTERNAL MEDICINE

## 2022-09-05 PROCEDURE — 99232 SBSQ HOSP IP/OBS MODERATE 35: CPT | Performed by: NURSE PRACTITIONER

## 2022-09-05 PROCEDURE — 85025 COMPLETE CBC W/AUTO DIFF WBC: CPT | Performed by: INTERNAL MEDICINE

## 2022-09-05 RX ADMIN — HEPARIN SODIUM 11.26 UNITS/KG/HR: 10000 INJECTION, SOLUTION INTRAVENOUS at 19:30

## 2022-09-05 RX ADMIN — POTASSIUM CHLORIDE 10 MEQ: 750 TABLET, FILM COATED, EXTENDED RELEASE ORAL at 09:35

## 2022-09-05 RX ADMIN — NICOTINE 1 PATCH: 14 PATCH, EXTENDED RELEASE TRANSDERMAL at 09:36

## 2022-09-05 RX ADMIN — SACUBITRIL AND VALSARTAN 1 TABLET: 97; 103 TABLET, FILM COATED ORAL at 20:27

## 2022-09-05 RX ADMIN — Medication 10 ML: at 20:27

## 2022-09-05 RX ADMIN — ASPIRIN 81 MG: 81 TABLET, CHEWABLE ORAL at 09:35

## 2022-09-05 RX ADMIN — FUROSEMIDE 20 MG: 20 TABLET ORAL at 09:35

## 2022-09-05 RX ADMIN — PREDNISONE 10 MG: 10 TABLET ORAL at 09:35

## 2022-09-05 RX ADMIN — ATORVASTATIN CALCIUM 80 MG: 40 TABLET, FILM COATED ORAL at 20:27

## 2022-09-05 RX ADMIN — Medication 10 ML: at 09:36

## 2022-09-05 RX ADMIN — SERTRALINE 100 MG: 50 TABLET, FILM COATED ORAL at 09:35

## 2022-09-05 NOTE — PLAN OF CARE
DYSPHAGIA THERAPY PLAN OF CARE:    Zaid Galarza will benefit from formal dysphagia therapy x3-5 days per week at 15-60 minutes sessions, as functionally tolerated, for 3-7 Days, to address:    Long Term Goal:  Pt will accept least restrictive diet tolerance w/o overt s/s aspiration.     Short Term Goals:    1. Pt will perform resistive breathing exercises x3 sets x5 reps w/resistance of 1-6 to improve respiratory support and control.     2. Pt will perform laryngeal adduction/elevation exercises x3 sets x10 reps w/ min cues.     3. Pt will perform CTAR/Shaker exercises sustained x3 sets x5 reps for 30+ seconds over 3 consecutive sessions.     4. Pt will perform CTAR/Shaker exercises repetitive x3 sets x12 reps w/ mod cues.     5. Pt will perform compensatory techniques during meals w/ min cues.    6. Pt will accept po trials of thin water w/o s/s concerning for aspiration in 90% of trials over 3 consecutive sessions.     7. Pt will participate in instrumental re-evaluation of swallowing fnx in 3-5 days, pending progress towards this poc.    Thank you-  Esme Hernandez M.S., CCC-SLP        Goal Outcome Evaluation:

## 2022-09-05 NOTE — THERAPY TREATMENT NOTE
Acute Care - Physical Therapy Treatment Note  [facility]     Patient Name: Zaid Galarza  : 1950  MRN: [id number]  Today's Date: 2022   Onset of Illness/Injury or Date of Surgery: (P) 22 (admission date)  Visit Dx:     ICD-10-CM ICD-9-CM   1. Cerebrovascular accident (CVA), unspecified mechanism (HCC)  I63.9 434.91   2. Left-sided weakness  R53.1 728.87     Patient Active Problem List   Diagnosis   • Atrial flutter (HCC)   • Dilated cardiomyopathy (HCC)   • Nonobstructive CAD per cath 10/3/2019   • Anxiety disorder   • Essential hypertension   • Tobacco use   • History of abdominal aortic aneurysm (AAA) repair   • Dyslipidemia, goal LDL below 100   • CVA (cerebral vascular accident) (HCC)   • Cerebrovascular accident (CVA), unspecified mechanism (HCC)     Past Medical History:   Diagnosis Date   • Atrial fibrillation (HCC)    • CHF (congestive heart failure) (HCC)    • COPD (chronic obstructive pulmonary disease) (HCC)    • Coronary artery disease    • Hypertension    • Tobacco abuse      Past Surgical History:   Procedure Laterality Date   • ABDOMINAL AORTIC ANEURYSM REPAIR     • CARDIAC CATHETERIZATION N/A 10/03/2019    Procedure: Left Heart Cath;  Surgeon: Vincent Phillips MD;  Location: [facility] INVASIVE LOCATION;  Service: Cardiology     PT Assessment (last 12 hours)     PT Evaluation and Treatment     Row Name 22 1323          Physical Therapy Time and Intention    Document Type progress note/recertification (P)   -TN     Mode of Treatment physical therapy (P)   -TN     Patient Effort good (P)   -TN     Symptoms Noted During/After Treatment shortness of breath (P)   -TN     Comment Pt seen this date by PT and SPT; pt ambulated in hallway and performed balance activities in his room. He expressed concern for his mental health to PT, nursing was notified that pt would be willing to talk to someone about his concerns. He was educated on rehabilitation post-stroke and discharge  recommendations. Tandem standing intervention was utilized to address balance concerns. (P)   -TN     Row Name 09/05/22 1323          General Information    General Observations of Patient Found supine in bed upon entry. (P)   -TN     Equipment Issued to Patient gait belt (P)   -TN     Row Name 09/05/22 1323          Bed Mobility    Supine-Sit [place] (Bed Mobility) modified independence;standby assist (P)   -TN     Sit-Supine [place] (Bed Mobility) supervision (P)   -TN     Row Name 09/05/22 1323          Transfers    Transfers sit-stand transfer;stand-sit transfer (P)   -TN     Sit-Stand [place] (Transfers) contact guard (P)   -TN     Stand-Sit [place] (Transfers) contact guard (P)   -TN     Row Name 09/05/22 1323          Sit-Stand Transfer    Assistive Device (Sit-Stand Transfers) walker, front-wheeled (P)   -TN     Row Name 09/05/22 1323          Stand-Sit Transfer    Assistive Device (Stand-Sit Transfers) walker, front-wheeled (P)   -TN     Row Name 09/05/22 1323          Gait/Stairs (Locomotion)    Gait/Stairs Locomotion gait/ambulation assistive device (P)   -TN     [place] Level (Gait) contact guard;minimum assist (75% patient effort) (P)   -TN     Assistive Device (Gait) walker, front-wheeled (P)   -TN     Distance in Feet (Gait) 100' grossly (P)   -TN     Pattern (Gait) step-through (P)   -TN     Deviations/Abnormal Patterns (Gait) base of support, narrow;other (see comments) (P)   pt encouraged to utilize wider ANEESH w/ demonstration.  -TN     Row Name 09/05/22 1323          Safety Issues, Functional Mobility    Impairments Affecting Function (Mobility) endurance/activity tolerance;balance;shortness of breath (P)   -TN     Row Name 09/05/22 1323          Balance    Balance Assessment standing static balance (P)   -TN     Static Standing Balance moderate assist;other (see comments) (P)   modA requried when AP perturbations were applied to patient. All other directions tested (med to  lateral, post to ant.) grossly CGA.  -TN     Dynamic Standing Balance contact guard;minimal assist (P)   -TN     Position/Device Used, Standing Balance walker, front-wheeled (P)   -TN     Balance Interventions tandem standing (P)   -TN     Row Name 09/05/22 1323          Positioning and Restraints    Pre-Treatment Position in bed (P)   -TN     Post Treatment Position bed (P)   -TN     In Bed supine;call light within reach;encouraged to call for assist (P)   -TN     Row Name 09/05/22 1323          Progress Summary (PT)    Daily Progress Summary (PT) Pt seen by PT and SPT on this date. His ROM and strength was reassessed w/o significant changes from previous evaluation. He demonstrated grossly 5/5 strength in BLE. He ambulated grossly 100' and exhibited a more efficient gait pattern, but still had narrow ANEESH & some unsteadniness. Pt opened up to PT about mental/emotional health concerns d/t recent hospitalization and past trauma. Pt concerns were heard and nursing was notified about pt's willingness to talk to someone about these concerns. D/t living at home w/o assistance available, pt is still at risk to fall upon return home d/t balance impairments. Therefore d/c recommendations for potential IPR, SNF. (P)   -TN           User Key  (r) = Recorded By, (t) = Taken By, (c) = Cosigned By    Initials Name Provider Type    Zeynep Mcmanus, PT Student PT Student                  PT Recommendation and Plan  Anticipated Discharge Disposition (PT): (P) inpatient rehabilitation facility, skilled nursing facility, extended care facility  Planned Therapy Interventions (PT): (P) balance training, gait training  Therapy Frequency (PT): (P) other (see comments) (2-5x per week)  Progress Summary (PT)  Daily Progress Summary (PT): (P) Pt seen by PT and SPT on this date. His ROM and strength was reassessed w/o significant changes from previous evaluation. He demonstrated grossly 5/5 strength in BLE. He ambulated grossly 100' and  exhibited a more efficient gait pattern, but still had narrow ANEESH & some unsteadniness. Pt opened up to PT about mental/emotional health concerns d/t recent hospitalization and past trauma. Pt concerns were heard and nursing was notified about pt's willingness to talk to someone about these concerns. D/t living at home w/o assistance available, pt is still at risk to fall upon return home d/t balance impairments. Therefore d/c recommendations for potential IPR, SNF.  Outcome Evaluation: (P) Pt evaluated on this date by SPT and PT. He was pleasant and cooperative with therapy. His strength was assessed while supine in bed, then pt. ambulated in room & hallway roughly 60'. He was mod(I) for supine to sit, CGA for sit<>stand, CGA/Jana for ambulation. He demonstrated a narrow base of support during ambulation and required a rest break after roughly 30'. Pt also demonstrated one instance of toe drag with LLE while ambulating in hallway. D/c recommendation IPR, SNF, extended care. Home w/ home health, home w/ supervision would be appropriate for this pt, however, at this time he is a fall risk and does not have anyone at home who could assist if needed.   Outcome Measures     Row Name 09/05/22 1238 09/05/22 1200          Modified [place]ia Scale    Pre-Stroke Modified [place] Scale 0 - No Symptoms at all.  -KR --     Modified [place] Scale 4 - Moderately severe disability.  Unable to walk without assistance, and unable to attend to own bodily needs without assistance.  -KR --            Functional Assessment    Outcome Measure Options -- Modified Cotton  -KR           User Key  (r) = Recorded By, (t) = Taken By, (c) = Cosigned By    Initials Name Provider Type    Dequan Pak, OT Occupational Therapist                 Time Calculation:    PT Charges     Row Name 09/05/22 1341             Time Calculation    PT Received On 09/05/22 (P)   -TN              Time Calculation- PT    Total Timed Code Minutes- PT 23 minute(s) (P)    -TN            User Key  (r) = Recorded By, (t) = Taken By, (c) = Cosigned By    Initials Name Provider Type    TN Zeynep Perdue, PT Student PT Student              Therapy Charges for Today     Code Description Service Date Service Provider Modifiers Qty    10052947928 HC GAIT TRAINING EA 15 MIN 9/5/2022 Zeynep Perdue, PT Student GP 1    [id number]  PT THERAPEUTIC ACT EA 15 MIN 9/5/2022 Zeynep Perdue PT Student GP 1          PT G-Codes  Outcome Measure Options: Modified [place]ia  Modified Marceline Scale: 4 - Moderately severe disability.  Unable to walk without assistance, and unable to attend to own bodily needs without assistance.    MISSY Mcdonnell  9/5/2022

## 2022-09-05 NOTE — PLAN OF CARE
Goal Outcome Evaluation: Patient has been very pleasant today. Compliant with all medications and care as ordered. He remains on 2L/NC, saturations maintaining well above 90%. Patient did ambulate to the bathroom with nursing assistance, gait improving. Heparin remains infusing at 11.26 units/kg/hr. Pauses this shift remain under 3 sec. He is currently resting in bed. Will continue with plan of care.

## 2022-09-05 NOTE — THERAPY EVALUATION
Acute Care - Occupational Therapy Initial Evaluation   [place]     Patient Name: Zaid Galarza  : 1950  MRN: [id number]  Today's Date: 2022  Onset of Illness/Injury or Date of Surgery: (P) 22 (admission date)          Admit Date: 2022   Pt presents with minimal self care deficits due to L sided weakness. OT will follow to address LUE strengthening, AE training as needed. Pt reports living alone and desires to return to this arrangement, following discharge.        ICD-10-CM ICD-9-CM   1. Cerebrovascular accident (CVA), unspecified mechanism (HCC)  I63.9 434.91   2. Left-sided weakness  R53.1 728.87     Patient Active Problem List   Diagnosis   • Atrial flutter (HCC)   • Dilated cardiomyopathy (HCC)   • Nonobstructive CAD per cath 10/3/2019   • Anxiety disorder   • Essential hypertension   • Tobacco use   • History of abdominal aortic aneurysm (AAA) repair   • Dyslipidemia, goal LDL below 100   • CVA (cerebral vascular accident) (HCC)   • Cerebrovascular accident (CVA), unspecified mechanism (HCC)     Past Medical History:   Diagnosis Date   • Atrial fibrillation (HCC)    • CHF (congestive heart failure) (HCC)    • COPD (chronic obstructive pulmonary disease) (HCC)    • Coronary artery disease    • Hypertension    • Tobacco abuse      Past Surgical History:   Procedure Laterality Date   • ABDOMINAL AORTIC ANEURYSM REPAIR     • CARDIAC CATHETERIZATION N/A 10/03/2019    Procedure: Left Heart Cath;  Surgeon: Vincent Phillips MD;  Location: [facility] INVASIVE LOCATION;  Service: Cardiology         OT ASSESSMENT FLOWSHEET (last 12 hours)     OT Evaluation and Treatment     Row Name 22 1211                   OT Time and Intention    Document Type evaluation  -KR        Mode of Treatment occupational therapy  -KR        Patient Effort adequate  -KR                  General Information    General Observations of Patient alert/cooperative  -KR                  Living Environment    Current  Living Arrangements home  -KR        People in Home alone  -KR                  Cognition    Affect/Mental Status (Cognition) WFL  -KR        Orientation Status (Cognition) oriented x 3  -KR        Follows Commands (Cognition) WFL  -KR                  Range of Motion Comprehensive    Comment, General Range of Motion RUE WFL, LUE 4/5 AROM  -KR                  Strength Comprehensive (MMT)    Comment, General Manual Muscle Testing (MMT) Assessment RUE 4/5, LUE 3-/5  -KR                  Sensory    Additional Documentation Vision Assessment/Intervention (Group);Sensory Assessment (Somatosensory) (Group)  -KR                  Vision Assessment/Intervention    Impact of Vision Impairment on Function (Vision) no visual changes noted/reported  -KR                  Activities of Daily Living    BADL Assessment/Intervention bathing;upper body dressing;lower body dressing;grooming;feeding;toileting  -KR                  Bathing Assessment/Intervention    [place] Level (Bathing) bathing skills;minimum assist (75% patient effort)  -KR                  Upper Body Dressing Assessment/Training    [place] Level (Upper Body Dressing) upper body dressing skills;contact guard assist  -KR                  Lower Body Dressing Assessment/Training    [place] Level (Lower Body Dressing) lower body dressing skills;minimum assist (75% patient effort)  -KR                  Grooming Assessment/Training    [place] Level (Grooming) grooming skills;set up  -KR                  Self-Feeding Assessment/Training    [place] Level (Feeding) feeding skills;set up  -KR                  Toileting Assessment/Training    [place] Level (Toileting) toileting skills;minimum assist (75% patient effort)  -KR                  Plan of Care Review    Plan of Care Reviewed With patient  -KR                  Therapy Assessment/Plan (OT)    Rehab Potential (OT) good, to achieve stated therapy goals  -KR        Criteria for Skilled  Therapeutic Interventions Met (OT) yes  -KR        Planned Therapy Interventions (OT) activity tolerance training;adaptive equipment training;strengthening exercise  -KR                  Therapy Plan Review/Discharge Plan (OT)    Anticipated Discharge Disposition (OT) home  -KR                  OT Goals    Dressing Goal Selection (OT) dressing, OT goal 1  -KR        Strength Goal Selection (OT) strength, OT goal 1  -KR                  Dressing Goal 1 (OT)    Activity/Device (Dressing Goal 1, OT) dressing skills, all  -KR        [place]/Cues Needed (Dressing Goal 1, OT) standby assist  -KR        Time Frame (Dressing Goal 1, OT) by discharge  -KR                  Strength Goal 1 (OT)    Strength Goal 1 (OT) LUE increase to WNL for performance of self care/activity  -KR        Time Frame (Strength Goal 1, OT) by discharge  -KR                  Patient Education Goal (OT)    Activity (Patient Education Goal, OT) AE/DME training to enhance safety/independence in home environment  -KR        [place]/Cues/Accuracy (Memory Goal 2, OT) verbalizes understanding  -KR        Time Frame (Patient Education Goal, OT) by discharge  -KR              User Key  (r) = Recorded By, (t) = Taken By, (c) = Cosigned By    Initials Name Effective Dates    KR Dequan Coulter, OT 06/16/21 -                        OT Recommendation and Plan  Planned Therapy Interventions (OT): activity tolerance training, adaptive equipment training, strengthening exercise  Plan of Care Review  Plan of Care Reviewed With: patient  Plan of Care Reviewed With: patient     Outcome Measures     Row Name 09/05/22 1238 09/05/22 1200          Modified Haines Scale    Pre-Stroke Modified [place] Scale 0 - No Symptoms at all.  -KR --     Modified [place] Scale 4 - Moderately severe disability.  Unable to walk without assistance, and unable to attend to own bodily needs without assistance.  -KR --            Functional Assessment    Outcome Measure Options --  Modified [place]hia ROSAS           User Key  (r) = Recorded By, (t) = Taken By, (c) = Cosigned By    Initials Name Provider Type    Dequan Pak OT Occupational Therapist                Time Calculation:     Therapy Charges for Today     Code Description Service Date Service Provider Modifiers Qty    [id number]  OT EVAL LOW COMPLEXITY 4 9/5/2022 Dequan Coulter OT GO 1               Dequan Coulter OT  9/5/2022

## 2022-09-05 NOTE — THERAPY TREATMENT NOTE
Acute Care - Speech Language Pathology   Swallow Treatment Note  [facility]   DYSPHAGIA THERAPY TREATMENT NOTE     Patient Name: Zaid Galarza  : 1950  MRN: [id number]  Today's Date: 2022  Onset of Illness/Injury or Date of Surgery: (P) 22 (admission date)          Admit Date: 2022    Zaid Galarza was seen in pt room this pm for initiation of formal dysphagia tx. He is s/p acceptance of lunch and he reports no complications. He is positioned reclined in bed for therapy tasks this date per his request. He is pleasant and cooperative to participate.     Long Term Goal:  Pt will accept least restrictive diet tolerance w/o overt s/s aspiration.      Short Term Goals:     1. Pt will perform resistive breathing exercises x3 sets x5 reps w/resistance of 1-6 to improve respiratory support and control.   *Resistance 3/3: x1 set x10 reps  *Resistance 4/4: x3 sets x10 reps. Noted increased work of breathing upon cessation of 3rd set.      2. Pt will perform laryngeal adduction/elevation exercises x3 sets x10 reps w/ min cues.   *not directly addressed this date.     3. Pt will perform CTAR/Shaker exercises sustained x3 sets x5 reps for 30+ seconds over 3 consecutive sessions.      4. Pt will perform CTAR/Shaker exercises repetitive x3 sets x12 reps w/ mod cues.   *Shaker: x1 set x10 reps w/ noted increased work of breathing and fatigue  *Modified CTAR: x1 set x10 reps w/ improved results.     5. Pt will perform compensatory techniques during meals w/ min cues.     6. Pt will accept po trials of thin water w/o s/s concerning for aspiration in 90% of trials over 3 consecutive sessions.      7. Pt will participate in instrumental re-evaluation of swallowing fnx in 3-5 days, pending progress towards this poc.    Visit Dx:     ICD-10-CM ICD-9-CM   1. Cerebrovascular accident (CVA), unspecified mechanism (HCC)  I63.9 434.91   2. Left-sided weakness  R53.1 728.87     Patient Active Problem List   Diagnosis    • Atrial flutter (HCC)   • Dilated cardiomyopathy (HCC)   • Nonobstructive CAD per cath 10/3/2019   • Anxiety disorder   • Essential hypertension   • Tobacco use   • History of abdominal aortic aneurysm (AAA) repair   • Dyslipidemia, goal LDL below 100   • CVA (cerebral vascular accident) (HCC)   • Cerebrovascular accident (CVA), unspecified mechanism (HCC)     Past Medical History:   Diagnosis Date   • Atrial fibrillation (HCC)    • CHF (congestive heart failure) (HCC)    • COPD (chronic obstructive pulmonary disease) (HCC)    • Coronary artery disease    • Hypertension    • Tobacco abuse      Past Surgical History:   Procedure Laterality Date   • ABDOMINAL AORTIC ANEURYSM REPAIR     • CARDIAC CATHETERIZATION N/A 10/03/2019    Procedure: Left Heart Cath;  Surgeon: Vincent Phillips MD;  Location: [facility] CATH INVASIVE LOCATION;  Service: Cardiology       SLP Recommendation and Plan      Mr Galarza is felt to be a candidate for water protocol and would benefit from thin water provided between meals and medications. All food and medications should be accompanied by nectar thick liquids.     Continue per POC.    Thank you for allowing me to participate in the care of your patient-  Esme Hernandez M.S., CCC-SLP          EDUCATION  The patient has been educated in the following areas:   Dysphagia (Swallowing Impairment) Oral Care/Hydration Modified Diet Instruction.              Time Calculation:                Esme Hernandez MS CCC-SLP  9/5/2022

## 2022-09-05 NOTE — PROGRESS NOTES
"    [facility] HOSPITALIST PROGRESS NOTE    S: patient seen and examined.  No CP or SOB.  Still feels weaker on L    O: /73 (BP Location: Right arm, Patient Position: Lying)   Pulse 69   Temp 98 °F (36.7 °C) (Axillary)   Resp 18   Ht 182.9 cm (72\")   Wt 108 kg (237 lb 8 oz)   SpO2 98%   BMI 32.21 kg/m²     GENERAL:  age appropriate, NAD  EARS,NOSE, MOUTH, THROAT:  MMM, hearing intact  EYES: PERRL, EOMI  CV:  NL S1/S2  RESPIRATORY:  CTAB no w/c/r  GI:  S/NT/ND +BS  SKIN: No rash or lesions. No nodules.  INT: No edema. No joint deformity.  PSYCH:  Normal affect, A+O x 3  NEURO: Alert and oriented,       Scheduled Meds:aspirin, 81 mg, Oral, Daily   Or  aspirin, 300 mg, Rectal, Daily  atorvastatin, 80 mg, Oral, Nightly  furosemide, 20 mg, Oral, Daily  nicotine, 1 patch, Transdermal, Q24H  potassium chloride, 10 mEq, Oral, Daily  predniSONE, 10 mg, Oral, Daily  sacubitril-valsartan, 1 tablet, Oral, BID  sertraline, 100 mg, Oral, Daily  sodium chloride, 10 mL, Intravenous, Q12H      Continuous Infusions:heparin, 9.26 Units/kg/hr, Last Rate: 9.26 Units/kg/hr (09/04/22 2106)      PRN Meds:.heparin (porcine)  •  heparin (porcine)  •  melatonin  •  sodium chloride  •  sodium chloride    Labs: Laboratory information reviewed and reconciled.  Results from last 7 days   Lab Units 09/05/22  0307   WBC 10*3/mm3 12.26*       Results from last 7 days   Lab Units 09/05/22  0307   CO2 mmol/L 20.3*   BUN mg/dL 19   CREATININE mg/dL 1.04   GLUCOSE mg/dL 97       Results from last 7 days   Lab Units 09/04/22  1426   INR  1.28*         Imaging (last 24 hours):    No radiology results for the last day   Assessment/Plan  # CVA  - cont ASA/statin  - IPR if able  - PT/OT    # Chronic afib/flutter  - >3s pauses noted  - cardio following, considering PPM  - AV ruben blocking agents held  - monitor on tele    # Chronic combined systolic/diastolic HF  - continue home entresto and lasix    # COPD without exacerbation    DVT " Prophylaxis:heparin gtt  Disposition: continue to monitor  Discharge disposition: IPR if able  Prognosis: guarded    Marcellus Munoz Jr, MD  [facility]  09/05/22  10:02 EDT

## 2022-09-05 NOTE — PLAN OF CARE
Goal Outcome Evaluation:   No acute changes this shift. Pt resting in bed with eyes closed. Oxygen sats well above 90% on 2L. VSS. Call light within reach and use has been encouraged.

## 2022-09-06 ENCOUNTER — APPOINTMENT (OUTPATIENT)
Dept: GENERAL RADIOLOGY | Facility: HOSPITAL | Age: 72
End: 2022-09-06

## 2022-09-06 DIAGNOSIS — I48.3 TYPICAL ATRIAL FLUTTER: Primary | ICD-10-CM

## 2022-09-06 DIAGNOSIS — R42 DIZZINESS: ICD-10-CM

## 2022-09-06 LAB
ANION GAP SERPL CALCULATED.3IONS-SCNC: 10.8 MMOL/L (ref 5–15)
APTT PPP: 32.8 SECONDS (ref 26.5–34.5)
APTT PPP: 79.8 SECONDS (ref 26.5–34.5)
APTT PPP: 94.3 SECONDS (ref 26.5–34.5)
BASOPHILS # BLD AUTO: 0.06 10*3/MM3 (ref 0–0.2)
BASOPHILS NFR BLD AUTO: 0.5 % (ref 0–1.5)
BUN SERPL-MCNC: 25 MG/DL (ref 8–23)
BUN/CREAT SERPL: 18.7 (ref 7–25)
CALCIUM SPEC-SCNC: 9 MG/DL (ref 8.6–10.5)
CHLORIDE SERPL-SCNC: 103 MMOL/L (ref 98–107)
CO2 SERPL-SCNC: 25.2 MMOL/L (ref 22–29)
CREAT SERPL-MCNC: 1.34 MG/DL (ref 0.76–1.27)
DEPRECATED RDW RBC AUTO: 46.6 FL (ref 37–54)
EGFRCR SERPLBLD CKD-EPI 2021: 57 ML/MIN/1.73
EOSINOPHIL # BLD AUTO: 0.11 10*3/MM3 (ref 0–0.4)
EOSINOPHIL NFR BLD AUTO: 0.9 % (ref 0.3–6.2)
ERYTHROCYTE [DISTWIDTH] IN BLOOD BY AUTOMATED COUNT: 14.5 % (ref 12.3–15.4)
GLUCOSE SERPL-MCNC: 92 MG/DL (ref 65–99)
HCT VFR BLD AUTO: 47.3 % (ref 37.5–51)
HGB BLD-MCNC: 15.5 G/DL (ref 13–17.7)
IMM GRANULOCYTES # BLD AUTO: 0.09 10*3/MM3 (ref 0–0.05)
IMM GRANULOCYTES NFR BLD AUTO: 0.7 % (ref 0–0.5)
LYMPHOCYTES # BLD AUTO: 2.35 10*3/MM3 (ref 0.7–3.1)
LYMPHOCYTES NFR BLD AUTO: 18.3 % (ref 19.6–45.3)
MCH RBC QN AUTO: 29 PG (ref 26.6–33)
MCHC RBC AUTO-ENTMCNC: 32.8 G/DL (ref 31.5–35.7)
MCV RBC AUTO: 88.6 FL (ref 79–97)
MONOCYTES # BLD AUTO: 0.94 10*3/MM3 (ref 0.1–0.9)
MONOCYTES NFR BLD AUTO: 7.3 % (ref 5–12)
NEUTROPHILS NFR BLD AUTO: 72.3 % (ref 42.7–76)
NEUTROPHILS NFR BLD AUTO: 9.26 10*3/MM3 (ref 1.7–7)
NRBC BLD AUTO-RTO: 0 /100 WBC (ref 0–0.2)
PLATELET # BLD AUTO: 145 10*3/MM3 (ref 140–450)
PMV BLD AUTO: 12.1 FL (ref 6–12)
POTASSIUM SERPL-SCNC: 4.2 MMOL/L (ref 3.5–5.2)
QT INTERVAL: 380 MS
QTC INTERVAL: 407 MS
RBC # BLD AUTO: 5.34 10*6/MM3 (ref 4.14–5.8)
SODIUM SERPL-SCNC: 139 MMOL/L (ref 136–145)
TROPONIN T SERPL-MCNC: <0.01 NG/ML (ref 0–0.03)
WBC NRBC COR # BLD: 12.81 10*3/MM3 (ref 3.4–10.8)

## 2022-09-06 PROCEDURE — 80048 BASIC METABOLIC PNL TOTAL CA: CPT | Performed by: INTERNAL MEDICINE

## 2022-09-06 PROCEDURE — 93010 ELECTROCARDIOGRAM REPORT: CPT | Performed by: INTERNAL MEDICINE

## 2022-09-06 PROCEDURE — 97530 THERAPEUTIC ACTIVITIES: CPT

## 2022-09-06 PROCEDURE — 97112 NEUROMUSCULAR REEDUCATION: CPT

## 2022-09-06 PROCEDURE — 99232 SBSQ HOSP IP/OBS MODERATE 35: CPT | Performed by: INTERNAL MEDICINE

## 2022-09-06 PROCEDURE — 74230 X-RAY XM SWLNG FUNCJ C+: CPT | Performed by: RADIOLOGY

## 2022-09-06 PROCEDURE — 85025 COMPLETE CBC W/AUTO DIFF WBC: CPT | Performed by: INTERNAL MEDICINE

## 2022-09-06 PROCEDURE — 74230 X-RAY XM SWLNG FUNCJ C+: CPT

## 2022-09-06 PROCEDURE — 84484 ASSAY OF TROPONIN QUANT: CPT | Performed by: INTERNAL MEDICINE

## 2022-09-06 PROCEDURE — 85730 THROMBOPLASTIN TIME PARTIAL: CPT | Performed by: INTERNAL MEDICINE

## 2022-09-06 PROCEDURE — 63710000001 PREDNISONE PER 5 MG: Performed by: INTERNAL MEDICINE

## 2022-09-06 PROCEDURE — 92611 MOTION FLUOROSCOPY/SWALLOW: CPT

## 2022-09-06 RX ORDER — HYDROXYZINE HYDROCHLORIDE 25 MG/1
25 TABLET, FILM COATED ORAL ONCE
Status: COMPLETED | OUTPATIENT
Start: 2022-09-06 | End: 2022-09-06

## 2022-09-06 RX ADMIN — SACUBITRIL AND VALSARTAN 1 TABLET: 97; 103 TABLET, FILM COATED ORAL at 10:19

## 2022-09-06 RX ADMIN — APIXABAN 5 MG: 5 TABLET, FILM COATED ORAL at 20:43

## 2022-09-06 RX ADMIN — PREDNISONE 10 MG: 10 TABLET ORAL at 10:19

## 2022-09-06 RX ADMIN — SODIUM CHLORIDE, POTASSIUM CHLORIDE, SODIUM LACTATE AND CALCIUM CHLORIDE 500 ML: 600; 310; 30; 20 INJECTION, SOLUTION INTRAVENOUS at 14:44

## 2022-09-06 RX ADMIN — FUROSEMIDE 20 MG: 20 TABLET ORAL at 10:19

## 2022-09-06 RX ADMIN — HYDROXYZINE HYDROCHLORIDE 25 MG: 25 TABLET ORAL at 01:10

## 2022-09-06 RX ADMIN — Medication 10 ML: at 20:49

## 2022-09-06 RX ADMIN — APIXABAN 5 MG: 5 TABLET, FILM COATED ORAL at 11:58

## 2022-09-06 RX ADMIN — Medication 10 ML: at 09:10

## 2022-09-06 RX ADMIN — NICOTINE 1 PATCH: 14 PATCH, EXTENDED RELEASE TRANSDERMAL at 09:10

## 2022-09-06 RX ADMIN — POTASSIUM CHLORIDE 10 MEQ: 750 TABLET, FILM COATED, EXTENDED RELEASE ORAL at 10:19

## 2022-09-06 RX ADMIN — ATORVASTATIN CALCIUM 80 MG: 40 TABLET, FILM COATED ORAL at 20:43

## 2022-09-06 RX ADMIN — SERTRALINE 100 MG: 50 TABLET, FILM COATED ORAL at 10:19

## 2022-09-06 RX ADMIN — ASPIRIN 81 MG: 81 TABLET, CHEWABLE ORAL at 10:19

## 2022-09-06 NOTE — PLAN OF CARE
Goal Outcome Evaluation: Patient was pleasant today. Compliant with care and medications as ordered. Patient is on 2L/NC, with saturations maintaining well above 90%. Patient was hypotensive during shift, LR Bolus administered, last /65. Heparin Drip DC as ordered. Patient ambulated to the bathroom several times throughout shift with assistance, BM noted. Patient sat in chair for most of shift. Family was at bedside, updated in plan of care. Patient is currently eating dinner, will continue with plan of care.

## 2022-09-06 NOTE — THERAPY TREATMENT NOTE
Patient Name: Zaid Galarza  : 1950    MRN: [id number]                              Today's Date: 2022       Admit Date: 2022    Visit Dx:     ICD-10-CM ICD-9-CM   1. Cerebrovascular accident (CVA), unspecified mechanism (HCC)  I63.9 434.91   2. Left-sided weakness  R53.1 728.87     Patient Active Problem List   Diagnosis   • Atrial flutter (HCC)   • Dilated cardiomyopathy (HCC)   • Nonobstructive CAD per cath 10/3/2019   • Anxiety disorder   • Essential hypertension   • Tobacco use   • History of abdominal aortic aneurysm (AAA) repair   • Dyslipidemia, goal LDL below 100   • CVA (cerebral vascular accident) (HCC)   • Cerebrovascular accident (CVA), unspecified mechanism (HCC)     Past Medical History:   Diagnosis Date   • Atrial fibrillation (HCC)    • CHF (congestive heart failure) (HCC)    • COPD (chronic obstructive pulmonary disease) (HCC)    • Coronary artery disease    • Hypertension    • Tobacco abuse      Past Surgical History:   Procedure Laterality Date   • ABDOMINAL AORTIC ANEURYSM REPAIR     • CARDIAC CATHETERIZATION N/A 10/03/2019    Procedure: Left Heart Cath;  Surgeon: Vincent Phillips MD;  Location: [facility] CATH INVASIVE LOCATION;  Service: Cardiology      General Information     Row Name 22 1436          OT Time and Intention    Document Type therapy note (daily note)  -     Mode of Treatment individual therapy;occupational therapy  -     Row Name 22 1436          General Information    Patient Profile Reviewed yes  -     Barriers to Rehab none identified  -     Row Name 22 1436          Cognition    Orientation Status (Cognition) oriented x 3  -     Row Name 22 1436          Safety Issues, Functional Mobility    Safety Issues Affecting Function (Mobility) insight into deficits/self-awareness;problem-solving  -     Impairments Affecting Function (Mobility) balance;endurance/activity tolerance;shortness of breath;strength  -           User Key   (r) = Recorded By, (t) = Taken By, (c) = Cosigned By    Initials Name Provider Type    Beatriz aGmez OT Occupational Therapist                 Mobility/ADL's     Row Name 09/06/22 1436          Bed Mobility    Bed Mobility supine-sit-supine  -     Supine-Sit-Supine [place] (Bed Mobility) minimum assist (75% patient effort)  -     Assistive Device (Bed Mobility) bed rails;head of bed elevated  -     Row Name 09/06/22 1436          Transfers    Transfers stand-sit transfer;sit-stand transfer  -     Comment, (Transfers) sit to stand trials X2; able to maintain standing 10-20 seconds  -     Sit-Stand [place] (Transfers) minimum assist (75% patient effort)  -     Stand-Sit [place] (Transfers) minimum assist (75% patient effort)  -           User Key  (r) = Recorded By, (t) = Taken By, (c) = Cosigned By    Initials Name Provider Type    Beatriz Gamez OT Occupational Therapist               Obj/Interventions     Row Name 09/06/22 1437          Motor Skills    Motor Skills functional endurance;motor control/coordination interventions  -     Functional Endurance fair, shortness of air with exertion requiring rest breaks  -     Motor Control/Coordination Interventions neuro-muscular re-education;therapeutic exercise/ROM  -     Row Name 09/06/22 1437          Neuromuscular Re-education    Interventions (Neuromuscular Re-education) facilitation/inhibition;weight bearing  -     Positioning (Neuromuscular Re-education) sitting  -     Comment (Neuromuscular Re-education) min resistance as tolerated through functional movement patterns in LUE X10-15 reps; education on joint alignment and compensation through upper trapezius  -           User Key  (r) = Recorded By, (t) = Taken By, (c) = Cosigned By    Initials Name Provider Type    Beatriz Gamez OT Occupational Therapist               Goals/Plan    No documentation.                Clinical Impression     Row Name 09/06/22 1439           Pain Assessment    Pretreatment Pain Rating 0/10 - no pain  -     Posttreatment Pain Rating 0/10 - no pain  -     Row Name 09/06/22 1439          Plan of Care Review    Plan of Care Reviewed With patient  -     Progress improving  -     Outcome Evaluation Patient seen for OT treatment. Fair functional endurance, but tolerated interventions well. Proximal LUE 3-/5 and distal LUE 3+/5 at this time. Minimal assist grossly with bed mobility and functional transfers. Continue plan of care.  -     Row Name 09/06/22 1439          Therapy Plan Review/Discharge Plan (OT)    Anticipated Discharge Disposition (OT) inpatient rehabilitation facility  -     Row Name 09/06/22 1439          Positioning and Restraints    Pre-Treatment Position in bed  -     Post Treatment Position bed  -     In Bed call light within reach  -           User Key  (r) = Recorded By, (t) = Taken By, (c) = Cosigned By    Initials Name Provider Type    Beatriz Gamez OT Occupational Therapist               Outcome Measures    No documentation.                   OT Recommendation and Plan     Plan of Care Review  Plan of Care Reviewed With: patient  Progress: improving  Outcome Evaluation: Patient seen for OT treatment. Fair functional endurance, but tolerated interventions well. Proximal LUE 3-/5 and distal LUE 3+/5 at this time. Minimal assist grossly with bed mobility and functional transfers. Continue plan of care.     Time Calculation:    Time Calculation- OT     Row Name 09/06/22 1440             Time Calculation- OT    OT Received On 09/06/22  -            User Key  (r) = Recorded By, (t) = Taken By, (c) = Cosigned By    Initials Name Provider Type    Beatriz Gamez OT Occupational Therapist              Therapy Charges for Today     Code Description Service Date Service Provider Modifiers Qty    [id number]  OT NEUROMUSC RE EDUCATION EA 15 MIN 9/6/2022 Beatriz Capellan OT GO 1    [id number]  OT THERAPEUTIC  ACT EA 15 MIN 9/6/2022 Beatriz Capellan, OT GO 1               Beatriz Capellan, OT  9/6/2022

## 2022-09-06 NOTE — CASE MANAGEMENT/SOCIAL WORK
Discharge Planning Assessment   Joey     Patient Name: Zaid Galarza  MRN: [id number]  Today's Date: 9/6/2022    Admit Date: 9/1/2022       Discharge Plan     Row Name 09/06/22 1514       Plan    Plan Pt has a  IPR following. SS was notified by Rehab admissions per Renuka who states they are still following pt but would like for pt's bp to improve before accepting him. SS notified Provider.SS to follow.           MAGEN Wiley

## 2022-09-06 NOTE — PLAN OF CARE
Goal Outcome Evaluation:   No acute changes during this shift. Pt has been up to commode, has ambulated with x1 assist. States he feels some better after change of bedding and clothing. He still has heparin gtt at 11.26/kg/hr, redraw in to be collected at 630AM. He has been held NPO as ordered after midnight. Call light within reach and use has been encouraged. Pt also had episode of chest pressure/mild chest pain earlier in shift. Ordered protocol for chest pain and alerted ROMULO Callejas. Pt was also very anxious, relieved by PRN order -- see MAR.

## 2022-09-06 NOTE — MBS/VFSS/FEES
Acute Care - Speech Language Pathology   Swallow Re-Evaluation [facility]   MODIFIED BARIUM SWALLOW STUDY     Patient Name: Zaid Galarza  : 1950  MRN: [id number]  Today's Date: 2022  Onset of Illness/Injury or Date of Surgery: (P) 22 (admission date)          Admit Date: 2022      Zaid Galarza  presents to the radiology suite this am from 3323/1P to participate in an instrumental MBS to assess safety/efficacy of swallowing fnx, determine safest/least restrictive diet. He has a medical hx significant for a-fib, CHF, COPD, CAD, HTN, and tobacco abuse. He reported to [facility] ED for evaluation of L-sided weakness.      Per neurology, pt is noted w/a subcortical lacunar stroke. He is noted w/ L-side weakness, slight L facial droop however able to retract both labial edges equally. He reports intact sensation to both sides of face equally, however is unaware of oral loss from L labial edge intermittently across evaluation.     He is s/p MBSS on 22 w/ recommendations for mechanical soft solids, chopped meats, and nectar thick liquids. He has been tolerating this modified po diet w/o complications.         Social History     Socioeconomic History   • Marital status: Single   Tobacco Use   • Smoking status: Current Every Day Smoker     Packs/day: 1.00     Types: Cigarettes   • Smokeless tobacco: Never Used   • Tobacco comment: down to 2 a day   Vaping Use   • Vaping Use: Never used   Substance and Sexual Activity   • Alcohol use: No   • Drug use: No   • Sexual activity: Defer        No recent chest xray available for review.     Diet Orders (active) (From admission, onward)     Start     Ordered    22 0951  Diet Soft Texture; Chopped; Nectar / Syrup Thick  Diet Effective Now        Comments: Medications whole in puree/nectars    22 0950    22 1206  DIET MESSAGE Pt has been NPO. Requests breakfast items please. NECTAR thick liquids.  Once        Comments: Pt has been NPO.  Requests breakfast items please. NECTAR thick liquids.    09/02/22 1207                He is observed on 2L NC w/o complications.     Risks and benefits of the procedure are explained w/ verbalizing understanding/agreement to participate.     Pt is positioned upright and centered in a soft strap supportive chair to accept multiple po presentations of solid cracker, puree, honey thick, nectar thick, and thin liquids via spoon, cup and straw, along w/ whole placebo pill in puree. Pt is able to self feed.     All views are from the lateral plane.     Facial/oral structures are symmetrical upon observation w/o lingual deviation upon protrusion. Oral mucosa are moist, pink and clean. Secretions are clear, thin and well controlled. OROM/VANESSA is slightly weak to imitate oral postures. Gag is not assessed. Volitional cough continues weak in intensity, clear in quality, nonproductive. Vocal quality is adequate in intensity, clear in quality w/ intelligible speech. Pt is a/a and cooperative to particpate.  Pt is oriented to person, place, and time, follows simple directives, and participates in simple conversational exchanges.     Upon po presentations, adequate bolus anticipation w/ good labial seal for bolus clearance via spoon bowl, cup rim stability and suction via straw.  Bolus formation, manipulation, and control are wfl w/ rotary mastication pattern. A-p transit is timely w/ slight oral residue remaining w/ all consistencies. Tongue base retraction is slightly delayed w/ vallecular pooling noted w/ loss to pyriforms w/ thin liquids. Linguavelar seal is adequate. No laryngeal penetration or aspiration is evidenced before the swallow.     Pharyngeal swallow is slightly delayed w/ weak and limited hyolaryngeal elevation and epiglottic inversion. Penetration and aspiration of nectar thick liquid via straw presentations during the swallow. Penetration and aspiration of thin liquids during the swallow. Pharyngeal contraction  is adequate w/o significant residue. No other laryngeal penetration or aspiration evidenced during or after the swallow.     Full esophageal sweep reveals no mucosal abnormalities. Motility is wfl w/o retrograde flow noted.          Visit Dx:     ICD-10-CM ICD-9-CM   1. Cerebrovascular accident (CVA), unspecified mechanism (HCC)  I63.9 434.91   2. Left-sided weakness  R53.1 728.87     Patient Active Problem List   Diagnosis   • Atrial flutter (HCC)   • Dilated cardiomyopathy (HCC)   • Nonobstructive CAD per cath 10/3/2019   • Anxiety disorder   • Essential hypertension   • Tobacco use   • History of abdominal aortic aneurysm (AAA) repair   • Dyslipidemia, goal LDL below 100   • CVA (cerebral vascular accident) (HCC)   • Cerebrovascular accident (CVA), unspecified mechanism (HCC)     Past Medical History:   Diagnosis Date   • Atrial fibrillation (HCC)    • CHF (congestive heart failure) (HCC)    • COPD (chronic obstructive pulmonary disease) (HCC)    • Coronary artery disease    • Hypertension    • Tobacco abuse      Past Surgical History:   Procedure Laterality Date   • ABDOMINAL AORTIC ANEURYSM REPAIR     • CARDIAC CATHETERIZATION N/A 10/03/2019    Procedure: Left Heart Cath;  Surgeon: Vincent Phillips MD;  Location: [facility] CATH INVASIVE LOCATION;  Service: Cardiology       IMPRESSION:  Mr Galarza presents w/ a mild oropharyngeal swallow w/ laryngeal penetration and aspiration of thin liquids and nectar thick liquids via straw presentations only. He is noted w/ significantly improved pharyngeal dysphagia and improved bolus formation during the oral phase of the swallow.     He is felt to most benefit from a continued modified po diet of mechanical soft solids, whole meats, and NECTAR THICK liquids via cup presentations only.  He will benefit from water protocol w/ thin water or ice chips provided between meals and medications.      SLP Recommendation and Plan       Recommendations:   1. Mechanical soft solids,  whole meats, NECTAR THICK liquids  2. Meds whole in puree/nectars.   3. Ramiro precautions.   4. Oral care protocol.   5. NO STRAWS.   6. Water protocol  7. Formal dysphagia tx.     SLP to f/u for formal dysphagia tx.     D/w pt results and recommendations w/ verbal understanding and agreement.     D/w RN results and recommendations w/ verbal understanding and agreement.     Thank you for allowing me to participate in the care of your patient-  Esme Hernandez MS CCC-SLP          EDUCATION  The patient has been educated in the following areas:   Dysphagia (Swallowing Impairment) Oral Care/Hydration Modified Diet Instruction.              Time Calculation:       Therapy Charges for Today     Code Description Service Date Service Provider Modifiers Qty    [id number]  ST TREATMENT SWALLOW 4 9/5/2022 Esme Hernnadez, MS CCC-SLP GN 1               Esme Hernandez MS CCC-SLP  9/6/2022

## 2022-09-06 NOTE — PROGRESS NOTES
LOS: 3 days     Name: Zaid Galarza  Age/Sex: 71 y.o. male  :  1950        PCP: Rosi Thacker APRN  REF: No Known Provider    Active Problems:    CVA (cerebral vascular accident) (HCC)    Cerebrovascular accident (CVA), unspecified mechanism (HCC)      Reason for follow-up: Intermittent Bradycardia and pauses    Subjective     Subjective      Mr. Galarza is a 71-year-old gentleman with history of apparently nonischemic dilated cardiomyopathy with improvement in his LV systolic function recently, nonobstructive coronary artery disease and chronic atrial fibrillation.  He was noted to have some pauses of about 3 to 4seconds.  Hence I have been asked to see him for consideration of permanent pacemaker implantation.    Interval History: Patient reports he is doing ok today. Remains in atrial fibrillation with lowest heart rates in the 50's with intermittent pauses less than 2.7 seconds. Does report some shortness of breath with exertion. On IV heparin. Creatinine worsened to 1.34.     Vital Signs  Temp:  [97.4 °F (36.3 °C)-98.1 °F (36.7 °C)] 97.9 °F (36.6 °C)  Heart Rate:  [60-85] 64  Resp:  [18] 18  BP: ()/(54-84) 93/54     Vital Signs (last 72 hrs)        0700  / 0659  0700   0659  0700   0659  0700   0841   Most Recent      Temp (°F) 98.2 -  98.4    97.9 -  98.4    97.4 -  98.1       97.9 (36.6) 652    Heart Rate 56 -  85    60 -  76    60 -  85       64 652    Resp   18    16 -  18      18       18 652    /93 -  146/94    107/80 -  147/77    93/54 -  131/84       93/54 652    SpO2 (%) 97 -  98    97 -  98    95 -  98       97 652        Documented weights    22 1640 22 1915 22 0549 22 0500   Weight: 104 kg (230 lb) 103 kg (226 lb 6.4 oz) 103 kg (226 lb 6.4 oz) 105 kg (232 lb 8 oz)    22 0500 22 0500   Weight: 108 kg (237 lb) 108 kg (237 lb 8 oz)      Body mass index is  32.21 kg/m².    Intake/Output Summary (Last 24 hours) at 9/6/2022 0841  Last data filed at 9/5/2022 1755  Gross per 24 hour   Intake 700 ml   Output 375 ml   Net 325 ml     Objective    Objective     I have seen and examined Mr. Galarza today.  Physical Exam:     General Appearance:    Alert, cooperative, in no acute distress   Head:    Normocephalic, without obvious abnormality, atraumatic   Eyes:            Conjunctivae and sclerae normal, no   icterus, no pallor, corneas clear.   Neck:   No adenopathy, supple, trachea midline, no thyromegaly, no   carotid bruit, no JVD   Lungs:     Clear to auscultation,respirations regular, even and                  unlabored    Heart:    irregular rhythm and normal rate, normal S1 and S2, no            murmur, no gallop, no rub, no click   Chest Wall:    No abnormalities observed   Abdomen:     Normal bowel sounds, no masses, no organomegaly, soft        nontender, nondistended, no guarding, no rebound                tenderness   Extremities:   Left sided upper and lower extremity weakness, no edema, no cyanosis, no redness   Pulses:   Pulses palpable and equal bilaterally   Skin:   No bleeding, bruising or rash       Neurologic:   Alert and oriented      Results review       Results Review:   Results from last 7 days   Lab Units 09/06/22  0007 09/05/22  0307 09/04/22  0838 09/02/22  0534 09/01/22  1643   WBC 10*3/mm3 12.81* 12.26* 10.68 7.78 10.07   HEMOGLOBIN g/dL 15.5 15.6 15.2 14.4 14.6   PLATELETS 10*3/mm3 145 133* 143 141 164     Results from last 7 days   Lab Units 09/06/22  0007 09/05/22  0307 09/04/22  0838 09/02/22  0534 09/01/22  1643 09/01/22  1642   SODIUM mmol/L 139 133* 136 136 135*  --    POTASSIUM mmol/L 4.2 4.4 4.2 4.0 4.0  --    CHLORIDE mmol/L 103 103 102 104 101  --    CO2 mmol/L 25.2 20.3* 26.1 22.2 25.6  --    BUN mg/dL 25* 19 16 9 11  --    CREATININE mg/dL 1.34* 1.04 1.07 1.02 1.21 1.20   CALCIUM mg/dL 9.0 8.7 8.7 8.5* 8.9  --    GLUCOSE mg/dL 92 97  113* 86 120*  --    ALT (SGPT) U/L  --  9 7 5 6  --    AST (SGOT) U/L  --  19 17 14 11  --      Results from last 7 days   Lab Units 09/06/22  0007 09/01/22  2103   TROPONIN T ng/mL <0.010 <0.010     Lab Results   Component Value Date    INR 1.28 (H) 09/04/2022    INR 1.12 (H) 09/01/2022    INR 1.07 09/30/2019    INR 1.11 (H) 09/29/2019     Lab Results   Component Value Date    MG 1.9 09/30/2019    MG 2.1 09/29/2019    MG 2.0 09/28/2019     Lab Results   Component Value Date    TSH 1.700 09/04/2022    TRIG 89 09/02/2022    HDL 31 (L) 09/02/2022    LDL 55 09/02/2022      Imaging Results (Last 48 Hours)     ** No results found for the last 48 hours. **        No results found for: BNP      Echo   Results for orders placed during the hospital encounter of 09/01/22    Adult Transthoracic Echo Complete W/ Cont if Necessary Per Protocol (With Agitated Saline)    Interpretation Summary  · Estimated left ventricular EF = 55% Left ventricular ejection fraction appears to be 51 - 55%. Left ventricular systolic function is normal.  · Left ventricular diastolic function was normal.  · No significant mitral or aortic valve regurgitation. Moderate sclerosis of both is noted.  · There is anterior pericardial fat pad but no effusion  · Left atrium is mildly enlarged  · Since prev study of 9/29/19 EF has increased from 30% to 55% and mitral, aortic and tricuspid regurgitation is no longer seen.       I reviewed the patient's new clinical results.    Telemetry: Atrial fibrillation 50-90 bpm with intermittent pauses up to 2.7 seconds last night.     Medication Review:   aspirin, 81 mg, Oral, Daily   Or  aspirin, 300 mg, Rectal, Daily  atorvastatin, 80 mg, Oral, Nightly  furosemide, 20 mg, Oral, Daily  nicotine, 1 patch, Transdermal, Q24H  potassium chloride, 10 mEq, Oral, Daily  predniSONE, 10 mg, Oral, Daily  sacubitril-valsartan, 1 tablet, Oral, BID  sertraline, 100 mg, Oral, Daily  sodium chloride, 10 mL, Intravenous,  Q12H        heparin, 9.26 Units/kg/hr, Last Rate: 9.26 Units/kg/hr (09/06/22 0739)        Assessment      Assessment:  1. Persistent/permanent atrial fibrillation with controlled ventricular response with ventricular rate in the 50s to 90s with a maximum pause of 2.7 seconds while patient was asleep last night.  Patient has been asymptomatic from the rhythm/rate standpoint.  2. Acute CVA with left hemiparesis.    Plan     Recommendations:  1. I do not see any absolute indication for permanent pacemaker implantation at this time since patient's rate is in the reasonable range and the pauses of improved and patient has been relatively asymptomatic from the heart rate standpoint.  I have discussed this with Mr. Galarza and is agreeable to hold off on the pacemaker implantation at this time.  I have also discussed with Dr. Hand his primary cardiologist.  We will see him as needed.  2. Continue to hold any AV ruben blocking agents or negative chronotropic agents such as beta-blockers or calcium channel blockers.    I discussed the patient's findings and my recommendations with patient and family.    Electronically signed by TIFFANIE Espinal, 09/06/22, 10:28 AM EDT.    Electronically signed by Chace Garcia MD, 09/06/22, 1:59 PM EDT.    Please note that portions of this note were completed with a voice recognition program.

## 2022-09-06 NOTE — PLAN OF CARE
Chart reviewed this am.     Mr Galarza is currently being held NPO as ordered by cardiology for procedure this date.     SLP to f/u.    Thank you-  Esme Hernandez M.S., CCC-SLP

## 2022-09-06 NOTE — PROGRESS NOTES
"    [facility] HOSPITALIST PROGRESS NOTE    S: patient seen and examined.  States overall feels well.  No CP    O: /74 (BP Location: Right arm, Patient Position: Lying)   Pulse 84   Temp 97.8 °F (36.6 °C) (Oral)   Resp 18   Ht 182.9 cm (72\")   Wt 108 kg (237 lb 8 oz)   SpO2 98%   BMI 32.21 kg/m²     GENERAL:  age appropriate, NAD  EARS,NOSE, MOUTH, THROAT:  MMM, hearing intact  EYES: PERRL, EOMI  CV:  NL S1/S2  RESPIRATORY:  CTAB no w/c/r  GI:  S/NT/ND +BS  SKIN: No rash or lesions. No nodules.  INT: No edema. No joint deformity.  PSYCH:  Normal affect, A+O x 3  NEURO: Alert and oriented, LUE slightly weaker than R      Scheduled Meds:apixaban, 5 mg, Oral, Q12H  aspirin, 81 mg, Oral, Daily  atorvastatin, 80 mg, Oral, Nightly  furosemide, 20 mg, Oral, Daily  nicotine, 1 patch, Transdermal, Q24H  potassium chloride, 10 mEq, Oral, Daily  predniSONE, 10 mg, Oral, Daily  sacubitril-valsartan, 1 tablet, Oral, BID  sertraline, 100 mg, Oral, Daily  sodium chloride, 10 mL, Intravenous, Q12H      Continuous Infusions:   PRN Meds:.heparin (porcine)  •  heparin (porcine)  •  melatonin  •  sodium chloride  •  sodium chloride    Labs: Laboratory information reviewed and reconciled.    Results from last 7 days   Lab Units 09/06/22  0007   WBC 10*3/mm3 12.81*         Results from last 7 days   Lab Units 09/06/22  0007   CO2 mmol/L 25.2   BUN mg/dL 25*   CREATININE mg/dL 1.34*   GLUCOSE mg/dL 92         Results from last 7 days   Lab Units 09/04/22  1426   INR  1.28*         Imaging (last 24 hours):    No radiology results for the last day   Assessment/Plan  # CVA  - cont ASA/statin  - IPR if able  - PT/OT    # Chronic afib/flutter  - >3s pauses noted initially, has improved off BB and CCB  - cardio following, patient ok to follow up outpatient  - AV ruben blocking agents held  - monitor on tele    # Chronic combined systolic/diastolic HF  - continue home eliquis, entresto and lasix    # COPD without " exacerbation    DVT Prophylaxis:eliquis  Disposition: continue to monitor  Discharge disposition: IPR if able  Prognosis: guarded    Marcellus Munoz Jr, MD  [facility] Hospitalist  09/06/22  11:05 EDT

## 2022-09-07 ENCOUNTER — HOSPITAL ENCOUNTER (INPATIENT)
Facility: HOSPITAL | Age: 72
LOS: 7 days | Discharge: NURSING, REHAB OR LONG-TERM CARE | End: 2022-09-14
Attending: FAMILY MEDICINE | Admitting: FAMILY MEDICINE

## 2022-09-07 VITALS
BODY MASS INDEX: 30.04 KG/M2 | TEMPERATURE: 97.3 F | OXYGEN SATURATION: 97 % | HEIGHT: 72 IN | RESPIRATION RATE: 18 BRPM | SYSTOLIC BLOOD PRESSURE: 125 MMHG | HEART RATE: 64 BPM | DIASTOLIC BLOOD PRESSURE: 61 MMHG | WEIGHT: 221.8 LBS

## 2022-09-07 LAB
ANION GAP SERPL CALCULATED.3IONS-SCNC: 11.4 MMOL/L (ref 5–15)
BASOPHILS # BLD AUTO: 0.04 10*3/MM3 (ref 0–0.2)
BASOPHILS NFR BLD AUTO: 0.4 % (ref 0–1.5)
BUN SERPL-MCNC: 31 MG/DL (ref 8–23)
BUN/CREAT SERPL: 25.6 (ref 7–25)
CALCIUM SPEC-SCNC: 8.6 MG/DL (ref 8.6–10.5)
CHLORIDE SERPL-SCNC: 103 MMOL/L (ref 98–107)
CO2 SERPL-SCNC: 22.6 MMOL/L (ref 22–29)
CREAT SERPL-MCNC: 1.21 MG/DL (ref 0.76–1.27)
DEPRECATED RDW RBC AUTO: 46.4 FL (ref 37–54)
EGFRCR SERPLBLD CKD-EPI 2021: 64.4 ML/MIN/1.73
EOSINOPHIL # BLD AUTO: 0.1 10*3/MM3 (ref 0–0.4)
EOSINOPHIL NFR BLD AUTO: 0.9 % (ref 0.3–6.2)
ERYTHROCYTE [DISTWIDTH] IN BLOOD BY AUTOMATED COUNT: 14.5 % (ref 12.3–15.4)
GLUCOSE SERPL-MCNC: 93 MG/DL (ref 65–99)
HCT VFR BLD AUTO: 43.1 % (ref 37.5–51)
HGB BLD-MCNC: 13.9 G/DL (ref 13–17.7)
IMM GRANULOCYTES # BLD AUTO: 0.06 10*3/MM3 (ref 0–0.05)
IMM GRANULOCYTES NFR BLD AUTO: 0.5 % (ref 0–0.5)
LYMPHOCYTES # BLD AUTO: 1.99 10*3/MM3 (ref 0.7–3.1)
LYMPHOCYTES NFR BLD AUTO: 17.6 % (ref 19.6–45.3)
MCH RBC QN AUTO: 28.4 PG (ref 26.6–33)
MCHC RBC AUTO-ENTMCNC: 32.3 G/DL (ref 31.5–35.7)
MCV RBC AUTO: 88.1 FL (ref 79–97)
MONOCYTES # BLD AUTO: 0.94 10*3/MM3 (ref 0.1–0.9)
MONOCYTES NFR BLD AUTO: 8.3 % (ref 5–12)
NEUTROPHILS NFR BLD AUTO: 72.3 % (ref 42.7–76)
NEUTROPHILS NFR BLD AUTO: 8.17 10*3/MM3 (ref 1.7–7)
NRBC BLD AUTO-RTO: 0 /100 WBC (ref 0–0.2)
PLATELET # BLD AUTO: 149 10*3/MM3 (ref 140–450)
PMV BLD AUTO: 12.5 FL (ref 6–12)
POTASSIUM SERPL-SCNC: 3.8 MMOL/L (ref 3.5–5.2)
RBC # BLD AUTO: 4.89 10*6/MM3 (ref 4.14–5.8)
SARS-COV-2 RNA RESP QL NAA+PROBE: NOT DETECTED
SODIUM SERPL-SCNC: 137 MMOL/L (ref 136–145)
WBC NRBC COR # BLD: 11.3 10*3/MM3 (ref 3.4–10.8)

## 2022-09-07 PROCEDURE — 97112 NEUROMUSCULAR REEDUCATION: CPT

## 2022-09-07 PROCEDURE — 85025 COMPLETE CBC W/AUTO DIFF WBC: CPT | Performed by: INTERNAL MEDICINE

## 2022-09-07 PROCEDURE — 97530 THERAPEUTIC ACTIVITIES: CPT

## 2022-09-07 PROCEDURE — 97110 THERAPEUTIC EXERCISES: CPT

## 2022-09-07 PROCEDURE — 80048 BASIC METABOLIC PNL TOTAL CA: CPT | Performed by: INTERNAL MEDICINE

## 2022-09-07 PROCEDURE — 97116 GAIT TRAINING THERAPY: CPT

## 2022-09-07 PROCEDURE — 63710000001 PREDNISONE PER 5 MG: Performed by: INTERNAL MEDICINE

## 2022-09-07 PROCEDURE — 99239 HOSP IP/OBS DSCHRG MGMT >30: CPT | Performed by: INTERNAL MEDICINE

## 2022-09-07 PROCEDURE — 99223 1ST HOSP IP/OBS HIGH 75: CPT | Performed by: FAMILY MEDICINE

## 2022-09-07 PROCEDURE — 87635 SARS-COV-2 COVID-19 AMP PRB: CPT | Performed by: INTERNAL MEDICINE

## 2022-09-07 RX ORDER — SODIUM CHLORIDE 0.9 % (FLUSH) 0.9 %
10 SYRINGE (ML) INJECTION EVERY 12 HOURS SCHEDULED
Status: DISCONTINUED | OUTPATIENT
Start: 2022-09-07 | End: 2022-09-09

## 2022-09-07 RX ORDER — CHOLECALCIFEROL (VITAMIN D3) 125 MCG
5 CAPSULE ORAL NIGHTLY PRN
Status: DISCONTINUED | OUTPATIENT
Start: 2022-09-07 | End: 2022-09-14 | Stop reason: HOSPADM

## 2022-09-07 RX ORDER — POTASSIUM CHLORIDE 750 MG/1
10 TABLET, FILM COATED, EXTENDED RELEASE ORAL DAILY
Status: DISCONTINUED | OUTPATIENT
Start: 2022-09-08 | End: 2022-09-12

## 2022-09-07 RX ORDER — SODIUM CHLORIDE 0.9 % (FLUSH) 0.9 %
10 SYRINGE (ML) INJECTION AS NEEDED
Status: CANCELLED | OUTPATIENT
Start: 2022-09-07

## 2022-09-07 RX ORDER — NICOTINE 21 MG/24HR
1 PATCH, TRANSDERMAL 24 HOURS TRANSDERMAL
Status: DISCONTINUED | OUTPATIENT
Start: 2022-09-08 | End: 2022-09-14 | Stop reason: HOSPADM

## 2022-09-07 RX ORDER — CHOLECALCIFEROL (VITAMIN D3) 125 MCG
5 CAPSULE ORAL NIGHTLY PRN
Status: CANCELLED | OUTPATIENT
Start: 2022-09-07

## 2022-09-07 RX ORDER — ASPIRIN 81 MG/1
81 TABLET, CHEWABLE ORAL DAILY
Status: DISCONTINUED | OUTPATIENT
Start: 2022-09-08 | End: 2022-09-14 | Stop reason: HOSPADM

## 2022-09-07 RX ORDER — HEPARIN SODIUM 5000 [USP'U]/ML
2500 INJECTION, SOLUTION INTRAVENOUS; SUBCUTANEOUS AS NEEDED
Status: CANCELLED | OUTPATIENT
Start: 2022-09-07

## 2022-09-07 RX ORDER — PREDNISONE 10 MG/1
10 TABLET ORAL
Status: DISCONTINUED | OUTPATIENT
Start: 2022-09-08 | End: 2022-09-14 | Stop reason: HOSPADM

## 2022-09-07 RX ORDER — ASPIRIN 81 MG/1
81 TABLET, CHEWABLE ORAL DAILY
Status: CANCELLED | OUTPATIENT
Start: 2022-09-08

## 2022-09-07 RX ORDER — ATORVASTATIN CALCIUM 40 MG/1
80 TABLET, FILM COATED ORAL NIGHTLY
Status: CANCELLED | OUTPATIENT
Start: 2022-09-07

## 2022-09-07 RX ORDER — NICOTINE 21 MG/24HR
1 PATCH, TRANSDERMAL 24 HOURS TRANSDERMAL
Status: CANCELLED | OUTPATIENT
Start: 2022-09-08

## 2022-09-07 RX ORDER — HEPARIN SODIUM 5000 [USP'U]/ML
5000 INJECTION, SOLUTION INTRAVENOUS; SUBCUTANEOUS AS NEEDED
Status: CANCELLED | OUTPATIENT
Start: 2022-09-07

## 2022-09-07 RX ORDER — SODIUM CHLORIDE 0.9 % (FLUSH) 0.9 %
10 SYRINGE (ML) INJECTION EVERY 12 HOURS SCHEDULED
Status: CANCELLED | OUTPATIENT
Start: 2022-09-07

## 2022-09-07 RX ORDER — PREDNISONE 10 MG/1
10 TABLET ORAL DAILY
Status: CANCELLED | OUTPATIENT
Start: 2022-09-08

## 2022-09-07 RX ORDER — ATORVASTATIN CALCIUM 40 MG/1
80 TABLET, FILM COATED ORAL NIGHTLY
Status: DISCONTINUED | OUTPATIENT
Start: 2022-09-07 | End: 2022-09-14 | Stop reason: HOSPADM

## 2022-09-07 RX ORDER — POTASSIUM CHLORIDE 750 MG/1
10 TABLET, FILM COATED, EXTENDED RELEASE ORAL DAILY
Status: CANCELLED | OUTPATIENT
Start: 2022-09-08

## 2022-09-07 RX ADMIN — POTASSIUM CHLORIDE 10 MEQ: 750 TABLET, FILM COATED, EXTENDED RELEASE ORAL at 09:40

## 2022-09-07 RX ADMIN — Medication 10 ML: at 21:21

## 2022-09-07 RX ADMIN — Medication 5 MG: at 21:19

## 2022-09-07 RX ADMIN — ASPIRIN 81 MG: 81 TABLET, CHEWABLE ORAL at 09:40

## 2022-09-07 RX ADMIN — SERTRALINE 100 MG: 50 TABLET, FILM COATED ORAL at 09:40

## 2022-09-07 RX ADMIN — ATORVASTATIN CALCIUM 80 MG: 40 TABLET, FILM COATED ORAL at 21:20

## 2022-09-07 RX ADMIN — PREDNISONE 10 MG: 10 TABLET ORAL at 09:40

## 2022-09-07 RX ADMIN — SACUBITRIL AND VALSARTAN 1 TABLET: 97; 103 TABLET, FILM COATED ORAL at 21:19

## 2022-09-07 RX ADMIN — Medication 10 ML: at 09:41

## 2022-09-07 RX ADMIN — NICOTINE 1 PATCH: 14 PATCH, EXTENDED RELEASE TRANSDERMAL at 09:39

## 2022-09-07 RX ADMIN — APIXABAN 5 MG: 5 TABLET, FILM COATED ORAL at 09:40

## 2022-09-07 RX ADMIN — APIXABAN 5 MG: 5 TABLET, FILM COATED ORAL at 21:20

## 2022-09-07 NOTE — PHARMACY PATIENT ASSISTANCE
Patient is getting eliquis per an assistance program without issues at this time according to Christy Malone.  She is checking with her office to see if an entresto application had been started as well and will let us know.  He never picked this up from his pharmacy due to cost.  We can try a free trial card at discharge to give time for the assistance program to be worked on if needed.    Thank You;  Snow Ontiveros, Zackary  09/07/22  17:24 EDT      Update: Entresto assistance program hadn't been approved yet due to them not being able to get in contact with patient. Christy Malone's office has provided pt with the company's phone number so he can call them to verify his information with them, and after that, he should be able to get his Entresto through the assistance program.  Thank you,  Nirmala Munoz, PharmD

## 2022-09-07 NOTE — CASE MANAGEMENT/SOCIAL WORK
Discharge Planning Assessment   Joey     Patient Name: Zaid Galarza  MRN: [id number]  Today's Date: 9/7/2022    Admit Date: 9/1/2022       Discharge Plan     Row Name 09/07/22 1157       Plan    Plan SS was notified by rehab admissions per Renuka who states they are able to take pt to day if he is medically stable. SS notified provider. SS to follow.                MAGEN Wiley

## 2022-09-07 NOTE — THERAPY TREATMENT NOTE
Acute Care - Physical Therapy Treatment Note  [facility]     Patient Name: Zaid Galarza  : 1950  MRN: [id number]  Today's Date: 2022   Onset of Illness/Injury or Date of Surgery: (P) 22 (admission date)  Visit Dx:     ICD-10-CM ICD-9-CM   1. Cerebrovascular accident (CVA), unspecified mechanism (HCC)  I63.9 434.91   2. Left-sided weakness  R53.1 728.87     Patient Active Problem List   Diagnosis   • Atrial flutter (HCC)   • Dilated cardiomyopathy (HCC)   • Nonobstructive CAD per cath 10/3/2019   • Anxiety disorder   • Essential hypertension   • Tobacco use   • History of abdominal aortic aneurysm (AAA) repair   • Dyslipidemia, goal LDL below 100   • CVA (cerebral vascular accident) (HCC)   • Cerebrovascular accident (CVA), unspecified mechanism (HCC)     Past Medical History:   Diagnosis Date   • Atrial fibrillation (HCC)    • CHF (congestive heart failure) (HCC)    • COPD (chronic obstructive pulmonary disease) (HCC)    • Coronary artery disease    • Hypertension    • Tobacco abuse      Past Surgical History:   Procedure Laterality Date   • ABDOMINAL AORTIC ANEURYSM REPAIR     • CARDIAC CATHETERIZATION N/A 10/03/2019    Procedure: Left Heart Cath;  Surgeon: Vincent Phillips MD;  Location: [facility] INVASIVE LOCATION;  Service: Cardiology     PT Assessment (last 12 hours)     PT Evaluation and Treatment     Row Name 22 1314          Physical Therapy Time and Intention    Subjective Information no complaints  -RF     Document Type therapy note (daily note)  -RF     Mode of Treatment physical therapy  -RF     Patient Effort good  -RF     Comment No significant c/o noted this date. SOA noted with increased exertion; cuing for PLB required for recovery. Pt eager to participate with treatment.  -RF     Row Name 22 1314          Bed Mobility    Comment, (Bed Mobility) Pt sitting in chair upon entering.  -RF     Row Name 22 1314          Transfers    Transfers sit-stand  transfer;stand-sit transfer;toilet transfer  -RF     Sit-Stand [place] (Transfers) minimum assist (75% patient effort);contact guard  -RF     Stand-Sit [place] (Transfers) contact guard;minimum assist (75% patient effort)  -RF     [place] Level (Toilet Transfer) contact guard;minimum assist (75% patient effort)  -RF     Assistive Device (Toilet Transfer) walker, front-wheeled  -RF     Row Name 09/07/22 1314          Sit-Stand Transfer    Assistive Device (Sit-Stand Transfers) walker, front-wheeled  -RF     Row Name 09/07/22 1314          Stand-Sit Transfer    Assistive Device (Stand-Sit Transfers) walker, front-wheeled  -RF     Row Name 09/07/22 1314          Toilet Transfer    Type (Toilet Transfer) stand pivot/stand step  -RF     Comment, (Toilet Transfer) Cues provided for proper technique with RW  -RF     Row Name 09/07/22 1314          Gait/Stairs (Locomotion)    Gait/Stairs Locomotion gait/ambulation assistive device  -RF     [place] Level (Gait) contact guard;minimum assist (75% patient effort)  -RF     Assistive Device (Gait) walker, front-wheeled  -RF     Distance in Feet (Gait) 300  -RF     Pattern (Gait) step-through  -RF     Deviations/Abnormal Patterns (Gait) base of support, narrow;other (see comments)  pt encouraged to utilize wider ANEESH w/ demonstration.  -RF     Comment, (Gait/Stairs) Cues required for proper technique with use of RW; pt demonstrates increased forward flexed posture. Pt provided cuing for PLB as dyspnea is noted with increased distance.  -     Row Name 09/07/22 1314          Safety Issues, Functional Mobility    Impairments Affecting Function (Mobility) endurance/activity tolerance;balance;shortness of breath  -     Row Name 09/07/22 1314          Balance    Dynamic Standing Balance contact guard;standby assist  Pt performed standing TE at [facility] sil with BUE support; pt performed TE in multiple planes with no significant c/o noted.  -     Row Name 09/07/22  1314          Motor Skills    Therapeutic Exercise hip;knee;ankle  -RF     Row Name 09/07/22 1314          Hip (Therapeutic Exercise)    Hip (Therapeutic Exercise) strengthening exercise  -RF     Hip Strengthening (Therapeutic Exercise) bilateral;flexion;extension;aBduction;aDduction;standing;2 sets;10 repetitions  -RF     Row Name 09/07/22 1314          Knee (Therapeutic Exercise)    Knee (Therapeutic Exercise) strengthening exercise  -RF     Knee Strengthening (Therapeutic Exercise) bilateral;flexion;extension;marching while standing;standing;2 sets;10 repetitions  -RF     Row Name 09/07/22 1314          Ankle (Therapeutic Exercise)    Ankle (Therapeutic Exercise) strengthening exercise  -RF     Ankle Strengthening (Therapeutic Exercise) bilateral;dorsiflexion;plantarflexion;standing;2 sets;10 repetitions  -RF     Row Name 09/07/22 1314          Positioning and Restraints    Pre-Treatment Position sitting in chair/recliner  -RF     Post Treatment Position chair  -RF     In Chair reclined;sitting;call light within reach;encouraged to call for assist  -RF     Row Name 09/07/22 1314          Progress Summary (PT)    Progress Toward Functional Goals (PT) progress toward functional goals as expected  -RF     Daily Progress Summary (PT) Good functional mobility and ambulation quality noted this date. Pt requires cuing for proper technique with RW as minimal unsteadiness and decreased safety awareness is noted. Adaquate LE strength noted. Conitnued skilled care required for further improvements to ensure maximum safe function upon D/C.  -RF           User Key  (r) = Recorded By, (t) = Taken By, (c) = Cosigned By    Initials Name Provider Type    RF Lindsey Mendoza, PTA Physical Therapist Assistant                  PT Recommendation and Plan     Progress Summary (PT)  Progress Toward Functional Goals (PT): progress toward functional goals as expected  Daily Progress Summary (PT): Good functional mobility and ambulation  quality noted this date. Pt requires cuing for proper technique with RW as minimal unsteadiness and decreased safety awareness is noted. Adaquate LE strength noted. Conitnued skilled care required for further improvements to ensure maximum safe function upon D/C.   Outcome Measures     Row Name 09/05/22 1238 09/05/22 1200          Modified [place] Scale    Pre-Stroke Modified [place] Scale 0 - No Symptoms at all.  -KR --     Modified [place] Scale 4 - Moderately severe disability.  Unable to walk without assistance, and unable to attend to own bodily needs without assistance.  -KR --            Functional Assessment    Outcome Measure Options -- Modified [place]  -KR           User Key  (r) = Recorded By, (t) = Taken By, (c) = Cosigned By    Initials Name Provider Type    Dequan Pak OT Occupational Therapist                 Time Calculation:    PT Charges     Row Name 09/07/22 1321             Time Calculation    PT Received On 09/07/22  -RF              Time Calculation- PT    Total Timed Code Minutes- PT 40 minute(s)  -RF            User Key  (r) = Recorded By, (t) = Taken By, (c) = Cosigned By    Initials Name Provider Type    Lindsey Pepper PTA Physical Therapist Assistant              Therapy Charges for Today     Code Description Service Date Service Provider Modifiers Qty    02980426958 HC GAIT TRAINING EA 15 MIN 9/7/2022 Lindsey Mendoza, PTA GP, CQ 1    51561610856 HC PT THER PROC EA 15 MIN 9/7/2022 Lindsey Mendoza PTA GP, CQ 1    67821965708 HC PT THERAPEUTIC ACT EA 15 MIN 9/7/2022 Lindsey Mendoza, PTA GP, CQ 1          PT G-Codes  Outcome Measure Options: Modified [place]  Modified [place] Scale: 4 - Moderately severe disability.  Unable to walk without assistance, and unable to attend to own bodily needs without assistance.    Lindsey Mendoza PTA  9/7/2022

## 2022-09-07 NOTE — THERAPY TREATMENT NOTE
Patient Name: Zaid Galarza  : 1950    MRN: [id number]                              Today's Date: 2022       Admit Date: 2022    Visit Dx:     ICD-10-CM ICD-9-CM   1. Cerebrovascular accident (CVA), unspecified mechanism (HCC)  I63.9 434.91   2. Left-sided weakness  R53.1 728.87     Patient Active Problem List   Diagnosis   • Atrial flutter (HCC)   • Dilated cardiomyopathy (HCC)   • Nonobstructive CAD per cath 10/3/2019   • Anxiety disorder   • Essential hypertension   • Tobacco use   • History of abdominal aortic aneurysm (AAA) repair   • Dyslipidemia, goal LDL below 100   • CVA (cerebral vascular accident) (HCC)   • Cerebrovascular accident (CVA), unspecified mechanism (HCC)     Past Medical History:   Diagnosis Date   • Atrial fibrillation (HCC)    • CHF (congestive heart failure) (HCC)    • COPD (chronic obstructive pulmonary disease) (HCC)    • Coronary artery disease    • Hypertension    • Tobacco abuse      Past Surgical History:   Procedure Laterality Date   • ABDOMINAL AORTIC ANEURYSM REPAIR     • CARDIAC CATHETERIZATION N/A 10/03/2019    Procedure: Left Heart Cath;  Surgeon: Vincent Phillips MD;  Location: [facility] CATH INVASIVE LOCATION;  Service: Cardiology      General Information     Row Name 22 1504          OT Time and Intention    Document Type therapy note (daily note)  -     Mode of Treatment individual therapy;occupational therapy  -     Row Name 22 1504          General Information    Patient Profile Reviewed yes  -     Row Name 22 1504          Cognition    Orientation Status (Cognition) oriented x 3  -     Row Name 22 1504          Safety Issues, Functional Mobility    Impairments Affecting Function (Mobility) endurance/activity tolerance;balance;shortness of breath  -           User Key  (r) = Recorded By, (t) = Taken By, (c) = Cosigned By    Initials Name Provider Type    Beatriz Gamez OT Occupational Therapist                  Mobility/ADL's     [facility] Name 09/07/22 1505          Transfers    Comment, (Transfers) sit to stands X5 reps X2 trials from recliner with contact guard assist  -           User Key  (r) = Recorded By, (t) = Taken By, (c) = Cosigned By    Initials Name Provider Type    Beatriz Gamez OT Occupational Therapist               Obj/Interventions     [facility] Name 09/07/22 1506          Motor Skills    Motor Skills functional endurance;motor control/coordination interventions  -     Functional Endurance good  -     Motor Control/Coordination Interventions neuro-muscular re-education  -[facility] Name 09/07/22 1506          Neuromuscular Re-education    Positioning (Neuromuscular Re-education) sitting  -     Comment (Neuromuscular Re-education) AROM through functional reach patterns X10-15 reps  -           User Key  (r) = Recorded By, (t) = Taken By, (c) = Cosigned By    Initials Name Provider Type    Beatriz Gamez OT Occupational Therapist               Goals/Plan    No documentation.                Clinical Impression     Row Name 09/07/22 1508          Pain Assessment    Pretreatment Pain Rating 0/10 - no pain  -     Posttreatment Pain Rating 0/10 - no pain  -KP     Row Name 09/07/22 1508          Plan of Care Review    Plan of Care Reviewed With patient  -     Outcome Evaluation Patient seen for OT treatment. Improved activation/strength demonstrated throughout LUE (3+ to 4-/5) but continues to fatigue quickly. Continue plan of care - pending discharge to [facility]).  -           User Key  (r) = Recorded By, (t) = Taken By, (c) = Cosigned By    Initials Name Provider Type    Beatriz Gamez OT Occupational Therapist               Outcome Measures     [facility] Name 09/07/22 1509          Modified [place] Scale    Pre-Stroke Modified [place] Scale 0 - No Symptoms at all.  -KP     Modified [place] Scale 4 - Moderately severe disability.  Unable to walk without assistance, and unable to attend to own bodily needs  without assistance.  -     Row Name 09/07/22 1509          Functional Assessment    Outcome Measure Options Modified [place]  -           User Key  (r) = Recorded By, (t) = Taken By, (c) = Cosigned By    Initials Name Provider Type    Beatriz Gamez OT Occupational Therapist                  OT Recommendation and Plan     Plan of Care Review  Plan of Care Reviewed With: patient  Progress: improving  Outcome Evaluation: Patient seen for OT treatment. Improved activation/strength demonstrated throughout LUE (3+ to 4-/5) but continues to fatigue quickly. Continue plan of care - pending discharge to [facility]).     Time Calculation:    Time Calculation- OT     Row Name 09/07/22 1509             Time Calculation- OT    OT Received On 09/07/22  -            User Key  (r) = Recorded By, (t) = Taken By, (c) = Cosigned By    Initials Name Provider Type    Beatriz Gamez OT Occupational Therapist              Therapy Charges for Today     Code Description Service Date Service Provider Modifiers Qty    34571486881 HC OT NEUROMUSC RE EDUCATION EA 15 MIN 9/6/2022 Beatriz Capellan OT GO 1    23905111033 HC OT THERAPEUTIC ACT EA 15 MIN 9/6/2022 Beatriz Capellan OT GO 1    15521268245 HC OT NEUROMUSC RE EDUCATION EA 15 MIN 9/7/2022 Beatriz Capellan OT GO 1    80381554170 HC OT THER PROC EA 15 MIN 9/7/2022 Beatriz Capellan OT GO 1               Beatriz Cpaellan OT  9/7/2022

## 2022-09-07 NOTE — PLAN OF CARE
Goal Outcome Evaluation:  Plan of Care Reviewed With: patient        Progress: no change  Outcome Evaluation: no changes or s/s of acute distress. Patient having 2 pauses, 2.4 seconds, and 2.9 seconds. GIN Callejas Notified of both occurences. will continue to monitor

## 2022-09-07 NOTE — CASE MANAGEMENT/SOCIAL WORK
Discharge Planning Assessment   Joey     Patient Name: Zaid Galarza  MRN: [id number]  Today's Date: 9/7/2022    Admit Date: 9/1/2022       Discharge Plan     Row Name 09/07/22 1411       Plan    Final Discharge Disposition Code 62 - inpatient rehab facility    Final Note Pt is being discharged to inpatient rehab on this date 9/7/22. SS notified RN with number to call report. No other needs identified at this time.               NGOC WileyW

## 2022-09-07 NOTE — PROGRESS NOTES
Rehabilitation Nursing  Inpatient Rehabilitation Plan of Care Note    Plan of Care  Pain    Pain Management (Active)  Current Status (9/7/2022 2:53:00 PM): potential for increased pain  Weekly Goal: pain managed  Discharge Goal: pain managed    Safety    Potential for Injury (Active)  Current Status (9/7/2022 2:53:00 PM): potential for falls  Weekly Goal: no falls  Discharge Goal: no falls    Signed by: Austyn Chavarria RN

## 2022-09-07 NOTE — DISCHARGE SUMMARY
[facility] HOSPITALISTS DISCHARGE SUMMARY    Patient Identification:  Name:  Zaid Galarza  Age:  71 y.o.  Sex:  male  :  1950  MRN:  [id number]  Visit Number:  [id number]    Date of Admission: 2022  Date of Discharge:  2022     PCP: Rosi Thacker APRN    DISCHARGE DIAGNOSIS:    1. Acute Right lacunar ischemic stroke in the subcortical region w/ residual left hemiparesis  2. Persistent/permanent atrial fibrillation on chronic AC  3. Intermittent Bradycardia and pauses, work up negative for need for Pacemaker  4. Nonobstructive coronary artery disease  5. Nonischemic dilated cardiomyopathy  6. Chronic combined systolic and diastolic CHF  7. COPD without exacerbation  8. Essential HTN  9. Tobacco abuse   10.  Medical Non-complaince      CONSULTS:  Consults     Date and Time Order Name Status Description    2022 12:54 PM Inpatient Cardiology Consult      2022  7:39 AM Inpatient Cardiology Consult Completed     2022  7:25 PM Inpatient Neurology Consult Stroke Completed             PROCEDURES PERFORMED:  None      HOSPITAL COURSE  Patient is a 71 y.o. male presented to Psychiatric complaining of   Chief Complaint   Patient presents with   • Stroke   .  Please see the admitting history and physical for further details.      Pt admitted with left-sided hemiparesis, found to have an acute ischemic stroke.  Counseled on medication compliance (consistently taking Eliquis), and smoking cessation for risk factor reduction.      Cardiology evaluated the patient, for intermittent bradycardia, and sinus pauses seen on telemetry.  They did not feel that he had a indication for pacemaker at this time.  They have held all his AV ruben blocking agents, and he is being discharged without metoprolol or diltiazem.  His blood pressures have been grossly well controlled on his Entresto.  His home apixaban has been restarted.    He is being discharged to inpatient rehab to  continue to improve his residual left-sided hemiparesis.     VITAL SIGNS:  Temp:  [97.3 °F (36.3 °C)-97.9 °F (36.6 °C)] 97.3 °F (36.3 °C)  Heart Rate:  [63-84] 64  Resp:  [18-22] 18  BP: ()/(52-79) 125/61  SpO2:  [94 %-97 %] 97 %  on  Flow (L/min):  [2] 2;   Device (Oxygen Therapy): nasal cannula    Body mass index is 30.08 kg/m².  Wt Readings from Last 3 Encounters:   09/07/22 101 kg (221 lb 12.8 oz)   06/09/22 109 kg (239 lb 6.4 oz)   12/09/21 98.9 kg (218 lb)       PHYSICAL EXAM:  Physical Exam  Vitals and nursing note reviewed.   Constitutional:       General: He is not in acute distress.     Appearance: Normal appearance. He is not ill-appearing or diaphoretic.   Eyes:      Extraocular Movements: Extraocular movements intact.      Pupils: Pupils are equal, round, and reactive to light.   Cardiovascular:      Rate and Rhythm: Normal rate and regular rhythm.      Heart sounds: Normal heart sounds.   Pulmonary:      Effort: Pulmonary effort is normal. No respiratory distress.      Breath sounds: Normal breath sounds. No wheezing or rales.   Abdominal:      General: Bowel sounds are normal. There is no distension.      Palpations: Abdomen is soft.      Tenderness: There is no abdominal tenderness. There is no guarding.   Musculoskeletal:      Right lower leg: No edema.      Left lower leg: No edema.   Skin:     General: Skin is warm and dry.   Neurological:      Mental Status: He is alert. Mental status is at baseline.      Motor: Weakness present.      Comments: Residual left hemiparesis   Psychiatric:         Mood and Affect: Mood normal.         Behavior: Behavior normal.         DISCHARGE DISPOSITION :  Stable    DISCHARGE MEDICATIONS:     Discharge Medications      ASK your doctor about these medications      Instructions Start Date   apixaban 5 MG tablet tablet  Commonly known as: ELIQUIS   5 mg, Oral, Every 12 Hours Scheduled      aspirin 81 MG EC tablet   81 mg, Oral, Daily      dilTIAZem  MG 12  hr capsule  Commonly known as: CARDIZEM SR   120 mg, Oral, 2 Times Daily      ezetimibe 10 MG tablet  Commonly known as: ZETIA   10 mg, Oral, Daily      furosemide 20 MG tablet  Commonly known as: Lasix   20 mg, Oral, Daily      metoprolol succinate XL 25 MG 24 hr tablet  Commonly known as: TOPROL-XL   12.5 mg, Oral, Daily      potassium chloride 10 MEQ CR tablet   10 mEq, Oral, Daily      predniSONE 10 MG tablet  Commonly known as: DELTASONE   10 mg, Oral, Daily      rosuvastatin 20 MG tablet  Commonly known as: CRESTOR   20 mg, Oral, Nightly      sacubitril-valsartan  MG tablet  Commonly known as: ENTRESTO   1 tablet, Oral, 2 Times Daily      sertraline 100 MG tablet  Commonly known as: ZOLOFT   100 mg, Oral, Daily                Follow-up Information     Rosi Thacker, TIFFANIE .    Specialty: Nurse Practitioner  Contact information:  [address]  [facility] [address]  [phone]                          TEST  RESULTS PENDING AT DISCHARGE:       CODE STATUS:  Code Status and Medical Interventions:   Ordered at: 09/01/22 1847     Code Status (Patient has no pulse and is not breathing):    CPR (Attempt to Resuscitate)     Medical Interventions (Patient has pulse or is breathing):    Full Support     Release to patient:    Routine Release       The ASCVD Risk score (Tabby RACHELLE Jr., et al., 2013) failed to calculate for the following reasons:    The patient has a prior MI or stroke diagnosis     Pablo Gao MD  09/07/22  12:30 EDT    Please note that this discharge summary required more than 30 minutes to complete.

## 2022-09-07 NOTE — PROGRESS NOTES
Patient Assessment Instrument  Quality Indicators - Admission    Section B. Hearing, Speech Vision  Expression of Ideas and Wants: Expresses complex messages without difficulty and  with speech that is clear and easy to understand.  Understanding Verbal and Non-Verbal Content: Understands: Clear comprehension  without cues or repetitions.    Section C. Cognitive Patterns      Section VR3821. Prior Functioning      Section UK0297. Prior Device Use      Section VR0299. Self Care Performance      Section PA4618. Self Care Discharge Goals      Section PE2925. Mobility Performance      Section AC2040. Mobility Discharge Goals      Section H. Bladder and Bowel      Section I. Active Diagnosis      Section J. Health Conditions      Section K. Swallowing/Nutritional Status      Section M. Skin Conditions      Section N. Medication      Section O. Special Treatments, Procedures, and Programs      OPTIONAL BRANCH FOR TRACKING FALLS  Fall(s) During Shift:    Signed by: Austyn Chavarria RN

## 2022-09-07 NOTE — PLAN OF CARE
Goal Outcome Evaluation: Patient has been very pleasant today. Compliant with all medications and care as ordered. He remain on 2L/NC, saturations maintaining well above 90%. He has been ambulating to the bathroom with nursing assistance, steady gait noted. He is to go to In Rehab on this date. COVID Swab Neg. Called report to Chloé RN, understanding verbalized. VSS during shift. Belongings packed and ready. Telemetry monitor removed and returned. Ranjana, patient's family made aware of patient's transfer. Awaiting transport. Will continue with plan of care.

## 2022-09-08 LAB
ANION GAP SERPL CALCULATED.3IONS-SCNC: 7.7 MMOL/L (ref 5–15)
BASOPHILS # BLD AUTO: 0.04 10*3/MM3 (ref 0–0.2)
BASOPHILS NFR BLD AUTO: 0.3 % (ref 0–1.5)
BUN SERPL-MCNC: 29 MG/DL (ref 8–23)
BUN/CREAT SERPL: 23.8 (ref 7–25)
CALCIUM SPEC-SCNC: 8.9 MG/DL (ref 8.6–10.5)
CHLORIDE SERPL-SCNC: 101 MMOL/L (ref 98–107)
CO2 SERPL-SCNC: 26.3 MMOL/L (ref 22–29)
CREAT SERPL-MCNC: 1.22 MG/DL (ref 0.76–1.27)
DEPRECATED RDW RBC AUTO: 47.6 FL (ref 37–54)
EGFRCR SERPLBLD CKD-EPI 2021: 63.8 ML/MIN/1.73
EOSINOPHIL # BLD AUTO: 0.22 10*3/MM3 (ref 0–0.4)
EOSINOPHIL NFR BLD AUTO: 1.7 % (ref 0.3–6.2)
ERYTHROCYTE [DISTWIDTH] IN BLOOD BY AUTOMATED COUNT: 14.5 % (ref 12.3–15.4)
GLUCOSE SERPL-MCNC: 114 MG/DL (ref 65–99)
HCT VFR BLD AUTO: 44.2 % (ref 37.5–51)
HGB BLD-MCNC: 14.3 G/DL (ref 13–17.7)
IMM GRANULOCYTES # BLD AUTO: 0.09 10*3/MM3 (ref 0–0.05)
IMM GRANULOCYTES NFR BLD AUTO: 0.7 % (ref 0–0.5)
LYMPHOCYTES # BLD AUTO: 2.39 10*3/MM3 (ref 0.7–3.1)
LYMPHOCYTES NFR BLD AUTO: 18.6 % (ref 19.6–45.3)
MAGNESIUM SERPL-MCNC: 1.9 MG/DL (ref 1.6–2.4)
MCH RBC QN AUTO: 29.1 PG (ref 26.6–33)
MCHC RBC AUTO-ENTMCNC: 32.4 G/DL (ref 31.5–35.7)
MCV RBC AUTO: 90 FL (ref 79–97)
MONOCYTES # BLD AUTO: 0.94 10*3/MM3 (ref 0.1–0.9)
MONOCYTES NFR BLD AUTO: 7.3 % (ref 5–12)
NEUTROPHILS NFR BLD AUTO: 71.4 % (ref 42.7–76)
NEUTROPHILS NFR BLD AUTO: 9.14 10*3/MM3 (ref 1.7–7)
NRBC BLD AUTO-RTO: 0 /100 WBC (ref 0–0.2)
PLATELET # BLD AUTO: 146 10*3/MM3 (ref 140–450)
PMV BLD AUTO: 12.1 FL (ref 6–12)
POTASSIUM SERPL-SCNC: 4.2 MMOL/L (ref 3.5–5.2)
RBC # BLD AUTO: 4.91 10*6/MM3 (ref 4.14–5.8)
SODIUM SERPL-SCNC: 135 MMOL/L (ref 136–145)
WBC NRBC COR # BLD: 12.82 10*3/MM3 (ref 3.4–10.8)

## 2022-09-08 PROCEDURE — 97167 OT EVAL HIGH COMPLEX 60 MIN: CPT

## 2022-09-08 PROCEDURE — 97530 THERAPEUTIC ACTIVITIES: CPT

## 2022-09-08 PROCEDURE — 63710000001 PREDNISONE PER 5 MG: Performed by: FAMILY MEDICINE

## 2022-09-08 PROCEDURE — 83735 ASSAY OF MAGNESIUM: CPT | Performed by: FAMILY MEDICINE

## 2022-09-08 PROCEDURE — 92610 EVALUATE SWALLOWING FUNCTION: CPT

## 2022-09-08 PROCEDURE — 97535 SELF CARE MNGMENT TRAINING: CPT

## 2022-09-08 PROCEDURE — 85025 COMPLETE CBC W/AUTO DIFF WBC: CPT | Performed by: FAMILY MEDICINE

## 2022-09-08 PROCEDURE — 92523 SPEECH SOUND LANG COMPREHEN: CPT

## 2022-09-08 PROCEDURE — 80048 BASIC METABOLIC PNL TOTAL CA: CPT | Performed by: FAMILY MEDICINE

## 2022-09-08 PROCEDURE — 97162 PT EVAL MOD COMPLEX 30 MIN: CPT

## 2022-09-08 PROCEDURE — 97110 THERAPEUTIC EXERCISES: CPT

## 2022-09-08 RX ADMIN — ATORVASTATIN CALCIUM 80 MG: 40 TABLET, FILM COATED ORAL at 20:47

## 2022-09-08 RX ADMIN — ASPIRIN 81 MG: 81 TABLET, CHEWABLE ORAL at 09:09

## 2022-09-08 RX ADMIN — Medication 5 MG: at 20:46

## 2022-09-08 RX ADMIN — NICOTINE 1 PATCH: 14 PATCH, EXTENDED RELEASE TRANSDERMAL at 09:09

## 2022-09-08 RX ADMIN — POTASSIUM CHLORIDE 10 MEQ: 750 TABLET, FILM COATED, EXTENDED RELEASE ORAL at 09:09

## 2022-09-08 RX ADMIN — APIXABAN 5 MG: 5 TABLET, FILM COATED ORAL at 09:09

## 2022-09-08 RX ADMIN — SACUBITRIL AND VALSARTAN 1 TABLET: 97; 103 TABLET, FILM COATED ORAL at 09:09

## 2022-09-08 RX ADMIN — APIXABAN 5 MG: 5 TABLET, FILM COATED ORAL at 20:46

## 2022-09-08 RX ADMIN — SERTRALINE 100 MG: 50 TABLET, FILM COATED ORAL at 09:09

## 2022-09-08 RX ADMIN — PREDNISONE 10 MG: 10 TABLET ORAL at 09:09

## 2022-09-08 NOTE — PROGRESS NOTES
Inpatient Rehabilitation Functional Measures Assessment and Plan of Care    Plan of Care   Self Care    Dressing (Lower) (Active)  Current Status (9/8/2022 10:50:00 AM): Jana/CGA  Weekly Goal: CGA  Discharge Goal: setup/supervision    Functional Measures  MAYNOR Eating:  MAYNOR Grooming:  MAYNOR Bathing:  MAYNOR Upper Body Dressing:  MAYNOR Lower Body Dressing:  MAYNOR Toileting:    MAYNOR Bladder Management  Level of Assistance:  Frequency/Number of Accidents this Shift:    MAYNOR Bowel Management  Level of Assistance:  Frequency/Number of Accidents this Shift:    MAYNOR Bed/Chair/Wheelchair Transfer:  MAYNOR Toilet Transfer:  MAYNOR Tub/Shower Transfer:    Previously Documented Mode of Locomotion at Discharge:  MAYNOR Expected Mode of Locomotion at Discharge:  MAYNOR Walk/Wheelchair:  MAYNOR Stairs:    MAYNOR Comprehension:  MAYNOR Expression:  MAYNOR Social Interaction:  MAYNOR Problem Solving:  MAYNOR Memory:    Therapy Mode Minutes  Occupational Therapy:  Physical Therapy:  Speech Language Pathology:    Discharge Functional Goals:    Signed by: Sade Galvan OT

## 2022-09-08 NOTE — PROGRESS NOTES
Patient Assessment Instrument  Quality Indicators - Admission    Section B. Hearing, Speech Vision      Section C. Cognitive Patterns      Section [id number]. Prior Functioning      Section SM3644. Prior Device Use      Section IU9812. Self Care Performance     Eating: [place] sets up or cleans up; patient completes activity. [place] assists  only prior to or following the activity.   Oral Hygiene: [place] sets up or cleans up; patient completes activity. [place]  assists only prior to or following the activity.   Toileting Hygiene: [place] does less than half the effort. Garfield lifts, holds  or supports trunk or limbs but provides less than half the effort.   Shower/Bathe Self: [place] does less than half the effort. [place] lifts, holds  or supports trunk or limbs but provides less than half the effort.   Upper Body Dressing: [place] does less than half the effort. Garfield lifts, holds  or supports trunk or limbs but provides less than half the effort.   Lower Body Dressing: [place] does less than half the effort. Garfield lifts, holds  or supports trunk or limbs but provides less than half the effort.   Putting On/Taking Off Footwear: [place] does less than half the effort. Garfield  lifts, holds or supports trunk or limbs but provides less than half the effort.    Section SE6639. Self Care Discharge Goals     Eating: Patient completed the activities by him/herself with no assistance from  a helper.   Oral Hygiene: [place] sets up or cleans up; patient completes activity. [place]  assists only prior to or following the activity.   Toileting Hygiene: [place] provides verbal cues or touching/steadying assistance  as patient completes activity.   Shower/Bathe Self: [place] provides verbal cues or touching/steadying assistance  as patient completes activity.   Upper Body Dressing: [place] sets up or cleans up; patient completes activity.  [place] assists only prior to or following the activity.   Lower Body Dressing: [place] provides verbal  cues or touching/steadying  assistance as patient completes activity.   Putting On/Taking Off Footwear: [place] provides verbal cues or  touching/steadying assistance as patient completes activity.    Section WW2630. Mobility Performance      Section IG2659. Mobility Discharge Goals      Section H. Bladder and Bowel      Section I. Active Diagnosis      Section J. Health Conditions      Section K. Swallowing/Nutritional Status      Section M. Skin Conditions      Section N. Medication      Section O. Special Treatments, Procedures, and Programs      OPTIONAL BRANCH FOR TRACKING FALLS  Fall(s) During Shift:    Signed by: Sade Galvan OT

## 2022-09-08 NOTE — PLAN OF CARE
DYSPHAGIA THERAPY PLAN OF CARE:    Zaid Galarza will benefit from formal dysphagia therapy x3-5 days per week at 15-60 minutes sessions, as functionally tolerated, for 7-21 Days, to address:    Long Term Goal:  Pt will accept least restrictive diet tolerance w/o overt s/s aspiration.     Short Term Goals:     1. Pt will perform resistive breathing exercises x3 sets x5 reps w/resistance of 1-6 to improve respiratory support and control.     4. Pt will perform laryngeal adduction/elevation exercises x3 sets x10 reps w/ min cues.     5. Pt will perform CTAR/Shaker exercises sustained x3 sets x5 reps for 30+ seconds over 3 consecutive sessions.     6. Pt will perform CTAR/Shaker exercises repetitive x3 sets x12 reps w/ mod cues.     7. Pt will perform compensatory techniques during meals w/ min cues.    8. Pt will participate in instrumental re-evaluation of swallowing fnx in 7-10 days, pending progress towards this poc.    Thank you-  Esme Hernandez M.S., CCC-SLP        Goal Outcome Evaluation:

## 2022-09-08 NOTE — PLAN OF CARE
Problem: Fall Injury Risk  Goal: Absence of Fall and Fall-Related Injury  Outcome: Ongoing, Progressing  Intervention: Identify and Manage Contributors  Recent Flowsheet Documentation  Taken 9/8/2022 0810 by Sandro Erwin, RN  Medication Review/Management: medications reviewed  Intervention: Promote Injury-Free Environment  Recent Flowsheet Documentation  Taken 9/8/2022 1800 by Sandro Erwin, RN  Safety Promotion/Fall Prevention: safety round/check completed  Taken 9/8/2022 1600 by Sandro Erwin, RN  Safety Promotion/Fall Prevention: safety round/check completed  Taken 9/8/2022 1400 by Sandro Erwin, RN  Safety Promotion/Fall Prevention: safety round/check completed  Taken 9/8/2022 1200 by Sandro Erwin RN  Safety Promotion/Fall Prevention: safety round/check completed  Taken 9/8/2022 1000 by Sandro Erwin, RN  Safety Promotion/Fall Prevention: safety round/check completed  Taken 9/8/2022 0810 by Sandro Erwin RN  Safety Promotion/Fall Prevention:   safety round/check completed   fall prevention program maintained   clutter free environment maintained   assistive device/personal items within reach     Problem: Rehabilitation (IRF) Plan of Care  Goal: Plan of Care Review  Outcome: Ongoing, Progressing  Flowsheets (Taken 9/8/2022 1231)  Progress: improving  Plan of Care Reviewed With: patient  Goal: Patient-Specific Goal (Individualized)  Outcome: Ongoing, Progressing  Goal: Absence of New-Onset Illness or Injury  Outcome: Ongoing, Progressing  Intervention: Prevent Fall and Fall Injury  Recent Flowsheet Documentation  Taken 9/8/2022 1800 by Sandro Erwin RN  Safety Promotion/Fall Prevention: safety round/check completed  Taken 9/8/2022 1600 by Sandro Erwin, RN  Safety Promotion/Fall Prevention: safety round/check completed  Taken 9/8/2022 1400 by Sandro Erwin, RN  Safety Promotion/Fall Prevention: safety round/check completed  Taken 9/8/2022 1200 by Sandro Erwin, RN  Safety  Promotion/Fall Prevention: safety round/check completed  Taken 9/8/2022 1000 by Sandro Erwin, RN  Safety Promotion/Fall Prevention: safety round/check completed  Taken 9/8/2022 0810 by Sandro Erwin, RN  Safety Promotion/Fall Prevention:   safety round/check completed   fall prevention program maintained   clutter free environment maintained   assistive device/personal items within reach  Goal: Optimal Comfort and Wellbeing  Outcome: Ongoing, Progressing  Goal: Home and Community Transition Plan Established  Outcome: Ongoing, Progressing     Problem: Mobility Impairment  Goal: Optimal Mobility [place] and Safety  Outcome: Ongoing, Progressing     Problem: Swallowing Impairment  Goal: Optimal Eating/Swallowing without Aspir  Outcome: Ongoing, Progressing   Goal Outcome Evaluation:  Plan of Care Reviewed With: patient        Progress: improving

## 2022-09-08 NOTE — THERAPY EVALUATION
Inpatient Rehabilitation - Physical Therapy Initial Evaluation        Joey     Patient Name: Zaid Galarza  : 1950  MRN: [id number]    Today's Date: 2022                    Admit Date: 2022      Visit Dx:   No diagnosis found.    Patient Active Problem List   Diagnosis   • Atrial flutter (HCC)   • Dilated cardiomyopathy (HCC)   • Nonobstructive CAD per cath 10/3/2019   • Anxiety disorder   • Essential hypertension   • Tobacco use   • History of abdominal aortic aneurysm (AAA) repair   • Dyslipidemia, goal LDL below 100   • CVA (cerebral vascular accident) (HCC)   • Cerebrovascular accident (CVA), unspecified mechanism (HCC)       Past Medical History:   Diagnosis Date   • Atrial fibrillation (HCC)    • CHF (congestive heart failure) (HCC)    • COPD (chronic obstructive pulmonary disease) (HCC)    • Coronary artery disease    • Hypertension    • Tobacco abuse        Past Surgical History:   Procedure Laterality Date   • ABDOMINAL AORTIC ANEURYSM REPAIR     • CARDIAC CATHETERIZATION N/A 10/03/2019    Procedure: Left Heart Cath;  Surgeon: Vincent Phillips MD;  Location: [facility] INVASIVE LOCATION;  Service: Cardiology       PT ASSESSMENT (last 12 hours)     IRF PT Evaluation and Treatment     Row Name 22 1500          PT Time and Intention    Document Type initial evaluation  -KM     Mode of Treatment individual therapy;physical therapy  -KM     Row Name 22 1500          General Information    Patient Profile Reviewed yes  -KM     Existing Precautions/Restrictions fall;swallow precautions  -KM     Row Name 22 1500          Living Environment    Current Living Arrangements home  -KM     Home Accessibility stairs within home;other (see comments)  12  -KM     People in Home alone  -KM     Primary Care Provided by self  -KM     Row Name 22 1500          Stairs Within Home, Primary    Number of Stairs, Within Home, Primary twelve  -KM     Row Name 22 1500          Home  Use of Assistive/Adaptive Equipment    Equipment Currently Used at Home none  -KM     Row Name 09/08/22 1500          Cognition/Psychosocial    Affect/Mental Status (Cognition) WFL  -KM     Orientation Status (Cognition) oriented x 4  -KM     Follows Commands (Cognition) WFL  -KM     Row Name 09/08/22 1500          Range of Motion (ROM)    Range of Motion other (see comments)  BLE ROM WFL  -KM     Row Name 09/08/22 1500          Strength (Manual Muscle Testing)    Strength (Manual Muscle Testing) bilateral lower extremities  BLE strength grossly 4/5  -KM     Row Name 09/08/22 1500          Bed Mobility    Bed Mobility bed mobility (all) activities  -KM     All Activities, [place] (Bed Mobility) standby assist  -KM     Row Name 09/08/22 1500          Transfer Assessment/Treatment    Transfers bed-chair transfer;chair-bed transfer;sit-stand transfer;stand-sit transfer  -KM     Row Name 09/08/22 1500          Transfers    Bed-Chair [place] (Transfers) contact guard  -KM     Chair-Bed [place] (Transfers) contact guard  -     Assistive Device (Bed-Chair Transfers) wheelchair  -KM     Sit-Stand [place] (Transfers) contact guard  -KM     Stand-Sit [place] (Transfers) contact guard  -KM     Row Name 09/08/22 1500          Chair-Bed Transfer    Assistive Device (Chair-Bed Transfers) wheelchair  -KM     Row Name 09/08/22 1500          Sit-Stand Transfer    Assistive Device (Sit-Stand Transfers) wheelchair  -KM     Row Name 09/08/22 1500          Stand-Sit Transfer    Assistive Device (Stand-Sit Transfers) wheelchair  -[facility] Name 09/08/22 1500          Gait/Stairs (Locomotion)    Gait/Stairs Locomotion gait/ambulation assistive device  -KM     [place] Level (Gait) contact guard  -     Assistive Device (Gait) walker, front-wheeled  -KM     Distance in Feet (Gait) 80  x2 bouts  -KM     Pattern (Gait) step-through  -KM     Deviations/Abnormal Patterns (Gait) base of support, narrow;other  (see comments)  -KM     Row Name 09/08/22 1500          Safety Issues, Functional Mobility    Impairments Affecting Function (Mobility) endurance/activity tolerance;balance;shortness of breath  -KM     Row Name 09/08/22 1500          Balance    Balance Assessment sitting static balance;standing dynamic balance  -KM     Static Sitting Balance standby assist  -KM     Position, Sitting Balance sitting edge of bed  -KM     Dynamic Standing Balance contact guard  -KM     Position/Device Used, Standing Balance walker, front-wheeled  -KM     Row Name 09/08/22 1500          Motor Skills    Therapeutic Exercise hip;knee;ankle  -KM     Row Name 09/08/22 1500          Hip (Therapeutic Exercise)    Hip (Therapeutic Exercise) strengthening exercise  -KM     Hip Strengthening (Therapeutic Exercise) bilateral;flexion;aBduction;aDduction;3 lb free weight  -KM     Row Name 09/08/22 1500          Knee (Therapeutic Exercise)    Knee (Therapeutic Exercise) strengthening exercise  -KM     Knee Strengthening (Therapeutic Exercise) bilateral;flexion;extension;3 lb free weight  -KM     Row Name 09/08/22 1500          Ankle (Therapeutic Exercise)    Ankle (Therapeutic Exercise) strengthening exercise  -KM     Ankle Strengthening (Therapeutic Exercise) bilateral;dorsiflexion;plantarflexion  -KM     Row Name 09/08/22 1500          Therapy Assessment/Plan (PT)    Patient's Goals For Discharge return home;take care of myself at home  -KM     Row Name 09/08/22 1500          Therapy Assessment/Plan (PT)    Functional Level at Time of Evaluation (PT) CGA  -KM     PT Diagnosis (PT) lower extremity weakness  -KM     Rehab Potential/Prognosis (PT) good, to achieve stated therapy goals  -KM     Frequency of Treatment (PT) 5 times per week  -KM     Estimated Duration of Therapy (PT) 2 weeks  -KM     Problem List (PT) problems related to;balance;motor control;mobility  -KM     Activity Limitations Related to Problem List (PT) unable to ambulate  safely;unable to transfer safely  -KM     Row Name 09/08/22 1500          Therapy Plan Review/Discharge Plan (PT)    Therapy Plan Review (PT) evaluation/treatment results reviewed;care plan/treatment goals reviewed;risks/benefits reviewed;patient  -KM     Anticipated Discharge Disposition (PT) home;home with assist  -KM     Row Name 09/08/22 1500          IRF PT Goals    Bed Mobility Goal Selection (PT-IRF) bed mobility, PT goal 1  -KM     Transfer Goal Selection (PT-IRF) transfers, PT goal 1  -KM     Gait (Walking Locomotion) Goal Selection (PT-IRF) gait, PT goal 1  -KM     Row Name 09/08/22 1500          Bed Mobility Goal 1 (PT-IRF)    Activity/Assistive Device (Bed Mobility Goal 1, PT-IRF) bed mobility activities, all  -KM     [place] Level (Bed Mobility Goal 1, PT-IRF) independent  -KM     Time Frame (Bed Mobility Goal 1, PT-IRF) by discharge  -KM     Row Name 09/08/22 1500          Transfer Goal 1 (PT-IRF)    Activity/Assistive Device (Transfer Goal 1, PT-IRF) sit-to-stand/stand-to-sit;bed-to-chair/chair-to-bed  -KM     [place] Level (Transfer Goal 1, PT-IRF) independent  -KM     Time Frame (Transfer Goal 1, PT-IRF) by discharge  -KM     Row Name 09/08/22 1500          Gait/Walking Locomotion Goal 1 (PT-IRF)    Activity/Assistive Device (Gait/Walking Locomotion Goal 1, PT-IRF) gait (walking locomotion);assistive device use  -KM     Gait/Walking Locomotion Distance Goal 1 (PT-IRF) 320'  -KM     [place] Level (Gait/Walking Locomotion Goal 1, PT-IRF) standby assist  -KM     Time Frame (Gait/Walking Locomotion Goal 1, PT-IRF) by discharge  -           User Key  (r) = Recorded By, (t) = Taken By, (c) = Cosigned By    Initials Name Provider Type    Dionicio Nur, PT Physical Therapist                 Physical Therapy Education                 Title: PT OT SLP Therapies (Done)     Topic: Physical Therapy (Done)     Point: Mobility training (Done)     Learning Progress Summary           Patient  Acceptance, E,TB, VU by MERE at 9/8/2022 1538                   Point: Home exercise program (Done)     Learning Progress Summary           Patient Acceptance, E,TB, VU by KM at 9/8/2022 1538                   Point: Body mechanics (Done)     Learning Progress Summary           Patient Acceptance, E,TB, VU by KM at 9/8/2022 1538                   Point: Precautions (Done)     Learning Progress Summary           Patient Acceptance, E,TB, VU by KM at 9/8/2022 1538                               User Key     Initials Effective Dates Name Provider Type Discipline     05/24/22 -  Dionicio Zamarripa, PT Physical Therapist PT                PT Recommendation and Plan    Planned Therapy Interventions (PT): balance training, bed mobility training, gait training, home exercise program, neuromuscular re-education, patient/family education, postural re-education, ROM (range of motion), strengthening, stretching, transfer training  Frequency of Treatment (PT): 5 times per week                     Time Calculation:      PT Charges     Row Name 09/08/22 1525             Time Calculation    Start Time 0745  -KM      Stop Time 0915  -KM      Time Calculation (min) 90 min  -KM      PT Received On 09/08/22  -KM      PT Goal Re-Cert Due Date 09/22/22  -KM              Time Calculation- PT    Total Timed Code Minutes- PT 90 minute(s)  -KM            User Key  (r) = Recorded By, (t) = Taken By, (c) = Cosigned By    Initials Name Provider Type    Dionicio Nur, PT Physical Therapist                Therapy Charges for Today     Code Description Service Date Service Provider Modifiers Qty    [id number]  PT EVAL MOD COMPLEXITY 2 9/8/2022 Dionicio Zamarripa, PT GP 1    [id number]  PT THERAPEUTIC ACT EA 15 MIN 9/8/2022 Dionicio Zamarripa, PT GP 2    [id number]  PT THER PROC EA 15 MIN 9/8/2022 Dionicio Zamarripa, PT GP 2                   Dionicio Zamarripa PT  9/8/2022

## 2022-09-08 NOTE — PROGRESS NOTES
Inpatient Rehabilitation Functional Measures Assessment and Plan of Care    Plan of Care      Functional Measures  MAYNOR Eating:  MAYNOR Grooming:  MAYNOR Bathing:  MAYNOR Upper Body Dressing:  MAYNOR Lower Body Dressing:  MAYNOR Toileting:    MAYNOR Bladder Management  Level of Assistance:  Frequency/Number of Accidents this Shift:    MAYNOR Bowel Management  Level of Assistance:  Frequency/Number of Accidents this Shift:    MAYNOR Bed/Chair/Wheelchair Transfer:  Bed/chair/wheelchair Transfer Score = 4.  Patient performs 75% or more of effort and minimal assistance (little/incidental  help/lifting of one limb/steadying) for transferring to and from the  bed/chair/wheelchair, requiring: Steadying. No assistive devices were required.    MAYNOR Toilet Transfer:  MAYNOR Tub/Shower Transfer:    Previously Documented Mode of Locomotion at Discharge:  MAYNOR Expected Mode of Locomotion at Discharge: The expected mode of most  frequently used locomotion, at discharge, is expected to be walking.  MAYNOR Walk/Wheelchair:  WHEELCHAIR OBSERVATION   Wheelchair did not occur.    WALK OBSERVATION   Walk Distance Scale = 2.  Distance walked is 50 -149 feet. Walk Score = 2.  Patient performs 75% or more of effort and requires minimal assistance.  Incidental assistance, contact guard or steadying was provided. Patient walked a  distance of 80 feet. No assistive devices were required.  MAYNOR Stairs:  Stairs Score = 4.  Incidental help/contact guard/steadying was  provided. Patient performs 75% or more of effort and requires minimal  assistance. Patient negotiated 20 stairs. Patient requires the following  assistive device(s): Handrail(s).    MAYNOR Comprehension:  MAYNOR Expression:  MAYNOR Social Interaction:  MAYNOR Problem Solving:  MAYNOR Memory:    Therapy Mode Minutes  Occupational Therapy:  Physical Therapy: Individual: 90 minutes.  Speech Language Pathology:    Discharge Functional Goals:    Signed by: Dionicio Zamarripa PT

## 2022-09-08 NOTE — H&P
[facility] HOSPITALIST HISTORY AND PHYSICAL    Patient Identification:  Name:  Zaid Galarza  Age:  71 y.o.  Sex:  male  :  1950  MRN:  [id number]   Visit Number:  [id number]  Room number:  110/2S  Primary Care Physician:  Rosi Thacker, TIFFANIE     Subjective     2022   Chief complaint:  No chief complaint on file.      History of presenting illness:  71 y.o. male  This pt is seen today for post admission physician evaluation to the inpatient rehabilitation facility.  Patient is a 71-year-old gentleman with a history of atrial fibrillation, congestive heart failure, COPD, CAD, hypertension, tobacco abuse, history of AAA repair endovascularly.  Patient states that he when he was at home he noticed that his left upper extremity was a little weaker and initially did not think much of this.  States that when he awoke the next morning he was weak on the left side and noticed that he is very clumsy with his left hand.  Patient became concerned and was brought in the emergency department and was diagnosed with right basal ganglia/internal capsule stroke.  Patient was evaluated by teleneurology on the floor and was started on Plavix, aspirin and statin therapy.  Patient did have echocardiogram showed no evidence of mural thrombus but a EF of 55 to 60%.  Patient did have some difficulties on swallow evaluation was placed on mechanical soft chopped meats and nectar thickened liquids with meds and puréed and nectar's.  Patient did have atrial fibrillation which is chronic with slow ventricular response at times with with greater than 3-second pauses at night.  Consequently AV ruben blocking agents were discontinued and patient has had improvement in this area.  Patient states that his strength is much improved from the time of the stroke that he has been able to ambulate somewhat and has reasonably good strength at this time.  In any event patient is thought to be a good candidate for  inpatient physical rehab as he does live alone.      Patient continued to progress medically.  Physical therapy and Occupational therapy evaluations were completed with recommended acute inpatient rehabilitation referral for continued functional mobility intervention and reeducation.  Acute rehab referral ordered for continued medical monitoring and management post prolonged hospitalization, continued respiratory status monitoring, lab monitoring, pain mgt needs, bowel/bladder care with new medication education, skin monitoring and breakdown prevention along with ongoing medical comorbidities that require ongoing care.    The preadmission mini-FIM score as assessed by the referring facility as follows: Eating 5; grooming 5; bathing 4; dressing upper body 5; dressing lower body for; toileting for; transfers to bed chair and wheelchair for; transfers to toilet for; locomotion 4; bladder management 6; bowel management 6; comprehension 7; expression 7; substractions 7; problem solving 7; memory 7.  Estimated length of stay 10 to 14 days.    ---------------------------------------------------------------------------------------------------------------------   Review of Systems:  General: Denies fever denies chills  HEENT: Negative  Heart: Denies chest pain or palpitations  Lungs: Has intermittent cough.  Patient has been a heavy smoker in the past.  States he normally does not require O2 at home  Abdomen: Negative  : Negative  Musculoskeletal: Negative  Neuro: Mild left-sided weakness; some difficulty swallowing  Skin: Negative  ---------------------------------------------------------------------------------------------------------------------   Past Medical History:   Diagnosis Date   • Atrial fibrillation (HCC)    • CHF (congestive heart failure) (HCC)    • COPD (chronic obstructive pulmonary disease) (HCC)    • Coronary artery disease    • Hypertension    • Tobacco abuse      Past Surgical History:   Procedure  Laterality Date   • ABDOMINAL AORTIC ANEURYSM REPAIR     • CARDIAC CATHETERIZATION N/A 10/03/2019    Procedure: Left Heart Cath;  Surgeon: Vincent Phillips MD;  Location: [facility] CATH INVASIVE LOCATION;  Service: Cardiology     Family History   Problem Relation Age of Onset   • Heart disease Mother    • Stroke Father    • Heart disease Father      Social History     Socioeconomic History   • Marital status: Single   Tobacco Use   • Smoking status: Current Every Day Smoker     Packs/day: 1.00     Types: Cigarettes   • Smokeless tobacco: Never Used   • Tobacco comment: down to 2 a day   Vaping Use   • Vaping Use: Never used   Substance and Sexual Activity   • Alcohol use: No   • Drug use: No   • Sexual activity: Defer     ---------------------------------------------------------------------------------------------------------------------   Allergies:  Patient has no known allergies.  ---------------------------------------------------------------------------------------------------------------------   Medications below are reported home medications pulling from within the system; at this time, these medications have not been reconciled unless otherwise specified and are in the verification process for further verifcation as current home medications.    Prior to Admission Medications     Prescriptions Last Dose Informant Patient Reported? Taking?    apixaban (ELIQUIS) 5 MG tablet tablet Unknown Pharmacy No No    Take 1 tablet by mouth Every 12 (Twelve) Hours. Indications: DVT/PE (active thrombosis)    aspirin (aspirin) 81 MG EC tablet Unknown Pharmacy No No    Take 1 tablet by mouth Daily.    dilTIAZem SR (CARDIZEM SR) 120 MG 12 hr capsule Unknown Pharmacy No No    Take 1 capsule by mouth 2 (Two) Times a Day.    ezetimibe (ZETIA) 10 MG tablet Unknown Pharmacy No No    Take 1 tablet by mouth Daily.    furosemide (Lasix) 20 MG tablet Unknown Pharmacy No No    Take 1 tablet by mouth Daily.    metoprolol succinate XL  (TOPROL-XL) 25 MG 24 hr tablet Unknown Pharmacy No No    Take 0.5 tablets by mouth Daily.    potassium chloride 10 MEQ CR tablet Unknown Self Yes No    Take 10 mEq by mouth Daily.    predniSONE (DELTASONE) 10 MG tablet Unknown Self Yes No    Take 10 mg by mouth Daily.    rosuvastatin (CRESTOR) 20 MG tablet Unknown Pharmacy No No    Take 1 tablet by mouth Every Night.    sacubitril-valsartan (ENTRESTO)  MG tablet Unknown Pharmacy No No    Take 1 tablet by mouth 2 (Two) Times a Day.    sertraline (ZOLOFT) 100 MG tablet Unknown Self Yes No    Take 100 mg by mouth Daily.        Objective     Vital Signs:  Temp:  [97.3 °F (36.3 °C)-97.7 °F (36.5 °C)] 97.6 °F (36.4 °C)  Heart Rate:  [64-84] 71  Resp:  [18-20] 20  BP: (119-139)/(61-84) 119/84    No data found.  SpO2:  [97 %] 97 %  on  Flow (L/min):  [2] 2;   Device (Oxygen Therapy): nasal cannula  Body mass index is 30.07 kg/m².    Wt Readings from Last 3 Encounters:   09/07/22 101 kg (221 lb 12.8 oz)   09/07/22 101 kg (221 lb 12.8 oz)   06/09/22 109 kg (239 lb 6.4 oz)      ---------------------------------------------------------------------------------------------------------------------     Physical exam:  Constitutional:   No acute distress  HEENT: Normocephalic atraumatic neck is supple  Neck: Supple   Cardiovascular: Irregular irregular, ventricular rate controlled  Pulmonary/Chest: Clear to auscultation  Abdominal: Positive bowel sounds soft.   Musculoskeletal: No arthropathy  Neurological: 4 to 4+ out of 5 strength in left upper lower extremity 5 out of 5 on the right.  Speech seems to be reasonably good.  Alert and oriented x3  Skin: No rash  Peripheral vascular: No edema  Genitourinary::  ---------------------------------------------------------------------------------------------------------------------  EKG: Atrial fibrillation with ventricular rate of 69.  Patient has some nonspecific ST changes in the lateral leads.  No orders to display           Last  echocardiogram:  Results for orders placed during the hospital encounter of 09/01/22    Adult Transthoracic Echo Complete W/ Cont if Necessary Per Protocol (With Agitated Saline)    Interpretation Summary  · Estimated left ventricular EF = 55% Left ventricular ejection fraction appears to be 51 - 55%. Left ventricular systolic function is normal.  · Left ventricular diastolic function was normal.  · No significant mitral or aortic valve regurgitation. Moderate sclerosis of both is noted.  · There is anterior pericardial fat pad but no effusion  · Left atrium is mildly enlarged  · Since prev study of 9/29/19 EF has increased from 30% to 55% and mitral, aortic and tricuspid regurgitation is no longer seen.    --------------------------------------------------------------------------------------------------------------------  Labs:  Results from last 7 days   Lab Units 09/08/22 0132 09/07/22 0048 09/06/22 0007 09/05/22 0307 09/04/22  1426 09/02/22  0534 09/01/22  1643   WBC 10*3/mm3 12.82* 11.30* 12.81*   < >  --    < > 10.07   HEMOGLOBIN g/dL 14.3 13.9 15.5   < >  --    < > 14.6   HEMATOCRIT % 44.2 43.1 47.3   < >  --    < > 43.3   MCV fL 90.0 88.1 88.6   < >  --    < > 86.1   MCHC g/dL 32.4 32.3 32.8   < >  --    < > 33.7   PLATELETS 10*3/mm3 146 149 145   < >  --    < > 164   INR   --   --   --   --  1.28*  --  1.12*    < > = values in this interval not displayed.         Results from last 7 days   Lab Units 09/08/22 0132 09/07/22 0048 09/06/22 0007 09/05/22  0307 09/04/22  0838 09/02/22  0534   SODIUM mmol/L 135* 137 139 133* 136 136   POTASSIUM mmol/L 4.2 3.8 4.2 4.4 4.2 4.0   MAGNESIUM mg/dL 1.9  --   --   --   --   --    CHLORIDE mmol/L 101 103 103 103 102 104   CO2 mmol/L 26.3 22.6 25.2 20.3* 26.1 22.2   BUN mg/dL 29* 31* 25* 19 16 9   CREATININE mg/dL 1.22 1.21 1.34* 1.04 1.07 1.02   CALCIUM mg/dL 8.9 8.6 9.0 8.7 8.7 8.5*   GLUCOSE mg/dL 114* 93 92 97 113* 86   ALBUMIN g/dL  --   --   --  3.07* 3.44*  3.06*   BILIRUBIN mg/dL  --   --   --  0.7 0.6 0.7   ALK PHOS U/L  --   --   --  98 105 109   AST (SGOT) U/L  --   --   --  19 17 14   ALT (SGPT) U/L  --   --   --  9 7 5   Estimated Creatinine Clearance: 69.3 mL/min (by C-G formula based on SCr of 1.22 mg/dL).  No results found for: AMMONIA  Results from last 7 days   Lab Units 09/06/22  0007 09/01/22  2103   TROPONIN T ng/mL <0.010 <0.010     Results from last 7 days   Lab Units 09/02/22  0534   CHOLESTEROL mg/dL 104   TRIGLYCERIDES mg/dL 89   HDL CHOL mg/dL 31*   LDL CHOL mg/dL 55     Glucose   Date/Time Value Ref Range Status   09/05/2022 1028 125 70 - 130 mg/dL Final     Comment:     Meter: [id number] : [id number] ROCIO AGUILA     Lab Results   Component Value Date    TSH 1.700 09/04/2022     No results found for: PREGTESTUR, PREGSERUM, HCG, HCGQUANT  Pain Management Panel     Pain Management Panel Latest Ref Rng & Units 3/8/2016    AMPHETAMINES SCREEN, URINE Negative Negative    BARBITURATES SCREEN Negative Negative    BENZODIAZEPINE SCREEN, URINE Negative Negative    COCAINE SCREEN, URINE Negative Negative    METHADONE SCREEN, URINE Negative Negative        Brief Urine Lab Results     None        No results found for: BLOODCX  No results found for: URINECX  No results found for: WOUNDCX  No results found for: STOOLCX    Last Urine Toxicity     LAST URINE TOXICITY RESULTS Latest Ref Rng & Units 3/8/2016    AMPHETAMINES SCREEN, URINE Negative Negative    BARBITURATES SCREEN Negative Negative    BENZODIAZEPINE SCREEN, URINE Negative Negative    COCAINE SCREEN, URINE Negative Negative    METHADONE SCREEN, URINE Negative Negative          I have personally looked at the labs and they are summarized above.  ----------------------------------------------------------------------------------------------------------------------  Detailed radiology reports for the last 24 hours:    Imaging Results (Last 24 Hours)     ** No results found for the last 24 hours. **         Final impressions for the last 30 days of radiology reports:    CT Angiogram Neck    Result Date: 9/1/2022  Multifocal plaque, but no evidence of large vessel occlusion.  This report was finalized on 9/1/2022 5:16 PM by Dr. Hossein Lenz MD.      MRI Brain Without Contrast    Result Date: 9/2/2022   1. Focus of recent ischemia in the right basal ganglia/internal capsule region. 2. Chronic microangiopathic change in the periventricular and parietal white matter. 3. No parenchymal mass, hemorrhage, or midline shift  This report was finalized on 9/2/2022 2:16 PM by Dr. Hossein Lenz MD.      XR Chest 1 View    Result Date: 9/1/2022  No radiographic evidence of acute cardiac or pulmonary disease.  This report was finalized on 9/1/2022 5:54 PM by Dr. Hossein Lenz MD.      US Carotid Bilateral    Result Date: 9/2/2022    No acute findings in the arteries of the neck.  This report was finalized on 9/2/2022 12:47 PM by Dr. Daniel Bateman MD.      CT Head Without Contrast Stroke Protocol    Result Date: 9/1/2022    1. No evidence of an acute ischemic event 2. Cerebral atrophy 3. No parenchymal mass, hemorrhage, or midline shift.  Results were relayed to the emergency department at 4:37 PM  This report was finalized on 9/1/2022 4:37 PM by Dr. Hossein Lenz MD.      CT Angiogram Head w AI Analysis of LVO    Result Date: 9/1/2022  No evidence of large vessel occlusion  This report was finalized on 9/1/2022 5:22 PM by Dr. Hossein Lenz MD.      FL Video Swallow Single Contrast    Result Date: 9/6/2022  1.  Aspiration with thin and nectar thick liquids. 2. Please see dysphasia notes for further details.  This report was finalized on 9/6/2022 3:18 PM by Dr. Dequan Cotter MD.      FL Video Swallow Single Contrast    Result Date: 9/2/2022  Aspiration with thin liquids.  For additional information please see the report provided by the speech therapy service  This report was finalized on 9/2/2022 2:16 PM by Dr. Hossein Lenz  MD.      I have personally looked at the radiology images and read the final radiology report.    Assessment & Plan    Status post CVA involving the right basal ganglia/internal capsule--patient currently on aspirin, Crestor, Eliquis therapy.  Patient will require physical therapy 90 minutes/day 5 to 6 days/week for up with training, safety, stair navigation, strengthening, therapeutic exercise, range of motion, endurance and gait training.  Require occupational therapy 60 minutes to 90 minutes/day 5 to 6 days/week with dressing, ADLs, feeding, home skills, safety and toileting.  Will require speech therapy 30 to 60 minutes/day 3 to 5 days/week for help with dysphagia needs.  Patient is expected to be able to safely perform activities of daily living using adaptive equipment for improved functional ability.  Independent and safe bowel bladder function.  Able to safely consume food and fluids without signs of aspiration.  Able navigate home community territory safe without injury or falls.  Anticipate patient going home with assistance [place] require home health with PT, OT and speech therapy services along with nursing services.  Patient may require bedside commode and walker at discharge.    CHF with preserved EF--patient has been on Entresto.  Peers to be compensated at this time patient also treated with Lasix.    Atrial fibrillation--patient has been taken off of diltiazem and Toprol-XL secondary to ventricular pauses.  Patient is on Eliquis will monitor closely    COPD--I have encouraged patient to discontinue smoking.  As needed neb treatments.  Patient currently on O2 will monitor and hopefully build to wean patient from O2 during admission.  Patient has been chronically on prednisone therapy.    Depression continue Zoloft.  Patient stable          VTE Prophylaxis:   Mechanical Order History:      Ordered        09/07/22 4375  Maintain Sequential Compression Device  Continuous                    Pharmalogical  Order History:      Ordered     Dose Route Frequency Stop    09/07/22 1515  apixaban (ELIQUIS) tablet 5 mg         5 mg PO Every 12 Hours Scheduled --                Fall risk evaluation: Elevated risk for fall secondary to recent CVA with some left-sided weakness and unsteady gait        Clifford Munoz MD  [facility] Hospitalist  09/08/22  07:46 EDT

## 2022-09-08 NOTE — PLAN OF CARE
Problem: Swallowing Impairment  Goal: Optimal Eating/Swallowing without Aspir  Outcome: Ongoing, Progressing   Goal Outcome Evaluation:

## 2022-09-08 NOTE — PROGRESS NOTES
Patient Assessment Instrument  Quality Indicators - Admission    Section B. Hearing, Speech Vision      Section C. Cognitive Patterns      Section JE8542. Prior Functioning      Section SP8877. Prior Device Use  Patient does not use manual or motorized wheelchair or scooter, mechanical lift,  walker, or an orthotic/prosthesis.    Section HX3121. Self Care Performance      Section QW5612. Self Care Discharge Goals      Section JW3944. Mobility Performance      Section YG6577. Mobility Discharge Goals      Section H. Bladder and Bowel      Section I. Active Diagnosis      Section J. Health Conditions      Section K. Swallowing/Nutritional Status      Section M. Skin Conditions      Section N. Medication      Section O. Special Treatments, Procedures, and Programs      OPTIONAL BRANCH FOR TRACKING FALLS  Fall(s) During Shift:    Signed by: JOSÉ MIGUEL Montalvo

## 2022-09-08 NOTE — PROGRESS NOTES
Rehabilitation Nursing  Inpatient Rehabilitation Plan of Care Note    Plan of Care  Copy from Brian    Pain Management (Active)  Current Status (9/7/2022 2:53:00 PM): potential for increased pain  Weekly Goal: pain managed  Discharge Goal: pain managed    Safety    Potential for Injury (Active)  Current Status (9/7/2022 2:53:00 PM): potential for falls  Weekly Goal: no falls  Discharge Goal: no falls    Signed by: Jelly Figueroa, Nurse

## 2022-09-08 NOTE — PROGRESS NOTES
Patient Assessment Instrument  Quality Indicators - Admission    Section B. Hearing, Speech Vision      Section C. Cognitive Patterns      Section LK7401. Prior Functioning      Section CO6811. Prior Device Use      Section KZ3326. Self Care Performance      Section NW3126. Self Care Discharge Goals      Section ZU1225. Mobility Performance     Roll Left and Right: [place] provides verbal cues and/or touching/steadying  and/or contact guard assistance as patient completes activity. Assistance may be  provided throughout the activity or intermittently.   Sit to Lying: [place] provides verbal cues and/or touching/steadying and/or  contact guard assistance as patient completes activity. Assistance may be  provided throughout the activity or intermittently.   Lying to Sitting on Side of Bed: [place] provides verbal cues and/or  touching/steadying and/or contact guard assistance as patient completes  activity. Assistance may be provided throughout the activity or intermittently.   Sit to Stand: [place] provides verbal cues and/or touching/steadying and/or  contact guard assistance as patient completes activity. Assistance may be  provided throughout the activity or intermittently.   Chair/Bed to Chair Transfer: [place] provides verbal cues and/or  touching/steadying and/or contact guard assistance as patient completes  activity. Assistance may be provided throughout the activity or intermittently.   Toilet Transfer [place] provides verbal cues and/or touching/steadying and/or  contact guard assistance as patient completes activity. Assistance may be  provided throughout the activity or intermittently.   Car Transfer: [place] provides verbal cues and/or touching/steadying and/or  contact guard assistance as patient completes activity. Assistance may be  provided throughout the activity or intermittently.   Walk 10 Feet:   [place] provides verbal cues and/or touching/steadying and/or  contact guard assistance as patient completes  activity. Assistance may be  provided throughout the activity or intermittently.  Walk 50 Feet with 2 Turns:   [place] provides verbal cues and/or  touching/steadying and/or contact guard assistance as patient completes  activity. Assistance may be provided throughout the activity or intermittently.  Walk 150 Feet:   Not attempted due to medical or safety concerns.  Walking 10 Feet on Uneven Surfaces:   Not attempted due to medical or safety  concerns.  1 Step Over Curb or Up/Down Stair:   [place] provides verbal cues and/or  touching/steadying and/or contact guard assistance as patient completes  activity. Assistance may be provided throughout the activity or intermittently.  4 Steps Up and Down, With/Without Rail:   [place] provides verbal cues and/or  touching/steadying and/or contact guard assistance as patient completes  activity. Assistance may be provided throughout the activity or intermittently.  12 Steps Up and Down, With/Without Rail:   [place] provides verbal cues and/or  touching/steadying and/or contact guard assistance as patient completes  activity. Assistance may be provided throughout the activity or intermittently.  Picking up an Object:   Not attempted due to medical or safety concerns.  Uses Wheelchair/Scooter: Yes  Wheel 50 Feet with Two Turns: Not applicable.  Type of Wheelchair or Scooter Used: Manual  Wheel 150 Feet: Not applicable.  Type of Wheelchair/Scooter Used: Manual    Section [id number]. Mobility Discharge Goals     Lying to Sitting on Side of Bed: Patient completed the activities by  him/herself with no assistance from a helper.   Sit to Stand: Patient completed the activities by him/herself with no  assistance from a helper.   Chair/Bed to Chair Transfer: Patient completed the activities by him/herself  with no assistance from a helper.   Walk Discharge Goals:   Walk 150 Feet: Patient completed the activities by him/herself with no  assistance from a helper.    Section H. Bladder and  Bowel      Section I. Active Diagnosis      Section J. Health Conditions      Section K. Swallowing/Nutritional Status      Section M. Skin Conditions      Section N. Medication      Section O. Special Treatments, Procedures, and Programs      OPTIONAL BRANCH FOR TRACKING FALLS  Fall(s) During Shift:    Signed by: Dionicio Zamarripa PT

## 2022-09-08 NOTE — PROGRESS NOTES
Inpatient Rehabilitation Functional Measures Assessment and Plan of Care    Plan of Care  Swallow Function    [ST] Swallowing(Active)  Current Status(09/08/2022): Nectar thick liquids  Weekly Goal(09/12/2022): Resistive breather  Discharge Goal: Least restrictive po diet    Functional Measures  MAYNOR Eating:  MAYNOR Grooming:  MAYNOR Bathing:  MAYNOR Upper Body Dressing:  MAYNOR Lower Body Dressing:  MAYNOR Toileting:    MAYNOR Bladder Management  Level of Assistance:  Frequency/Number of Accidents this Shift:    MAYNOR Bowel Management  Level of Assistance:  Frequency/Number of Accidents this Shift:    MAYNOR Bed/Chair/Wheelchair Transfer:  [facility] Toilet Transfer:  [facility] Tub/Shower Transfer:    Previously Documented Mode of Locomotion at Discharge:  [facility] Expected Mode of Locomotion at Discharge:  [facility] Walk/Wheelchair:  [facility] Stairs:    [facility] Comprehension:  [facility] Expression:  [facility] Social Interaction:  [facility] Problem Solving:  [facility] Memory:    Therapy Mode Minutes  Occupational Therapy:  Physical Therapy:  Speech Language Pathology: Individual: 50 minutes.    Discharge Functional Goals:    Signed by: Esme Hernandez SLP

## 2022-09-08 NOTE — THERAPY EVALUATION
Inpatient Rehabilitation - Speech Language Pathology Initial Evaluation  [facility]   SPEECH LANGUAGE COGNITIVE EVALUATION     Patient Name: Zaid Galarza  : 1950  MRN: [id number]  Today's Date: 2022             Admit Date: 2022     Zaid Galarza  is seen this am on  110/2S to participate in a formal s/l and cognitive evaluation to assess safety/function in adls. Pt is positioned upright in wheelchair in locked position to cooperatively participate. All results and observations of this evaluation are felt to be representative of pt's current functional status. He has a medical hx significant for atrial fibrillation, congestive heart failure, COPD, CAD, hypertension, tobacco abuse, history of AAA repair endovascularly.     He was admitted to acute care after he presented to [facility] ED following noted L sided weakness. He was diagnosed w/ R basal ganglia/internal capsule stroke. He was evaluated by speech therapy during that admission w/ recommendations for nectar thick liquids following imaging. Most recent MBSS performed 22 revealed continued mild oropharyngeal dysphagia w/ penetration and aspiration of thin liquids and penetration and aspiration of nectar thick liquids via straw presentations only. Speech, language, and cognition were all found to be at pt's baseline and wfl.     Social History     Socioeconomic History   • Marital status: Single   Tobacco Use   • Smoking status: Current Every Day Smoker     Packs/day: 1.00     Types: Cigarettes   • Smokeless tobacco: Never Used   • Tobacco comment: down to 2 a day   Vaping Use   • Vaping Use: Never used   Substance and Sexual Activity   • Alcohol use: No   • Drug use: No   • Sexual activity: Defer        Diet Orders (active) (From admission, onward)     Start     Ordered    22 1058  Diet Soft Texture; Chopped; Nectar / Syrup Thick  Diet Effective Now        Comments: Medications whole in puree/nectars  No Straws please.    22 1057  "   09/07/22 1516  DIET MESSAGE Pt has been NPO. Requests breakfast items please. NECTAR thick liquids.  Once        Comments: Pt has been NPO. Requests breakfast items please. NECTAR thick liquids.    09/07/22 1515                He is observed on nasal cannula at 2L w/o complications this am.     Receptive language skills are intact. Pt is able to id common objects and personal body parts, follow simple and multi-step directives, understand complex \"wh\" questions and participate in conversational exchange w/o difficulties.    Expressive language skills are intact. Pt is able to complete automatic speech tasks, confrontation naming tasks, complete complex oe sentences, respond to complet oe \"wh\" questions, repeat at word/sentence and multiple digit levels, as well as participate in complex conversational exchange. No aphasia, anomia or verbal apraxia evidenced.    Pt is independently oriented to person, place and time. Immediate, STM and LTM are wfl w/ pt recalling recent adls and providing personal information w/o delays. Thought organization, processing and problem solving are wfl w/ pt demonstrating appropriate comparing/contrasting, understanding of idiomatic language, convergent and divergent thinking skills.    Facial/oral structures are symmetrical upon observation. Oral mucosa are moist, pink and clean. Secretions are clear, thin and well controlled. OROM/VANESSA is wfl w/o lingual deviation upon protrusion. Speech intensity, clarity and intelligibility are wfl w/o dysarthria or oral apraxia noted.    Graphic and reading skills are intact, premorbid baseline status. Pt is able to id isolated letters, simple and moderate words, as well as sentences and small paragraphs. Signature is legible.    Pragmatic skills are wfl w/ appropriate eye gaze patterns and visual tracking. Language is appropriate in context w/ topic initiation and maintenance independently. No left neglect evidenced. Humor response is intact w/ " appropriate affect.      Visit Dx:  No diagnosis found.  Patient Active Problem List   Diagnosis   • Atrial flutter (HCC)   • Dilated cardiomyopathy (HCC)   • Nonobstructive CAD per cath 10/3/2019   • Anxiety disorder   • Essential hypertension   • Tobacco use   • History of abdominal aortic aneurysm (AAA) repair   • Dyslipidemia, goal LDL below 100   • CVA (cerebral vascular accident) (HCC)   • Cerebrovascular accident (CVA), unspecified mechanism (HCC)     Past Medical History:   Diagnosis Date   • Atrial fibrillation (HCC)    • CHF (congestive heart failure) (HCC)    • COPD (chronic obstructive pulmonary disease) (HCC)    • Coronary artery disease    • Hypertension    • Tobacco abuse      Past Surgical History:   Procedure Laterality Date   • ABDOMINAL AORTIC ANEURYSM REPAIR     • CARDIAC CATHETERIZATION N/A 10/03/2019    Procedure: Left Heart Cath;  Surgeon: Vincent Phillips MD;  Location: [facility] CATH INVASIVE LOCATION;  Service: Cardiology       Impression:   WFL speech, language and cognitive skills.      SLP Recommendation and Plan       Recommendations: No further formal SLP f/u recommended for s/l and cognitive skills. Pt will participate in further dysphagia evaluation. Please see full dysphagia note for details.    D/w pt results and recommendations w/ verbal understanding and agreement.    D/w RN results and recommendations w/ verbal understanding and agreement.     Thank you for allowing me to participate in the care of your patient-  Esme Hernandez M.S., CCC-SLP          EDUCATION  The patient has been educated in the following areas:     Cognitive Impairment Communication Impairment.    Time Calculation:      Time Calculation- SLP     Row Name 09/08/22 1327             Time Calculation- SLP    SLP Start Time 1000  -JR      SLP Stop Time 1050  -      SLP Time Calculation (min) 50 min  -JR      SLP Received On 09/08/22  -      SLP - Next Appointment 09/09/22  -            User Key  (r) = Recorded By,  (t) = Taken By, (c) = Cosigned By    Initials Name Provider Type    Esme Bosch, MS CCC-SLP Speech and Language Pathologist                Therapy Charges for Today     Code Description Service Date Service Provider Modifiers Qty    59789908354 HC ST EVAL ORAL PHARYNG SWALLOW 2 2022 Esme Hernandez, MS CCC-SLP GN 1    00287829336 HC ST EVAL SPEECH AND PROD W LANG  1 2022 David MS Esme CCC-SLP GN 1          Esme Hernandez MS CCC-SLP  2022 and       Inpatient Rehabilitation - Speech Language Pathology   Swallow Initial Evaluation [facility]   CLINICAL DYSPHAGIA ASSESSMENT     Patient Name: Zaid Galarza  : 1950  MRN: [id number]  Today's Date: 2022             Admit Date: 2022    Zaid Galarza  is seen in the speech therapy office this am to assess safety/efficacy of swallowing fnx, determine safest/least restrictive diet tolerance. He has a medical hx significant for atrial fibrillation, congestive heart failure, COPD, CAD, hypertension, tobacco abuse, history of AAA repair endovascularly.     He was admitted to acute care after he presented to [facility] ED following noted L sided weakness. He was diagnosed w/ R basal ganglia/internal capsule stroke. He was evaluated by speech therapy during that admission w/ recommendations for nectar thick liquids following imaging. Most recent MBSS performed 22 revealed continued mild oropharyngeal dysphagia w/ penetration and aspiration of thin liquids and penetration and aspiration of nectar thick liquids via straw presentations only. Speech, language, and cognition were all found to be at pt's baseline and wfl.       Social History     Socioeconomic History   • Marital status: Single   Tobacco Use   • Smoking status: Current Every Day Smoker     Packs/day: 1.00     Types: Cigarettes   • Smokeless tobacco: Never Used   • Tobacco comment: down to 2 a day   Vaping Use   • Vaping Use: Never used   Substance and Sexual Activity   • Alcohol use: No   •  Drug use: No   • Sexual activity: Defer      No recent chest xray is available for review at this time.    Diet Orders (active) (From admission, onward)     Start     Ordered    09/08/22 1058  Diet Soft Texture; Chopped; Nectar / Syrup Thick  Diet Effective Now        Comments: Medications whole in puree/nectars  No Straws please.    09/08/22 1057    09/07/22 1516  DIET MESSAGE Pt has been NPO. Requests breakfast items please. NECTAR thick liquids.  Once        Comments: Pt has been NPO. Requests breakfast items please. NECTAR thick liquids.    09/07/22 1515                He is observed on 2L nasal cannula w/o complications.     Mr Galarza is positioned upright and centered in wheelchair in locked position to accept multiple po presentations of ice chips, solid cracker, purees, thin liquids via spoon, cup and straw, and nectar thick liquids via spoon, cup, and straw.  He is able to self feed.     Facial/oral structures are symmetrical upon observation. Lingual protrusion reveals no deviation. Oral mucosa are moist, pink, and clean. Secretions are clear, thin, and well controlled. OROM/VANESSA is wfl to imitate oral postures. Gag is not assessed. Volitional cough is intact w/ adequate  intensity, clear in quality, non-productive. Voice is adequate in intensity, clear in quality w/ intelligible speech.    Upon po presentations, adequate bolus anticipation and acceptance w/ good labial seal for bolus clearance via spoon bowl, cup rim stability and suction via straw. Bolus formation, manipulation and control are wfl w/ rotary mastication pattern. A-p transit is timely w/o oral residue.     Pharyngeal swallow is slightly delayed w/ slightly weak hyolaryngeal elevation per palpation. Overt s/s aspiration evidenced w/ thin liquids via all presentation styles. Overt s/s aspiration and s/s concerning for silent aspiration are evidenced w/ nectar thick liquids via straw only. Pt denies odynophagia.        Visit Dx:   No  diagnosis found.  Patient Active Problem List   Diagnosis   • Atrial flutter (HCC)   • Dilated cardiomyopathy (HCC)   • Nonobstructive CAD per cath 10/3/2019   • Anxiety disorder   • Essential hypertension   • Tobacco use   • History of abdominal aortic aneurysm (AAA) repair   • Dyslipidemia, goal LDL below 100   • CVA (cerebral vascular accident) (HCC)   • Cerebrovascular accident (CVA), unspecified mechanism (HCC)     Past Medical History:   Diagnosis Date   • Atrial fibrillation (HCC)    • CHF (congestive heart failure) (HCC)    • COPD (chronic obstructive pulmonary disease) (HCC)    • Coronary artery disease    • Hypertension    • Tobacco abuse      Past Surgical History:   Procedure Laterality Date   • ABDOMINAL AORTIC ANEURYSM REPAIR     • CARDIAC CATHETERIZATION N/A 10/03/2019    Procedure: Left Heart Cath;  Surgeon: Vincent Phillips MD;  Location: [facility] CATH INVASIVE LOCATION;  Service: Cardiology       IMPRESSION:  Mr Galarza presents w/ wfl oral swallow mild pharyngeal dysphagia w/ s/s concerning for aspiration of thin liquids and nectar thick liquids via straw presentations only. No odynophagia reported. He is felt to most benefit from a modified po diet of mechanical soft solids, whole meats, and nectar thick liquids w/ no straw presentations provided. Medications to be administered whole in puree/nectars. He is felt to benefit from formal dysphagia tx to address noted deficits.       SLP Recommendation and Plan       Recommendations:  1. Mechanical soft solids, whole meats, NECTAR THICK liquids   2. Meds whole in puree/NECTARS.    3. Upright and centered for all po intake  4. JACOB precautions.  5. Oral care protocol.  6. Universal aspiration precautions.   7. Green band on wrist for liquid status reminders.  8. Water Protocol - thin water provided between meals and medications.   9. Visual reminders to be posted in pt room for Water Protocol and No Straws.  10. Formal dysphagia tx.     SLP to f/u.      D/w pt results and recommendations w/ verbal agreement.    D/w RN results and recommendations w/ verbal agreement.    Thank you for allowing me to participate in the care of your patient-  Esme Hernandez M.S., CCC-SLP          EDUCATION  The patient has been educated in the following areas:   Dysphagia (Swallowing Impairment) Modified Diet Instruction.              Time Calculation:    Time Calculation- SLP     Row Name 09/08/22 1327             Time Calculation- SLP    SLP Start Time 1000  -      SLP Stop Time 1050  -      SLP Time Calculation (min) 50 min  -      SLP Received On 09/08/22  -      SLP - Next Appointment 09/09/22  -            User Key  (r) = Recorded By, (t) = Taken By, (c) = Cosigned By    Initials Name Provider Type    Esme Bosch MS CCC-SLP Speech and Language Pathologist                Therapy Charges for Today     Code Description Service Date Service Provider Modifiers Qty    93066809335 HC ST EVAL ORAL PHARYNG SWALLOW 2 9/8/2022 Esme Hernandez MS CCC-SLP GN 1    [id number] HC ST EVAL SPEECH AND PROD W LANG  1 9/8/2022 Esme Hernandez MS CCC-SLP GN 1               MS ASHLEY Dobbins  9/8/2022

## 2022-09-08 NOTE — THERAPY EVALUATION
Inpatient Rehabilitation - Occupational Therapy Initial Evaluation and Treatment Note     [facility]     Patient Name: Zaid Galarza  : 1950  MRN: [id number]    Today's Date: 2022                 Admit Date: 2022       No diagnosis found.    Patient Active Problem List   Diagnosis   • Atrial flutter (HCC)   • Dilated cardiomyopathy (HCC)   • Nonobstructive CAD per cath 10/3/2019   • Anxiety disorder   • Essential hypertension   • Tobacco use   • History of abdominal aortic aneurysm (AAA) repair   • Dyslipidemia, goal LDL below 100   • CVA (cerebral vascular accident) (HCC)   • Cerebrovascular accident (CVA), unspecified mechanism (HCC)       Past Medical History:   Diagnosis Date   • Atrial fibrillation (HCC)    • CHF (congestive heart failure) (HCC)    • COPD (chronic obstructive pulmonary disease) (HCC)    • Coronary artery disease    • Hypertension    • Tobacco abuse        Past Surgical History:   Procedure Laterality Date   • ABDOMINAL AORTIC ANEURYSM REPAIR     • CARDIAC CATHETERIZATION N/A 10/03/2019    Procedure: Left Heart Cath;  Surgeon: Vincent Phillips MD;  Location: [facility] INVASIVE LOCATION;  Service: Cardiology             IRF OT ASSESSMENT FLOWSHEET (last 12 hours)     IRF OT Evaluation and Treatment     Row Name 22 1053          OT Time and Intention    Document Type initial evaluation;daily treatment  -TM     Mode of Treatment occupational therapy  -TM     Patient Effort adequate  -TM     Symptoms Noted During/After Treatment none  -TM     Row Name 22 1053          General Information    Patient Profile Reviewed yes  -TM     General Observations of Patient Pt agreeable for therapy.  -TM     Existing Precautions/Restrictions fall;swallow precautions  -TM     Row Name 22 1053          Previous Level of Function/Home Environm    BADLs, Premorbid Functional Level independent  -TM     Row Name 22 1053          Living Environment    Current Living Arrangements  home  -TM     Home Accessibility stairs within home;other (see comments)  pt reported steps to basement  -TM     People in Home alone  -TM     Row Name 09/08/22 1053          Home Use of Assistive/Adaptive Equipment    Equipment Currently Used at Home none  -TM     Row Name 09/08/22 1053          Cognition/Psychosocial    Orientation Status (Cognition) oriented x 4  -TM     Follows Commands (Cognition) follows one-step commands;verbal cues/prompting required;WFL  -TM     Row Name 09/08/22 1053          Range of Motion Comprehensive    Comment, General Range of Motion BUE WFL  -TM     Row Name 09/08/22 1053          Strength (Manual Muscle Testing)    Left Hand, Setting 1 (Dynamometer Testing) 46 pounds  -TM     Right Hand, Setting 1 (Dynamometer Testing) 55 pounds  -TM     Row Name 09/08/22 1053          Strength Comprehensive (MMT)    Comment, General Manual Muscle Testing (MMT) Assessment RUE 4-/5; LUE 3+/5  -TM     Row Name 09/08/22 1053          Sensory    Additional Documentation --  -TM     Hearing Status WFL  -TM     Row Name 09/08/22 1053          Vision Assessment/Intervention    Visual Impairment/Limitations WFL;other (see comments)  reading glasses per pt  -TM     Row Name 09/08/22 1053          Sensory Assessment (Somatosensory)    Sensory Assessment (Somatosensory) UE sensation intact  -     Row Name 09/08/22 1053          Bathing    Comment (Bathing) Jana  -TM     Row Name 09/08/22 1053          Upper Body Dressing    Comment (Upper Body Dressing) Jana/setup  -TM     Row Name 09/08/22 1053          Lower Body Dressing    [place] Level (Lower Body Dressing) minimum assist (75% patient effort);contact guard assist;verbal cues  -TM     Position (Lower Body Dressing) supported sitting  -TM     Comment (Lower Body Dressing) Jana/CGA  -TM     Row Name 09/08/22 1053          Grooming    Comment (Grooming) setup  -TM     Row Name 09/08/22 1053          Toileting    [place] Level (Toileting) minimum  assist (75% patient effort);contact guard assist;verbal cues  -TM     Assistive Device Use (Toileting) grab bar/safety frame  -TM     Position (Toileting) supported sitting  -TM     Row Name 09/08/22 1053          Self-Feeding    [place] Level (Self-Feeding) set up  -TM     Position (Self-Feeding) supported sitting  -TM     Row Name 09/08/22 1053          Transfer Assessment/Treatment    Transfers bed-chair transfer;toilet transfer  -     Row Name 09/08/22 1053          Transfers    Bed-Chair [place] (Transfers) contact guard;minimum assist (75% patient effort);verbal cues  -TM     Assistive Device (Bed-Chair Transfers) wheelchair  -TM     [place] Level (Toilet Transfer) contact guard;minimum assist (75% patient effort);verbal cues  -TM     Assistive Device (Toilet Transfer) wheelchair;grab bars/safety frame  -     Row Name 09/08/22 1053          Toilet Transfer    Type (Toilet Transfer) stand pivot/stand step  -     Row Name 09/08/22 1053          Motor Skills    Motor Skills motor control/coordination interventions;coordination;functional endurance  -TM     Coordination other (see comments)  box n blocks; R hand 48 blocks, L hand 39 blocks  -TM     Motor Control/Coordination Interventions therapeutic exercise/ROM;fine motor manipulation/dexterity activities;gross motor coordination activities;other (see comments)  BUE ther ex/act, GMC/FMC  -     Row Name 09/08/22 1053          Therapy Assessment/Plan (OT)    Patient's Goals For Discharge return home  -     Row Name 09/08/22 1053          Therapy Assessment/Plan (OT)    OT Diagnosis R CVA (R subcortical/lacunar stroke)  -TM     Rehab Potential/Prognosis (OT) adequate, monitor progress closely  -TM     Frequency of Treatment (OT) 5 times per week  -TM     Estimated Duration of Therapy (OT) 2 weeks  -TM     Problem List (OT) other (see comments)  decreased ADLs, strength, fxal mobility coordination  -TM     Planned Therapy Interventions (OT)  activity tolerance training;adaptive equipment training;BADL retraining;IADL retraining;occupation/activity based interventions;patient/caregiver education/training;ROM/therapeutic exercise;transfer/mobility retraining;strengthening exercise  -TM     Row Name 09/08/22 1053          IRF OT Goals    UB Dressing Goal Selection (OT-IRF) UB dressing, OT goal 1  -TM     LB Dressing Goal Selection (OT-IRF) LB dressing, OT goal 1;LB dressing, OT goal 2  -TM     Toileting Goal Selection (OT-IRF) toileting, OT goal 1;toileting, OT goal 2  -TM     Row Name 09/08/22 1053          UB Dressing Goal 1 (OT-IRF)    [place] (UB Dress Goal 1, OT-IRF) set-up required  -TM     Time Frame (UB Dressing Goal 1, OT-IRF) short-term goal (STG)  -TM     Row Name 09/08/22 1053          LB Dressing Goal 1 (OT-IRF)    [place] (LB Dressing Goal 1, OT-IRF) contact guard required  -TM     Time Frame (LB Dressing Goal 1, OT-IRF) short-term goal (STG)  -TM     Row Name 09/08/22 1053          LB Dressing Goal 2 (OT-IRF)    [place] (LB Dressing Goal 2, OT-IRF) set-up required;supervision required  -TM     Time Frame (LB Dressing Goal 2, OT-IRF) long-term goal (LTG);by discharge  -     Row Name 09/08/22 1053          Toileting Goal 1 (OT-IRF)    [place] Level (Toileting Goal 1, OT-IRF) contact guard required  -TM     Time Frame (Toileting Goal 1, OT-IRF) short-term goal (STG)  -TM     Row Name 09/08/22 1053          Toileting Goal 2 (OT-IRF)    [place] Level (Toileting Goal 2, OT-IRF) set-up required;supervision required  -TM     Time Frame (Toileting Goal 2, OT-IRF) long-term goal (LTG);by discharge  -TM           User Key  (r) = Recorded By, (t) = Taken By, (c) = Cosigned By    Initials Name Effective Dates    Sade Rios, OT 06/16/21 -                  Occupational Therapy Education                 Title: PT OT SLP Therapies (Done)     Topic: Occupational Therapy (Done)     Point: ADL training (Done)      Description:   Instruct learner(s) on proper safety adaptation and remediation techniques during self care or transfers.   Instruct in proper use of assistive devices.              Learning Progress Summary           Patient Acceptance, E,D, VU,NR by  at 9/8/2022 1052                   Point: Precautions (Done)     Description:   Instruct learner(s) on prescribed precautions during self-care and functional transfers.              Learning Progress Summary           Patient Acceptance, E,D, VU,NR by  at 9/8/2022 1052                               User Key     Initials Effective Dates Name Provider Type Discipline     06/16/21 -  Sade Galvan OT Occupational Therapist OT                    OT Recommendation and Plan    Planned Therapy Interventions (OT): activity tolerance training, adaptive equipment training, BADL retraining, IADL retraining, occupation/activity based interventions, patient/caregiver education/training, ROM/therapeutic exercise, transfer/mobility retraining, strengthening exercise                    Time Calculation:      Time Calculation- OT     Row Name 09/08/22 1450 09/08/22 1448          Time Calculation- OT    OT Start Time 1330  -TM 1050  -TM     OT Stop Time 1415  -TM 1135  -TM     OT Time Calculation (min) 45 min  -TM 45 min  -TM     Total Timed Code Minutes- OT 45 minute(s)  -TM 45 minute(s)  -TM     OT Non-Billable Time (min) -- 60 min  -TM           User Key  (r) = Recorded By, (t) = Taken By, (c) = Cosigned By    Initials Name Provider Type     Sade Galvan OT Occupational Therapist              Therapy Charges for Today     Code Description Service Date Service Provider Modifiers Qty    90750086871 HC OT EVAL HIGH COMPLEXITY 3 9/8/2022 Sade Galvan OT GO 1    [id number] HC OT SELF CARE/MGMT/TRAIN EA 15 MIN 9/8/2022 Sade Galvan OT GO 1    [id number] HC OT THERAPEUTIC ACT EA 15 MIN 9/8/2022 Sade Galvan OT GO 2                    Sade Galvan, OT  9/8/2022

## 2022-09-08 NOTE — PLAN OF CARE
Goal Outcome Evaluation:         New admit to physical rehab unit. PT and OT to eval. Continue current plan of care.

## 2022-09-08 NOTE — PROGRESS NOTES
Occupational Therapy: Individual: 90 minutes.    Physical Therapy:    Speech Language Pathology:    Signed by: Sade Galvan OT

## 2022-09-08 NOTE — PROGRESS NOTES
Case Management  Inpatient Rehabilitation Plan of Care and Discharge Plan Note    Rehabilitation Diagnosis:  right CVA  Date of Onset:  9-1-22    Medical Summary:  right subcortical/lacunar stroke    Plan of Care      Expected Intensity:  Average of 3 hours of therapy 5 days/week.  Interdisciplinary Team:  Interdisciplinary Team: Medical Supervision and 24 Hour Rehabilitation Nursing.,  Physical Therapy:, Occupational Therapy:, Speech and Language Therapy:, Social  Work, Therapeutic Recreation.  Physical Therapy Intensity/Duration: PT 1-1.5 hours per day/5 days per week  Occupational Therapy Intensity/Duration: OT 1-1.5 hours per day/5 days per week  Speech Language Pathology  Intensity/Duration: SLP 30 mins-90mins per day/5 days  per week  Estimated Length of Stay/Anticipated Discharge Date: 14 days  Anticipated Discharge Destination:  Anticipated discharge destination from inpatient rehabilitation is community  discharge with assistance. Pt plans to return home alone at discharge if  possible.  Pt may be able hire a caregiver if needed.      Based on the patient's medical and functional status, their prognosis and  expected level of functional improvement is:  good    Signed by: JOSÉ MIGUEL Montalvo

## 2022-09-08 NOTE — SIGNIFICANT NOTE
09/08/22 1445   Living Environment   People in Home alone   Current Living Arrangements home   Primary Care Provided by self   Provides Primary Care For no one   Caregiving Concerns Pt does not have a caregiver.   Quality of Family Relationships supportive   Able to Return to Prior Arrangements yes   Living Arrangement Comments SS spoke to pt who states being single and living alone.  Pt does not have children.  Pt has siblings who are supportive.  Pt receives Medicare A,B,D and [place] [employer] insurance.  Pt receives Social Security income and longterm pension.  PCP is TIFFANIE Venegas.  Pt uses [facility] on [address].  Pt has a living will and appointed brother Esequiel Galarza and sister Susan Chaparro as healthcare surrogates.  Pt does not have a POA.  Educated about POA and process per pt request.  Pt was independent with mobility and ADL's prior to CVA.  Pt says he may hire someone to assist with housekeeping.  Pt uses a taxi or family for transportation.   Resource/Environmental Concerns   Resource/Environmental Concerns none   Financial Concerns none   Transportation Concerns none   Transition Planning   Patient/Family Anticipates Transition to home   Patient/Family Anticipated Services at Transition durable medical equipment;home health care   Transportation Anticipated family or friend will provide   Discharge Needs Assessment (IRF)   Current Outpatient/Agency/Support Group   (Pt did not receive home health or outpatient therapy prior to admission.)   Concerns to be Addressed adjustment to diagnosis/illness   Concerns Comments recent CVA   Equipment Currently Used at Home shower chair   Current Discharge Risk lives alone   Discharge Coordination/Progress Pt plans to return to his home at discharge.  Pt says he may hire someone to assist with caregiving if needed.  Pt does not have a caregiver at this time.  Team conference will be held on 9-13-22.  SS will follow and assist with  discharge planning.

## 2022-09-09 PROCEDURE — 92526 ORAL FUNCTION THERAPY: CPT

## 2022-09-09 PROCEDURE — 97110 THERAPEUTIC EXERCISES: CPT

## 2022-09-09 PROCEDURE — 97116 GAIT TRAINING THERAPY: CPT

## 2022-09-09 PROCEDURE — 97535 SELF CARE MNGMENT TRAINING: CPT

## 2022-09-09 PROCEDURE — 97530 THERAPEUTIC ACTIVITIES: CPT

## 2022-09-09 PROCEDURE — 99231 SBSQ HOSP IP/OBS SF/LOW 25: CPT | Performed by: FAMILY MEDICINE

## 2022-09-09 PROCEDURE — 97112 NEUROMUSCULAR REEDUCATION: CPT

## 2022-09-09 PROCEDURE — 63710000001 PREDNISONE PER 5 MG: Performed by: FAMILY MEDICINE

## 2022-09-09 RX ADMIN — SERTRALINE 100 MG: 50 TABLET, FILM COATED ORAL at 09:04

## 2022-09-09 RX ADMIN — NICOTINE 1 PATCH: 14 PATCH, EXTENDED RELEASE TRANSDERMAL at 09:09

## 2022-09-09 RX ADMIN — APIXABAN 5 MG: 5 TABLET, FILM COATED ORAL at 20:31

## 2022-09-09 RX ADMIN — Medication 5 MG: at 20:31

## 2022-09-09 RX ADMIN — APIXABAN 5 MG: 5 TABLET, FILM COATED ORAL at 09:03

## 2022-09-09 RX ADMIN — ASPIRIN 81 MG: 81 TABLET, CHEWABLE ORAL at 09:03

## 2022-09-09 RX ADMIN — SACUBITRIL AND VALSARTAN 1 TABLET: 97; 103 TABLET, FILM COATED ORAL at 20:31

## 2022-09-09 RX ADMIN — PREDNISONE 10 MG: 10 TABLET ORAL at 09:02

## 2022-09-09 RX ADMIN — POTASSIUM CHLORIDE 10 MEQ: 750 TABLET, FILM COATED, EXTENDED RELEASE ORAL at 09:04

## 2022-09-09 RX ADMIN — ATORVASTATIN CALCIUM 80 MG: 40 TABLET, FILM COATED ORAL at 20:31

## 2022-09-09 RX ADMIN — SACUBITRIL AND VALSARTAN 1 TABLET: 97; 103 TABLET, FILM COATED ORAL at 09:04

## 2022-09-09 NOTE — PROGRESS NOTES
Rehabilitation Nursing  Inpatient Rehabilitation Plan of Care Note    Plan of Care  Copy from POC    Pain    Pain Management (Active)  Current Status (9/7/2022 2:53:00 PM): potential for increased pain  Weekly Goal: pain managed  Discharge Goal: pain managed    Safety    Potential for Injury (Active)  Current Status (9/7/2022 2:53:00 PM): potential for falls  Weekly Goal: no falls  Discharge Goal: no falls    Signed by: Shwetha Isidro Nurse

## 2022-09-09 NOTE — PHARMACY PATIENT ASSISTANCE
Pharmacy conducted jones check for Entresto 97-103mg. Using the free trial card, there is no co-pay.    Thank you  Lester Hoffman, Pharmacy Intern  09/09/22  13:22 EDT

## 2022-09-09 NOTE — THERAPY TREATMENT NOTE
Inpatient Rehabilitation - Occupational Therapy Treatment Note    [facility]     Patient Name: Zaid Galarza  : 1950  MRN: [id number]    Today's Date: 2022                 Admit Date: 2022       No diagnosis found.    Patient Active Problem List   Diagnosis   • Atrial flutter (HCC)   • Dilated cardiomyopathy (HCC)   • Nonobstructive CAD per cath 10/3/2019   • Anxiety disorder   • Essential hypertension   • Tobacco use   • History of abdominal aortic aneurysm (AAA) repair   • Dyslipidemia, goal LDL below 100   • CVA (cerebral vascular accident) (HCC)   • Cerebrovascular accident (CVA), unspecified mechanism (HCC)       Past Medical History:   Diagnosis Date   • Atrial fibrillation (HCC)    • CHF (congestive heart failure) (HCC)    • COPD (chronic obstructive pulmonary disease) (HCC)    • Coronary artery disease    • Hypertension    • Tobacco abuse        Past Surgical History:   Procedure Laterality Date   • ABDOMINAL AORTIC ANEURYSM REPAIR     • CARDIAC CATHETERIZATION N/A 10/03/2019    Procedure: Left Heart Cath;  Surgeon: Vincent Phillips MD;  Location: [facility] INVASIVE LOCATION;  Service: Cardiology             IRF OT ASSESSMENT FLOWSHEET (last 12 hours)     IRF OT Evaluation and Treatment     Row Name 22 1313          OT Time and Intention    Document Type daily treatment  -TM     Mode of Treatment occupational therapy  -TM     Patient Effort adequate  -TM     Symptoms Noted During/After Treatment none  -TM     Row Name 22 1313          General Information    General Observations of Patient Pt agreeable for therapy.  -TM     Existing Precautions/Restrictions fall;swallow precautions  -TM     Row Name 22 1313          Cognition/Psychosocial    Orientation Status (Cognition) oriented x 4  -TM     Follows Commands (Cognition) follows one-step commands;verbal cues/prompting required;WFL  -TM     Row Name 22 1313          Upper Body Dressing    Comment (Upper Body  Dressing) setup  -TM     Row Name 09/09/22 1313          Grooming    Comment (Grooming) setup  -TM     Row Name 09/09/22 1313          Toileting    [place] Level (Toileting) contact guard assist;supervision;verbal cues  -TM     Assistive Device Use (Toileting) grab bar/safety frame  -     Position (Toileting) supported sitting  -TM     Row Name 09/09/22 1313          Transfers    Bed-Chair [place] (Transfers) contact guard;verbal cues  -TM     Chair-Bed [place] (Transfers) contact guard;verbal cues  -TM     Assistive Device (Bed-Chair Transfers) wheelchair  -     [place] Level (Toilet Transfer) contact guard;verbal cues  -TM     Assistive Device (Toilet Transfer) wheelchair;grab bars/safety frame  -TM     Row Name 09/09/22 1313          Chair-Bed Transfer    Assistive Device (Chair-Bed Transfers) wheelchair  -TM     Row Name 09/09/22 1313          Toilet Transfer    Type (Toilet Transfer) stand pivot/stand step  -     Row Name 09/09/22 1313          Motor Skills    Motor Skills coordination;functional endurance;motor control/coordination interventions  -     Motor Control/Coordination Interventions therapeutic exercise/ROM;fine motor manipulation/dexterity activities;gross motor coordination activities;other (see comments)  BUE ther ex/act, GMC/FMC, bilateral coord  -           User Key  (r) = Recorded By, (t) = Taken By, (c) = Cosigned By    Initials Name Effective Dates     Sade Galvan, OT 06/16/21 -                  Occupational Therapy Education                 Title: PT OT SLP Therapies (Done)     Topic: Occupational Therapy (Done)     Point: ADL training (Done)     Description:   Instruct learner(s) on proper safety adaptation and remediation techniques during self care or transfers.   Instruct in proper use of assistive devices.              Learning Progress Summary           Patient Acceptance, E,D, VU,NR by TM at 9/9/2022 1313    Acceptance, E,D, VU,NR by TM at  9/8/2022 1052                   Point: Precautions (Done)     Description:   Instruct learner(s) on prescribed precautions during self-care and functional transfers.              Learning Progress Summary           Patient Acceptance, E,D, VU,NR by TM at 9/9/2022 1313    Acceptance, E,D, VU,NR by TM at 9/8/2022 1052                               User Key     Initials Effective Dates Name Provider Type Discipline     06/16/21 -  Sade Galvan OT Occupational Therapist OT                    OT Recommendation and Plan    Planned Therapy Interventions (OT): activity tolerance training, adaptive equipment training, BADL retraining, IADL retraining, occupation/activity based interventions, patient/caregiver education/training, ROM/therapeutic exercise, transfer/mobility retraining, strengthening exercise                    Time Calculation:      Time Calculation- OT     Row Name 09/09/22 1313 09/09/22 1312          Time Calculation- OT    OT Start Time 1245  -TM 1050  -TM     OT Stop Time 1340  -TM 1135  -TM     OT Time Calculation (min) 55 min  -TM 45 min  -TM     Total Timed Code Minutes- OT 55 minute(s)  -TM 45 minute(s)  -TM     OT Non-Billable Time (min) -- 15 min  -TM           User Key  (r) = Recorded By, (t) = Taken By, (c) = Cosigned By    Initials Name Provider Type     Sade Galvan OT Occupational Therapist              Therapy Charges for Today     Code Description Service Date Service Provider Modifiers Qty    25159202965 HC OT EVAL HIGH COMPLEXITY 3 9/8/2022 Sade Galvan OT GO 1    82624055971 HC OT SELF CARE/MGMT/TRAIN EA 15 MIN 9/8/2022 Sade Galvan, OT GO 1    05241651404 HC OT THERAPEUTIC ACT EA 15 MIN 9/8/2022 Sade Galvan OT GO 2    63718512106 HC OT SELF CARE/MGMT/TRAIN EA 15 MIN 9/9/2022 Sade Galvan OT GO 1    53631240152 HC OT THERAPEUTIC ACT EA 15 MIN 9/9/2022 Sade Galvan OT GO 3    79561049486 HC OT THER PROC EA 15 MIN  9/9/2022 Sade Galvan, OT GO 2    30619009666 HC OT SELF CARE/MGMT/TRAIN EA 15 MIN 9/9/2022 Sade Galvan OT GO 1                   Sade Galvan OT  9/9/2022

## 2022-09-09 NOTE — PROGRESS NOTES
Occupational Therapy: Individual: 100 minutes.    Physical Therapy:    Speech Language Pathology:    Signed by: Sade Galvan OT

## 2022-09-09 NOTE — SIGNIFICANT NOTE
22 6220   Plan   Plan MD informed about pt's statement about wishing he would have , hand gesture of a gun to his head and asking about pills today when sister and brother-in-law were talking to him along with SS about concerns with his home environment, option of going to SNF, and other options for housing.

## 2022-09-09 NOTE — PROGRESS NOTES
Occupational Therapy:    Physical Therapy: Individual: 90 minutes.    Speech Language Pathology:    Signed by: Grady Mcleod PTA

## 2022-09-09 NOTE — SIGNIFICANT NOTE
"   22 1215   Plan   Plan SS spoke to pt about concerns voiced by his sister Ranjana with his home and option of going to SNF for short-term rehab.  Pt says he does not want to go to SNF at discharge and wants to return to his home.  Pt has been at [facility] in the past for rehab.  Sister Ranjana and her spouse walked in pt's room during conversation.  Family informed about the condition of his home, sophie, bed bugs, and car port potentially falling.  Family encouraged pt to go to SNF.  SS informed pt about the option of going to SNF until he could get an apartment or another place to live.  Educated about [facility], [facility], [facility], or renting from private landlords. Explained application process, approval and getting into an apartment can take some time.  SS informed pt he does not have to make a decision about plans at discharge today and encouraged him to consider these options and focus on his rehabilitation/therapy.  Pt became upset and says \"he wished he would have \", made a hand gesture of a gun to his head and asked about having some pills.  SS apologized to pt for getting upset and assured him he does not have to make a decision today and explained SS will assist with discharge plans.  Informed pt SS would have to inform nurse about his statement and reaction and Psych may have to be consulted.  Pt calmed down and denied any thoughts or plans of harm to himself.  Sister apologized to pt and assured him she cared for him then she and her spouse left.  SS informed AYAN Jo and SS will send MD a message about these events.     "

## 2022-09-09 NOTE — SIGNIFICANT NOTE
09/09/22 4845   Plan   Plan SS spoke to pt and sister Susan about option of SNF, assisted living facility, [facility], [facility] or renting an apartment.  Educated about Medicare A SNF benefits and cost of assisted living.   Pt does not have anyone to stay with at discharge.  Pt to think about options and make a decision.  SS will follow and assist with discharge planning.   Patient/Family in Agreement with Plan yes

## 2022-09-09 NOTE — PHARMACY PATIENT ASSISTANCE
We will use a free trial card at discharge for his entresto.    Thank You;  Snow Ontiveros, Zackary  09/09/22  14:36 EDT

## 2022-09-09 NOTE — PROGRESS NOTES
"Patient Assessment Instrument  Quality Indicators - Admission    Section B. Hearing, Speech Vision      Section C. Cognitive Patterns  Brief Interview for Mental Status (BIMS) was conducted.  Repetition of Three Words: Three words  Able to report correct year: Correct  Able to report correct month: Accurate within 5 days  Able to report correct day of the week: Correct  Able to recall \"sock\": Yes, no cue required  Able to recall \"blue\": Yes, no cue required  Able to recall \"bed\": Yes, no cue required    BIMS SUMMARY SCORE: 15 Cognitively intact Patient was able to complete the Brief  Interview for Mental Status    Section [id number]. Prior Functioning    Self Care: Patient completed the activities by him/herself, with or without an  assistive device, with no assistance from a helper.  Indoor Mobility: Patient completed the activities by him/herself, with or  without an assistive device, with no assistance from a helper.  Stairs: Patient completed the activities by him/herself, with or without an  assistive device, with no assistance from a helper.  Functional Cognition: Patient completed the activities by him/herself, with or  without an assistive device, with no assistance from a helper.    Section [id number]. Prior Device Use      Section XD5929. Self Care Performance      Section IK6642. Self Care Discharge Goals      Section AI5173. Mobility Performance      Section GB7674. Mobility Discharge Goals      Section H. Bladder and Bowel  Bladder Continence: Always continent (no documented incontinence).  Bowel Continence: Always continent (no documented incontinence).    Section I. Active Diagnosis  Comorbidities and Co-existing Conditions:   Patient does not have PAD, PVD, or  Diabetes Mellitus    Section J. Health Conditions  Patient has not had any falls in the past year. Patient has not had major  surgery during the 100 days prior to admission.    Section K. Swallowing/Nutritional Status  Modiified food " consistency/supervision (patient requires modified food or liquid  consistency and/or needs supervision during eating for safety).    Section M. Skin Conditions  Unhealed Pressure Ulcer/Injuries at Stage 1 or Higher on Admission:  No.    Section N. Medication    Potential Clinically Significant Medication Issues: No issues found during  review    Section O. Special Treatments, Procedures, and Programs  Patient did not receive total parenteral nutrition treatment at the time of  admission.    OPTIONAL BRANCH FOR TRACKING FALLS  Fall(s) During Shift: No falls.    Signed by: Pauline Kamara, Supervisor

## 2022-09-09 NOTE — PROGRESS NOTES
PPS CMG Coordinator  Inpatient Rehabilitation Admission    Ethnic Group: White.  Marital Status:  Marital Status: Never .    IRF Admission Date:  09/07/2022  Admission Class: Initial Rehab.  Admit From:  [facility]    Pre-Hospital Living: Home. Pre-Hospital Living  With: (1) Alone.    Payment Sources: Primary: Medicare Fee for Service  Secondary: Not Listed.  Impairment Group: 01.1 Left Body Involvement (Right Brain)  Date of Onset of Impairment: 09/01/2022    Etiologic Diagnosis Code(s):  Rank Code      Description  1    I63.9     Cerebral infarction, unspecified    Comorbidities:      Height on Admission: 72 inches.  Weight on Admission: 222 pounds.    Are there any arthritis conditions recorded for Impairment Group, Etiologic  Diagnosis, or Comorbid Conditions that meet all of the regulatory requirements  for IRF classification (in 42 .29(b)(2)(x), (xi), and xii))?    MAYNOR Bladder Accidents:  0 - Accidents.  Bladder Score = 6. Patient has not had an accident, but uses a  device/medication. urinal  MAYNOR Bowel Accident: 0 -Accidents.  Bowel Score = 7. Patient has no accidents.    Signed by: Pauline Kamara, Supervisor

## 2022-09-09 NOTE — SIGNIFICANT NOTE
09/09/22 5763   Plan   Plan Pt's sister Ranjana Wilson requested to meet with SS.  Sister voiced concerns about pt's home condition. She says pt had not allowed family to come inside his home in about 3 years.  Pt gave brother Romy dejesus to his home during hospital admission so has been into pt's home and sister says she went to pt's home on 9-8-22 to look at cleaning it up and getting it exterminated for bed bugs.  Sister says pt's floor is wet, refrigerator has froze over and leaked, and carpet is infested with bed bugs, therefore, does not feel it is safe for pt to return to his home at discharge.  Sister says pt was also not taking his medications properly prior to admission and feels he needs to go to a SNF at discharge.  Sister had pictures of pt's home environment.  Sister says pt needs someone to help manage his care.  Sister says pt has also not agreed to appoint anyone to be a POA.  Explained SS can assist with discharge plans if pt is agreeable to go to SNF at discharge.

## 2022-09-09 NOTE — PROGRESS NOTES
Occupational Therapy:    Physical Therapy:    Speech Language Pathology: Individual: 40 minutes.    Signed by: RELL Dobbins

## 2022-09-09 NOTE — PLAN OF CARE
Goal Outcome Evaluation:  Plan of Care Reviewed With: patient        Progress: improving  Outcome Evaluation: CONTINUE POC; MONITOR

## 2022-09-09 NOTE — THERAPY TREATMENT NOTE
Inpatient Rehabilitation - Physical Therapy Treatment Note       [facility]     Patient Name: Zaid Galarza  : 1950  MRN: [id number]    Today's Date: 2022                    Admit Date: 2022      Visit Dx:   No diagnosis found.    Patient Active Problem List   Diagnosis   • Atrial flutter (HCC)   • Dilated cardiomyopathy (HCC)   • Nonobstructive CAD per cath 10/3/2019   • Anxiety disorder   • Essential hypertension   • Tobacco use   • History of abdominal aortic aneurysm (AAA) repair   • Dyslipidemia, goal LDL below 100   • CVA (cerebral vascular accident) (HCC)   • Cerebrovascular accident (CVA), unspecified mechanism (HCC)       Past Medical History:   Diagnosis Date   • Atrial fibrillation (HCC)    • CHF (congestive heart failure) (HCC)    • COPD (chronic obstructive pulmonary disease) (HCC)    • Coronary artery disease    • Hypertension    • Tobacco abuse        Past Surgical History:   Procedure Laterality Date   • ABDOMINAL AORTIC ANEURYSM REPAIR     • CARDIAC CATHETERIZATION N/A 10/03/2019    Procedure: Left Heart Cath;  Surgeon: Vincent Phillips MD;  Location: [facility] INVASIVE LOCATION;  Service: Cardiology       PT ASSESSMENT (last 12 hours)     IRF PT Evaluation and Treatment     Row Name 22 1415          PT Time and Intention    Document Type daily treatment  -RG     Mode of Treatment individual therapy;physical therapy  -RG     Patient/Family/Caregiver Comments/Observations Pt and nursing in agreement for skilled PT on this date. Pt increased ambulation distance with no AD.  Slight lob noted with ambulation.  -RG     Row Name 22 1415          General Information    Patient Profile Reviewed yes  -RG     Existing Precautions/Restrictions fall;swallow precautions  -     Row Name 22 1415          Living Environment    Primary Care Provided by self  -     Row Name 22 1415          Home Use of Assistive/Adaptive Equipment    Equipment Currently Used at Home none   -RG     Row Name 09/09/22 1415          Cognition/Psychosocial    Affect/Mental Status (Cognition) WFL  -RG     Orientation Status (Cognition) oriented x 4  -RG     Follows Commands (Cognition) WFL  -RG     Personal Safety Interventions gait belt;nonskid shoes/slippers when out of bed;fall prevention program maintained  -RG     Row Name 09/09/22 1415          Bed Mobility    Bed Mobility bed mobility (all) activities  -RG     All Activities, [place] (Bed Mobility) standby assist  -RG     Row Name 09/09/22 1415          Transfer Assessment/Treatment    Transfers bed-chair transfer;chair-bed transfer;sit-stand transfer;stand-sit transfer  -RG     Row Name 09/09/22 1415          Transfers    Bed-Chair [place] (Transfers) standby assist  -RG     Chair-Bed [place] (Transfers) standby assist  -RG     Assistive Device (Bed-Chair Transfers) wheelchair  -RG     Sit-Stand [place] (Transfers) standby assist  -RG     Stand-Sit [place] (Transfers) standby assist  -RG     [place] Level (Toilet Transfer) contact guard  -RG     Assistive Device (Toilet Transfer) wheelchair;grab bars/safety frame  -RG     Row Name 09/09/22 1415          Chair-Bed Transfer    Assistive Device (Chair-Bed Transfers) wheelchair  -RG     Row Name 09/09/22 1415          Sit-Stand Transfer    Assistive Device (Sit-Stand Transfers) wheelchair  -RG     Row Name 09/09/22 1415          Stand-Sit Transfer    Assistive Device (Stand-Sit Transfers) wheelchair  -RG     Row Name 09/09/22 1415          Toilet Transfer    Type (Toilet Transfer) stand pivot/stand step  -     Row Name 09/09/22 1415          Gait/Stairs (Locomotion)    Gait/Stairs Locomotion gait/ambulation assistive device  -RG     [place] Level (Gait) contact guard  -RG     Assistive Device (Gait) --  no AD  -RG     Distance in Feet (Gait) 160,160  -RG     Pattern (Gait) step-through  -RG     Deviations/Abnormal Patterns (Gait) base of support, narrow;other (see  comments)  -RG     Comment, (Gait/Stairs) lob noted with gait  -RG     Row Name 09/09/22 1415          Safety Issues, Functional Mobility    Impairments Affecting Function (Mobility) endurance/activity tolerance;balance;shortness of breath  -     Row Name 09/09/22 1415          Balance    Static Sitting Balance contact guard;non-verbal cues (demo/gesture);standby assist  -RG     Static Standing Balance contact guard;verbal cues;non-verbal cues (demo/gesture)  foam block  -     Row Name 09/09/22 1415          Hip (Therapeutic Exercise)    Hip Strengthening (Therapeutic Exercise) bilateral;flexion;aBduction;aDduction;marching while seated;marching while standing;sitting;standing;2 lb free weight;resistance band;green;10 repetitions;2 sets  -     Row Name 09/09/22 1415          Knee (Therapeutic Exercise)    Knee Strengthening (Therapeutic Exercise) bilateral;flexion;extension;marching while seated;marching while standing;LAQ (long arc quad);hamstring curls;sitting;standing;2 lb free weight;resistance band;green;10 repetitions;2 sets  -     Row Name 09/09/22 1415          Ankle (Therapeutic Exercise)    Ankle Strengthening (Therapeutic Exercise) bilateral;dorsiflexion;plantarflexion;sitting;standing;20 repititions  -     Row Name 09/09/22 1415          Positioning and Restraints    Pre-Treatment Position in bed  -RG     Post Treatment Position bed  -RG     In Bed notified nsg;supine;call light within reach;encouraged to call for assist;legs elevated  -     Row Name 09/09/22 1415          Therapy Assessment/Plan (PT)    Patient's Goals For Discharge return home;take care of myself at home  -     Row Name 09/09/22 1415          Therapy Assessment/Plan (PT)    Rehab Potential/Prognosis (PT) good, to achieve stated therapy goals  -RG     Frequency of Treatment (PT) 5 times per week  -RG     Estimated Duration of Therapy (PT) 2 weeks  -RG     Problem List (PT) problems related to;balance;motor control;mobility   -RG     Activity Limitations Related to Problem List (PT) unable to ambulate safely;unable to transfer safely  -RG     Row Name 09/09/22 1415          Therapy Plan Review/Discharge Plan (PT)    Anticipated Discharge Disposition (PT) home;home with assist  -RG     Row Name 09/09/22 1415          IRF PT Goals    Bed Mobility Goal Selection (PT-IRF) bed mobility, PT goal 1  -RG     Transfer Goal Selection (PT-IRF) transfers, PT goal 1  -RG     Gait (Walking Locomotion) Goal Selection (PT-IRF) gait, PT goal 1  -RG     Row Name 09/09/22 1415          Bed Mobility Goal 1 (PT-IRF)    Activity/Assistive Device (Bed Mobility Goal 1, PT-IRF) bed mobility activities, all  -RG     [place] Level (Bed Mobility Goal 1, PT-IRF) independent  -RG     Time Frame (Bed Mobility Goal 1, PT-IRF) by discharge  -RG     Row Name 09/09/22 1415          Transfer Goal 1 (PT-IRF)    Activity/Assistive Device (Transfer Goal 1, PT-IRF) sit-to-stand/stand-to-sit;bed-to-chair/chair-to-bed  -RG     [place] Level (Transfer Goal 1, PT-IRF) independent  -RG     Time Frame (Transfer Goal 1, PT-IRF) by discharge  -RG     Row Name 09/09/22 1415          Gait/Walking Locomotion Goal 1 (PT-IRF)    Activity/Assistive Device (Gait/Walking Locomotion Goal 1, PT-IRF) gait (walking locomotion);assistive device use  -RG     Gait/Walking Locomotion Distance Goal 1 (PT-IRF) 320'  -RG     [place] Level (Gait/Walking Locomotion Goal 1, PT-IRF) standby assist  -RG     Time Frame (Gait/Walking Locomotion Goal 1, PT-IRF) by discharge  -RG           User Key  (r) = Recorded By, (t) = Taken By, (c) = Cosigned By    Initials Name Provider Type    RG Grady Mcleod PTA Physical Therapist Assistant                 Physical Therapy Education                 Title: PT OT SLP Therapies (Done)     Topic: Physical Therapy (Done)     Point: Mobility training (Done)     Learning Progress Summary           Patient Acceptance, E,D, VU,NR by ALVARADO at 9/9/2022 4905     Acceptance, E,TB, VU by KM at 9/8/2022 1538                   Point: Home exercise program (Done)     Learning Progress Summary           Patient Acceptance, E,D, VU,NR by RG at 9/9/2022 1422    Acceptance, E,TB, VU by KM at 9/8/2022 1538                   Point: Body mechanics (Done)     Learning Progress Summary           Patient Acceptance, E,D, VU,NR by RG at 9/9/2022 1422    Acceptance, E,TB, VU by KM at 9/8/2022 1538                   Point: Precautions (Done)     Learning Progress Summary           Patient Acceptance, E,D, VU,NR by RG at 9/9/2022 1422    Acceptance, E,TB, VU by KM at 9/8/2022 1538                               User Key     Initials Effective Dates Name Provider Type Discipline     06/16/21 -  Grady Mcleod PTA Physical Therapist Assistant PT     05/24/22 -  Dionicio Zamarripa, MISSY Physical Therapist PT                PT Recommendation and Plan    Frequency of Treatment (PT): 5 times per week                     Time Calculation:      PT Charges     Row Name 09/09/22 1422             Time Calculation    Start Time 0745  -      Stop Time 0915  -      Time Calculation (min) 90 min  -      PT Received On 09/09/22  -              Time Calculation- PT    Total Timed Code Minutes- PT 90 minute(s)  -            User Key  (r) = Recorded By, (t) = Taken By, (c) = Cosigned By    Initials Name Provider Type     Grady Mcleod PTA Physical Therapist Assistant                Therapy Charges for Today     Code Description Service Date Service Provider Modifiers Qty    58497377848 HC GAIT TRAINING EA 15 MIN 9/9/2022 Grady Mcleod PTA GP, CQ 1    13594717502 HC PT THERAPEUTIC ACT EA 15 MIN 9/9/2022 Grady Mcleod PTA GP, CQ 2    94955687508 HC PT THER PROC EA 15 MIN 9/9/2022 Grady Mcleod PTA GP, CQ 2    47077474108 HC PT NEUROMUSC RE EDUCATION EA 15 MIN 9/9/2022 Grady Mcleod PTA GP, CQ 1                   Grady Mcleod PTA  9/9/2022

## 2022-09-09 NOTE — PLAN OF CARE
Problem: Fall Injury Risk  Goal: Absence of Fall and Fall-Related Injury  Outcome: Ongoing, Progressing  Intervention: Identify and Manage Contributors  Recent Flowsheet Documentation  Taken 9/9/2022 0800 by Sandro Erwin, RN  Medication Review/Management: medications reviewed  Intervention: Promote Injury-Free Environment  Recent Flowsheet Documentation  Taken 9/9/2022 1600 by Sandro Erwin, RN  Safety Promotion/Fall Prevention: safety round/check completed  Taken 9/9/2022 1400 by Sandro Erwin, RN  Safety Promotion/Fall Prevention: safety round/check completed  Taken 9/9/2022 1200 by Sandro Erwin, RN  Safety Promotion/Fall Prevention: safety round/check completed  Taken 9/9/2022 1000 by Sandro Erwin RN  Safety Promotion/Fall Prevention: safety round/check completed  Taken 9/9/2022 0800 by Sandro Erwin RN  Safety Promotion/Fall Prevention:   safety round/check completed   fall prevention program maintained     Problem: Rehabilitation (IRF) Plan of Care  Goal: Plan of Care Review  Outcome: Ongoing, Progressing  Flowsheets (Taken 9/9/2022 1218)  Progress: improving  Plan of Care Reviewed With: patient  Goal: Patient-Specific Goal (Individualized)  Outcome: Ongoing, Progressing  Goal: Absence of New-Onset Illness or Injury  Outcome: Ongoing, Progressing  Intervention: Prevent Fall and Fall Injury  Recent Flowsheet Documentation  Taken 9/9/2022 1600 by Sandro Erwin RN  Safety Promotion/Fall Prevention: safety round/check completed  Taken 9/9/2022 1400 by Sandro Erwin, RN  Safety Promotion/Fall Prevention: safety round/check completed  Taken 9/9/2022 1200 by Sandro Erwin, RN  Safety Promotion/Fall Prevention: safety round/check completed  Taken 9/9/2022 1000 by Sandro Erwin, RN  Safety Promotion/Fall Prevention: safety round/check completed  Taken 9/9/2022 0800 by Sandro Erwin, RN  Safety Promotion/Fall Prevention:   safety round/check completed   fall prevention program  maintained  Intervention: Prevent VTE (Venous Thromboembolism)  Recent Flowsheet Documentation  Taken 9/9/2022 0800 by Snadro Erwin, RN  VTE Prevention/Management: (see mar.) other (see comments)  Goal: Optimal Comfort and Wellbeing  Outcome: Ongoing, Progressing  Goal: Home and Community Transition Plan Established  Outcome: Ongoing, Progressing     Problem: Mobility Impairment  Goal: Optimal Mobility [place] and Safety  Outcome: Ongoing, Progressing     Problem: Swallowing Impairment  Goal: Optimal Eating/Swallowing without Aspir  Outcome: Ongoing, Progressing     Problem: Skin Injury Risk Increased  Goal: Skin Health and Integrity  Outcome: Ongoing, Progressing  Intervention: Promote and Optimize Oral Intake  Recent Flowsheet Documentation  Taken 9/9/2022 0800 by Sandro Erwin, RN  Oral Nutrition Promotion: physical activity promoted  Intervention: Optimize Skin Protection  Recent Flowsheet Documentation  Taken 9/9/2022 0800 by Sandro Erwin, RN  Pressure Reduction Techniques:   pressure points protected   frequent weight shift encouraged  Pressure Reduction Devices: pressure-redistributing mattress utilized  Skin Protection: incontinence pads utilized   Goal Outcome Evaluation:  Plan of Care Reviewed With: patient        Progress: improving

## 2022-09-10 PROCEDURE — 99231 SBSQ HOSP IP/OBS SF/LOW 25: CPT | Performed by: FAMILY MEDICINE

## 2022-09-10 PROCEDURE — 97530 THERAPEUTIC ACTIVITIES: CPT

## 2022-09-10 PROCEDURE — 97110 THERAPEUTIC EXERCISES: CPT

## 2022-09-10 PROCEDURE — 92526 ORAL FUNCTION THERAPY: CPT

## 2022-09-10 PROCEDURE — 97535 SELF CARE MNGMENT TRAINING: CPT

## 2022-09-10 PROCEDURE — 63710000001 PREDNISONE PER 5 MG: Performed by: FAMILY MEDICINE

## 2022-09-10 PROCEDURE — 97116 GAIT TRAINING THERAPY: CPT

## 2022-09-10 RX ADMIN — ASPIRIN 81 MG: 81 TABLET, CHEWABLE ORAL at 08:28

## 2022-09-10 RX ADMIN — APIXABAN 5 MG: 5 TABLET, FILM COATED ORAL at 08:28

## 2022-09-10 RX ADMIN — ATORVASTATIN CALCIUM 80 MG: 40 TABLET, FILM COATED ORAL at 20:08

## 2022-09-10 RX ADMIN — NICOTINE 1 PATCH: 14 PATCH, EXTENDED RELEASE TRANSDERMAL at 08:29

## 2022-09-10 RX ADMIN — APIXABAN 5 MG: 5 TABLET, FILM COATED ORAL at 20:08

## 2022-09-10 RX ADMIN — Medication 5 MG: at 20:08

## 2022-09-10 RX ADMIN — PREDNISONE 10 MG: 10 TABLET ORAL at 08:28

## 2022-09-10 RX ADMIN — POTASSIUM CHLORIDE 10 MEQ: 750 TABLET, FILM COATED, EXTENDED RELEASE ORAL at 08:28

## 2022-09-10 RX ADMIN — SACUBITRIL AND VALSARTAN 1 TABLET: 97; 103 TABLET, FILM COATED ORAL at 20:08

## 2022-09-10 RX ADMIN — SACUBITRIL AND VALSARTAN 1 TABLET: 97; 103 TABLET, FILM COATED ORAL at 08:28

## 2022-09-10 RX ADMIN — SERTRALINE 100 MG: 50 TABLET, FILM COATED ORAL at 08:28

## 2022-09-10 NOTE — PROGRESS NOTES
Occupational Therapy:    Physical Therapy: Individual: 90 minutes.    Speech Language Pathology:    Signed by: Ena Brower PTA

## 2022-09-10 NOTE — PLAN OF CARE
Goal Outcome Evaluation:  Plan of Care Reviewed With: patient        Progress: improving  Outcome Evaluation: PROGRESSING WITH CURRENT THERAPIES; CONTINUE POC; MONITOR

## 2022-09-10 NOTE — PROGRESS NOTES
Occupational Therapy:    Physical Therapy:    Speech Language Pathology: Individual: 45 minutes.    Signed by: RELL Martinez

## 2022-09-10 NOTE — THERAPY TREATMENT NOTE
Inpatient Rehabilitation - Speech Language Pathology   [facility]   DYSPHAGIA THERAPY TREATMENT NOTE     Patient Name: Zaid Galarza  : 1950  MRN: [id number]  Today's Date: 9/10/2022             Admit Date: 2022    Zaid Galarza is seen in speech therapy office this am for formal dysphagia tx. He is a/a, upright and centered in a wheelchair, cooperative to participate.      Long Term Goal:  Pt will accept least restrictive diet tolerance w/o overt s/s aspiration.      Short Term Goals:     1. Pt will perform resistive breathing exercises x3 sets x5 reps w/resistance of 1-6 to improve respiratory support and control.   *x5 sets x10 reps w/ resistance level 4/4, min cues.      4. Pt will perform laryngeal adduction/elevation exercises x3 sets x10 reps w/ min cues.   *x2 sets x5 reps pre-resistive breather w/ mean phonation duration 14.46 seconds, min cues. x4 sets x5 reps post-resistive breather w/ mean phonation duration 13.68 seconds, min cues. Mild fatigue effects noted.       5. Pt will perform CTAR/Shaker exercises sustained x3 sets x5 reps for 30+ seconds over 3 consecutive sessions.   *Not directly addressed today.      6. Pt will perform CTAR/Shaker exercises repetitive x3 sets x12 reps w/ mod cues.   *CTAR: x5 sets x10 reps, min cues.      7. Pt will perform compensatory techniques during meals w/ min cues.  *He accepts trials of thin water via cup and straw w/ no overt s/s aspiration 6/6 trials today.      8. Pt will participate in instrumental re-evaluation of swallowing fnx in 7-10 days, pending progress towards this poc.  *Most recent MBS performed . Repeat MBS tentatively .     Visit Dx:   No diagnosis found.  Patient Active Problem List   Diagnosis   • Atrial flutter (HCC)   • Dilated cardiomyopathy (HCC)   • Nonobstructive CAD per cath 10/3/2019   • Anxiety disorder   • Essential hypertension   • Tobacco use   • History of abdominal aortic aneurysm (AAA)  repair   • Dyslipidemia, goal LDL below 100   • CVA (cerebral vascular accident) (HCC)   • Cerebrovascular accident (CVA), unspecified mechanism (HCC)     Past Medical History:   Diagnosis Date   • Atrial fibrillation (HCC)    • CHF (congestive heart failure) (HCC)    • COPD (chronic obstructive pulmonary disease) (HCC)    • Coronary artery disease    • Hypertension    • Tobacco abuse      Past Surgical History:   Procedure Laterality Date   • ABDOMINAL AORTIC ANEURYSM REPAIR     • CARDIAC CATHETERIZATION N/A 10/03/2019    Procedure: Left Heart Cath;  Surgeon: Vincent Phillips MD;  Location: [facility] CATH INVASIVE LOCATION;  Service: Cardiology       SLP Recommendation and Plan  Continue tx as tolerated per POC.     Thank you-  Lisa Goodman M.A., CCC-SLP    EDUCATION  The patient has been educated in the following areas:   Dysphagia (Swallowing Impairment) Oral Care/Hydration Modified Diet Instruction.     Time Calculation:    Time Calculation- SLP     Row Name 09/10/22 0919             Time Calculation- SLP    SLP Start Time 0830  -CJ      SLP Stop Time 0915  -      SLP Time Calculation (min) 45 min  -      SLP - Next Appointment 09/12/22  -            User Key  (r) = Recorded By, (t) = Taken By, (c) = Cosigned By    Initials Name Provider Type    Lisa Banegas MA,CCC-SLP Speech and Language Pathologist              Therapy Charges for Today     Code Description Service Date Service Provider Modifiers Qty    10824245163 HC ST TREATMENT SWALLOW 3 9/10/2022 Lisa Goodman MA,CCC-SLP GN 1          Lisa Goodman MA,CCC-SLP  9/10/2022

## 2022-09-10 NOTE — THERAPY TREATMENT NOTE
Inpatient Rehabilitation - Physical Therapy Treatment Note       [facility]     Patient Name: Zaid Galarza  : 1950  MRN: [id number]    Today's Date: 9/10/2022                    Admit Date: 2022      Visit Dx:   No diagnosis found.    Patient Active Problem List   Diagnosis   • Atrial flutter (HCC)   • Dilated cardiomyopathy (HCC)   • Nonobstructive CAD per cath 10/3/2019   • Anxiety disorder   • Essential hypertension   • Tobacco use   • History of abdominal aortic aneurysm (AAA) repair   • Dyslipidemia, goal LDL below 100   • CVA (cerebral vascular accident) (HCC)   • Cerebrovascular accident (CVA), unspecified mechanism (HCC)       Past Medical History:   Diagnosis Date   • Atrial fibrillation (HCC)    • CHF (congestive heart failure) (HCC)    • COPD (chronic obstructive pulmonary disease) (HCC)    • Coronary artery disease    • Hypertension    • Tobacco abuse        Past Surgical History:   Procedure Laterality Date   • ABDOMINAL AORTIC ANEURYSM REPAIR     • CARDIAC CATHETERIZATION N/A 10/03/2019    Procedure: Left Heart Cath;  Surgeon: Vincent Phillips MD;  Location: [facility] INVASIVE LOCATION;  Service: Cardiology       PT ASSESSMENT (last 12 hours)     IRF PT Evaluation and Treatment     Row Name 09/10/22 1300          PT Time and Intention    Document Type daily treatment  -LL     Mode of Treatment individual therapy;physical therapy  -LL     Patient/Family/Caregiver Comments/Observations Patient & RN in agreement for participation with PT this date.  -LL     Row Name 09/10/22 1300          General Information    Patient Profile Reviewed yes  -LL     Existing Precautions/Restrictions fall;swallow precautions  -LL     Row Name 09/10/22 1300          Living Environment    Primary Care Provided by self  -LL     Row Name 09/10/22 1300          Home Use of Assistive/Adaptive Equipment    Equipment Currently Used at Home none  -LL     Row Name 09/10/22 1300          Cognition/Psychosocial     Affect/Mental Status (Cognition) WFL  -LL     Orientation Status (Cognition) oriented x 4  -LL     Follows Commands (Cognition) WFL  -LL     Personal Safety Interventions gait belt;nonskid shoes/slippers when out of bed;supervised activity  -LL     Cognitive Function WFL  -LL     Row Name 09/10/22 1300          Bed Mobility    Bed Mobility bed mobility (all) activities  -LL     All Activities, [place] (Bed Mobility) standby assist  -LL     Row Name 09/10/22 1300          Transfer Assessment/Treatment    Transfers bed-chair transfer;chair-bed transfer;sit-stand transfer;stand-sit transfer  -LL     Row Name 09/10/22 1300          Transfers    Bed-Chair [place] (Transfers) standby assist  -LL     Chair-Bed [place] (Transfers) standby assist  -LL     Assistive Device (Bed-Chair Transfers) wheelchair  -LL     Sit-Stand [place] (Transfers) standby assist  -LL     Stand-Sit [place] (Transfers) standby assist  -LL     [place] Level (Toilet Transfer) contact guard  -LL     Assistive Device (Toilet Transfer) wheelchair;grab bars/safety frame  -LL     Row Name 09/10/22 1300          Chair-Bed Transfer    Assistive Device (Chair-Bed Transfers) wheelchair  -LL     Row Name 09/10/22 1300          Sit-Stand Transfer    Assistive Device (Sit-Stand Transfers) wheelchair  -LL     Row Name 09/10/22 1300          Stand-Sit Transfer    Assistive Device (Stand-Sit Transfers) wheelchair  -     Row Name 09/10/22 1300          Toilet Transfer    Type (Toilet Transfer) stand pivot/stand step  -LL     Comment, (Toilet Transfer) x 2 during session  -LL     Row Name 09/10/22 1300          Gait/Stairs (Locomotion)    Gait/Stairs Locomotion gait/ambulation assistive device  -LL     [place] Level (Gait) contact guard  -LL     Assistive Device (Gait) --  no AD  -LL     Distance in Feet (Gait) 150', 160'  -LL     Pattern (Gait) step-through  -LL     Deviations/Abnormal Patterns (Gait) base of support,  narrow;other (see comments)  -LL     Comment, (Gait/Stairs) Patient did exhibit periodic deviation from path, but was able to self correct.  -     Row Name 09/10/22 1300          Safety Issues, Functional Mobility    Impairments Affecting Function (Mobility) endurance/activity tolerance;balance;shortness of breath  -     Row Name 09/10/22 1300          Hip (Therapeutic Exercise)    Hip Strengthening (Therapeutic Exercise) bilateral;flexion;extension;aBduction;aDduction;marching while seated;marching while standing;mini squats;sitting;standing;resistance band;green  2# w/ sitting ex's  -     Row Name 09/10/22 1300          Knee (Therapeutic Exercise)    Knee Strengthening (Therapeutic Exercise) bilateral;flexion;extension;marching while seated;marching while standing;LAQ (long arc quad);hamstring curls;sitting;standing;resistance band;green  2# w/ sitting ex's  -LL     Row Name 09/10/22 1300          Ankle (Therapeutic Exercise)    Ankle Strengthening (Therapeutic Exercise) bilateral;dorsiflexion;plantarflexion;sitting;standing  -     Row Name 09/10/22 1300          Positioning and Restraints    Pre-Treatment Position --  WC w/ SLP  -LL     Post Treatment Position bed  -LL     In Bed supine;call light within reach;encouraged to call for assist;side rails up x2;heels elevated  -     Row Name 09/10/22 1300          Therapy Assessment/Plan (PT)    Patient's Goals For Discharge return home;take care of myself at home  -     Row Name 09/10/22 1300          Therapy Assessment/Plan (PT)    Rehab Potential/Prognosis (PT) good, to achieve stated therapy goals  -LL     Frequency of Treatment (PT) 5 times per week  -LL     Estimated Duration of Therapy (PT) 2 weeks  -LL     Problem List (PT) problems related to;balance;motor control;mobility  -LL     Activity Limitations Related to Problem List (PT) unable to ambulate safely;unable to transfer safely  -     Row Name 09/10/22 1300          Therapy Plan  Review/Discharge Plan (PT)    Anticipated Discharge Disposition (PT) home;home with assist  -LL     Row Name 09/10/22 1300          IRF PT Goals    Bed Mobility Goal Selection (PT-IRF) bed mobility, PT goal 1  -LL     Transfer Goal Selection (PT-IRF) transfers, PT goal 1  -LL     Gait (Walking Locomotion) Goal Selection (PT-IRF) gait, PT goal 1  -LL     Row Name 09/10/22 1300          Bed Mobility Goal 1 (PT-IRF)    Activity/Assistive Device (Bed Mobility Goal 1, PT-IRF) bed mobility activities, all  -LL     [place] Level (Bed Mobility Goal 1, PT-IRF) independent  -LL     Time Frame (Bed Mobility Goal 1, PT-IRF) by discharge  -LL     Row Name 09/10/22 1300          Transfer Goal 1 (PT-IRF)    Activity/Assistive Device (Transfer Goal 1, PT-IRF) sit-to-stand/stand-to-sit;bed-to-chair/chair-to-bed  -LL     [place] Level (Transfer Goal 1, PT-IRF) independent  -LL     Time Frame (Transfer Goal 1, PT-IRF) by discharge  -LL     Row Name 09/10/22 1300          Gait/Walking Locomotion Goal 1 (PT-IRF)    Activity/Assistive Device (Gait/Walking Locomotion Goal 1, PT-IRF) gait (walking locomotion);assistive device use  -LL     Gait/Walking Locomotion Distance Goal 1 (PT-IRF) 320'  -LL     [place] Level (Gait/Walking Locomotion Goal 1, PT-IRF) standby assist  -LL     Time Frame (Gait/Walking Locomotion Goal 1, PT-IRF) by discharge  -LL           User Key  (r) = Recorded By, (t) = Taken By, (c) = Cosigned By    Initials Name Provider Type    Ena Diaz PTA Physical Therapist Assistant                 Physical Therapy Education                 Title: PT OT SLP Therapies (Done)     Topic: Physical Therapy (Done)     Point: Mobility training (Done)     Learning Progress Summary           Patient Acceptance, E,D, VU,NR by CRISSY at 9/10/2022 1341    Acceptance, E,D, VU,NR by RG at 9/9/2022 1422    Acceptance, E,TB, VU by MERE at 9/8/2022 1538                   Point: Home exercise program (Done)     Learning  Progress Summary           Patient Acceptance, E,D, VU,NR by LL at 9/10/2022 1341    Acceptance, E,D, VU,NR by RG at 9/9/2022 1422    Acceptance, E,TB, VU by KM at 9/8/2022 1538                   Point: Body mechanics (Done)     Learning Progress Summary           Patient Acceptance, E,D, VU,NR by LL at 9/10/2022 1341    Acceptance, E,D, VU,NR by RG at 9/9/2022 1422    Acceptance, E,TB, VU by KM at 9/8/2022 1538                   Point: Precautions (Done)     Learning Progress Summary           Patient Acceptance, E,D, VU,NR by LL at 9/10/2022 1341    Acceptance, E,D, VU,NR by RG at 9/9/2022 1422    Acceptance, E,TB, VU by KM at 9/8/2022 1538                               User Key     Initials Effective Dates Name Provider Type Discipline     05/02/16 -  Ena Brower PTA Physical Therapist Assistant PT    RG 06/16/21 -  Grady Mcleod PTA Physical Therapist Assistant PT     05/24/22 -  Dionicio Zamarripa, MISSY Physical Therapist PT                PT Recommendation and Plan    Frequency of Treatment (PT): 5 times per week                     Time Calculation:      PT Charges     Row Name 09/10/22 1341             Time Calculation    Start Time 0915  -LL      Stop Time 1045  -LL      Time Calculation (min) 90 min  -LL      PT Received On 09/10/22  -LL      PT Goal Re-Cert Due Date 09/15/22  -LL              Time Calculation- PT    Total Timed Code Minutes- PT 90 minute(s)  -LL            User Key  (r) = Recorded By, (t) = Taken By, (c) = Cosigned By    Initials Name Provider Type    LL Ena Brower PTA Physical Therapist Assistant                Therapy Charges for Today     Code Description Service Date Service Provider Modifiers Qty    02739535535 HC GAIT TRAINING EA 15 MIN 9/10/2022 Ena Brower PTA GP, CQ 2    61443548235 HC PT THERAPEUTIC ACT EA 15 MIN 9/10/2022 Ena Brower PTA GP, CQ 1    50576727021 HC PT THER PROC EA 15 MIN 9/10/2022 Ena Brower PTA GP, CQ 3                   Denis  Inocente, PTA  9/10/2022

## 2022-09-10 NOTE — PROGRESS NOTES
Occupational Therapy: Individual: 90 minutes.    Physical Therapy:    Speech Language Pathology:    Signed by: Nika Sierra OT

## 2022-09-10 NOTE — PROGRESS NOTES
[facility]  PROGRESS NOTE     Patient Identification:  Name:  Zaid Galarza  Age:  71 y.o.  Sex:  male  :  1950  MRN:  [id number]  Visit Number:  [id number]  ROOM: 110/2S     Primary Care Provider:  Rosi Thacker APRN    Length of stay in inpatient status:  3    Subjective     Chief Compliant:  No chief complaint on file.      History of Presenting Illness: 71-year-old gentleman with recent CVA involving the right basal ganglia/internal capsule.  Has past history of CHF preserved EF, atrial fibrillation, COPD, depression and hypertrophic nails.  Patient has no new complaints at this time.  I talked with family this morning and they are concerned about his home situation.  Apparently patient was confronted with this yesterday and he became upset.  Patient does have a psych evaluation pending because he had stated that he which she had  instead of coming to the hospital.  Patient however states that he is not suicidal and was just upset with conversation he was having yesterday.    Objective     Current Hospital Meds:apixaban, 5 mg, Oral, Q12H  aspirin, 81 mg, Oral, Daily  atorvastatin, 80 mg, Oral, Nightly  nicotine, 1 patch, Transdermal, Q24H  potassium chloride, 10 mEq, Oral, Daily  predniSONE, 10 mg, Oral, Daily With Breakfast  sacubitril-valsartan, 1 tablet, Oral, BID  sertraline, 100 mg, Oral, Daily       ----------------------------------------------------------------------------------------------------------------------  Vital Signs:  Temp:  [98 °F (36.7 °C)-98.5 °F (36.9 °C)] 98.5 °F (36.9 °C)  Heart Rate:  [72-78] 72  Resp:  [18] 18  BP: (100-137)/(63-90) 100/63  SpO2:  [96 %-98 %] 98 %  on   ;   Device (Oxygen Therapy): room air  Body mass index is 30.07 kg/m².    Wt Readings from Last 3 Encounters:   22 101 kg (221 lb 12.8 oz)   22 101 kg (221 lb 12.8 oz)   22 109 kg (239 lb 6.4 oz)     Intake & Output (last 3 days)        0701   0700   0701 09/09 0700 09/09 0701  09/10 0700 09/10 0701 09/11 0700    P.O. 720 1320 1440     Total Intake(mL/kg) 720 (7.1) 1320 (13.1) 1440 (14.3)     Net +720 +1320 +1440             Urine Unmeasured Occurrence 3 x 5 x 7 x     Stool Unmeasured Occurrence  3 x          Diet Soft Texture; [employer]; Bud / Syrup Thick  ----------------------------------------------------------------------------------------------------------------------  Physical exam:  Constitutional:   No acute distress  HEENT: Normocephalic atraumatic  Neck: Supple   Cardiovascular: Regular rate and rhythm  Pulmonary/Chest: Clear to auscultation  Abdominal: Positive bowel sounds soft.   Musculoskeletal: No arthropathy  Neurological: 4+ out of 5 strength in the left upper and lower extremity  Skin: No rash  Peripheral vascular:  Genitourinary:  ----------------------------------------------------------------------------------------------------------------------    Last echocardiogram:  Results for orders placed during the hospital encounter of 09/01/22    Adult Transthoracic Echo Complete W/ Cont if Necessary Per Protocol (With Agitated Saline)    Interpretation Summary  · Estimated left ventricular EF = 55% Left ventricular ejection fraction appears to be 51 - 55%. Left ventricular systolic function is normal.  · Left ventricular diastolic function was normal.  · No significant mitral or aortic valve regurgitation. Moderate sclerosis of both is noted.  · There is anterior pericardial fat pad but no effusion  · Left atrium is mildly enlarged  · Since prev study of 9/29/19 EF has increased from 30% to 55% and mitral, aortic and tricuspid regurgitation is no longer seen.    ----------------------------------------------------------------------------------------------------------------------  Results from last 7 days   Lab Units 09/08/22  0132 09/07/22  0048 09/06/22  0007 09/05/22  0307 09/04/22  1426   WBC 10*3/mm3 12.82* 11.30* 12.81*   < >  --     HEMOGLOBIN g/dL 14.3 13.9 15.5   < >  --    HEMATOCRIT % 44.2 43.1 47.3   < >  --    MCV fL 90.0 88.1 88.6   < >  --    MCHC g/dL 32.4 32.3 32.8   < >  --    PLATELETS 10*3/mm3 146 149 145   < >  --    INR   --   --   --   --  1.28*    < > = values in this interval not displayed.         Results from last 7 days   Lab Units 09/08/22  0132 09/07/22  0048 09/06/22  0007 09/05/22  0307 09/04/22  0838   SODIUM mmol/L 135* 137 139 133* 136   POTASSIUM mmol/L 4.2 3.8 4.2 4.4 4.2   MAGNESIUM mg/dL 1.9  --   --   --   --    CHLORIDE mmol/L 101 103 103 103 102   CO2 mmol/L 26.3 22.6 25.2 20.3* 26.1   BUN mg/dL 29* 31* 25* 19 16   CREATININE mg/dL 1.22 1.21 1.34* 1.04 1.07   CALCIUM mg/dL 8.9 8.6 9.0 8.7 8.7   GLUCOSE mg/dL 114* 93 92 97 113*   ALBUMIN g/dL  --   --   --  3.07* 3.44*   BILIRUBIN mg/dL  --   --   --  0.7 0.6   ALK PHOS U/L  --   --   --  98 105   AST (SGOT) U/L  --   --   --  19 17   ALT (SGPT) U/L  --   --   --  9 7   Estimated Creatinine Clearance: 69.3 mL/min (by C-G formula based on SCr of 1.22 mg/dL).  No results found for: AMMONIA  Results from last 7 days   Lab Units 09/06/22  0007   TROPONIN T ng/mL <0.010             No results found for: HGBA1C, POCGLU  Lab Results   Component Value Date    TSH 1.700 09/04/2022     No results found for: PREGTESTUR, PREGSERUM, HCG, HCGQUANT  Pain Management Panel     Pain Management Panel Latest Ref Rng & Units 3/8/2016    AMPHETAMINES SCREEN, URINE Negative Negative    BARBITURATES SCREEN Negative Negative    BENZODIAZEPINE SCREEN, URINE Negative Negative    COCAINE SCREEN, URINE Negative Negative    METHADONE SCREEN, URINE Negative Negative        Brief Urine Lab Results     None        No results found for: BLOODCX      No results found for: URINECX  No results found for: WOUNDCX  No results found for: STOOLCX        I have personally looked at the labs and they are summarized  above.  ----------------------------------------------------------------------------------------------------------------------  Detailed radiology reports for the last 24 hours:    Imaging Results (Last 24 Hours)     ** No results found for the last 24 hours. **        Final impressions for the last 30 days of radiology reports:    CT Angiogram Neck    Result Date: 9/1/2022  Multifocal plaque, but no evidence of large vessel occlusion.  This report was finalized on 9/1/2022 5:16 PM by Dr. Hossein Lenz MD.      MRI Brain Without Contrast    Result Date: 9/2/2022   1. Focus of recent ischemia in the right basal ganglia/internal capsule region. 2. Chronic microangiopathic change in the periventricular and parietal white matter. 3. No parenchymal mass, hemorrhage, or midline shift  This report was finalized on 9/2/2022 2:16 PM by Dr. Hossein Lenz MD.      XR Chest 1 View    Result Date: 9/1/2022  No radiographic evidence of acute cardiac or pulmonary disease.  This report was finalized on 9/1/2022 5:54 PM by Dr. Hossein Lenz MD.      US Carotid Bilateral    Result Date: 9/2/2022    No acute findings in the arteries of the neck.  This report was finalized on 9/2/2022 12:47 PM by Dr. Daniel Bateman MD.      CT Head Without Contrast Stroke Protocol    Result Date: 9/1/2022    1. No evidence of an acute ischemic event 2. Cerebral atrophy 3. No parenchymal mass, hemorrhage, or midline shift.  Results were relayed to the emergency department at 4:37 PM  This report was finalized on 9/1/2022 4:37 PM by Dr. Hossein Lenz MD.      CT Angiogram Head w AI Analysis of LVO    Result Date: 9/1/2022  No evidence of large vessel occlusion  This report was finalized on 9/1/2022 5:22 PM by Dr. Hossein Lenz MD.      FL Video Swallow Single Contrast    Result Date: 9/6/2022  1.  Aspiration with thin and nectar thick liquids. 2. Please see dysphasia notes for further details.  This report was finalized on 9/6/2022 3:18 PM by Dr. Dequan HEWITT  MD [place].      FL Video Swallow Single Contrast    Result Date: 9/2/2022  Aspiration with thin liquids.  For additional information please see the report provided by the speech therapy service  This report was finalized on 9/2/2022 2:16 PM by Dr. Hossein Lenz MD.      I have personally looked at the radiology images and read the final radiology report.    Assessment & Plan    Status post CVA involving the right basal ganglia/internal capsule--continue aspirin, Crestor and Eliquis therapy.  Patient continues to work with speech therapy on dysphagia therapy.  Patient requiring set up for upper body dressing, lower body dressing, grooming contact-guard for toileting.  Requires standby assist for transfers yesterday.  Ambulated 160 feet x 2 with contact-guard yesterday    CHF preserved EF continue Lasix and Entresto.  Compensated    Atrial fibrillation--rate is well controlled.  Continue Eliquis.  Patient was taken off AV ruben blocking agents last week secondary to ventricular pauses    COPD stable.  Recommend smoking cessation    Depression continue Zoloft    VTE Prophylaxis:   Mechanical Order History:      Ordered        09/07/22 1515  Maintain Sequential Compression Device  Continuous                    Pharmalogical Order History:      Ordered     Dose Route Frequency Stop    09/07/22 1515  apixaban (ELIQUIS) tablet 5 mg         5 mg PO Every 12 Hours Scheduled --                    Clifford Munoz MD  [facility]  09/10/22  09:25 EDT

## 2022-09-10 NOTE — PROGRESS NOTES
Rehabilitation Nursing  Inpatient Rehabilitation Plan of Care Note    Plan of Care  Copy from Brian    Pain Management (Active)  Current Status (9/7/2022 2:53:00 PM): potential for increased pain  Weekly Goal: pain managed  Discharge Goal: pain managed    Safety    Potential for Injury (Active)  Current Status (9/7/2022 2:53:00 PM): potential for falls  Weekly Goal: no falls  Discharge Goal: no falls    Signed by: Yesenia Bateman Nurse

## 2022-09-10 NOTE — THERAPY TREATMENT NOTE
Inpatient Rehabilitation - Occupational Therapy Treatment Note    [facility]     Patient Name: Zaid Galarza  : 1950  MRN: [id number]    Today's Date: 9/10/2022                 Admit Date: 2022       No diagnosis found.    Patient Active Problem List   Diagnosis   • Atrial flutter (HCC)   • Dilated cardiomyopathy (HCC)   • Nonobstructive CAD per cath 10/3/2019   • Anxiety disorder   • Essential hypertension   • Tobacco use   • History of abdominal aortic aneurysm (AAA) repair   • Dyslipidemia, goal LDL below 100   • CVA (cerebral vascular accident) (HCC)   • Cerebrovascular accident (CVA), unspecified mechanism (HCC)       Past Medical History:   Diagnosis Date   • Atrial fibrillation (HCC)    • CHF (congestive heart failure) (HCC)    • COPD (chronic obstructive pulmonary disease) (HCC)    • Coronary artery disease    • Hypertension    • Tobacco abuse        Past Surgical History:   Procedure Laterality Date   • ABDOMINAL AORTIC ANEURYSM REPAIR     • CARDIAC CATHETERIZATION N/A 10/03/2019    Procedure: Left Heart Cath;  Surgeon: Vincent Phillips MD;  Location: [facility] INVASIVE LOCATION;  Service: Cardiology             IRF OT ASSESSMENT FLOWSHEET (last 12 hours)     IRF OT Evaluation and Treatment     Row Name 09/10/22 0814          OT Time and Intention    Document Type daily treatment  -HB     Mode of Treatment individual therapy;occupational therapy  -HB     Total Minutes, Occupational Therapy 90  -HB     Patient Effort good  -HB     Symptoms Noted During/After Treatment none  -HB     Row Name 09/10/22 0814          General Information    Patient Profile Reviewed yes  -HB     Patient/Family/Caregiver Comments/Observations Patient and RN okd OT this date.  -HB     General Observations of Patient Patient tolerated OT well with no adverse reactions. Increased tolerance with ADLs noted during session.  -HB     Existing Precautions/Restrictions fall;swallow precautions  -HB     Row Name 09/10/22 0814           Pain Assessment    Pretreatment Pain Rating 0/10 - no pain  -     Posttreatment Pain Rating 0/10 - no pain  -HB     Row Name 09/10/22 0814          Upper Body Dressing    [place] Level (Upper Body Dressing) upper body dressing skills;doff;don;pull over garment;set up assistance  -HB     Position (Upper Body Dressing) edge of bed sitting  -[facility] Name 09/10/22 0814          Lower Body Dressing    [place] Level (Lower Body Dressing) doff;don;pants/bottoms;set up  -HB     Position (Lower Body Dressing) unsupported standing;unsupported sitting  -HB     Row Name 09/10/22 0814          Grooming    [place] Level (Grooming) grooming skills;wash face, hands;set up  -HB     Position (Grooming) supported sitting  -HB     Row Name 09/10/22 0814          Toileting    [place] Level (Toileting) contact guard assist;supervision;verbal cues  -     Position (Toileting) supported sitting;supported standing  -HB     Row Name 09/10/22 0814          Self-Feeding    [place] Level (Self-Feeding) feeding skills;set up  -HB     Position (Self-Feeding) supported sitting  -HB     Row Name 09/10/22 0814          Bed Mobility    Supine-Sit [place] (Bed Mobility) modified independence  -HB     Row Name 09/10/22 0814          Transfers    Bed-Chair [place] (Transfers) contact guard;nonverbal cues (demo/gesture);verbal cues  -     Assistive Device (Bed-Chair Transfers) wheelchair  -     Sit-Stand [place] (Transfers) contact guard;nonverbal cues (demo/gesture);verbal cues  -     Stand-Sit [place] (Transfers) nonverbal cues (demo/gesture);contact guard;verbal cues  -     [place] Level (Toilet Transfer) contact guard;nonverbal cues (demo/gesture);verbal cues  -     Assistive Device (Toilet Transfer) wheelchair;grab bars/safety frame  -HB     Row Name 09/10/22 0814          Sit-Stand Transfer    Assistive Device (Sit-Stand Transfers) wheelchair  -HB     Row Name 09/10/22 0814           Stand-Sit Transfer    Assistive Device (Stand-Sit Transfers) wheelchair  -HB     Row Name 09/10/22 0814          Toilet Transfer    Type (Toilet Transfer) stand pivot/stand step  -HB     Row Name 09/10/22 0814          Motor Skills    Motor Skills coordination;functional endurance;motor control/coordination interventions  -HB     Motor Control/Coordination Interventions fine motor manipulation/dexterity activities;gross motor coordination activities;therapeutic exercise/ROM  -HB     Therapeutic Exercise shoulder;elbow/forearm;wrist;hand  -HB     Additional Documentation --  PRE 2x 5 min; BUE TA to increase ADL status;endurance; rest between tasks.  -HB     Row Name 09/10/22 0814          Positioning and Restraints    Pre-Treatment Position in bed  -HB     Post Treatment Position wheelchair  -HB     In Bed with SLP  -     Row Name 09/10/22 0814          LB Dressing Goal 1 (OT-IRF)    Activity/Device (LB Dressing Goal 1, OT-IRF) lower body dressing  pants only this date- Setup  -HB     [place] (LB Dressing Goal 1, OT-IRF) contact guard required  -HB     Time Frame (LB Dressing Goal 1, OT-IRF) short-term goal (STG)  -HB     Progress/Outcomes (LB Dressing Goal 1, OT-IRF) good progress toward goal  -HB     Row Name 09/10/22 0814          Toileting Goal 1 (OT-IRF)    Activity/Device (Toileting Goal 1, OT-IRF) toileting skills, all  -HB     [place] Level (Toileting Goal 1, OT-IRF) contact guard required  -HB     Progress/Outcomes (Toileting Goal 1, OT-IRF) goal met  -HB           User Key  (r) = Recorded By, (t) = Taken By, (c) = Cosigned By    Initials Name Effective Dates     Nika Sierra, OT 05/25/21 -                  Occupational Therapy Education                 Title: PT OT SLP Therapies (Done)     Topic: Occupational Therapy (Done)     Point: ADL training (Done)     Description:   Instruct learner(s) on proper safety adaptation and remediation techniques during self care or transfers.    Instruct in proper use of assistive devices.              Learning Progress Summary           Patient Acceptance, E, VU,NR by  at 9/10/2022 0827    Acceptance, E,D, VU,NR by TM at 9/9/2022 1313    Acceptance, E,D, VU,NR by  at 9/8/2022 1052                   Point: Precautions (Done)     Description:   Instruct learner(s) on prescribed precautions during self-care and functional transfers.              Learning Progress Summary           Patient Acceptance, E, VU,NR by  at 9/10/2022 0827    Acceptance, E,D, VU,NR by TM at 9/9/2022 1313    Acceptance, E,D, VU,NR by TM at 9/8/2022 1052                               User Key     Initials Effective Dates Name Provider Type [facility] 06/16/21 -  Sade Galvan OT Occupational Therapist OT     05/25/21 -  Nika Sierra OT Occupational Therapist OT                    OT Recommendation and Plan                         Time Calculation:      Time Calculation- OT     Row Name 09/10/22 0828             Time Calculation- OT    OT Start Time 0700  -HB      OT Stop Time 0830  -HB      OT Time Calculation (min) 90 min  -HB      Total Timed Code Minutes- OT 90 minute(s)  -HB      OT Non-Billable Time (min) 10 min  -HB            User Key  (r) = Recorded By, (t) = Taken By, (c) = Cosigned By    Initials Name Provider Type     Nika Sierra OT Occupational Therapist              Therapy Charges for Today     Code Description Service Date Service Provider Modifiers Qty    00231584398 HC OT SELF CARE/MGMT/TRAIN EA 15 MIN 9/10/2022 Nika Sierra OT GO 3    61617081176 HC OT THER PROC EA 15 MIN 9/10/2022 Nika Sierra OT GO 1    83058378761 HC OT THERAPEUTIC ACT EA 15 MIN 9/10/2022 Nika Sierar OT GO 2                   Nika Sierra OT  9/10/2022

## 2022-09-11 PROCEDURE — 99231 SBSQ HOSP IP/OBS SF/LOW 25: CPT | Performed by: FAMILY MEDICINE

## 2022-09-11 PROCEDURE — 63710000001 PREDNISONE PER 5 MG: Performed by: FAMILY MEDICINE

## 2022-09-11 RX ADMIN — PREDNISONE 10 MG: 10 TABLET ORAL at 09:08

## 2022-09-11 RX ADMIN — NICOTINE 1 PATCH: 14 PATCH, EXTENDED RELEASE TRANSDERMAL at 09:07

## 2022-09-11 RX ADMIN — SERTRALINE 100 MG: 50 TABLET, FILM COATED ORAL at 09:09

## 2022-09-11 RX ADMIN — POTASSIUM CHLORIDE 10 MEQ: 750 TABLET, FILM COATED, EXTENDED RELEASE ORAL at 09:09

## 2022-09-11 RX ADMIN — ASPIRIN 81 MG: 81 TABLET, CHEWABLE ORAL at 09:08

## 2022-09-11 RX ADMIN — SACUBITRIL AND VALSARTAN 1 TABLET: 97; 103 TABLET, FILM COATED ORAL at 09:08

## 2022-09-11 RX ADMIN — SACUBITRIL AND VALSARTAN 1 TABLET: 97; 103 TABLET, FILM COATED ORAL at 21:06

## 2022-09-11 RX ADMIN — ATORVASTATIN CALCIUM 80 MG: 40 TABLET, FILM COATED ORAL at 21:06

## 2022-09-11 RX ADMIN — APIXABAN 5 MG: 5 TABLET, FILM COATED ORAL at 21:06

## 2022-09-11 RX ADMIN — Medication 5 MG: at 21:06

## 2022-09-11 RX ADMIN — APIXABAN 5 MG: 5 TABLET, FILM COATED ORAL at 09:08

## 2022-09-11 NOTE — PROGRESS NOTES
[facility]  PROGRESS NOTE     Patient Identification:  Name:  Zaid Galarza  Age:  71 y.o.  Sex:  male  :  1950  MRN:  [id number]  Visit Number:  [id number]  ROOM: 110/2S     Primary Care Provider:  Rosi Thacker APRN    Length of stay in inpatient status:  4    Subjective     Chief Compliant:  No chief complaint on file.      History of Presenting Illness: 71-year-old gentleman recent CVA involving the right basal ganglia/internal capsule.  Has past history of CHF preserved EF, atrial fibrillation, COPD, depression and hypertrophic nails.  Patient has no complaints this morning states he is doing well    Objective     Current Hospital Meds:apixaban, 5 mg, Oral, Q12H  aspirin, 81 mg, Oral, Daily  atorvastatin, 80 mg, Oral, Nightly  nicotine, 1 patch, Transdermal, Q24H  potassium chloride, 10 mEq, Oral, Daily  predniSONE, 10 mg, Oral, Daily With Breakfast  sacubitril-valsartan, 1 tablet, Oral, BID  sertraline, 100 mg, Oral, Daily       ----------------------------------------------------------------------------------------------------------------------  Vital Signs:  Temp:  [97.5 °F (36.4 °C)] 97.5 °F (36.4 °C)  Heart Rate:  [62-84] 62  Resp:  [18] 18  BP: (129-130)/(80-94) 129/94  SpO2:  [97 %] 97 %  on   ;   Device (Oxygen Therapy): room air  Body mass index is 30.07 kg/m².    Wt Readings from Last 3 Encounters:   22 101 kg (221 lb 12.8 oz)   22 101 kg (221 lb 12.8 oz)   22 109 kg (239 lb 6.4 oz)     Intake & Output (last 3 days)       09/08 0701  09/09 0700 09/09 0701  09/10 0700 09/10 0701  09/11 0700 09/11 0701  09/12 0700    P.O. 1320 1440 1320 240    Total Intake(mL/kg) 1320 (13.1) 1440 (14.3) 1320 (13.1) 240 (2.4)    Net +1320 +1440 +1320 +240            Urine Unmeasured Occurrence 5 x 7 x 154 x 1 x    Stool Unmeasured Occurrence 3 x  2 x 2 x        Diet Soft Texture; [employer] Foods; Malin / Syrup  Thick  ----------------------------------------------------------------------------------------------------------------------  Physical exam:  Constitutional:   No acute distress  HEENT: Normocephalic atraumatic  Neck: Supple   Cardiovascular: Regular rate and rhythm  Pulmonary/Chest: Clear to auscultation  Abdominal: Positive bowel sounds soft.   Musculoskeletal: No arthropathy  Neurological: 4 out of 5 strength in left upper and lower extremity.  Skin: No rash  Peripheral vascular:  Genitourinary:  ----------------------------------------------------------------------------------------------------------------------    Last echocardiogram:  Results for orders placed during the hospital encounter of 09/01/22    Adult Transthoracic Echo Complete W/ Cont if Necessary Per Protocol (With Agitated Saline)    Interpretation Summary  · Estimated left ventricular EF = 55% Left ventricular ejection fraction appears to be 51 - 55%. Left ventricular systolic function is normal.  · Left ventricular diastolic function was normal.  · No significant mitral or aortic valve regurgitation. Moderate sclerosis of both is noted.  · There is anterior pericardial fat pad but no effusion  · Left atrium is mildly enlarged  · Since prev study of 9/29/19 EF has increased from 30% to 55% and mitral, aortic and tricuspid regurgitation is no longer seen.    ----------------------------------------------------------------------------------------------------------------------  Results from last 7 days   Lab Units 09/08/22  0132 09/07/22  0048 09/06/22  0007 09/05/22  0307 09/04/22  1426   WBC 10*3/mm3 12.82* 11.30* 12.81*   < >  --    HEMOGLOBIN g/dL 14.3 13.9 15.5   < >  --    HEMATOCRIT % 44.2 43.1 47.3   < >  --    MCV fL 90.0 88.1 88.6   < >  --    MCHC g/dL 32.4 32.3 32.8   < >  --    PLATELETS 10*3/mm3 146 149 145   < >  --    INR   --   --   --   --  1.28*    < > = values in this interval not displayed.         Results from last 7 days    Lab Units 09/08/22  0132 09/07/22  0048 09/06/22  0007 09/05/22  0307   SODIUM mmol/L 135* 137 139 133*   POTASSIUM mmol/L 4.2 3.8 4.2 4.4   MAGNESIUM mg/dL 1.9  --   --   --    CHLORIDE mmol/L 101 103 103 103   CO2 mmol/L 26.3 22.6 25.2 20.3*   BUN mg/dL 29* 31* 25* 19   CREATININE mg/dL 1.22 1.21 1.34* 1.04   CALCIUM mg/dL 8.9 8.6 9.0 8.7   GLUCOSE mg/dL 114* 93 92 97   ALBUMIN g/dL  --   --   --  3.07*   BILIRUBIN mg/dL  --   --   --  0.7   ALK PHOS U/L  --   --   --  98   AST (SGOT) U/L  --   --   --  19   ALT (SGPT) U/L  --   --   --  9   Estimated Creatinine Clearance: 69.3 mL/min (by C-G formula based on SCr of 1.22 mg/dL).  No results found for: AMMONIA  Results from last 7 days   Lab Units 09/06/22  0007   TROPONIN T ng/mL <0.010             No results found for: HGBA1C, POCGLU  Lab Results   Component Value Date    TSH 1.700 09/04/2022     No results found for: PREGTESTUR, PREGSERUM, HCG, HCGQUANT  Pain Management Panel     Pain Management Panel Latest Ref Rng & Units 3/8/2016    AMPHETAMINES SCREEN, URINE Negative Negative    BARBITURATES SCREEN Negative Negative    BENZODIAZEPINE SCREEN, URINE Negative Negative    COCAINE SCREEN, URINE Negative Negative    METHADONE SCREEN, URINE Negative Negative        Brief Urine Lab Results     None        No results found for: BLOODCX      No results found for: URINECX  No results found for: WOUNDCX  No results found for: STOOLCX        I have personally looked at the labs and they are summarized above.  ----------------------------------------------------------------------------------------------------------------------  Detailed radiology reports for the last 24 hours:    Imaging Results (Last 24 Hours)     ** No results found for the last 24 hours. **        Final impressions for the last 30 days of radiology reports:    CT Angiogram Neck    Result Date: 9/1/2022  Multifocal plaque, but no evidence of large vessel occlusion.  This report was finalized on  9/1/2022 5:16 PM by Dr. Hossein Lenz MD.      MRI Brain Without Contrast    Result Date: 9/2/2022   1. Focus of recent ischemia in the right basal ganglia/internal capsule region. 2. Chronic microangiopathic change in the periventricular and parietal white matter. 3. No parenchymal mass, hemorrhage, or midline shift  This report was finalized on 9/2/2022 2:16 PM by Dr. Hossein Lenz MD.      XR Chest 1 View    Result Date: 9/1/2022  No radiographic evidence of acute cardiac or pulmonary disease.  This report was finalized on 9/1/2022 5:54 PM by Dr. Hossein Lenz MD.      US Carotid Bilateral    Result Date: 9/2/2022    No acute findings in the arteries of the neck.  This report was finalized on 9/2/2022 12:47 PM by Dr. Daniel Bateman MD.      CT Head Without Contrast Stroke Protocol    Result Date: 9/1/2022    1. No evidence of an acute ischemic event 2. Cerebral atrophy 3. No parenchymal mass, hemorrhage, or midline shift.  Results were relayed to the emergency department at 4:37 PM  This report was finalized on 9/1/2022 4:37 PM by Dr. Hossein Lenz MD.      CT Angiogram Head w AI Analysis of LVO    Result Date: 9/1/2022  No evidence of large vessel occlusion  This report was finalized on 9/1/2022 5:22 PM by Dr. Hossein Lenz MD.      FL Video Swallow Single Contrast    Result Date: 9/6/2022  1.  Aspiration with thin and nectar thick liquids. 2. Please see dysphasia notes for further details.  This report was finalized on 9/6/2022 3:18 PM by Dr. Dequan Cotter MD.      FL Video Swallow Single Contrast    Result Date: 9/2/2022  Aspiration with thin liquids.  For additional information please see the report provided by the speech therapy service  This report was finalized on 9/2/2022 2:16 PM by Dr. Hossein Lenz MD.      I have personally looked at the radiology images and read the final radiology report.    Assessment & Plan    Status post CVA involving right basal ganglia/internal capsule--we will continue aspirin,  Crestor and Eliquis therapy.  Patient being followed by speech therapy and is requiring nectar thickened liquids and soft diet at this time.  Requiring standby assist for bed mobility; standby assist to contact-guard for transfers; ambulated 150 feet 160 feet with contact-guard and no assistive device yesterday.  Patient requires set up assistance for upper body dressing; set up for lower body dressing; set up for grooming; contact-guard for toileting.    Depression--currently on sertraline.  Psych evaluation pending.  Patient became upset when told by his family that they did not think he should go back home as is that they did not feel his home was safe for him.  Patient was upset at the time states that he wished he dad  when this stroke occurred.  Patient's made allusions to guns/pills.  At this time I do not think patient is suicidal however will obtain psych consultation for further evaluation.    Atrial fibrillation rate is well controlled patient is on Eliquis.  Patient was taken off of AV ruben blocking agents prior to being brought to the rehab secondary to ventricular pauses    COPD stable    CHF preserved EF patient currently on Lasix and Entresto and is compensated    Hypertrophic nails--have placed podiatry consult for care.    VTE Prophylaxis:   Mechanical Order History:      Ordered        22 1515  Maintain Sequential Compression Device  Continuous                    Pharmalogical Order History:      Ordered     Dose Route Frequency Stop    22 1515  apixaban (ELIQUIS) tablet 5 mg         5 mg PO Every 12 Hours Scheduled --                  Clifford Munoz MD  [facility] Hospitalist  22  11:26 EDT

## 2022-09-11 NOTE — PROGRESS NOTES
Physical Medicine and Rehabilitation  Inpatient Rehabilitation Interdisciplinary Plan of Care    Demographics            Age: 71Y            Gender: Male    Admission Date: 9/7/2022 2:53:00 PM  Rehabilitation Diagnosis:  right CVA    Plan of Care  Anticipated Discharge Date/Estimated Length of Stay: 14 days  Anticipated Discharge Destination: Community discharge with assistance  Discharge Plan : Pt plans to return home alone at discharge if possible.  Pt may  be able hire a caregiver if needed.  Medical Necessity Expected Level Rationale: good  Intensity and Duration: an average of 3 hours/5 days per week  Medical Supervision and 24 Hour Rehab Nursing: x  Physical Therapy: x  PT Intensity/Duration: PT 1-1.5 hours per day/5 days per week  Occupational Therapy: x  OT Intensity/Duration: OT 1-1.5 hours per day/5 days per week  Speech and Language Therapy: x  SLP Intensity/Duration: SLP 30 mins-90mins per day/5 days per week  Social Work: x  Therapeutic Recreation: x  Updated (if changes indicated)  No changes to plan.    Based on the patient's medical and functional status, their prognosis and  expected level of functional improvement is: good    Interdisciplinary Problem/Goals/Status  Copy from POCMobility    [PT] Bed Mobility (Active)  Current Status (9/8/2022 12:00:00 AM): CGA  Weekly Goal: I  Discharge Goal: I    [PT] Bed/Chair/Wheelchair (Active)  Current Status (9/8/2022 12:00:00 AM): CGA  Weekly Goal: I  Discharge Goal: I    [PT] Walk (Active)  Current Status (9/8/2022 12:00:00 AM): 80' w/ CGA  Weekly Goal: 320' w/ SBA  Discharge Goal: 320' w/ SBA    Self Care    [OT] Dressing (Lower) (Active)  Current Status (9/8/2022 10:50:00 AM): Jana/CGA  Weekly Goal: CGA  Discharge Goal: setup/supervision    Swallow Function    [ST] Swallowing (Active)  Current Status (9/8/2022 12:00:00 AM): Nectar thick liquids  Weekly Goal: Resistive breather  Discharge Goal: Least restrictive po diet    Pain    [RN] Pain Management  (Active)  Current Status (9/7/2022 2:53:00 PM): potential for increased pain  Weekly Goal: pain managed  Discharge Goal: pain managed    Safety    [RN] Potential for Injury (Active)  Current Status (9/7/2022 2:53:00 PM): potential for falls  Weekly Goal: no falls  Discharge Goal: no falls    Medical Problems    Comments:    Signed by: Clifford Munoz, Physician

## 2022-09-12 LAB
ANION GAP SERPL CALCULATED.3IONS-SCNC: 7.9 MMOL/L (ref 5–15)
BASOPHILS # BLD AUTO: 0.06 10*3/MM3 (ref 0–0.2)
BASOPHILS NFR BLD AUTO: 0.5 % (ref 0–1.5)
BUN SERPL-MCNC: 28 MG/DL (ref 8–23)
BUN/CREAT SERPL: 25 (ref 7–25)
CALCIUM SPEC-SCNC: 9.2 MG/DL (ref 8.6–10.5)
CHLORIDE SERPL-SCNC: 105 MMOL/L (ref 98–107)
CO2 SERPL-SCNC: 26.1 MMOL/L (ref 22–29)
CREAT SERPL-MCNC: 1.12 MG/DL (ref 0.76–1.27)
DEPRECATED RDW RBC AUTO: 48.1 FL (ref 37–54)
EGFRCR SERPLBLD CKD-EPI 2021: 70.7 ML/MIN/1.73
EOSINOPHIL # BLD AUTO: 0.27 10*3/MM3 (ref 0–0.4)
EOSINOPHIL NFR BLD AUTO: 2.1 % (ref 0.3–6.2)
ERYTHROCYTE [DISTWIDTH] IN BLOOD BY AUTOMATED COUNT: 14.7 % (ref 12.3–15.4)
GLUCOSE SERPL-MCNC: 109 MG/DL (ref 65–99)
HCT VFR BLD AUTO: 42.3 % (ref 37.5–51)
HGB BLD-MCNC: 13.7 G/DL (ref 13–17.7)
IMM GRANULOCYTES # BLD AUTO: 0.13 10*3/MM3 (ref 0–0.05)
IMM GRANULOCYTES NFR BLD AUTO: 1 % (ref 0–0.5)
LYMPHOCYTES # BLD AUTO: 2.86 10*3/MM3 (ref 0.7–3.1)
LYMPHOCYTES NFR BLD AUTO: 22.3 % (ref 19.6–45.3)
MCH RBC QN AUTO: 29.1 PG (ref 26.6–33)
MCHC RBC AUTO-ENTMCNC: 32.4 G/DL (ref 31.5–35.7)
MCV RBC AUTO: 89.8 FL (ref 79–97)
MONOCYTES # BLD AUTO: 1.07 10*3/MM3 (ref 0.1–0.9)
MONOCYTES NFR BLD AUTO: 8.3 % (ref 5–12)
NEUTROPHILS NFR BLD AUTO: 65.8 % (ref 42.7–76)
NEUTROPHILS NFR BLD AUTO: 8.43 10*3/MM3 (ref 1.7–7)
NRBC BLD AUTO-RTO: 0 /100 WBC (ref 0–0.2)
PLATELET # BLD AUTO: 158 10*3/MM3 (ref 140–450)
PMV BLD AUTO: 12.2 FL (ref 6–12)
POTASSIUM SERPL-SCNC: 4.5 MMOL/L (ref 3.5–5.2)
RBC # BLD AUTO: 4.71 10*6/MM3 (ref 4.14–5.8)
SODIUM SERPL-SCNC: 139 MMOL/L (ref 136–145)
WBC NRBC COR # BLD: 12.82 10*3/MM3 (ref 3.4–10.8)

## 2022-09-12 PROCEDURE — 85025 COMPLETE CBC W/AUTO DIFF WBC: CPT | Performed by: FAMILY MEDICINE

## 2022-09-12 PROCEDURE — 92526 ORAL FUNCTION THERAPY: CPT

## 2022-09-12 PROCEDURE — 97530 THERAPEUTIC ACTIVITIES: CPT

## 2022-09-12 PROCEDURE — 63710000001 PREDNISONE PER 5 MG: Performed by: FAMILY MEDICINE

## 2022-09-12 PROCEDURE — 99221 1ST HOSP IP/OBS SF/LOW 40: CPT | Performed by: PSYCHIATRY & NEUROLOGY

## 2022-09-12 PROCEDURE — 97535 SELF CARE MNGMENT TRAINING: CPT

## 2022-09-12 PROCEDURE — 97110 THERAPEUTIC EXERCISES: CPT

## 2022-09-12 PROCEDURE — 97116 GAIT TRAINING THERAPY: CPT

## 2022-09-12 PROCEDURE — 99231 SBSQ HOSP IP/OBS SF/LOW 25: CPT | Performed by: INTERNAL MEDICINE

## 2022-09-12 PROCEDURE — 63710000001 ONDANSETRON PER 8 MG: Performed by: INTERNAL MEDICINE

## 2022-09-12 PROCEDURE — 80048 BASIC METABOLIC PNL TOTAL CA: CPT | Performed by: FAMILY MEDICINE

## 2022-09-12 RX ORDER — ONDANSETRON 4 MG/1
4 TABLET, FILM COATED ORAL EVERY 6 HOURS PRN
Status: DISCONTINUED | OUTPATIENT
Start: 2022-09-12 | End: 2022-09-14 | Stop reason: HOSPADM

## 2022-09-12 RX ORDER — ONDANSETRON 4 MG/1
4 TABLET, FILM COATED ORAL EVERY 6 HOURS PRN
Status: DISCONTINUED | OUTPATIENT
Start: 2022-09-12 | End: 2022-09-12

## 2022-09-12 RX ADMIN — ATORVASTATIN CALCIUM 80 MG: 40 TABLET, FILM COATED ORAL at 20:09

## 2022-09-12 RX ADMIN — POTASSIUM CHLORIDE 10 MEQ: 750 TABLET, FILM COATED, EXTENDED RELEASE ORAL at 10:13

## 2022-09-12 RX ADMIN — ASPIRIN 81 MG: 81 TABLET, CHEWABLE ORAL at 10:13

## 2022-09-12 RX ADMIN — SACUBITRIL AND VALSARTAN 1 TABLET: 97; 103 TABLET, FILM COATED ORAL at 10:13

## 2022-09-12 RX ADMIN — SACUBITRIL AND VALSARTAN 1 TABLET: 97; 103 TABLET, FILM COATED ORAL at 20:09

## 2022-09-12 RX ADMIN — Medication 5 MG: at 20:09

## 2022-09-12 RX ADMIN — ONDANSETRON HYDROCHLORIDE 4 MG: 4 TABLET, FILM COATED ORAL at 20:09

## 2022-09-12 RX ADMIN — PREDNISONE 10 MG: 10 TABLET ORAL at 10:13

## 2022-09-12 RX ADMIN — NICOTINE 1 PATCH: 14 PATCH, EXTENDED RELEASE TRANSDERMAL at 10:14

## 2022-09-12 RX ADMIN — APIXABAN 5 MG: 5 TABLET, FILM COATED ORAL at 20:09

## 2022-09-12 RX ADMIN — APIXABAN 5 MG: 5 TABLET, FILM COATED ORAL at 10:13

## 2022-09-12 RX ADMIN — SERTRALINE 100 MG: 50 TABLET, FILM COATED ORAL at 10:13

## 2022-09-12 NOTE — THERAPY TREATMENT NOTE
Inpatient Rehabilitation - Occupational Therapy Treatment Note     [place]     Patient Name: Zaid Galarza  : 1950  MRN: [id number]    Today's Date: 2022                 Admit Date: 2022       No diagnosis found.    Patient Active Problem List   Diagnosis   • Atrial flutter (HCC)   • Dilated cardiomyopathy (HCC)   • Nonobstructive CAD per cath 10/3/2019   • Anxiety disorder   • Essential hypertension   • Tobacco use   • History of abdominal aortic aneurysm (AAA) repair   • Dyslipidemia, goal LDL below 100   • CVA (cerebral vascular accident) (HCC)   • Cerebrovascular accident (CVA), unspecified mechanism (HCC)       Past Medical History:   Diagnosis Date   • Atrial fibrillation (HCC)    • CHF (congestive heart failure) (HCC)    • COPD (chronic obstructive pulmonary disease) (HCC)    • Coronary artery disease    • Hypertension    • Tobacco abuse        Past Surgical History:   Procedure Laterality Date   • ABDOMINAL AORTIC ANEURYSM REPAIR     • CARDIAC CATHETERIZATION N/A 10/03/2019    Procedure: Left Heart Cath;  Surgeon: Vincent Phillips MD;  Location: [facility] INVASIVE LOCATION;  Service: Cardiology             IRF OT ASSESSMENT FLOWSHEET (last 12 hours)     IRF OT Evaluation and Treatment     Row Name 22 0952          OT Time and Intention    Document Type daily treatment  -TM     Mode of Treatment occupational therapy  -TM     Patient Effort good  -TM     Symptoms Noted During/After Treatment none  -TM     Row Name 22 0952          General Information    General Observations of Patient Pt agreeable for therapy.  -TM     Existing Precautions/Restrictions fall;swallow precautions  -TM     Row Name 22 0952          Cognition/Psychosocial    Orientation Status (Cognition) oriented x 4  -TM     Follows Commands (Cognition) follows one-step commands;verbal cues/prompting required  -TM     Row Name 22 0952          Grooming    [place] Level (Grooming) set up  -TM      Position (Grooming) supported sitting  -TM     Row Name 09/12/22 0952          Toileting    [place] Level (Toileting) supervision;verbal cues  -TM     Assistive Device Use (Toileting) grab bar/safety frame  -     Position (Toileting) supported sitting  -TM     Row Name 09/12/22 0952          Transfers    Bed-Chair [place] (Transfers) standby assist;verbal cues  -TM     Chair-Bed [place] (Transfers) standby assist;verbal cues  -TM     Assistive Device (Bed-Chair Transfers) wheelchair  -TM     [place] Level (Toilet Transfer) standby assist;contact guard;verbal cues  -TM     Assistive Device (Toilet Transfer) grab bars/safety frame;wheelchair  -TM     Row Name 09/12/22 0952          Chair-Bed Transfer    Assistive Device (Chair-Bed Transfers) wheelchair  -TM     Row Name 09/12/22 0952          Toilet Transfer    Type (Toilet Transfer) stand pivot/stand step  -     Row Name 09/12/22 0952          Motor Skills    Motor Skills coordination;functional endurance;motor control/coordination interventions  -     Motor Control/Coordination Interventions therapeutic exercise/ROM;gross motor coordination activities;fine motor manipulation/dexterity activities;other (see comments)  BUE ther ex/act, GMC/FMC, bilateral coord  -           User Key  (r) = Recorded By, (t) = Taken By, (c) = Cosigned By    Initials Name Effective Dates     Sade Galvan, OT 06/16/21 -                  Occupational Therapy Education                 Title: PT OT SLP Therapies (Done)     Topic: Occupational Therapy (Done)     Point: ADL training (Done)     Description:   Instruct learner(s) on proper safety adaptation and remediation techniques during self care or transfers.   Instruct in proper use of assistive devices.              Learning Progress Summary           Patient Acceptance, E,D, VU,NR by TM at 9/12/2022 0952      Show all documentation for this point (3)                 Point: Precautions (Done)      Description:   Instruct learner(s) on prescribed precautions during self-care and functional transfers.              Learning Progress Summary           Patient Acceptance, E,D, VU,NR by TM at 9/12/2022 0952      Show all documentation for this point (3)                             User Key     Initials Effective Dates Name Provider Type Discipline     06/16/21 -  Sade Galvan OT Occupational Therapist OT                    OT Recommendation and Plan    Planned Therapy Interventions (OT): activity tolerance training, adaptive equipment training, BADL retraining, IADL retraining, occupation/activity based interventions, patient/caregiver education/training, ROM/therapeutic exercise, transfer/mobility retraining, strengthening exercise                    Time Calculation:      Time Calculation- OT     Row Name 09/12/22 1446 09/12/22 0951          Time Calculation- OT    OT Start Time 1330  -TM 0815  -TM     OT Stop Time 1410  -TM 0915  -TM     OT Time Calculation (min) 40 min  -TM 60 min  -TM     Total Timed Code Minutes- OT 40 minute(s)  -TM 60 minute(s)  -TM     OT Non-Billable Time (min) -- 15 min  -TM           User Key  (r) = Recorded By, (t) = Taken By, (c) = Cosigned By    Initials Name Provider Type     Sade Galvan OT Occupational Therapist              Therapy Charges for Today     Code Description Service Date Service Provider Modifiers Qty    24272578325 HC OT SELF CARE/MGMT/TRAIN EA 15 MIN 9/12/2022 Sade Galvan OT GO 1    72518777177 HC OT THERAPEUTIC ACT EA 15 MIN 9/12/2022 Sade Galvan OT GO 3    85263711513 HC OT THER PROC EA 15 MIN 9/12/2022 Sade Galvan OT GO 1    57219235943 HC OT SELF CARE/MGMT/TRAIN EA 15 MIN 9/12/2022 Sade Galvan OT GO 2                   Sade Galvan OT  9/12/2022

## 2022-09-12 NOTE — PROGRESS NOTES
Occupational Therapy:    Physical Therapy: Individual: 90 minutes.    Speech Language Pathology:    Signed by: Lindsey Mendoza PTA

## 2022-09-12 NOTE — PROGRESS NOTES
Rehabilitation Nursing  Inpatient Rehabilitation Plan of Care Note    Plan of Care  Copy from Brian    Pain Management (Active)  Current Status (9/7/2022 2:53:00 PM): potential for increased pain  Weekly Goal: pain managed  Discharge Goal: pain managed    Safety    Potential for Injury (Active)  Current Status (9/7/2022 2:53:00 PM): potential for falls  Weekly Goal: no falls  Discharge Goal: no falls    Signed by: Bailey Greer Nurse

## 2022-09-12 NOTE — PROGRESS NOTES
Inpatient Rehabilitation Functional Measures Assessment and Plan of Care    Plan of Care  Swallow Function    [ST] Swallowing(Active)  Current Status(09/13/2022): Nectar thick liquids  Weekly Goal(09/19/2022): Resistive breather  Discharge Goal: Least restrictive po diet    Functional Measures  MAYNOR Eating:  MAYNOR Grooming:  MAYNOR Bathing:  MAYNOR Upper Body Dressing:  MAYNOR Lower Body Dressing:  MAYNOR Toileting:    MAYNOR Bladder Management  Level of Assistance:  Frequency/Number of Accidents this Shift:    MAYNOR Bowel Management  Level of Assistance:  Frequency/Number of Accidents this Shift:    MAYNOR Bed/Chair/Wheelchair Transfer:  [facility] Toilet Transfer:  [facility] Tub/Shower Transfer:    Previously Documented Mode of Locomotion at Discharge:  [facility] Expected Mode of Locomotion at Discharge:  [facility] Walk/Wheelchair:  [facility] Stairs:    [facility] Comprehension:  [facility] Expression:  [facility] Social Interaction:  [facility] Problem Solving:  [facility] Memory:    Therapy Mode Minutes  Occupational Therapy:  Physical Therapy:  Speech Language Pathology: Individual: 45 minutes.    Discharge Functional Goals:    Signed by: Esme Hernandez SLP

## 2022-09-12 NOTE — THERAPY TREATMENT NOTE
Inpatient Rehabilitation - Physical Therapy Treatment Note       [facility]     Patient Name: Zaid Galarza  : 1950  MRN: [id number]    Today's Date: 2022                    Admit Date: 2022      Visit Dx:   No diagnosis found.    Patient Active Problem List   Diagnosis   • Atrial flutter (HCC)   • Dilated cardiomyopathy (HCC)   • Nonobstructive CAD per cath 10/3/2019   • Anxiety disorder   • Essential hypertension   • Tobacco use   • History of abdominal aortic aneurysm (AAA) repair   • Dyslipidemia, goal LDL below 100   • CVA (cerebral vascular accident) (HCC)   • Cerebrovascular accident (CVA), unspecified mechanism (HCC)       Past Medical History:   Diagnosis Date   • Atrial fibrillation (HCC)    • CHF (congestive heart failure) (HCC)    • COPD (chronic obstructive pulmonary disease) (HCC)    • Coronary artery disease    • Hypertension    • Tobacco abuse        Past Surgical History:   Procedure Laterality Date   • ABDOMINAL AORTIC ANEURYSM REPAIR     • CARDIAC CATHETERIZATION N/A 10/03/2019    Procedure: Left Heart Cath;  Surgeon: Vincent Phillips MD;  Location: [facility] INVASIVE LOCATION;  Service: Cardiology       PT ASSESSMENT (last 12 hours)     IRF PT Evaluation and Treatment     Row Name 22 1549          PT Time and Intention    Document Type daily treatment  -RF     Mode of Treatment individual therapy;physical therapy  -RF     Patient/Family/Caregiver Comments/Observations No significant c/o noted this date.  -RF     Row Name 22 1549          General Information    Patient Profile Reviewed yes  -RF     Existing Precautions/Restrictions fall;swallow precautions  -RF     Row Name 22 1549          Living Environment    Primary Care Provided by self  -RF     Row Name 22 1549          Home Use of Assistive/Adaptive Equipment    Equipment Currently Used at Home none  -RF     Row Name 22 1549          Cognition/Psychosocial    Affect/Mental Status (Cognition)  WFL  -RF     Orientation Status (Cognition) oriented x 4  -RF     Follows Commands (Cognition) WFL  -RF     Cognitive Function WFL  -RF     Row Name 09/12/22 1549          Bed Mobility    Bed Mobility bed mobility (all) activities  -RF     Row Name 09/12/22 1549          Transfer Assessment/Treatment    Transfers bed-chair transfer;chair-bed transfer;sit-stand transfer;stand-sit transfer  -RF     Row Name 09/12/22 1549          Transfers    Bed-Chair [place] (Transfers) standby assist  -RF     Chair-Bed [place] (Transfers) standby assist  -RF     Assistive Device (Bed-Chair Transfers) wheelchair  -RF     Sit-Stand [place] (Transfers) standby assist  -RF     Stand-Sit [place] (Transfers) standby assist  -RF     Row Name 09/12/22 1549          Chair-Bed Transfer    Assistive Device (Chair-Bed Transfers) wheelchair  -RF     Row Name 09/12/22 1549          Sit-Stand Transfer    Assistive Device (Sit-Stand Transfers) wheelchair  -RF     Row Name 09/12/22 1549          Stand-Sit Transfer    Assistive Device (Stand-Sit Transfers) wheelchair  -RF     Row Name 09/12/22 1549          Gait/Stairs (Locomotion)    Gait/Stairs Locomotion gait/ambulation assistive device  -RF     [place] Level (Gait) contact guard  -RF     Assistive Device (Gait) --  no AD  -RF     Distance in Feet (Gait) 160X2  -RF     Pattern (Gait) step-through  -RF     Deviations/Abnormal Patterns (Gait) base of support, narrow;other (see comments)  -RF     Comment, (Gait/Stairs) Minimal unsteadiness noted, however, no significant LOB observed.  -RF     Row Name 09/12/22 1549          Safety Issues, Functional Mobility    Impairments Affecting Function (Mobility) endurance/activity tolerance;balance;shortness of breath  -RF     Row Name 09/12/22 1549          Motor Skills    Therapeutic Exercise aerobic  -RF     Row Name 09/12/22 1549          Hip (Therapeutic Exercise)    Hip Strengthening (Therapeutic Exercise)  bilateral;flexion;extension;aBduction;aDduction;heel slides;marching while seated;marching while standing;mini squats;sitting;standing;2 lb free weight;resistance band;green;2 sets;10 repetitions  -RF     Row Name 09/12/22 1549          Knee (Therapeutic Exercise)    Knee Strengthening (Therapeutic Exercise) bilateral;flexion;extension;heel slides;marching while seated;marching while standing;LAQ (long arc quad);hamstring curls;sitting;standing;2 lb free weight;resistance band;green;2 sets;10 repetitions  -RF     Row Name 09/12/22 1549          Ankle (Therapeutic Exercise)    Ankle Strengthening (Therapeutic Exercise) bilateral;dorsiflexion;plantarflexion;sitting;standing;2 lb free weight;2 sets;10 repetitions  -RF     Row Name 09/12/22 1549          Aerobic Exercise    Type (Aerobic Exercise) recumbent stationary bike  -RF     Time Performed (Aerobic Exercise) 22  -RF     Comment, Aerobic Exercise (Therapeutic Exercise) LVL 2.5  -RF     Row Name 09/12/22 1549          Positioning and Restraints    Pre-Treatment Position sitting in chair/recliner  -RF     In Bed sitting EOB;call light within reach;encouraged to call for assist  -RF     Row Name 09/12/22 1549          Therapy Assessment/Plan (PT)    Patient's Goals For Discharge return home;take care of myself at home  -RF     Row Name 09/12/22 1549          Therapy Assessment/Plan (PT)    Rehab Potential/Prognosis (PT) good, to achieve stated therapy goals  -RF     Frequency of Treatment (PT) 5 times per week  -RF     Estimated Duration of Therapy (PT) 2 weeks  -RF     Problem List (PT) problems related to;balance;motor control;mobility  -RF     Activity Limitations Related to Problem List (PT) unable to ambulate safely;unable to transfer safely  -RF     Row Name 09/12/22 1549          Daily Progress Summary (PT)    Functional Goal Overall Progress (PT) progressing toward functional goals as expected  -RF     Daily Progress Summary (PT) Good functional mobility and  ambulation quality noted this date. LE strength deficits and decreased acitvity tolerance noted with activity this date. Continued skilled care required for further improvements to ensure maximum safe function upon D/C.  -RF     Impairments Still Limiting Function (PT) functional activity tolerance impairment;strength deficit  -RF     Recommendations (PT) Continue per current POC  -RF     Row Name 09/12/22 1549          Therapy Plan Review/Discharge Plan (PT)    Anticipated Discharge Disposition (PT) home;home with assist  -RF     Row Name 09/12/22 1549          IRF PT Goals    Bed Mobility Goal Selection (PT-IRF) bed mobility, PT goal 1  -RF     Transfer Goal Selection (PT-IRF) transfers, PT goal 1  -RF     Gait (Walking Locomotion) Goal Selection (PT-IRF) gait, PT goal 1  -RF     Row Name 09/12/22 1549          Bed Mobility Goal 1 (PT-IRF)    Activity/Assistive Device (Bed Mobility Goal 1, PT-IRF) bed mobility activities, all  -RF     [place] Level (Bed Mobility Goal 1, PT-IRF) independent  -RF     Time Frame (Bed Mobility Goal 1, PT-IRF) by discharge  -RF     Row Name 09/12/22 1549          Transfer Goal 1 (PT-IRF)    Activity/Assistive Device (Transfer Goal 1, PT-IRF) sit-to-stand/stand-to-sit;bed-to-chair/chair-to-bed  -RF     [place] Level (Transfer Goal 1, PT-IRF) independent  -RF     Time Frame (Transfer Goal 1, PT-IRF) by discharge  -RF     Row Name 09/12/22 1549          Gait/Walking Locomotion Goal 1 (PT-IRF)    Activity/Assistive Device (Gait/Walking Locomotion Goal 1, PT-IRF) gait (walking locomotion);assistive device use  -RF     Gait/Walking Locomotion Distance Goal 1 (PT-IRF) 320'  -RF     [place] Level (Gait/Walking Locomotion Goal 1, PT-IRF) standby assist  -RF     Time Frame (Gait/Walking Locomotion Goal 1, PT-IRF) by discharge  -RF           User Key  (r) = Recorded By, (t) = Taken By, (c) = Cosigned By    Initials Name Provider Type    RF Lindsey Mendoza, PTA Physical Therapist  Assistant                 Physical Therapy Education                 Title: PT OT SLP Therapies (Done)     Topic: Physical Therapy (Done)     Point: Mobility training (Done)     Learning Progress Summary           Patient Acceptance, E,TB, VU by RF at 9/12/2022 1554      Show all documentation for this point (3)                 Point: Home exercise program (Done)     Learning Progress Summary           Patient Acceptance, E,TB, VU by RF at 9/12/2022 1554      Show all documentation for this point (3)                 Point: Body mechanics (Done)     Learning Progress Summary           Patient Acceptance, E,TB, VU by RF at 9/12/2022 1554      Show all documentation for this point (3)                 Point: Precautions (Done)     Learning Progress Summary           Patient Acceptance, E,TB, VU by RF at 9/12/2022 1554      Show all documentation for this point (3)                             User Key     Initials Effective Dates Name Provider Type Discipline     06/16/21 -  Lindsey Mendoza PTA Physical Therapist Assistant PT                PT Recommendation and Plan    Frequency of Treatment (PT): 5 times per week     Daily Progress Summary (PT)  Functional Goal Overall Progress (PT): progressing toward functional goals as expected  Daily Progress Summary (PT): Good functional mobility and ambulation quality noted this date. LE strength deficits and decreased acitvity tolerance noted with activity this date. Continued skilled care required for further improvements to ensure maximum safe function upon D/C.  Impairments Still Limiting Function (PT): functional activity tolerance impairment, strength deficit  Recommendations (PT): Continue per current POC               Time Calculation:      PT Charges     Row Name 09/12/22 155             Time Calculation    Start Time 1000  -RF      Stop Time 1130  -RF      Time Calculation (min) 90 min  -RF      PT Received On 09/12/22  -RF      PT - Next Appointment 09/13/22  -RF       PT Goal Re-Cert Due Date 09/15/22  -RF              Time Calculation- PT    Total Timed Code Minutes- PT 90 minute(s)  -RF            User Key  (r) = Recorded By, (t) = Taken By, (c) = Cosigned By    Initials Name Provider Type    RF Lindsey Mendoza, FERMIN Physical Therapist Assistant                Therapy Charges for Today     Code Description Service Date Service Provider Modifiers Qty    07666878939 HC GAIT TRAINING EA 15 MIN 9/12/2022 Linsdey Mendoza, FERMIN GP, CQ 2    [id number]  PT THER PROC EA 15 MIN 9/12/2022 Lindsey Mendoza, FERMIN GP, CQ 2    [id number]  PT THERAPEUTIC ACT EA 15 MIN 9/12/2022 Lindsey Mendoza, FERMIN GP, CQ 2                   Lindsey Mendoza PTA  9/12/2022

## 2022-09-12 NOTE — CONSULTS
Referring Provider: Dr. Munoz  Reason for Consultation: SI      Chief complaint/Focus of Exam: SI    Subjective .     History of present illness:    Zaid is a 71-year-old male with a history of hypertension, A. fib/flutter, CHF, CAD, who was admitted following a stroke, currently in physical rehab.  Patient voiced SI yesterday, leading to psychiatry consult request.    On exam today, Zaid was awake, alert, oriented x4.  He was pleasant and appropriate, discussing aspects of his treatment and plans for the future.  He denied SI, saying that he spoke out of frustration yesterday because he feels like the family is strong harming him into a decision about his living situation versus further care.  He denies active SI at any point and says that he called his family and apologized after making these comments.  He reports that the family wants either POA or guardianship over him, but he does not agree with guardianship and he is not sure about the specifics of the POA process.  Zaid reports a history of panic disorder, with intermittent panic attacks, with no discernible effect and current dosing of sertraline.  No acutely concerning psychiatric symptoms otherwise at this time.    Review of Systems  All systems were reviewed and negative except for:  Neurological: positive for  weakness    History  Past Medical History:   Diagnosis Date   • Atrial fibrillation (HCC)    • CHF (congestive heart failure) (HCC)    • COPD (chronic obstructive pulmonary disease) (HCC)    • Coronary artery disease    • Hypertension    • Tobacco abuse    ,   Past Surgical History:   Procedure Laterality Date   • ABDOMINAL AORTIC ANEURYSM REPAIR     • CARDIAC CATHETERIZATION N/A 10/03/2019    Procedure: Left Heart Cath;  Surgeon: Vincent Phillips MD;  Location: [facility] CATH INVASIVE LOCATION;  Service: Cardiology   ,   Family History   Problem Relation Age of Onset   • Heart disease Mother    • Stroke Father    • Heart disease Father    ,   Social  History     Socioeconomic History   • Marital status: Single   Tobacco Use   • Smoking status: Current Every Day Smoker     Packs/day: 1.00     Types: Cigarettes   • Smokeless tobacco: Never Used   • Tobacco comment: down to 2 a day   Vaping Use   • Vaping Use: Never used   Substance and Sexual Activity   • Alcohol use: No   • Drug use: No   • Sexual activity: Defer     E-cigarette/Vaping   • E-cigarette/Vaping Use Never User      E-cigarette/Vaping Substances   • Nicotine No    • THC No    • CBD No    • Flavoring No      E-cigarette/Vaping Devices   • Disposable No    • Pre-filled or Refillable Cartridge No    • Refillable Tank No    • Pre-filled Pod No        ,   Medications Prior to Admission   Medication Sig Dispense Refill Last Dose   • apixaban (ELIQUIS) 5 MG tablet tablet Take 1 tablet by mouth Every 12 (Twelve) Hours. Indications: DVT/PE (active thrombosis) 180 tablet 3 Unknown at Unknown time   • aspirin (aspirin) 81 MG EC tablet Take 1 tablet by mouth Daily. 90 tablet 3 Unknown at Unknown time   • dilTIAZem SR (CARDIZEM SR) 120 MG 12 hr capsule Take 1 capsule by mouth 2 (Two) Times a Day. 180 capsule 3 Unknown at Unknown time   • ezetimibe (ZETIA) 10 MG tablet Take 1 tablet by mouth Daily. 90 tablet 3 Unknown at Unknown time   • furosemide (Lasix) 20 MG tablet Take 1 tablet by mouth Daily. 90 tablet 3 Unknown at Unknown time   • metoprolol succinate XL (TOPROL-XL) 25 MG 24 hr tablet Take 0.5 tablets by mouth Daily. 45 tablet 3 Unknown at Unknown time   • potassium chloride 10 MEQ CR tablet Take 10 mEq by mouth Daily.   Unknown at Unknown time   • predniSONE (DELTASONE) 10 MG tablet Take 10 mg by mouth Daily.   Unknown at Unknown time   • rosuvastatin (CRESTOR) 20 MG tablet Take 1 tablet by mouth Every Night. 90 tablet 3 Unknown at Unknown time   • sacubitril-valsartan (ENTRESTO)  MG tablet Take 1 tablet by mouth 2 (Two) Times a Day. 180 tablet 3 Unknown at Unknown time   • sertraline (ZOLOFT) 100 MG  tablet Take 100 mg by mouth Daily.   Unknown at Unknown time   , Scheduled Meds:  apixaban, 5 mg, Oral, Q12H  aspirin, 81 mg, Oral, Daily  atorvastatin, 80 mg, Oral, Nightly  nicotine, 1 patch, Transdermal, Q24H  predniSONE, 10 mg, Oral, Daily With Breakfast  sacubitril-valsartan, 1 tablet, Oral, BID  sertraline, 100 mg, Oral, Daily    , Continuous Infusions:   , PRN Meds:  melatonin and Allergies:  Patient has no known allergies.    Objective     Vital Signs   Temp:  [97.3 °F (36.3 °C)-97.8 °F (36.6 °C)] 97.8 °F (36.6 °C)  Heart Rate:  [81-99] 81  Resp:  [16-20] 16  BP: (127-128)/(79-84) 128/84    Mental Status Exam:  Hygiene:   good  Cooperation:  Cooperative  Eye Contact:  Good  Psychomotor Behavior:  Appropriate  Affect:  Full range  Hopelessness: Denies  Speech:  Normal  Thought Progress:  Goal directed and Linear  Thought Content:  Normal  Suicidal:  None  Homicidal:  None  Hallucinations:  None  Delusion:  None  Memory:  Intact  Orientation:  Person, Place, Time and Situation  Reliability:  fair  Insight:  Good  Judgement:  Good  Impulse Control:  Fair    Results Review:   I reviewed the patient's new clinical results.  I reviewed the patient's other test results and agree with the interpretation  I personally viewed and interpreted the patient's EKG/Telemetry data  Lab Results (last 24 hours)     Procedure Component Value Units Date/Time    Basic Metabolic Panel [[id number]]  (Abnormal) Collected: 09/12/22 0203    Specimen: Blood Updated: 09/12/22 0237     Glucose 109 mg/dL      BUN 28 mg/dL      Creatinine 1.12 mg/dL      Sodium 139 mmol/L      Potassium 4.5 mmol/L      Chloride 105 mmol/L      CO2 26.1 mmol/L      Calcium 9.2 mg/dL      BUN/Creatinine Ratio 25.0     Anion Gap 7.9 mmol/L      eGFR 70.7 mL/min/1.73      Comment: National Kidney Foundation and American Society of Nephrology (ASN) Task Force recommended calculation based on the Chronic Kidney Disease Epidemiology Collaboration (CKD-EPI)  equation refit without adjustment for race.       Narrative:      GFR Normal >60  Chronic Kidney Disease <60  Kidney Failure <15      CBC & Differential [[id number]]  (Abnormal) Collected: 09/12/22 0203    Specimen: Blood Updated: 09/12/22 0215    Narrative:      The following orders were created for panel order CBC & Differential.  Procedure                               Abnormality         Status                     ---------                               -----------         ------                     CBC Auto Differential[[id number]]        Abnormal            Final result                 Please view results for these tests on the individual orders.    CBC Auto Differential [[id number]]  (Abnormal) Collected: 09/12/22 0203    Specimen: Blood Updated: 09/12/22 0215     WBC 12.82 10*3/mm3      RBC 4.71 10*6/mm3      Hemoglobin 13.7 g/dL      Hematocrit 42.3 %      MCV 89.8 fL      MCH 29.1 pg      MCHC 32.4 g/dL      RDW 14.7 %      RDW-SD 48.1 fl      MPV 12.2 fL      Platelets 158 10*3/mm3      Neutrophil % 65.8 %      Lymphocyte % 22.3 %      Monocyte % 8.3 %      Eosinophil % 2.1 %      Basophil % 0.5 %      Immature Grans % 1.0 %      Neutrophils, Absolute 8.43 10*3/mm3      Lymphocytes, Absolute 2.86 10*3/mm3      Monocytes, Absolute 1.07 10*3/mm3      Eosinophils, Absolute 0.27 10*3/mm3      Basophils, Absolute 0.06 10*3/mm3      Immature Grans, Absolute 0.13 10*3/mm3      nRBC 0.0 /100 WBC         Imaging Results (Last 24 Hours)     ** No results found for the last 24 hours. **            Assessment & Plan     Active Problems:    CVA (cerebral vascular accident) ([facility])     Suicidal ideation  -Statement made in the setting of frustration with the situation family yesterday.  Patient immediately retracted, has consistently denied SI and apologized to family for making statements.  He denies active suicidality at any point  -From a psychiatric perspective, cleared for continued medical treatment    Panic disorder,  with moderate panic attacks  -Increase sertraline to 150 mg daily as higher doses of SSRIs are generally more effective for anxiety and panic disorders  -Recommend referral to outpatient therapy    I discussed the patients findings and my recommendations with patient    Harsh Osuna MD  09/12/22  12:38 EDT

## 2022-09-12 NOTE — PROGRESS NOTES
Inpatient Rehabilitation Functional Measures Assessment and Plan of Care    Plan of Care    Self Care    Dressing (Lower) (Active)  Current Status (9/12/2022 8:15:00 AM): setup/SBA/supervision  Weekly Goal: SBA/Martha  Discharge Goal: SBA/Martha    Functional Measures  MAYNOR Eating:  MAYNOR Grooming:  MAYNOR Bathing:  MAYNOR Upper Body Dressing:  MAYNOR Lower Body Dressing:  MAYNOR Toileting:    MAYNOR Bladder Management  Level of Assistance:  Frequency/Number of Accidents this Shift:    MAYNOR Bowel Management  Level of Assistance:  Frequency/Number of Accidents this Shift:    MAYNOR Bed/Chair/Wheelchair Transfer:  MAYNOR Toilet Transfer:  MAYNOR Tub/Shower Transfer:    Previously Documented Mode of Locomotion at Discharge:  [facility] Expected Mode of Locomotion at Discharge:  [facility] Walk/Wheelchair:  [facility] Stairs:    [facility] Comprehension:  [facility] Expression:  [facility] Social Interaction:  [facility] Problem Solving:  MAYNOR Memory:    Therapy Mode Minutes  Occupational Therapy:  Physical Therapy:  Speech Language Pathology:    Discharge Functional Goals:    Signed by: Sade Galvan OT

## 2022-09-12 NOTE — PROGRESS NOTES
Assisted By: Bailey BOTELLO    CC: Follow-up on CVA    Interview History/HPI: Patient states he is doing okay, he denies any acute pain, he did tolerate his diet, he is on modified liquids.  I did discuss with him about suicidal thoughts or ideations and he denies this currently.          Current Hospital Meds:  apixaban, 5 mg, Oral, Q12H  aspirin, 81 mg, Oral, Daily  atorvastatin, 80 mg, Oral, Nightly  nicotine, 1 patch, Transdermal, Q24H  potassium chloride, 10 mEq, Oral, Daily  predniSONE, 10 mg, Oral, Daily With Breakfast  sacubitril-valsartan, 1 tablet, Oral, BID  sertraline, 100 mg, Oral, Daily         Vitals:    09/12/22 1013   BP: 128/84   Pulse: 81   Resp:    Temp:    SpO2:          Intake/Output Summary (Last 24 hours) at 9/12/2022 1059  Last data filed at 9/12/2022 0900  Gross per 24 hour   Intake 780 ml   Output 450 ml   Net 330 ml       EXAM: Saturation 99% on room air, temperature is 97.3, no distress, actually his strength appears symmetric he is speech appears normal, face is symmetric, lungs are clear without rhonchi rales or wheezing, heart is a controlled irregularly irregular rhythm without murmur, no edema.      Diet Soft Texture; [employer] Foods; [place] / Syrup Thick        LABS:     Lab Results (last 48 hours)     Procedure Component Value Units Date/Time    Basic Metabolic Panel [[id number]]  (Abnormal) Collected: 09/12/22 0203    Specimen: Blood Updated: 09/12/22 0237     Glucose 109 mg/dL      BUN 28 mg/dL      Creatinine 1.12 mg/dL      Sodium 139 mmol/L      Potassium 4.5 mmol/L      Chloride 105 mmol/L      CO2 26.1 mmol/L      Calcium 9.2 mg/dL      BUN/Creatinine Ratio 25.0     Anion Gap 7.9 mmol/L      eGFR 70.7 mL/min/1.73      Comment: National Kidney Foundation and American Society of Nephrology (ASN) Task Force recommended calculation based on the Chronic Kidney Disease Epidemiology Collaboration (CKD-EPI) equation refit without adjustment for race.       Narrative:      GFR Normal  >60  Chronic Kidney Disease <60  Kidney Failure <15      CBC & Differential [[id number]]  (Abnormal) Collected: 09/12/22 0203    Specimen: Blood Updated: 09/12/22 0215    Narrative:      The following orders were created for panel order CBC & Differential.  Procedure                               Abnormality         Status                     ---------                               -----------         ------                     CBC Auto Differential[[id number]]        Abnormal            Final result                 Please view results for these tests on the individual orders.    CBC Auto Differential [[id number]]  (Abnormal) Collected: 09/12/22 0203    Specimen: Blood Updated: 09/12/22 0215     WBC 12.82 10*3/mm3      RBC 4.71 10*6/mm3      Hemoglobin 13.7 g/dL      Hematocrit 42.3 %      MCV 89.8 fL      MCH 29.1 pg      MCHC 32.4 g/dL      RDW 14.7 %      RDW-SD 48.1 fl      MPV 12.2 fL      Platelets 158 10*3/mm3      Neutrophil % 65.8 %      Lymphocyte % 22.3 %      Monocyte % 8.3 %      Eosinophil % 2.1 %      Basophil % 0.5 %      Immature Grans % 1.0 %      Neutrophils, Absolute 8.43 10*3/mm3      Lymphocytes, Absolute 2.86 10*3/mm3      Monocytes, Absolute 1.07 10*3/mm3      Eosinophils, Absolute 0.27 10*3/mm3      Basophils, Absolute 0.06 10*3/mm3      Immature Grans, Absolute 0.13 10*3/mm3      nRBC 0.0 /100 WBC                Radiology:    Imaging Results (Last 72 Hours)     ** No results found for the last 72 hours. **          Results for orders placed during the hospital encounter of 09/01/22    Adult Transthoracic Echo Complete W/ Cont if Necessary Per Protocol (With Agitated Saline)    Interpretation Summary  · Estimated left ventricular EF = 55% Left ventricular ejection fraction appears to be 51 - 55%. Left ventricular systolic function is normal.  · Left ventricular diastolic function was normal.  · No significant mitral or aortic valve regurgitation. Moderate sclerosis of both is noted.  · There is  anterior pericardial fat pad but no effusion  · Left atrium is mildly enlarged  · Since prev study of 9/29/19 EF has increased from 30% to 55% and mitral, aortic and tricuspid regurgitation is no longer seen.      Assessment/Plan:   Status post right basal ganglia/internal capsule CVA.  Patient appears to have minimal deficits at this time, continue intensive therapy with physical and occupational therapy.  Continue speech therapy.  Patient is on modified diet.  Continue aspirin and statin therapy.  Patient does have history of chronic atrial fibrillation.  Carotid Dopplers negative    Atrial fibrillation, rate is controlled, noted patient had a slow ventricular response with some pauses at night therefore AV ruben blocking agents were discontinued while in the acute unit.  This improve then.  Continue Eliquis for continued stroke prevention.    Depression, patient is on Zoloft, continue to wait on psych consult for patient denies suicidal ideation at this time.    History of HFrEF now improved to normal ejection fraction, diastolic function was normal as well.  Entresto most likely can be stopped but I am going to leave this to the discretion of his cardiology team, will refer him back to Christy Martin on discharge for evaluation.  Christy did see the patient while in the acute unit and recommended continue Entresto at that time.      Deven Chowdary MD

## 2022-09-12 NOTE — THERAPY TREATMENT NOTE
Inpatient Rehabilitation - Speech Language Pathology   [facility]allow [facility]   DYSPHAGIA THERAPY TREATMENT NOTE     Patient Name: Zaid Galarza  : 1950  MRN: [id number]  Today's Date: 2022             Admit Date: 2022    Zaid Galarza is seen in speech therapy office this am for formal dysphagia tx. He is a/a, upright and centered in a wheelchair, cooperative to participate.      Long Term Goal:  Pt will accept least restrictive diet tolerance w/o overt s/s aspiration.      Short Term Goals:     1. Pt will perform resistive breathing exercises x3 sets x5 reps w/resistance of 1-6 to improve respiratory support and control.   *x3 sets x15 reps w/ resistance level 4/4, min cues.   *x1 set x10 reps w/ resistance level 5/5, min cues, but noted increased shortness of breath. Resistance turned back to 4/4.      4. Pt will perform laryngeal adduction/elevation exercises x3 sets x10 reps w/ min cues.   *x1 sets x5 reps pre-resistive breather w/ mean phonation duration 13.48 seconds, min cues. x3 sets x5 reps post-resistive breather w/ mean phonation duration 14.63 seconds, min cues. Mild fatigue effects noted.       5. Pt will perform CTAR/Shaker exercises sustained x3 sets x5 reps for 30+ seconds over 3 consecutive sessions.   *Not directly addressed today.      6. Pt will perform CTAR/Shaker exercises repetitive x3 sets x12 reps w/ mod cues.   *CTAR: x3 sets x20 reps, min cues.      7. Pt will perform compensatory techniques during meals w/ min cues.  *He accepts trials of thin water via cup and straw w/ no overt s/s aspiration 6/8 trials today.      8. Pt will participate in instrumental re-evaluation of swallowing fnx in 7-10 days, pending progress towards this poc.  *Most recent MBS performed . Repeat MBS tentatively .     Visit Dx:   No diagnosis found.  Patient Active Problem List   Diagnosis   • Atrial flutter (HCC)   • Dilated cardiomyopathy (HCC)   • Nonobstructive CAD per  cath 10/3/2019   • Anxiety disorder   • Essential hypertension   • Tobacco use   • History of abdominal aortic aneurysm (AAA) repair   • Dyslipidemia, goal LDL below 100   • CVA (cerebral vascular accident) (HCC)   • Cerebrovascular accident (CVA), unspecified mechanism (HCC)     Past Medical History:   Diagnosis Date   • Atrial fibrillation (HCC)    • CHF (congestive heart failure) (HCC)    • COPD (chronic obstructive pulmonary disease) (HCC)    • Coronary artery disease    • Hypertension    • Tobacco abuse      Past Surgical History:   Procedure Laterality Date   • ABDOMINAL AORTIC ANEURYSM REPAIR     • CARDIAC CATHETERIZATION N/A 10/03/2019    Procedure: Left Heart Cath;  Surgeon: Vincent Phillips MD;  Location: [facility] CATH INVASIVE LOCATION;  Service: Cardiology       SLP Recommendation and Plan  Continue tx as tolerated per POC.     Thank you-  Lisa Goodman M.A., CCC-SLP    EDUCATION  The patient has been educated in the following areas:   Dysphagia (Swallowing Impairment) Oral Care/Hydration Modified Diet Instruction.     Time Calculation:    Time Calculation- SLP     Row Name 09/12/22 1314             Time Calculation- SLP    SLP Start Time 0915  -      SLP Stop Time 1000  -      SLP Time Calculation (min) 45 min  -      SLP - Next Appointment 09/13/22  -            User Key  (r) = Recorded By, (t) = Taken By, (c) = Cosigned By    Initials Name Provider Type    Esme Bosch MS CCC-SLP Speech and Language Pathologist              Therapy Charges for Today     Code Description Service Date Service Provider Modifiers Qty    [id number] HC ST TREATMENT SWALLOW 3 9/12/2022 Esme Hernandez MS CCC-SLP GN 1          MS ASHLEY Dobbins  9/12/2022

## 2022-09-13 ENCOUNTER — APPOINTMENT (OUTPATIENT)
Dept: GENERAL RADIOLOGY | Facility: HOSPITAL | Age: 72
End: 2022-09-13

## 2022-09-13 PROCEDURE — 97530 THERAPEUTIC ACTIVITIES: CPT

## 2022-09-13 PROCEDURE — 97535 SELF CARE MNGMENT TRAINING: CPT

## 2022-09-13 PROCEDURE — 92611 MOTION FLUOROSCOPY/SWALLOW: CPT

## 2022-09-13 PROCEDURE — 74230 X-RAY XM SWLNG FUNCJ C+: CPT

## 2022-09-13 PROCEDURE — 97116 GAIT TRAINING THERAPY: CPT

## 2022-09-13 PROCEDURE — 99231 SBSQ HOSP IP/OBS SF/LOW 25: CPT | Performed by: INTERNAL MEDICINE

## 2022-09-13 PROCEDURE — 97112 NEUROMUSCULAR REEDUCATION: CPT

## 2022-09-13 PROCEDURE — 97110 THERAPEUTIC EXERCISES: CPT

## 2022-09-13 PROCEDURE — 63710000001 ONDANSETRON PER 8 MG: Performed by: INTERNAL MEDICINE

## 2022-09-13 PROCEDURE — 63710000001 PREDNISONE PER 5 MG: Performed by: FAMILY MEDICINE

## 2022-09-13 PROCEDURE — 74230 X-RAY XM SWLNG FUNCJ C+: CPT | Performed by: RADIOLOGY

## 2022-09-13 RX ADMIN — NICOTINE 1 PATCH: 14 PATCH, EXTENDED RELEASE TRANSDERMAL at 09:58

## 2022-09-13 RX ADMIN — APIXABAN 5 MG: 5 TABLET, FILM COATED ORAL at 20:33

## 2022-09-13 RX ADMIN — ASPIRIN 81 MG: 81 TABLET, CHEWABLE ORAL at 09:56

## 2022-09-13 RX ADMIN — PREDNISONE 10 MG: 10 TABLET ORAL at 09:56

## 2022-09-13 RX ADMIN — Medication 5 MG: at 20:32

## 2022-09-13 RX ADMIN — APIXABAN 5 MG: 5 TABLET, FILM COATED ORAL at 09:56

## 2022-09-13 RX ADMIN — ONDANSETRON HYDROCHLORIDE 4 MG: 4 TABLET, FILM COATED ORAL at 17:40

## 2022-09-13 RX ADMIN — SACUBITRIL AND VALSARTAN 1 TABLET: 97; 103 TABLET, FILM COATED ORAL at 20:33

## 2022-09-13 RX ADMIN — SERTRALINE 150 MG: 50 TABLET, FILM COATED ORAL at 09:56

## 2022-09-13 RX ADMIN — ATORVASTATIN CALCIUM 80 MG: 40 TABLET, FILM COATED ORAL at 20:33

## 2022-09-13 NOTE — PROGRESS NOTES
Occupational Therapy:    Physical Therapy: Individual: 105 minutes.    Speech Language Pathology:    Signed by: Lindsey Mendoza PTA

## 2022-09-13 NOTE — SIGNIFICANT NOTE
09/13/22 1200   Plan   Plan Team conference held today.  Spoke to pt and sister Susan about pending discharge date, how he is doing in therapy and recommendation for short-term rehab at a SNF.  Discussed getting housing at [facility], [facility], [facility] or in an apartment, and assisting living.  Educated about Medicare A SNF benefits and [place] Medicaid.  Spoke to Gail with [facility] [phone] who says bed is available and residents are not allowed to smoke.  Informed Gail about pt's home environment and need for different housing after short-term rehab.  Gail says someone would assist pt with filling out application for [facility] or [facility] if he is admitted.   Gail says if pt is agreeable to not smoking they can provide nicotine patches.  Gail informed SS to call [place]  Pedro Luis if pt wants them to review him for admission.   Reviewed this with pt and sister.  Pt says he is agreeable to not smoking and going to [facility] if they can accept him.  Pt also agreeable to getting an apartment at [facility] or [facility].  Pt and sister aware these apartments are for independent living.  Pt's second choice is [facility].  SS left a message for Pedro Luis at [facility] [phone] and faxed referral to [phone] (face sheet, H&P, PT/OT/SLP notes/evaluations, x-ray, MD progress note, medication list and active orders).   Patient/Family in Agreement with Plan yes

## 2022-09-13 NOTE — SIGNIFICANT NOTE
09/13/22 4300   Plan   Plan Received call from Pedro Luis with [facility] who says she will contact  on 9-14-22 with a decision about admission.  Informed her pt's plan is short-term placement and he wants to get into [facility].  Will follow.

## 2022-09-13 NOTE — SIGNIFICANT NOTE
09/13/22 3504   Plan   Plan Received call from Pedro Luis with [facility] who says she will contact  on 9-14-22 with a decision about admission.  Informed her pt's plan is short-term and plans to get [facility] which is [facility].  Will follow.

## 2022-09-13 NOTE — THERAPY TREATMENT NOTE
Inpatient Rehabilitation - Physical Therapy Treatment Note       [facility]     Patient Name: Zaid Galarza  : 1950  MRN: [id number]    Today's Date: 2022                    Admit Date: 2022      Visit Dx:   No diagnosis found.    Patient Active Problem List   Diagnosis   • Atrial flutter (HCC)   • Dilated cardiomyopathy (HCC)   • Nonobstructive CAD per cath 10/3/2019   • Anxiety disorder   • Essential hypertension   • Tobacco use   • History of abdominal aortic aneurysm (AAA) repair   • Dyslipidemia, goal LDL below 100   • CVA (cerebral vascular accident) (HCC)   • Cerebrovascular accident (CVA), unspecified mechanism (HCC)       Past Medical History:   Diagnosis Date   • Atrial fibrillation (HCC)    • CHF (congestive heart failure) (HCC)    • COPD (chronic obstructive pulmonary disease) (HCC)    • Coronary artery disease    • Hypertension    • Tobacco abuse        Past Surgical History:   Procedure Laterality Date   • ABDOMINAL AORTIC ANEURYSM REPAIR     • CARDIAC CATHETERIZATION N/A 10/03/2019    Procedure: Left Heart Cath;  Surgeon: Vincent Phillips MD;  Location: [facility] INVASIVE LOCATION;  Service: Cardiology       PT ASSESSMENT (last 12 hours)     IRF PT Evaluation and Treatment     Row Name 22 1603          PT Time and Intention    Document Type daily treatment  -RF     Mode of Treatment individual therapy;physical therapy  -RF     Patient/Family/Caregiver Comments/Observations No significant c/o noted this date. Slight dyspnea noted with increased exertion; cues provided for PLB.  -RF     Row Name 22 1603          General Information    Patient Profile Reviewed yes  -RF     Existing Precautions/Restrictions fall;swallow precautions  -RF     Row Name 22 1603          Living Environment    Primary Care Provided by self  -RF     Row Name 22 1603          Home Use of Assistive/Adaptive Equipment    Equipment Currently Used at Home none  -RF     Row Name 22  1603          Cognition/Psychosocial    Affect/Mental Status (Cognition) WFL  -RF     Orientation Status (Cognition) oriented x 4  -RF     Follows Commands (Cognition) WFL  -RF     Personal Safety Interventions gait belt;nonskid shoes/slippers when out of bed;fall prevention program maintained  -RF     Cognitive Function WFL  -RF     Row Name 09/13/22 1603          Bed Mobility    Bed Mobility bed mobility (all) activities  -RF     Supine-Sit [place] (Bed Mobility) modified independence  -RF     Sit-Supine [place] (Bed Mobility) modified independence  -RF     Assistive Device (Bed Mobility) bed rails  -RF     Row Name 09/13/22 1603          Transfer Assessment/Treatment    Transfers bed-chair transfer;chair-bed transfer;sit-stand transfer;stand-sit transfer  -RF     Row Name 09/13/22 1603          Transfers    Bed-Chair [place] (Transfers) standby assist  -RF     Chair-Bed [place] (Transfers) standby assist  -RF     Assistive Device (Bed-Chair Transfers) wheelchair  -RF     Sit-Stand [place] (Transfers) standby assist  -RF     Stand-Sit [place] (Transfers) standby assist  -RF     Row Name 09/13/22 1603          Bed-Chair Transfer    Comment, (Bed-Chair Transfer) Cues provided for safety with transferring.  -RF     Row Name 09/13/22 1603          Chair-Bed Transfer    Assistive Device (Chair-Bed Transfers) wheelchair  -RF     Row Name 09/13/22 1603          Sit-Stand Transfer    Assistive Device (Sit-Stand Transfers) wheelchair  -RF     Row Name 09/13/22 1603          Stand-Sit Transfer    Assistive Device (Stand-Sit Transfers) wheelchair  -RF     Row Name 09/13/22 1603          Gait/Stairs (Locomotion)    Gait/Stairs Locomotion gait/ambulation assistive device  -RF     [place] Level (Gait) contact guard  -RF     Assistive Device (Gait) --  no AD  -RF     Distance in Feet (Gait) 160X2  -RF     Pattern (Gait) step-through  -RF     Deviations/Abnormal Patterns (Gait) base of support,  narrow;other (see comments)  -RF     [place] Level (Stairs) contact guard  -RF     Handrail Location (Stairs) both sides  -RF     Number of Steps (Stairs) 15  -RF     Ascending Technique (Stairs) step-over-step  -RF     Descending Technique (Stairs) step-over-step  -RF     Stairs, Impairments strength decreased  -RF     Comment, (Gait/Stairs) Minimal unsteadiness noted; pt requires cuing for PLB as fatigue and dyspnea noted. Decreased activity tolerance limits increased distance.  -RF     Row Name 09/13/22 1603          Safety Issues, Functional Mobility    Impairments Affecting Function (Mobility) endurance/activity tolerance;balance;shortness of breath  -RF     Row Name 09/13/22 1603          Balance    Dynamic Standing Balance contact guard;standby assist  Standing bag toss with unilateral stance; performed 1 X L&R.  -RF     Balance Interventions other (see comments)  weave cones frwd/side  -RF     Comment, Balance Minimal unsteadiness noted with dynamic balance activities; no significant LOB observed.  -RF     Row Name 09/13/22 1603          Hip (Therapeutic Exercise)    Hip Strengthening (Therapeutic Exercise) bilateral;flexion;extension;aBduction;aDduction;marching while standing;standing;2 sets;10 repetitions  rest and cuing for PLB required after each activity  -RF     Row Name 09/13/22 1603          Knee (Therapeutic Exercise)    Knee Strengthening (Therapeutic Exercise) bilateral;flexion;extension;marching while standing;hamstring curls;standing;2 sets;10 repetitions  rest and cuing for PLB required after each activity  -RF     Row Name 09/13/22 1603          Ankle (Therapeutic Exercise)    Ankle Strengthening (Therapeutic Exercise) bilateral;dorsiflexion;plantarflexion;standing;2 sets;10 repetitions  rest and cuing for PLB required after each activity  -RF     Row Name 09/13/22 1603          Aerobic Exercise    Type (Aerobic Exercise) recumbent stationary bike  -RF     Time Performed (Aerobic  Exercise) 20  -RF     Comment, Aerobic Exercise (Therapeutic Exercise) LVL 2.0  -RF     Row Name 09/13/22 1603          Positioning and Restraints    Pre-Treatment Position in bed  BID  -RF     In Bed supine;call light within reach;encouraged to call for assist  -RF     Row Name 09/13/22 1603          Therapy Assessment/Plan (PT)    Patient's Goals For Discharge return home;take care of myself at home  -RF     Row Name 09/13/22 1603          Therapy Assessment/Plan (PT)    Rehab Potential/Prognosis (PT) good, to achieve stated therapy goals  -RF     Frequency of Treatment (PT) 5 times per week  -RF     Estimated Duration of Therapy (PT) 2 weeks  -RF     Problem List (PT) problems related to;balance;motor control;mobility  -RF     Activity Limitations Related to Problem List (PT) unable to ambulate safely;unable to transfer safely  -RF     Row Name 09/13/22 1603          Daily Progress Summary (PT)    Functional Goal Overall Progress (PT) progressing toward functional goals as expected  -RF     Daily Progress Summary (PT) Improving functional mobility and ambulation quality noted this date. Cuing required for safety with all mobility this date. Decreased activity noted to hinder exertiona dn increased ambulation distances at this time. Continued skilled care required for further improvements to ensure maximum safe function upon D/C.  -RF     Impairments Still Limiting Function (PT) functional activity tolerance impairment;strength deficit  -RF     Recommendations (PT) Continue per current POC  -RF     Row Name 09/13/22 1603          Therapy Plan Review/Discharge Plan (PT)    Anticipated Discharge Disposition (PT) home;home with assist  -RF     Row Name 09/13/22 1603          IRF PT Goals    Bed Mobility Goal Selection (PT-IRF) bed mobility, PT goal 1  -RF     Transfer Goal Selection (PT-IRF) transfers, PT goal 1  -RF     Gait (Walking Locomotion) Goal Selection (PT-IRF) gait, PT goal 1  -RF     Row Name 09/13/22 1603           Bed Mobility Goal 1 (PT-IRF)    Activity/Assistive Device (Bed Mobility Goal 1, PT-IRF) bed mobility activities, all  -RF     [place] Level (Bed Mobility Goal 1, PT-IRF) independent  -RF     Time Frame (Bed Mobility Goal 1, PT-IRF) by discharge  -RF     Row Name 09/13/22 1603          Transfer Goal 1 (PT-IRF)    Activity/Assistive Device (Transfer Goal 1, PT-IRF) sit-to-stand/stand-to-sit;bed-to-chair/chair-to-bed  -RF     [place] Level (Transfer Goal 1, PT-IRF) independent  -RF     Time Frame (Transfer Goal 1, PT-IRF) by discharge  -RF     Row Name 09/13/22 1603          Gait/Walking Locomotion Goal 1 (PT-IRF)    Activity/Assistive Device (Gait/Walking Locomotion Goal 1, PT-IRF) gait (walking locomotion);assistive device use  -RF     Gait/Walking Locomotion Distance Goal 1 (PT-IRF) 320'  -RF     [place] Level (Gait/Walking Locomotion Goal 1, PT-IRF) standby assist  -RF     Time Frame (Gait/Walking Locomotion Goal 1, PT-IRF) by discharge  -RF           User Key  (r) = Recorded By, (t) = Taken By, (c) = Cosigned By    Initials Name Provider Type    RF Lindsey Mendoza, PTA Physical Therapist Assistant                 Physical Therapy Education                 Title: PT OT SLP Therapies (Done)     Topic: Physical Therapy (Done)     Point: Mobility training (Done)     Learning Progress Summary           Patient Acceptance, E,TB, VU by RF at 9/13/2022 1609      Show all documentation for this point (4)                 Point: Home exercise program (Done)     Learning Progress Summary           Patient Acceptance, E,TB, VU by RF at 9/13/2022 1609      Show all documentation for this point (4)                 Point: Body mechanics (Done)     Learning Progress Summary           Patient Acceptance, E,TB, VU by RF at 9/13/2022 1609      Show all documentation for this point (4)                 Point: Precautions (Done)     Learning Progress Summary           Patient Acceptance, E,TB, VU by RF at  9/13/2022 1609      Show all documentation for this point (4)                             User Key     Initials Effective Dates Name Provider Type Discipline    RF 06/16/21 -  Lindsey Mendoza PTA Physical Therapist Assistant PT                PT Recommendation and Plan    Frequency of Treatment (PT): 5 times per week     Daily Progress Summary (PT)  Functional Goal Overall Progress (PT): progressing toward functional goals as expected  Daily Progress Summary (PT): Improving functional mobility and ambulation quality noted this date. Cuing required for safety with all mobility this date. Decreased activity noted to hinder exertiona dn increased ambulation distances at this time. Continued skilled care required for further improvements to ensure maximum safe function upon D/C.  Impairments Still Limiting Function (PT): functional activity tolerance impairment, strength deficit  Recommendations (PT): Continue per current POC               Time Calculation:      PT Charges     Row Name 09/13/22 1611 09/13/22 1609          Time Calculation    Start Time 1330  -RF 0915  -RF     Stop Time 1430  -RF 1000  -RF     Time Calculation (min) 60 min  -RF 45 min  -RF     PT Received On 09/13/22  -RF 09/13/22  -RF     PT - Next Appointment 09/14/22  -RF 09/13/22  -RF     PT Goal Re-Cert Due Date 09/15/22  -RF 09/15/22  -RF            Time Calculation- PT    Total Timed Code Minutes- PT 60 minute(s)  -RF 45 minute(s)  -RF           User Key  (r) = Recorded By, (t) = Taken By, (c) = Cosigned By    Initials Name Provider Type    RF Lindsey Mendoza PTA Physical Therapist Assistant                Therapy Charges for Today     Code Description Service Date Service Provider Modifiers Qty    85222592126 HC GAIT TRAINING EA 15 MIN 9/12/2022 Lindsey Mendoza PTA GP, CQ 2    10471114467 HC PT THER PROC EA 15 MIN 9/12/2022 Lindsey Mendoza PTA GP, CQ 2    11577416058 HC PT THERAPEUTIC ACT EA 15 MIN 9/12/2022 Lindsey Mendoza PTA GP,  CQ 2    [id number]  GAIT TRAINING EA 15 MIN 9/13/2022 Lindsey Mendoza, PTA GP, CQ 1    [id number]  PT NEUROMUSC RE EDUCATION EA 15 MIN 9/13/2022 Lindsey Mendoza, PTA GP, CQ 2    [id number]  PT THER PROC EA 15 MIN 9/13/2022 Lindsey Mendoza, PTA GP, CQ 1    [id number]  PT THERAPEUTIC ACT EA 15 MIN 9/13/2022 Lindsey Mendoza, PTA GP, CQ 2    [id number]  PT THER PROC EA 15 MIN 9/13/2022 Lindsey Mendoza, PTA GP, CQ 1                   Lindsey Mendoza PTA  9/13/2022

## 2022-09-13 NOTE — PLAN OF CARE
Goal Outcome Evaluation:  Plan of Care Reviewed With: patient        Progress: improving     Problem: Fall Injury Risk  Goal: Absence of Fall and Fall-Related Injury  Outcome: Ongoing, Progressing  Intervention: Identify and Manage Contributors  Recent Flowsheet Documentation  Taken 9/13/2022 0800 by Sandro Erwin, RN  Medication Review/Management: medications reviewed  Intervention: Promote Injury-Free Environment  Recent Flowsheet Documentation  Taken 9/13/2022 1800 by Sandro Erwin, RN  Safety Promotion/Fall Prevention: safety round/check completed  Taken 9/13/2022 1600 by Sandro Erwin, RN  Safety Promotion/Fall Prevention: safety round/check completed  Taken 9/13/2022 1200 by Sandro Erwin, RN  Safety Promotion/Fall Prevention: safety round/check completed  Taken 9/13/2022 1000 by Sandro Erwin RN  Safety Promotion/Fall Prevention: safety round/check completed  Taken 9/13/2022 0800 by Sandro Erwin RN  Safety Promotion/Fall Prevention:   safety round/check completed   fall prevention program maintained   clutter free environment maintained   assistive device/personal items within reach     Problem: Rehabilitation (IRF) Plan of Care  Goal: Plan of Care Review  Outcome: Ongoing, Progressing  Flowsheets (Taken 9/13/2022 1258)  Progress: improving  Plan of Care Reviewed With: patient  Goal: Patient-Specific Goal (Individualized)  Outcome: Ongoing, Progressing  Goal: Absence of New-Onset Illness or Injury  Outcome: Ongoing, Progressing  Intervention: Prevent Fall and Fall Injury  Recent Flowsheet Documentation  Taken 9/13/2022 1800 by Sandro Erwin, RN  Safety Promotion/Fall Prevention: safety round/check completed  Taken 9/13/2022 1600 by Sandro Erwin RN  Safety Promotion/Fall Prevention: safety round/check completed  Taken 9/13/2022 1200 by Sandro Erwin, RN  Safety Promotion/Fall Prevention: safety round/check completed  Taken 9/13/2022 1000 by Sandro Erwin, RN  Safety  Promotion/Fall Prevention: safety round/check completed  Taken 9/13/2022 0800 by Sandro Erwin, RN  Safety Promotion/Fall Prevention:   safety round/check completed   fall prevention program maintained   clutter free environment maintained   assistive device/personal items within reach  Intervention: Prevent VTE (Venous Thromboembolism)  Recent Flowsheet Documentation  Taken 9/13/2022 0800 by Sandro Erwin, RN  VTE Prevention/Management: (Eliquis) other (see comments)  Goal: Optimal Comfort and Wellbeing  Outcome: Ongoing, Progressing  Goal: Home and Community Transition Plan Established  Outcome: Ongoing, Progressing     Problem: Mobility Impairment  Goal: Optimal Mobility [place] and Safety  Outcome: Ongoing, Progressing     Problem: Swallowing Impairment  Goal: Optimal Eating/Swallowing without Aspir  Outcome: Ongoing, Progressing     Problem: Skin Injury Risk Increased  Goal: Skin Health and Integrity  Outcome: Ongoing, Progressing  Intervention: Promote and Optimize Oral Intake  Recent Flowsheet Documentation  Taken 9/13/2022 0800 by Sandro Erwin, RN  Oral Nutrition Promotion: physical activity promoted  Intervention: Optimize Skin Protection  Recent Flowsheet Documentation  Taken 9/13/2022 0800 by Sandro Erwin, RN  Pressure Reduction Techniques:   pressure points protected   frequent weight shift encouraged  Pressure Reduction Devices: pressure-redistributing mattress utilized  Skin Protection:   incontinence pads utilized   adhesive use limited

## 2022-09-13 NOTE — PROGRESS NOTES
[facility]PS CMG Coordinator  Inpatient Rehabilitation Admission    Ethnic Group:  Marital Status:    IRF Admission Date:  09/07/2022  Admission Class:  Admit From:    Pre-Hospital Living:    Payment Sources:  Impairment Group:  Date of Onset of Impairment:    Etiologic Diagnosis Code(s):  Rank Code      Description  1    I63.9     Cerebral infarction, unspecified    Comorbidities:  Rank Code      Description    1    G81.94    Hemiplegia, unspecified affecting left                 nondominant side  2    I50.30    Unspecified diastolic (congestive) heart failure  3    I11.0     Hypertensive heart disease with heart failure  4    J44.9     Chronic obstructive pulmonary disease,                 unspecified  5    I48.20    Chronic atrial fibrillation, unspecified  6    F17.210   Nicotine dependence, cigarettes, uncomplicated  7    I25.10    Atherosclerotic heart disease of native                 coronary artery without angina pectoris  8    Z79.01    Long term (current) use of anticoagulants  9    Z79.82    Long term (current) use of aspirin  10   Z79.52    Long term (current) use of systemic steroids    Height on Admission:  Weight on Admission:    Are there any arthritis conditions recorded for Impairment Group, Etiologic  Diagnosis, or Comorbid Conditions that meet all of the regulatory requirements  for IRF classification (in 42 .29(b)(2)(x), (xi), and xii))?  No    MAYNOR Bladder Accidents:   - Accidents.    MAYNOR Bowel Accident:  -Accidents.    Signed by: Nurse Zeferino

## 2022-09-13 NOTE — SIGNIFICANT NOTE
09/13/22 1531   Plan   Plan Left message for Pedro Luis at [facility] [phone].

## 2022-09-13 NOTE — PROGRESS NOTES
CC: Follow-up on CVA    Interview History/HPI: Patient was having diarrhea last night, I had ordered C. difficile but he has had none since, he was having nausea but he states he has eaten all his lunch nausea resolved no further diarrhea.  Have canceled the C. difficile order.  He states that he is working well with therapy no other new complaints.  Psychiatry consult noted as well.  Patient has had another swallow eval this a.m. and patient has been upgraded to thin liquids.  He has been participating with therapy.          Current Hospital Meds:  apixaban, 5 mg, Oral, Q12H  aspirin, 81 mg, Oral, Daily  atorvastatin, 80 mg, Oral, Nightly  nicotine, 1 patch, Transdermal, Q24H  predniSONE, 10 mg, Oral, Daily With Breakfast  sacubitril-valsartan, 1 tablet, Oral, BID  sertraline, 150 mg, Oral, Daily         Vitals:    09/13/22 0700   BP: 106/73   Pulse: 79   Resp: 18   Temp: 97.3 °F (36.3 °C)   SpO2: 96%         Intake/Output Summary (Last 24 hours) at 9/13/2022 1320  Last data filed at 9/12/2022 2300  Gross per 24 hour   Intake 840 ml   Output --   Net 840 ml       EXAM: Pleasant no distress lungs clear, heart controlled irregularly irregular rhythm without murmur.  Abdomen is benign.      Diet Soft Texture; Whole Foods; Thin        LABS:     Lab Results (last 48 hours)     Procedure Component Value Units Date/Time    Basic Metabolic Panel [[id number]]  (Abnormal) Collected: 09/12/22 0203    Specimen: Blood Updated: 09/12/22 0237     Glucose 109 mg/dL      BUN 28 mg/dL      Creatinine 1.12 mg/dL      Sodium 139 mmol/L      Potassium 4.5 mmol/L      Chloride 105 mmol/L      CO2 26.1 mmol/L      Calcium 9.2 mg/dL      BUN/Creatinine Ratio 25.0     Anion Gap 7.9 mmol/L      eGFR 70.7 mL/min/1.73      Comment: National Kidney Foundation and American Society of Nephrology (ASN) Task Force recommended calculation based on the Chronic Kidney Disease Epidemiology Collaboration (CKD-EPI) equation refit without adjustment for  race.       Narrative:      GFR Normal >60  Chronic Kidney Disease <60  Kidney Failure <15      CBC & Differential [[id number]]  (Abnormal) Collected: 09/12/22 0203    Specimen: Blood Updated: 09/12/22 0215    Narrative:      The following orders were created for panel order CBC & Differential.  Procedure                               Abnormality         Status                     ---------                               -----------         ------                     CBC Auto Differential[[id number]]        Abnormal            Final result                 Please view results for these tests on the individual orders.    CBC Auto Differential [[id number]]  (Abnormal) Collected: 09/12/22 0203    Specimen: Blood Updated: 09/12/22 0215     WBC 12.82 10*3/mm3      RBC 4.71 10*6/mm3      Hemoglobin 13.7 g/dL      Hematocrit 42.3 %      MCV 89.8 fL      MCH 29.1 pg      MCHC 32.4 g/dL      RDW 14.7 %      RDW-SD 48.1 fl      MPV 12.2 fL      Platelets 158 10*3/mm3      Neutrophil % 65.8 %      Lymphocyte % 22.3 %      Monocyte % 8.3 %      Eosinophil % 2.1 %      Basophil % 0.5 %      Immature Grans % 1.0 %      Neutrophils, Absolute 8.43 10*3/mm3      Lymphocytes, Absolute 2.86 10*3/mm3      Monocytes, Absolute 1.07 10*3/mm3      Eosinophils, Absolute 0.27 10*3/mm3      Basophils, Absolute 0.06 10*3/mm3      Immature Grans, Absolute 0.13 10*3/mm3      nRBC 0.0 /100 WBC                Radiology:    Imaging Results (Last 72 Hours)     Procedure Component Value Units Date/Time    FL Video Swallow Single Contrast [[id number]] Collected: 09/13/22 1048     Updated: 09/13/22 1050    Narrative:      EXAM:    FL Swallowing Function With Video/Cine     EXAM DATE:    9/13/2022 10:18 AM     CLINICAL HISTORY:    Aspiration r/o     TECHNIQUE:    Fluoroscopy of the oral cavity through upper esophagus with video/cine  recording.  The study was performed in conjunction with speech  pathology.  Various consistencies of liquid and food were  administered  orally by the speech pathologist.     Fluoroscopy exposure time of  approximately 1 minute or less.     COMPARISON:    No relevant prior studies available.     FINDINGS:    PHARYNGEAL PHASE:  Aspiration during the swallow w/ thin liquids via  cup and straw presentations. No spontaneous cough elicited. Cued cough  is sufficient to clear. Chin tuck is sufficient to extinguish  aspiration.       Impression:          Please see the speech pathologist's report for additional  information.     This report was finalized on 9/13/2022 10:48 AM by Dr. Daniel Bateman MD.             Results for orders placed during the hospital encounter of 09/01/22    Adult Transthoracic Echo Complete W/ Cont if Necessary Per Protocol (With Agitated Saline)    Interpretation Summary  · Estimated left ventricular EF = 55% Left ventricular ejection fraction appears to be 51 - 55%. Left ventricular systolic function is normal.  · Left ventricular diastolic function was normal.  · No significant mitral or aortic valve regurgitation. Moderate sclerosis of both is noted.  · There is anterior pericardial fat pad but no effusion  · Left atrium is mildly enlarged  · Since prev study of 9/29/19 EF has increased from 30% to 55% and mitral, aortic and tricuspid regurgitation is no longer seen.      Assessment/Plan:   Status post right basal ganglia/internal capsule CVA.  Patient continues to do well with therapy, he walked 160 feet x 2.  As above, his diet has been upgraded to thin liquids now.  No new deficits or complaints.  Continue intensive therapy.  I did discuss with the patient about disposition, he is in agreement with only short-term [facility].  Maria Dolores  is aware.  Continue aspirin and statin.    Atrial fibrillation, rate is controlled, on Eliquis for stroke prevention.    Depression, patient is on Zoloft, this has been increased by psychiatry to 150 mg a day to help better benefit anxiety.    History  of HFrEF now with recovered EF, compensated.    Chronic prednisone usage, no evidence of hemodynamic instability.  Will discuss further with patient but this is most like related to COPD.    Deven Chowdary MD

## 2022-09-13 NOTE — MBS/VFSS/FEES
Inpatient Rehabilitation - Speech Language Pathology   Swallow Re-Evaluation [facility]   MODIFIED BARIUM SWALLOW STUDY     Patient Name: Zaid Galarza  : 1950  MRN: [id number]  Today's Date: 2022             Admit Date: 2022      Zaid Galarza  presents to the radiology suite this am from 110/2S to participate in an instrumental MBS to assess safety/efficacy of swallowing fnx, determine safest/least restrictive diet. He has a medical hx significant for a-fib, CHF, COPD, CAD, HTN, and tobacco abuse. He reported to [facility] ED for evaluation of L-sided weakness.     He is s/p MBSS on 22, the day before admission to the inpatient physical rehabilitation unit, w/ a noted mild oropharyngeal dysphagia w/ penetration and aspiration of nectar liquids via straw only, and thin liquids. He has been tolerating a modified po diet of mechanical soft solids, whole meats, and nectar thick liquids w/ no straw presentations. He has been participating in formal dysphagia tx across admission to this unit.         Social History     Socioeconomic History   • Marital status: Single   Tobacco Use   • Smoking status: Current Every Day Smoker     Packs/day: 1.00     Types: Cigarettes   • Smokeless tobacco: Never Used   • Tobacco comment: down to 2 a day   Vaping Use   • Vaping Use: Never used   Substance and Sexual Activity   • Alcohol use: No   • Drug use: No   • Sexual activity: Defer        No recent chest xray available for review.     Diet Orders (active) (From admission, onward)     Start     Ordered    22 1254  Diet Soft Texture; Whole Foods; Thin  Diet Effective Now        Comments: Medications whole in puree/thins  CHIN TUCK w/ all liquids    22 1254                He is observed on room air w/o complications.     Risks and benefits of the procedure are explained w/ verbalizing understanding/agreement to participate.     Mr Galarza is positioned upright and centered in a stationary chair to accept  multiple po presentations of solid cracker, puree, honey thick, nectar thick, and thin liquids via spoon, cup and straw, along w/ whole placebo pill in puree. He is able to self feed and does so for all po presentations across this assessment.     All views are from the lateral plane.     Facial/oral structures are symmetrical upon observation w/o lingual deviation upon protrusion. Oral mucosa are moist, pink and clean. Secretions are clear, thin and well controlled. OROM/VANESSA is wfl to imitate oral postures. Gag is not assessed. Volitional cough is adequate in intensity, clear in quality, nonproductive. Vocal quality is adequate in intensity, clear in quality w/ intelligible speech. He is a/a and cooperative to particpate. He is oriented to person, place, and time, follows simple directives, and participates in simple conversational exchanges.     Upon po presentations, adequate bolus anticipation w/ good labial seal for bolus clearance via spoon bowl, cup rim stability and suction via straw.  Bolus formation, manipulation, and control are evidenced w/ trace weakness w/ rotary mastication pattern. A-p transit is timely w/ trace oral residue. Tongue base retraction and linguavelar seal are adequate w/o premature spillage. No laryngeal penetration or aspiration is evidenced before the swallow.     Pharyngeal swallow is slightly delayed w/ mildly weak and limited hyolaryngeal elevation and epiglottic inversion. Penetration w/ aspiration of thin liquids via cup and straw are evidenced during the swallow. No spontaneous cough is elicited. Cued cough is sufficient to clear aspirated material. Pharyngeal contraction is adequate w/o significant residue. No other laryngeal penetration or aspiration evidenced during or after the swallow.     *Aspiration of thin liquids via cup      Compensatory strategy is sufficient to extinguish aspiration of thin liquids via cup and straw presentations.     *Thin liquids via straw w/ chin  tuck      Full esophageal sweep reveals no mucosal abnormalities. Motility is wfl w/o retrograde flow noted.      Visit Dx:   No diagnosis found.  Patient Active Problem List   Diagnosis   • Atrial flutter (HCC)   • Dilated cardiomyopathy (HCC)   • Nonobstructive CAD per cath 10/3/2019   • Anxiety disorder   • Essential hypertension   • Tobacco use   • History of abdominal aortic aneurysm (AAA) repair   • Dyslipidemia, goal LDL below 100   • CVA (cerebral vascular accident) (HCC)   • Cerebrovascular accident (CVA), unspecified mechanism (HCC)     Past Medical History:   Diagnosis Date   • Atrial fibrillation (HCC)    • CHF (congestive heart failure) (HCC)    • COPD (chronic obstructive pulmonary disease) (HCC)    • Coronary artery disease    • Hypertension    • Tobacco abuse      Past Surgical History:   Procedure Laterality Date   • ABDOMINAL AORTIC ANEURYSM REPAIR     • CARDIAC CATHETERIZATION N/A 10/03/2019    Procedure: Left Heart Cath;  Surgeon: Vincent Phillips MD;  Location: [facility] CATH INVASIVE LOCATION;  Service: Cardiology       IMPRESSION:  Mr Galarza presents w/ wfl oral swallow and mild pharyngeal dysphagia w/ aspiration of thin liquids via cup and straw are evidenced during the swallow. No spontaneous cough is elicited, however cued cough is sufficient to clear aspirated material. Although no spontaneous cough is elicited during this evaluation on this date, Mr Galarza has demonstrated a spontaneous cough w/ thin liquid trials during dysphagia therapy sessions and as such he is felt to evidence w/ intermittently silent aspiration. Compensatory strategy of chin tuck is sufficient to extinguish aspiration events.     Per this evaluation, he is felt to most benefit from an advanced po diet to mechanical soft solids, whole meats, and thin liquids w/ a chin tuck for all thin liquids. Recommend medication whole in puree/thins w/ chin tuck in use for thin liquids.       SLP Recommendation and Plan        Recommendations:   1. Mechanical soft consistencies, whole meats, thin liquids w/ chin tuck compensatory strategy.   2. Meds whole in puree/thins.   3. Ramiro precautions.   4. Oral care protocol.   5. Chin tuck for all thin liquids.    SLP to f/u for continued formal dysphagia therapy.     D/w pt results and recommendations w/ verbal understanding and agreement.     D/w RN results and recommendations w/ verbal understanding and agreement.     Thank you for allowing me to participate in the care of your patient-  Esme Hernandez MS CCC-SLP        EDUCATION  The patient has been educated in the following areas:   Dysphagia (Swallowing Impairment) Modified Diet Instruction.              Time Calculation:    Time Calculation- SLP     Row Name 09/13/22 1254             Time Calculation- SLP    SLP Start Time 1000  -      SLP Stop Time 1040  -      SLP Time Calculation (min) 40 min  -      SLP - Next Appointment 09/14/22  -            User Key  (r) = Recorded By, (t) = Taken By, (c) = Cosigned By    Initials Name Provider Type    Esme Bosch MS CCC-SLP Speech and Language Pathologist                Therapy Charges for Today     Code Description Service Date Service Provider Modifiers Qty    05100727163 HC ST TREATMENT SWALLOW 3 9/12/2022 Esme Hernandez MS CCC-SLP GN 1    88176664574 HC ST MOTION FLUORO EVAL SWALLOW 3 9/13/2022 Esme Hernandez MS CCC-SLP GN 1               Esme Hernandez MS CCC-RELL  9/13/2022

## 2022-09-13 NOTE — THERAPY TREATMENT NOTE
Inpatient Rehabilitation - Occupational Therapy Treatment Note    [facility]     Patient Name: Zaid Galarza  : 1950  MRN: [id number]    Today's Date: 2022                 Admit Date: 2022       No diagnosis found.    Patient Active Problem List   Diagnosis   • Atrial flutter (HCC)   • Dilated cardiomyopathy (HCC)   • Nonobstructive CAD per cath 10/3/2019   • Anxiety disorder   • Essential hypertension   • Tobacco use   • History of abdominal aortic aneurysm (AAA) repair   • Dyslipidemia, goal LDL below 100   • CVA (cerebral vascular accident) (HCC)   • Cerebrovascular accident (CVA), unspecified mechanism (HCC)       Past Medical History:   Diagnosis Date   • Atrial fibrillation (HCC)    • CHF (congestive heart failure) (HCC)    • COPD (chronic obstructive pulmonary disease) (HCC)    • Coronary artery disease    • Hypertension    • Tobacco abuse        Past Surgical History:   Procedure Laterality Date   • ABDOMINAL AORTIC ANEURYSM REPAIR     • CARDIAC CATHETERIZATION N/A 10/03/2019    Procedure: Left Heart Cath;  Surgeon: Vincent Phillips MD;  Location: [facility] INVASIVE LOCATION;  Service: Cardiology             IRF OT ASSESSMENT FLOWSHEET (last 12 hours)     IRF OT Evaluation and Treatment     Row Name 22 1310          OT Time and Intention    Document Type daily treatment  -TM     Mode of Treatment occupational therapy  -TM     Patient Effort good  -TM     Symptoms Noted During/After Treatment none  -TM     Row Name 22 1310          General Information    General Observations of Patient Pt agreeable for therapy.  -TM     Existing Precautions/Restrictions fall;swallow precautions  -TM     Row Name 22 1310          Cognition/Psychosocial    Orientation Status (Cognition) oriented x 4  -TM     Follows Commands (Cognition) WFL;follows one-step commands;verbal cues/prompting required  -TM     Row Name 22 1310          Upper Body Dressing    Comment (Upper Body Dressing)  setup  -TM     Row Name 09/13/22 1310          Lower Body Dressing    Comment (Lower Body Dressing) setup/standby assist  -TM     Row Name 09/13/22 1310          Grooming    Comment (Grooming) setup  -TM     Row Name 09/13/22 1310          Transfers    Bed-Chair [place] (Transfers) standby assist  -TM     Chair-Bed [place] (Transfers) standby assist  -TM     Assistive Device (Bed-Chair Transfers) wheelchair  -TM     Row Name 09/13/22 1310          Chair-Bed Transfer    Assistive Device (Chair-Bed Transfers) wheelchair  -TM     Row Name 09/13/22 1310          Motor Skills    Motor Skills coordination;functional endurance;motor control/coordination interventions  -     Motor Control/Coordination Interventions therapeutic exercise/ROM;fine motor manipulation/dexterity activities;gross motor coordination activities;other (see comments)  BUE ther ex/act, GMC/FMC  -           User Key  (r) = Recorded By, (t) = Taken By, (c) = Cosigned By    Initials Name Effective Dates     Sade Galvan OT 06/16/21 -                  Occupational Therapy Education                 Title: PT OT SLP Therapies (Done)     Topic: Occupational Therapy (Done)     Point: ADL training (Done)     Description:   Instruct learner(s) on proper safety adaptation and remediation techniques during self care or transfers.   Instruct in proper use of assistive devices.              Learning Progress Summary           Patient Acceptance, E,D, VU,NR by  at 9/13/2022 1308      Show all documentation for this point (4)                 Point: Precautions (Done)     Description:   Instruct learner(s) on prescribed precautions during self-care and functional transfers.              Learning Progress Summary           Patient Acceptance, E,D, VU,NR by  at 9/13/2022 1308      Show all documentation for this point (4)                             User Key     Initials Effective Dates Name Provider Type Discipline    TM 06/16/21 -  Mandy  Sade Mak OT Occupational Therapist OT                    OT Recommendation and Plan    Planned Therapy Interventions (OT): activity tolerance training, adaptive equipment training, BADL retraining, IADL retraining, occupation/activity based interventions, patient/caregiver education/training, ROM/therapeutic exercise, transfer/mobility retraining, strengthening exercise                    Time Calculation:      Time Calculation- OT     Row Name 09/13/22 1310 09/13/22 1309          Time Calculation- OT    OT Start Time 1245  -TM 1050  -TM     OT Stop Time 1330  -TM 1135  -TM     OT Time Calculation (min) 45 min  -TM 45 min  -TM     Total Timed Code Minutes- OT 45 minute(s)  -TM 45 minute(s)  -TM     OT Non-Billable Time (min) -- 15 min  -TM           User Key  (r) = Recorded By, (t) = Taken By, (c) = Cosigned By    Initials Name Provider Type    TM Sade Galvan OT Occupational Therapist              Therapy Charges for Today     Code Description Service Date Service Provider Modifiers Qty    84791111684 HC OT SELF CARE/MGMT/TRAIN EA 15 MIN 9/12/2022 Sade Galvan, OT GO 1    64229317127 HC OT THERAPEUTIC ACT EA 15 MIN 9/12/2022 Sade Galvan, OT GO 3    57416900883 HC OT THER PROC EA 15 MIN 9/12/2022 Sade Galvan, OT GO 1    73394311665 HC OT SELF CARE/MGMT/TRAIN EA 15 MIN 9/12/2022 Sade Galvan, OT GO 2    97465745433 HC OT THER PROC EA 15 MIN 9/13/2022 Sade Galvan OT GO 2    13227547739 HC OT THERAPEUTIC ACT EA 15 MIN 9/13/2022 Sade Galvan, OT GO 3    35203268693 HC OT SELF CARE/MGMT/TRAIN EA 15 MIN 9/13/2022 Sade Galvan, OT GO 1                   Sade Galvan OT  9/13/2022

## 2022-09-14 VITALS
BODY MASS INDEX: 30.04 KG/M2 | WEIGHT: 221.8 LBS | HEIGHT: 72 IN | OXYGEN SATURATION: 98 % | HEART RATE: 63 BPM | RESPIRATION RATE: 18 BRPM | DIASTOLIC BLOOD PRESSURE: 77 MMHG | SYSTOLIC BLOOD PRESSURE: 114 MMHG | TEMPERATURE: 97.4 F

## 2022-09-14 LAB
ANION GAP SERPL CALCULATED.3IONS-SCNC: 8.7 MMOL/L (ref 5–15)
BASOPHILS # BLD AUTO: 0.05 10*3/MM3 (ref 0–0.2)
BASOPHILS NFR BLD AUTO: 0.4 % (ref 0–1.5)
BUN SERPL-MCNC: 30 MG/DL (ref 8–23)
BUN/CREAT SERPL: 24.8 (ref 7–25)
CALCIUM SPEC-SCNC: 8.6 MG/DL (ref 8.6–10.5)
CHLORIDE SERPL-SCNC: 104 MMOL/L (ref 98–107)
CO2 SERPL-SCNC: 24.3 MMOL/L (ref 22–29)
CREAT SERPL-MCNC: 1.21 MG/DL (ref 0.76–1.27)
CRP SERPL-MCNC: <0.3 MG/DL (ref 0–0.5)
DEPRECATED RDW RBC AUTO: 49 FL (ref 37–54)
EGFRCR SERPLBLD CKD-EPI 2021: 64.4 ML/MIN/1.73
EOSINOPHIL # BLD AUTO: 0.17 10*3/MM3 (ref 0–0.4)
EOSINOPHIL NFR BLD AUTO: 1.3 % (ref 0.3–6.2)
ERYTHROCYTE [DISTWIDTH] IN BLOOD BY AUTOMATED COUNT: 14.8 % (ref 12.3–15.4)
GLUCOSE SERPL-MCNC: 145 MG/DL (ref 65–99)
HCT VFR BLD AUTO: 40.3 % (ref 37.5–51)
HGB BLD-MCNC: 13 G/DL (ref 13–17.7)
IMM GRANULOCYTES # BLD AUTO: 0.14 10*3/MM3 (ref 0–0.05)
IMM GRANULOCYTES NFR BLD AUTO: 1 % (ref 0–0.5)
LYMPHOCYTES # BLD AUTO: 2.6 10*3/MM3 (ref 0.7–3.1)
LYMPHOCYTES NFR BLD AUTO: 19.4 % (ref 19.6–45.3)
MCH RBC QN AUTO: 29.3 PG (ref 26.6–33)
MCHC RBC AUTO-ENTMCNC: 32.3 G/DL (ref 31.5–35.7)
MCV RBC AUTO: 90.8 FL (ref 79–97)
MONOCYTES # BLD AUTO: 0.94 10*3/MM3 (ref 0.1–0.9)
MONOCYTES NFR BLD AUTO: 7 % (ref 5–12)
NEUTROPHILS NFR BLD AUTO: 70.9 % (ref 42.7–76)
NEUTROPHILS NFR BLD AUTO: 9.47 10*3/MM3 (ref 1.7–7)
NRBC BLD AUTO-RTO: 0 /100 WBC (ref 0–0.2)
PLATELET # BLD AUTO: 159 10*3/MM3 (ref 140–450)
PMV BLD AUTO: 12.5 FL (ref 6–12)
POTASSIUM SERPL-SCNC: 4.1 MMOL/L (ref 3.5–5.2)
RBC # BLD AUTO: 4.44 10*6/MM3 (ref 4.14–5.8)
SARS-COV-2 AG RESP QL IA.RAPID: NORMAL
SODIUM SERPL-SCNC: 137 MMOL/L (ref 136–145)
WBC NRBC COR # BLD: 13.37 10*3/MM3 (ref 3.4–10.8)

## 2022-09-14 PROCEDURE — 85025 COMPLETE CBC W/AUTO DIFF WBC: CPT | Performed by: INTERNAL MEDICINE

## 2022-09-14 PROCEDURE — 63710000001 PREDNISONE PER 5 MG: Performed by: FAMILY MEDICINE

## 2022-09-14 PROCEDURE — 87426 SARSCOV CORONAVIRUS AG IA: CPT | Performed by: INTERNAL MEDICINE

## 2022-09-14 PROCEDURE — 97116 GAIT TRAINING THERAPY: CPT

## 2022-09-14 PROCEDURE — 97110 THERAPEUTIC EXERCISES: CPT

## 2022-09-14 PROCEDURE — 97535 SELF CARE MNGMENT TRAINING: CPT

## 2022-09-14 PROCEDURE — 97530 THERAPEUTIC ACTIVITIES: CPT

## 2022-09-14 PROCEDURE — 86140 C-REACTIVE PROTEIN: CPT | Performed by: INTERNAL MEDICINE

## 2022-09-14 PROCEDURE — 99238 HOSP IP/OBS DSCHRG MGMT 30/<: CPT | Performed by: INTERNAL MEDICINE

## 2022-09-14 PROCEDURE — 92526 ORAL FUNCTION THERAPY: CPT

## 2022-09-14 PROCEDURE — 80048 BASIC METABOLIC PNL TOTAL CA: CPT | Performed by: INTERNAL MEDICINE

## 2022-09-14 RX ORDER — SERTRALINE HYDROCHLORIDE 100 MG/1
150 TABLET, FILM COATED ORAL DAILY
Start: 2022-09-14

## 2022-09-14 RX ORDER — ATORVASTATIN CALCIUM 80 MG/1
80 TABLET, FILM COATED ORAL NIGHTLY
Qty: 90 TABLET
Start: 2022-09-14 | End: 2023-02-27 | Stop reason: SDUPTHER

## 2022-09-14 RX ORDER — NICOTINE 21 MG/24HR
1 PATCH, TRANSDERMAL 24 HOURS TRANSDERMAL
Qty: 10 PATCH | Refills: 0
Start: 2022-09-15 | End: 2022-09-25

## 2022-09-14 RX ADMIN — SERTRALINE 150 MG: 50 TABLET, FILM COATED ORAL at 09:08

## 2022-09-14 RX ADMIN — NICOTINE 1 PATCH: 14 PATCH, EXTENDED RELEASE TRANSDERMAL at 09:08

## 2022-09-14 RX ADMIN — APIXABAN 5 MG: 5 TABLET, FILM COATED ORAL at 09:08

## 2022-09-14 RX ADMIN — SACUBITRIL AND VALSARTAN 1 TABLET: 97; 103 TABLET, FILM COATED ORAL at 09:08

## 2022-09-14 RX ADMIN — PREDNISONE 10 MG: 10 TABLET ORAL at 09:08

## 2022-09-14 RX ADMIN — ASPIRIN 81 MG: 81 TABLET, CHEWABLE ORAL at 09:08

## 2022-09-14 NOTE — SIGNIFICANT NOTE
09/14/22 1020   Plan   Plan SS spoke to Pedro Luis [phone] and Gail [phone] with [facility] who state pt can be accepted for admission today.  Gail will complete admission paperwork at the facility.  MD agreeable to discharge today.  Informed pt and sister Susan [phone] about discharge to [facility] today who are agreeable.  Sister will come to rehab around 1:00 pm to transport pt to [facility].  Pt will be admitted to skilled bed.   Final Discharge Disposition Code 03 - skilled nursing facility (SNF)

## 2022-09-14 NOTE — THERAPY DISCHARGE NOTE
Inpatient Rehabilitation - Physical Therapy Treatment Note/Discharge   [place]     Patient Name: Zaid Galarza  : 1950  MRN: [id number]  Today's Date: 2022                Admit Date: 2022    Visit Dx:  No diagnosis found.  Patient Active Problem List   Diagnosis   • Atrial flutter (HCC)   • Dilated cardiomyopathy (HCC)   • Nonobstructive CAD per cath 10/3/2019   • Anxiety disorder   • Essential hypertension   • Tobacco use   • History of abdominal aortic aneurysm (AAA) repair   • Dyslipidemia, goal LDL below 100   • CVA (cerebral vascular accident) (HCC)   • Cerebrovascular accident (CVA), unspecified mechanism (HCC)     Past Medical History:   Diagnosis Date   • Atrial fibrillation (HCC)    • CHF (congestive heart failure) (HCC)    • COPD (chronic obstructive pulmonary disease) (HCC)    • Coronary artery disease    • Hypertension    • Tobacco abuse      Past Surgical History:   Procedure Laterality Date   • ABDOMINAL AORTIC ANEURYSM REPAIR     • CARDIAC CATHETERIZATION N/A 10/03/2019    Procedure: Left Heart Cath;  Surgeon: Vincent Phillips MD;  Location: [facility] INVASIVE LOCATION;  Service: Cardiology       PT ASSESSMENT (last 12 hours)     IRF PT Evaluation and Treatment     Row Name 22 1544          PT Time and Intention    Document Type daily treatment  -RF     Mode of Treatment individual therapy;physical therapy  -RF     Patient/Family/Caregiver Comments/Observations Pt reports increased anxiety about D/C plans this date. Pt educated on POC and importance of safety before D/C home independently. No other significant c/o noted. Pt D/C to SNF this date.  -RF     Row Name 22 1544          General Information    Patient Profile Reviewed yes  -RF     Existing Precautions/Restrictions fall;swallow precautions  -RF     Row Name 22 1544          Living Environment    Primary Care Provided by self  -RF     Row Name 22 1544          Home Use of Assistive/Adaptive  Equipment    Equipment Currently Used at Home none  -RF     Row Name 09/14/22 1544          Cognition/Psychosocial    Affect/Mental Status (Cognition) WFL  -RF     Orientation Status (Cognition) oriented x 4  -RF     Follows Commands (Cognition) WFL  -RF     Cognitive Function WFL  -RF     Row Name 09/14/22 1544          Bed Mobility    Bed Mobility bed mobility (all) activities  -RF     Supine-Sit Ceres (Bed Mobility) modified independence  -RF     Sit-Supine Ce[place] (Bed Mobility) modified independence  -RF     Assistive Device (Bed Mobility) bed rails  -RF     Row Name 09/14/22 1544          Transfer Assessment/Treatment    Transfers bed-chair transfer;chair-bed transfer;sit-stand transfer;stand-sit transfer  -RF     Row Name 09/14/22 1544          Transfers    Bed-Chair [place] (Transfers) standby assist;supervision  -RF     Chair-Bed [place] (Transfers) standby assist;supervision  -RF     Assistive Device (Bed-Chair Transfers) wheelchair  -RF     Sit-Stand [place] (Transfers) standby assist;supervision  -RF     Stand-Sit [place] (Transfers) standby assist;supervision  -RF     Row Name 09/14/22 1544          Chair-Bed Transfer    Assistive Device (Chair-Bed Transfers) wheelchair  -RF     Row Name 09/14/22 1544          Sit-Stand Transfer    Assistive Device (Sit-Stand Transfers) wheelchair  -RF     Row Name 09/14/22 1544          Stand-Sit Transfer    Assistive Device (Stand-Sit Transfers) wheelchair  -RF     Row Name 09/14/22 1544          Gait/Stairs (Locomotion)    Gait/Stairs Locomotion gait/ambulation assistive device  -RF     [place] Level (Gait) contact guard;standby assist  -RF     Assistive Device (Gait) --  no AD  -RF     Distance in Feet (Gait) 160X2  -RF     Pattern (Gait) step-through  -RF     Deviations/Abnormal Patterns (Gait) base of support, narrow;other (see comments)  -RF     Comment, (Gait/Stairs) Unsteadiness noted with ambualtion, however, no significant  LOB noted.  -RF     Row Name 09/14/22 1544          Safety Issues, Functional Mobility    Impairments Affecting Function (Mobility) endurance/activity tolerance;balance;shortness of breath  -RF     Row Name 09/14/22 1544          Hip (Therapeutic Exercise)    Hip Strengthening (Therapeutic Exercise) bilateral;flexion;extension;aBduction;aDduction;heel slides;marching while seated;sitting;resistance band;green;2 sets;10 repetitions  -RF     Row Name 09/14/22 1544          Knee (Therapeutic Exercise)    Knee Strengthening (Therapeutic Exercise) bilateral;flexion;extension;heel slides;marching while seated;LAQ (long arc quad);hamstring curls;sitting;resistance band;green;2 sets;10 repetitions  -RF     Row Name 09/14/22 1544          Ankle (Therapeutic Exercise)    Ankle Strengthening (Therapeutic Exercise) bilateral;dorsiflexion;plantarflexion;sitting;2 sets;10 repetitions  -RF     Row Name 09/14/22 1544          Positioning and Restraints    Pre-Treatment Position sitting in chair/recliner  -RF     Post Treatment Position bed  -RF     In Bed sitting EOB;call light within reach;encouraged to call for assist  -RF     Row Name 09/14/22 1544          Therapy Assessment/Plan (PT)    Patient's Goals For Discharge return home;take care of myself at home  -RF     Row Name 09/14/22 1544          Therapy Assessment/Plan (PT)    Rehab Potential/Prognosis (PT) good, to achieve stated therapy goals  -RF     Frequency of Treatment (PT) 5 times per week  -RF     Estimated Duration of Therapy (PT) 2 weeks  -RF     Problem List (PT) problems related to;balance;motor control;mobility  -RF     Activity Limitations Related to Problem List (PT) unable to ambulate safely;unable to transfer safely  -RF     Row Name 09/14/22 1544          Therapy Plan Review/Discharge Plan (PT)    Anticipated Discharge Disposition (PT) home;home with assist  -RF     Row Name 09/14/22 1544          IRF PT Goals    Bed Mobility Goal Selection (PT-IRF) bed  mobility, PT goal 1  -RF     Transfer Goal Selection (PT-IRF) transfers, PT goal 1  -RF     Gait (Walking Locomotion) Goal Selection (PT-IRF) gait, PT goal 1  -RF     Row Name 09/14/22 1544          Bed Mobility Goal 1 (PT-IRF)    Activity/Assistive Device (Bed Mobility Goal 1, PT-IRF) bed mobility activities, all  -RF     [employer] Level (Bed Mobility Goal 1, PT-IRF) independent  -RF     Time Frame (Bed Mobility Goal 1, PT-IRF) by discharge  -RF     Progress/Outcomes (Bed Mobility Goal 1, PT-IRF) goal met  -RF     Row Name 09/14/22 1544          Transfer Goal 1 (PT-IRF)    Activity/Assistive Device (Transfer Goal 1, PT-IRF) sit-to-stand/stand-to-sit;bed-to-chair/chair-to-bed  -RF     [employer] Level (Transfer Goal 1, PT-IRF) independent  -RF     Time Frame (Transfer Goal 1, PT-IRF) by discharge  -RF     Progress/Outcomes (Transfer Goal 1, PT-IRF) goal not met  -RF     Row Name 09/14/22 1544          Gait/Walking Locomotion Goal 1 (PT-IRF)    Activity/Assistive Device (Gait/Walking Locomotion Goal 1, PT-IRF) gait (walking locomotion);assistive device use  -RF     Gait/Walking Locomotion Distance Goal 1 (PT-IRF) 320'  -RF     [employer] Level (Gait/Walking Locomotion Goal 1, PT-IRF) standby assist  -RF     Time Frame (Gait/Walking Locomotion Goal 1, PT-IRF) by discharge  -RF     Progress/Outcomes (Gait/Walking Locomotion Goal 1, PT-IRF) goal met  -RF     Row Name 09/14/22 1544          Discharge Summary (PT)    Outcomes Achieved/Progress Made Upon Discharge (PT) goals partially achieved prior to discharge  -RF     Discharge Summary Statement (PT) Good functional mobility and ambulation quality noted this date, however, SBA required for safety as decreased safety awareness and unsteadiness is noted. Pt D/C to SNF this date.  -RF           User Key  (r) = Recorded By, (t) = Taken By, (c) = Cosigned By    Initials Name Provider Type    RF Lindsey Mendoza, PTA Physical Therapist Assistant                 Physical Therapy Education                 Title: PT OT SLP Therapies (Resolved)     Topic: Physical Therapy (Resolved)     Point: Mobility training (Resolved)     Learning Progress Summary           Patient Acceptance, E,TB, VU by RF at 9/13/2022 1609      Show all documentation for this point (4)                 Point: Home exercise program (Resolved)     Learning Progress Summary           Patient Acceptance, E,TB, VU by RF at 9/13/2022 1609      Show all documentation for this point (4)                 Point: Body mechanics (Resolved)     Learning Progress Summary           Patient Acceptance, E,TB, VU by RF at 9/13/2022 1609      Show all documentation for this point (4)                 Point: Precautions (Resolved)     Learning Progress Summary           Patient Acceptance, E,TB, VU by RF at 9/13/2022 1609      Show all documentation for this point (4)                             User Key     Initials Effective Dates Name Provider Type Discipline     06/16/21 -  Lindsey Mendoza PTA Physical Therapist Assistant PT                PT Recommendation and Plan        Daily Progress Summary (PT)  Functional Goal Overall Progress (PT): progressing toward functional goals as expected  Daily Progress Summary (PT): Improving functional mobility and ambulation quality noted this date. Cuing required for safety with all mobility this date. Decreased activity noted to hinder exertiona dn increased ambulation distances at this time. Continued skilled care required for further improvements to ensure maximum safe function upon D/C.  Impairments Still Limiting Function (PT): functional activity tolerance impairment, strength deficit  Recommendations (PT): Continue per current POC         Time Calculation:    PT Charges     Row Name 09/14/22 1550             Time Calculation    Start Time 1045  -RF      Stop Time 1145  -RF      Time Calculation (min) 60 min  -RF      PT Received On 09/14/22  -RF              Time  Calculation- PT    Total Timed Code Minutes- PT 60 minute(s)  -RF            User Key  (r) = Recorded By, (t) = Taken By, (c) = Cosigned By    Initials Name Provider Type    RF Lindsey Mendoza, FERMIN Physical Therapist Assistant                Therapy Charges for Today     Code Description Service Date Service Provider Modifiers Qty    59377278567 HC GAIT TRAINING EA 15 MIN 9/13/2022 Lindsey Mendoza, PTA GP, CQ 1    36259953105 HC PT NEUROMUSC RE EDUCATION EA 15 MIN 9/13/2022 Lindsey Mendoza, PTA GP, CQ 2    03975545375 HC PT THER PROC EA 15 MIN 9/13/2022 Lindsey Mendoza, PTA GP, CQ 1    50360934048 HC PT THERAPEUTIC ACT EA 15 MIN 9/13/2022 Lindsey Mendoza, PTA GP, CQ 2    09337123922 HC PT THER PROC EA 15 MIN 9/13/2022 Lindsey Mendoza, PTA GP, CQ 1    04218610981 HC GAIT TRAINING EA 15 MIN 9/14/2022 Lindsey Mendoza, PTA GP, CQ 1    90906401768 HC PT THER PROC EA 15 MIN 9/14/2022 Lindsey Mendoza, PTA GP, CQ 2    59327299008 HC PT THERAPEUTIC ACT EA 15 MIN 9/14/2022 Lindsey Mendoza, PTA GP, CQ 1                    Lindsey Mendoza, PTA  9/14/2022

## 2022-09-14 NOTE — SIGNIFICANT NOTE
09/14/22 6978   Plan   Plan Contacted [facility] [phone] to schedule appointment with TIFFANIE Walker on 10-6-22 at 11:00 am.

## 2022-09-14 NOTE — DISCHARGE SUMMARY
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 Fib  History of HFrEF now with recovered EF  Bradycardia secondary to AV ruben blocking agents while in the acute unit, avoiding these on discharge  COPD without current exacerbation  Ongoing tobacco use counseled to quit on Nicorette patch  Depression and anxiety seen by psychiatry medications adjusted  Hypertension  Nonobstructive coronary disease by cardiac catheterization 10/4/2019    Consultants:  Psychiatry Dr. Osuna    Procedures:  None    Exam: Patient lying in bed no complaints lungs clear heart regular rate and rhythm strength appears symmetric mood is good vital signs: 114/77, 18, 63, 97.4 saturation 98% on room air    Hospital course: Patient was admitted here after a right subcortical stroke, he had intensive PT and OT also was seen by speech therapy, initially he was on a nectar thick diet, he has been upgraded with repeat swallowing evaluation to thin liquids.  He remains on whole foods but he has made progress with this.  Initially patient was standby assist with bed mobility and contact-guard with transfers, patient initially walked 80 feet x 2 with a front wheel walker with contact-guard.  His strength, manual testing 46 pounds left hand 55 pounds right hand, initially left upper extremity strength 3/5 with the right upper extremity strength 4/5, initially lower body dressing minimal assist and upper body dressing with set up. Grooming with set up and toileting was minimal assist.  On discharge patient is modified independent for bed mobility and standby assist for transfers.  He has walked 160 feet x 2 with contact-guard and balance is contact-guard as well.  Patient has made progress, he is being discharged to a short-term SNF.  I have contacted his cardiology NP Christy  Mario, she is going to have a cardiac monitor placed on him prior to discharge and she will follow him up in 3 weeks.  Patient was seen by psychiatry while here for pression and Zoloft was adjusted.    Condition on discharge, stable/improved    Follow-up:  Christy Martin 3 weeks    Diet: Soft texture whole foods thin liquids chin tuck with swallowing    Disposition: Short-term skilled nursing facility    Weightbearing: As tolerated    Medication:  Apixaban 5 mg twice daily  Aspirin 81 mg a day  Atorvastatin 80 mg every night  NicoDerm patch 14 mg for 7 days  Prednisone 10 mg a day  Entresto 97/103 twice daily  Zoloft 150 mg a day    Less than 30-minute discharge

## 2022-09-14 NOTE — PROGRESS NOTES
Rehabilitation Nursing  Inpatient Rehabilitation Plan of Care Note    Plan of Care  Pain    Pain Management (Active)  Current Status (9/7/2022 2:53:00 PM): potential for increased pain  Weekly Goal: pain managed  Discharge Goal: pain managed    Safety    Potential for Injury (Active)  Current Status (9/7/2022 2:53:00 PM): potential for falls  Weekly Goal: no falls  Discharge Goal: no falls    Signed by: Chelita Ferguson RN

## 2022-09-14 NOTE — SIGNIFICANT NOTE
09/14/22 1223   Plan   Plan Faxed negative COVID results to [facility] [phone].

## 2022-09-14 NOTE — PROGRESS NOTES
Patient Assessment Instrument  Quality Indicators - Discharge    Section GG. Self-Care Performance      Section GG. Mobility Performance      Section J. Health Conditions Discharge  Fall(s) Since Admission:  No    Section M. Skin Conditions Discharge  Unhealed Pressure Ulcer(s)/Injurie(s) at Stage 1 or Higher:  No    . Current Number of Unhealed Pressure Ulcers      Section N. Medication    Medication Intervention: N/A - There were no potential clinically significant  medication issues identified since admission or patient is not taking any  medications.    Signed by: Pauline Kamara, Supervisor

## 2022-09-14 NOTE — SIGNIFICANT NOTE
09/14/22 1122   Plan   Plan Faxed discharge summary and AVS to [facility] [phone].  Nurse to call report to [phone].

## 2022-09-14 NOTE — SIGNIFICANT NOTE
09/14/22 1134   Plan   Plan Faxed NH orders to [facility] [phone].  COVID test results to be faxed when available.

## 2022-09-14 NOTE — PROGRESS NOTES
Patient without new complaints, participating well with therapy,  working on short-term nursing placement possibly today.

## 2022-09-14 NOTE — THERAPY DISCHARGE NOTE
Inpatient Rehabilitation - IRF Occupational Therapy Treatment Note/Discharge   Joey     Patient Name: Zaid Galarza  : 1950  MRN: [id number]  Today's Date: 2022               Admit Date: 2022     No diagnosis found.  Patient Active Problem List   Diagnosis   • Atrial flutter (HCC)   • Dilated cardiomyopathy (HCC)   • Nonobstructive CAD per cath 10/3/2019   • Anxiety disorder   • Essential hypertension   • Tobacco use   • History of abdominal aortic aneurysm (AAA) repair   • Dyslipidemia, goal LDL below 100   • CVA (cerebral vascular accident) (HCC)   • Cerebrovascular accident (CVA), unspecified mechanism (HCC)     Past Medical History:   Diagnosis Date   • Atrial fibrillation (HCC)    • CHF (congestive heart failure) (HCC)    • COPD (chronic obstructive pulmonary disease) (HCC)    • Coronary artery disease    • Hypertension    • Tobacco abuse      Past Surgical History:   Procedure Laterality Date   • ABDOMINAL AORTIC ANEURYSM REPAIR     • CARDIAC CATHETERIZATION N/A 10/03/2019    Procedure: Left Heart Cath;  Surgeon: Vincent Phillips MD;  Location: [facility] INVASIVE LOCATION;  Service: Cardiology       IRF OT ASSESSMENT FLOWSHEET (last 12 hours)     IRF OT Evaluation and Treatment     Row Name 22 1035          OT Time and Intention    Document Type daily treatment;discharge evaluation  -TM     Mode of Treatment occupational therapy  -TM     Patient Effort good  -TM     Symptoms Noted During/After Treatment none  -TM     Row Name 22 1035          General Information    General Observations of Patient Pt agreeable for therapy. Pt discharged to skilled [facility] facility in pm for 24 hour assistance and supervision .  -TM     Existing Precautions/Restrictions fall;swallow precautions  -TM     Row Name 22 1035          Cognition/Psychosocial    Orientation Status (Cognition) oriented x 4  -TM     Follows Commands (Cognition) follows one-step commands;verbal cues/prompting  required;WFL  -TM     Row Name 09/14/22 1035          Bathing    [place] Level (Bathing) standby assist;supervision  -TM     Position (Bathing) supported sitting  -TM     Comment (Bathing) standby assist/supervision  -TM     Row Name 09/14/22 1035          Upper Body Dressing    [place] Level (Upper Body Dressing) set up assistance  -TM     Position (Upper Body Dressing) supported sitting  -TM     Row Name 09/14/22 1035          Lower Body Dressing    [place] Level (Lower Body Dressing) set up;standby assist;supervision  -TM     Position (Lower Body Dressing) supported sitting  -TM     Row Name 09/14/22 1035          Grooming    [place] Level (Grooming) set up  -TM     Position (Grooming) supported sitting  -TM     Row Name 09/14/22 1035          Toileting    [place] Level (Toileting) supervision  -TM     Assistive Device Use (Toileting) grab bar/safety frame  -TM     Position (Toileting) supported sitting  -TM     Row Name 09/14/22 1035          Self-Feeding    [place] Level (Self-Feeding) modified independence  -TM     Position (Self-Feeding) supported sitting  -TM     Row Name 09/14/22 1035          Transfers    Bed-Chair [place] (Transfers) standby assist;verbal cues  -TM     Chair-Bed [place] (Transfers) standby assist;verbal cues  -TM     Assistive Device (Bed-Chair Transfers) wheelchair  -TM     [place] Level (Toilet Transfer) standby assist  -TM     Assistive Device (Toilet Transfer) grab bars/safety frame;wheelchair  -TM     Row Name 09/14/22 1035          Chair-Bed Transfer    Assistive Device (Chair-Bed Transfers) wheelchair  -TM     Row Name 09/14/22 1035          Motor Skills    Motor Skills coordination;functional endurance;motor control/coordination interventions  -TM     Motor Control/Coordination Interventions therapeutic exercise/ROM;fine motor manipulation/dexterity activities;gross motor coordination activities;other (see comments)  BUE ther ex/act,  [facility]  -TM     Row Name 09/14/22 1035          UB Dressing Goal 1 (OT-IRF)    Progress/Outcomes (UB Dressing Goal 1, OT-IRF) goal met  -TM     Row Name 09/14/22 1035          LB Dressing Goal 1 (OT-IRF)    Progress/Outcomes (LB Dressing Goal 1, OT-IRF) goal met  -TM     Row Name 09/14/22 1035          LB Dressing Goal 2 (OT-IRF)    Progress/Outcomes (LB Dressing Goal 2, OT-IRF) goal met  -TM     Row Name 09/14/22 1035          Toileting Goal 1 (OT-IRF)    Progress/Outcomes (Toileting Goal 1, OT-IRF) goal met  -TM     Row Name 09/14/22 1035          Toileting Goal 2 (OT-IRF)    Progress/Outcomes (Toileting Goal 2, OT-IRF) goal met  -           User Key  (r) = Recorded By, (t) = Taken By, (c) = Cosigned By    Initials Name Effective Dates     Sade Galvan OT 06/16/21 -                    Occupational Therapy Education                 Title: PT OT SLP Therapies (Resolved)     Topic: Occupational Therapy (Resolved)     Point: ADL training (Resolved)     Description:   Instruct learner(s) on proper safety adaptation and remediation techniques during self care or transfers.   Instruct in proper use of assistive devices.              Learning Progress Summary           Patient Acceptance, E,D, VU,NR by  at 9/14/2022 1035      Show all documentation for this point (5)                 Point: Precautions (Resolved)     Description:   Instruct learner(s) on prescribed precautions during self-care and functional transfers.              Learning Progress Summary           Patient Acceptance, E,D, VU,NR by  at 9/14/2022 1035      Show all documentation for this point (5)                             User Key     Initials Effective Dates Name Provider Type Discipline     06/16/21 -  Sade Galvan OT Occupational Therapist OT                OT Recommendation and Plan  Planned Therapy Interventions (OT): activity tolerance training, adaptive equipment training, BADL retraining, IADL retraining,  occupation/activity based interventions, patient/caregiver education/training, ROM/therapeutic exercise, transfer/mobility retraining, strengthening exercise           OT IRF GOALS     Row Name 09/14/22 1035 09/10/22 0814 09/08/22 1053       UB Dressing Goal 1 (OT-IRF)    [place] (UB Dress Goal 1, OT-IRF) -- -- set-up required  -TM    Time Frame (UB Dressing Goal 1, OT-IRF) -- -- short-term goal (STG)  -TM    Progress/Outcomes (UB Dressing Goal 1, OT-IRF) goal met  -TM -- --       LB Dressing Goal 1 (OT-IRF)    Activity/Device (LB Dressing Goal 1, OT-IRF) -- lower body dressing  pants only this date- Setup  -HB --    [place] (LB Dressing Goal 1, OT-IRF) -- contact guard required  -HB contact guard required  -TM    Time Frame (LB Dressing Goal 1, OT-IRF) -- short-term goal (STG)  -HB short-term goal (STG)  -TM    Progress/Outcomes (LB Dressing Goal 1, OT-IRF) goal met  -TM good progress toward goal  -HB --       LB Dressing Goal 2 (OT-IRF)    [place] (LB Dressing Goal 2, OT-IRF) -- -- set-up required;supervision required  -TM    Time Frame (LB Dressing Goal 2, OT-IRF) -- -- long-term goal (LTG);by discharge  -TM    Progress/Outcomes (LB Dressing Goal 2, OT-IRF) goal met  -TM -- --       Toileting Goal 1 (OT-IRF)    Activity/Device (Toileting Goal 1, OT-IRF) -- toileting skills, all  -HB --    [place] Level (Toileting Goal 1, OT-IRF) -- contact guard required  -HB contact guard required  -TM    Progress/Outcomes (Toileting Goal 1, OT-IRF) goal met  -TM goal met  -HB --    Time Frame (Toileting Goal 1, OT-IRF) -- -- short-term goal (STG)  -TM       Toileting Goal 2 (OT-IRF)    [place] Level (Toileting Goal 2, OT-IRF) -- -- set-up required;supervision required  -TM    Progress/Outcomes (Toileting Goal 2, OT-IRF) goal met  -TM -- --    Time Frame (Toileting Goal 2, OT-IRF) -- -- long-term goal (LTG);by discharge  -TM          User Key  (r) = Recorded By, (t) = Taken By, (c) = Cosigned By     Initials Name Provider Type    TM Sade Galvan, OT Occupational Therapist     Nika Sierra OT Occupational Therapist                    Time Calculation:    Time Calculation- OT     Row Name 09/14/22 1322 09/14/22 1034          Time Calculation- OT    OT Start Time 1225  -TM 1000  -TM     OT Stop Time 1310  -TM 1045  -TM     OT Time Calculation (min) 45 min  -TM 45 min  -TM     Total Timed Code Minutes- OT 45 minute(s)  -TM 45 minute(s)  -TM     OT Non-Billable Time (min) -- 15 min  -TM           User Key  (r) = Recorded By, (t) = Taken By, (c) = Cosigned By    Initials Name Provider Type    TM Sade Galvan, OT Occupational Therapist                Therapy Charges for Today     Code Description Service Date Service Provider Modifiers Qty    51171683222 HC OT THER PROC EA 15 MIN 9/13/2022 Sade Galvan OT GO 2    49337730011 HC OT THERAPEUTIC ACT EA 15 MIN 9/13/2022 Sade Galvan, OT GO 3    79077833878 HC OT SELF CARE/MGMT/TRAIN EA 15 MIN 9/13/2022 Sade Galvan, OT GO 1    62780863834 HC OT SELF CARE/MGMT/TRAIN EA 15 MIN 9/14/2022 Sade Galvan, OT GO 1    77294842106 HC OT THERAPEUTIC ACT EA 15 MIN 9/14/2022 Sade Galvan, OT GO 2    65299585284 HC OT SELF CARE/MGMT/TRAIN EA 15 MIN 9/14/2022 Sade Galvan, OT GO 1    39602619400 HC OT THERAPEUTIC ACT EA 15 MIN 9/14/2022 Sade Galvan OT GO 1    46677621882 HC OT THER PROC EA 15 MIN 9/14/2022 Sade Galvan OT GO 1               OT Discharge Summary  Reason for Discharge: Discharge from facility  Outcomes Achieved: Refer to plan of care for updates on goals achieved  Discharge Destination: SNF    Sade Galvan OT  9/14/2022

## 2022-09-14 NOTE — PROGRESS NOTES
Case Management  Inpatient Rehabilitation Team Conference    Conference Date/Time: 9/13/2022 6:39:57 AM    Team Conference Attendees:  JOSÉ MIGUEL Montalvo RN,   AYAN Vázquez, PT  Sade Galvan, OT  Esme Hernandez, SLP   Dr. Deven Chowdary MD    Demographics            Age: 71Y            Gender: Male    Admission Date: 9/7/2022 2:53:00 PM  Rehabilitation Diagnosis:  right CVA  Comorbidities:      Plan of Care  Anticipated Discharge Date/Estimated Length of Stay: 14 days  Anticipated Discharge Destination: Community discharge with assistance  Discharge Plan : Pt plans to return home alone at discharge if possible.  Pt may  be able hire a caregiver if needed.  Medical Necessity Expected Level Rationale: good  Intensity and Duration: an average of 3 hours/5 days per week  Medical Supervision and 24 Hour Rehab Nursing: x  Physical Therapy: x  PT Intensity/Duration: PT 1-1.5 hours per day/5 days per week  Occupational Therapy: x  OT Intensity/Duration: OT 1-1.5 hours per day/5 days per week  Speech and Language Therapy: x  SLP Intensity/Duration: SLP 30 mins-90mins per day/5 days per week  Social Work: x  Therapeutic Recreation: x  Updated (if changes indicated)    Anticipated Discharge Date/Estimated Length of Stay:   Pending      Discharge Plan of Care:    Based on the patient's medical and functional status, their prognosis and  expected level of functional improvement is: good      Interdisciplinary Problem/Goals/Status  Mobility    [PT] Bed/Chair/Wheelchair(Active)  Current Status(09/08/2022): CGA  Weekly Goal(09/15/2022): I  Discharge Goal: I    [PT] Walk(Active)  Current Status(09/08/2022): 80' w/ CGA  Weekly Goal(09/15/2022): 320' w/ SBA  Discharge Goal: 320' w/ SBA    [PT] Bed Mobility(Active)  Current Status(09/08/2022): CGA  Weekly Goal(09/15/2022): I  Discharge Goal: I        Pain    [RN] Pain Management(Active)  Current Status(09/07/2022): potential for increased  pain  Weekly Goal(09/23/2022): pain managed  Discharge Goal: pain managed        Safety    [RN] Potential for Injury(Active)  Current Status(09/07/2022): potential for falls  Weekly Goal(09/23/2022): no falls  Discharge Goal: no falls        Self Care    [OT] Dressing (Lower)(Active)  Current Status(09/12/2022): setup/SBA/supervision  Weekly Goal(09/13/2022): SBA/Martha  Discharge Goal: SBA/Martha        Swallow Function    [ST] Swallowing(Active)  Current Status(09/13/2022): Nectar thick liquids  Weekly Goal(09/19/2022): Resistive breather  Discharge Goal: Least restrictive po diet    Comments: Pt's home environment is not safe for him to return to at discharge.  Pt does not have any family to stay with at discharge.   Pt is recommended for  short-term SNF until he can get into a different home.    Signed by: JOSÉ MIGUEL Montalvo    Physician CoSigned By: Deven Chowdary 09/14/2022 07:56:48

## 2022-09-14 NOTE — PROGRESS NOTES
Occupational Therapy:    Physical Therapy: Individual: 60 minutes.    Speech Language Pathology:    Signed by: Lindsey Mendoza PTA

## 2022-09-14 NOTE — PLAN OF CARE
Goal Outcome Evaluation:  Plan of Care Reviewed With: patient           Outcome Evaluation: Patient being discharged to SNF for short term rehab.

## 2022-09-14 NOTE — SIGNIFICANT NOTE
09/14/22 1030   Plan   Plan Gail with [facility] says pt will need COVID test.  MD aware.

## 2022-09-14 NOTE — PROGRESS NOTES
Occupational Therapy:    Physical Therapy:    Speech Language Pathology: Individual: 40 minutes.    Signed by: Pauline Kamara, Supervisor

## 2022-09-14 NOTE — THERAPY TREATMENT NOTE
Inpatient Rehabilitation - Speech Language Pathology   [facility]   DYSPHAGIA THERAPY TREATMENT NOTE     Patient Name: Zaid Galarza  : 1950  MRN: [id number]  Today's Date: 2022             Admit Date: 2022    Zaid Galarza is seen in speech therapy office this am for formal dysphagia tx. He is a/a, upright and centered in a wheelchair, cooperative to participate.      Long Term Goal:  Pt will accept least restrictive diet tolerance w/o overt s/s aspiration.      Short Term Goals:     1. Pt will perform resistive breathing exercises x3 sets x5 reps w/resistance of 1-6 to improve respiratory support and control.   *x1 sets x15 reps w/ resistance level 4/4, min cues.   *x2 sets x10 reps w/ resistance level 4/4, min cues.      4. Pt will perform laryngeal adduction/elevation exercises x3 sets x10 reps w/ min cues.   *x2 sets x5 reps w/ mean length of phonation of 12.45 seconds. Mild fatigue effects noted.    5. Pt will perform CTAR/Shaker exercises sustained x3 sets x5 reps for 30+ seconds over 3 consecutive sessions.   *x2 sets x3 reps of sustained CTAR exercises for 30 seconds.       6. Pt will perform CTAR/Shaker exercises repetitive x3 sets x12 reps w/ mod cues.   *CTAR: x2 sets x20 reps, min cues.     7. NEW GOAL: Pt will perform Trang maneuver x5 - 8 trials w/ min cues.   *x9 maneuvers performed w/ mirror to provide visual feedback.  Able to generally perform x2 manuevers consecutively.      8. Pt will perform compensatory techniques during meals w/ min cues.  *He accepts ice chips intermittently across this session w/o s/s concerning for aspiration.  *Per RN, pt instructed RN in his use of chin tuck w/ thin liquids.        9. Pt will participate in instrumental re-evaluation of swallowing fnx in 7-10 days, pending progress towards this poc.  *Most recent MBS performed . Diet advancement to thin liquids w/ chin tuck compensatory strategy.     Visit Dx:   No  diagnosis found.  Patient Active Problem List   Diagnosis   • Atrial flutter (HCC)   • Dilated cardiomyopathy (HCC)   • Nonobstructive CAD per cath 10/3/2019   • Anxiety disorder   • Essential hypertension   • Tobacco use   • History of abdominal aortic aneurysm (AAA) repair   • Dyslipidemia, goal LDL below 100   • CVA (cerebral vascular accident) (HCC)   • Cerebrovascular accident (CVA), unspecified mechanism (HCC)     Past Medical History:   Diagnosis Date   • Atrial fibrillation (HCC)    • CHF (congestive heart failure) (HCC)    • COPD (chronic obstructive pulmonary disease) (HCC)    • Coronary artery disease    • Hypertension    • Tobacco abuse      Past Surgical History:   Procedure Laterality Date   • ABDOMINAL AORTIC ANEURYSM REPAIR     • CARDIAC CATHETERIZATION N/A 10/03/2019    Procedure: Left Heart Cath;  Surgeon: Vincent Phillips MD;  Location: [facility] CATH INVASIVE LOCATION;  Service: Cardiology       SLP Recommendation and Plan  Continue tx as tolerated per POC.     Thank you-  Esme Hernandez MS, CCC-SLP    EDUCATION  The patient has been educated in the following areas:   Dysphagia (Swallowing Impairment) Oral Care/Hydration Modified Diet Instruction.     Time Calculation:    Time Calculation- SLP     Row Name 09/14/22 1004             Time Calculation- SLP    SLP Start Time 0915  -      SLP Stop Time 1000  -      SLP Time Calculation (min) 45 min  -      SLP - Next Appointment 09/15/22  -            User Key  (r) = Recorded By, (t) = Taken By, (c) = Cosigned By    Initials Name Provider Type    Esme Bosch MS CCC-SLP Speech and Language Pathologist              Therapy Charges for Today     Code Description Service Date Service Provider Modifiers Qty    79556139908 HC ST MOTION FLUORO EVAL SWALLOW 3 9/13/2022 Esme Hernandez MS CCC-SLP GN 1    03697682547 HC ST TREATMENT SWALLOW 3 9/14/2022 Esme Hernandez MS CCC-SLP GN 1          Esme Hernandez MS CCC-RELL  9/14/2022

## 2022-09-14 NOTE — PROGRESS NOTES
Patient Assessment Instrument  Quality Indicators - Discharge    Section GG. Self-Care Performance      Section GG. Mobility Performance      Section J. Health Conditions Discharge      Section M. Skin Conditions Discharge  Unhealed Pressure Ulcer(s)/Injurie(s) at Stage 1 or Higher:  No    . Current Number of Unhealed Pressure Ulcers      Section N. Medication    Signed by: Rupa Gonzáles Nurse

## 2022-09-14 NOTE — PROGRESS NOTES
Patient Assessment Instrument  Quality Indicators - Discharge    Section GG. Self-Care Performance      Section GG. Mobility Performance     Roll Left and Right: Patient completed the activities by him/herself with no  assistance from a helper.   Sit to Lying: Patient completed the activities by him/herself with no  assistance from a helper.   Lying to Sitting on Side of Bed: Patient completed the activities by  him/herself with no assistance from a helper.   Sit to Stand: Pillager provides verbal cues and/or touching/steadying and/or  contact guard assistance as patient completes activity. Assistance may be  provided throughout the activity or intermittently.   Chair/Bed to Chair Transfer: Pillager provides verbal cues and/or  touching/steadying and/or contact guard assistance as patient completes  activity. Assistance may be provided throughout the activity or intermittently.   Toilet Transfer Pillager provides verbal cues and/or touching/steadying and/or  contact guard assistance as patient completes activity. Assistance may be  provided throughout the activity or intermittently.   Car Transfer: Not attempted due to medical or safety concerns.   Walk 10 Feet:   Pillager provides verbal cues and/or touching/steadying and/or  contact guard assistance as patient completes activity. Assistance may be  provided throughout the activity or intermittently.  Walk 50 Feet with 2 Turns:   Pillager provides verbal cues and/or  touching/steadying and/or contact guard assistance as patient completes  activity. Assistance may be provided throughout the activity or intermittently.  Walk 150 Feet:   Pillager provides verbal cues and/or touching/steadying and/or  contact guard assistance as patient completes activity. Assistance may be  provided throughout the activity or intermittently.  Walking 10 Feet on Uneven Surfaces:   Not attempted due to medical or safety  concerns.  1 Step Over Curb or Up/Down Stair:   Pillager provides verbal cues  and/or  touching/steadying and/or contact guard assistance as patient completes  activity. Assistance may be provided throughout the activity or intermittently.  4 Steps Up and Down, With/Without Rail:   Wiggins provides verbal cues and/or  touching/steadying and/or contact guard assistance as patient completes  activity. Assistance may be provided throughout the activity or intermittently.  12 Steps Up and Down, With/Without Rail:   Wiggins provides verbal cues and/or  touching/steadying and/or contact guard assistance as patient completes  activity. Assistance may be provided throughout the activity or intermittently.  Picking up an Object:   Not attempted due to medical or safety concerns. Uses  Wheelchair and/or Scooter: Yes  Wheel 50 Feet with Two Turns: Patient completed the activities by him/herself  with no assistance from a helper.  Type of Wheelchair or Scooter Used: Manual  Wheel 150 Feet: Patient completed the activities by him/herself with no  assistance from a helper.  Type of Wheelchair or Scooter Used: Manual    Section J. Health Conditions Discharge      Section M. Skin Conditions Discharge      . Current Number of Unhealed Pressure Ulcers      Section N. Medication    Signed by: Lindsey Mendoza PTA

## 2022-09-15 NOTE — PROGRESS NOTES
PPS CMG Coordinator  Inpatient Rehabilitation Discharge    Mode of Locomotion: Walking.    Discharge Against Medical Advice:  No.  Discharge Information  Patient Discharged Alive:  Yes  Discharge Destination/Living Setting: Skilled Nursing Facility  Diagnosis for Interruption/Death:    Impairment Group: Stroke: 01.1 Left Body Involvement (Right Brain)    Comorbidities: Rank Code      Description      1    G81.94    Hemiplegia, unspecified affecting left                 nondominant side  2    I50.30    Unspecified diastolic (congestive) heart failure  3    I11.0     Hypertensive heart disease with heart failure  4    J44.9     Chronic obstructive pulmonary disease,                 unspecified  5    I48.20    Chronic atrial fibrillation, unspecified  6    F17.210   Nicotine dependence, cigarettes, uncomplicated  7    I25.10    Atherosclerotic heart disease of native                 coronary artery without angina pectoris  8    Z79.01    Long term (current) use of anticoagulants  9    Z79.82    Long term (current) use of aspirin  10   Z79.52    Long term (current) use of systemic steroids    Complications:      MAYNOR Bladder Accidents: 0  - Accidents.  Bladder Score = 6. Patient has not had an accident, but uses a  device/medication. urinal  MAYNOR Bowel Accident: 0  - Accidents.  Bowel Score = 7. Patient has no accidents.    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 Positive serology for Lupus,  neuro following. s/p Immunoglobulin infusion.  Appreciate Toxicology consult for cadmium level-per them no evidence of heavy metal exposure by imaging and no other symptoms, does not require chelation.   MRI lumbar, C spine show no cord compression.    Continue with Lyrica- renally dosed.   PT eval- recommend JESSICA placement.   Continue to hold Plaquenil.   Per neuro no further IVIG at this time.    - sural bx consistent with vasculitic neuropathy - per rheum, c/w prednisone 50mg daily - no pulse steriods at this time  Neurology consult in am

## 2022-09-22 ENCOUNTER — TELEPHONE (OUTPATIENT)
Dept: CARDIOLOGY | Facility: CLINIC | Age: 72
End: 2022-09-22

## 2022-09-30 NOTE — TELEPHONE ENCOUNTER
We received a note from [employer] that Mr. Galarza had filed a adverse reaction to Entresto.    I called the patient and spoke to him regarding this.  He denies ever calling [employer] to report adverse reaction.  He is currently taking max dose of Entresto without any problem.

## 2022-10-06 ENCOUNTER — OFFICE VISIT (OUTPATIENT)
Dept: CARDIOLOGY | Facility: CLINIC | Age: 72
End: 2022-10-06

## 2022-10-06 ENCOUNTER — TREATMENT (OUTPATIENT)
Dept: CARDIOLOGY | Facility: CLINIC | Age: 72
End: 2022-10-06

## 2022-10-06 VITALS
OXYGEN SATURATION: 98 % | HEART RATE: 89 BPM | HEIGHT: 72 IN | SYSTOLIC BLOOD PRESSURE: 139 MMHG | BODY MASS INDEX: 28.04 KG/M2 | WEIGHT: 207 LBS | DIASTOLIC BLOOD PRESSURE: 82 MMHG

## 2022-10-06 DIAGNOSIS — I48.3 TYPICAL ATRIAL FLUTTER: Chronic | ICD-10-CM

## 2022-10-06 DIAGNOSIS — I48.3 TYPICAL ATRIAL FLUTTER: ICD-10-CM

## 2022-10-06 DIAGNOSIS — I10 ESSENTIAL HYPERTENSION: Chronic | ICD-10-CM

## 2022-10-06 DIAGNOSIS — R00.1 BRADYCARDIA, SINUS: ICD-10-CM

## 2022-10-06 DIAGNOSIS — E78.5 DYSLIPIDEMIA, GOAL LDL BELOW 100: Chronic | ICD-10-CM

## 2022-10-06 DIAGNOSIS — I25.10 CORONARY ARTERY DISEASE INVOLVING NATIVE CORONARY ARTERY OF NATIVE HEART WITHOUT ANGINA PECTORIS: Primary | Chronic | ICD-10-CM

## 2022-10-06 DIAGNOSIS — R42 DIZZINESS: ICD-10-CM

## 2022-10-06 DIAGNOSIS — I42.0 DILATED CARDIOMYOPATHY: Chronic | ICD-10-CM

## 2022-10-06 PROCEDURE — 93228 REMOTE 30 DAY ECG REV/REPORT: CPT | Performed by: NURSE PRACTITIONER

## 2022-10-06 PROCEDURE — 99214 OFFICE O/P EST MOD 30 MIN: CPT | Performed by: NURSE PRACTITIONER

## 2022-10-06 RX ORDER — ONDANSETRON 4 MG/1
4 TABLET, ORALLY DISINTEGRATING ORAL EVERY 8 HOURS PRN
COMMUNITY
End: 2023-02-08

## 2022-10-06 RX ORDER — LORATADINE 10 MG/1
10 CAPSULE, LIQUID FILLED ORAL ONCE
COMMUNITY
End: 2023-02-08

## 2022-10-06 NOTE — PROGRESS NOTES
"Chief Complaint  Coronary Artery Disease (Patient states doing better since stroke , fatigue, shortness of breath , cough chest tightness )    Subjective          Zaid Galarza presents to [facility] CARDIOLOG[facility] for hospital follow up.    History of Present Illness    Mr. Galarza was last seen during his hospitalization for a CVA in early September.  He was discharged from the hospital to inpatient physical rehab and is now at [facility] [place].  He presents today for follow-up.    At today's visit Zaid states that he has been doing well at rehab.  He denies any chest pain or palpitations.  He denies any syncope or near syncopal event.  He denies PND and orthopnea.  He does report a cough and some associated shortness of breath.    He tells me that he is getting stronger but is still having some difficulty with his left side.    Objective     Vital Signs:   /82 (BP Location: Left arm, Patient Position: Sitting, Cuff Size: Large Adult)   Pulse 89   Ht 182.9 cm (72\")   Wt 93.9 kg (207 lb)   SpO2 98%   BMI 28.07 kg/m²       Physical Exam  Vitals reviewed.   Constitutional:       Appearance: Normal appearance. He is well-developed.   Cardiovascular:      Rate and Rhythm: Normal rate and regular rhythm.      Heart sounds: No murmur heard.    No friction rub. No gallop.   Pulmonary:      Effort: Pulmonary effort is normal. No respiratory distress.      Breath sounds: Normal breath sounds. No wheezing or rales.   Skin:     General: Skin is warm and dry.   Neurological:      Mental Status: He is alert and oriented to person, place, and time.   Psychiatric:         Mood and Affect: Mood normal.         Behavior: Behavior normal.          Result Review :{ Labs  Result Review  Imaging  Med Tab  Media :23}     CMP    CMP 9/8/22 9/12/22 9/14/22   Glucose 114 (A) 109 (A) 145 (A)   BUN 29 (A) 28 (A) 30 (A)   Creatinine 1.22 1.12 1.21   Sodium 135 (A) 139 137   Potassium 4.2 4.5 4.1   Chloride 101 " 105 104   Calcium 8.9 9.2 8.6   (A) Abnormal value       Comments are available for some flowsheets but are not being displayed.           CBC    CBC 9/8/22 9/12/22 9/14/22   WBC 12.82 (A) 12.82 (A) 13.37 (A)   RBC 4.91 4.71 4.44   Hemoglobin 14.3 13.7 13.0   Hematocrit 44.2 42.3 40.3   MCV 90.0 89.8 90.8   MCH 29.1 29.1 29.3   MCHC 32.4 32.4 32.3   RDW 14.5 14.7 14.8   Platelets 146 158 159   (A) Abnormal value            Lipid Panel    Lipid Panel 9/2/22   Total Cholesterol 104   Triglycerides 89   HDL Cholesterol 31 (A)   VLDL Cholesterol 18   LDL Cholesterol  55   LDL/HDL Ratio 1.78   (A) Abnormal value                   9/2/2022 transthoracic echocardiogram    interpretation Summary    • Estimated left ventricular EF = 55% Left ventricular ejection fraction appears to be 51 - 55%. Left ventricular systolic function is normal.  • Left ventricular diastolic function was normal.  • No significant mitral or aortic valve regurgitation. Moderate sclerosis of both is noted.  • There is anterior pericardial fat pad but no effusion  • Left atrium is mildly enlarged  • Since prev study of 9/29/19 EF has increased from 30% to 55% and mitral, aortic and tricuspid regurgitation is no longer seen.      9/2/2022 bilateral carotid ultrasound  Study Result    Narrative & Impression   EXAM:    US Duplex Bilateral Extracranial Arteries     CLINICAL HISTORY:    Plaque, suspected CVA; I63.9-Cerebral infarction, unspecified;  R53.1-Weakness     TECHNIQUE:    Real-time duplex ultrasound scan of the extracranial arteries  integrating B-mode two-dimensional vascular structure, Doppler spectral  analysis and color flow Doppler imaging.     COMPARISON:    No relevant prior studies available.     FINDINGS:    RIGHT COMMON CAROTID ARTERY:  Unremarkable.  No occlusion or  significant stenosis on color flow and spectral Doppler imaging.    RIGHT INTERNAL CAROTID ARTERY:  Unremarkable.  No occlusion or  significant stenosis on color flow and  spectral Doppler imaging.    RIGHT EXTERNAL CAROTID ARTERY:  Unremarkable.  No occlusion or  significant stenosis on color flow and spectral Doppler imaging.    RIGHT VERTEBRAL ARTERY:  Unremarkable.  Antegrade flow.    RIGHT ICA/CCA RATIO:  Unremarkable.  Within normal limits.       LEFT COMMON CAROTID ARTERY:  Unremarkable.  No occlusion or  significant stenosis on color flow and spectral Doppler imaging.    LEFT INTERNAL CAROTID ARTERY:  Unremarkable.  No occlusion or  significant stenosis on color flow and spectral Doppler imaging.    LEFT EXTERNAL CAROTID ARTERY:  Unremarkable.  No occlusion or  significant stenosis on color flow and spectral Doppler imaging.    LEFT VERTEBRAL ARTERY:  Unremarkable.  Antegrade flow.    LEFT ICA/CCA RATIO:  Unremarkable.  Within normal limits.       LYMPH NODES:  Unremarkable.  No lymphadenopathy.      CAROTID STENOSIS REFERENCE USING SRU CRITERIA:    Mild - <50% stenosis. ICA PSV is less than 125 cm/second and plaque or  intimal thickening is visible.    Moderate - 50-69% stenosis. ICA PSV is 125 to 230 cm/second and plaque  is visible.    Severe - 70-94% stenosis. ICA PSV is more than 230 cm/second and  visible plaque with lumen narrowing is seen.    Near occlusion - 95-99% stenosis. ICA PSV is variable and significant  plaque with luminal narrowing is seen.    Occluded - 100% stenosis. No flow identified.     IMPRESSION:    No acute findings in the arteries of the neck.         Current Outpatient Medications   Medication Sig Dispense Refill   • apixaban (ELIQUIS) 5 MG tablet tablet Take 1 tablet by mouth Every 12 (Twelve) Hours. Indications: DVT/PE (active thrombosis) 180 tablet 3   • aspirin (aspirin) 81 MG EC tablet Take 1 tablet by mouth Daily. 90 tablet 3   • atorvastatin (LIPITOR) 80 MG tablet Take 1 tablet by mouth Every Night. 90 tablet    • Loratadine 10 MG capsule Take 10 mg by mouth 1 (One) Time.     • ondansetron ODT (ZOFRAN-ODT) 4 MG disintegrating tablet Place 4  mg on the tongue Every 8 (Eight) Hours As Needed for Nausea or Vomiting.     • predniSONE (DELTASONE) 10 MG tablet Take 10 mg by mouth Daily.     • sacubitril-valsartan (ENTRESTO)  MG tablet Take 1 tablet by mouth 2 (Two) Times a Day. 180 tablet 3   • sertraline (ZOLOFT) 100 MG tablet Take 1.5 tablets by mouth Daily.       No current facility-administered medications for this visit.            Assessment and Plan    Problem List Items Addressed This Visit        Cardiac and Vasculature    Dilated cardiomyopathy (HCC) (Chronic)    Overview     · TTE: LVEF 26 to 30%, RV and LV mildly dilated, grade 3 diastolic dysfunction consistent with fixed restrictive pattern, mild TR, left atrial cavity mildly dilated right atrial cavity moderately dilated, moderate pulmonary hypertension with RVSP 47 mmHg  · 9/30/2019 ANGELITO: Severe cardiomyopathy likely from atrial flutter  · 2/11/2020 TTE LVEF 36 to 40%  · 5/6/2020 TTE LVEF 56 to 60%, mild concentric LVH  · 9/2/2022 TTE: LVEF 51 to 55%, moderate sclerosis of both the mitral and aortic valve with no significant regurgitation noted.  Left atrium is mildly enlarged.         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    Non obstructive CAD is stable.  Continue aspirin, atorvastatin.    With regard to dilated cardiomyopathy, continue Entresto.    Atrial flutter is stable.  Continue Eliquis for stroke prophylaxis    Hypertension is stable.    Return in about 6 months (around 4/6/2023).    Patient was given  instructions and counseling regarding his condition or for health maintenance advice. Please see specific information pulled into the AVS if appropriate.

## 2022-12-08 ENCOUNTER — OFFICE VISIT (OUTPATIENT)
Dept: CARDIOLOGY | Facility: CLINIC | Age: 72
End: 2022-12-08

## 2022-12-08 VITALS
BODY MASS INDEX: 32.67 KG/M2 | HEIGHT: 72 IN | WEIGHT: 241.2 LBS | HEART RATE: 96 BPM | DIASTOLIC BLOOD PRESSURE: 77 MMHG | SYSTOLIC BLOOD PRESSURE: 107 MMHG | OXYGEN SATURATION: 96 %

## 2022-12-08 DIAGNOSIS — E78.5 DYSLIPIDEMIA, GOAL LDL BELOW 100: Chronic | ICD-10-CM

## 2022-12-08 DIAGNOSIS — R09.89 DECREASED PULSES IN FEET: ICD-10-CM

## 2022-12-08 DIAGNOSIS — I48.19 PERSISTENT ATRIAL FIBRILLATION: Primary | ICD-10-CM

## 2022-12-08 DIAGNOSIS — I10 ESSENTIAL HYPERTENSION: Chronic | ICD-10-CM

## 2022-12-08 DIAGNOSIS — I25.10 CORONARY ARTERY DISEASE INVOLVING NATIVE CORONARY ARTERY OF NATIVE HEART WITHOUT ANGINA PECTORIS: Chronic | ICD-10-CM

## 2022-12-08 DIAGNOSIS — I42.0 DILATED CARDIOMYOPATHY: Chronic | ICD-10-CM

## 2022-12-08 DIAGNOSIS — Z98.890 HISTORY OF ABDOMINAL AORTIC ANEURYSM (AAA) REPAIR: Chronic | ICD-10-CM

## 2022-12-08 PROCEDURE — 99214 OFFICE O/P EST MOD 30 MIN: CPT | Performed by: NURSE PRACTITIONER

## 2022-12-08 PROCEDURE — 93000 ELECTROCARDIOGRAM COMPLETE: CPT | Performed by: NURSE PRACTITIONER

## 2022-12-08 RX ORDER — NICOTINE 21 MG/24HR
1 PATCH, TRANSDERMAL 24 HOURS TRANSDERMAL EVERY 24 HOURS
COMMUNITY
End: 2023-02-08

## 2022-12-08 RX ORDER — SACUBITRIL AND VALSARTAN 49; 51 MG/1; MG/1
1 TABLET, FILM COATED ORAL 2 TIMES DAILY
Qty: 180 TABLET | Refills: 3 | Status: SHIPPED | OUTPATIENT
Start: 2022-12-08 | End: 2023-01-09 | Stop reason: SDUPTHER

## 2022-12-08 RX ORDER — BUDESONIDE AND FORMOTEROL FUMARATE DIHYDRATE 160; 4.5 UG/1; UG/1
2 AEROSOL RESPIRATORY (INHALATION)
COMMUNITY
Start: 2022-12-07

## 2022-12-08 RX ORDER — SACUBITRIL AND VALSARTAN 49; 51 MG/1; MG/1
1 TABLET, FILM COATED ORAL 2 TIMES DAILY
Qty: 56 TABLET | Refills: 0 | COMMUNITY
Start: 2022-12-08 | End: 2023-01-09 | Stop reason: SDUPTHER

## 2022-12-08 NOTE — PROGRESS NOTES
"Chief Complaint  Cardiomyopathy (F/U), Nonobstuctive CAD (F/U), Follow-up (SOA on exertion, Pt. Complains of blue/blk toes and mild swelling in the feet), and Med Management (Pt brought med list, concerned if he needs to take Prednisone)    Subjective          Zaid Galarza presents to [facility]ARDIOLOG[facility] for follow up.    History of Present Illness    Zaid was last seen in clinic on 10/6/2022.  He had a CVA in early September and was staying at [facility].    Zaid is accompanied by his sister, Susan Chaparro for today's visit.  He is now living in an apartment by himself.  He denies any chest pain or palpitations.  He does state that he can tell that his heart rate is \"irregular\".  He reports that he is getting stronger and recovering from his CVA every day.  He is using a walker.  He complains of cold feet and \"black toes\".  He has an appointment with podiatry on Monday.    Objective     Vital Signs:   /77   Pulse 96   Ht 182.9 cm (72\")   Wt 109 kg (241 lb 3.2 oz)   SpO2 96%   BMI 32.71 kg/m²       Physical Exam  Vitals reviewed.   Constitutional:       Appearance: Normal appearance. He is well-developed.   Cardiovascular:      Rate and Rhythm: Normal rate and regular rhythm.      Pulses:           Dorsalis pedis pulses are 1+ on the right side and 1+ on the left side.        Posterior tibial pulses are 1+ on the right side and 0 on the left side.      Heart sounds: No murmur heard.    No friction rub. No gallop.   Pulmonary:      Effort: Pulmonary effort is normal. No respiratory distress.      Breath sounds: Normal breath sounds. No wheezing or rales.   Musculoskeletal:        Feet:    Feet:      Comments: Right great toenail has central black area.  There is, what appears to be bruising on the inside of his third right toe.  Both of his feet are  Skin:     General: Skin is warm and dry.   Neurological:      Mental Status: He is alert and oriented to person, place, " and time.   Psychiatric:         Mood and Affect: Mood normal.         Behavior: Behavior normal.          Result Review :            ECG 12 Lead    Date/Time: 12/8/2022 3:57 PM  Performed by: Christy Malone APRN  Authorized by: Christy Malone APRN   Comparison: compared with previous ECG from 9/5/2022  Similar to previous ECG  Rhythm: atrial fibrillation  BPM: 81    Clinical impression: abnormal EKG  Comments:              Current Outpatient Medications   Medication Sig Dispense Refill   • apixaban (ELIQUIS) 5 MG tablet tablet Take 1 tablet by mouth Every 12 (Twelve) Hours. Indications: DVT/PE (active thrombosis) 180 tablet 3   • aspirin (aspirin) 81 MG EC tablet Take 1 tablet by mouth Daily. 90 tablet 3   • atorvastatin (LIPITOR) 80 MG tablet Take 1 tablet by mouth Every Night. 90 tablet    • Cyanocobalamin (VITAMIN B-12 CR PO) Take  by mouth.     • Loratadine 10 MG capsule Take 10 mg by mouth 1 (One) Time.     • metoprolol tartrate (LOPRESSOR) 25 MG tablet Take 25 mg by mouth Daily. Per patient 1/2 tab daily     • nicotine (NICODERM CQ) 21 MG/24HR patch Place 1 patch on the skin as directed by provider Daily. Patient states takes 14mg     • ondansetron ODT (ZOFRAN-ODT) 4 MG disintegrating tablet Place 4 mg on the tongue Every 8 (Eight) Hours As Needed for Nausea or Vomiting.     • predniSONE (DELTASONE) 10 MG tablet Take 10 mg by mouth Daily.     • sertraline (ZOLOFT) 100 MG tablet Take 1.5 tablets by mouth Daily.     • Symbicort 160-4.5 MCG/ACT inhaler      • sacubitril-valsartan (Entresto) 49-51 MG tablet Take 1 tablet by mouth 2 (Two) Times a Day. 180 tablet 3   • sacubitril-valsartan (Entresto) 49-51 MG tablet Take 1 tablet by mouth 2 (Two) Times a Day. 56 tablet 0     No current facility-administered medications for this visit.            Assessment and Plan    Problem List Items Addressed This Visit        Cardiac and Vasculature    Dilated cardiomyopathy (HCC) (Chronic)    Overview     · TTE:  LVEF 26 to 30%, RV and LV mildly dilated, grade 3 diastolic dysfunction consistent with fixed restrictive pattern, mild TR, left atrial cavity mildly dilated right atrial cavity moderately dilated, moderate pulmonary hypertension with RVSP 47 mmHg  · 9/30/2019 ANGELITO: Severe cardiomyopathy likely from atrial flutter  · 2/11/2020 TTE LVEF 36 to 40%  · 5/6/2020 TTE LVEF 56 to 60%, mild concentric LVH  · 9/2/2022 TTE: LVEF 51 to 55%, moderate sclerosis of both the mitral and aortic valve with no significant regurgitation noted.  Left atrium is mildly enlarged.         Relevant Medications    metoprolol tartrate (LOPRESSOR) 25 MG tablet    sacubitril-valsartan (Entresto) 49-51 MG tablet    sacubitril-valsartan (Entresto) 49-51 MG tablet    Nonobstructive CAD per cath 10/3/2019 (Chronic)    Overview     · 10/4/2019 LHC: Nonobstructive coronary artery disease         Relevant Medications    metoprolol tartrate (LOPRESSOR) 25 MG tablet    sacubitril-valsartan (Entresto) 49-51 MG tablet    sacubitril-valsartan (Entresto) 49-51 MG tablet    Essential hypertension (Chronic)    Relevant Medications    metoprolol tartrate (LOPRESSOR) 25 MG tablet    History of abdominal aortic aneurysm (AAA) repair (Chronic)    Dyslipidemia, goal LDL below 100 (Chronic)    Overview     · 9/2/2022 total cholesterol 104, triglycerides 89, HDL 31, and LDL 55         Persistent atrial fibrillation (HCC) - Primary    Overview     [id number] is at least 6 for heart failure, hypertension, CVA, nonobstructive CAD, and age         Relevant Medications    metoprolol tartrate (LOPRESSOR) 25 MG tablet    sacubitril-valsartan (Entresto) 49-51 MG tablet    sacubitril-valsartan (Entresto) 49-51 MG tablet   Other Visit Diagnoses     Decreased pulses in feet        Relevant Orders    Doppler Arterial Multi Level Lower Extremity - Bilateral CAR              Follow Up     Medications were reviewed with the patient.    I have ordered an arterial Doppler due to  decreased pulses in the lower extremities.  Unable to palpate a posterior tibialis pulse.  Patient does not have any lesions at present.  His toenail appears to be possibly caused from a bruising as well as the area between his third and fourth toe.    Ischemic cardiomyopathy is stable.  Patient is to continue GDMT including Entresto.  I have decreased his dose today due to his baseline low blood pressure.    Continue Eliquis and metoprolol for atrial fibrillation.    Hypertension is overcontrolled.  Entresto decreased today.    With regard to dyslipidemia, most recent LDL was 56.  Continue atorvastatin.    Nonobstructive CAD is stable.    Return in about 3 months (around 3/8/2023).    Patient was given instructions and counseling regarding his condition or for health maintenance advice. Please see specific information pulled into the AVS if appropriate.

## 2022-12-14 DIAGNOSIS — R09.89 DECREASED PEDAL PULSES: Primary | ICD-10-CM

## 2022-12-14 DIAGNOSIS — R60.0 LOCALIZED EDEMA: ICD-10-CM

## 2023-01-09 DIAGNOSIS — I42.0 DILATED CARDIOMYOPATHY: Primary | ICD-10-CM

## 2023-01-09 RX ORDER — SACUBITRIL AND VALSARTAN 49; 51 MG/1; MG/1
1 TABLET, FILM COATED ORAL 2 TIMES DAILY
Qty: 56 TABLET | Refills: 0 | COMMUNITY
Start: 2023-01-09 | End: 2023-01-12

## 2023-01-09 RX ORDER — SACUBITRIL AND VALSARTAN 49; 51 MG/1; MG/1
1 TABLET, FILM COATED ORAL 2 TIMES DAILY
Qty: 180 TABLET | Refills: 3 | Status: SHIPPED | OUTPATIENT
Start: 2023-01-09 | End: 2023-01-12

## 2023-01-12 ENCOUNTER — TELEPHONE (OUTPATIENT)
Dept: CARDIOLOGY | Facility: CLINIC | Age: 73
End: 2023-01-12
Payer: MEDICARE

## 2023-01-12 DIAGNOSIS — I48.3 TYPICAL ATRIAL FLUTTER: Chronic | ICD-10-CM

## 2023-01-12 RX ORDER — SACUBITRIL AND VALSARTAN 97; 103 MG/1; MG/1
1 TABLET, FILM COATED ORAL 2 TIMES DAILY
Qty: 90 TABLET | Refills: 3
Start: 2023-01-12 | End: 2023-02-11 | Stop reason: HOSPADM

## 2023-01-12 NOTE — TELEPHONE ENCOUNTER
Caller: Zaid Galarza    Relationship: Self    Best call back number: [phone]    What is the best time to reach you: ANY     Who are you requesting to speak with (clinical staff, provider,  specific staff member): WOJCIECH    Do you know the name of the person who called:     What was the call regarding: PT WANTED TO DISCUSS ENTRESTO DOSAGE WITH WOJCIECH. HIS PHARMACY HAS A DIFFERENT DOSAGE ON FILE FOR THE PT.     Do you require a callback: YES

## 2023-01-12 NOTE — TELEPHONE ENCOUNTER
I have spoke with Christy in regards to patient taking Entresto 97 103. Patient is going to continue taking that mg. Also, patient is to take the low dose mg of entresto until receiving 97 - 103. Christy was made aware of this and I have informed patients sister tiffanie.    18

## 2023-01-17 ENCOUNTER — HOSPITAL ENCOUNTER (OUTPATIENT)
Dept: CARDIOLOGY | Facility: HOSPITAL | Age: 73
Discharge: HOME | End: 2023-01-17
Admitting: NURSE PRACTITIONER
Payer: MEDICARE

## 2023-01-17 DIAGNOSIS — R09.89 DECREASED PEDAL PULSES: ICD-10-CM

## 2023-01-17 DIAGNOSIS — R60.0 LOCALIZED EDEMA: ICD-10-CM

## 2023-01-17 PROCEDURE — 93970 EXTREMITY STUDY: CPT

## 2023-01-17 PROCEDURE — 93970 EXTREMITY STUDY: CPT | Performed by: RADIOLOGY

## 2023-01-19 ENCOUNTER — TELEPHONE (OUTPATIENT)
Dept: CARDIOLOGY | Facility: CLINIC | Age: 73
End: 2023-01-19
Payer: MEDICARE

## 2023-01-23 ENCOUNTER — TELEPHONE (OUTPATIENT)
Dept: CARDIOLOGY | Facility: CLINIC | Age: 73
End: 2023-01-23
Payer: MEDICARE

## 2023-01-23 NOTE — TELEPHONE ENCOUNTER
Contacted patient with normal venous doppler results per Christy's request. Also reminded patient of follow up on 02/14/23----- Message from TIFFANIE Cox sent at 1/23/2023  8:37 AM EST -----  Please call patient about their normal result.    Venous Doppler was normal.    Please ask him if he is having any further trouble with the circulation in his feet.  If he has I will order an arterial Doppler.    Thanks, Christy

## 2023-02-08 ENCOUNTER — APPOINTMENT (OUTPATIENT)
Dept: GENERAL RADIOLOGY | Facility: HOSPITAL | Age: 73
DRG: 812 | End: 2023-02-08
Payer: MEDICARE

## 2023-02-08 ENCOUNTER — HOSPITAL ENCOUNTER (INPATIENT)
Facility: HOSPITAL | Age: 73
LOS: 3 days | Discharge: HOME | DRG: 812 | End: 2023-02-11
Attending: STUDENT IN AN ORGANIZED HEALTH CARE EDUCATION/TRAINING PROGRAM | Admitting: INTERNAL MEDICINE
Payer: MEDICARE

## 2023-02-08 DIAGNOSIS — D64.9 SYMPTOMATIC ANEMIA: Primary | ICD-10-CM

## 2023-02-08 LAB
ABO GROUP BLD: NORMAL
ALBUMIN SERPL-MCNC: 3.6 G/DL (ref 3.5–5.2)
ALBUMIN/GLOB SERPL: 1.2 G/DL
ALP SERPL-CCNC: 98 U/L (ref 39–117)
ALT SERPL W P-5'-P-CCNC: 14 U/L (ref 1–41)
ANION GAP SERPL CALCULATED.3IONS-SCNC: 9.4 MMOL/L (ref 5–15)
AST SERPL-CCNC: 15 U/L (ref 1–40)
BASOPHILS # BLD AUTO: 0.06 10*3/MM3 (ref 0–0.2)
BASOPHILS NFR BLD AUTO: 0.5 % (ref 0–1.5)
BILIRUB SERPL-MCNC: 0.3 MG/DL (ref 0–1.2)
BLD GP AB SCN SERPL QL: NEGATIVE
BUN SERPL-MCNC: 21 MG/DL (ref 8–23)
BUN/CREAT SERPL: 16.5 (ref 7–25)
CALCIUM SPEC-SCNC: 8.8 MG/DL (ref 8.6–10.5)
CHLORIDE SERPL-SCNC: 104 MMOL/L (ref 98–107)
CO2 SERPL-SCNC: 25.6 MMOL/L (ref 22–29)
CREAT SERPL-MCNC: 1.27 MG/DL (ref 0.76–1.27)
DEPRECATED RDW RBC AUTO: 48.8 FL (ref 37–54)
EGFRCR SERPLBLD CKD-EPI 2021: 60.4 ML/MIN/1.73
EOSINOPHIL # BLD AUTO: 0.28 10*3/MM3 (ref 0–0.4)
EOSINOPHIL NFR BLD AUTO: 2.6 % (ref 0.3–6.2)
ERYTHROCYTE [DISTWIDTH] IN BLOOD BY AUTOMATED COUNT: 16.2 % (ref 12.3–15.4)
FERRITIN SERPL-MCNC: 19.94 NG/ML (ref 30–400)
FLUAV RNA RESP QL NAA+PROBE: NOT DETECTED
FLUBV RNA RESP QL NAA+PROBE: NOT DETECTED
GLOBULIN UR ELPH-MCNC: 3.1 GM/DL
GLUCOSE SERPL-MCNC: 117 MG/DL (ref 65–99)
HCT VFR BLD AUTO: 24.5 % (ref 37.5–51)
HCT VFR BLD AUTO: 25.3 % (ref 37.5–51)
HCT VFR BLD AUTO: 27.2 % (ref 37.5–51)
HGB BLD-MCNC: 7.2 G/DL (ref 13–17.7)
HGB BLD-MCNC: 7.5 G/DL (ref 13–17.7)
HGB BLD-MCNC: 7.9 G/DL (ref 13–17.7)
HOLD SPECIMEN: NORMAL
HOLD SPECIMEN: NORMAL
IMM GRANULOCYTES # BLD AUTO: 0.06 10*3/MM3 (ref 0–0.05)
IMM GRANULOCYTES NFR BLD AUTO: 0.5 % (ref 0–0.5)
IRON 24H UR-MRATE: 23 MCG/DL (ref 59–158)
IRON SATN MFR SERPL: 6 % (ref 20–50)
LYMPHOCYTES # BLD AUTO: 1.71 10*3/MM3 (ref 0.7–3.1)
LYMPHOCYTES NFR BLD AUTO: 15.6 % (ref 19.6–45.3)
MCH RBC QN AUTO: 24.4 PG (ref 26.6–33)
MCHC RBC AUTO-ENTMCNC: 29.6 G/DL (ref 31.5–35.7)
MCV RBC AUTO: 82.1 FL (ref 79–97)
MONOCYTES # BLD AUTO: 1.06 10*3/MM3 (ref 0.1–0.9)
MONOCYTES NFR BLD AUTO: 9.7 % (ref 5–12)
NEUTROPHILS NFR BLD AUTO: 7.76 10*3/MM3 (ref 1.7–7)
NEUTROPHILS NFR BLD AUTO: 71.1 % (ref 42.7–76)
NRBC BLD AUTO-RTO: 0 /100 WBC (ref 0–0.2)
NT-PROBNP SERPL-MCNC: 576.3 PG/ML (ref 0–900)
PLATELET # BLD AUTO: 267 10*3/MM3 (ref 140–450)
PMV BLD AUTO: 11.1 FL (ref 6–12)
POTASSIUM SERPL-SCNC: 4.4 MMOL/L (ref 3.5–5.2)
PROT SERPL-MCNC: 6.7 G/DL (ref 6–8.5)
RBC # BLD AUTO: 3.08 10*6/MM3 (ref 4.14–5.8)
RETICS # AUTO: 0.08 10*6/MM3 (ref 0.02–0.13)
RETICS/RBC NFR AUTO: 2.47 % (ref 0.7–1.9)
RH BLD: POSITIVE
SARS-COV-2 RNA RESP QL NAA+PROBE: NOT DETECTED
SODIUM SERPL-SCNC: 139 MMOL/L (ref 136–145)
T&S EXPIRATION DATE: NORMAL
TIBC SERPL-MCNC: 396 MCG/DL (ref 298–536)
TRANSFERRIN SERPL-MCNC: 266 MG/DL (ref 200–360)
WBC NRBC COR # BLD: 10.93 10*3/MM3 (ref 3.4–10.8)
WHOLE BLOOD HOLD COAG: NORMAL
WHOLE BLOOD HOLD SPECIMEN: NORMAL

## 2023-02-08 PROCEDURE — 82746 ASSAY OF FOLIC ACID SERUM: CPT | Performed by: PHYSICIAN ASSISTANT

## 2023-02-08 PROCEDURE — 94640 AIRWAY INHALATION TREATMENT: CPT

## 2023-02-08 PROCEDURE — 25010000002 FUROSEMIDE PER 20 MG: Performed by: INTERNAL MEDICINE

## 2023-02-08 PROCEDURE — P9016 RBC LEUKOCYTES REDUCED: HCPCS

## 2023-02-08 PROCEDURE — 99223 1ST HOSP IP/OBS HIGH 75: CPT | Performed by: INTERNAL MEDICINE

## 2023-02-08 PROCEDURE — 83540 ASSAY OF IRON: CPT | Performed by: PHYSICIAN ASSISTANT

## 2023-02-08 PROCEDURE — 86901 BLOOD TYPING SEROLOGIC RH(D): CPT | Performed by: PHYSICIAN ASSISTANT

## 2023-02-08 PROCEDURE — 80053 COMPREHEN METABOLIC PANEL: CPT | Performed by: PHYSICIAN ASSISTANT

## 2023-02-08 PROCEDURE — 82728 ASSAY OF FERRITIN: CPT | Performed by: PHYSICIAN ASSISTANT

## 2023-02-08 PROCEDURE — 36430 TRANSFUSION BLD/BLD COMPNT: CPT

## 2023-02-08 PROCEDURE — 99284 EMERGENCY DEPT VISIT MOD MDM: CPT

## 2023-02-08 PROCEDURE — 85045 AUTOMATED RETICULOCYTE COUNT: CPT | Performed by: PHYSICIAN ASSISTANT

## 2023-02-08 PROCEDURE — 85014 HEMATOCRIT: CPT | Performed by: INTERNAL MEDICINE

## 2023-02-08 PROCEDURE — 82607 VITAMIN B-12: CPT | Performed by: PHYSICIAN ASSISTANT

## 2023-02-08 PROCEDURE — 84466 ASSAY OF TRANSFERRIN: CPT | Performed by: PHYSICIAN ASSISTANT

## 2023-02-08 PROCEDURE — 86900 BLOOD TYPING SEROLOGIC ABO: CPT

## 2023-02-08 PROCEDURE — 94799 UNLISTED PULMONARY SVC/PX: CPT

## 2023-02-08 PROCEDURE — 87636 SARSCOV2 & INF A&B AMP PRB: CPT | Performed by: PHYSICIAN ASSISTANT

## 2023-02-08 PROCEDURE — 86900 BLOOD TYPING SEROLOGIC ABO: CPT | Performed by: PHYSICIAN ASSISTANT

## 2023-02-08 PROCEDURE — 86923 COMPATIBILITY TEST ELECTRIC: CPT

## 2023-02-08 PROCEDURE — 85025 COMPLETE CBC W/AUTO DIFF WBC: CPT | Performed by: PHYSICIAN ASSISTANT

## 2023-02-08 PROCEDURE — 71045 X-RAY EXAM CHEST 1 VIEW: CPT

## 2023-02-08 PROCEDURE — 36415 COLL VENOUS BLD VENIPUNCTURE: CPT

## 2023-02-08 PROCEDURE — 94761 N-INVAS EAR/PLS OXIMETRY MLT: CPT

## 2023-02-08 PROCEDURE — 85018 HEMOGLOBIN: CPT | Performed by: INTERNAL MEDICINE

## 2023-02-08 PROCEDURE — 83880 ASSAY OF NATRIURETIC PEPTIDE: CPT | Performed by: PHYSICIAN ASSISTANT

## 2023-02-08 PROCEDURE — 71045 X-RAY EXAM CHEST 1 VIEW: CPT | Performed by: RADIOLOGY

## 2023-02-08 PROCEDURE — 86850 RBC ANTIBODY SCREEN: CPT | Performed by: PHYSICIAN ASSISTANT

## 2023-02-08 RX ORDER — FUROSEMIDE 10 MG/ML
20 INJECTION INTRAMUSCULAR; INTRAVENOUS ONCE
Status: COMPLETED | OUTPATIENT
Start: 2023-02-08 | End: 2023-02-08

## 2023-02-08 RX ORDER — MAGNESIUM SULFATE HEPTAHYDRATE 40 MG/ML
2 INJECTION, SOLUTION INTRAVENOUS AS NEEDED
Status: DISCONTINUED | OUTPATIENT
Start: 2023-02-08 | End: 2023-02-11 | Stop reason: HOSPADM

## 2023-02-08 RX ORDER — SODIUM CHLORIDE 0.9 % (FLUSH) 0.9 %
10 SYRINGE (ML) INJECTION AS NEEDED
Status: DISCONTINUED | OUTPATIENT
Start: 2023-02-08 | End: 2023-02-11 | Stop reason: HOSPADM

## 2023-02-08 RX ORDER — ATORVASTATIN CALCIUM 40 MG/1
80 TABLET, FILM COATED ORAL NIGHTLY
Status: DISCONTINUED | OUTPATIENT
Start: 2023-02-08 | End: 2023-02-11 | Stop reason: HOSPADM

## 2023-02-08 RX ORDER — ACETAMINOPHEN 650 MG/1
650 SUPPOSITORY RECTAL EVERY 4 HOURS PRN
Status: DISCONTINUED | OUTPATIENT
Start: 2023-02-08 | End: 2023-02-11 | Stop reason: HOSPADM

## 2023-02-08 RX ORDER — FUROSEMIDE 20 MG/1
20 TABLET ORAL DAILY
COMMUNITY
End: 2023-02-08

## 2023-02-08 RX ORDER — SODIUM CHLORIDE 0.9 % (FLUSH) 0.9 %
10 SYRINGE (ML) INJECTION EVERY 12 HOURS SCHEDULED
Status: DISCONTINUED | OUTPATIENT
Start: 2023-02-08 | End: 2023-02-11 | Stop reason: HOSPADM

## 2023-02-08 RX ORDER — ONDANSETRON 2 MG/ML
4 INJECTION INTRAMUSCULAR; INTRAVENOUS EVERY 6 HOURS PRN
Status: DISCONTINUED | OUTPATIENT
Start: 2023-02-08 | End: 2023-02-11 | Stop reason: HOSPADM

## 2023-02-08 RX ORDER — SODIUM CHLORIDE, SODIUM LACTATE, POTASSIUM CHLORIDE, CALCIUM CHLORIDE 600; 310; 30; 20 MG/100ML; MG/100ML; MG/100ML; MG/100ML
75 INJECTION, SOLUTION INTRAVENOUS CONTINUOUS
Status: DISCONTINUED | OUTPATIENT
Start: 2023-02-08 | End: 2023-02-08

## 2023-02-08 RX ORDER — PANTOPRAZOLE SODIUM 40 MG/10ML
40 INJECTION, POWDER, LYOPHILIZED, FOR SOLUTION INTRAVENOUS EVERY 12 HOURS SCHEDULED
Status: DISCONTINUED | OUTPATIENT
Start: 2023-02-08 | End: 2023-02-11 | Stop reason: HOSPADM

## 2023-02-08 RX ORDER — ACETAMINOPHEN 160 MG/5ML
650 SOLUTION ORAL EVERY 4 HOURS PRN
Status: DISCONTINUED | OUTPATIENT
Start: 2023-02-08 | End: 2023-02-11 | Stop reason: HOSPADM

## 2023-02-08 RX ORDER — ACETAMINOPHEN 325 MG/1
650 TABLET ORAL EVERY 4 HOURS PRN
Status: DISCONTINUED | OUTPATIENT
Start: 2023-02-08 | End: 2023-02-11 | Stop reason: HOSPADM

## 2023-02-08 RX ORDER — POTASSIUM CHLORIDE 1500 MG/1
40 TABLET, FILM COATED, EXTENDED RELEASE ORAL AS NEEDED
Status: DISCONTINUED | OUTPATIENT
Start: 2023-02-08 | End: 2023-02-11 | Stop reason: HOSPADM

## 2023-02-08 RX ORDER — SODIUM CHLORIDE 9 MG/ML
40 INJECTION, SOLUTION INTRAVENOUS AS NEEDED
Status: DISCONTINUED | OUTPATIENT
Start: 2023-02-08 | End: 2023-02-11 | Stop reason: HOSPADM

## 2023-02-08 RX ORDER — POTASSIUM CHLORIDE 1.5 G/1.77G
40 POWDER, FOR SOLUTION ORAL AS NEEDED
Status: DISCONTINUED | OUTPATIENT
Start: 2023-02-08 | End: 2023-02-11 | Stop reason: HOSPADM

## 2023-02-08 RX ORDER — POTASSIUM CHLORIDE 7.45 MG/ML
10 INJECTION INTRAVENOUS
Status: DISCONTINUED | OUTPATIENT
Start: 2023-02-08 | End: 2023-02-11 | Stop reason: HOSPADM

## 2023-02-08 RX ORDER — MAGNESIUM SULFATE HEPTAHYDRATE 40 MG/ML
4 INJECTION, SOLUTION INTRAVENOUS AS NEEDED
Status: DISCONTINUED | OUTPATIENT
Start: 2023-02-08 | End: 2023-02-11 | Stop reason: HOSPADM

## 2023-02-08 RX ORDER — BUDESONIDE AND FORMOTEROL FUMARATE DIHYDRATE 160; 4.5 UG/1; UG/1
2 AEROSOL RESPIRATORY (INHALATION)
Status: DISCONTINUED | OUTPATIENT
Start: 2023-02-08 | End: 2023-02-11 | Stop reason: HOSPADM

## 2023-02-08 RX ADMIN — BUDESONIDE AND FORMOTEROL FUMARATE DIHYDRATE 2 PUFF: 160; 4.5 AEROSOL RESPIRATORY (INHALATION) at 18:49

## 2023-02-08 RX ADMIN — FUROSEMIDE 20 MG: 10 INJECTION, SOLUTION INTRAMUSCULAR; INTRAVENOUS at 18:28

## 2023-02-08 RX ADMIN — PANTOPRAZOLE SODIUM 40 MG: 40 INJECTION, POWDER, FOR SOLUTION INTRAVENOUS at 21:33

## 2023-02-08 RX ADMIN — ATORVASTATIN CALCIUM 80 MG: 40 TABLET, FILM COATED ORAL at 21:33

## 2023-02-08 RX ADMIN — SODIUM CHLORIDE, POTASSIUM CHLORIDE, SODIUM LACTATE AND CALCIUM CHLORIDE 75 ML/HR: 600; 310; 30; 20 INJECTION, SOLUTION INTRAVENOUS at 16:32

## 2023-02-08 RX ADMIN — Medication 10 ML: at 21:35

## 2023-02-08 NOTE — PLAN OF CARE
Goal Outcome Evaluation: Patient has been very pleasant since arriving from ER. 1 unit of blood started once initial assessment was completed as patient arrived symptomatic, pale, weak, complaints of light headedness and SOB. He remains on RA, saturations maintaining well above 90%. IV access obtained and LR started. He is currently resting in bed. Will continue with plan of care.     1823: Patient more SOB with exertion and lung sounds have more rhonchi than on admission. DC Fluids per order. Holding second bag of blood per MD Munoz. IV Lasix given.

## 2023-02-08 NOTE — H&P
[facility]  [facility]   Patient Name: Zaid Galarza  : 1950  MRN: [id number]  Primary Care Physician:  Rosi Thacker APRN  Date of admission: 2023    Subjective   Subjective     Chief Complaint: Fatigue, dyspnea on exertion    HPI:    Zaid Galarza is a 72 y.o. male past medical history of atrial fibrillation on Eliquis who presents to the emergency department for evaluation of increasing fatigue and dyspnea on exertion over the past 2 to 3 weeks.  He denies any other fevers, chills, sweats, nausea, vomiting, chest pain, palpitations, abdominal pain diarrhea constipation, dysuria, weakness, rash.  He denies any melena or bright red blood per rectum.  In the emergency department found to have a hemoglobin of 7.5 which is down from normal 5 months ago.  Patient has been on continuous mera blood cell and will be admitted for ongoing monitoring and management.  Of note, also found to have iron deficiency prior to transfusions.      Personal History     Past Medical History:  Past Medical History:   Diagnosis Date   • Atrial fibrillation (HCC)    • CHF (congestive heart failure) (HCC)    • COPD (chronic obstructive pulmonary disease) (HCC)    • Coronary artery disease    • Hypertension    • Tobacco abuse          Past Surgical History:  Past Surgical History:   Procedure Laterality Date   • ABDOMINAL AORTIC ANEURYSM REPAIR     • CARDIAC CATHETERIZATION N/A 10/03/2019    Procedure: Left Heart Cath;  Surgeon: Vincent Phillips MD;  Location: [facility] INVASIVE LOCATION;  Service: Cardiology         Family History:   Family History   Problem Relation Age of Onset   • Heart disease Mother    • Stroke Father    • Heart disease Father          Social History:   Social History     Tobacco Use   • Smoking status: Former     Packs/day: 1.00     Types: Cigarettes   • Smokeless tobacco: Never   • Tobacco comments:     2022   Vaping Use   • Vaping Use: Never  used   Substance Use Topics   • Alcohol use: No   • Drug use: No         Home Medications:  Cyanocobalamin, Loratadine, apixaban, aspirin, atorvastatin, budesonide-formoterol, metoprolol tartrate, nicotine, ondansetron ODT, predniSONE, sacubitril-valsartan, and sertraline    Allergies:  No Known Allergies    Review of Systems   All systems were reviewed and negative except for: Dyspnea on exertion, fatigue    Objective   Objective     Vitals:   Temp:  [97 °F (36.1 °C)] 97 °F (36.1 °C)  Heart Rate:  [81] 81  Resp:  [20] 20  BP: (111)/(74) 111/74    Physical Exam    Constitutional: Awake, alert, no acute distress   Eyes: Pupils equal, sclerae anicteric, no conjunctival injection   HENT: NCAT, mucous membranes moist   Neck: Supple, no thyromegaly, no lymphadenopathy, trachea midline   Respiratory: Clear to auscultation bilaterally, nonlabored respirations    Cardiovascular: RRR, no murmurs, rubs, or gallops, palpable pedal pulses bilaterally   Gastrointestinal: Positive bowel sounds, soft, nontender, nondistended   Musculoskeletal: No bilateral ankle edema, no clubbing or cyanosis to extremities   Psychiatric: Appropriate affect, cooperative   Neurologic: Oriented x 3, strength symmetric in all extremities, Cranial Nerves grossly intact to confrontation, speech clear   Skin: No rashes     Result Review    Result Review:  I have personally reviewed the results from the time of this admission to 2/8/2023 14:41 EST and agree with these findings:  [x]  Laboratory  [x]  Microbiology  [x]  Radiology  []  EKG/Telemetry   []  Cardiology/Vascular   []  Pathology  []  Old records  []  Other:      Assessment & Plan   Assessment / Plan     Assessment/Plan:   • Symptomatic anemia: We will admit and continue monitor.  Monitor on telemetry.  Serial H&H, transfuse for hemoglobin less than 7 or signs of bleeding.  To receive 2 units packed red blood cell in the emergency department.  Start Protonix IV twice daily.  Clear liquid diet  for now and n.p.o. at midnight pending further hemoglobin levels.  Check occult stool.  Patient likely to need endoscopy inpatient versus outpatient pending clinical course.  Hold Eliquis and aspirin for now.  • Paroxysmal atrial fibrillation: Hold Eliquis as above.  Will resume home beta-blocker once verified.  Monitor on telemetry.  • History of CVA: Holding aspirin as above.  Continue statin.  Supportive care.  • History of congestive heart failure with reduced ejection fraction: Most recent ejection fraction 51 to 55% previous was 30%.  Monitor volume status closely.  Resume home indications as appropriate.  • History of COPD without acute exacerbation: Continue home regimen        Addendum: His hemoglobin is greater than 7 and patient has a history of congestive heart failure, at this time will cancel the second unit of PRBC ordered in the emergency department and monitor.      DVT prophylaxis:  SCDs    CODE STATUS:     Full code    Admission Status:  I believe this patient meets inpatient status.    Electronically signed by Marcellus Munoz Jr, MD, 02/08/23, 2:41 PM EST.

## 2023-02-08 NOTE — ED NOTES
MEDICAL SCREENING:    Reason for Visit: Low hemoglobin per primary care.    Patient initially seen in triage.  The patient was advised further evaluation and diagnostic testing will be needed, some of the treatment and testing will be initiated in the lobby in order to begin the process.  The patient will be returned to the waiting area for the time being and possibly be re-assessed by a subsequent ED provider.  The patient will be brought back to the treatment area in as timely manner as possible.         Marcellus Soto PA  02/08/23 1054

## 2023-02-09 LAB
ALBUMIN SERPL-MCNC: 3.2 G/DL (ref 3.5–5.2)
ALBUMIN/GLOB SERPL: 1.2 G/DL
ALP SERPL-CCNC: 93 U/L (ref 39–117)
ALT SERPL W P-5'-P-CCNC: 11 U/L (ref 1–41)
ANION GAP SERPL CALCULATED.3IONS-SCNC: 9 MMOL/L (ref 5–15)
AST SERPL-CCNC: 12 U/L (ref 1–40)
BASOPHILS # BLD AUTO: 0.03 10*3/MM3 (ref 0–0.2)
BASOPHILS NFR BLD AUTO: 0.3 % (ref 0–1.5)
BILIRUB SERPL-MCNC: 0.6 MG/DL (ref 0–1.2)
BUN SERPL-MCNC: 20 MG/DL (ref 8–23)
BUN/CREAT SERPL: 17.1 (ref 7–25)
CALCIUM SPEC-SCNC: 8.2 MG/DL (ref 8.6–10.5)
CHLORIDE SERPL-SCNC: 101 MMOL/L (ref 98–107)
CO2 SERPL-SCNC: 26 MMOL/L (ref 22–29)
CREAT SERPL-MCNC: 1.17 MG/DL (ref 0.76–1.27)
DEPRECATED RDW RBC AUTO: 47.6 FL (ref 37–54)
EGFRCR SERPLBLD CKD-EPI 2021: 66.6 ML/MIN/1.73
EOSINOPHIL # BLD AUTO: 0.27 10*3/MM3 (ref 0–0.4)
EOSINOPHIL NFR BLD AUTO: 2.8 % (ref 0.3–6.2)
ERYTHROCYTE [DISTWIDTH] IN BLOOD BY AUTOMATED COUNT: 15.8 % (ref 12.3–15.4)
FOLATE SERPL-MCNC: 15.1 NG/ML (ref 4.78–24.2)
GLOBULIN UR ELPH-MCNC: 2.7 GM/DL
GLUCOSE SERPL-MCNC: 113 MG/DL (ref 65–99)
HCT VFR BLD AUTO: 24.5 % (ref 37.5–51)
HCT VFR BLD AUTO: 24.5 % (ref 37.5–51)
HCT VFR BLD AUTO: 26.2 % (ref 37.5–51)
HCT VFR BLD AUTO: 28 % (ref 37.5–51)
HGB BLD-MCNC: 7.4 G/DL (ref 13–17.7)
HGB BLD-MCNC: 7.4 G/DL (ref 13–17.7)
HGB BLD-MCNC: 7.8 G/DL (ref 13–17.7)
HGB BLD-MCNC: 8.4 G/DL (ref 13–17.7)
IMM GRANULOCYTES # BLD AUTO: 0.05 10*3/MM3 (ref 0–0.05)
IMM GRANULOCYTES NFR BLD AUTO: 0.5 % (ref 0–0.5)
LYMPHOCYTES # BLD AUTO: 1.32 10*3/MM3 (ref 0.7–3.1)
LYMPHOCYTES NFR BLD AUTO: 13.9 % (ref 19.6–45.3)
MCH RBC QN AUTO: 25.1 PG (ref 26.6–33)
MCHC RBC AUTO-ENTMCNC: 30.2 G/DL (ref 31.5–35.7)
MCV RBC AUTO: 83.1 FL (ref 79–97)
MONOCYTES # BLD AUTO: 1.06 10*3/MM3 (ref 0.1–0.9)
MONOCYTES NFR BLD AUTO: 11.2 % (ref 5–12)
NEUTROPHILS NFR BLD AUTO: 6.75 10*3/MM3 (ref 1.7–7)
NEUTROPHILS NFR BLD AUTO: 71.3 % (ref 42.7–76)
NRBC BLD AUTO-RTO: 0 /100 WBC (ref 0–0.2)
PLATELET # BLD AUTO: 220 10*3/MM3 (ref 140–450)
PMV BLD AUTO: 11.3 FL (ref 6–12)
POTASSIUM SERPL-SCNC: 3.8 MMOL/L (ref 3.5–5.2)
PROT SERPL-MCNC: 5.9 G/DL (ref 6–8.5)
RBC # BLD AUTO: 2.95 10*6/MM3 (ref 4.14–5.8)
SODIUM SERPL-SCNC: 136 MMOL/L (ref 136–145)
VIT B12 BLD-MCNC: 1090 PG/ML (ref 211–946)
WBC NRBC COR # BLD: 9.48 10*3/MM3 (ref 3.4–10.8)

## 2023-02-09 PROCEDURE — 85014 HEMATOCRIT: CPT | Performed by: INTERNAL MEDICINE

## 2023-02-09 PROCEDURE — 85018 HEMOGLOBIN: CPT | Performed by: INTERNAL MEDICINE

## 2023-02-09 PROCEDURE — 94799 UNLISTED PULMONARY SVC/PX: CPT

## 2023-02-09 PROCEDURE — 94761 N-INVAS EAR/PLS OXIMETRY MLT: CPT

## 2023-02-09 PROCEDURE — 85025 COMPLETE CBC W/AUTO DIFF WBC: CPT | Performed by: INTERNAL MEDICINE

## 2023-02-09 PROCEDURE — 99232 SBSQ HOSP IP/OBS MODERATE 35: CPT | Performed by: INTERNAL MEDICINE

## 2023-02-09 PROCEDURE — 80053 COMPREHEN METABOLIC PANEL: CPT | Performed by: INTERNAL MEDICINE

## 2023-02-09 RX ADMIN — PANTOPRAZOLE SODIUM 40 MG: 40 INJECTION, POWDER, FOR SOLUTION INTRAVENOUS at 08:40

## 2023-02-09 RX ADMIN — Medication 10 ML: at 22:32

## 2023-02-09 RX ADMIN — PANTOPRAZOLE SODIUM 40 MG: 40 INJECTION, POWDER, FOR SOLUTION INTRAVENOUS at 22:32

## 2023-02-09 RX ADMIN — Medication 10 ML: at 08:40

## 2023-02-09 RX ADMIN — ATORVASTATIN CALCIUM 80 MG: 40 TABLET, FILM COATED ORAL at 22:32

## 2023-02-09 RX ADMIN — BUDESONIDE AND FORMOTEROL FUMARATE DIHYDRATE 2 PUFF: 160; 4.5 AEROSOL RESPIRATORY (INHALATION) at 19:12

## 2023-02-09 RX ADMIN — BUDESONIDE AND FORMOTEROL FUMARATE DIHYDRATE 2 PUFF: 160; 4.5 AEROSOL RESPIRATORY (INHALATION) at 07:39

## 2023-02-09 RX ADMIN — SERTRALINE 150 MG: 50 TABLET, FILM COATED ORAL at 08:40

## 2023-02-09 NOTE — PROGRESS NOTES
[facility] HOSPITALIST PROGRESS NOTE     Patient Identification:  Name:  Zaid Galarza  Age:  72 y.o.  Sex:  male  :  1950  MRN:  [id number]  Visit Number:  [id number]  ROOM: [address] [place] [address]     Primary Care Provider:  Rosi Thacker APRN     Date of Admission: 2023    Length of stay in inpatient status:  1    Subjective     Chief Compliant:    Chief Complaint   Patient presents with   • Abnormal Lab     History of Presenting Illness: The patient sister and brother-in-law were at bedside when I saw him.  The patient states that he feels better.  He denies any nausea, vomiting, and diarrhea.  He denies bloody stools, black stools, hematuria,  hematemesis.  He also denies any large bruises anywhere and he denies falling recently.    Objective     Current Hospital Meds:  atorvastatin, 80 mg, Oral, Nightly  budesonide-formoterol, 2 puff, Inhalation, BID - RT  pantoprazole, 40 mg, Intravenous, Q12H  sertraline, 150 mg, Oral, Daily  sodium chloride, 10 mL, Intravenous, Q12H       Current Antimicrobial Therapy:  Anti-Infectives (From admission, onward)    None        Current Diuretic Therapy:  Diuretics (From admission, onward)    Ordered     Dose/Rate Route Frequency Start Stop    23  furosemide (LASIX) injection 20 mg        Ordering Provider: Marcellus Munoz Jr., MD    20 mg Intravenous Once 23        ----------------------------------------------------------------------------------------------------------------------  Vital Signs:  Temp:  [97.6 °F (36.4 °C)-98.5 °F (36.9 °C)] 97.7 °F (36.5 °C)  Heart Rate:  [70-96] 73  Resp:  [18-20] 18  BP: ()/(59-81) 120/70  SpO2:  [93 %-100 %] 94 %  on   ;   Device (Oxygen Therapy): room air  Body mass index is 33.4 kg/m².    Wt Readings from Last 3 Encounters:   23 112 kg (246 lb 4.8 oz)   22 109 kg (241 lb 3.2 oz)   10/06/22 93.9 kg (207 lb)     Intake & Output (last 3 days)         0701 02/07 0700 02/07 0701  02/08 0700 02/08 0701  02/09 0700 02/09 0701  02/10 0700    P.O.   240     Blood   300     Total Intake(mL/kg)   540 (4.8)     Urine (mL/kg/hr)   1660     Stool   0     Total Output   1660     Net   -1120             Urine Unmeasured Occurrence   2 x     Stool Unmeasured Occurrence   1 x 1 x        NPO Diet NPO Type: Strict NPO  ----------------------------------------------------------------------------------------------------------------------  Physical Exam  Vitals reviewed.   Constitutional:       General: He is awake. He is not in acute distress.     Appearance: He is well-developed. He is obese. He is not ill-appearing, toxic-appearing or diaphoretic.   HENT:      Head: Normocephalic and atraumatic.      Right Ear: External ear normal.      Left Ear: External ear normal.      Nose: Nose normal.   Eyes:      General: No scleral icterus.        Right eye: No discharge.         Left eye: No discharge.      Conjunctiva/sclera: Conjunctivae normal.      Pupils: Pupils are equal, round, and reactive to light.   Cardiovascular:      Rate and Rhythm: Normal rate and regular rhythm.      Pulses: Normal pulses.      Heart sounds: No murmur heard.  Pulmonary:      Effort: Pulmonary effort is normal. No respiratory distress.      Breath sounds: No wheezing or rales.   Abdominal:      General: Bowel sounds are normal. There is no distension.      Palpations: Abdomen is soft.   Musculoskeletal:         General: No swelling, deformity or signs of injury.   Skin:     Capillary Refill: Capillary refill takes less than 2 seconds.      Coloration: Skin is not jaundiced or pale.      Findings: No bruising or rash.   Neurological:      Mental Status: He is alert and oriented to person, place, and time. Mental status is at baseline.      Cranial Nerves: No cranial nerve deficit.   Psychiatric:         Mood and Affect: Mood normal.         Behavior: Behavior normal. Behavior is cooperative.        ----------------------------------------------------------------------------------------------------------------------  Tele: A-fib with heart rate 60s to 80s.  One-time, when the patient stood up, his heart rate jumped up to 140.  ----------------------------------------------------------------------------------------------------------------------  LABS:    CBC and coagulation:  Results from last 7 days   Lab Units 02/09/23 0823 02/09/23 0006 02/08/23 2044 02/08/23  1532 02/08/23  1146   WBC 10*3/mm3  --  9.48  --   --  10.93*   HEMOGLOBIN g/dL 7.8* 7.4*  7.4* 7.9* 7.2* 7.5*   HEMATOCRIT % 26.2* 24.5*  24.5* 27.2* 24.5* 25.3*   MCV fL  --  83.1  --   --  82.1   MCHC g/dL  --  30.2*  --   --  29.6*   PLATELETS 10*3/mm3  --  220  --   --  267     Renal and electrolytes:  Results from last 7 days   Lab Units 02/09/23 0006 02/08/23  1146   SODIUM mmol/L 136 139   POTASSIUM mmol/L 3.8 4.4   CHLORIDE mmol/L 101 104   CO2 mmol/L 26.0 25.6   BUN mg/dL 20 21   CREATININE mg/dL 1.17 1.27   CALCIUM mg/dL 8.2* 8.8   GLUCOSE mg/dL 113* 117*     Estimated Creatinine Clearance: 74.9 mL/min (by C-G formula based on SCr of 1.17 mg/dL).    Liver and pancreatic function:  Results from last 7 days   Lab Units 02/09/23  0006 02/08/23  1146   ALBUMIN g/dL 3.2* 3.6   BILIRUBIN mg/dL 0.6 0.3   ALK PHOS U/L 93 98   AST (SGOT) U/L 12 15   ALT (SGPT) U/L 11 14         I have personally looked at the labs and they are summarized above.  ----------------------------------------------------------------------------------------------------------------------  Detailed radiology reports for the last 24 hours:    Imaging Results (Last 24 Hours)     Procedure Component Value Units Date/Time    XR Chest 1 View [[id number]] Collected: 02/08/23 1239     Updated: 02/08/23 1307    Narrative:      EXAM:    XR Chest, 1 View     EXAM DATE:    2/8/2023 12:27 PM     CLINICAL HISTORY:    sob     TECHNIQUE:    Frontal view of the chest.      COMPARISON:    09/01/2022     FINDINGS:    LUNGS:  Coarsened interstitial markings bases again noted.    PLEURAL SPACE:  Unremarkable.  No pneumothorax.    HEART:  Heart size is stable.    MEDIASTINUM:  Unremarkable.    BONES/JOINTS:  Unremarkable.       Impression:        No acute cardiopulmonary findings identified.     This report was finalized on 2/8/2023 12:39 PM by Dr. Daniel Bateman MD.           I have personally looked at the radiology images and I have read the available final report.    Assessment & Plan      -Symptomatic normocytic anemia that was present on admission  -Suspect chronic kidney disease stage IIIa with baseline creatinine 1.1-1.2, which places the patient at risk for anemia of chronic disease  -History of persistent atrial fibrillation  -History of CVA  -History of congestive heart failure with recovered ejection fraction and dilated cardiomyopathy, currently not in an acute exacerbation  -History of COPD, currently not in an acute exacerbation  -History of essential hypertension  -History of nonobstructive coronary artery disease per left heart catheterization 10/3/2019  -History of prior smoking abuse, quit 9/1/2022    We have held the patient's home aspirin and Eliquis.  He will need to have a GI work-up in the outpatient setting; the patient has seen Dr. Crawford before and he would like to follow-up with him.  There is no indication for an inpatient EGD/colonoscopy as the patient has not been hypotensive and has not had any witnessed bleeding.  So far, the patient has received 2 units packed red blood cells.  We will repeat his blood work in the morning and if his hemoglobin remains stable then we may be able to get him home tomorrow.    VTE Prophylaxis:   Mechanical Order History:      Ordered        02/08/23 1521  Place Sequential Compression Device  Once            02/08/23 1521  Maintain Sequential Compression Device  Continuous                    Pharmalogical Order History:      None        The patient is high risk due to the following diagnoses/reasons: Symptomatic anemia    Disposition: Home    Amelia Nolasco MD  [facility]  02/09/23  11:18 EST

## 2023-02-09 NOTE — PLAN OF CARE
Goal Outcome Evaluation:     Pt is resting in bed awake watching television. No s/s of acute distress noted. No complaints at this time. Pt remains NPO.

## 2023-02-09 NOTE — PLAN OF CARE
Goal Outcome Evaluation: Patient has been very pleasant today. Compliant with all medications and care as ordered. He remains on RA, saturations maintaining well above 90%. Diet was adjusted to clear liquid on this date, tolerating well. SCDS remain in place. Family has been at bedside, updated on plan of care. He has been ambulating to the bathroom with nursing assistance, tolerating well. He is currently resting in bed. Will continue with plan of care.

## 2023-02-10 LAB
ANION GAP SERPL CALCULATED.3IONS-SCNC: 7.4 MMOL/L (ref 5–15)
BUN SERPL-MCNC: 19 MG/DL (ref 8–23)
BUN/CREAT SERPL: 15.3 (ref 7–25)
CALCIUM SPEC-SCNC: 8.2 MG/DL (ref 8.6–10.5)
CHLORIDE SERPL-SCNC: 107 MMOL/L (ref 98–107)
CO2 SERPL-SCNC: 26.6 MMOL/L (ref 22–29)
CREAT SERPL-MCNC: 1.24 MG/DL (ref 0.76–1.27)
EGFRCR SERPLBLD CKD-EPI 2021: 62.2 ML/MIN/1.73
GLUCOSE SERPL-MCNC: 108 MG/DL (ref 65–99)
HCT VFR BLD AUTO: 25 % (ref 37.5–51)
HCT VFR BLD AUTO: 31.1 % (ref 37.5–51)
HGB BLD-MCNC: 7.3 G/DL (ref 13–17.7)
HGB BLD-MCNC: 9.7 G/DL (ref 13–17.7)
MAGNESIUM SERPL-MCNC: 2 MG/DL (ref 1.6–2.4)
PHOSPHATE SERPL-MCNC: 3.4 MG/DL (ref 2.5–4.5)
POTASSIUM SERPL-SCNC: 4.4 MMOL/L (ref 3.5–5.2)
QT INTERVAL: 400 MS
QTC INTERVAL: 434 MS
SODIUM SERPL-SCNC: 141 MMOL/L (ref 136–145)

## 2023-02-10 PROCEDURE — 36430 TRANSFUSION BLD/BLD COMPNT: CPT

## 2023-02-10 PROCEDURE — 85018 HEMOGLOBIN: CPT | Performed by: INTERNAL MEDICINE

## 2023-02-10 PROCEDURE — 80048 BASIC METABOLIC PNL TOTAL CA: CPT | Performed by: INTERNAL MEDICINE

## 2023-02-10 PROCEDURE — 94799 UNLISTED PULMONARY SVC/PX: CPT

## 2023-02-10 PROCEDURE — 93005 ELECTROCARDIOGRAM TRACING: CPT | Performed by: INTERNAL MEDICINE

## 2023-02-10 PROCEDURE — 86900 BLOOD TYPING SEROLOGIC ABO: CPT

## 2023-02-10 PROCEDURE — 85014 HEMATOCRIT: CPT | Performed by: INTERNAL MEDICINE

## 2023-02-10 PROCEDURE — 99232 SBSQ HOSP IP/OBS MODERATE 35: CPT | Performed by: INTERNAL MEDICINE

## 2023-02-10 PROCEDURE — 83735 ASSAY OF MAGNESIUM: CPT | Performed by: INTERNAL MEDICINE

## 2023-02-10 PROCEDURE — P9016 RBC LEUKOCYTES REDUCED: HCPCS

## 2023-02-10 PROCEDURE — 84100 ASSAY OF PHOSPHORUS: CPT | Performed by: INTERNAL MEDICINE

## 2023-02-10 PROCEDURE — 94761 N-INVAS EAR/PLS OXIMETRY MLT: CPT

## 2023-02-10 RX ADMIN — Medication 10 ML: at 21:28

## 2023-02-10 RX ADMIN — PANTOPRAZOLE SODIUM 40 MG: 40 INJECTION, POWDER, FOR SOLUTION INTRAVENOUS at 12:27

## 2023-02-10 RX ADMIN — SERTRALINE 150 MG: 50 TABLET, FILM COATED ORAL at 12:27

## 2023-02-10 RX ADMIN — PANTOPRAZOLE SODIUM 40 MG: 40 INJECTION, POWDER, FOR SOLUTION INTRAVENOUS at 21:27

## 2023-02-10 RX ADMIN — BUDESONIDE AND FORMOTEROL FUMARATE DIHYDRATE 2 PUFF: 160; 4.5 AEROSOL RESPIRATORY (INHALATION) at 07:04

## 2023-02-10 RX ADMIN — Medication 10 ML: at 09:40

## 2023-02-10 RX ADMIN — ATORVASTATIN CALCIUM 80 MG: 40 TABLET, FILM COATED ORAL at 21:27

## 2023-02-10 RX ADMIN — BUDESONIDE AND FORMOTEROL FUMARATE DIHYDRATE 2 PUFF: 160; 4.5 AEROSOL RESPIRATORY (INHALATION) at 18:34

## 2023-02-10 NOTE — PLAN OF CARE
Goal Outcome Evaluation:     Pt is resting in bed with eyes closed. Chest is rising and falling evenly. No s/s of acute distress noted. No complaints at this time. Pt has rested well majority of this shift.        Pt's HR dropped to 37 then back up to 70s. Made Dr. Walton aware. No new orders.

## 2023-02-10 NOTE — PLAN OF CARE
Goal Outcome Evaluation:      SBPs 100-130s, SPO2 stable on RA, no c/o chest pain or discomfort voiced this shift. Hempglobin 7.3, transfused 2 units PRBCs, pt tolerated well. Ambulates w/ assist x 1, with walker, A//Ox4, compliant with care. 2 BM noted today, no evidence of blood, voids per urinal without difficulty.

## 2023-02-10 NOTE — ED PROVIDER NOTES
Subjective   History of Present Illness  72-year-old male presents secondary to anemia.  Patient was sent by primary care.  He arrived by private vehicle.  He states that recently has had progressively worsening fatigue and shortness of breath is worse with exertion.  He denies any current chest pain pressure tightness or squeezing.  He denies any abdominal pain.  Patient denies any dark tarry stools or bright red blood per rectum.  Patient is on anticoagulation.  Denies any abdominal pain.  No back pain.  He voices no other complaints this time.  Patient does have a history of atrial fib.  He has had a history of stroke along with COPD coronary artery disease and hypertension.        Review of Systems   Constitutional: Positive for fatigue. Negative for fever.   HENT: Negative.    Respiratory: Positive for shortness of breath.    Cardiovascular: Negative.  Negative for chest pain.   Gastrointestinal: Negative.  Negative for abdominal pain.   Endocrine: Negative.    Genitourinary: Negative.  Negative for dysuria.   Skin: Negative.    Neurological: Negative.    Psychiatric/Behavioral: Negative.    All other systems reviewed and are negative.      Past Medical History:   Diagnosis Date   • Atrial fibrillation (HCC)    • CHF (congestive heart failure) (HCC)    • COPD (chronic obstructive pulmonary disease) (HCC)    • Coronary artery disease    • Hypertension    • Tobacco abuse        No Known Allergies    Past Surgical History:   Procedure Laterality Date   • ABDOMINAL AORTIC ANEURYSM REPAIR     • CARDIAC CATHETERIZATION N/A 10/03/2019    Procedure: Left Heart Cath;  Surgeon: Vincent Phillips MD;  Location: [facility] INVASIVE LOCATION;  Service: Cardiology       Family History   Problem Relation Age of Onset   • Heart disease Mother    • Stroke Father    • Heart disease Father        Social History     Socioeconomic History   • Marital status: Single   Tobacco Use   • Smoking status: Former     Packs/day: 1.00      Types: Cigarettes   • Smokeless tobacco: Never   • Tobacco comments:     9/1/2022   Vaping Use   • Vaping Use: Never used   Substance and Sexual Activity   • Alcohol use: No   • Drug use: No   • Sexual activity: Defer           Objective   Physical Exam  Vitals and nursing note reviewed.   Constitutional:       General: He is not in acute distress.     Appearance: He is well-developed. He is not diaphoretic.   HENT:      Head: Normocephalic and atraumatic.      Right Ear: External ear normal.      Left Ear: External ear normal.      Nose: Nose normal.   Eyes:      Conjunctiva/sclera: Conjunctivae normal.      Pupils: Pupils are equal, round, and reactive to light.   Neck:      Vascular: No JVD.      Trachea: No tracheal deviation.   Cardiovascular:      Rate and Rhythm: Normal rate and regular rhythm.      Heart sounds: Normal heart sounds. No murmur heard.  Pulmonary:      Effort: Pulmonary effort is normal. No respiratory distress.      Breath sounds: Normal breath sounds. No wheezing.   Abdominal:      General: Bowel sounds are normal.      Palpations: Abdomen is soft.      Tenderness: There is no abdominal tenderness.   Musculoskeletal:         General: No deformity. Normal range of motion.      Cervical back: Normal range of motion and neck supple.   Skin:     General: Skin is warm and dry.      Coloration: Skin is not pale.      Findings: No erythema or rash.   Neurological:      Mental Status: He is alert and oriented to person, place, and time.      Cranial Nerves: No cranial nerve deficit.   Psychiatric:         Behavior: Behavior normal.         Thought Content: Thought content normal.         Procedures           ED Course  ED Course as of 02/09/23 1906   Wed Feb 08, 2023   1404 Reached out to the hospital service. [JI]      ED Course User Index  [JI] Marcellus Soto PA           Results for orders placed or performed during the hospital encounter of 02/08/23   COVID-19 and FLU A/B PCR - Swab,  Nasopharynx    Specimen: Nasopharynx; Swab   Result Value Ref Range    COVID19 Not Detected Not Detected - Ref. Range    Influenza A PCR Not Detected Not Detected    Influenza B PCR Not Detected Not Detected   Comprehensive Metabolic Panel    Specimen: Arm, Left; Blood   Result Value Ref Range    Glucose 117 (H) 65 - 99 mg/dL    BUN 21 8 - 23 mg/dL    Creatinine 1.27 0.76 - 1.27 mg/dL    Sodium 139 136 - 145 mmol/L    Potassium 4.4 3.5 - 5.2 mmol/L    Chloride 104 98 - 107 mmol/L    CO2 25.6 22.0 - 29.0 mmol/L    Calcium 8.8 8.6 - 10.5 mg/dL    Total Protein 6.7 6.0 - 8.5 g/dL    Albumin 3.6 3.5 - 5.2 g/dL    ALT (SGPT) 14 1 - 41 U/L    AST (SGOT) 15 1 - 40 U/L    Alkaline Phosphatase 98 39 - 117 U/L    Total Bilirubin 0.3 0.0 - 1.2 mg/dL    Globulin 3.1 gm/dL    A/G Ratio 1.2 g/dL    BUN/Creatinine Ratio 16.5 7.0 - 25.0    Anion Gap 9.4 5.0 - 15.0 mmol/L    eGFR 60.4 >60.0 mL/min/1.73   CBC Auto Differential    Specimen: Arm, Left; Blood   Result Value Ref Range    WBC 10.93 (H) 3.40 - 10.80 10*3/mm3    RBC 3.08 (L) 4.14 - 5.80 10*6/mm3    Hemoglobin 7.5 (L) 13.0 - 17.7 g/dL    Hematocrit 25.3 (L) 37.5 - 51.0 %    MCV 82.1 79.0 - 97.0 fL    MCH 24.4 (L) 26.6 - 33.0 pg    MCHC 29.6 (L) 31.5 - 35.7 g/dL    RDW 16.2 (H) 12.3 - 15.4 %    RDW-SD 48.8 37.0 - 54.0 fl    MPV 11.1 6.0 - 12.0 fL    Platelets 267 140 - 450 10*3/mm3    Neutrophil % 71.1 42.7 - 76.0 %    Lymphocyte % 15.6 (L) 19.6 - 45.3 %    Monocyte % 9.7 5.0 - 12.0 %    Eosinophil % 2.6 0.3 - 6.2 %    Basophil % 0.5 0.0 - 1.5 %    Immature Grans % 0.5 0.0 - 0.5 %    Neutrophils, Absolute 7.76 (H) 1.70 - 7.00 10*3/mm3    Lymphocytes, Absolute 1.71 0.70 - 3.10 10*3/mm3    Monocytes, Absolute 1.06 (H) 0.10 - 0.90 10*3/mm3    Eosinophils, Absolute 0.28 0.00 - 0.40 10*3/mm3    Basophils, Absolute 0.06 0.00 - 0.20 10*3/mm3    Immature Grans, Absolute 0.06 (H) 0.00 - 0.05 10*3/mm3    nRBC 0.0 0.0 - 0.2 /100 WBC   BNP    Specimen: Arm, Left; Blood   Result Value Ref  Range    proBNP 576.3 0.0 - 900.0 pg/mL   Iron Profile    Specimen: Arm, Left; Blood   Result Value Ref Range    Iron 23 (L) 59 - 158 mcg/dL    Iron Saturation 6 (L) 20 - 50 %    Transferrin 266 200 - 360 mg/dL    TIBC 396 298 - 536 mcg/dL   Reticulocytes    Specimen: Arm, Left; Blood   Result Value Ref Range    Reticulocyte % 2.47 (H) 0.70 - 1.90 %    Reticulocyte Absolute 0.0773 0.0200 - 0.1300 10*6/mm3   Ferritin    Specimen: Arm, Left; Blood   Result Value Ref Range    Ferritin 19.94 (L) 30.00 - 400.00 ng/mL   Vitamin B12    Specimen: Arm, Left; Blood   Result Value Ref Range    Vitamin B-12 1,090 (H) 211 - 946 pg/mL   Folate    Specimen: Arm, Left; Blood   Result Value Ref Range    Folate 15.10 4.78 - 24.20 ng/mL   Hemoglobin & Hematocrit, Blood    Specimen: Blood   Result Value Ref Range    Hemoglobin 7.2 (L) 13.0 - 17.7 g/dL    Hematocrit 24.5 (L) 37.5 - 51.0 %   Hemoglobin & Hematocrit, Blood    Specimen: Blood   Result Value Ref Range    Hemoglobin 7.4 (L) 13.0 - 17.7 g/dL    Hematocrit 24.5 (L) 37.5 - 51.0 %   Hemoglobin & Hematocrit, Blood    Specimen: Blood   Result Value Ref Range    Hemoglobin 7.9 (L) 13.0 - 17.7 g/dL    Hematocrit 27.2 (L) 37.5 - 51.0 %   Comprehensive Metabolic Panel    Specimen: Blood   Result Value Ref Range    Glucose 113 (H) 65 - 99 mg/dL    BUN 20 8 - 23 mg/dL    Creatinine 1.17 0.76 - 1.27 mg/dL    Sodium 136 136 - 145 mmol/L    Potassium 3.8 3.5 - 5.2 mmol/L    Chloride 101 98 - 107 mmol/L    CO2 26.0 22.0 - 29.0 mmol/L    Calcium 8.2 (L) 8.6 - 10.5 mg/dL    Total Protein 5.9 (L) 6.0 - 8.5 g/dL    Albumin 3.2 (L) 3.5 - 5.2 g/dL    ALT (SGPT) 11 1 - 41 U/L    AST (SGOT) 12 1 - 40 U/L    Alkaline Phosphatase 93 39 - 117 U/L    Total Bilirubin 0.6 0.0 - 1.2 mg/dL    Globulin 2.7 gm/dL    A/G Ratio 1.2 g/dL    BUN/Creatinine Ratio 17.1 7.0 - 25.0    Anion Gap 9.0 5.0 - 15.0 mmol/L    eGFR 66.6 >60.0 mL/min/1.73   CBC Auto Differential    Specimen: Blood   Result Value Ref Range     WBC 9.48 3.40 - 10.80 10*3/mm3    RBC 2.95 (L) 4.14 - 5.80 10*6/mm3    Hemoglobin 7.4 (L) 13.0 - 17.7 g/dL    Hematocrit 24.5 (L) 37.5 - 51.0 %    MCV 83.1 79.0 - 97.0 fL    MCH 25.1 (L) 26.6 - 33.0 pg    MCHC 30.2 (L) 31.5 - 35.7 g/dL    RDW 15.8 (H) 12.3 - 15.4 %    RDW-SD 47.6 37.0 - 54.0 fl    MPV 11.3 6.0 - 12.0 fL    Platelets 220 140 - 450 10*3/mm3    Neutrophil % 71.3 42.7 - 76.0 %    Lymphocyte % 13.9 (L) 19.6 - 45.3 %    Monocyte % 11.2 5.0 - 12.0 %    Eosinophil % 2.8 0.3 - 6.2 %    Basophil % 0.3 0.0 - 1.5 %    Immature Grans % 0.5 0.0 - 0.5 %    Neutrophils, Absolute 6.75 1.70 - 7.00 10*3/mm3    Lymphocytes, Absolute 1.32 0.70 - 3.10 10*3/mm3    Monocytes, Absolute 1.06 (H) 0.10 - 0.90 10*3/mm3    Eosinophils, Absolute 0.27 0.00 - 0.40 10*3/mm3    Basophils, Absolute 0.03 0.00 - 0.20 10*3/mm3    Immature Grans, Absolute 0.05 0.00 - 0.05 10*3/mm3    nRBC 0.0 0.0 - 0.2 /100 WBC   Hemoglobin & Hematocrit, Blood    Specimen: Blood   Result Value Ref Range    Hemoglobin 7.8 (L) 13.0 - 17.7 g/dL    Hematocrit 26.2 (L) 37.5 - 51.0 %   Hemoglobin & Hematocrit, Blood    Specimen: Blood   Result Value Ref Range    Hemoglobin 8.4 (L) 13.0 - 17.7 g/dL    Hematocrit 28.0 (L) 37.5 - 51.0 %   Type & Screen    Specimen: Arm, Left; Blood   Result Value Ref Range    ABO Type A     RH type Positive     Antibody Screen Negative     T&S Expiration Date 2/11/2023 11:59:59 PM    Prepare RBC, 2 Units   Result Value Ref Range    Product Code O2529S49     Unit Number [id number]     UNIT  ABO A     UNIT  RH POS     Crossmatch Interpretation Compatible     Dispense Status PT     Blood Expiration Date 202302272359     Blood Type Barcode 6200     Product Code B4796B15     Unit Number [id number]     UNIT  ABO A     UNIT  RH POS     Crossmatch Interpretation Compatible     Dispense Status XM     Blood Expiration Date 202302272359     Blood Type Barcode 6200    Green Top (Gel)   Result Value Ref Range    Extra Tube Hold for  add-ons.    Lavender Top   Result Value Ref Range    Extra Tube hold for add-on    Gold Top - SST   Result Value Ref Range    Extra Tube Hold for add-ons.    Light Blue Top   Result Value Ref Range    Extra Tube Hold for add-ons.          XR Chest 1 View    Result Date: 2/8/2023    No acute cardiopulmonary findings identified.  This report was finalized on 2/8/2023 12:39 PM by Dr. Daniel Bateman MD.                                   Medical Decision Making  72-year-old male presents secondary to anemia.  Patient was sent by primary care.  He arrived by private vehicle.  He states that recently has had progressively worsening fatigue and shortness of breath is worse with exertion.  He denies any current chest pain pressure tightness or squeezing.  He denies any abdominal pain.  Patient denies any dark tarry stools or bright red blood per rectum.  Patient is on anticoagulation.  Denies any abdominal pain.  No back pain.  He voices no other complaints this time.  Patient does have a history of atrial fib.  He has had a history of stroke along with COPD coronary artery disease and hypertension.    Amount and/or Complexity of Data Reviewed  Labs: ordered.  Radiology: ordered.      Risk  Decision regarding hospitalization.          Final diagnoses:   Symptomatic anemia       ED Disposition  ED Disposition     ED Disposition   Decision to Admit    Condition   --    Comment   Level of Care: Telemetry [5]   Diagnosis: Symptomatic anemia [[id number]]   Certification: I Certify That Inpatient Hospital Services Are Medically Necessary For Greater Than 2 Midnights               No follow-up provider specified.       Medication List      No changes were made to your prescriptions during this visit.          Marcellus Soto PA  02/09/23 3791

## 2023-02-11 ENCOUNTER — READMISSION MANAGEMENT (OUTPATIENT)
Dept: CALL CENTER | Facility: HOSPITAL | Age: 73
End: 2023-02-11
Payer: MEDICARE

## 2023-02-11 VITALS
DIASTOLIC BLOOD PRESSURE: 71 MMHG | HEIGHT: 72 IN | RESPIRATION RATE: 18 BRPM | SYSTOLIC BLOOD PRESSURE: 105 MMHG | HEART RATE: 77 BPM | OXYGEN SATURATION: 97 % | WEIGHT: 246.5 LBS | TEMPERATURE: 97.9 F | BODY MASS INDEX: 33.39 KG/M2

## 2023-02-11 LAB
ALBUMIN SERPL-MCNC: 3.2 G/DL (ref 3.5–5.2)
ALBUMIN/GLOB SERPL: 1.1 G/DL
ALP SERPL-CCNC: 93 U/L (ref 39–117)
ALT SERPL W P-5'-P-CCNC: 8 U/L (ref 1–41)
ANION GAP SERPL CALCULATED.3IONS-SCNC: 8.6 MMOL/L (ref 5–15)
AST SERPL-CCNC: 16 U/L (ref 1–40)
BH BB BLOOD EXPIRATION DATE: NORMAL
BH BB BLOOD EXPIRATION DATE: NORMAL
BH BB BLOOD TYPE BARCODE: 6200
BH BB BLOOD TYPE BARCODE: 6200
BH BB DISPENSE STATUS: NORMAL
BH BB DISPENSE STATUS: NORMAL
BH BB PRODUCT CODE: NORMAL
BH BB PRODUCT CODE: NORMAL
BH BB UNIT NUMBER: NORMAL
BH BB UNIT NUMBER: NORMAL
BILIRUB SERPL-MCNC: 0.8 MG/DL (ref 0–1.2)
BUN SERPL-MCNC: 14 MG/DL (ref 8–23)
BUN/CREAT SERPL: 12.1 (ref 7–25)
CALCIUM SPEC-SCNC: 8.1 MG/DL (ref 8.6–10.5)
CHLORIDE SERPL-SCNC: 106 MMOL/L (ref 98–107)
CO2 SERPL-SCNC: 23.4 MMOL/L (ref 22–29)
CREAT SERPL-MCNC: 1.16 MG/DL (ref 0.76–1.27)
CROSSMATCH INTERPRETATION: NORMAL
CROSSMATCH INTERPRETATION: NORMAL
DEPRECATED RDW RBC AUTO: 48.4 FL (ref 37–54)
EGFRCR SERPLBLD CKD-EPI 2021: 67.3 ML/MIN/1.73
ERYTHROCYTE [DISTWIDTH] IN BLOOD BY AUTOMATED COUNT: 16.2 % (ref 12.3–15.4)
GLOBULIN UR ELPH-MCNC: 3 GM/DL
GLUCOSE SERPL-MCNC: 108 MG/DL (ref 65–99)
HCT VFR BLD AUTO: 29.4 % (ref 37.5–51)
HGB BLD-MCNC: 9.3 G/DL (ref 13–17.7)
MAGNESIUM SERPL-MCNC: 2 MG/DL (ref 1.6–2.4)
MCH RBC QN AUTO: 25.8 PG (ref 26.6–33)
MCHC RBC AUTO-ENTMCNC: 31.6 G/DL (ref 31.5–35.7)
MCV RBC AUTO: 81.4 FL (ref 79–97)
PHOSPHATE SERPL-MCNC: 3.2 MG/DL (ref 2.5–4.5)
PLATELET # BLD AUTO: 193 10*3/MM3 (ref 140–450)
PMV BLD AUTO: 11.2 FL (ref 6–12)
POTASSIUM SERPL-SCNC: 3.7 MMOL/L (ref 3.5–5.2)
PROT SERPL-MCNC: 6.2 G/DL (ref 6–8.5)
RBC # BLD AUTO: 3.61 10*6/MM3 (ref 4.14–5.8)
SODIUM SERPL-SCNC: 138 MMOL/L (ref 136–145)
UNIT  ABO: NORMAL
UNIT  ABO: NORMAL
UNIT  RH: NORMAL
UNIT  RH: NORMAL
WBC NRBC COR # BLD: 9.03 10*3/MM3 (ref 3.4–10.8)

## 2023-02-11 PROCEDURE — 84100 ASSAY OF PHOSPHORUS: CPT | Performed by: INTERNAL MEDICINE

## 2023-02-11 PROCEDURE — 99239 HOSP IP/OBS DSCHRG MGMT >30: CPT | Performed by: INTERNAL MEDICINE

## 2023-02-11 PROCEDURE — 94761 N-INVAS EAR/PLS OXIMETRY MLT: CPT

## 2023-02-11 PROCEDURE — 80053 COMPREHEN METABOLIC PANEL: CPT | Performed by: INTERNAL MEDICINE

## 2023-02-11 PROCEDURE — 94799 UNLISTED PULMONARY SVC/PX: CPT

## 2023-02-11 PROCEDURE — 83735 ASSAY OF MAGNESIUM: CPT | Performed by: INTERNAL MEDICINE

## 2023-02-11 PROCEDURE — 85027 COMPLETE CBC AUTOMATED: CPT | Performed by: INTERNAL MEDICINE

## 2023-02-11 RX ORDER — PANTOPRAZOLE SODIUM 40 MG/1
40 TABLET, DELAYED RELEASE ORAL 2 TIMES DAILY
Qty: 28 TABLET | Refills: 0 | Status: SHIPPED | OUTPATIENT
Start: 2023-02-11 | End: 2023-02-14

## 2023-02-11 RX ADMIN — PANTOPRAZOLE SODIUM 40 MG: 40 INJECTION, POWDER, FOR SOLUTION INTRAVENOUS at 08:17

## 2023-02-11 RX ADMIN — Medication 10 ML: at 08:17

## 2023-02-11 RX ADMIN — SERTRALINE 150 MG: 50 TABLET, FILM COATED ORAL at 08:17

## 2023-02-11 RX ADMIN — BUDESONIDE AND FORMOTEROL FUMARATE DIHYDRATE 2 PUFF: 160; 4.5 AEROSOL RESPIRATORY (INHALATION) at 06:55

## 2023-02-11 NOTE — PROGRESS NOTES
[facility] HOSPITALIST PROGRESS NOTE     Patient Identification:  Name:  Zaid Galarza  Age:  72 y.o.  Sex:  male  :  1950  MRN:  [id number]  Visit Number:  [id number]  ROOM: [address] [place] [address]     Primary Care Provider:  Rosi Thacker APRN     Date of Admission: 2023    Length of stay in inpatient status:  2    Subjective     Chief Compliant:    Chief Complaint   Patient presents with   • Abnormal Lab     History of Presenting Illness:  The patient denies any bloody bowel movements nor any black bowel movements; he also denies hematemesis nor hematuria.  He does not have any chest pain nor trouble breathing.  He has tolerated the 2 units of PRBCs without any edema and without any trouble breathing.    Objective     Current Hospital Meds:  atorvastatin, 80 mg, Oral, Nightly  budesonide-formoterol, 2 puff, Inhalation, BID - RT  pantoprazole, 40 mg, Intravenous, Q12H  sertraline, 150 mg, Oral, Daily  sodium chloride, 10 mL, Intravenous, Q12H       Current Antimicrobial Therapy:  Anti-Infectives (From admission, onward)    None        Current Diuretic Therapy:  Diuretics (From admission, onward)    Ordered     Dose/Rate Route Frequency Start Stop    23  furosemide (LASIX) injection 20 mg        Ordering Provider: Marcellus Munoz Jr., MD    20 mg Intravenous Once 23        ----------------------------------------------------------------------------------------------------------------------  Vital Signs:  Temp:  [97.5 °F (36.4 °C)-98.1 °F (36.7 °C)] 98.1 °F (36.7 °C)  Heart Rate:  [67-82] 76  Resp:  [1-18] 8  BP: (104-139)/(65-89) 132/80  SpO2:  [96 %-99 %] 99 %  on   ;   Device (Oxygen Therapy): room air  Body mass index is 33.31 kg/m².    Wt Readings from Last 3 Encounters:   02/10/23 111 kg (245 lb 9.6 oz)   22 109 kg (241 lb 3.2 oz)   10/06/22 93.9 kg (207 lb)     Intake & Output (last 3 days)        0701   0700  0701  02/10 0700  02/10 0701  02/11 0700    P.O. 240 360 600    Blood 300  609.6    Total Intake(mL/kg) 540 (4.8) 360 (3.2) 1209.6 (10.9)    Urine (mL/kg/hr) 1660 600 (0.2)     Stool 0      Total Output 1660 600     Net -1120 -240 +1209.6           Urine Unmeasured Occurrence 2 x 4 x 2 x    Stool Unmeasured Occurrence 1 x 2 x 1 x        Diet: Liquid Diets; Clear Liquid; Texture: Regular Texture (IDDSI 7); Fluid Consistency: Thin (IDDSI 0)  ----------------------------------------------------------------------------------------------------------------------  Physical Exam; the exam is the same as yesterday.  Constitutional:       General: He is awake. He is not in acute distress.     Appearance: He is well-developed. He is obese. He is not ill-appearing, toxic-appearing or diaphoretic.   Cardiovascular:      Rate and Rhythm: Normal rate and regular rhythm.      Pulses: Normal pulses.      Heart sounds: No murmur heard.  Pulmonary:      Effort: Pulmonary effort is normal. No respiratory distress.      Breath sounds: No wheezing or rales.   Neurological:      Mental Status: He is alert and oriented to person, place, and time. Mental status is at baseline.      Cranial Nerves: No cranial nerve deficit.   ----------------------------------------------------------------------------------------------------------------------  Tele:  Aflutter with heart rates 70's.  I have personally reviewed/looked at the telemetry strips.  ----------------------------------------------------------------------------------------------------------------------  LABS:    CBC and coagulation:  Results from last 7 days   Lab Units 02/10/23  1920 02/10/23  0255 02/09/23 1537 02/09/23 0823 02/09/23  0006 02/08/23  2044 02/08/23  1532 02/08/23  1146   WBC 10*3/mm3  --   --   --   --  9.48  --   --  10.93*   HEMOGLOBIN g/dL 9.7* 7.3* 8.4* 7.8* 7.4*  7.4* 7.9* 7.2* 7.5*   HEMATOCRIT % 31.1* 25.0* 28.0* 26.2* 24.5*  24.5* 27.2* 24.5* 25.3*   MCV fL  --   --   --    --  83.1  --   --  82.1   MCHC g/dL  --   --   --   --  30.2*  --   --  29.6*   PLATELETS 10*3/mm3  --   --   --   --  220  --   --  267     Renal and electrolytes:  Results from last 7 days   Lab Units 02/10/23  0255 02/09/23  0006 02/08/23  1146   SODIUM mmol/L 141 136 139   POTASSIUM mmol/L 4.4 3.8 4.4   MAGNESIUM mg/dL 2.0  --   --    CHLORIDE mmol/L 107 101 104   CO2 mmol/L 26.6 26.0 25.6   BUN mg/dL 19 20 21   CREATININE mg/dL 1.24 1.17 1.27   CALCIUM mg/dL 8.2* 8.2* 8.8   PHOSPHORUS mg/dL 3.4  --   --    GLUCOSE mg/dL 108* 113* 117*     Estimated Creatinine Clearance: 70.3 mL/min (by C-G formula based on SCr of 1.24 mg/dL).      I have personally looked at the labs and they are summarized above.    Assessment & Plan      -Symptomatic normocytic anemia that was present on admission  -Suspect chronic kidney disease stage IIIa with baseline creatinine 1.1-1.2, which places the patient at risk for anemia of chronic disease  -History of persistent atrial fibrillation  -History of CVA  -History of congestive heart failure with recovered ejection fraction and dilated cardiomyopathy, currently not in an acute exacerbation  -History of COPD, currently not in an acute exacerbation  -History of essential hypertension  -History of nonobstructive coronary artery disease per left heart catheterization 10/3/2019  -History of prior smoking abuse, quit 9/1/2022    Since the hemoglobin level has not changed after 2 units; thus, I will give him 2 more units of PRBCs today and repeat the labs in the am.  No immediate indication for an urgent EGD/colonoscopy.  The blood pressures and the glucose levels are all at goal.    VTE Prophylaxis:   Mechanical Order History:      Ordered        02/08/23 1521  Place Sequential Compression Device  Once            02/08/23 1521  Maintain Sequential Compression Device  Continuous                    Pharmalogical Order History:     None      The patient is high risk due to the following  diagnoses/reasons: Symptomatic anemia     Disposition: Home    Amelia Nolasco MD  [facility] Hospitalist  02/10/23  20:18 EST

## 2023-02-11 NOTE — DISCHARGE INSTR - APPOINTMENTS
[facility]FFANIE Go office is closed on the weekend will call on Monday, February 13th and get appointment and call pt with appointment. Pt  has an  appointment with TIFFANIE Walker  for Tuesday, February 14th at 11 am.   Pt  will get  a referal  when  goes [facility]  for  dr bedoya  for  iron  defficency  anemia   and  hematology.

## 2023-02-11 NOTE — PLAN OF CARE
Goal Outcome Evaluation:               Pt resting in bed at this time with no concerns or complaints. No s/s of acute distress noted. VSS. Will continue to follow plan of care.

## 2023-02-11 NOTE — DISCHARGE SUMMARY
[facility] HOSPITALISTS DISCHARGE SUMMARY    Patient Identification:  Name:  Zaid Galarza  Age:  72 y.o.  Sex:  male  :  1950  MRN:  [id number]  Visit Number:  [id number]    Date of Admission: 2023  Date of Discharge: 2023    PCP: Rosi Thacker, TIFFANIE    DISCHARGE DIAGNOSES  -Symptomatic normocytic anemia that was present on admission  -Suspect chronic kidney disease stage IIIa with baseline creatinine 1.1-1.2, which places the patient at risk for anemia of chronic disease  -History of persistent atrial fibrillation  -History of CVA  -History of congestive heart failure with recovered ejection fraction and dilated cardiomyopathy, currently not in an acute exacerbation  -History of COPD, currently not in an acute exacerbation  -History of essential hypertension  -History of nonobstructive coronary artery disease per left heart catheterization 10/3/2019  -History of prior smoking abuse, quit 2022    CONSULTS   None    PROCEDURES PERFORMED  2023:  Transfusion of 2 units of PRBCs  2/10/2023:  Transfusion of 2 units of PRBCs    HOSPITAL COURSE  Patient is a 72 y.o. male presented to [facility] on 2023 at the request of his primary care provider for low hemoglobin level; the patient had initially went to his primary care provider to work-up fatigue and dyspnea on exertion.  The patient was found to have a hemoglobin level of 7.2, down from 13 in 2022.  Since the patient had symptomatic anemia, he was admitted to the telemetry floor for further evaluation and treatment.  Please see the admitting history and physical for further details.  The patient was initially given 2 units of packed red blood cells.  He was also placed on Protonix 40 mg twice a day.  However, his hemoglobin level did not increase past the 7 range.  Therefore, I gave the patient 2 more units of blood.  The patient finally had the expected two-point elevation in his hemoglobin  level.  At no point during the hospitalization did the patient have any hematemesis, hematochezia, hematuria, or black bowel movements.  The patient will need outpatient colonoscopy at his GI doctor's office; there was no acute indication to perform the endoscopies inpatient.  Patient understood to come back to the hospital if he had bloody or black bowel movements or if he developed the shortness of air and fatigue again.  The patient will need to take twice a day Protonix for the next 2 weeks.    On day of discharge, the patient was feeling back to his normal self.  He denied chest pain, trouble breathing, nausea, vomiting, and diarrhea.  He was able to ambulate without any difficulties.  He was able to eat his normal diet.  He was able to perform all of his activities of daily living.      VITAL SIGNS:  Temp:  [97.5 °F (36.4 °C)-98.1 °F (36.7 °C)] 97.9 °F (36.6 °C)  Heart Rate:  [67-82] 74  Resp:  [8-20] 20  BP: (104-145)/(69-89) 105/71  SpO2:  [94 %-99 %] 97 %  on   ;   Device (Oxygen Therapy): room air    Body mass index is 33.43 kg/m².  Wt Readings from Last 3 Encounters:   02/11/23 112 kg (246 lb 8 oz)   12/08/22 109 kg (241 lb 3.2 oz)   10/06/22 93.9 kg (207 lb)       PHYSICAL EXAM:  Constitutional:       General: He is awake. He is not in acute distress.     Appearance: He is well-developed. He is obese. He is not ill-appearing, toxic-appearing or diaphoretic.   HENT:      Head: Normocephalic and atraumatic.      Right Ear: External ear normal.      Left Ear: External ear normal.      Nose: Nose normal.   Eyes:      General: No scleral icterus.        Right eye: No discharge.         Left eye: No discharge.      Conjunctiva/sclera: Conjunctivae normal.      Pupils: Pupils are equal, round, and reactive to light.   Cardiovascular:      Rate and Rhythm: Normal rate and regular rhythm.      Pulses: Normal pulses.      Heart sounds: No murmur heard.  Pulmonary:      Effort: Pulmonary effort is normal. No  respiratory distress.      Breath sounds: No wheezing or rales.   Abdominal:      General: Bowel sounds are normal. There is no distension.      Palpations: Abdomen is soft.   Musculoskeletal:         General: No swelling, deformity or signs of injury.   Skin:     Capillary Refill: Capillary refill takes less than 2 seconds.      Coloration: Skin is not jaundiced or pale.      Findings: No bruising or rash.   Neurological:      Mental Status: He is alert and oriented to person, place, and time. Mental status is at baseline.      Cranial Nerves: No cranial nerve deficit.   Psychiatric:         Mood and Affect: Mood normal.         Behavior: Behavior normal. Behavior is cooperative.        DISCHARGE DISPOSITION   Stable       Discharge Medications      New Medications      Instructions Start Date   pantoprazole 40 MG EC tablet  Commonly known as: PROTONIX   40 mg, Oral, 2 Times Daily         Continue These Medications      Instructions Start Date   atorvastatin 80 MG tablet  Commonly known as: LIPITOR   80 mg, Oral, Nightly      sertraline 100 MG tablet  Commonly known as: ZOLOFT   150 mg, Oral, Daily      Symbicort 160-4.5 MCG/ACT inhaler  Generic drug: budesonide-formoterol   2 puffs, Inhalation, 2 Times Daily - RT         Stop These Medications    apixaban 5 MG tablet tablet  Commonly known as: ELIQUIS     aspirin 81 MG EC tablet     Entresto  MG tablet  Generic drug: sacubitril-valsartan     metoprolol tartrate 25 MG tablet  Commonly known as: LOPRESSOR            Diet Instructions     Diet: Regular, Cardiac      Discharge Diet:  Regular  Cardiac           Activity Instructions     Activity as Tolerated          Additional Instructions for the Follow-ups that You Need to Schedule     Discharge Follow-up with PCP   As directed     Currently Documented PCP:    Rosi Thacker APRN    PCP Phone Number:    [phone]     Follow Up Details: 3 days; will need a CBC at that time             Discharge  Follow-up with PCP   As directed     Currently Documented PCP:    Rosi Thacker APRN    PCP Phone Number:    [phone]     Follow Up Details: 14 days             Discharge Follow-up with Specified Provider: Dr. Crawford as soon as possible for endoscopies   As directed      To: Dr. Crawford as soon as possible for endoscopies             Discharge Follow-up with Specified Provider: Dr. Crawford for endoscopy to work up iron deficiency anemia; quickest appointment   As directed      To: Dr. Crawford for endoscopy to work up iron deficiency anemia; quickest appointment             Discharge Follow-up with Specified Provider: Hematology to work up the iron deficiency anemia; 6 Weeks   As directed      To: Hematology to work up the iron deficiency anemia    Follow Up: 6 Weeks            Follow-up Information     Rosi Thacker APRN .    Specialty: Nurse Practitioner  Why: 3 days; will need a CBC at that time  Contact information:  [address]  [facility] [address]  [phone]           TEST  RESULTS PENDING AT DISCHARGE:  None       CODE STATUS  Code Status and Medical Interventions:   Ordered at: 02/09/23 2232     Level Of Support Discussed With:    Patient     Code Status (Patient has no pulse and is not breathing):    CPR (Attempt to Resuscitate)     Medical Interventions (Patient has pulse or is breathing):    Full Support     Release to patient:    Routine Release       The ASCVD Risk score (Zander DK, et al., 2019) failed to calculate for the following reasons:    The patient has a prior MI or stroke diagnosis       Amelia Nolasco MD  [facility]  02/11/23  08:46 EST    Please note that this discharge summary required more than 30 minutes to complete.

## 2023-02-12 LAB
BH BB BLOOD EXPIRATION DATE: NORMAL
BH BB BLOOD EXPIRATION DATE: NORMAL
BH BB BLOOD TYPE BARCODE: 6200
BH BB BLOOD TYPE BARCODE: 6200
BH BB DISPENSE STATUS: NORMAL
BH BB DISPENSE STATUS: NORMAL
BH BB PRODUCT CODE: NORMAL
BH BB PRODUCT CODE: NORMAL
BH BB UNIT NUMBER: NORMAL
BH BB UNIT NUMBER: NORMAL
CROSSMATCH INTERPRETATION: NORMAL
CROSSMATCH INTERPRETATION: NORMAL
UNIT  ABO: NORMAL
UNIT  ABO: NORMAL
UNIT  RH: NORMAL
UNIT  RH: NORMAL

## 2023-02-12 NOTE — OUTREACH NOTE
Prep Survey    Flowsheet Row Responses   [facility] facility patient discharged from? [facility]   Is LACE score < 7 ? No   Eligibility Readm Mgmt   Discharge diagnosis Symptomatic anemia   Does the patient have one of the following disease processes/diagnoses(primary or secondary)? Other   Does the patient have Home health ordered? No   Is there a DME ordered? No   Prep survey completed? Yes          Carolina SOLER - Registered Nurse

## 2023-02-13 ENCOUNTER — TELEPHONE (OUTPATIENT)
Dept: TELEMETRY | Facility: HOSPITAL | Age: 73
End: 2023-02-13
Payer: MEDICARE

## 2023-02-14 ENCOUNTER — OFFICE VISIT (OUTPATIENT)
Dept: CARDIOLOGY | Facility: CLINIC | Age: 73
End: 2023-02-14
Payer: MEDICARE

## 2023-02-14 ENCOUNTER — READMISSION MANAGEMENT (OUTPATIENT)
Dept: CALL CENTER | Facility: HOSPITAL | Age: 73
End: 2023-02-14
Payer: MEDICARE

## 2023-02-14 VITALS
DIASTOLIC BLOOD PRESSURE: 81 MMHG | SYSTOLIC BLOOD PRESSURE: 151 MMHG | BODY MASS INDEX: 33.59 KG/M2 | HEART RATE: 101 BPM | OXYGEN SATURATION: 95 % | WEIGHT: 248 LBS | HEIGHT: 72 IN

## 2023-02-14 DIAGNOSIS — I10 ESSENTIAL HYPERTENSION: Chronic | ICD-10-CM

## 2023-02-14 DIAGNOSIS — E78.5 DYSLIPIDEMIA, GOAL LDL BELOW 100: Chronic | ICD-10-CM

## 2023-02-14 DIAGNOSIS — I42.0 DILATED CARDIOMYOPATHY: Primary | Chronic | ICD-10-CM

## 2023-02-14 DIAGNOSIS — I25.10 CORONARY ARTERY DISEASE INVOLVING NATIVE CORONARY ARTERY OF NATIVE HEART WITHOUT ANGINA PECTORIS: Chronic | ICD-10-CM

## 2023-02-14 PROCEDURE — 99214 OFFICE O/P EST MOD 30 MIN: CPT | Performed by: NURSE PRACTITIONER

## 2023-02-14 RX ORDER — PANTOPRAZOLE SODIUM 40 MG/1
40 TABLET, DELAYED RELEASE ORAL DAILY
Qty: 90 TABLET | Refills: 3 | Status: SHIPPED | OUTPATIENT
Start: 2023-02-14 | End: 2023-02-27 | Stop reason: SDUPTHER

## 2023-02-14 RX ORDER — SACUBITRIL AND VALSARTAN 49; 51 MG/1; MG/1
1 TABLET, FILM COATED ORAL 2 TIMES DAILY
Qty: 60 TABLET | Refills: 11
Start: 2023-02-14 | End: 2023-02-14

## 2023-02-14 NOTE — PROGRESS NOTES
"Chief Complaint  Atrial Fibrillation (F/U), Coronary Artery Disease (F/U), Cardiomyopathy (F/U), Follow-up (Pt denies CP, stay SOA, fatigue, leg/ankle swelling), and MEDICATION REVIEW (Pt brought med list,tolerating all meds well.)    Subjective          Zaid Galarza presents to [facility] CARDIOLOGY for follow up.    History of Present Illness    Zaid was last seen in clinic on 12/8/2022.  He was hospitalized last week with anemia.  He did receive 4 units of PRBCs during his hospitalization.  He was discharged on Saturday, 2/11/2023, with instructions for follow-up with hematology and gastroenterology as well as his primary care provider.    Zaid is accompanied by his sister for today's visit.  He denies any chest pain or palpitations.  He states that he has been doing okay since his hospitalization.  Of note I did see Zaid during his hospitalization, on the date of discharge, and he had mentioned that he was taken off of his blood pressure medications as well as his anticoagulation medication.  I instructed him to follow those discharge orders and we would adjust as needed when he would be seen in 3 days.    Zaid tells me that he has not heard from Dr. Jalloh's office or from hematology about follow-up yet.  He also tells me he did not restart any of his blood pressure medications and he has not been taking his Eliquis as instructed.    He tells me he has not had any bright red blood per rectum or black tarry stools since discharge from hospital.    Objective     Vital Signs:   /81   Pulse 101   Ht 182.9 cm (72\")   Wt 112 kg (248 lb)   SpO2 95%   BMI 33.63 kg/m²       Physical Exam  Vitals reviewed.   Constitutional:       Appearance: Normal appearance. He is well-developed.   Cardiovascular:      Rate and Rhythm: Normal rate and regular rhythm.      Heart sounds: No murmur heard.    No friction rub. No gallop.   Pulmonary:      Effort: Pulmonary effort is normal. No respiratory " distress.      Breath sounds: Normal breath sounds. No wheezing or rales.   Skin:     General: Skin is warm and dry.   Neurological:      Mental Status: He is alert and oriented to person, place, and time.   Psychiatric:         Mood and Affect: Mood normal.         Behavior: Behavior normal.          Result Review :                Current Outpatient Medications   Medication Sig Dispense Refill   • atorvastatin (LIPITOR) 80 MG tablet Take 1 tablet by mouth Every Night. 90 tablet    • sertraline (ZOLOFT) 100 MG tablet Take 1.5 tablets by mouth Daily.     • Symbicort 160-4.5 MCG/ACT inhaler Inhale 2 puffs 2 (Two) Times a Day.     • metoprolol tartrate (LOPRESSOR) 25 MG tablet Take 1 tablet by mouth 2 (Two) Times a Day. 180 tablet 3   • pantoprazole (Protonix) 40 MG EC tablet Take 1 tablet by mouth Daily. 90 tablet 3   • sacubitril-valsartan (ENTRESTO)  MG tablet Take 1 tablet by mouth 2 (Two) Times a Day. 60 tablet      No current facility-administered medications for this visit.            Assessment and Plan    Problem List Items Addressed This Visit        Cardiac and Vasculature    Dilated cardiomyopathy (HCC) - Primary (Chronic)    Overview     · TTE: LVEF 26 to 30%, RV and LV mildly dilated, grade 3 diastolic dysfunction consistent with fixed restrictive pattern, mild TR, left atrial cavity mildly dilated right atrial cavity moderately dilated, moderate pulmonary hypertension with RVSP 47 mmHg  · 9/30/2019 ANGELITO: Severe cardiomyopathy likely from atrial flutter  · 2/11/2020 TTE LVEF 36 to 40%  · 5/6/2020 TTE LVEF 56 to 60%, mild concentric LVH  · 9/2/2022 TTE: LVEF 51 to 55%, moderate sclerosis of both the mitral and aortic valve with no significant regurgitation noted.  Left atrium is mildly enlarged.         Relevant Medications    metoprolol tartrate (LOPRESSOR) 25 MG tablet    sacubitril-valsartan (ENTRESTO)  MG tablet    Nonobstructive CAD per cath 10/3/2019 (Chronic)    Overview     · 10/4/2019  LHC: Nonobstructive coronary artery disease         Relevant Medications    metoprolol tartrate (LOPRESSOR) 25 MG tablet    sacubitril-valsartan (ENTRESTO)  MG tablet    Essential hypertension (Chronic)    Relevant Medications    metoprolol tartrate (LOPRESSOR) 25 MG tablet    Dyslipidemia, goal LDL below 100 (Chronic)    Overview     · 9/2/2022 total cholesterol 104, triglycerides 89, HDL 31, and LDL 55                Follow Up     Medications were reviewed with the patient.    Discharge creatinine was 1.16.  I am going to restart him on his Entresto and metoprolol.  This should help with both his elevated blood pressure and heart rate as noted on vital signs today.    Cardiomyopathy is stable.  Continue Entresto, metoprolol.    Nonobstructive CAD is stable.    With regard to patient's atrial fibrillation, this is stable as well.  Patient is currently not on anticoagulation secondary to recent diagnosis of anemia and pending GI work-up.    Return in about 6 months (around 8/14/2023).    Patient was given instructions and counseling regarding his condition or for health maintenance advice. Please see specific information pulled into the AVS if appropriate.

## 2023-02-14 NOTE — OUTREACH NOTE
Medical Week 1 Survey    Flowsheet Row Responses   [facility] patient discharged from? [facility]   Does the patient have one of the following disease processes/diagnoses(primary or secondary)? Other   Week 1 attempt successful? Yes   Call start time 1349   Call end time 1351   Discharge diagnosis Symptomatic anemia   Meds reviewed with patient/caregiver? Yes   Is the patient having any side effects they believe may be caused by any medication additions or changes? No   Does the patient have all medications ordered at discharge? Yes   Is the patient taking all medications as directed (includes completed medication regime)? Yes   Does the patient have a primary care provider?  Yes   Does the patient have an appointment with their PCP within 7 days of discharge? Yes   Has the patient kept scheduled appointments due by today? N/A   Has home health visited the patient within 72 hours of discharge? N/A   Psychosocial issues? No   Did the patient receive a copy of their discharge instructions? Yes   Nursing interventions Reviewed instructions with patient   What is the patient's perception of their health status since discharge? Same   Week 1 call completed? Yes          Charu WATKINS - Registered Nurse

## 2023-02-22 ENCOUNTER — READMISSION MANAGEMENT (OUTPATIENT)
Dept: CALL CENTER | Facility: HOSPITAL | Age: 73
End: 2023-02-22
Payer: MEDICARE

## 2023-02-22 NOTE — OUTREACH NOTE
Medical Week 2 Survey    Flowsheet Row Responses   [facility] [place] patient discharged from? [facility]   Does the patient have one of the following disease processes/diagnoses(primary or secondary)? Other   Week 2 attempt successful? Yes   Call start time 1343   Discharge diagnosis Symptomatic anemia   Call end time 1351   Meds reviewed with patient/caregiver? Yes   Is the patient taking all medications as directed (includes completed medication regime)? Yes   Comments regarding appointments 2/23/23 hem/onc apt,  cards apt on 3/14/23   Has the patient kept scheduled appointments due by today? N/A   Has home health visited the patient within 72 hours of discharge? N/A   Psychosocial issues? No   What is the patient's perception of their health status since discharge? Improving   Is the patient/caregiver able to teach back signs and symptoms related to disease process for when to call PCP? Yes   Is the patient/caregiver able to teach back signs and symptoms related to disease process for when to call 911? Yes   Is the patient/caregiver able to teach back the hierarchy of who to call/visit for symptoms/problems? PCP, Specialist, Home health nurse, Urgent Care, ED, 911 Yes   Week 2 Call Completed? Yes          Irma H - Registered Nurse

## 2023-02-23 ENCOUNTER — CONSULT (OUTPATIENT)
Dept: ONCOLOGY | Facility: CLINIC | Age: 73
End: 2023-02-23
Payer: MEDICARE

## 2023-02-23 ENCOUNTER — LAB (OUTPATIENT)
Dept: ONCOLOGY | Facility: CLINIC | Age: 73
End: 2023-02-23
Payer: MEDICARE

## 2023-02-23 VITALS
RESPIRATION RATE: 18 BRPM | HEART RATE: 106 BPM | DIASTOLIC BLOOD PRESSURE: 68 MMHG | TEMPERATURE: 97.3 F | SYSTOLIC BLOOD PRESSURE: 110 MMHG | HEIGHT: 72 IN | BODY MASS INDEX: 33.43 KG/M2 | WEIGHT: 246.8 LBS | OXYGEN SATURATION: 98 %

## 2023-02-23 DIAGNOSIS — D50.9 IRON DEFICIENCY ANEMIA, UNSPECIFIED IRON DEFICIENCY ANEMIA TYPE: ICD-10-CM

## 2023-02-23 DIAGNOSIS — D64.9 NORMOCYTIC ANEMIA: Primary | ICD-10-CM

## 2023-02-23 DIAGNOSIS — D64.9 NORMOCYTIC ANEMIA: ICD-10-CM

## 2023-02-23 LAB
ALBUMIN SERPL-MCNC: 3.6 G/DL (ref 3.5–5.2)
ALBUMIN/GLOB SERPL: 1 G/DL
ALP SERPL-CCNC: 115 U/L (ref 39–117)
ALT SERPL W P-5'-P-CCNC: 14 U/L (ref 1–41)
ANION GAP SERPL CALCULATED.3IONS-SCNC: 7.3 MMOL/L (ref 5–15)
AST SERPL-CCNC: 16 U/L (ref 1–40)
BASOPHILS # BLD AUTO: 0.06 10*3/MM3 (ref 0–0.2)
BASOPHILS NFR BLD AUTO: 0.7 % (ref 0–1.5)
BILIRUB SERPL-MCNC: 0.2 MG/DL (ref 0–1.2)
BILIRUB UR QL STRIP: NEGATIVE
BUN SERPL-MCNC: 17 MG/DL (ref 8–23)
BUN/CREAT SERPL: 14 (ref 7–25)
CALCIUM SPEC-SCNC: 9.3 MG/DL (ref 8.6–10.5)
CHLORIDE SERPL-SCNC: 106 MMOL/L (ref 98–107)
CLARITY UR: CLEAR
CO2 SERPL-SCNC: 28.7 MMOL/L (ref 22–29)
COLOR UR: YELLOW
CREAT SERPL-MCNC: 1.21 MG/DL (ref 0.76–1.27)
CRP SERPL-MCNC: <0.3 MG/DL (ref 0–0.5)
DEPRECATED RDW RBC AUTO: 47.7 FL (ref 37–54)
EGFRCR SERPLBLD CKD-EPI 2021: 64 ML/MIN/1.73
EOSINOPHIL # BLD AUTO: 0.47 10*3/MM3 (ref 0–0.4)
EOSINOPHIL NFR BLD AUTO: 5.3 % (ref 0.3–6.2)
ERYTHROCYTE [DISTWIDTH] IN BLOOD BY AUTOMATED COUNT: 15.9 % (ref 12.3–15.4)
ERYTHROCYTE [SEDIMENTATION RATE] IN BLOOD: 33 MM/HR (ref 0–20)
FERRITIN SERPL-MCNC: 25.76 NG/ML (ref 30–400)
GLOBULIN UR ELPH-MCNC: 3.5 GM/DL
GLUCOSE SERPL-MCNC: 118 MG/DL (ref 65–99)
GLUCOSE UR STRIP-MCNC: NEGATIVE MG/DL
HCT VFR BLD AUTO: 35.1 % (ref 37.5–51)
HGB BLD-MCNC: 10.4 G/DL (ref 13–17.7)
HGB UR QL STRIP.AUTO: NEGATIVE
IMM GRANULOCYTES # BLD AUTO: 0.03 10*3/MM3 (ref 0–0.05)
IMM GRANULOCYTES NFR BLD AUTO: 0.3 % (ref 0–0.5)
IRON 24H UR-MRATE: 22 MCG/DL (ref 59–158)
IRON SATN MFR SERPL: 6 % (ref 20–50)
KETONES UR QL STRIP: NEGATIVE
LDH SERPL-CCNC: 237 U/L (ref 135–225)
LEUKOCYTE ESTERASE UR QL STRIP.AUTO: ABNORMAL
LYMPHOCYTES # BLD AUTO: 1.75 10*3/MM3 (ref 0.7–3.1)
LYMPHOCYTES NFR BLD AUTO: 19.8 % (ref 19.6–45.3)
MCH RBC QN AUTO: 24.2 PG (ref 26.6–33)
MCHC RBC AUTO-ENTMCNC: 29.6 G/DL (ref 31.5–35.7)
MCV RBC AUTO: 81.8 FL (ref 79–97)
MONOCYTES # BLD AUTO: 0.74 10*3/MM3 (ref 0.1–0.9)
MONOCYTES NFR BLD AUTO: 8.4 % (ref 5–12)
NEUTROPHILS NFR BLD AUTO: 5.81 10*3/MM3 (ref 1.7–7)
NEUTROPHILS NFR BLD AUTO: 65.5 % (ref 42.7–76)
NITRITE UR QL STRIP: NEGATIVE
NRBC BLD AUTO-RTO: 0 /100 WBC (ref 0–0.2)
PH UR STRIP.AUTO: 6.5 [PH] (ref 5–8)
PLAT MORPH BLD: NORMAL
PLATELET # BLD AUTO: 201 10*3/MM3 (ref 140–450)
PMV BLD AUTO: 11.8 FL (ref 6–12)
POTASSIUM SERPL-SCNC: 4.6 MMOL/L (ref 3.5–5.2)
PROT SERPL-MCNC: 7.1 G/DL (ref 6–8.5)
PROT UR QL STRIP: ABNORMAL
RBC # BLD AUTO: 4.29 10*6/MM3 (ref 4.14–5.8)
RBC MORPH BLD: NORMAL
RETICS # AUTO: 0.05 10*6/MM3 (ref 0.02–0.13)
RETICS/RBC NFR AUTO: 1.2 % (ref 0.7–1.9)
SODIUM SERPL-SCNC: 142 MMOL/L (ref 136–145)
SP GR UR STRIP: 1.02 (ref 1–1.03)
TIBC SERPL-MCNC: 395 MCG/DL (ref 298–536)
TRANSFERRIN SERPL-MCNC: 265 MG/DL (ref 200–360)
TSH SERPL DL<=0.05 MIU/L-ACNC: 2.04 UIU/ML (ref 0.27–4.2)
UROBILINOGEN UR QL STRIP: ABNORMAL
WBC NRBC COR # BLD: 8.86 10*3/MM3 (ref 3.4–10.8)

## 2023-02-23 PROCEDURE — 83540 ASSAY OF IRON: CPT | Performed by: NURSE PRACTITIONER

## 2023-02-23 PROCEDURE — 82607 VITAMIN B-12: CPT | Performed by: NURSE PRACTITIONER

## 2023-02-23 PROCEDURE — 99215 OFFICE O/P EST HI 40 MIN: CPT | Performed by: NURSE PRACTITIONER

## 2023-02-23 PROCEDURE — 81003 URINALYSIS AUTO W/O SCOPE: CPT | Performed by: NURSE PRACTITIONER

## 2023-02-23 PROCEDURE — 82746 ASSAY OF FOLIC ACID SERUM: CPT | Performed by: NURSE PRACTITIONER

## 2023-02-23 PROCEDURE — 85007 BL SMEAR W/DIFF WBC COUNT: CPT | Performed by: NURSE PRACTITIONER

## 2023-02-23 PROCEDURE — 85652 RBC SED RATE AUTOMATED: CPT | Performed by: NURSE PRACTITIONER

## 2023-02-23 PROCEDURE — 84630 ASSAY OF ZINC: CPT | Performed by: NURSE PRACTITIONER

## 2023-02-23 PROCEDURE — 86364 TISS TRNSGLTMNASE EA IG CLAS: CPT | Performed by: NURSE PRACTITIONER

## 2023-02-23 PROCEDURE — 86140 C-REACTIVE PROTEIN: CPT | Performed by: NURSE PRACTITIONER

## 2023-02-23 PROCEDURE — 84466 ASSAY OF TRANSFERRIN: CPT | Performed by: NURSE PRACTITIONER

## 2023-02-23 PROCEDURE — 80053 COMPREHEN METABOLIC PANEL: CPT | Performed by: NURSE PRACTITIONER

## 2023-02-23 PROCEDURE — 84443 ASSAY THYROID STIM HORMONE: CPT | Performed by: NURSE PRACTITIONER

## 2023-02-23 PROCEDURE — 82525 ASSAY OF COPPER: CPT | Performed by: NURSE PRACTITIONER

## 2023-02-23 PROCEDURE — 83010 ASSAY OF HAPTOGLOBIN QUANT: CPT | Performed by: NURSE PRACTITIONER

## 2023-02-23 PROCEDURE — 83615 LACTATE (LD) (LDH) ENZYME: CPT | Performed by: NURSE PRACTITIONER

## 2023-02-23 PROCEDURE — 85025 COMPLETE CBC W/AUTO DIFF WBC: CPT | Performed by: NURSE PRACTITIONER

## 2023-02-23 PROCEDURE — 85045 AUTOMATED RETICULOCYTE COUNT: CPT | Performed by: NURSE PRACTITIONER

## 2023-02-23 PROCEDURE — 82728 ASSAY OF FERRITIN: CPT | Performed by: NURSE PRACTITIONER

## 2023-02-23 RX ORDER — AMOXICILLIN 250 MG
1 CAPSULE ORAL 2 TIMES DAILY PRN
Qty: 60 TABLET | Refills: 1 | Status: SHIPPED | OUTPATIENT
Start: 2023-02-23

## 2023-02-23 RX ORDER — FERROUS SULFATE 325(65) MG
325 TABLET ORAL 2 TIMES DAILY
Qty: 60 TABLET | Refills: 2 | Status: SHIPPED | OUTPATIENT
Start: 2023-02-23

## 2023-02-23 NOTE — PROGRESS NOTES
Venipuncture Blood Specimen Collection  Venipuncture performed in left arm by Gail Armstrong MA with good hemostasis. Patient tolerated the procedure well without complications.   02/23/23   Gail Armstrong MA

## 2023-02-23 NOTE — PROGRESS NOTES
DATE OF CONSULTATION:  2/23/2023    REASON FOR REFERRAL: MUKESH    REFERRING PHYSICIAN:  BROCK Venegas*    CHIEF COMPLAINT:  Weakness, Fatigue, Shortness of Breath on Exertion, Dizziness      HISTORY OF PRESENT ILLNESS:   Zaid Galarza is a very pleasant 72 y.o. male who is being seen today at the request of TRAVIS Venegas for evaluation and treatment of MUKESH. Mr. Galarza reports following with his PCP every 3 months with lab testing. He recently had labs drawn at his PCP office on 06/06/2023 showed Hg (7.8) and he was told he should go to [facility] ED. He presented to [facility] ED and was admitted and during admission he received a total of 4 units PRBCs. Of note, prior to this admission is H/H were within normal. General surgery was consulted while inpatient and they recommended to have EGD/colonoscopy as an outpatient since patient had no sign of acute blood loss. Iron panel and Ferritin obtained while inpatient was consistent with MUKESH and he was not started on oral Iron replacement. He continues to deny any obvious blood loss from any source. He is scheduled with Dr. Mireles (GI) on 02/27/2023 for possible EGD/colonoscopy. He reports weakness/fatigue, shortness of breath on exertion and dizziness. He denies any chest pain. He denies any other specific complaints today.     PAST MEDICAL HISTORY:  Past Medical History:   Diagnosis Date   • Atrial fibrillation (HCC)    • CHF (congestive heart failure) (HCC)    • COPD (chronic obstructive pulmonary disease) (HCC)    • Coronary artery disease    • Hypertension    • Tobacco abuse        PAST SURGICAL HISTORY:  Past Surgical History:   Procedure Laterality Date   • ABDOMINAL AORTIC ANEURYSM REPAIR     • CARDIAC CATHETERIZATION N/A 10/03/2019    Procedure: Left Heart Cath;  Surgeon: Vincent Phillips MD;  Location: [facility] CATH INVASIVE LOCATION;  Service: Cardiology       FAMILY HISTORY:  Family History   Problem Relation Age of Onset   • Heart disease Mother    •  "Stroke Father    • Heart disease Father        SOCIAL HISTORY:  Social History     Socioeconomic History   • Marital status: Single   Tobacco Use   • Smoking status: Former     Packs/day: 1.00     Types: Cigarettes   • Smokeless tobacco: Never   • Tobacco comments:     9/1/2022   Vaping Use   • Vaping Use: Never used   Substance and Sexual Activity   • Alcohol use: No   • Drug use: No   • Sexual activity: Defer              MEDICATIONS:  The current medication list was reviewed in the EMR    Current Outpatient Medications:   •  atorvastatin (LIPITOR) 80 MG tablet, Take 1 tablet by mouth Every Night., Disp: 90 tablet, Rfl:   •  metoprolol tartrate (LOPRESSOR) 25 MG tablet, Take 1 tablet by mouth 2 (Two) Times a Day., Disp: 180 tablet, Rfl: 3  •  pantoprazole (Protonix) 40 MG EC tablet, Take 1 tablet by mouth Daily., Disp: 90 tablet, Rfl: 3  •  sacubitril-valsartan (ENTRESTO)  MG tablet, Take 1 tablet by mouth 2 (Two) Times a Day., Disp: 60 tablet, Rfl:   •  sertraline (ZOLOFT) 100 MG tablet, Take 1.5 tablets by mouth Daily., Disp: , Rfl:   •  Symbicort 160-4.5 MCG/ACT inhaler, Inhale 2 puffs 2 (Two) Times a Day., Disp: , Rfl:   •  ferrous sulfate 325 (65 FE) MG tablet, Take 1 tablet by mouth 2 (Two) Times a Day., Disp: 60 tablet, Rfl: 2  •  sennosides-docusate (senna-docusate sodium) 8.6-50 MG per tablet, Take 1 tablet by mouth 2 (Two) Times a Day As Needed for Constipation., Disp: 60 tablet, Rfl: 1    ALLERGIES:  No Known Allergies      REVIEW OF SYSTEMS:    A comprehensive 14 point review of systems was performed.  Significant findings as mentioned above.  All other systems reviewed and are negative.        Physical Exam   Vital Signs: /68   Pulse 106   Temp 97.3 °F (36.3 °C) (Temporal)   Resp 18   Ht 182.9 cm (72\")   Wt 112 kg (246 lb 12.8 oz)   SpO2 98%   BMI 33.47 kg/m²        ECOG score: 0   General: Well developed, well nourished, alert and oriented x 3, in no acute distress.   Head: ATNC "   Eyes: PERRL, No evidence of conjunctivitis.   Nose: No nasal discharge.   Mouth: Oral mucosal membranes moist. No oral ulceration or hemorrhages.   Neck: Neck supple. No thyromegaly. No JVD.   Lungs: Clear in all fields to A&P without rales, rhonchi or wheezing.   Heart: S1, S2. Regular rate and rhythm. No murmurs, rubs, or gallops.   Abdomen: Soft. Bowel sounds are normoactive. Nontender with palpation. No Hepatosplenomegaly can be appreciated.   Extremities: No clubbing, cyanosis or edema bilaterally.   Integumentary: Warm, dry, intact.   Neurologic: Grossly non-focal exam    Pain Score:  Pain Score    02/23/23 1317   PainSc: 0-No pain       PHQ-Score Total:  PHQ-9 Total Score: 0       PATHOLOGY:        ENDOSCOPY:        IMAGING:      RECENT LABS:  Lab Results   Component Value Date    WBC 9.03 02/11/2023    HGB 9.3 (L) 02/11/2023    HCT 29.4 (L) 02/11/2023    MCV 81.4 02/11/2023    RDW 16.2 (H) 02/11/2023     02/11/2023    NEUTRORELPCT 71.3 02/09/2023    LYMPHORELPCT 13.9 (L) 02/09/2023    MONORELPCT 11.2 02/09/2023    EOSRELPCT 2.8 02/09/2023    BASORELPCT 0.3 02/09/2023    NEUTROABS 6.75 02/09/2023    LYMPHSABS 1.32 02/09/2023       Lab Results   Component Value Date     02/11/2023    K 3.7 02/11/2023    CO2 23.4 02/11/2023     02/11/2023    BUN 14 02/11/2023    CREATININE 1.16 02/11/2023    EGFRIFNONA 61 02/09/2021    GLUCOSE 108 (H) 02/11/2023    CALCIUM 8.1 (L) 02/11/2023    ALKPHOS 93 02/11/2023    AST 16 02/11/2023    ALT 8 02/11/2023    BILITOT 0.8 02/11/2023    ALBUMIN 3.2 (L) 02/11/2023    PROTEINTOT 6.2 02/11/2023    MG 2.0 02/11/2023    PHOS 3.2 02/11/2023           ASSESSMENT & PLAN:  Zaid Galarza is a very pleasant 72 y.o. male with    1. MUKESH  - He recently had labs drawn at his PCP office on 06/06/2023 showed Hg (7.8) and he was told he should go to [facility] ED. He presented to [facility] ED and was admitted and during admission he received a total of 4 units PRBCs. Of note, prior to  this admission is H/H were within normal. Iron panel and Ferritin obtained while inpatient was consistent with MUKESH.  - General surgery recommended outpatient EGD/colonoscopy. He has upcoming appointment with Dr. Mireles on 02/28/2023. He denies any obvious blood loss from any source.   - He is not taking oral iron supplements.  - Obtained repeat CBC today which showed Hg (10.4) stable/improved. Iron panel and Ferritin are low and most consistent with MUKESH. Therefore, will start Ferrous Sulfate 325 mg BID (advised to start one tablet at HS x 1 week and if able to tolerate increase to BID). Discussed side effects and he is aware in the interim to let us know if he is not able to tolerate oral iron. Also advised to take stool softeners as needed for constipation related to oral iron therapy.   - Sent additional labs today to further evaluate anemia including CMP, PBS, B12, Folate, Retic, LDH, Haptoglobin, ESR, CRP, Zinc, Copper, Tissue Transglutaminase, TSH and UA which are pending.   - Will follow up in 2 months with repeat labs. In the meantime, will follow up with above pending lab work.       ACO / PURNIMA/Other  Quality measures  -  Zaid Galarza received 2022 flu vaccine.  -  Zaid Galarza reports a pain score of 0.    -  Current outpatient and discharge medications have been reconciled for the patient.  Reviewed by: TIFFANIE Baum            The patient was in agreement with the plan and all questions were answered to his satisfaction.     Thank you so much for allowing us to participate in the care of Zaid Galarza . Please do not hesitate to contact us with any questions or concerns.     A total of 45 minutes were spent coordinating this patient’s care in clinic today; more than 50% of this time was face-to-face with the patient, reviewing his interim medical history and counseling on the current treatment and followup plan. All questions were answered to his satisfaction.      Electronically  Signed by: TIFFANIE Shine , February 23, 2023 14:13 EST           Electronically Signed by: TIFFANIE hSine , February 23, 2023 14:13 EST       CC:   TRAVIS Venegas Hester Faye, APRN

## 2023-02-24 LAB
FOLATE SERPL-MCNC: 11.1 NG/ML (ref 4.78–24.2)
HAPTOGLOB SERPL-MCNC: 183 MG/DL (ref 30–200)
REF LAB TEST METHOD: NORMAL
TTG IGA SER-ACNC: <2 U/ML (ref 0–3)
VIT B12 BLD-MCNC: 940 PG/ML (ref 211–946)

## 2023-02-25 LAB — COPPER SERPL-MCNC: 113 UG/DL (ref 69–132)

## 2023-02-26 LAB — ZINC SERPL-MCNC: 97 UG/DL (ref 44–115)

## 2023-02-27 RX ORDER — ATORVASTATIN CALCIUM 80 MG/1
80 TABLET, FILM COATED ORAL NIGHTLY
Qty: 90 TABLET | Refills: 3
Start: 2023-02-27 | End: 2023-03-01 | Stop reason: SDUPTHER

## 2023-02-27 RX ORDER — PANTOPRAZOLE SODIUM 40 MG/1
40 TABLET, DELAYED RELEASE ORAL DAILY
Qty: 90 TABLET | Refills: 3 | Status: SHIPPED | OUTPATIENT
Start: 2023-02-27

## 2023-02-28 ENCOUNTER — READMISSION MANAGEMENT (OUTPATIENT)
Dept: CALL CENTER | Facility: HOSPITAL | Age: 73
End: 2023-02-28
Payer: MEDICARE

## 2023-02-28 NOTE — OUTREACH NOTE
Medical Week 3 Survey    Flowsheet Row Responses   [facility] [place] operated by [facility] patient discharged from? [facility]   Does the patient have one of the following disease processes/diagnoses(primary or secondary)? Other   Week 3 attempt successful? No   Unsuccessful attempts Attempt 1  [Pt answered the phone however he was at a doctors appt.]          La BENOIT - Registered Nurse

## 2023-03-01 RX ORDER — ATORVASTATIN CALCIUM 80 MG/1
80 TABLET, FILM COATED ORAL NIGHTLY
Qty: 90 TABLET | Refills: 3 | Status: SHIPPED | OUTPATIENT
Start: 2023-03-01

## 2023-03-07 ENCOUNTER — READMISSION MANAGEMENT (OUTPATIENT)
Dept: CALL CENTER | Facility: HOSPITAL | Age: 73
End: 2023-03-07
Payer: MEDICARE

## 2023-03-07 NOTE — OUTREACH NOTE
Medical Week 3 Survey    Flowsheet Row Responses   [facility] patient discharged from? [facility]   Does the patient have one of the following disease processes/diagnoses(primary or secondary)? Other   Week 3 attempt successful? Yes   Call start time 1600   Call end time 1606   Discharge diagnosis Symptomatic anemia   Meds reviewed with patient/caregiver? Yes   Is the patient having any side effects they believe may be caused by any medication additions or changes? No   Does the patient have all medications ordered at discharge? Yes   Prescription comments iron added   Is the patient taking all medications as directed (includes completed medication regime)? Yes   Does the patient have a primary care provider?  Yes   Does the patient have an appointment with their PCP within 7 days of discharge? Yes   Has the patient kept scheduled appointments due by today? Yes   Has home health visited the patient within 72 hours of discharge? N/A   Psychosocial issues? No   Comments states is happy that ADL's have become easier since stroke, states now can take shower by self, walking longer distances   Did the patient receive a copy of their discharge instructions? Yes   Nursing interventions Reviewed instructions with patient   What is the patient's perception of their health status since discharge? Improving   Is the patient/caregiver able to teach back signs and symptoms related to disease process for when to call PCP? Yes   Is the patient/caregiver able to teach back signs and symptoms related to disease process for when to call 911? Yes   Is the patient/caregiver able to teach back the hierarchy of who to call/visit for symptoms/problems? PCP, Specialist, Home health nurse, Urgent Care, ED, 911 Yes   If the patient is a current smoker, are they able to teach back resources for cessation? Not a smoker  [has quit since 9/1/22]   Additional teach back comments states appetite good, states is careful with food choices due to  prone to choking, (leftover from stroke in Sept, 2022)   Week 3 Call Completed? Yes          Linda WATKINS - Registered Nurse

## 2023-03-14 ENCOUNTER — OFFICE VISIT (OUTPATIENT)
Dept: CARDIOLOGY | Facility: CLINIC | Age: 73
End: 2023-03-14
Payer: MEDICARE

## 2023-03-14 VITALS
DIASTOLIC BLOOD PRESSURE: 80 MMHG | HEIGHT: 72 IN | OXYGEN SATURATION: 98 % | WEIGHT: 246 LBS | BODY MASS INDEX: 33.32 KG/M2 | HEART RATE: 83 BPM | SYSTOLIC BLOOD PRESSURE: 138 MMHG

## 2023-03-14 DIAGNOSIS — E78.5 DYSLIPIDEMIA, GOAL LDL BELOW 100: Chronic | ICD-10-CM

## 2023-03-14 DIAGNOSIS — I10 ESSENTIAL HYPERTENSION: Chronic | ICD-10-CM

## 2023-03-14 DIAGNOSIS — I25.10 CORONARY ARTERY DISEASE INVOLVING NATIVE CORONARY ARTERY OF NATIVE HEART WITHOUT ANGINA PECTORIS: Primary | Chronic | ICD-10-CM

## 2023-03-14 DIAGNOSIS — I42.0 DILATED CARDIOMYOPATHY: Chronic | ICD-10-CM

## 2023-03-14 DIAGNOSIS — Z98.890 HISTORY OF ABDOMINAL AORTIC ANEURYSM (AAA) REPAIR: Chronic | ICD-10-CM

## 2023-03-14 PROCEDURE — 1159F MED LIST DOCD IN RCRD: CPT | Performed by: NURSE PRACTITIONER

## 2023-03-14 PROCEDURE — 3075F SYST BP GE 130 - 139MM HG: CPT | Performed by: NURSE PRACTITIONER

## 2023-03-14 PROCEDURE — 3079F DIAST BP 80-89 MM HG: CPT | Performed by: NURSE PRACTITIONER

## 2023-03-14 PROCEDURE — 99214 OFFICE O/P EST MOD 30 MIN: CPT | Performed by: NURSE PRACTITIONER

## 2023-03-14 PROCEDURE — 1160F RVW MEDS BY RX/DR IN RCRD: CPT | Performed by: NURSE PRACTITIONER

## 2023-03-14 NOTE — PROGRESS NOTES
"Chief Complaint  Atrial Fibrillation (Patient states shortness of breath , denies any palpitations, swelling to lower extremity)    Subjective          Zaid Galarza presents to [facility] CARDIOLOGY for follow up.    History of Present Illness    Zaid was last seen in clinic on 2/14/2023.  At that visit his Entresto and metoprolol were restarted.  Patient had not been restarted on anticoagulation due to a recent diagnosis of anemia and pending GI work-up.  He will be having colonoscopy on 4/3/2023.    At today's visit Zaid is accompanied by his sister.  He states that he has been doing well.  He denies any bright red blood per rectum or black tarry stools.  He denies chest pain.  He denies lower extremity swelling.  He denies any shortness of breath or dyspnea on exertion.    Objective     Vital Signs:   /80 (BP Location: Left arm, Patient Position: Sitting, Cuff Size: Adult)   Pulse 83   Ht 182.9 cm (72\")   Wt 112 kg (246 lb)   SpO2 98%   BMI 33.36 kg/m²       Physical Exam  Constitutional:       Appearance: Normal appearance. He is well-developed.   Cardiovascular:      Rate and Rhythm: Normal rate and regular rhythm.      Heart sounds: No murmur heard.    No friction rub. No gallop.   Pulmonary:      Effort: Pulmonary effort is normal. No respiratory distress.      Breath sounds: Normal breath sounds. No wheezing or rales.   Skin:     General: Skin is warm and dry.   Neurological:      Mental Status: He is alert and oriented to person, place, and time.   Psychiatric:         Mood and Affect: Mood normal.         Behavior: Behavior normal.          Result Review :                Current Outpatient Medications   Medication Sig Dispense Refill   • atorvastatin (LIPITOR) 80 MG tablet Take 1 tablet by mouth Every Night. 90 tablet 3   • ferrous sulfate 325 (65 FE) MG tablet Take 1 tablet by mouth 2 (Two) Times a Day. 60 tablet 2   • metoprolol tartrate (LOPRESSOR) 25 MG tablet Take 1 " tablet by mouth 2 (Two) Times a Day. 180 tablet 3   • pantoprazole (Protonix) 40 MG EC tablet Take 1 tablet by mouth Daily. 90 tablet 3   • sacubitril-valsartan (ENTRESTO)  MG tablet Take 1 tablet by mouth 2 (Two) Times a Day. 60 tablet    • sennosides-docusate (senna-docusate sodium) 8.6-50 MG per tablet Take 1 tablet by mouth 2 (Two) Times a Day As Needed for Constipation. 60 tablet 1   • sertraline (ZOLOFT) 100 MG tablet Take 1.5 tablets by mouth Daily.     • Symbicort 160-4.5 MCG/ACT inhaler Inhale 2 puffs 2 (Two) Times a Day.       No current facility-administered medications for this visit.            Assessment and Plan    Problem List Items Addressed This Visit        Cardiac and Vasculature    Dilated cardiomyopathy (HCC) (Chronic)    Overview     · TTE: LVEF 26 to 30%, RV and LV mildly dilated, grade 3 diastolic dysfunction consistent with fixed restrictive pattern, mild TR, left atrial cavity mildly dilated right atrial cavity moderately dilated, moderate pulmonary hypertension with RVSP 47 mmHg  · 9/30/2019 ANGELITO: Severe cardiomyopathy likely from atrial flutter  · 2/11/2020 TTE LVEF 36 to 40%  · 5/6/2020 TTE LVEF 56 to 60%, mild concentric LVH  · 9/2/2022 TTE: LVEF 51 to 55%, moderate sclerosis of both the mitral and aortic valve with no significant regurgitation noted.  Left atrium is mildly enlarged.         Nonobstructive CAD per cath 10/3/2019 - Primary (Chronic)    Overview     · 10/4/2019 LHC: Nonobstructive coronary artery disease         Essential hypertension (Chronic)    History of abdominal aortic aneurysm (AAA) repair (Chronic)    Relevant Orders    US AAA Screen Limited    Dyslipidemia, goal LDL below 100 (Chronic)    Overview     · 9/2/2022 total cholesterol 104, triglycerides 89, HDL 31, and LDL 55                Follow Up     Medications were reviewed with the patient.    Nonobstructive coronary artery disease is stable.  Patient is to continue metoprolol tartrate and  atorvastatin.    Nonischemic cardiomyopathy is stable.  Patient is not showing any signs of fluid overload.  Continue Entresto, metoprolol.  Jardiance added today.    With regard to paroxysmal atrial fibrillation, we do await colonoscopy/GI work-up before restarting Eliquis due to recent anemia and possible GI bleed. [id number] Score: 6 (3/21/2023  6:08 PM)    Return in about 6 weeks (around 4/25/2023).    Patient was given instructions and counseling regarding his condition or for health maintenance advice. Please see specific information pulled into the AVS if appropriate.

## 2023-04-05 ENCOUNTER — HOSPITAL ENCOUNTER (OUTPATIENT)
Dept: ULTRASOUND IMAGING | Facility: HOSPITAL | Age: 73
Discharge: HOME | End: 2023-04-05
Admitting: NURSE PRACTITIONER
Payer: MEDICARE

## 2023-04-05 DIAGNOSIS — Z98.890 HISTORY OF ABDOMINAL AORTIC ANEURYSM (AAA) REPAIR: Chronic | ICD-10-CM

## 2023-04-05 PROCEDURE — 76706 US ABDL AORTA SCREEN AAA: CPT

## 2023-04-05 PROCEDURE — 76706 US ABDL AORTA SCREEN AAA: CPT | Performed by: RADIOLOGY

## 2023-04-06 ENCOUNTER — TELEPHONE (OUTPATIENT)
Dept: CARDIOLOGY | Facility: CLINIC | Age: 73
End: 2023-04-06
Payer: MEDICARE

## 2023-04-06 NOTE — TELEPHONE ENCOUNTER
----- Message from TIFFANIE Cox sent at 4/5/2023  6:12 PM EDT -----  Please call patient about their normal result.    Thanks, Christy

## 2023-04-06 NOTE — TELEPHONE ENCOUNTER
Spoke to patient regarding normal results per Christy's request. Patient is understanding of test results and will keep follow up appointment on 04/25/23

## 2023-04-24 ENCOUNTER — TELEPHONE (OUTPATIENT)
Dept: ONCOLOGY | Facility: CLINIC | Age: 73
End: 2023-04-24

## 2023-04-24 NOTE — TELEPHONE ENCOUNTER
Caller: Mason Galarza    Relationship: Self    Best call back number: [phone]  What is the best time to reach you: ANYTIME.  LEAVE VM IF NEEDED.    Who are you requesting to speak with (clinical staff, provider,  specific staff member): SCHEDULING      What was the call regarding: PATIENT MASON CALLED TO R/S HIS LAB APPT TOMORROW ON 4/25/23 AND WOULD LIKE TO COME IN EARLIER AT 12 NOON.    Do you require a callback: YES

## 2023-04-25 ENCOUNTER — OFFICE VISIT (OUTPATIENT)
Dept: ONCOLOGY | Facility: CLINIC | Age: 73
End: 2023-04-25
Payer: MEDICARE

## 2023-04-25 ENCOUNTER — OFFICE VISIT (OUTPATIENT)
Dept: CARDIOLOGY | Facility: CLINIC | Age: 73
End: 2023-04-25
Payer: MEDICARE

## 2023-04-25 ENCOUNTER — LAB (OUTPATIENT)
Dept: ONCOLOGY | Facility: CLINIC | Age: 73
End: 2023-04-25
Payer: MEDICARE

## 2023-04-25 VITALS
OXYGEN SATURATION: 97 % | BODY MASS INDEX: 32.45 KG/M2 | HEART RATE: 79 BPM | TEMPERATURE: 97.3 F | DIASTOLIC BLOOD PRESSURE: 67 MMHG | RESPIRATION RATE: 18 BRPM | HEIGHT: 72 IN | SYSTOLIC BLOOD PRESSURE: 105 MMHG | WEIGHT: 239.6 LBS

## 2023-04-25 VITALS
SYSTOLIC BLOOD PRESSURE: 107 MMHG | WEIGHT: 240 LBS | DIASTOLIC BLOOD PRESSURE: 70 MMHG | HEART RATE: 83 BPM | OXYGEN SATURATION: 98 % | BODY MASS INDEX: 32.51 KG/M2 | HEIGHT: 72 IN

## 2023-04-25 DIAGNOSIS — I42.0 DILATED CARDIOMYOPATHY: Chronic | ICD-10-CM

## 2023-04-25 DIAGNOSIS — I25.10 CORONARY ARTERY DISEASE INVOLVING NATIVE CORONARY ARTERY OF NATIVE HEART WITHOUT ANGINA PECTORIS: Primary | Chronic | ICD-10-CM

## 2023-04-25 DIAGNOSIS — I48.19 PERSISTENT ATRIAL FIBRILLATION: Chronic | ICD-10-CM

## 2023-04-25 DIAGNOSIS — E78.5 DYSLIPIDEMIA, GOAL LDL BELOW 100: Chronic | ICD-10-CM

## 2023-04-25 DIAGNOSIS — D64.9 NORMOCYTIC ANEMIA: Primary | ICD-10-CM

## 2023-04-25 DIAGNOSIS — D50.9 IRON DEFICIENCY ANEMIA, UNSPECIFIED IRON DEFICIENCY ANEMIA TYPE: ICD-10-CM

## 2023-04-25 DIAGNOSIS — I10 ESSENTIAL HYPERTENSION: Chronic | ICD-10-CM

## 2023-04-25 LAB
BASOPHILS # BLD AUTO: 0.06 10*3/MM3 (ref 0–0.2)
BASOPHILS NFR BLD AUTO: 0.6 % (ref 0–1.5)
DEPRECATED RDW RBC AUTO: 60.5 FL (ref 37–54)
EOSINOPHIL # BLD AUTO: 0.49 10*3/MM3 (ref 0–0.4)
EOSINOPHIL NFR BLD AUTO: 4.7 % (ref 0.3–6.2)
ERYTHROCYTE [DISTWIDTH] IN BLOOD BY AUTOMATED COUNT: 19.4 % (ref 12.3–15.4)
FERRITIN SERPL-MCNC: 62.66 NG/ML (ref 30–400)
HCT VFR BLD AUTO: 43.7 % (ref 37.5–51)
HGB BLD-MCNC: 13.3 G/DL (ref 13–17.7)
IMM GRANULOCYTES # BLD AUTO: 0.05 10*3/MM3 (ref 0–0.05)
IMM GRANULOCYTES NFR BLD AUTO: 0.5 % (ref 0–0.5)
IRON 24H UR-MRATE: 54 MCG/DL (ref 59–158)
IRON SATN MFR SERPL: 17 % (ref 20–50)
LYMPHOCYTES # BLD AUTO: 2.06 10*3/MM3 (ref 0.7–3.1)
LYMPHOCYTES NFR BLD AUTO: 19.6 % (ref 19.6–45.3)
MCH RBC QN AUTO: 26 PG (ref 26.6–33)
MCHC RBC AUTO-ENTMCNC: 30.4 G/DL (ref 31.5–35.7)
MCV RBC AUTO: 85.5 FL (ref 79–97)
MONOCYTES # BLD AUTO: 0.98 10*3/MM3 (ref 0.1–0.9)
MONOCYTES NFR BLD AUTO: 9.3 % (ref 5–12)
NEUTROPHILS NFR BLD AUTO: 6.87 10*3/MM3 (ref 1.7–7)
NEUTROPHILS NFR BLD AUTO: 65.3 % (ref 42.7–76)
NRBC BLD AUTO-RTO: 0 /100 WBC (ref 0–0.2)
PLATELET # BLD AUTO: 167 10*3/MM3 (ref 140–450)
PMV BLD AUTO: 11.1 FL (ref 6–12)
RBC # BLD AUTO: 5.11 10*6/MM3 (ref 4.14–5.8)
TIBC SERPL-MCNC: 326 MCG/DL (ref 298–536)
TRANSFERRIN SERPL-MCNC: 219 MG/DL (ref 200–360)
WBC NRBC COR # BLD: 10.51 10*3/MM3 (ref 3.4–10.8)

## 2023-04-25 PROCEDURE — 3074F SYST BP LT 130 MM HG: CPT | Performed by: NURSE PRACTITIONER

## 2023-04-25 PROCEDURE — 3078F DIAST BP <80 MM HG: CPT | Performed by: NURSE PRACTITIONER

## 2023-04-25 PROCEDURE — 99214 OFFICE O/P EST MOD 30 MIN: CPT | Performed by: NURSE PRACTITIONER

## 2023-04-25 PROCEDURE — 84466 ASSAY OF TRANSFERRIN: CPT | Performed by: NURSE PRACTITIONER

## 2023-04-25 PROCEDURE — 1159F MED LIST DOCD IN RCRD: CPT | Performed by: NURSE PRACTITIONER

## 2023-04-25 PROCEDURE — 83540 ASSAY OF IRON: CPT | Performed by: NURSE PRACTITIONER

## 2023-04-25 PROCEDURE — 82728 ASSAY OF FERRITIN: CPT | Performed by: NURSE PRACTITIONER

## 2023-04-25 PROCEDURE — 1126F AMNT PAIN NOTED NONE PRSNT: CPT | Performed by: NURSE PRACTITIONER

## 2023-04-25 PROCEDURE — 85025 COMPLETE CBC W/AUTO DIFF WBC: CPT | Performed by: NURSE PRACTITIONER

## 2023-04-25 PROCEDURE — 1160F RVW MEDS BY RX/DR IN RCRD: CPT | Performed by: NURSE PRACTITIONER

## 2023-04-25 NOTE — PROGRESS NOTES
Venipuncture Blood Specimen Collection  Venipuncture performed in left arm by Gail Armstrong MA with good hemostasis. Patient tolerated the procedure well without complications.   04/25/23   Gail Armstrong MA

## 2023-04-25 NOTE — PROGRESS NOTES
"Chief Complaint  Coronary Artery Disease (Patient here for follow up / states shortness of breath, denies any chest pain , states some swelling of feet )    Subjective          Zaid Galarza presents to [facility] CARDIOLOGY for follow up.    History of Present Illness    Zaid was last seen in clinic on 3/14/2023.  Patient was scheduled for outpatient colonoscopy and GI work-up due to recent anemia and possible GI bleed.  He was not taking his Eliquis at that time for that reason.  Patient's GI work-up was canceled and rescheduled for May 19.  I did order a AAA screen and patient completed on 4/5/2023.  The study was negative for new abdominal aortic aneurysm.    At today's visit Zaid denies any complaint of chest pain, shortness of breath, dyspnea on exertion.  He denies lower extremity swelling.  He denies any palpitations.  He denies syncope or near syncopal event.  He states that he has been doing well and has been compliant with his medications.    Objective     Vital Signs:   /70 (BP Location: Left arm, Patient Position: Sitting, Cuff Size: Adult)   Pulse 83   Ht 182.9 cm (72\")   Wt 109 kg (240 lb)   SpO2 98%   BMI 32.55 kg/m²       Physical Exam  Vitals reviewed.   Constitutional:       Appearance: Normal appearance. He is well-developed.   Cardiovascular:      Rate and Rhythm: Normal rate. Rhythm irregular.      Heart sounds: No murmur heard.    No friction rub. No gallop.   Pulmonary:      Effort: Pulmonary effort is normal. No respiratory distress.      Breath sounds: Normal breath sounds. No wheezing or rales.   Skin:     General: Skin is warm and dry.   Neurological:      Mental Status: He is alert and oriented to person, place, and time.   Psychiatric:         Mood and Affect: Mood normal.         Behavior: Behavior normal.          Result Review :                Current Outpatient Medications   Medication Sig Dispense Refill   • atorvastatin (LIPITOR) 80 MG tablet Take " 1 tablet by mouth Every Night. 90 tablet 3   • ferrous sulfate 325 (65 FE) MG tablet Take 1 tablet by mouth 2 (Two) Times a Day. 60 tablet 2   • metoprolol tartrate (LOPRESSOR) 25 MG tablet Take 1 tablet by mouth 2 (Two) Times a Day. 180 tablet 3   • pantoprazole (Protonix) 40 MG EC tablet Take 1 tablet by mouth Daily. 90 tablet 3   • sacubitril-valsartan (ENTRESTO)  MG tablet Take 1 tablet by mouth 2 (Two) Times a Day. 60 tablet    • sennosides-docusate (senna-docusate sodium) 8.6-50 MG per tablet Take 1 tablet by mouth 2 (Two) Times a Day As Needed for Constipation. 60 tablet 1   • sertraline (ZOLOFT) 100 MG tablet Take 1.5 tablets by mouth Daily.     • Symbicort 160-4.5 MCG/ACT inhaler Inhale 2 puffs 2 (Two) Times a Day.       No current facility-administered medications for this visit.            Assessment and Plan    Problem List Items Addressed This Visit        Cardiac and Vasculature    Dilated cardiomyopathy (HCC) (Chronic)    Overview     · TTE: LVEF 26 to 30%, RV and LV mildly dilated, grade 3 diastolic dysfunction consistent with fixed restrictive pattern, mild TR, left atrial cavity mildly dilated right atrial cavity moderately dilated, moderate pulmonary hypertension with RVSP 47 mmHg  · 9/30/2019 ANGELITO: Severe cardiomyopathy likely from atrial flutter  · 2/11/2020 TTE LVEF 36 to 40%  · 5/6/2020 TTE LVEF 56 to 60%, mild concentric LVH  · 9/2/2022 TTE: LVEF 51 to 55%, moderate sclerosis of both the mitral and aortic valve with no significant regurgitation noted.  Left atrium is mildly enlarged.         Persistent atrial fibrillation (Chronic)    Overview     [id number] is at least 6 for heart failure, hypertension, CVA, nonobstructive CAD, and age         Nonobstructive CAD per cath 10/3/2019 - Primary (Chronic)    Overview     · 10/4/2019 LHC: Nonobstructive coronary artery disease         Essential hypertension (Chronic)    Dyslipidemia, goal LDL below 100 (Chronic)    Overview     · 9/2/2022  total cholesterol 104, triglycerides 89, HDL 31, and LDL 55                Follow Up     Medications were reviewed with the patient.    Cardiomyopathy is stable.  Patient is euvolemic today.  Shows no evidence of swelling.  Breathing is at baseline.  Continue Entresto, spironolactone.    Nonobstructive coronary artery disease is stable.  Continue atorvastatin and metoprolol tartrate.    With regard to atrial fibrillation, will continue to hold anticoagulation until patient's GI work-up is complete and his hemoglobin shows some recovery. [id number] Score: 6 (4/25/2023 10:55 AM)    With regard to hypertension, it is well controlled continue current medications.    No follow-ups on file.    Patient was given instructions and counseling regarding his condition or for health maintenance advice. Please see specific information pulled into the AVS if appropriate.

## 2023-04-25 NOTE — PROGRESS NOTES
DATE:  4/25/2023    DIAGNOSIS: MUKESH    CHIEF COMPLAINT:  Improved Weakness/Fatigue, Shortness of Breath and Dizzziness    TREATMENT HISTORY:  Ferrous Sulfate BID    HISTORY OF PRESENT ILLNESS:   Zaid Galarza is a very pleasant 72 y.o. male who is being seen today at the request of TRAVIS Venegas for evaluation and treatment of MUKESH. Mr. Galarza reports following with his PCP every 3 months with lab testing. He recently had labs drawn at his PCP office on 06/06/2023 showed Hg (7.8) and he was told he should go to [facility] ED. He presented to [facility] ED and was admitted and during admission he received a total of 4 units PRBCs. Of note, prior to this admission is H/H were within normal. General surgery was consulted while inpatient and they recommended to have EGD/colonoscopy as an outpatient since patient had no sign of acute blood loss. Iron panel and Ferritin obtained while inpatient was consistent with MUKESH and he was not started on oral Iron replacement. He continues to deny any obvious blood loss from any source. He is scheduled with Dr. Mireles (GI) on 02/27/2023 for possible EGD/colonoscopy. He reports weakness/fatigue, shortness of breath on exertion and dizziness. He denies any chest pain. He denies any other specific complaints today.        Interval History:  Mr. Galarza presents today for follow up of MUKESH. He has been taking Ferrous Sulfate BID and tolerating this well without any noticeable side effects. He reports improvement in weakness/fatigue, shortness of breath on exertion and dizziness. He was scheduled for EGD/colonoscopy but unfortunately this has now been rescheduled for 05/19/2023. He denies any obvious blood loss from any source. He denies any other specific complaints today.     The following portions of the patient's history were reviewed and updated as appropriate: allergies, current medications, past family history, past medical history, past social history, past surgical history and  problem list.    PAST MEDICAL HISTORY:  Past Medical History:   Diagnosis Date   • Atrial fibrillation    • CHF (congestive heart failure)    • COPD (chronic obstructive pulmonary disease)    • Coronary artery disease    • Hypertension    • Tobacco abuse        PAST SURGICAL HISTORY:  Past Surgical History:   Procedure Laterality Date   • ABDOMINAL AORTIC ANEURYSM REPAIR     • CARDIAC CATHETERIZATION N/A 10/03/2019    Procedure: Left Heart Cath;  Surgeon: Vincent Phillips MD;  Location: [facility] CATH INVASIVE LOCATION;  Service: Cardiology       SOCIAL HISTORY:  Social History     Socioeconomic History   • Marital status: Single   Tobacco Use   • Smoking status: Former     Packs/day: 1.00     Types: Cigarettes   • Smokeless tobacco: Never   • Tobacco comments:     9/1/2022   Vaping Use   • Vaping Use: Never used   Substance and Sexual Activity   • Alcohol use: No   • Drug use: No   • Sexual activity: Defer         FAMILY HISTORY:  Family History   Problem Relation Age of Onset   • Heart disease Mother    • Stroke Father    • Heart disease Father          MEDICATIONS:  The current medication list was reviewed in the EMR    Current Outpatient Medications:   •  atorvastatin (LIPITOR) 80 MG tablet, Take 1 tablet by mouth Every Night., Disp: 90 tablet, Rfl: 3  •  ferrous sulfate 325 (65 FE) MG tablet, Take 1 tablet by mouth 2 (Two) Times a Day., Disp: 60 tablet, Rfl: 2  •  metoprolol tartrate (LOPRESSOR) 25 MG tablet, Take 1 tablet by mouth 2 (Two) Times a Day., Disp: 180 tablet, Rfl: 3  •  pantoprazole (Protonix) 40 MG EC tablet, Take 1 tablet by mouth Daily., Disp: 90 tablet, Rfl: 3  •  sacubitril-valsartan (ENTRESTO)  MG tablet, Take 1 tablet by mouth 2 (Two) Times a Day., Disp: 60 tablet, Rfl:   •  sennosides-docusate (senna-docusate sodium) 8.6-50 MG per tablet, Take 1 tablet by mouth 2 (Two) Times a Day As Needed for Constipation., Disp: 60 tablet, Rfl: 1  •  sertraline (ZOLOFT) 100 MG tablet, Take 1.5 tablets  by mouth Daily., Disp: , Rfl:   •  Symbicort 160-4.5 MCG/ACT inhaler, Inhale 2 puffs 2 (Two) Times a Day., Disp: , Rfl:     ALLERGIES:  No Known Allergies      REVIEW OF SYSTEMS:    A comprehensive 14 point review of systems was performed.  Significant findings as mentioned above.  All other systems reviewed and are negative.        Physical Exam   Vital Signs:   Vitals:    04/25/23 1048   BP: 105/67   Pulse: 79   Resp: 18   Temp: 97.3 °F (36.3 °C)   SpO2: 97%     ECOG score: 0   General: Well developed, well nourished, alert and oriented x 3, in no acute distress.   Head: ATNC   Eyes: PERRL, No evidence of conjunctivitis.   Nose: No nasal discharge.   Mouth: Oral mucosal membranes moist. No oral ulceration or hemorrhages.   Neck: Neck supple. No thyromegaly. No JVD.   Lungs: Clear in all fields to A&P without rales, rhonchi or wheezing.   Heart: S1, S2. Regular rate and rhythm. No murmurs, rubs, or gallops.   Abdomen: Soft. Bowel sounds are normoactive. Nontender with palpation. No Hepatosplenomegaly can be appreciated.   Extremities: No clubbing, cyanosis or edema bilaterally.   Integumentary: Warm, dry, intact.   Neurologic: Grossly non-focal exam.      Pain Score:  Pain Score    04/25/23 1048   PainSc: 0-No pain       PHQ-Score Total:  PHQ-9 Total Score:         PATHOLOGY:        ENDOSCOPY:        IMAGING:         RECENT LABS:  Lab Results   Component Value Date    WBC 10.51 04/25/2023    HGB 13.3 04/25/2023    HCT 43.7 04/25/2023    MCV 85.5 04/25/2023    RDW 19.4 (H) 04/25/2023     04/25/2023    NEUTRORELPCT 65.3 04/25/2023    LYMPHORELPCT 19.6 04/25/2023    MONORELPCT 9.3 04/25/2023    EOSRELPCT 4.7 04/25/2023    BASORELPCT 0.6 04/25/2023    NEUTROABS 6.87 04/25/2023    LYMPHSABS 2.06 04/25/2023       Lab Results   Component Value Date     02/23/2023    K 4.6 02/23/2023    CO2 28.7 02/23/2023     02/23/2023    BUN 17 02/23/2023    CREATININE 1.21 02/23/2023    EGFRIFNONA 61 02/09/2021     GLUCOSE 118 (H) 02/23/2023    CALCIUM 9.3 02/23/2023    ALKPHOS 115 02/23/2023    AST 16 02/23/2023    ALT 14 02/23/2023    BILITOT 0.2 02/23/2023    ALBUMIN 3.6 02/23/2023    PROTEINTOT 7.1 02/23/2023    MG 2.0 02/11/2023    PHOS 3.2 02/11/2023       Lab Results   Component Value Date     (H) 02/23/2023       Lab Results   Component Value Date    FERRITIN 25.76 (L) 02/23/2023    IRON 22 (L) 02/23/2023    TIBC 395 02/23/2023    LABIRON 6 (L) 02/23/2023    KSTOOWRT28 940 02/23/2023    FOLATE 11.10 02/23/2023    HAPTOGLOBIN 183 02/23/2023    RETICCTPCT 1.20 02/23/2023    RETIC 0.0515 02/23/2023      Work Up 02/23/2023      Sed Rate  0 - 20 mm/hr 33 High      C-Reactive Protein  0.00 - 0.50 mg/dL <0.30      Zinc  44 - 115 ug/dL 97      Copper  69 - 132 ug/dL 113      Tissue Transglutaminase IgA  0 - 3 U/mL <2      TSH  0.270 - 4.200 uIU/mL 2.040      Color, UA  Yellow, Straw Yellow     Appearance, UA  Clear Clear  Clear    pH, UA  5.0 - 8.0 6.5  6.0    Specific Gravity, UA  1.005 - 1.030 1.019  1.017    Glucose, UA  Negative Negative  Negative R    Ketones, UA  Negative Negative  Negative R    Bilirubin, UA  Negative Negative  Negative    Blood, UA  Negative Negative  Negative    Protein, UA  Negative Trace Abnormal   Negative R    Leuk Esterase, UA  Negative Moderate (2+) Abnormal   Negative    Nitrite, UA  Negative Negative  Negative    Urobilinogen, UA  0.2 - 1.0 E.U./dL 1.0 E.U./dL  0.2          ASSESSMENT & PLAN:  Zaid Galarza is a very pleasant 72 y.o. male with    1. MUKESH  - He recently had labs drawn at his PCP office on 06/06/2023 showed Hg (7.8) and he was told he should go to [facility] ED. He presented to [facility] ED and was admitted and during admission he received a total of 4 units PRBCs. Of note, prior to this admission is H/H were within normal. Iron panel and Ferritin obtained while inpatient was consistent with MUKESH.  - General surgery recommended outpatient EGD/colonoscopy. He has upcoming appointment  with Dr. Mireles on 02/28/2023. He denies any obvious blood loss from any source.   - He is not taking oral iron supplements.  - CBC from initial consultation showed Hg (10.4) stable/improved. Iron panel and Ferritin were low and most consistent with MUKESH. Therefore, he was started on Ferrous Sulfate 325 mg BID. B12 and Folate were replete. No evidence of hemolysis as Retic and Haptoglobin are normal with mildly elevated LDH. ESR is elevated and suggestive of chronic underlying inflammation. CRP normal. TSH normal. Copper, Zinc and Tissue Transglutaminase was normal. UA was unremarkable for blood. PBS showed normocytic, hypochromic anemia with anisocytosis, no schistocytes identified, normal total WBC count with borderline eosinophilia, no granulocytic dysplasia or blasts, adequate platelets.  - Repeat CBC from today shows normalized Hg (13.3) with normal WBC and platelets. Iron panel remains low but improved with normal Ferritin. Will continue Ferrous Sulfate BID.  - Will follow up in 3 months with repeat labs.      ACO / PURNIMA/Other  Quality measures  -  Zaid Galarza received 2022 flu vaccine.  -  Zaid Galarza reports a pain score of 0.    -  Current outpatient and discharge medications have been reconciled for the patient.  Reviewed by: TIFFANIE Baum                  A total of 30 minutes were spent coordinating this patient’s care in clinic today; more than 50% of this time was face-to-face with the patient, reviewing his interim medical history and counseling on the current treatment and followup plan. All questions were answered to his satisfaction.          Electronically Signed by: TIFFANIE Shine APRN April 25, 2023 12:32 EDT       CC:   No ref. provider found  Rosi Thacker APRN

## 2023-05-25 RX ORDER — FERROUS SULFATE 325(65) MG
325 TABLET ORAL 2 TIMES DAILY
Qty: 60 TABLET | Refills: 2 | Status: SHIPPED | OUTPATIENT
Start: 2023-05-25

## 2023-05-25 NOTE — TELEPHONE ENCOUNTER
Caller: Zaid Galarza    Relationship: Self    Best call back number: [phone]    Requested Prescriptions:   Requested Prescriptions     Pending Prescriptions Disp Refills   • ferrous sulfate 325 (65 FE) MG tablet 60 tablet 2     Sig: Take 1 tablet by mouth 2 (Two) Times a Day.        Pharmacy where request should be sent: [facility] [id number]  [place], [place] - [address] AT [facility] [address] [facility] [phone] [facility] [phone] FX     Last office visit with prescribing clinician: 4/25/2023   Last telemedicine visit with prescribing clinician: Visit date not found   Next office visit with prescribing clinician: 7/18/2023     Additional details provided by patient: HE IS COMPLETELY OUT.    Does the patient have less than a 3 day supply:  [x] Yes  [] No    Would you like a call back once the refill request has been completed: [] Yes [x] No    If the office needs to give you a call back, can they leave a voicemail: [x] Yes [] No    Kevin Faria Rep   05/25/23 14:50 EDT

## 2023-06-15 ENCOUNTER — OFFICE VISIT (OUTPATIENT)
Dept: CARDIOLOGY | Facility: CLINIC | Age: 73
End: 2023-06-15
Payer: MEDICARE

## 2023-06-15 VITALS
DIASTOLIC BLOOD PRESSURE: 59 MMHG | SYSTOLIC BLOOD PRESSURE: 93 MMHG | OXYGEN SATURATION: 94 % | WEIGHT: 240.4 LBS | BODY MASS INDEX: 32.56 KG/M2 | HEIGHT: 72 IN | HEART RATE: 81 BPM

## 2023-06-15 DIAGNOSIS — I10 ESSENTIAL HYPERTENSION: Chronic | ICD-10-CM

## 2023-06-15 DIAGNOSIS — E78.5 DYSLIPIDEMIA, GOAL LDL BELOW 100: Chronic | ICD-10-CM

## 2023-06-15 DIAGNOSIS — I42.0 DILATED CARDIOMYOPATHY: Chronic | ICD-10-CM

## 2023-06-15 DIAGNOSIS — Z72.0 TOBACCO USE: Chronic | ICD-10-CM

## 2023-06-15 DIAGNOSIS — I25.10 CORONARY ARTERY DISEASE INVOLVING NATIVE CORONARY ARTERY OF NATIVE HEART WITHOUT ANGINA PECTORIS: Primary | Chronic | ICD-10-CM

## 2023-06-15 DIAGNOSIS — I48.19 PERSISTENT ATRIAL FIBRILLATION: Chronic | ICD-10-CM

## 2023-06-15 DIAGNOSIS — Z98.890 HISTORY OF ABDOMINAL AORTIC ANEURYSM (AAA) REPAIR: Chronic | ICD-10-CM

## 2023-06-15 PROCEDURE — 3074F SYST BP LT 130 MM HG: CPT | Performed by: NURSE PRACTITIONER

## 2023-06-15 PROCEDURE — 1160F RVW MEDS BY RX/DR IN RCRD: CPT | Performed by: NURSE PRACTITIONER

## 2023-06-15 PROCEDURE — 3078F DIAST BP <80 MM HG: CPT | Performed by: NURSE PRACTITIONER

## 2023-06-15 PROCEDURE — 1159F MED LIST DOCD IN RCRD: CPT | Performed by: NURSE PRACTITIONER

## 2023-06-15 PROCEDURE — 99214 OFFICE O/P EST MOD 30 MIN: CPT | Performed by: NURSE PRACTITIONER

## 2023-06-15 RX ORDER — ASPIRIN 81 MG/1
81 TABLET ORAL DAILY
COMMUNITY

## 2023-06-15 NOTE — PROGRESS NOTES
"Chief Complaint  Follow-up (3 mo follow up ) and Med Management (Verbally reviewed medications with patient. Patient is tolerating medications well. )    Subjective          Zaid Galarza presents to [facility] for follow up.    History of Present Illness    Zaid was last seen in clinic on 4/25/2023.  He was stable at that time and no changes were made to his medications.    Zaid presents today accompanied by his sister.  He has not been taking his blood pressure at home.  His blood pressure is fairly low today.  He denies any dizziness, lightheadedness, syncope or near syncopal event.    He denies any chest pain, shortness of breath or dyspnea on exertion.  He does admit that he is largely inactive.    Objective     Vital Signs:   BP 93/59 (BP Location: Right arm, Patient Position: Sitting, Cuff Size: Large Adult)   Pulse 81   Ht 182.9 cm (72\")   Wt 109 kg (240 lb 6.4 oz)   SpO2 94%   BMI 32.60 kg/m²       Physical Exam  Constitutional:       Appearance: Normal appearance. He is well-developed.   Cardiovascular:      Rate and Rhythm: Normal rate and regular rhythm.      Heart sounds: No murmur heard.    No friction rub. No gallop.   Pulmonary:      Effort: Pulmonary effort is normal. No respiratory distress.      Breath sounds: Rhonchi present. No wheezing or rales.   Skin:     General: Skin is warm and dry.   Neurological:      Mental Status: He is alert and oriented to person, place, and time.   Psychiatric:         Mood and Affect: Mood normal.         Behavior: Behavior normal.        Result Review :                Current Outpatient Medications   Medication Sig Dispense Refill    apixaban (ELIQUIS) 5 MG tablet tablet Take 1 tablet by mouth Every 12 (Twelve) Hours.      aspirin 81 MG EC tablet Take 1 tablet by mouth Daily.      atorvastatin (LIPITOR) 80 MG tablet Take 1 tablet by mouth Every Night. 90 tablet 3    cyanocobalamin (VITAMIN B-12) 1000 MCG tablet Take 1 tablet " by mouth Daily.      ferrous sulfate 325 (65 FE) MG tablet Take 1 tablet by mouth 2 (Two) Times a Day. 60 tablet 2    metoprolol tartrate (LOPRESSOR) 25 MG tablet Take 1 tablet by mouth 2 (Two) Times a Day. 180 tablet 3    pantoprazole (Protonix) 40 MG EC tablet Take 1 tablet by mouth Daily. 90 tablet 3    sacubitril-valsartan (ENTRESTO)  MG tablet Take 1 tablet by mouth 2 (Two) Times a Day. 60 tablet     sennosides-docusate (senna-docusate sodium) 8.6-50 MG per tablet Take 1 tablet by mouth 2 (Two) Times a Day As Needed for Constipation. 60 tablet 1    sertraline (ZOLOFT) 100 MG tablet Take 1.5 tablets by mouth Daily.      Symbicort 160-4.5 MCG/ACT inhaler Inhale 2 puffs 2 (Two) Times a Day.       No current facility-administered medications for this visit.            Assessment and Plan    Problem List Items Addressed This Visit          Cardiac and Vasculature    Dilated cardiomyopathy (Chronic)    Overview     TTE: LVEF 26 to 30%, RV and LV mildly dilated, grade 3 diastolic dysfunction consistent with fixed restrictive pattern, mild TR, left atrial cavity mildly dilated right atrial cavity moderately dilated, moderate pulmonary hypertension with RVSP 47 mmHg  9/30/2019 ANGELITO: Severe cardiomyopathy likely from atrial flutter  2/11/2020 TTE LVEF 36 to 40%  5/6/2020 TTE LVEF 56 to 60%, mild concentric LVH  9/2/2022 TTE: LVEF 51 to 55%, moderate sclerosis of both the mitral and aortic valve with no significant regurgitation noted.  Left atrium is mildly enlarged.         Persistent atrial fibrillation (Chronic)    Overview     [id number] is at least 6 for heart failure, hypertension, CVA, nonobstructive CAD, and age         Nonobstructive CAD per cath 10/3/2019 - Primary (Chronic)    Overview     10/4/2019 LHC: Nonobstructive coronary artery disease         Essential hypertension (Chronic)    History of abdominal aortic aneurysm (AAA) repair (Chronic)    Dyslipidemia, goal LDL below 100 (Chronic)    Overview      9/2/2022 total cholesterol 104, triglycerides 89, HDL 31, and LDL 55            Tobacco    Tobacco use (Chronic)           Follow Up     Medications were reviewed with the patient.    Nonobstructive CAD is stable.  Continue aspirin and atorvastatin.    Patient had abdominal aortic aneurysm repair in 2007.  Last surveillance was in April 2023 and there was no evidence of a new abdominal aortic aneurysm.    Hypertension is looking as though its overcontrolled.  I have asked him to keep a blood pressure diary over the next 2 weeks.  He is to take his blood pressure once or twice daily at random times.  I may need to lower his Entresto dose.    Atrial fibrillation is stable.  Continue Eliquis for stroke prophylaxis.[id number] Score: 6 (6/15/2023  4:06 PM)    Dilated cardiomyopathy is stable.  Patient is euvolemic.  Continue current medications.                Return in about 3 months (around 9/15/2023).    Patient was given instructions and counseling regarding his condition or for health maintenance advice. Please see specific information pulled into the AVS if appropriate.

## 2023-07-03 ENCOUNTER — TELEPHONE (OUTPATIENT)
Dept: CARDIOLOGY | Facility: CLINIC | Age: 73
End: 2023-07-03

## 2023-08-29 ENCOUNTER — TELEPHONE (OUTPATIENT)
Dept: CARDIOLOGY | Facility: CLINIC | Age: 73
End: 2023-08-29
Payer: MEDICARE

## 2023-08-29 NOTE — TELEPHONE ENCOUNTER
PATIENT IS NEEDING A NEW PRESCRIPTION SENT TO [facility], FOR HIS RENEWAL. WILL FORWARD TO THE APPROPRIATE ENTITY.

## 2023-09-15 ENCOUNTER — HOSPITAL ENCOUNTER (OUTPATIENT)
Dept: GENERAL RADIOLOGY | Facility: HOSPITAL | Age: 73
Discharge: HOME | End: 2023-09-15
Payer: MEDICARE

## 2023-09-15 ENCOUNTER — OFFICE VISIT (OUTPATIENT)
Dept: CARDIOLOGY | Facility: CLINIC | Age: 73
End: 2023-09-15
Payer: MEDICARE

## 2023-09-15 ENCOUNTER — APPOINTMENT (OUTPATIENT)
Dept: CT IMAGING | Facility: HOSPITAL | Age: 73
DRG: 812 | End: 2023-09-15
Payer: MEDICARE

## 2023-09-15 ENCOUNTER — HOSPITAL ENCOUNTER (INPATIENT)
Facility: HOSPITAL | Age: 73
LOS: 5 days | Discharge: HOME HEALTH | DRG: 812 | End: 2023-09-20
Attending: EMERGENCY MEDICINE | Admitting: HOSPITALIST
Payer: MEDICARE

## 2023-09-15 ENCOUNTER — LAB (OUTPATIENT)
Dept: LAB | Facility: HOSPITAL | Age: 73
End: 2023-09-15
Payer: MEDICARE

## 2023-09-15 VITALS
HEART RATE: 87 BPM | SYSTOLIC BLOOD PRESSURE: 103 MMHG | WEIGHT: 251.2 LBS | BODY MASS INDEX: 34.02 KG/M2 | HEIGHT: 72 IN | OXYGEN SATURATION: 96 % | DIASTOLIC BLOOD PRESSURE: 64 MMHG

## 2023-09-15 DIAGNOSIS — I48.19 PERSISTENT ATRIAL FIBRILLATION: Chronic | ICD-10-CM

## 2023-09-15 DIAGNOSIS — I42.0 DILATED CARDIOMYOPATHY: Chronic | ICD-10-CM

## 2023-09-15 DIAGNOSIS — D64.9 SYMPTOMATIC ANEMIA: ICD-10-CM

## 2023-09-15 DIAGNOSIS — E78.5 DYSLIPIDEMIA, GOAL LDL BELOW 100: Chronic | ICD-10-CM

## 2023-09-15 DIAGNOSIS — I25.10 CORONARY ARTERY DISEASE INVOLVING NATIVE CORONARY ARTERY OF NATIVE HEART WITHOUT ANGINA PECTORIS: Chronic | ICD-10-CM

## 2023-09-15 DIAGNOSIS — K92.2 GASTROINTESTINAL HEMORRHAGE, UNSPECIFIED GASTROINTESTINAL HEMORRHAGE TYPE: Primary | ICD-10-CM

## 2023-09-15 DIAGNOSIS — R06.02 SHORTNESS OF BREATH: Primary | ICD-10-CM

## 2023-09-15 LAB
ABO GROUP BLD: NORMAL
ALBUMIN SERPL-MCNC: 3.6 G/DL (ref 3.5–5.2)
ALBUMIN SERPL-MCNC: 3.6 G/DL (ref 3.5–5.2)
ALBUMIN/GLOB SERPL: 1.2 G/DL
ALBUMIN/GLOB SERPL: 1.2 G/DL
ALP SERPL-CCNC: 98 U/L (ref 39–117)
ALP SERPL-CCNC: 99 U/L (ref 39–117)
ALT SERPL W P-5'-P-CCNC: 16 U/L (ref 1–41)
ALT SERPL W P-5'-P-CCNC: 9 U/L (ref 1–41)
ANION GAP SERPL CALCULATED.3IONS-SCNC: 12.3 MMOL/L (ref 5–15)
ANION GAP SERPL CALCULATED.3IONS-SCNC: 8 MMOL/L (ref 5–15)
ANISOCYTOSIS BLD QL: NORMAL
AST SERPL-CCNC: 20 U/L (ref 1–40)
AST SERPL-CCNC: 21 U/L (ref 1–40)
BASOPHILS # BLD AUTO: 0.03 10*3/MM3 (ref 0–0.2)
BASOPHILS NFR BLD AUTO: 0.3 % (ref 0–1.5)
BILIRUB SERPL-MCNC: 0.3 MG/DL (ref 0–1.2)
BILIRUB SERPL-MCNC: 0.3 MG/DL (ref 0–1.2)
BLD GP AB SCN SERPL QL: NEGATIVE
BUN SERPL-MCNC: 30 MG/DL (ref 8–23)
BUN SERPL-MCNC: 32 MG/DL (ref 8–23)
BUN/CREAT SERPL: 17.8 (ref 7–25)
BUN/CREAT SERPL: 18 (ref 7–25)
CALCIUM SPEC-SCNC: 8.5 MG/DL (ref 8.6–10.5)
CALCIUM SPEC-SCNC: 8.5 MG/DL (ref 8.6–10.5)
CHLORIDE SERPL-SCNC: 102 MMOL/L (ref 98–107)
CHLORIDE SERPL-SCNC: 106 MMOL/L (ref 98–107)
CK SERPL-CCNC: 172 U/L (ref 20–200)
CO2 SERPL-SCNC: 19.7 MMOL/L (ref 22–29)
CO2 SERPL-SCNC: 24 MMOL/L (ref 22–29)
CREAT SERPL-MCNC: 1.67 MG/DL (ref 0.76–1.27)
CREAT SERPL-MCNC: 1.8 MG/DL (ref 0.76–1.27)
CRP SERPL-MCNC: <0.3 MG/DL (ref 0–0.5)
DACRYOCYTES BLD QL SMEAR: NORMAL
DEPRECATED RDW RBC AUTO: 58.4 FL (ref 37–54)
DEPRECATED RDW RBC AUTO: 59.4 FL (ref 37–54)
EGFRCR SERPLBLD CKD-EPI 2021: 39.7 ML/MIN/1.73
EGFRCR SERPLBLD CKD-EPI 2021: 43.5 ML/MIN/1.73
EOSINOPHIL # BLD AUTO: 0.14 10*3/MM3 (ref 0–0.4)
EOSINOPHIL NFR BLD AUTO: 1.4 % (ref 0.3–6.2)
ERYTHROCYTE [DISTWIDTH] IN BLOOD BY AUTOMATED COUNT: 19.3 % (ref 12.3–15.4)
ERYTHROCYTE [DISTWIDTH] IN BLOOD BY AUTOMATED COUNT: 19.5 % (ref 12.3–15.4)
ERYTHROCYTE [SEDIMENTATION RATE] IN BLOOD: 27 MM/HR (ref 0–20)
GEN 5 2HR TROPONIN T REFLEX: 22 NG/L
GLOBULIN UR ELPH-MCNC: 2.9 GM/DL
GLOBULIN UR ELPH-MCNC: 3.1 GM/DL
GLUCOSE SERPL-MCNC: 134 MG/DL (ref 65–99)
GLUCOSE SERPL-MCNC: 152 MG/DL (ref 65–99)
HCT VFR BLD AUTO: 15.2 % (ref 37.5–51)
HCT VFR BLD AUTO: 16.1 % (ref 37.5–51)
HGB BLD-MCNC: 4.3 G/DL (ref 13–17.7)
HGB BLD-MCNC: 4.6 G/DL (ref 13–17.7)
HYPOCHROMIA BLD QL: NORMAL
IMM GRANULOCYTES # BLD AUTO: 0.08 10*3/MM3 (ref 0–0.05)
IMM GRANULOCYTES NFR BLD AUTO: 0.8 % (ref 0–0.5)
LYMPHOCYTES # BLD AUTO: 1.3 10*3/MM3 (ref 0.7–3.1)
LYMPHOCYTES NFR BLD AUTO: 12.6 % (ref 19.6–45.3)
MAGNESIUM SERPL-MCNC: 1.8 MG/DL (ref 1.6–2.4)
MCH RBC QN AUTO: 23.5 PG (ref 26.6–33)
MCH RBC QN AUTO: 24.1 PG (ref 26.6–33)
MCHC RBC AUTO-ENTMCNC: 28.3 G/DL (ref 31.5–35.7)
MCHC RBC AUTO-ENTMCNC: 28.6 G/DL (ref 31.5–35.7)
MCV RBC AUTO: 83.1 FL (ref 79–97)
MCV RBC AUTO: 84.3 FL (ref 79–97)
MONOCYTES # BLD AUTO: 0.9 10*3/MM3 (ref 0.1–0.9)
MONOCYTES NFR BLD AUTO: 8.7 % (ref 5–12)
NEUTROPHILS NFR BLD AUTO: 7.86 10*3/MM3 (ref 1.7–7)
NEUTROPHILS NFR BLD AUTO: 76.2 % (ref 42.7–76)
NRBC BLD AUTO-RTO: 0.8 /100 WBC (ref 0–0.2)
NT-PROBNP SERPL-MCNC: 3602 PG/ML (ref 0–900)
PLAT MORPH BLD: NORMAL
PLATELET # BLD AUTO: 258 10*3/MM3 (ref 140–450)
PLATELET # BLD AUTO: 284 10*3/MM3 (ref 140–450)
PMV BLD AUTO: 12 FL (ref 6–12)
PMV BLD AUTO: 12.1 FL (ref 6–12)
POIKILOCYTOSIS BLD QL SMEAR: NORMAL
POTASSIUM SERPL-SCNC: 4.2 MMOL/L (ref 3.5–5.2)
POTASSIUM SERPL-SCNC: 4.4 MMOL/L (ref 3.5–5.2)
PROT SERPL-MCNC: 6.5 G/DL (ref 6–8.5)
PROT SERPL-MCNC: 6.7 G/DL (ref 6–8.5)
QT INTERVAL: 392 MS
QTC INTERVAL: 476 MS
RBC # BLD AUTO: 1.83 10*6/MM3 (ref 4.14–5.8)
RBC # BLD AUTO: 1.91 10*6/MM3 (ref 4.14–5.8)
RH BLD: POSITIVE
SODIUM SERPL-SCNC: 134 MMOL/L (ref 136–145)
SODIUM SERPL-SCNC: 138 MMOL/L (ref 136–145)
T&S EXPIRATION DATE: NORMAL
T4 FREE SERPL-MCNC: 1.18 NG/DL (ref 0.93–1.7)
TROPONIN T DELTA: 1 NG/L
TROPONIN T SERPL HS-MCNC: 21 NG/L
TSH SERPL DL<=0.05 MIU/L-ACNC: 1.91 UIU/ML (ref 0.27–4.2)
WBC NRBC COR # BLD: 10.31 10*3/MM3 (ref 3.4–10.8)
WBC NRBC COR # BLD: 11.54 10*3/MM3 (ref 3.4–10.8)

## 2023-09-15 PROCEDURE — 86923 COMPATIBILITY TEST ELECTRIC: CPT

## 2023-09-15 PROCEDURE — 84439 ASSAY OF FREE THYROXINE: CPT | Performed by: EMERGENCY MEDICINE

## 2023-09-15 PROCEDURE — 36415 COLL VENOUS BLD VENIPUNCTURE: CPT | Performed by: NURSE PRACTITIONER

## 2023-09-15 PROCEDURE — 99223 1ST HOSP IP/OBS HIGH 75: CPT | Performed by: HOSPITALIST

## 2023-09-15 PROCEDURE — 99214 OFFICE O/P EST MOD 30 MIN: CPT | Performed by: NURSE PRACTITIONER

## 2023-09-15 PROCEDURE — 86900 BLOOD TYPING SEROLOGIC ABO: CPT | Performed by: EMERGENCY MEDICINE

## 2023-09-15 PROCEDURE — 84484 ASSAY OF TROPONIN QUANT: CPT | Performed by: EMERGENCY MEDICINE

## 2023-09-15 PROCEDURE — 85652 RBC SED RATE AUTOMATED: CPT | Performed by: EMERGENCY MEDICINE

## 2023-09-15 PROCEDURE — 86140 C-REACTIVE PROTEIN: CPT | Performed by: EMERGENCY MEDICINE

## 2023-09-15 PROCEDURE — P9016 RBC LEUKOCYTES REDUCED: HCPCS

## 2023-09-15 PROCEDURE — 1159F MED LIST DOCD IN RCRD: CPT | Performed by: NURSE PRACTITIONER

## 2023-09-15 PROCEDURE — 85025 COMPLETE CBC W/AUTO DIFF WBC: CPT | Performed by: EMERGENCY MEDICINE

## 2023-09-15 PROCEDURE — 86901 BLOOD TYPING SEROLOGIC RH(D): CPT | Performed by: EMERGENCY MEDICINE

## 2023-09-15 PROCEDURE — 86850 RBC ANTIBODY SCREEN: CPT | Performed by: EMERGENCY MEDICINE

## 2023-09-15 PROCEDURE — 3074F SYST BP LT 130 MM HG: CPT | Performed by: NURSE PRACTITIONER

## 2023-09-15 PROCEDURE — 93005 ELECTROCARDIOGRAM TRACING: CPT | Performed by: EMERGENCY MEDICINE

## 2023-09-15 PROCEDURE — 84443 ASSAY THYROID STIM HORMONE: CPT | Performed by: EMERGENCY MEDICINE

## 2023-09-15 PROCEDURE — 3078F DIAST BP <80 MM HG: CPT | Performed by: NURSE PRACTITIONER

## 2023-09-15 PROCEDURE — 71046 X-RAY EXAM CHEST 2 VIEWS: CPT

## 2023-09-15 PROCEDURE — 94761 N-INVAS EAR/PLS OXIMETRY MLT: CPT

## 2023-09-15 PROCEDURE — 82550 ASSAY OF CK (CPK): CPT | Performed by: EMERGENCY MEDICINE

## 2023-09-15 PROCEDURE — 85027 COMPLETE CBC AUTOMATED: CPT | Performed by: NURSE PRACTITIONER

## 2023-09-15 PROCEDURE — 80061 LIPID PANEL: CPT | Performed by: NURSE PRACTITIONER

## 2023-09-15 PROCEDURE — 80053 COMPREHEN METABOLIC PANEL: CPT | Performed by: NURSE PRACTITIONER

## 2023-09-15 PROCEDURE — 86900 BLOOD TYPING SEROLOGIC ABO: CPT

## 2023-09-15 PROCEDURE — 83735 ASSAY OF MAGNESIUM: CPT | Performed by: EMERGENCY MEDICINE

## 2023-09-15 PROCEDURE — 36415 COLL VENOUS BLD VENIPUNCTURE: CPT

## 2023-09-15 PROCEDURE — 94799 UNLISTED PULMONARY SVC/PX: CPT

## 2023-09-15 PROCEDURE — 85007 BL SMEAR W/DIFF WBC COUNT: CPT | Performed by: EMERGENCY MEDICINE

## 2023-09-15 PROCEDURE — 80053 COMPREHEN METABOLIC PANEL: CPT | Performed by: EMERGENCY MEDICINE

## 2023-09-15 PROCEDURE — 83880 ASSAY OF NATRIURETIC PEPTIDE: CPT | Performed by: EMERGENCY MEDICINE

## 2023-09-15 PROCEDURE — 74176 CT ABD & PELVIS W/O CONTRAST: CPT

## 2023-09-15 PROCEDURE — 36430 TRANSFUSION BLD/BLD COMPNT: CPT

## 2023-09-15 PROCEDURE — 94640 AIRWAY INHALATION TREATMENT: CPT

## 2023-09-15 PROCEDURE — 93010 ELECTROCARDIOGRAM REPORT: CPT | Performed by: SPECIALIST

## 2023-09-15 PROCEDURE — 99285 EMERGENCY DEPT VISIT HI MDM: CPT

## 2023-09-15 PROCEDURE — 1160F RVW MEDS BY RX/DR IN RCRD: CPT | Performed by: NURSE PRACTITIONER

## 2023-09-15 RX ORDER — BISACODYL 5 MG/1
5 TABLET, DELAYED RELEASE ORAL DAILY PRN
Status: DISCONTINUED | OUTPATIENT
Start: 2023-09-15 | End: 2023-09-20 | Stop reason: HOSPADM

## 2023-09-15 RX ORDER — SODIUM CHLORIDE 9 MG/ML
50 INJECTION, SOLUTION INTRAVENOUS CONTINUOUS
Status: DISCONTINUED | OUTPATIENT
Start: 2023-09-15 | End: 2023-09-16

## 2023-09-15 RX ORDER — SODIUM CHLORIDE 9 MG/ML
40 INJECTION, SOLUTION INTRAVENOUS AS NEEDED
Status: DISCONTINUED | OUTPATIENT
Start: 2023-09-15 | End: 2023-09-20 | Stop reason: HOSPADM

## 2023-09-15 RX ORDER — NITROGLYCERIN 0.4 MG/1
0.4 TABLET SUBLINGUAL
Status: DISCONTINUED | OUTPATIENT
Start: 2023-09-15 | End: 2023-09-20 | Stop reason: HOSPADM

## 2023-09-15 RX ORDER — ATORVASTATIN CALCIUM 40 MG/1
80 TABLET, FILM COATED ORAL NIGHTLY
Status: DISCONTINUED | OUTPATIENT
Start: 2023-09-15 | End: 2023-09-20 | Stop reason: HOSPADM

## 2023-09-15 RX ORDER — POLYETHYLENE GLYCOL 3350 17 G/17G
17 POWDER, FOR SOLUTION ORAL DAILY PRN
Status: DISCONTINUED | OUTPATIENT
Start: 2023-09-15 | End: 2023-09-20 | Stop reason: HOSPADM

## 2023-09-15 RX ORDER — AMOXICILLIN 250 MG
2 CAPSULE ORAL 2 TIMES DAILY
Status: DISCONTINUED | OUTPATIENT
Start: 2023-09-15 | End: 2023-09-20 | Stop reason: HOSPADM

## 2023-09-15 RX ORDER — SODIUM CHLORIDE 0.9 % (FLUSH) 0.9 %
10 SYRINGE (ML) INJECTION AS NEEDED
Status: DISCONTINUED | OUTPATIENT
Start: 2023-09-15 | End: 2023-09-20 | Stop reason: HOSPADM

## 2023-09-15 RX ORDER — BISACODYL 10 MG
10 SUPPOSITORY, RECTAL RECTAL DAILY PRN
Status: DISCONTINUED | OUTPATIENT
Start: 2023-09-15 | End: 2023-09-20 | Stop reason: HOSPADM

## 2023-09-15 RX ORDER — SACUBITRIL AND VALSARTAN 49; 51 MG/1; MG/1
1 TABLET, FILM COATED ORAL 2 TIMES DAILY
Qty: 56 TABLET | Refills: 0 | Status: ON HOLD | COMMUNITY
Start: 2023-09-15

## 2023-09-15 RX ORDER — BUDESONIDE AND FORMOTEROL FUMARATE DIHYDRATE 160; 4.5 UG/1; UG/1
2 AEROSOL RESPIRATORY (INHALATION)
Status: DISCONTINUED | OUTPATIENT
Start: 2023-09-15 | End: 2023-09-20 | Stop reason: HOSPADM

## 2023-09-15 RX ORDER — SODIUM CHLORIDE 0.9 % (FLUSH) 0.9 %
10 SYRINGE (ML) INJECTION EVERY 12 HOURS SCHEDULED
Status: DISCONTINUED | OUTPATIENT
Start: 2023-09-15 | End: 2023-09-20 | Stop reason: HOSPADM

## 2023-09-15 RX ADMIN — METOPROLOL TARTRATE 25 MG: 25 TABLET, FILM COATED ORAL at 21:23

## 2023-09-15 RX ADMIN — Medication 10 ML: at 21:24

## 2023-09-15 RX ADMIN — PANTOPRAZOLE SODIUM 8 MG/HR: 40 INJECTION, POWDER, FOR SOLUTION INTRAVENOUS at 18:28

## 2023-09-15 RX ADMIN — BUDESONIDE AND FORMOTEROL FUMARATE DIHYDRATE 2 PUFF: 160; 4.5 AEROSOL RESPIRATORY (INHALATION) at 22:42

## 2023-09-15 RX ADMIN — SERTRALINE 150 MG: 50 TABLET, FILM COATED ORAL at 19:37

## 2023-09-15 RX ADMIN — ATORVASTATIN CALCIUM 80 MG: 40 TABLET, FILM COATED ORAL at 21:23

## 2023-09-15 RX ADMIN — PANTOPRAZOLE SODIUM 8 MG/HR: 40 INJECTION, POWDER, FOR SOLUTION INTRAVENOUS at 23:14

## 2023-09-15 RX ADMIN — SODIUM CHLORIDE 50 ML/HR: 9 INJECTION, SOLUTION INTRAVENOUS at 18:29

## 2023-09-15 NOTE — PLAN OF CARE
Goal Outcome Evaluation:  Plan of Care Reviewed With: patient        Progress: improving  Outcome Evaluation: ROMINA GRIGGS RA. Patient admitted to PCU. IV fluids and protonix gtt started. One unit PRBC completed in ED, one more due to give. Patient reports improved symptoms. Will continue to monitor.

## 2023-09-15 NOTE — H&P
"    [facility]    Patient Identification:  Name:  Zaid Galarza  Age:  72 y.o.  Sex:  male  :  1950  MRN:  [id number]   Visit Number:  [id number]  Admit Date: 9/15/2023   Room number:  109/09  Primary Care Physician:  Rosi Thacker APRN     Chief complaint:    Chief Complaint   Patient presents with    Abnormal Lab       History of presenting illness:  72 y.o. male with history significant for reduced ejection fraction heart failure, repeat 2D echo in September of last year shows normal EF, he was diagnosed with iron deficient anemia after he was admitted here in February of this year noted to have anemia requiring transfusion, he eventually had a work-up upper and lower endoscopy 3 months ago approximately which as per patient did not reveal any findings.  He is seen by hematology for iron deficiency anemia, he used to take iron pills but was discontinued approximately  of this year.       Patient reported that he has chronic dyspnea on exertion, he is undergoing physical therapy at home, for the past week or so he is more dyspneic with exertion, he denies melena until yesterday while doing physical therapy he had the urge to have bowel movement and had \"an accident did not make it to the bathroom \"large amount of stools described as black.  No iron pills or Pepto-Bismol ingested recently.  Patient reported no history of black stools in the past.  While he was seen at cardiology clinic today for his CHF and nonobstructive coronary disease, patient was noted to have significant dyspnea on exertion, labs revealed hemoglobin of 4 because of this he was sent to our emergency room.      ---------------------------------------------------------------------------------------------------------------------   Review of Systems   Constitutional:  Positive for activity change (Acute worsening of dyspnea on exertion and increased fatigability) and fatigue. " Negative for appetite change, diaphoresis, fever and unexpected weight change.   HENT: Negative.     Eyes: Negative.    Respiratory: Negative.     Cardiovascular: Negative.    Gastrointestinal:  Positive for blood in stool (Black stool yesterday x1). Negative for abdominal distention, abdominal pain, anal bleeding, constipation, diarrhea, nausea, rectal pain and vomiting.   Endocrine: Negative.    Genitourinary: Negative.    Musculoskeletal: Negative.    Skin:  Positive for pallor. Negative for color change, rash and wound.   Allergic/Immunologic: Negative.    Neurological: Negative.    Hematological: Negative.    Psychiatric/Behavioral: Negative.       ---------------------------------------------------------------------------------------------------------------------   Past Medical History:   Diagnosis Date    Atrial fibrillation     CHF (congestive heart failure)     COPD (chronic obstructive pulmonary disease)     Coronary artery disease     Hypertension     Tobacco abuse      Past Surgical History:   Procedure Laterality Date    ABDOMINAL AORTIC ANEURYSM REPAIR      CARDIAC CATHETERIZATION N/A 10/03/2019    Procedure: Left Heart Cath;  Surgeon: Vincent Phillips MD;  Location: [facility] CATH INVASIVE LOC[facility];  Service: Cardiology     Family History   Problem Relation Age of Onset    Heart disease Mother     Stroke Father     Heart disease Father      Social History     Socioeconomic History    Marital status: Single   Tobacco Use    Smoking status: Former     Packs/day: 1.00     Types: Cigarettes    Smokeless tobacco: Never    Tobacco comments:     9/1/2022   Vaping Use    Vaping Use: Never used   Substance and Sexual Activity    Alcohol use: No    Drug use: No    Sexual activity: Defer     ---------------------------------------------------------------------------------------------------------------------   Allergies:  Patient has no known  allergies.  ---------------------------------------------------------------------------------------------------------------------   Prior to Admission Medications       Prescriptions Last Dose Informant Patient Reported? Taking?    apixaban (ELIQUIS) 5 MG tablet tablet   No No    Take 1 tablet by mouth Every 12 (Twelve) Hours.    aspirin 81 MG EC tablet   Yes No    Take 1 tablet by mouth Daily.    atorvastatin (LIPITOR) 80 MG tablet   No No    Take 1 tablet by mouth Every Night.    cyanocobalamin (VITAMIN B-12) 1000 MCG tablet   Yes No    Take 1 tablet by mouth Daily.    ferrous sulfate 325 (65 FE) MG tablet   No No    Take 1 tablet by mouth 2 (Two) Times a Day.    Patient not taking:  Reported on 9/15/2023    metoprolol tartrate (LOPRESSOR) 25 MG tablet   No No    Take 1 tablet by mouth 2 (Two) Times a Day.    pantoprazole (Protonix) 40 MG EC tablet   No No    Take 1 tablet by mouth Daily.    sacubitril-valsartan (Entresto) 49-51 MG tablet   No No    Take 1 tablet by mouth 2 (Two) Times a Day.    sennosides-docusate (senna-docusate sodium) 8.6-50 MG per tablet   No No    Take 1 tablet by mouth 2 (Two) Times a Day As Needed for Constipation.    sertraline (ZOLOFT) 100 MG tablet   No No    Take 1.5 tablets by mouth Daily.    Symbicort 160-4.5 MCG/ACT inhaler  Pharmacy Yes No    Inhale 2 puffs 2 (Two) Times a Day.          ---------------------------------------------------------------------------------------------------------------------   Vital Signs:  Temp:  [97.8 °F (36.6 °C)-98.8 °F (37.1 °C)] 98.8 °F (37.1 °C)  Heart Rate:  [73-91] 85  Resp:  [16] 16  BP: ()/(34-64) 98/63    Mean Arterial Pressure (Non-Invasive) for the past 24 hrs (Last 3 readings):   Noninvasive MAP (mmHg)   09/15/23 1415 57     SpO2:  [96 %-98 %] 96 %  on   ;   Device (Oxygen Therapy): room air  Body mass index is 33.91 kg/m².    Wt Readings from Last 3 Encounters:   09/15/23 113 kg (250 lb)   09/15/23 114 kg (251 lb 3.2 oz)   07/18/23  111 kg (244 lb 3.2 oz)               ---------------------------------------------------------------------------------------------------------------------   Physical Exam:  Constitutional: Patient is awake and alert pale looking, dyspneic on exertion awake and alert he is not in respiratory distress   HENT:  Head: Normocephalic and atraumatic.  Mouth:  Moist mucous membranes.    Eyes: Pale palpebral conjunctivae and EOM are normal.  Pupils are equal, round, and reactive to light.  No scleral icterus.  Neck:  Neck supple.  No JVD present.  He has no hepatojugular reflux  Cardiovascular: I could not hear any obvious murmur, distant heart sound normal rate, regular rhythm and normal heart sounds  Pulmonary/Chest:  No respiratory distress, no wheezes, no crackles, with normal breath sounds and good air movement.  No rales or crackles, no adventitious sounds  Abdominal: No bruit no tenderness soft soft.  Bowel sounds are normal.  No distension and no tenderness.   Musculoskeletal:  No edema, no tenderness, and no deformity.  No red or swollen joints anywhere.    Neurological:  Alert and oriented to person, place, and time.  No cranial nerve deficit.  No tongue deviation.  No facial droop.  No slurred speech.   Skin:  Skin is warm and dry.  No rash noted.  No pallor.   Peripheral vascular:  No edema and strong pulses on all 4 extremities.  Genitourinary: He has no Agosto catheter  ---------------------------------------------------------------------------------------------------------------------  EKG:      Telemetry: Atrial fibrillation 86 bpm  I have personally looked at both the EKG and the telemetry strips.  --------------------------------------------------------------------------------------------------------------------  Results from last 7 days   Lab Units 09/15/23  1340 09/15/23  1202   CK TOTAL U/L  --  172   HSTROP T ng/L 22* 21*     Results from last 7 days   Lab Units 09/15/23  1340 09/15/23  1202   CRP mg/dL   --  <0.30   WBC 10*3/mm3 10.31 11.54*   HEMOGLOBIN g/dL 4.3* 4.6*   HEMATOCRIT % 15.2* 16.1*   MCV fL 83.1 84.3   MCHC g/dL 28.3* 28.6*   PLATELETS 10*3/mm3 258 284     Results from last 7 days   Lab Units 09/15/23  1340 09/15/23  1202   SODIUM mmol/L 138 134*   POTASSIUM mmol/L 4.4 4.2   MAGNESIUM mg/dL  --  1.8   CHLORIDE mmol/L 106 102   CO2 mmol/L 24.0 19.7*   BUN mg/dL 32* 30*   CREATININE mg/dL 1.80* 1.67*   CALCIUM mg/dL 8.5* 8.5*   GLUCOSE mg/dL 152* 134*   ALBUMIN g/dL 3.6 3.6   BILIRUBIN mg/dL 0.3 0.3   ALK PHOS U/L 99 98   AST (SGOT) U/L 21 20   ALT (SGPT) U/L 9 16   Estimated Creatinine Clearance: 48.9 mL/min (A) (by C-G formula based on SCr of 1.8 mg/dL (H)).    No results found for: HGBA1C, POCGLU  No results found for: AMMONIA        No results found for: BLOODCXNo results found for: RESPCXNo results found for: URINECXNo results found for: WOUNDCXNo results found for: BODYFLDCXNo results found for: STOOLCX  pH No results found for: PHART   pO2 No results found for: PO2ART   pCO2 No results found for: QLO2BYW   HCO3 No results found for: VGW8LMD     I have personally looked at the labs and they are summarized above.  ----------------------------------------------------------------------------------------------------------------------  Imaging Results (Last 24 Hours)       ** No results found for the last 24 hours. **          I have personally reviewed the radiology images and read the final radiology report.  ----------------------------------------------------------------------------------------------------------------------  Assessment and Plan:  -Acute worsening of chronic anemia with episode of melena x1 yesterday  -History of iron deficiency anemia, with history of transfusion 4 units packed RBC last June for severe anemia.  Upper and lower endoscopy apparently was negative  -Worsening of shortness of breath with no obvious heart failure decompensation, likely secondary to anemia  -History of  reduced ejection fraction heart failure recovered EF 2D echo in September 2022 shows normal ejection fraction   -Acute kidney injury could be from hypotension while on nephrotoxic medication  -Chronic atrial fibrillation on anticoagulation  -Obesity with a BMI of 34 complicating all aspects of care  -Apparent history of abdominal aortic aneurysm status post aortoiliac graft 2009  -History of COPD  -History of dyslipidemia  -History of nonobstructive coronary disease  -History of prediabetes  -History of CVA in the past    Patient's last dose of Eliquis was this morning, he has no recurrence of obvious GI bleed since last night, will hold his Eliquis, H&H monitoring, continue with packed RBC transfusion, watch for volume overload, check CT of the abdomen pelvis, n.p.o. for now, IV PPI, surgery has been consulted, if CT is unremarkable and H&H continue to worsen will order tagged RBC scan, patient had upper and lower endoscopy approximately 3 months ago for work-up of anemia which apparently was nonyielding.  In the meantime we will hold off his Entresto and other nephrotoxic medication, gentle hydration.    Plan outlined to the patient together with her sister and brother-in-law in the presence of Dr. Varner who is present inside the room, family medicine patient agreed the plan.  Including cessation of anticoagulation and aspirin until no active bleeding is confirmed, risk for embolic stroke discussed at length with patient and family and agreed.    Condition is guarded, he will be admitted to progressive care unit for close monitoring.  In case the patient need pressor support or continues to bleed.      Maria Elena Dorsey MD  09/15/23  15:43 EDT

## 2023-09-15 NOTE — PROGRESS NOTES
"Chief Complaint  Follow-up (Pt denies CP, SOA on exertion,chronic fatigue) and Med Review (Tolerating all current medications.)    Subjective          Zaid Galarza presents to [facility] CARDIOLOGY for follow up.    History of Present Illness    Zaid was last seen in clinic on 6/15/2023.  From a cardiac standpoint he was stable and no changes were made to his medications.    At today's visit Zaid reports that he has had increased shortness of breath with minimal exertion.  He states that he could usually finish morning self-care without having to stop between tasks however in the last week he has had to take breaks so that he could catch his breath.  He denies any recent chest pain, cough, fever or sore throat.  He denies any bleeding issues-no bright red blood per rectum or black tarry stools.  He does have recent colonoscopy and EGD from April which were negative for any acute bleed.    Objective     Vital Signs:   /64   Pulse 87   Ht 182.9 cm (72\")   Wt 114 kg (251 lb 3.2 oz)   SpO2 96%   BMI 34.07 kg/m²       Physical Exam  Constitutional:       Appearance: He is well-developed. He is ill-appearing.   Cardiovascular:      Rate and Rhythm: Normal rate and regular rhythm.      Heart sounds: No murmur heard.    No friction rub. No gallop.   Pulmonary:      Effort: Pulmonary effort is normal. No respiratory distress.      Breath sounds: Examination of the left-middle field reveals decreased breath sounds. Examination of the left-lower field reveals decreased breath sounds. Decreased breath sounds present. No wheezing or rales.   Skin:     General: Skin is warm and dry.      Coloration: Skin is pale.   Neurological:      Mental Status: He is alert and oriented to person, place, and time.   Psychiatric:         Mood and Affect: Mood normal.         Behavior: Behavior normal.        Result Review :                Current Outpatient Medications   Medication Sig Dispense Refill    " apixaban (ELIQUIS) 5 MG tablet tablet Take 1 tablet by mouth Every 12 (Twelve) Hours. 28 tablet 0    aspirin 81 MG EC tablet Take 1 tablet by mouth Daily.      atorvastatin (LIPITOR) 80 MG tablet Take 1 tablet by mouth Every Night. 90 tablet 3    cyanocobalamin (VITAMIN B-12) 1000 MCG tablet Take 1 tablet by mouth Daily.      metoprolol tartrate (LOPRESSOR) 25 MG tablet Take 1 tablet by mouth 2 (Two) Times a Day. 180 tablet 3    pantoprazole (Protonix) 40 MG EC tablet Take 1 tablet by mouth Daily. 90 tablet 3    sennosides-docusate (senna-docusate sodium) 8.6-50 MG per tablet Take 1 tablet by mouth 2 (Two) Times a Day As Needed for Constipation. 60 tablet 1    sertraline (ZOLOFT) 100 MG tablet Take 1.5 tablets by mouth Daily.      Symbicort 160-4.5 MCG/ACT inhaler Inhale 2 puffs 2 (Two) Times a Day.      ferrous sulfate 325 (65 FE) MG tablet Take 1 tablet by mouth 2 (Two) Times a Day. (Patient not taking: Reported on 9/15/2023) 60 tablet 2    sacubitril-valsartan (Entresto) 49-51 MG tablet Take 1 tablet by mouth 2 (Two) Times a Day. 56 tablet 0     No current facility-administered medications for this visit.            Assessment and Plan    Problem List Items Addressed This Visit          Cardiac and Vasculature    Nonobstructive CAD per cath 10/3/2019 (Chronic)    Overview     10/4/2019 [facility] [place]: Nonobstructive coronary artery disease         Relevant Medications    sacubitril-valsartan (Entresto) 49-51 MG tablet    Other Relevant Orders    Lipid Panel    Dyslipidemia, goal LDL below 100 (Chronic)    Overview     9/2/2022 total cholesterol 104, triglycerides 89, HDL 31, and LDL 55         Relevant Orders    Lipid Panel     Other Visit Diagnoses       Shortness of breath    -  Primary    Relevant Orders    XR Chest 2 View (Completed)    Comprehensive Metabolic Panel (Completed)    CBC (No Diff) (Completed)                Follow Up     Medications were reviewed with the patient.    Nonobstructive coronary artery disease  is stable.  Continue aspirin, atorvastatin and metoprolol.    Continue atorvastatin for dyslipidemia.    Blood pressure is very low today, I am decreasing his Entresto.  Samples were given.    I have ordered labs and a chest x-ray on Zaid today to evaluate his shortness of breath.  I have also advised him to follow-up with his primary care provider.    Return in about 5 weeks (around 10/20/2023).    Patient was given instructions and counseling regarding his condition or for health maintenance advice. Please see specific information pulled into the AVS if appropriate.     Of note: Received a phone call from the hospital lab that there is a critical low hemoglobin of 4.6.  I called and spoke with the patient's sister and instructed her to take her brother to the ED here as soon as possible due to the low hemoglobin.  She stated that she would.

## 2023-09-16 ENCOUNTER — APPOINTMENT (OUTPATIENT)
Dept: NUCLEAR MEDICINE | Facility: HOSPITAL | Age: 73
DRG: 812 | End: 2023-09-16
Payer: MEDICARE

## 2023-09-16 LAB
ANION GAP SERPL CALCULATED.3IONS-SCNC: 8.3 MMOL/L (ref 5–15)
BASOPHILS # BLD AUTO: 0.04 10*3/MM3 (ref 0–0.2)
BASOPHILS NFR BLD AUTO: 0.4 % (ref 0–1.5)
BH BB BLOOD EXPIRATION DATE: NORMAL
BH BB BLOOD TYPE BARCODE: 6200
BH BB DISPENSE STATUS: NORMAL
BH BB PRODUCT CODE: NORMAL
BH BB UNIT NUMBER: NORMAL
BILIRUB UR QL STRIP: NEGATIVE
BUN SERPL-MCNC: 31 MG/DL (ref 8–23)
BUN/CREAT SERPL: 19.7 (ref 7–25)
CALCIUM SPEC-SCNC: 8.1 MG/DL (ref 8.6–10.5)
CHLORIDE SERPL-SCNC: 104 MMOL/L (ref 98–107)
CHOLEST SERPL-MCNC: 88 MG/DL (ref 0–200)
CLARITY UR: CLEAR
CO2 SERPL-SCNC: 20.7 MMOL/L (ref 22–29)
COLOR UR: YELLOW
CREAT SERPL-MCNC: 1.57 MG/DL (ref 0.76–1.27)
CROSSMATCH INTERPRETATION: NORMAL
DEPRECATED RDW RBC AUTO: 55.8 FL (ref 37–54)
EGFRCR SERPLBLD CKD-EPI 2021: 46.8 ML/MIN/1.73
EOSINOPHIL # BLD AUTO: 0.19 10*3/MM3 (ref 0–0.4)
EOSINOPHIL NFR BLD AUTO: 1.9 % (ref 0.3–6.2)
ERYTHROCYTE [DISTWIDTH] IN BLOOD BY AUTOMATED COUNT: 18.2 % (ref 12.3–15.4)
GLUCOSE SERPL-MCNC: 103 MG/DL (ref 65–99)
GLUCOSE UR STRIP-MCNC: NEGATIVE MG/DL
HCT VFR BLD AUTO: 20 % (ref 37.5–51)
HCT VFR BLD AUTO: 20.7 % (ref 37.5–51)
HCT VFR BLD AUTO: 26.6 % (ref 37.5–51)
HDLC SERPL-MCNC: 39 MG/DL (ref 40–60)
HGB BLD-MCNC: 6.1 G/DL (ref 13–17.7)
HGB BLD-MCNC: 6.2 G/DL (ref 13–17.7)
HGB BLD-MCNC: 8.1 G/DL (ref 13–17.7)
HGB UR QL STRIP.AUTO: NEGATIVE
IMM GRANULOCYTES # BLD AUTO: 0.07 10*3/MM3 (ref 0–0.05)
IMM GRANULOCYTES NFR BLD AUTO: 0.7 % (ref 0–0.5)
KETONES UR QL STRIP: NEGATIVE
LDLC SERPL CALC-MCNC: 34 MG/DL (ref 0–100)
LDLC/HDLC SERPL: 0.91 {RATIO}
LEUKOCYTE ESTERASE UR QL STRIP.AUTO: NEGATIVE
LYMPHOCYTES # BLD AUTO: 1.42 10*3/MM3 (ref 0.7–3.1)
LYMPHOCYTES NFR BLD AUTO: 13.9 % (ref 19.6–45.3)
MCH RBC QN AUTO: 25.8 PG (ref 26.6–33)
MCHC RBC AUTO-ENTMCNC: 30.5 G/DL (ref 31.5–35.7)
MCV RBC AUTO: 84.7 FL (ref 79–97)
MONOCYTES # BLD AUTO: 1 10*3/MM3 (ref 0.1–0.9)
MONOCYTES NFR BLD AUTO: 9.8 % (ref 5–12)
NEUTROPHILS NFR BLD AUTO: 7.47 10*3/MM3 (ref 1.7–7)
NEUTROPHILS NFR BLD AUTO: 73.3 % (ref 42.7–76)
NITRITE UR QL STRIP: NEGATIVE
NRBC BLD AUTO-RTO: 0.7 /100 WBC (ref 0–0.2)
PH UR STRIP.AUTO: 5.5 [PH] (ref 5–8)
PLATELET # BLD AUTO: 232 10*3/MM3 (ref 140–450)
PMV BLD AUTO: 12 FL (ref 6–12)
POTASSIUM SERPL-SCNC: 4.3 MMOL/L (ref 3.5–5.2)
PROT UR QL STRIP: NEGATIVE
RBC # BLD AUTO: 2.36 10*6/MM3 (ref 4.14–5.8)
SODIUM SERPL-SCNC: 133 MMOL/L (ref 136–145)
SP GR UR STRIP: 1.02 (ref 1–1.03)
TRIGL SERPL-MCNC: 67 MG/DL (ref 0–150)
UNIT  ABO: NORMAL
UNIT  RH: NORMAL
UROBILINOGEN UR QL STRIP: NORMAL
VLDLC SERPL-MCNC: 15 MG/DL (ref 5–40)
WBC NRBC COR # BLD: 10.19 10*3/MM3 (ref 3.4–10.8)

## 2023-09-16 PROCEDURE — P9016 RBC LEUKOCYTES REDUCED: HCPCS

## 2023-09-16 PROCEDURE — P9040 RBC LEUKOREDUCED IRRADIATED: HCPCS

## 2023-09-16 PROCEDURE — 99222 1ST HOSP IP/OBS MODERATE 55: CPT | Performed by: SURGERY

## 2023-09-16 PROCEDURE — 85014 HEMATOCRIT: CPT | Performed by: HOSPITALIST

## 2023-09-16 PROCEDURE — 94799 UNLISTED PULMONARY SVC/PX: CPT

## 2023-09-16 PROCEDURE — 86900 BLOOD TYPING SEROLOGIC ABO: CPT

## 2023-09-16 PROCEDURE — 78278 ACUTE GI BLOOD LOSS IMAGING: CPT

## 2023-09-16 PROCEDURE — 85025 COMPLETE CBC W/AUTO DIFF WBC: CPT | Performed by: HOSPITALIST

## 2023-09-16 PROCEDURE — 85018 HEMOGLOBIN: CPT | Performed by: HOSPITALIST

## 2023-09-16 PROCEDURE — 81003 URINALYSIS AUTO W/O SCOPE: CPT | Performed by: HOSPITALIST

## 2023-09-16 PROCEDURE — 0 TECHNETIUM LABELED RED BLOOD CELLS: Performed by: HOSPITALIST

## 2023-09-16 PROCEDURE — 80048 BASIC METABOLIC PNL TOTAL CA: CPT | Performed by: HOSPITALIST

## 2023-09-16 PROCEDURE — 94664 DEMO&/EVAL PT USE INHALER: CPT

## 2023-09-16 PROCEDURE — 36430 TRANSFUSION BLD/BLD COMPNT: CPT

## 2023-09-16 PROCEDURE — 99232 SBSQ HOSP IP/OBS MODERATE 35: CPT | Performed by: HOSPITALIST

## 2023-09-16 PROCEDURE — A9560 TC99M LABELED RBC: HCPCS | Performed by: HOSPITALIST

## 2023-09-16 PROCEDURE — 25010000002 CALCIUM GLUCONATE-NACL 1-0.675 GM/50ML-% SOLUTION: Performed by: HOSPITALIST

## 2023-09-16 PROCEDURE — 78278 ACUTE GI BLOOD LOSS IMAGING: CPT | Performed by: RADIOLOGY

## 2023-09-16 RX ORDER — PANTOPRAZOLE SODIUM 40 MG/1
40 TABLET, DELAYED RELEASE ORAL 2 TIMES DAILY
COMMUNITY

## 2023-09-16 RX ORDER — ASPIRIN 81 MG/1
81 TABLET ORAL DAILY
Status: CANCELLED | OUTPATIENT
Start: 2023-09-16

## 2023-09-16 RX ORDER — BUMETANIDE 0.25 MG/ML
1 INJECTION INTRAMUSCULAR; INTRAVENOUS ONCE
Status: COMPLETED | OUTPATIENT
Start: 2023-09-16 | End: 2023-09-16

## 2023-09-16 RX ORDER — CALCIUM GLUCONATE 20 MG/ML
1000 INJECTION, SOLUTION INTRAVENOUS ONCE
Status: COMPLETED | OUTPATIENT
Start: 2023-09-16 | End: 2023-09-16

## 2023-09-16 RX ORDER — PANTOPRAZOLE SODIUM 40 MG/10ML
40 INJECTION, POWDER, LYOPHILIZED, FOR SOLUTION INTRAVENOUS EVERY 12 HOURS SCHEDULED
Status: DISCONTINUED | OUTPATIENT
Start: 2023-09-16 | End: 2023-09-20 | Stop reason: HOSPADM

## 2023-09-16 RX ORDER — ASPIRIN 81 MG/1
81 TABLET ORAL DAILY
COMMUNITY
End: 2023-09-20 | Stop reason: HOSPADM

## 2023-09-16 RX ORDER — SODIUM CHLORIDE 0.9 % (FLUSH) 0.9 %
10 SYRINGE (ML) INJECTION AS NEEDED
Status: DISCONTINUED | OUTPATIENT
Start: 2023-09-16 | End: 2023-09-20 | Stop reason: HOSPADM

## 2023-09-16 RX ORDER — LANOLIN ALCOHOL/MO/W.PET/CERES
1000 CREAM (GRAM) TOPICAL DAILY
Status: CANCELLED | OUTPATIENT
Start: 2023-09-16

## 2023-09-16 RX ORDER — SODIUM CHLORIDE 9 MG/ML
40 INJECTION, SOLUTION INTRAVENOUS AS NEEDED
Status: DISCONTINUED | OUTPATIENT
Start: 2023-09-16 | End: 2023-09-20 | Stop reason: HOSPADM

## 2023-09-16 RX ORDER — SODIUM CHLORIDE 0.9 % (FLUSH) 0.9 %
10 SYRINGE (ML) INJECTION EVERY 12 HOURS SCHEDULED
Status: DISCONTINUED | OUTPATIENT
Start: 2023-09-16 | End: 2023-09-20 | Stop reason: HOSPADM

## 2023-09-16 RX ORDER — PANTOPRAZOLE SODIUM 40 MG/1
40 TABLET, DELAYED RELEASE ORAL 2 TIMES DAILY
Status: CANCELLED | OUTPATIENT
Start: 2023-09-16

## 2023-09-16 RX ORDER — LANOLIN ALCOHOL/MO/W.PET/CERES
1000 CREAM (GRAM) TOPICAL DAILY
COMMUNITY

## 2023-09-16 RX ADMIN — Medication 10 ML: at 21:31

## 2023-09-16 RX ADMIN — METOPROLOL TARTRATE 25 MG: 25 TABLET, FILM COATED ORAL at 21:34

## 2023-09-16 RX ADMIN — ATORVASTATIN CALCIUM 80 MG: 40 TABLET, FILM COATED ORAL at 21:32

## 2023-09-16 RX ADMIN — SERTRALINE 150 MG: 50 TABLET, FILM COATED ORAL at 10:21

## 2023-09-16 RX ADMIN — Medication 10 ML: at 10:23

## 2023-09-16 RX ADMIN — BUMETANIDE 1 MG: 0.25 INJECTION, SOLUTION INTRAMUSCULAR; INTRAVENOUS at 14:02

## 2023-09-16 RX ADMIN — BUDESONIDE AND FORMOTEROL FUMARATE DIHYDRATE 2 PUFF: 160; 4.5 AEROSOL RESPIRATORY (INHALATION) at 18:58

## 2023-09-16 RX ADMIN — CALCIUM GLUCONATE 1000 MG: 20 INJECTION, SOLUTION INTRAVENOUS at 10:18

## 2023-09-16 RX ADMIN — METOPROLOL TARTRATE 25 MG: 25 TABLET, FILM COATED ORAL at 10:21

## 2023-09-16 RX ADMIN — PANTOPRAZOLE SODIUM 40 MG: 40 INJECTION, POWDER, FOR SOLUTION INTRAVENOUS at 21:32

## 2023-09-16 RX ADMIN — PANTOPRAZOLE SODIUM 8 MG/HR: 40 INJECTION, POWDER, FOR SOLUTION INTRAVENOUS at 10:23

## 2023-09-16 RX ADMIN — PANTOPRAZOLE SODIUM 8 MG/HR: 40 INJECTION, POWDER, FOR SOLUTION INTRAVENOUS at 04:12

## 2023-09-16 RX ADMIN — SODIUM BICARBONATE: 84 INJECTION, SOLUTION INTRAVENOUS at 14:02

## 2023-09-16 RX ADMIN — BUDESONIDE AND FORMOTEROL FUMARATE DIHYDRATE 2 PUFF: 160; 4.5 AEROSOL RESPIRATORY (INHALATION) at 06:20

## 2023-09-16 RX ADMIN — TECHNETIUM TC 99M-LABELED RED BLOOD CELLS 1 DOSE: KIT at 08:32

## 2023-09-16 NOTE — CONSULTS
Patient Name:  Zaid Galarza  YOB: 1950  [id number]       Patient Care Team:  Rosi Thacker APRN as PCP - General (Nurse Practitioner)      General Surgery Consult Note     Date of Consultation: 09/16/23    Consulting Physician : Maria Elena Dorsey MD    Reason for Consult : GI bleed    Subjective     I have been asked to see  Zaid Galarza , a 72 y.o. male in consultation for GI bleed. He has had a prior admission for a similar complaint.  On arrival to the emergency department, he was found to have a hemoglobin of 4.  He was dyspneic and lightheaded on arrival.  He reports feeling improved since blood transfusion.  CT imaging did not show any evidence of bleeding.  He underwent a nuclear medicine tagged red blood cell scan today which showed an accumulation of activity at the clinic flexure but no evidence of active bleeding noted.  He had undergone an upper and lower endoscopy 3 months ago from an outside provider with no evidence of bleeding, I do not have access to these reports at this time.  He does have a history of an aortobiiliac graft.      Allergy: No Known Allergies    Medications:  atorvastatin, 80 mg, Oral, Nightly  budesonide-formoterol, 2 puff, Inhalation, BID - RT  bumetanide, 1 mg, Intravenous, Once  metoprolol tartrate, 25 mg, Oral, BID  senna-docusate sodium, 2 tablet, Oral, BID  sertraline, 150 mg, Oral, Daily  sodium chloride, 10 mL, Intravenous, Q12H      pantoprazole, 8 mg/hr, Last Rate: 8 mg/hr (09/16/23 1023)  custom IV infusion builder,       No current facility-administered medications on file prior to encounter.     Current Outpatient Medications on File Prior to Encounter   Medication Sig    apixaban (ELIQUIS) 5 MG tablet tablet Take 1 tablet by mouth 2 (Two) Times a Day.    aspirin 81 MG EC tablet Take 1 tablet by mouth Daily.    atorvastatin (LIPITOR) 80 MG tablet Take 1 tablet by mouth Every Night.    metoprolol tartrate (LOPRESSOR) 25 MG tablet Take  "1 tablet by mouth 2 (Two) Times a Day.    pantoprazole (PROTONIX) 40 MG EC tablet Take 1 tablet by mouth 2 (Two) Times a Day.    sacubitril-valsartan (Entresto) 49-51 MG tablet Take 1 tablet by mouth 2 (Two) Times a Day.    sertraline (ZOLOFT) 100 MG tablet Take 1.5 tablets by mouth Daily.    Symbicort 160-4.5 MCG/ACT inhaler Inhale 2 puffs 2 (Two) Times a Day.    vitamin B-12 (CYANOCOBALAMIN) 1000 MCG tablet Take 1 tablet by mouth Daily.    [DISCONTINUED] pantoprazole (Protonix) 40 MG EC tablet Take 1 tablet by mouth Daily.       PMHx:   Past Medical History:   Diagnosis Date    Atrial fibrillation     CHF (congestive heart failure)     COPD (chronic obstructive pulmonary disease)     Coronary artery disease     Hypertension     Tobacco abuse        Past Surgical History:  Aortobiiliac aneurysm repair    Family History: Noncontributory     Social History:  Social History     Tobacco Use    Smoking status: Former     Packs/day: 1.00     Types: Cigarettes    Smokeless tobacco: Never    Tobacco comments:     9/1/2022   Vaping Use    Vaping Use: Never used   Substance Use Topics    Alcohol use: No    Drug use: No          Review of Systems   Pertinent items are noted in HPI     Constitutional: No fevers, chills or malaise   Eyes: Denies visual changes    Cardiovascular: Denies chest pain, palpitations   Pulmonary: Denies cough or shortness of breath   Abdominal/ GI: Nontender    Genitourinary: Denies dysuria or hematuria   Musculoskeletal: Denies any but chronic joint aches, pains or deformities   Psychiatric: No recent mood changes   Neurologic: No paresthesias or loss of function          Objective     Physical Exam:      Vital Signs  /81   Pulse 64   Temp 97.9 °F (36.6 °C) (Oral)   Resp 20   Ht 182.9 cm (72\")   Wt 116 kg (255 lb 8.2 oz)   SpO2 96%   BMI 34.65 kg/m²     Intake/Output Summary (Last 24 hours) at 9/16/2023 1303  Last data filed at 9/16/2023 0700  Gross per 24 hour   Intake 1866.25 ml "   Output 580 ml   Net 1286.25 ml         Physical Exam:    Head: Normocephalic, atraumatic.   Eyes: Pupils equal, round, react to light   Mouth: No lesions noted  CV: Regular rate and rhythm   Lungs: Bilateral chest rise and fall, no use of accessory muscles   Abdomen: Soft, nontender, nondistended  Extremities:  No cyanosis, clubbing or edema bilaterally   Neurologic: No gross deficits      Results Review: I have personally reviewed all of the recent lab and imaging results available at this time: No evidence of active bleeding noted on imaging           Assessment and Plan:    Problem List Items Addressed This Visit    None       Active Hospital Problems    Diagnosis  POA    **GI bleed [K92.2]  Yes      Resolved Hospital Problems   No resolved problems to display.      Mr. Galarza is a 72-year-old male, with chronic anticoagulation needs due to atrial fibrillation, who presents with a GI bleed.  Work-up of CT scan and tagged nuclear red blood cell scan did not show any active bleeding.  He has had normal bowel movements since 1 prior episode of dark red blood.  At this time, I will not plan to proceed with colonoscopy unless he decompensates or continues to have bleeding.  He will need a repeat scan as outpatient.    I discussed the patient's findings and my recommendations with the patient and/or family, as well as the primary team     Alka Schmid MD  09/16/23  13:03 EDT

## 2023-09-16 NOTE — PLAN OF CARE
Goal Outcome Evaluation:  Plan of Care Reviewed With: patient   Received 2 units PRBCs since admission, awaiting h/h after transfusion.  VSS, denies pain.     Progress: no change

## 2023-09-16 NOTE — PLAN OF CARE
Goal Outcome Evaluation:  Plan of Care Reviewed With: patient        Progress: improving  Outcome Evaluation: VSS. Afib. RA. IV fluids dc'd and bicarb gtt started. Protonix gtt switched to IV BID. 2 additional units PRBC given. Diuresed with IV bumex. NM blood loss scan completed. Diet advanced to clear liquids. No s/s active bleed. No complaints. Will continue to monitor.

## 2023-09-16 NOTE — PROGRESS NOTES
[facility]     Patient Identification:  Name:  Zaid Galarza  Age:  72 y.o.  Sex:  male  :  1950  MRN:  [id number]  Visit Number:  [id number]  Primary Care Provider:  Rosi Thacker APRN    Length of stay:  1    Subjective: Patient seen and examined, he is less dyspneic and less pale, has received 3 units packed RBC and last hemoglobin is 6.1 up from 4.6.  No more bowel movement no melena, nuclear tagged RBC noted, not suggestive of bleeding.???    Chief Complaint: Severe anemia  ----------------------------------------------------------------------------------------------------------------------  Current Hospital Meds:  atorvastatin, 80 mg, Oral, Nightly  budesonide-formoterol, 2 puff, Inhalation, BID - RT  bumetanide, 1 mg, Intravenous, Once  metoprolol tartrate, 25 mg, Oral, BID  senna-docusate sodium, 2 tablet, Oral, BID  sertraline, 150 mg, Oral, Daily  sodium chloride, 10 mL, Intravenous, Q12H      pantoprazole, 8 mg/hr, Last Rate: 8 mg/hr (23 1023)  sodium chloride, 50 mL/hr, Last Rate: 50 mL/hr (09/15/23 1829)      ----------------------------------------------------------------------------------------------------------------------  Vital Signs:  Temp:  [97.6 °F (36.4 °C)-99.2 °F (37.3 °C)] 97.9 °F (36.6 °C)  Heart Rate:  [63-98] 69  Resp:  [12-25] 12  BP: ()/(34-90) 128/76       Tele: Atrial fibrillation 66 bpm      09/15/23  1313 09/15/23  1815 23  0500   Weight: 113 kg (250 lb) 117 kg (258 lb 9.6 oz) 116 kg (255 lb 8.2 oz)     Body mass index is 34.65 kg/m².    Intake/Output Summary (Last 24 hours) at 2023 1228  Last data filed at 2023 0700  Gross per 24 hour   Intake 1866.25 ml   Output 580 ml   Net 1286.25 ml     NPO Diet NPO Type: Strict NPO, Sips with Meds  ----------------------------------------------------------------------------------------------------------------------  Physical exam:  General: Patient just  came back from nuclear study, he looks much better, he is less pale and more conversant is not short of breath except activity   No respiratory distress.    Skin:  Skin is warm and dry. No rash noted. No pallor.    HENT:  Head:  Normocephalic and atraumatic.  Mouth:  Moist mucous membranes.    Eyes:  Conjunctivae and EOM are normal.  Pupils are equal, round, and reactive to light.  No scleral icterus.    Neck:  Neck supple.  No JVD present.  No hepatojugular reflux  Pulmonary/Chest: No more wheezing no rales or crackles, equal chest expansion   Cardiovascular:  Normal rate, regular rhythm and normal heart sounds with no murmur.  Abdominal: Obese soft.  Bowel sounds are normal.  No distension and no tenderness.   Extremities:  No edema, no tenderness, and no deformity.  No red or swollen joints anywhere.  Strong pulses in all 4 extremities with no clubbing, no cyanosis, no edema.  Neurological:  Motor strength equal no obvious deficit, sensory grossly intact.   No cranial nerve deficit.  No tongue deviation.  No facial droop.  No slurred speech.    Genitourinary: No Agosto catheter back:  ----------------------------------------------------------------------------------------------------------------------  ----------------------------------------------------------------------------------------------------------------------  Results from last 7 days   Lab Units 09/15/23  1340 09/15/23  1202   CK TOTAL U/L  --  172   HSTROP T ng/L 22* 21*     Results from last 7 days   Lab Units 09/16/23  0603 09/16/23  0021 09/15/23  1340 09/15/23  1202   CRP mg/dL  --   --   --  <0.30   WBC 10*3/mm3  --  10.19 10.31 11.54*   HEMOGLOBIN g/dL 6.2* 6.1* 4.3* 4.6*   HEMATOCRIT % 20.7* 20.0* 15.2* 16.1*   MCV fL  --  84.7 83.1 84.3   MCHC g/dL  --  30.5* 28.3* 28.6*   PLATELETS 10*3/mm3  --  232 258 284         Results from last 7 days   Lab Units 09/16/23  0021 09/15/23  1340 09/15/23  1202   SODIUM mmol/L 133* 138 134*   POTASSIUM mmol/L  4.3 4.4 4.2   MAGNESIUM mg/dL  --   --  1.8   CHLORIDE mmol/L 104 106 102   CO2 mmol/L 20.7* 24.0 19.7*   BUN mg/dL 31* 32* 30*   CREATININE mg/dL 1.57* 1.80* 1.67*   CALCIUM mg/dL 8.1* 8.5* 8.5*   GLUCOSE mg/dL 103* 152* 134*   ALBUMIN g/dL  --  3.6 3.6   BILIRUBIN mg/dL  --  0.3 0.3   ALK PHOS U/L  --  99 98   AST (SGOT) U/L  --  21 20   ALT (SGPT) U/L  --  9 16   Estimated Creatinine Clearance: 56.8 mL/min (A) (by C-G formula based on SCr of 1.57 mg/dL (H)).    No results found for: AMMONIA  Results from last 7 days   Lab Units 09/15/23  1202   CHOLESTEROL mg/dL 88   TRIGLYCERIDES mg/dL 67   HDL CHOL mg/dL 39*   LDL CHOL mg/dL 34     No results found for: BLOODCX  No results found for: URINECX  No results found for: WOUNDCX  No results found for: STOOLCX    I have personally looked at the labs and they are summarized above.  ----------------------------------------------------------------------------------------------------------------------  Imaging Results (Last 24 Hours)       Procedure Component Value Units Date/Time    NM GI Blood Loss [[id number]] Collected: 09/16/23 1151     Updated: 09/16/23 1154    Narrative:      EXAMINATION: NM GI BLOOD LOSS-      CLINICAL INDICATION: Melena        COMPARISON: None immediately available.     PROCEDURE:  24.6 mCi technetium tagged red blood cells administered. Flow images  were acquired followed by static images for one hour.     FINDINGS:  Flow images show no focal abnormal accumulation of radiotracer     Static images demonstrate no activity which moves throughout the study.  There is accumulation of activity in the region of the splenic flexure  but this does not move throughout the remainder of the exam.       Impression:      1. Accumulation of activity in the splenic flexure but it is not moved  throughout the study and it is, therefore, not highly suggestive of a  site of active gastrointestinal bleeding.      This report was finalized on 9/16/2023 11:52 AM by   Hossein Lenz MD.       CT Abdomen Pelvis Without Contrast [[id number]] Collected: 09/15/23 1716     Updated: 09/15/23 1720    Narrative:      EXAM: CT ABDOMEN PELVIS WO CONTRAST-         TECHNIQUE: Multiple axial CT images were obtained from lung bases  through pubic symphysis WITHOUT administration of IV contrast.  Reformatted images in the coronal and/or sagittal plane(s) were  generated from the axial data set to facilitate diagnostic accuracy  and/or surgical planning.  Oral Contrast:NONE.     Radiation dose reduction techniques were utilized per ALARA protocol.  Automated exposure control was initiated through either or Hydrocapsule or  [employer] software packages by  protocol.    DOSE:     Clinical information gi bleed      Comparison none immediately available at this time     FINDINGS:     Lower thorax: Clear. No effusions.     Abdomen:     Liver: Homogeneous. No focal hepatic mass or ductal dilatation.     Gallbladder: No dilation or stone identified.     Pancreas: Unremarkable. No mass or ductal dilatation.     Spleen: Homogeneous. No splenomegaly.     Adrenals: No mass.     Kidneys/ureters: No mass. No obstructive uropathy.  No evidence of  urolithiasis.     GI tract: Moderate stool burden. There is no evidence of appendicitis     MESENTERY: No free fluid, walled off fluid collections, mesenteric  stranding, or enlarged lymph nodes        Vasculature: Evidence of atherosclerotic vascular disease. Aortobiiliac  graft's are present.     Abdominal wall: No focal hernia or mass.        Bladder: No focal mass or significant wall thickening     Reproductive: Unremarkable as visualized     Bones: No acute bony abnormality.       Impression:         1. Moderate stool burden and sigmoid diverticuli but no evidence of  diverticulitis at the time the study. Mild narrowing of the descending  colon lumen which may be physiologic but recommend direct visualization  with the patient can undergo this exam    "  2.Other findings as above                    This report was finalized on 9/15/2023 5:18 PM by Dr. Hossein Lenz MD.             ----------------------------------------------------------------------------------------------------------------------  Assessment and Plan:  -Acute blood loss anemia secondary to GI bleed  -GI bleed source undetermined  -Acute worsening of chronic anemia from above  -Acute kidney injury likely from medication, hyportension  -Obesity  -History of COPD  -History of reduced ejection fraction heart failure moderately depressed EF recovered  -History of nonobstructive coronary disease  -Prediabetes  -History of CVA in the past  -History of chronic atrial fibrillation currently off anticoagulation due to GI bleed  -History of abdominal aortic aneurysm status post aortoiliac graft 2009    Continue gentle hydration, patient will be receiving fourth unit of packed RBC hemoglobin currently 6.2, no bowel movement, no hematemesis.  \"Nuclear tagged RBC is inconclusive with accumulation of activity at the splenic flexure does not move throughout the study\" surgery is consulted and will evaluate, if patient continues have blood loss may need video capsule endoscopy if feasible.  Watch for volume overload, monitor electrolytes, avoid nephrotoxic medications, check postvoiding bladder scan.    Plan outlined to the patient together with his nurse AYAN Ac. and agreed.      Disposition pending      Maria Elena Dorsey MD  09/16/23  12:28 EDT  "

## 2023-09-17 LAB
ANION GAP SERPL CALCULATED.3IONS-SCNC: 9 MMOL/L (ref 5–15)
BASOPHILS # BLD AUTO: 0.03 10*3/MM3 (ref 0–0.2)
BASOPHILS NFR BLD AUTO: 0.3 % (ref 0–1.5)
BH BB BLOOD EXPIRATION DATE: NORMAL
BH BB BLOOD EXPIRATION DATE: NORMAL
BH BB BLOOD TYPE BARCODE: 6200
BH BB BLOOD TYPE BARCODE: 6200
BH BB DISPENSE STATUS: NORMAL
BH BB DISPENSE STATUS: NORMAL
BH BB PRODUCT CODE: NORMAL
BH BB PRODUCT CODE: NORMAL
BH BB UNIT NUMBER: NORMAL
BH BB UNIT NUMBER: NORMAL
BUN SERPL-MCNC: 27 MG/DL (ref 8–23)
BUN/CREAT SERPL: 18.4 (ref 7–25)
CALCIUM SPEC-SCNC: 8.1 MG/DL (ref 8.6–10.5)
CHLORIDE SERPL-SCNC: 106 MMOL/L (ref 98–107)
CO2 SERPL-SCNC: 23 MMOL/L (ref 22–29)
CREAT SERPL-MCNC: 1.47 MG/DL (ref 0.76–1.27)
CROSSMATCH INTERPRETATION: NORMAL
CROSSMATCH INTERPRETATION: NORMAL
DEPRECATED RDW RBC AUTO: 58.4 FL (ref 37–54)
EGFRCR SERPLBLD CKD-EPI 2021: 50.7 ML/MIN/1.73
EOSINOPHIL # BLD AUTO: 0.31 10*3/MM3 (ref 0–0.4)
EOSINOPHIL NFR BLD AUTO: 3 % (ref 0.3–6.2)
ERYTHROCYTE [DISTWIDTH] IN BLOOD BY AUTOMATED COUNT: 18.7 % (ref 12.3–15.4)
GLUCOSE SERPL-MCNC: 97 MG/DL (ref 65–99)
HCT VFR BLD AUTO: 24.5 % (ref 37.5–51)
HCT VFR BLD AUTO: 25.1 % (ref 37.5–51)
HCT VFR BLD AUTO: 25.2 % (ref 37.5–51)
HCT VFR BLD AUTO: 28.7 % (ref 37.5–51)
HGB BLD-MCNC: 7.5 G/DL (ref 13–17.7)
HGB BLD-MCNC: 7.7 G/DL (ref 13–17.7)
HGB BLD-MCNC: 8 G/DL (ref 13–17.7)
HGB BLD-MCNC: 8.7 G/DL (ref 13–17.7)
IMM GRANULOCYTES # BLD AUTO: 0.09 10*3/MM3 (ref 0–0.05)
IMM GRANULOCYTES NFR BLD AUTO: 0.9 % (ref 0–0.5)
LYMPHOCYTES # BLD AUTO: 1.12 10*3/MM3 (ref 0.7–3.1)
LYMPHOCYTES NFR BLD AUTO: 10.8 % (ref 19.6–45.3)
MCH RBC QN AUTO: 25.8 PG (ref 26.6–33)
MCHC RBC AUTO-ENTMCNC: 30.7 G/DL (ref 31.5–35.7)
MCV RBC AUTO: 84.2 FL (ref 79–97)
MONOCYTES # BLD AUTO: 0.99 10*3/MM3 (ref 0.1–0.9)
MONOCYTES NFR BLD AUTO: 9.6 % (ref 5–12)
NEUTROPHILS NFR BLD AUTO: 7.8 10*3/MM3 (ref 1.7–7)
NEUTROPHILS NFR BLD AUTO: 75.4 % (ref 42.7–76)
NRBC BLD AUTO-RTO: 0.7 /100 WBC (ref 0–0.2)
PLATELET # BLD AUTO: 203 10*3/MM3 (ref 140–450)
PMV BLD AUTO: 11.7 FL (ref 6–12)
POTASSIUM SERPL-SCNC: 4.1 MMOL/L (ref 3.5–5.2)
QT INTERVAL: 410 MS
QTC INTERVAL: 442 MS
RBC # BLD AUTO: 2.98 10*6/MM3 (ref 4.14–5.8)
SODIUM SERPL-SCNC: 138 MMOL/L (ref 136–145)
UNIT  ABO: NORMAL
UNIT  ABO: NORMAL
UNIT  RH: NORMAL
UNIT  RH: NORMAL
WBC NRBC COR # BLD: 10.34 10*3/MM3 (ref 3.4–10.8)

## 2023-09-17 PROCEDURE — 94799 UNLISTED PULMONARY SVC/PX: CPT

## 2023-09-17 PROCEDURE — 80048 BASIC METABOLIC PNL TOTAL CA: CPT | Performed by: HOSPITALIST

## 2023-09-17 PROCEDURE — 99232 SBSQ HOSP IP/OBS MODERATE 35: CPT | Performed by: HOSPITALIST

## 2023-09-17 PROCEDURE — 94761 N-INVAS EAR/PLS OXIMETRY MLT: CPT

## 2023-09-17 PROCEDURE — 85025 COMPLETE CBC W/AUTO DIFF WBC: CPT | Performed by: HOSPITALIST

## 2023-09-17 PROCEDURE — 99231 SBSQ HOSP IP/OBS SF/LOW 25: CPT | Performed by: SURGERY

## 2023-09-17 PROCEDURE — 93005 ELECTROCARDIOGRAM TRACING: CPT | Performed by: HOSPITALIST

## 2023-09-17 PROCEDURE — 85018 HEMOGLOBIN: CPT | Performed by: HOSPITALIST

## 2023-09-17 PROCEDURE — 94664 DEMO&/EVAL PT USE INHALER: CPT

## 2023-09-17 PROCEDURE — 85014 HEMATOCRIT: CPT | Performed by: HOSPITALIST

## 2023-09-17 RX ORDER — ACETAMINOPHEN 325 MG/1
650 TABLET ORAL EVERY 6 HOURS PRN
Status: DISCONTINUED | OUTPATIENT
Start: 2023-09-17 | End: 2023-09-20 | Stop reason: HOSPADM

## 2023-09-17 RX ADMIN — BUDESONIDE AND FORMOTEROL FUMARATE DIHYDRATE 2 PUFF: 160; 4.5 AEROSOL RESPIRATORY (INHALATION) at 18:37

## 2023-09-17 RX ADMIN — SODIUM BICARBONATE: 84 INJECTION, SOLUTION INTRAVENOUS at 16:42

## 2023-09-17 RX ADMIN — Medication 10 ML: at 08:24

## 2023-09-17 RX ADMIN — ATORVASTATIN CALCIUM 80 MG: 40 TABLET, FILM COATED ORAL at 20:13

## 2023-09-17 RX ADMIN — SERTRALINE 150 MG: 50 TABLET, FILM COATED ORAL at 08:23

## 2023-09-17 RX ADMIN — PANTOPRAZOLE SODIUM 40 MG: 40 INJECTION, POWDER, FOR SOLUTION INTRAVENOUS at 08:23

## 2023-09-17 RX ADMIN — BUDESONIDE AND FORMOTEROL FUMARATE DIHYDRATE 2 PUFF: 160; 4.5 AEROSOL RESPIRATORY (INHALATION) at 06:49

## 2023-09-17 RX ADMIN — METOPROLOL TARTRATE 25 MG: 25 TABLET, FILM COATED ORAL at 08:22

## 2023-09-17 RX ADMIN — ACETAMINOPHEN 650 MG: 325 TABLET ORAL at 08:34

## 2023-09-17 RX ADMIN — PANTOPRAZOLE SODIUM 40 MG: 40 INJECTION, POWDER, FOR SOLUTION INTRAVENOUS at 20:13

## 2023-09-17 RX ADMIN — Medication 10 ML: at 20:13

## 2023-09-17 RX ADMIN — METOPROLOL TARTRATE 25 MG: 25 TABLET, FILM COATED ORAL at 20:13

## 2023-09-17 RX ADMIN — Medication 10 ML: at 20:14

## 2023-09-17 NOTE — PROGRESS NOTES
Mr Galarza is well appearing this morning.   Possible small amount of dark stool last night however hemoglobin remains stable.     Will continue to monitor at this time. Would prefer to perform scopes as outpatient once stabilized.     Alka Romano MD       General Surgeon  [facility]

## 2023-09-17 NOTE — PLAN OF CARE
Goal Outcome Evaluation:  Plan of Care Reviewed With: patient        Progress: improving  Outcome Evaluation: Pt AOx4. VSS. Patient up to chair and had shortness of breath. Oxygen dropped to the high 80s but recovered very quickly. No further needs at this time. Bed low, locked, and in posistion. Call light in reach.

## 2023-09-17 NOTE — PLAN OF CARE
Problem: Fall Injury Risk  Goal: Absence of Fall and Fall-Related Injury  Outcome: Ongoing, Progressing  Intervention: Identify and Manage Contributors  Recent Flowsheet Documentation  Taken 9/17/2023 0300 by Franny Argueta RN  Medication Review/Management: medications reviewed  Taken 9/17/2023 0100 by Franny Argueta RN  Medication Review/Management: medications reviewed  Taken 9/16/2023 2300 by Franny Argueta RN  Medication Review/Management: medications reviewed  Taken 9/16/2023 2100 by Franny Argueta RN  Medication Review/Management: medications reviewed  Taken 9/16/2023 2000 by Franny Argueta RN  Medication Review/Management: medications reviewed  Taken 9/16/2023 1900 by Franny Argueta RN  Medication Review/Management: medications reviewed  Intervention: Promote Injury-Free Environment  Recent Flowsheet Documentation  Taken 9/17/2023 0300 by Franny Argueta RN  Safety Promotion/Fall Prevention:   activity supervised   assistive device/personal items within reach   clutter free environment maintained   fall prevention program maintained   safety round/check completed  Taken 9/17/2023 0100 by Franny Argueta RN  Safety Promotion/Fall Prevention:   activity supervised   assistive device/personal items within reach   clutter free environment maintained   fall prevention program maintained   safety round/check completed  Taken 9/16/2023 2300 by Franny Argueta RN  Safety Promotion/Fall Prevention:   activity supervised   assistive device/personal items within reach   clutter free environment maintained   fall prevention program maintained   safety round/check completed  Taken 9/16/2023 2100 by Franny Argueta RN  Safety Promotion/Fall Prevention:   activity supervised   assistive device/personal items within reach   clutter free environment maintained   fall prevention program maintained   safety round/check completed  Taken 9/16/2023 1900 by Franny Argueta RN  Safety Promotion/Fall Prevention:   activity  supervised   assistive device/personal items within reach   clutter free environment maintained   fall prevention program maintained   safety round/check completed     Problem: Heart Failure Comorbidity  Goal: Maintenance of Heart Failure Symptom Control  Outcome: Ongoing, Progressing  Intervention: Maintain Heart Failure-Management  Recent Flowsheet Documentation  Taken 9/17/2023 0300 by Franny Argueta RN  Medication Review/Management: medications reviewed  Taken 9/17/2023 0100 by Franny Argueta RN  Medication Review/Management: medications reviewed  Taken 9/16/2023 2300 by Franny Argueta RN  Medication Review/Management: medications reviewed  Taken 9/16/2023 2100 by Franny Argueta RN  Medication Review/Management: medications reviewed  Taken 9/16/2023 2000 by Franny Argueta RN  Medication Review/Management: medications reviewed  Taken 9/16/2023 1900 by Franny Argueta RN  Medication Review/Management: medications reviewed     Problem: Hypertension Comorbidity  Goal: Blood Pressure in Desired Range  Outcome: Ongoing, Progressing  Intervention: Maintain Blood Pressure Management  Recent Flowsheet Documentation  Taken 9/17/2023 0300 by Franny Argueta RN  Medication Review/Management: medications reviewed  Taken 9/17/2023 0100 by Franny Argueta RN  Medication Review/Management: medications reviewed  Taken 9/16/2023 2300 by Franny Argueta RN  Medication Review/Management: medications reviewed  Taken 9/16/2023 2100 by Franny Argueta RN  Medication Review/Management: medications reviewed  Taken 9/16/2023 2000 by Franny Argueta RN  Medication Review/Management: medications reviewed  Taken 9/16/2023 1900 by Franny Argueta RN  Medication Review/Management: medications reviewed     Problem: Adult Inpatient Plan of Care  Goal: Plan of Care Review  Outcome: Ongoing, Progressing  Flowsheets (Taken 9/17/2023 0343)  Outcome Evaluation: Pt resting in bed, call light in reach, and bed alarm set. No new needs noted at  this time.  Goal: Patient-Specific Goal (Individualized)  Outcome: Ongoing, Progressing  Goal: Absence of Hospital-Acquired Illness or Injury  Outcome: Ongoing, Progressing  Intervention: Identify and Manage Fall Risk  Recent Flowsheet Documentation  Taken 9/17/2023 0300 by Franny Argueta RN  Safety Promotion/Fall Prevention:   activity supervised   assistive device/personal items within reach   clutter free environment maintained   fall prevention program maintained   safety round/check completed  Taken 9/17/2023 0100 by Franny Argueta RN  Safety Promotion/Fall Prevention:   activity supervised   assistive device/personal items within reach   clutter free environment maintained   fall prevention program maintained   safety round/check completed  Taken 9/16/2023 2300 by Franny Argueta RN  Safety Promotion/Fall Prevention:   activity supervised   assistive device/personal items within reach   clutter free environment maintained   fall prevention program maintained   safety round/check completed  Taken 9/16/2023 2100 by Franny Argueta RN  Safety Promotion/Fall Prevention:   activity supervised   assistive device/personal items within reach   clutter free environment maintained   fall prevention program maintained   safety round/check completed  Taken 9/16/2023 1900 by Franny Argueta RN  Safety Promotion/Fall Prevention:   activity supervised   assistive device/personal items within reach   clutter free environment maintained   fall prevention program maintained   safety round/check completed  Intervention: Prevent Skin Injury  Recent Flowsheet Documentation  Taken 9/16/2023 2000 by Franny Argueta RN  Skin Protection:   adhesive use limited   incontinence pads utilized   tubing/devices free from skin contact   transparent dressing maintained  Intervention: Prevent and Manage VTE (Venous Thromboembolism) Risk  Recent Flowsheet Documentation  Taken 9/17/2023 0300 by Franny Argueta RN  Activity Management: activity  encouraged  Taken 9/17/2023 0100 by Franny Argueta RN  Activity Management: activity encouraged  Taken 9/16/2023 2300 by Franny Argueta RN  Activity Management: activity encouraged  Taken 9/16/2023 2100 by Franny Argueta RN  Activity Management: activity encouraged  Taken 9/16/2023 2000 by Frnany Argueta RN  VTE Prevention/Management:   bilateral   sequential compression devices off   patient refused intervention  Range of Motion: active ROM (range of motion) encouraged  Taken 9/16/2023 1900 by Franny Argueta RN  Activity Management: activity encouraged  Intervention: Prevent Infection  Recent Flowsheet Documentation  Taken 9/17/2023 0300 by Franny Argueta RN  Infection Prevention:   single patient room provided   rest/sleep promoted   hand hygiene promoted  Taken 9/17/2023 0100 by Franny Argueta RN  Infection Prevention:   single patient room provided   rest/sleep promoted   hand hygiene promoted  Taken 9/16/2023 2300 by Franny Argueta RN  Infection Prevention:   single patient room provided   rest/sleep promoted   hand hygiene promoted  Taken 9/16/2023 2100 by Franny Argueta RN  Infection Prevention:   single patient room provided   rest/sleep promoted   hand hygiene promoted  Taken 9/16/2023 2000 by Franny Argueta RN  Infection Prevention:   single patient room provided   rest/sleep promoted   hand hygiene promoted  Taken 9/16/2023 1900 by Franny Argueta RN  Infection Prevention:   single patient room provided   rest/sleep promoted   hand hygiene promoted  Goal: Optimal Comfort and Wellbeing  Outcome: Ongoing, Progressing  Intervention: Provide Person-Centered Care  Recent Flowsheet Documentation  Taken 9/16/2023 2000 by Franny Argueta RN  Trust Relationship/Rapport:   care explained   choices provided   questions answered   questions encouraged   reassurance provided   thoughts/feelings acknowledged  Goal: Readiness for Transition of Care  Outcome: Ongoing, Progressing     Problem: Adjustment to  Illness (Gastrointestinal Bleeding)  Goal: Optimal Coping with Acute Illness  Outcome: Ongoing, Progressing  Intervention: Optimize Psychosocial Response  Recent Flowsheet Documentation  Taken 9/16/2023 2000 by Franny Argueta RN  Supportive Measures:   active listening utilized   relaxation techniques promoted     Problem: Bleeding (Gastrointestinal Bleeding)  Goal: Hemostasis  Outcome: Ongoing, Progressing     Problem: Skin Injury Risk Increased  Goal: Skin Health and Integrity  Outcome: Ongoing, Progressing  Intervention: Optimize Skin Protection  Recent Flowsheet Documentation  Taken 9/16/2023 2000 by Franny Argueta RN  Skin Protection:   adhesive use limited   incontinence pads utilized   tubing/devices free from skin contact   transparent dressing maintained   Goal Outcome Evaluation:              Outcome Evaluation: Pt resting in bed, call light in reach, and bed alarm set. No new needs noted at this time.

## 2023-09-18 LAB
ANION GAP SERPL CALCULATED.3IONS-SCNC: 9.9 MMOL/L (ref 5–15)
BASOPHILS # BLD AUTO: 0.04 10*3/MM3 (ref 0–0.2)
BASOPHILS NFR BLD AUTO: 0.4 % (ref 0–1.5)
BUN SERPL-MCNC: 21 MG/DL (ref 8–23)
BUN/CREAT SERPL: 15.7 (ref 7–25)
CALCIUM SPEC-SCNC: 8 MG/DL (ref 8.6–10.5)
CHLORIDE SERPL-SCNC: 106 MMOL/L (ref 98–107)
CO2 SERPL-SCNC: 23.1 MMOL/L (ref 22–29)
CREAT SERPL-MCNC: 1.34 MG/DL (ref 0.76–1.27)
DEPRECATED RDW RBC AUTO: 60.7 FL (ref 37–54)
EGFRCR SERPLBLD CKD-EPI 2021: 56.6 ML/MIN/1.73
EOSINOPHIL # BLD AUTO: 0.27 10*3/MM3 (ref 0–0.4)
EOSINOPHIL NFR BLD AUTO: 2.4 % (ref 0.3–6.2)
ERYTHROCYTE [DISTWIDTH] IN BLOOD BY AUTOMATED COUNT: 19.5 % (ref 12.3–15.4)
GLUCOSE SERPL-MCNC: 91 MG/DL (ref 65–99)
HCT VFR BLD AUTO: 26.7 % (ref 37.5–51)
HCT VFR BLD AUTO: 29.9 % (ref 37.5–51)
HGB BLD-MCNC: 8.2 G/DL (ref 13–17.7)
HGB BLD-MCNC: 8.5 G/DL (ref 13–17.7)
IMM GRANULOCYTES # BLD AUTO: 0.08 10*3/MM3 (ref 0–0.05)
IMM GRANULOCYTES NFR BLD AUTO: 0.7 % (ref 0–0.5)
IRON 24H UR-MRATE: 14 MCG/DL (ref 59–158)
IRON SATN MFR SERPL: 4 % (ref 20–50)
LYMPHOCYTES # BLD AUTO: 0.86 10*3/MM3 (ref 0.7–3.1)
LYMPHOCYTES NFR BLD AUTO: 7.8 % (ref 19.6–45.3)
MCH RBC QN AUTO: 26.4 PG (ref 26.6–33)
MCHC RBC AUTO-ENTMCNC: 30.7 G/DL (ref 31.5–35.7)
MCV RBC AUTO: 85.9 FL (ref 79–97)
MONOCYTES # BLD AUTO: 0.96 10*3/MM3 (ref 0.1–0.9)
MONOCYTES NFR BLD AUTO: 8.7 % (ref 5–12)
NEUTROPHILS NFR BLD AUTO: 8.85 10*3/MM3 (ref 1.7–7)
NEUTROPHILS NFR BLD AUTO: 80 % (ref 42.7–76)
NRBC BLD AUTO-RTO: 0.4 /100 WBC (ref 0–0.2)
PLATELET # BLD AUTO: 203 10*3/MM3 (ref 140–450)
PMV BLD AUTO: 11.2 FL (ref 6–12)
POTASSIUM SERPL-SCNC: 4 MMOL/L (ref 3.5–5.2)
RBC # BLD AUTO: 3.11 10*6/MM3 (ref 4.14–5.8)
SODIUM SERPL-SCNC: 139 MMOL/L (ref 136–145)
TIBC SERPL-MCNC: 373 MCG/DL (ref 298–536)
TRANSFERRIN SERPL-MCNC: 250 MG/DL (ref 200–360)
WBC NRBC COR # BLD: 11.06 10*3/MM3 (ref 3.4–10.8)

## 2023-09-18 PROCEDURE — 84466 ASSAY OF TRANSFERRIN: CPT | Performed by: STUDENT IN AN ORGANIZED HEALTH CARE EDUCATION/TRAINING PROGRAM

## 2023-09-18 PROCEDURE — 94799 UNLISTED PULMONARY SVC/PX: CPT

## 2023-09-18 PROCEDURE — 85014 HEMATOCRIT: CPT | Performed by: HOSPITALIST

## 2023-09-18 PROCEDURE — 92610 EVALUATE SWALLOWING FUNCTION: CPT

## 2023-09-18 PROCEDURE — 97166 OT EVAL MOD COMPLEX 45 MIN: CPT

## 2023-09-18 PROCEDURE — 99233 SBSQ HOSP IP/OBS HIGH 50: CPT | Performed by: STUDENT IN AN ORGANIZED HEALTH CARE EDUCATION/TRAINING PROGRAM

## 2023-09-18 PROCEDURE — 83540 ASSAY OF IRON: CPT | Performed by: STUDENT IN AN ORGANIZED HEALTH CARE EDUCATION/TRAINING PROGRAM

## 2023-09-18 PROCEDURE — 85025 COMPLETE CBC W/AUTO DIFF WBC: CPT | Performed by: HOSPITALIST

## 2023-09-18 PROCEDURE — 94664 DEMO&/EVAL PT USE INHALER: CPT

## 2023-09-18 PROCEDURE — 97161 PT EVAL LOW COMPLEX 20 MIN: CPT

## 2023-09-18 PROCEDURE — 99231 SBSQ HOSP IP/OBS SF/LOW 25: CPT | Performed by: SURGERY

## 2023-09-18 PROCEDURE — 80048 BASIC METABOLIC PNL TOTAL CA: CPT | Performed by: HOSPITALIST

## 2023-09-18 PROCEDURE — 85018 HEMOGLOBIN: CPT | Performed by: HOSPITALIST

## 2023-09-18 PROCEDURE — 31579 LARYNGOSCOPY TELESCOPIC: CPT

## 2023-09-18 PROCEDURE — 94761 N-INVAS EAR/PLS OXIMETRY MLT: CPT

## 2023-09-18 RX ORDER — FERROUS SULFATE 325(65) MG
325 TABLET ORAL 2 TIMES DAILY WITH MEALS
Status: DISCONTINUED | OUTPATIENT
Start: 2023-09-18 | End: 2023-09-20 | Stop reason: HOSPADM

## 2023-09-18 RX ADMIN — Medication 10 ML: at 08:40

## 2023-09-18 RX ADMIN — BUDESONIDE AND FORMOTEROL FUMARATE DIHYDRATE 2 PUFF: 160; 4.5 AEROSOL RESPIRATORY (INHALATION) at 18:46

## 2023-09-18 RX ADMIN — METOPROLOL TARTRATE 25 MG: 25 TABLET, FILM COATED ORAL at 20:56

## 2023-09-18 RX ADMIN — PANTOPRAZOLE SODIUM 40 MG: 40 INJECTION, POWDER, FOR SOLUTION INTRAVENOUS at 08:39

## 2023-09-18 RX ADMIN — ATORVASTATIN CALCIUM 80 MG: 40 TABLET, FILM COATED ORAL at 20:56

## 2023-09-18 RX ADMIN — SERTRALINE 150 MG: 50 TABLET, FILM COATED ORAL at 08:39

## 2023-09-18 RX ADMIN — PANTOPRAZOLE SODIUM 40 MG: 40 INJECTION, POWDER, FOR SOLUTION INTRAVENOUS at 20:57

## 2023-09-18 RX ADMIN — Medication 10 ML: at 20:57

## 2023-09-18 RX ADMIN — APIXABAN 5 MG: 5 TABLET, FILM COATED ORAL at 20:57

## 2023-09-18 RX ADMIN — BUDESONIDE AND FORMOTEROL FUMARATE DIHYDRATE 2 PUFF: 160; 4.5 AEROSOL RESPIRATORY (INHALATION) at 06:26

## 2023-09-18 RX ADMIN — METOPROLOL TARTRATE 25 MG: 25 TABLET, FILM COATED ORAL at 08:39

## 2023-09-18 RX ADMIN — FERROUS SULFATE TAB 325 MG (65 MG ELEMENTAL FE) 325 MG: 325 (65 FE) TAB at 17:09

## 2023-09-18 NOTE — PLAN OF CARE
Goal Outcome Evaluation:              Outcome Evaluation: Patient resting in bed at this time. Patient denies complaints. Patient continues to have IVF infusing per order. Call light within reach.

## 2023-09-18 NOTE — THERAPY EVALUATION
Acute Care - Occupational Therapy Initial Evaluation   [place]     Patient Name: Zaid Galarza  : 1950  MRN: [id number]  Today's Date: 2023             Admit Date: 9/15/2023     No diagnosis found.  Patient Active Problem List   Diagnosis    Persistent atrial fibrillation    Dilated cardiomyopathy    Nonobstructive CAD per cath 10/3/2019    Anxiety disorder    Essential hypertension    Tobacco use    History of abdominal aortic aneurysm (AAA) repair    Dyslipidemia, goal LDL below 100    CVA (cerebral vascular accident)    Cerebrovascular accident (CVA), unspecified mechanism    Symptomatic anemia    GI bleed     Past Medical History:   Diagnosis Date    Atrial fibrillation     CHF (congestive heart failure)     COPD (chronic obstructive pulmonary disease)     Coronary artery disease     Hypertension     Tobacco abuse      Past Surgical History:   Procedure Laterality Date    ABDOMINAL AORTIC ANEURYSM REPAIR      CARDIAC CATHETERIZATION N/A 10/03/2019    Procedure: Left Heart Cath;  Surgeon: Vincent Phillips MD;  Location: [facility] INVASIVE LOC[facility];  Service: Cardiology         OT ASSESSMENT FLOWSHEET (last 12 hours)       OT Evaluation and Treatment       Row Name 23 1132                   OT Time and Intention    Document Type evaluation  -KR        Mode of Treatment occupational therapy  -KR        Patient Effort adequate  -KR           Living Environment    Current Living Arrangements home  -KR        People in Home alone  -KR           Home Use of Assistive/Adaptive Equipment    Equipment Currently Used at Home cane, straight;walker, rolling;wheelchair  -KR           Cognition    Affect/Mental Status (Cognition) WFL  -KR        Orientation Status (Cognition) oriented x 3  -KR        Follows Commands (Cognition) WFL  -KR           Range of Motion Comprehensive    Comment, General Range of Motion BUE WFL  -KR           Strength Comprehensive (MMT)    Comment, General Manual Muscle  Testing (MMT) Assessment BUE 3-/5  -KR           Activities of Daily Living    BADL Assessment/Intervention bathing;upper body dressing;lower body dressing;grooming;feeding;toileting  -KR           Bathing Assessment/Intervention    Summers Level (Bathing) bathing skills;moderate assist (50% patient effort)  -KR           Upper Body Dressing Assessment/Training    Summers Level (Upper Body Dressing) upper body dressing skills;moderate assist (50% patient effort)  -KR           Lower Body Dressing Assessment/Training    Summers Level (Lower Body Dressing) lower body dressing skills;moderate assist (50% patient effort)  -KR           Grooming Assessment/Training    Summers Level (Grooming) grooming skills;minimum assist (75% patient effort)  -KR           Self-Feeding Assessment/Training    Summers Level (Feeding) feeding skills;set up  -KR           Toileting Assessment/Training    Summers Level (Toileting) toileting skills;moderate assist (50% patient effort)  -KR           Plan of Care Review    Plan of Care Reviewed With patient  -KR           Therapy Assessment/Plan (OT)    Planned Therapy Interventions (OT) activity tolerance training;BADL retraining;strengthening exercise  -KR           Therapy Plan Review/Discharge Plan (OT)    Anticipated Discharge Disposition (OT) home  -KR           OT Goals    Dressing Goal Selection (OT) dressing, OT goal 1  -KR        Strength Goal Selection (OT) strength, OT goal 1  -KR           Dressing Goal 1 (OT)    Activity/Device (Dressing Goal 1, OT) dressing skills, all  -KR        Summers/Cues Needed (Dressing Goal 1, OT) contact guard required  -KR        Time Frame (Dressing Goal 1, OT) by discharge  -KR           Strength Goal 1 (OT)    Strength Goal 1 (OT) BUE increase x1 to enhance self care/mobility skills  -KR        Time Frame (Strength Goal 1, OT) by discharge  -KR                  User Key  (r) = Recorded By, (t) = Taken By, (c) =  Cosigned By      Initials Name Effective Dates    KR Dequan Coulter OT 06/16/21 -                            OT Recommendation and Plan  Planned Therapy Interventions (OT): activity tolerance training, BADL retraining, strengthening exercise  Plan of Care Review  Plan of Care Reviewed With: patient  Plan of Care Reviewed With: patient        Time Calculation:     Therapy Charges for Today       Code Description Service Date Service Provider Modifiers Qty    33762658280 HC OT EVAL MOD COMPLEXITY 4 9/18/2023 Dequan Coulter, OT GO 1                 Dequan Coulter OT  9/18/2023

## 2023-09-18 NOTE — PLAN OF CARE
Goal Outcome Evaluation:              Outcome Evaluation: Pt seen for evaluation this date, pleasant and cooperative with therapy. Pt may benefit from therapeutic intervention to work on endurance, fall risk/safety, balance.      Anticipated Discharge Disposition (PT): home with home health

## 2023-09-18 NOTE — PROGRESS NOTES
Hemoglobin remains stable. OK to advance diet as tolerated.     Please notify if any further blood in stool.     Patient may follow up with surgeon who previously scoped him or may come see me in office in 1 month.     Alka Romano MD       General Surgeon  [facility]

## 2023-09-18 NOTE — PLAN OF CARE
Problem: Fall Injury Risk  Goal: Absence of Fall and Fall-Related Injury  Outcome: Ongoing, Not Progressing  Intervention: Promote Injury-Free Environment  Recent Flowsheet Documentation  Taken 9/18/2023 1300 by Juliana Fonseca RN  Safety Promotion/Fall Prevention: safety round/check completed  Taken 9/18/2023 1100 by Juliana Fonseca RN  Safety Promotion/Fall Prevention: safety round/check completed  Taken 9/18/2023 0900 by Juliana Fonseca RN  Safety Promotion/Fall Prevention: safety round/check completed  Taken 9/18/2023 0700 by Juliana Fonseca RN  Safety Promotion/Fall Prevention: safety round/check completed     Problem: Heart Failure Comorbidity  Goal: Maintenance of Heart Failure Symptom Control  Outcome: Ongoing, Not Progressing     Problem: Hypertension Comorbidity  Goal: Blood Pressure in Desired Range  Outcome: Ongoing, Not Progressing     Problem: Adult Inpatient Plan of Care  Goal: Plan of Care Review  Outcome: Ongoing, Not Progressing  Flowsheets (Taken 9/18/2023 1453)  Progress: no change  Plan of Care Reviewed With: patient  Outcome Evaluation: Pt A&Ox4. NSR. RA. Hgb remains stable. Diet advanced this shift. Pt started on ferrous sulfate tablet. Pt ambulated this shift. Verbalizing no new complaints at this time. Call light wtihin reach. Bed locked and in lowest position.  Goal: Patient-Specific Goal (Individualized)  Outcome: Ongoing, Not Progressing  Goal: Absence of Hospital-Acquired Illness or Injury  Outcome: Ongoing, Not Progressing  Intervention: Identify and Manage Fall Risk  Recent Flowsheet Documentation  Taken 9/18/2023 1300 by Juliana Fonseca RN  Safety Promotion/Fall Prevention: safety round/check completed  Taken 9/18/2023 1100 by Juliana Fonseca RN  Safety Promotion/Fall Prevention: safety round/check completed  Taken 9/18/2023 0900 by Juliana Fonseca RN  Safety Promotion/Fall Prevention: safety round/check completed  Taken 9/18/2023 0700 by Juliana Fonseca RN  Safety Promotion/Fall  Prevention: safety round/check completed  Intervention: Prevent Skin Injury  Recent Flowsheet Documentation  Taken 9/18/2023 1400 by Juliana Fonseca RN  Skin Protection:   adhesive use limited   incontinence pads utilized   skin-to-device areas padded   skin-to-skin areas padded   tubing/devices free from skin contact  Taken 9/18/2023 0800 by Juliana Fonseca RN  Skin Protection:   adhesive use limited   incontinence pads utilized   skin-to-device areas padded   skin-to-skin areas padded   tubing/devices free from skin contact  Intervention: Prevent and Manage VTE (Venous Thromboembolism) Risk  Recent Flowsheet Documentation  Taken 9/18/2023 1400 by Juliana Fonseca RN  VTE Prevention/Management:   bilateral   sequential compression devices on  Range of Motion: active ROM (range of motion) encouraged  Taken 9/18/2023 0800 by Juliana Fonseca RN  VTE Prevention/Management:   bilateral   sequential compression devices on  Range of Motion: active ROM (range of motion) encouraged  Goal: Optimal Comfort and Wellbeing  Outcome: Ongoing, Not Progressing  Intervention: Provide Person-Centered Care  Recent Flowsheet Documentation  Taken 9/18/2023 1400 by Juliana Fonseca RN  Trust Relationship/Rapport:   care explained   choices provided   emotional support provided   empathic listening provided   questions answered   questions encouraged   reassurance provided   thoughts/feelings acknowledged  Taken 9/18/2023 0800 by Juliana Fonseca RN  Trust Relationship/Rapport:   care explained   choices provided   emotional support provided   empathic listening provided   questions answered   questions encouraged   reassurance provided   thoughts/feelings acknowledged  Goal: Readiness for Transition of Care  Outcome: Ongoing, Not Progressing     Problem: Adjustment to Illness (Gastrointestinal Bleeding)  Goal: Optimal Coping with Acute Illness  Outcome: Ongoing, Not Progressing  Intervention: Optimize Psychosocial Response  Recent Flowsheet  Documentation  Taken 9/18/2023 1400 by Juliana Fonseca RN  Supportive Measures:   active listening utilized   relaxation techniques promoted  Taken 9/18/2023 0800 by Juliana Fonseca RN  Supportive Measures:   active listening utilized   relaxation techniques promoted     Problem: Bleeding (Gastrointestinal Bleeding)  Goal: Hemostasis  Outcome: Ongoing, Not Progressing     Problem: Skin Injury Risk Increased  Goal: Skin Health and Integrity  Outcome: Ongoing, Not Progressing  Intervention: Optimize Skin Protection  Recent Flowsheet Documentation  Taken 9/18/2023 1400 by Juliana Fonseca RN  Pressure Reduction Techniques:   heels elevated off bed   positioned off wounds   pressure points protected   weight shift assistance provided  Pressure Reduction Devices:   heel offloading device utilized   positioning supports utilized   pressure-redistributing mattress utilized   specialty bed utilized  Skin Protection:   adhesive use limited   incontinence pads utilized   skin-to-device areas padded   skin-to-skin areas padded   tubing/devices free from skin contact  Taken 9/18/2023 0800 by Juliana Fonseca RN  Pressure Reduction Techniques:   heels elevated off bed   positioned off wounds   pressure points protected   weight shift assistance provided  Pressure Reduction Devices:   heel offloading device utilized   positioning supports utilized   pressure-redistributing mattress utilized   specialty bed utilized  Skin Protection:   adhesive use limited   incontinence pads utilized   skin-to-device areas padded   skin-to-skin areas padded   tubing/devices free from skin contact   Goal Outcome Evaluation:  Plan of Care Reviewed With: patient        Progress: no change  Outcome Evaluation: Pt A&Ox4. NSR. RA. Hgb remains stable. Diet advanced this shift. Pt started on ferrous sulfate tablet. Pt ambulated this shift. Verbalizing no new complaints at this time. Call light wtihin reach. Bed locked and in lowest position.

## 2023-09-18 NOTE — THERAPY EVALUATION
"Acute Care - Physical Therapy Initial Evaluation   [place]     Patient Name: Zaid Galarza  : 1950  MRN: [id number]  Today's Date: 2023   Onset of Illness/Injury or Date of Surgery: 09/15/23 (admission date)  Visit Dx:   No diagnosis found.  Patient Active Problem List   Diagnosis    Persistent atrial fibrillation    Dilated cardiomyopathy    Nonobstructive CAD per cath 10/3/2019    Anxiety disorder    Essential hypertension    Tobacco use    History of abdominal aortic aneurysm (AAA) repair    Dyslipidemia, goal LDL below 100    CVA (cerebral vascular accident)    Cerebrovascular accident (CVA), unspecified mechanism    Symptomatic anemia    GI bleed     Past Medical History:   Diagnosis Date    Atrial fibrillation     CHF (congestive heart failure)     COPD (chronic obstructive pulmonary disease)     Coronary artery disease     Hypertension     Tobacco abuse      Past Surgical History:   Procedure Laterality Date    ABDOMINAL AORTIC ANEURYSM REPAIR      CARDIAC CATHETERIZATION N/A 10/03/2019    Procedure: Left Heart Cath;  Surgeon: Vincent Phillips MD;  Location: [facility] INVASIVE LOCATION;  Service: Cardiology     PT Assessment (last 12 hours)       PT Evaluation and Treatment       Row Name 23 1100          Physical Therapy Time and Intention    Document Type evaluation  -     Mode of Treatment physical therapy  -     Patient Effort adequate  -     Comment Pt seen for evaluation this date, pleasant and cooperative with therapy. Pt is known to this therapist, was in [facility] for sometime, D/C'd home. He lives alone and states his sisters assist him with shopping for him. Pt has not had any falls at home since his D/C. He states he primarily gets around in W/C stating he was \"lazy.\"  -       Row Name 23 1100          General Information    Patient Profile Reviewed yes  -SHARA     Onset of Illness/Injury or Date of Surgery 09/15/23  admission date  -SHARA     Referring Physician  " Stefanie  -     Patient Observations alert;cooperative;agree to therapy  -     General Observations of Patient Pt seen sitting in recliner this date, pleasant and cooperative with therapy  -     Existing Precautions/Restrictions fall  -[facility] Name 09/18/23 1100          Living Environment    Current Living Arrangements home  -     People in Home alone  -[facility] Name 09/18/23 1100          Home Use of Assistive/Adaptive Equipment    Equipment Currently Used at Home cane, straight;wheelchair;walker, standard  from prior entry  -[facility] Name 09/18/23 1100          Range of Motion Comprehensive    Comment, General Range of Motion A/PROM BLE grossly WFL  -KH       Row Name 09/18/23 1100          Strength Comprehensive (MMT)    Comment, General Manual Muscle Testing (MMT) Assessment Grossly 5/5 MMT  -KH       Row Name 09/18/23 1100          Bed Mobility    Comment, (Bed Mobility) Not observed as pt was seen sitting in recliner chair  -KH       Row Name 09/18/23 1100          Transfers    Transfers sit-stand transfer;stand-sit transfer  -KH       Row Name 09/18/23 1100          Sit-Stand Transfer    Sit-Stand [place] (Transfers) standby assist;supervision;contact guard;1 person assist;2 person assist  -KH       Row Name 09/18/23 1100          Stand-Sit Transfer    Stand-Sit [place] (Transfers) standby assist;supervision;contact guard;verbal cues;1 person assist;2 person assist  -KH       Row Name 09/18/23 1100          Gait/Stairs (Locomotion)    Gait/Stairs Locomotion gait/ambulation independence;gait/ambulation assistive device  -     [place] Level (Gait) modified independence;contact guard  -     Patient was able to Ambulate yes  -     Distance in Feet (Gait) Grossly 2x20  -[facility] Name 09/18/23 1100          Balance    Comment, Balance Not formally tested however pt demonstrates with some unsteadiness/slight fall risk  -[facility] Name 09/18/23 1100          Plan of Care  Review    Outcome Evaluation Pt seen for evaluation this date, pleasant and cooperative with therapy. Pt may benefit from therapeutic intervention to work on endurance, fall risk/safety, balance.  -       Row Name 09/18/23 1100          Positioning and Restraints    Pre-Treatment Position sitting in chair/recliner  -     Post Treatment Position chair  -     In Chair sitting;call light within reach  CL w/in reach per pt  -       Row Name 09/18/23 1100          Therapy Assessment/Plan (PT)    Functional Level at Time of Evaluation (PT) Recommended CGA when OOB  -     PT Diagnosis (PT) Impaired/decreased functional mobility  -     Rehab Potential (PT) --  fair/good  -     Criteria for Skilled Interventions Met (PT) yes;meets criteria  -     Therapy Frequency (PT) 2 times/wk  2-5x/wk PRN, as available  -     Predicted Duration of Therapy Intervention (PT) length of stay  -       Row Name 09/18/23 1100          Physical Therapy Goals    Gait Training Goal Selection (PT) gait training, PT goal 1  -     Balance Goal Selection (PT) balance, PT goal 1  -[facility] Name 09/18/23 1100          Gait Training Goal 1 (PT)    Activity/Assistive Device (Gait Training Goal 1, PT) gait (walking locomotion);assistive device use;increase endurance/gait distance  -     [place] Level (Gait Training Goal 1, PT) modified independence  -     Distance (Gait Training Goal 1, PT) 50'  -     Time Frame (Gait Training Goal 1, PT) long term goal (LTG)  -       Row Name 09/18/23 1100          Balance Goal 1 (PT)    Activity/Assistive Device (Balance Goal 1, PT) standing, dynamic;walker, rolling  -     [place] Level/Cues Needed (Balance Goal 1, PT) conditional independence  -     Time Frame (Balance Goal 1, PT) long term goal (LTG)  -               User Key  (r) = Recorded By, (t) = Taken By, (c) = Cosigned By      Initials Name Provider Type    Kirstin Platt, PT Physical Therapist                       PT Recommendation and Plan  Anticipated Discharge Disposition (PT): home with home health  Planned Therapy Interventions (PT): balance training, gait training, patient/family education, transfer training  Therapy Frequency (PT): 2 times/wk (2-5x/wk PRN, as available)  Outcome Evaluation: Pt seen for evaluation this date, pleasant and cooperative with therapy. Pt may benefit from therapeutic intervention to work on endurance, fall risk/safety, balance.       Time Calculation:    PT Charges       Row Name 09/18/23 1313             Time Calculation    Start Time 1100  -KH      PT Received On 09/18/23 -      PT Goal Re-Cert Due Date 10/02/23  -KH                User Key  (r) = Recorded By, (t) = Taken By, (c) = Cosigned By      Initials Name Provider Type    Kirstin Platt, PT Physical Therapist                  Therapy Charges for Today       Code Description Service Date Service Provider Modifiers Qty    33585980524 HC PT EVAL LOW COMPLEXITY 4 9/18/2023 Kirstin Richards, PT GP 1            PT G-Codes  AM-PAC 6 Clicks Score (PT): 17    Kirstin Richards PT  9/18/2023

## 2023-09-18 NOTE — PROGRESS NOTES
[facility] HOSPITALIST PROGRESS NOTE     Patient Identification:  Name:  Zaid Galarza  Age:  72 y.o.  Sex:  male  :  1950  MRN:  [id number]  Visit Number:  [id number]  ROOM: [address]     Primary Care Provider:  Rosi Thacker APRN    Length of stay in inpatient status:  3    Subjective     Chief Compliant:    Chief Complaint   Patient presents with    Abnormal Lab       History of Presenting Illness: Patient seen and evaluated in follow-up for acute on chronic anemia.  Patient with some likely upper GI bleeding but also with significant recurrence of iron deficiency anemia post stopping of oral iron supplementation.  Patient time exam today resting comfort in bed with no acute complaints other than feeling tired but does report feeling improved from presentation.    Objective     Current Hospital Meds:  atorvastatin, 80 mg, Oral, Nightly  budesonide-formoterol, 2 puff, Inhalation, BID - RT  ferrous sulfate, 325 mg, Oral, BID With Meals  metoprolol tartrate, 25 mg, Oral, BID  pantoprazole, 40 mg, Intravenous, Q12H  senna-docusate sodium, 2 tablet, Oral, BID  sertraline, 150 mg, Oral, Daily  sodium chloride, 10 mL, Intravenous, Q12H  sodium chloride, 10 mL, Intravenous, Q12H         ----------------------------------------------------------------------------------------------------------------------  Vital Signs:  Temp:  [97.9 °F (36.6 °C)-98.9 °F (37.2 °C)] 98.8 °F (37.1 °C)  Heart Rate:  [68-83] 70  Resp:  [12-28] 24  BP: (109-149)/() 128/100  SpO2:  [86 %-99 %] 98 %  on   ;   Device (Oxygen Therapy): room air  Body mass index is 35.13 kg/m².      Intake/Output Summary (Last 24 hours) at 2023 1651  Last data filed at 2023 0890  Gross per 24 hour   Intake --   Output 550 ml   Net -550 ml      ----------------------------------------------------------------------------------------------------------------------  Physical exam:  Constitutional: Elderly chronically  ill-appearing adult male sitting up in chair in room.HENT:  Head:  Normocephalic and atraumatic.  Mouth:  Moist mucous membranes.    Eyes:  Conjunctivae and EOM are normal. No scleral icterus.    Neck:  Neck supple.  No JVD present.    Cardiovascular:  Normal rate, regular rhythm and normal heart sounds with no murmur.  Pulmonary/Chest:  No respiratory distress, no wheezes, no crackles, with normal breath sounds and good air movement.  Abdominal:  Soft.  Bowel sounds are normal.  No distension and no tenderness.   Musculoskeletal:  No tenderness and no deformity.  No red or swollen joints anywhere.  Functional ROM intact.   Neurological:  Alert and oriented to person, place, and time.  No cranial nerve deficit or other focal neurological deficits.    Skin:  Skin is warm and dry. No rash or lesion noted.  Mild pallor present.   Peripheral vascular:  Pulses in all 4 extremities with no clubbing, no cyanosis, no edema.  Psychiatric: Appropriate mood and affect, pleasant.   ----------------------------------------------------------------------------------------------------------------------  WBC/HGB/HCT/PLT   11.06/8.5/29.9/203 (09/18 0019-09/18 0602)  BUN/CREAT/GLUC/ALT/AST/TORY/LIP    21/1.34/91/--/--/--/-- (09/18 0019)  LYTES - Na/K/Cl/CO2: 139/4.0/106/23.1 (09/18 0019)        No results found for: URINECX  No results found for: BLOODCX    I have personally looked at the labs and they are summarized above.  ----------------------------------------------------------------------------------------------------------------------  Detailed radiology reports for the last 24 hours:  No radiology results for the last day  Assessment & Plan      Acute on chronic anemia  Acute episode of melena  Recurrent severe iron deficiency anemia    -Patient with progressive dyspnea at home and weakness with one-time episode of melena prior to presentation.    -Patient status post transfusion of 5 units of packed RBCs with improvement and  stabilization of hemoglobin 8.5 today.    -Patient chronically on Eliquis and aspirin due to history of atrial fibrillation and aortobiiliac grafts    -Patient recently taken off iron supplementation earlier this summer by hematology after iron levels normalized however since that time patient with decline in hemoglobin from 10.4-4.6 at this presentation.    -Iron panel not obtained until posttransfusion as it was not ordered at admission however despite receiving 4 units of blood which would result in significant iron administration patient's iron studies consistent with severe iron deficiency with iron of 14 and iron saturation of 4    -Given patient's history of A-fib we will resume on Eliquis but hold aspirin as this will still provide protective benefit for patient scribes and reduce risk for recurrent bleeding.  Additionally as noted given patient's severe iron deficiency will start back on iron supplementation twice daily as he previously was during which time patient suffered no issues or problems with anemia    Dysphagia    -Seen and evaluated by speech therapy and placed on modified diet    Acute kidney injury    -Secondary to anemia, renal function continuing improved posttransfusion    Hx HFrEF with recovered EF    -Patient appears compensated at this time, continue current cardiac regiment.    Chronic atrial fibrillation  Status post aortobiiliac grafting  Checks nonobstructive coronary artery disease    -Currently rate controlled, resume Eliquis as noted above and monitor for any evidence of GI bleeding.      Copied text in portions of the note has been reviewed and is accurate as of 09/18/23    VTE Prophylaxis:   Mechanical Order History:        Ordered        09/15/23 1810  Place Sequential Compression Device  Once            09/15/23 1810  Maintain Sequential Compression Device  Continuous            09/15/23 1810  Place Sequential Compression Device  Once            09/15/23 1810  Maintain  Sequential Compression Device  Continuous                          Pharmalogical Order History:       None            Disposition likely home in the next 24 to 48 hours as patient is not interested in any acute rehab.    Nitish Watts DO  [facility] Hospitalist  09/18/23  16:54 EDT

## 2023-09-19 LAB
ANION GAP SERPL CALCULATED.3IONS-SCNC: 11.3 MMOL/L (ref 5–15)
BUN SERPL-MCNC: 18 MG/DL (ref 8–23)
BUN/CREAT SERPL: 13.6 (ref 7–25)
CALCIUM SPEC-SCNC: 8 MG/DL (ref 8.6–10.5)
CHLORIDE SERPL-SCNC: 107 MMOL/L (ref 98–107)
CO2 SERPL-SCNC: 22.7 MMOL/L (ref 22–29)
CREAT SERPL-MCNC: 1.32 MG/DL (ref 0.76–1.27)
DEPRECATED RDW RBC AUTO: 61.8 FL (ref 37–54)
EGFRCR SERPLBLD CKD-EPI 2021: 57.7 ML/MIN/1.73
ERYTHROCYTE [DISTWIDTH] IN BLOOD BY AUTOMATED COUNT: 19.7 % (ref 12.3–15.4)
GLUCOSE SERPL-MCNC: 131 MG/DL (ref 65–99)
HCT VFR BLD AUTO: 26.9 % (ref 37.5–51)
HGB BLD-MCNC: 8.1 G/DL (ref 13–17.7)
MCH RBC QN AUTO: 26 PG (ref 26.6–33)
MCHC RBC AUTO-ENTMCNC: 30.1 G/DL (ref 31.5–35.7)
MCV RBC AUTO: 86.2 FL (ref 79–97)
PLATELET # BLD AUTO: 208 10*3/MM3 (ref 140–450)
PMV BLD AUTO: 11.3 FL (ref 6–12)
POTASSIUM SERPL-SCNC: 3.5 MMOL/L (ref 3.5–5.2)
POTASSIUM SERPL-SCNC: 4 MMOL/L (ref 3.5–5.2)
RBC # BLD AUTO: 3.12 10*6/MM3 (ref 4.14–5.8)
SODIUM SERPL-SCNC: 141 MMOL/L (ref 136–145)
WBC NRBC COR # BLD: 10.54 10*3/MM3 (ref 3.4–10.8)

## 2023-09-19 PROCEDURE — 94761 N-INVAS EAR/PLS OXIMETRY MLT: CPT

## 2023-09-19 PROCEDURE — 97110 THERAPEUTIC EXERCISES: CPT

## 2023-09-19 PROCEDURE — 94664 DEMO&/EVAL PT USE INHALER: CPT

## 2023-09-19 PROCEDURE — 94799 UNLISTED PULMONARY SVC/PX: CPT

## 2023-09-19 PROCEDURE — 99232 SBSQ HOSP IP/OBS MODERATE 35: CPT | Performed by: STUDENT IN AN ORGANIZED HEALTH CARE EDUCATION/TRAINING PROGRAM

## 2023-09-19 PROCEDURE — 84132 ASSAY OF SERUM POTASSIUM: CPT | Performed by: INTERNAL MEDICINE

## 2023-09-19 PROCEDURE — 80048 BASIC METABOLIC PNL TOTAL CA: CPT | Performed by: STUDENT IN AN ORGANIZED HEALTH CARE EDUCATION/TRAINING PROGRAM

## 2023-09-19 PROCEDURE — 85027 COMPLETE CBC AUTOMATED: CPT | Performed by: STUDENT IN AN ORGANIZED HEALTH CARE EDUCATION/TRAINING PROGRAM

## 2023-09-19 PROCEDURE — 97116 GAIT TRAINING THERAPY: CPT

## 2023-09-19 PROCEDURE — 97530 THERAPEUTIC ACTIVITIES: CPT

## 2023-09-19 PROCEDURE — 0 POTASSIUM CHLORIDE 10 MEQ/100ML SOLUTION: Performed by: INTERNAL MEDICINE

## 2023-09-19 RX ORDER — POTASSIUM CHLORIDE 7.45 MG/ML
10 INJECTION INTRAVENOUS
Status: COMPLETED | OUTPATIENT
Start: 2023-09-19 | End: 2023-09-19

## 2023-09-19 RX ADMIN — SERTRALINE 150 MG: 50 TABLET, FILM COATED ORAL at 08:19

## 2023-09-19 RX ADMIN — BUDESONIDE AND FORMOTEROL FUMARATE DIHYDRATE 2 PUFF: 160; 4.5 AEROSOL RESPIRATORY (INHALATION) at 18:35

## 2023-09-19 RX ADMIN — APIXABAN 5 MG: 5 TABLET, FILM COATED ORAL at 08:19

## 2023-09-19 RX ADMIN — BUDESONIDE AND FORMOTEROL FUMARATE DIHYDRATE 2 PUFF: 160; 4.5 AEROSOL RESPIRATORY (INHALATION) at 06:44

## 2023-09-19 RX ADMIN — Medication 10 ML: at 20:27

## 2023-09-19 RX ADMIN — FERROUS SULFATE TAB 325 MG (65 MG ELEMENTAL FE) 325 MG: 325 (65 FE) TAB at 17:27

## 2023-09-19 RX ADMIN — POTASSIUM CHLORIDE 10 MEQ: 7.46 INJECTION, SOLUTION INTRAVENOUS at 05:28

## 2023-09-19 RX ADMIN — POTASSIUM CHLORIDE 10 MEQ: 7.46 INJECTION, SOLUTION INTRAVENOUS at 04:14

## 2023-09-19 RX ADMIN — POTASSIUM CHLORIDE 10 MEQ: 7.46 INJECTION, SOLUTION INTRAVENOUS at 07:52

## 2023-09-19 RX ADMIN — METOPROLOL TARTRATE 25 MG: 25 TABLET, FILM COATED ORAL at 20:28

## 2023-09-19 RX ADMIN — FERROUS SULFATE TAB 325 MG (65 MG ELEMENTAL FE) 325 MG: 325 (65 FE) TAB at 08:19

## 2023-09-19 RX ADMIN — APIXABAN 5 MG: 5 TABLET, FILM COATED ORAL at 20:28

## 2023-09-19 RX ADMIN — ATORVASTATIN CALCIUM 80 MG: 40 TABLET, FILM COATED ORAL at 20:28

## 2023-09-19 RX ADMIN — PANTOPRAZOLE SODIUM 40 MG: 40 INJECTION, POWDER, FOR SOLUTION INTRAVENOUS at 08:19

## 2023-09-19 RX ADMIN — Medication 10 ML: at 08:19

## 2023-09-19 RX ADMIN — PANTOPRAZOLE SODIUM 40 MG: 40 INJECTION, POWDER, FOR SOLUTION INTRAVENOUS at 23:09

## 2023-09-19 RX ADMIN — POTASSIUM CHLORIDE 10 MEQ: 7.46 INJECTION, SOLUTION INTRAVENOUS at 06:40

## 2023-09-19 RX ADMIN — METOPROLOL TARTRATE 25 MG: 25 TABLET, FILM COATED ORAL at 08:19

## 2023-09-19 NOTE — PROGRESS NOTES
[facility] HOSPITALIST PROGRESS NOTE     Patient Identification:  Name:  Zaid Galarza  Age:  72 y.o.  Sex:  male  :  1950  MRN:  [id number]  Visit Number:  [id number]  ROOM: [address]     Primary Care Provider:  Rosi Thacker APRN    Length of stay in inpatient status:  4    Subjective     Chief Compliant:    Chief Complaint   Patient presents with    Abnormal Lab       History of Presenting Illness: Patient seen and evaluated in follow-up for acute on chronic anemia.  Patient with some likely upper GI bleeding but also with significant recurrence of iron deficiency anemia post stopping of oral iron supplementation.  Patient time exam today resting comfort in bed with no acute complaints other than feeling tired but does report feeling improved from presentation.    Objective     Current Hospital Meds:  apixaban, 5 mg, Oral, BID  atorvastatin, 80 mg, Oral, Nightly  budesonide-formoterol, 2 puff, Inhalation, BID - RT  ferrous sulfate, 325 mg, Oral, BID With Meals  metoprolol tartrate, 25 mg, Oral, BID  pantoprazole, 40 mg, Intravenous, Q12H  senna-docusate sodium, 2 tablet, Oral, BID  sertraline, 150 mg, Oral, Daily  sodium chloride, 10 mL, Intravenous, Q12H  sodium chloride, 10 mL, Intravenous, Q12H         ----------------------------------------------------------------------------------------------------------------------  Vital Signs:  Temp:  [98.2 °F (36.8 °C)-99.6 °F (37.6 °C)] 98.2 °F (36.8 °C)  Heart Rate:  [68-88] 74  Resp:  [17-26] 24  BP: (114-149)/() 121/100  SpO2:  [91 %-99 %] 91 %  on   ;   Device (Oxygen Therapy): room air  Body mass index is 34.56 kg/m².      Intake/Output Summary (Last 24 hours) at 2023 1543  Last data filed at 2023 1200  Gross per 24 hour   Intake 350 ml   Output --   Net 350 ml      ----------------------------------------------------------------------------------------------------------------------  Physical exam:  Constitutional:  Elderly chronically ill-appearing adult male sitting up in chair in room.HENT:  Head:  Normocephalic and atraumatic.  Mouth:  Moist mucous membranes.    Eyes:  Conjunctivae and EOM are normal. No scleral icterus.    Neck:  Neck supple.  No JVD present.    Cardiovascular:  Normal rate, regular rhythm and normal heart sounds with no murmur.  Pulmonary/Chest:  No respiratory distress, no wheezes, no crackles, with normal breath sounds and good air movement.  Abdominal:  Soft.  Bowel sounds are normal.  No distension and no tenderness.   Musculoskeletal:  No tenderness and no deformity.  No red or swollen joints anywhere.  Functional ROM intact.   Neurological:  Alert and oriented to person, place, and time.  No cranial nerve deficit or other focal neurological deficits.    Skin:  Skin is warm and dry. No rash or lesion noted.  Mild pallor present.   Peripheral vascular:  Pulses in all 4 extremities with no clubbing, no cyanosis, no edema.  Psychiatric: Appropriate mood and affect, pleasant.   ----------------------------------------------------------------------------------------------------------------------  WBC/HGB/HCT/PLT   10.54/8.1/26.9/208 (09/19 0040)  BUN/CREAT/GLUC/ALT/AST/TORY/LIP    18/1.32/131/--/--/--/-- (09/19 0040)  LYTES - Na/K/Cl/CO2: --/4.0/--/-- (09/19 1311)        No results found for: URINECX  No results found for: BLOODCX    I have personally looked at the labs and they are summarized above.  ----------------------------------------------------------------------------------------------------------------------  Detailed radiology reports for the last 24 hours:  No radiology results for the last day  Assessment & Plan      Acute on chronic anemia  Acute episode of melena  Recurrent severe iron deficiency anemia    -Patient with progressive dyspnea at home and weakness with one-time episode of melena prior to presentation.    -Patient status post transfusion of 5 units of packed RBCs with improvement  and stabilization of hemoglobin, currently 8.1 today.    -Patient chronically on Eliquis and aspirin due to history of atrial fibrillation and aortobiiliac grafts    -Patient recently taken off iron supplementation earlier this summer by hematology after iron levels normalized however since that time patient with decline in hemoglobin from 10.4-4.6 at this presentation.    -Iron panel not obtained until posttransfusion as it was not ordered at admission however despite receiving 4 units of blood which would result in significant iron administration patient's iron studies consistent with severe iron deficiency with iron of 14 and iron saturation of 4    -Given patient's history of A-fib w he was resumed on Eliquis but will hold aspirin as this will still provide protective benefit for patient scribes and reduce risk for recurrent bleeding.  Additionally as noted given patient's severe iron deficiency have started back on iron supplementation twice daily as he previously was during which time patient suffered no issues or problems with anemia    Dysphagia    -Seen and evaluated by speech therapy and placed on modified diet, will continue.    Acute kidney injury    -Secondary to anemia, renal function continuing improved posttransfusion    Hx HFrEF with recovered EF    -Patient appears compensated at this time, continue current cardiac regiment.    Chronic atrial fibrillation  Status post aortobiiliac grafting  Checks nonobstructive coronary artery disease    -Currently rate controlled, resumed Eliquis as noted above and monitor for any evidence of GI bleeding.      Copied text in portions of the note has been reviewed and is accurate as of 09/19/23    VTE Prophylaxis:   Mechanical Order History:        Ordered        09/15/23 1810  Place Sequential Compression Device  Once            09/15/23 1810  Maintain Sequential Compression Device  Continuous            09/15/23 1810  Place Sequential Compression Device  Once             09/15/23 1810  Maintain Sequential Compression Device  Continuous                          Pharmalogical Order History:       None            Disposition likely home in the next 24 hours if no evidence of ongoing blood loss on resumed anticoagulation.    Nitish Watts DO  [facility]  09/19/23  15:43 EDT

## 2023-09-19 NOTE — THERAPY TREATMENT NOTE
Acute Care - Physical Therapy Treatment Note  [facility]     Patient Name: Zaid Galarza  : 1950  MRN: [id number]  Today's Date: 2023   Onset of Illness/Injury or Date of Surgery: 09/15/23 (admission date)  Visit Dx:     ICD-10-CM ICD-9-CM   1. Gastrointestinal hemorrhage, unspecified gastrointestinal hemorrhage type  K92.2 578.9     Patient Active Problem List   Diagnosis    Persistent atrial fibrillation    Dilated cardiomyopathy    Nonobstructive CAD per cath 10/3/2019    Anxiety disorder    Essential hypertension    Tobacco use    History of abdominal aortic aneurysm (AAA) repair    Dyslipidemia, goal LDL below 100    CVA (cerebral vascular accident)    Cerebrovascular accident (CVA), unspecified mechanism    Symptomatic anemia    GI bleed     Past Medical History:   Diagnosis Date    Atrial fibrillation     CHF (congestive heart failure)     COPD (chronic obstructive pulmonary disease)     Coronary artery disease     Hypertension     Tobacco abuse      Past Surgical History:   Procedure Laterality Date    ABDOMINAL AORTIC ANEURYSM REPAIR      CARDIAC CATHETERIZATION N/A 10/03/2019    Procedure: Left Heart Cath;  Surgeon: Vincent Phillips MD;  Location: [facility] INVASIVE LOCATION;  Service: Cardiology     PT Assessment (last 12 hours)       PT Evaluation and Treatment       Row Name 23 1355          Physical Therapy Time and Intention    Subjective Information complains of;weakness;fatigue;dyspnea  -CT     Document Type therapy note (daily note)  -CT     Mode of Treatment physical therapy  -CT     Patient Effort adequate  -CT       Row Name 23 1352          General Information    Patient Profile Reviewed yes  -CT     Existing Precautions/Restrictions fall  -CT       Row Name 23 1359          Home Use of Assistive/Adaptive Equipment    Equipment Currently Used at Home cane, straight;wheelchair;walker, standard  -CT       Row Name 23 1354          Transfers    Transfers  sit-stand transfer;stand-sit transfer  -CT       Row Name 09/19/23 1350          Sit-Stand Transfer    Sit-Stand [place] (Transfers) contact guard  -CT     Assistive Device (Sit-Stand Transfers) walker, front-wheeled  -CT       Row Name 09/19/23 1350          Stand-Sit Transfer    Stand-Sit [place] (Transfers) contact guard  -CT     Assistive Device (Stand-Sit Transfers) walker, front-wheeled  -CT       Row Name 09/19/23 1350          Gait/Stairs (Locomotion)    Gait/Stairs Locomotion gait/ambulation independence;gait/ambulation assistive device  -CT     [place] Level (Gait) contact guard  -CT     Distance in Feet (Gait) 50  -CT     Pattern (Gait) swing-through  -CT     Deviations/Abnormal Patterns (Gait) gait speed decreased;weight shifting decreased  -CT       Row Name 09/19/23 1350          Motor Skills    Therapeutic Exercise --  BLE seated ther ex  -CT       Row Name 09/19/23 1350          Coping    Observed Emotional State calm;cooperative  -CT     Verbalized Emotional State acceptance  -CT       Row Name 09/19/23 3612          Plan of Care Review    Plan of Care Reviewed With patient  -CT       Row Name 09/19/23 6629          Positioning and Restraints    Pre-Treatment Position in bed  -CT     Post Treatment Position chair  -CT     In Chair sitting;call light within reach;encouraged to call for assist  -CT       Row Name 09/19/23 9816          Therapy Assessment/Plan (PT)    Criteria for Skilled Interventions Met (PT) yes;meets criteria  -CT     Therapy Frequency (PT) 2 times/wk  2-5x/wk PRN, as available  -CT               User Key  (r) = Recorded By, (t) = Taken By, (c) = Cosigned By      Initials Name Provider Type    CT Rimma Hart PT Physical Therapist                      PT Recommendation and Plan  Therapy Frequency (PT): 2 times/wk (2-5x/wk PRN, as available)  Plan of Care Reviewed With: patient       Time Calculation:    PT Charges       Row Name 09/19/23 9121             Time  Calculation    PT Received On 09/19/23  -CT         Time Calculation- PT    Total Timed Code Minutes- PT 39 minute(s)  -CT                User Key  (r) = Recorded By, (t) = Taken By, (c) = Cosigned By      Initials Name Provider Type    CT Rimma Hart, PT Physical Therapist                  Therapy Charges for Today       Code Description Service Date Service Provider Modifiers Qty    98669068039 HC GAIT TRAINING EA 15 MIN 9/19/2023 Rimma Hart, PT GP 1    [id number]  PT THERAPEUTIC ACT EA 15 MIN 9/19/2023 Rimma Hart, PT GP 1    [id number]  PT THER PROC EA 15 MIN 9/19/2023 Rimma Hart, PT GP 1            PT G-Codes  AM-PAC 6 Clicks Score (PT): 17    Rimma Hart, PT  9/19/2023

## 2023-09-19 NOTE — PLAN OF CARE
Problem: Adult Inpatient Plan of Care  Goal: Plan of Care Review  Outcome: Ongoing, Progressing   Pt resting comfortably. Up to chair today. VSS on RA. Pt updated on plan of care. No questions/concerns at this time.

## 2023-09-19 NOTE — ED PROVIDER NOTES
Subjective     History provided by:  Patient   used: No    Weakness - Generalized  Severity:  Severe  Onset quality:  Gradual  Timing:  Constant  Progression:  Worsening  Chronicity:  Chronic  Context: not alcohol use, not allergies, not change in medication, not decreased sleep, not dehydration, not drug use, not increased activity, not pinched nerve, not recent infection, not stress and not urinary tract infection    Relieved by:  Nothing  Worsened by:  Nothing  Ineffective treatments:  None tried  Associated symptoms: difficulty walking and melena    Associated symptoms: no abdominal pain, no anorexia, no aphasia, no arthralgias, no ataxia, no chest pain, no cough, no diarrhea, no dizziness, no drooling, no dysphagia, no dysuria, no numbness in extremities, no falls, no fever, no foul-smelling urine, no frequency, no headaches, no hematochezia, no lethargy, no loss of consciousness, no myalgias, no nausea, no near-syncope, no seizures, no sensory-motor deficit, no shortness of breath, no stroke symptoms, no syncope, no urgency, no vision change and no vomiting    Risk factors: anemia and congestive heart failure    Risk factors: no coronary artery disease, no diabetes, no excessive menstruation, no family hx of stroke, no heart disease, no neurologic disease, no new medications and no recent stressors      Review of Systems   Constitutional:  Negative for activity change, appetite change, chills, diaphoresis, fatigue and fever.   HENT:  Negative for congestion, drooling, ear pain and sore throat.    Eyes:  Negative for redness.   Respiratory:  Negative for cough, chest tightness, shortness of breath and wheezing.    Cardiovascular:  Negative for chest pain, palpitations, leg swelling, syncope and near-syncope.   Gastrointestinal:  Positive for melena. Negative for abdominal pain, anorexia, diarrhea, dysphagia, hematochezia, nausea and vomiting.   Genitourinary:  Negative for dysuria, frequency  and urgency.   Musculoskeletal:  Negative for arthralgias, back pain, falls, myalgias and neck pain.   Skin:  Negative for pallor, rash and wound.   Neurological:  Positive for weakness. Negative for dizziness, seizures, loss of consciousness, speech difficulty and headaches.   Psychiatric/Behavioral:  Negative for agitation, behavioral problems, confusion and decreased concentration.    All other systems reviewed and are negative.    Past Medical History:   Diagnosis Date    Atrial fibrillation     CHF (congestive heart failure)     COPD (chronic obstructive pulmonary disease)     Coronary artery disease     Hypertension     Tobacco abuse        No Known Allergies    Past Surgical History:   Procedure Laterality Date    ABDOMINAL AORTIC ANEURYSM REPAIR      CARDIAC CATHETERIZATION N/A 10/03/2019    Procedure: Left Heart Cath;  Surgeon: Vincent Phillips MD;  Location: [facility] CATH INVASIVE LOC[facility];  Service: Cardiology       Family History   Problem Relation Age of Onset    Heart disease Mother     Stroke Father     Heart disease Father        Social History     Socioeconomic History    Marital status: Single   Tobacco Use    Smoking status: Former     Packs/day: 1.00     Types: Cigarettes    Smokeless tobacco: Never    Tobacco comments:     9/1/2022   Vaping Use    Vaping Use: Never used   Substance and Sexual Activity    Alcohol use: No    Drug use: No    Sexual activity: Defer           Objective   Physical Exam  Vitals and nursing note reviewed.   Constitutional:       General: He is not in acute distress.     Appearance: Normal appearance. He is well-developed. He is ill-appearing. He is not toxic-appearing or diaphoretic.   HENT:      Head: Normocephalic and atraumatic.      Right Ear: External ear normal.      Left Ear: External ear normal.      Nose: Nose normal.      Mouth/Throat:      Pharynx: No oropharyngeal exudate.      Tonsils: No tonsillar exudate.   Eyes:      General: Lids are normal.       Conjunctiva/sclera: Conjunctivae normal.      Pupils: Pupils are equal, round, and reactive to light.   Neck:      Thyroid: No thyromegaly.   Cardiovascular:      Rate and Rhythm: Normal rate and regular rhythm.      Pulses: Normal pulses.      Heart sounds: Normal heart sounds, S1 normal and S2 normal.   Pulmonary:      Effort: Pulmonary effort is normal. No tachypnea or respiratory distress.      Breath sounds: Normal breath sounds. No decreased breath sounds, wheezing or rales.   Chest:      Chest wall: No tenderness.   Abdominal:      General: Bowel sounds are normal. There is no distension.      Palpations: Abdomen is soft.      Tenderness: There is no abdominal tenderness. There is no guarding or rebound.   Musculoskeletal:         General: No tenderness or deformity. Normal range of motion.      Cervical back: Full passive range of motion without pain, normal range of motion and neck supple.   Lymphadenopathy:      Cervical: No cervical adenopathy.   Skin:     General: Skin is warm and dry.      Capillary Refill: Capillary refill takes more than 3 seconds.      Coloration: Skin is pale.      Findings: No erythema or rash.   Neurological:      Mental Status: He is alert and oriented to person, place, and time.      GCS: GCS eye subscore is 4. GCS verbal subscore is 5. GCS motor subscore is 6.      Cranial Nerves: No cranial nerve deficit.      Sensory: No sensory deficit.   Psychiatric:         Speech: Speech normal.         Behavior: Behavior normal.         Thought Content: Thought content normal.         Judgment: Judgment normal.       Procedures           ED Course  ED Course as of 09/19/23 1002   Fri Sep 15, 2023   1345 ECG 12 Lead Chest Pain  Vent. Rate :  89 BPM     Atrial Rate :  97 BPM     P-R Int :   * ms          QRS Dur :  84 ms      QT Int : 392 ms       P-R-T Axes :   *   2  33 degrees     QTc Int : 476 ms     Atrial fibrillation with a competing junctional pacemaker  Low voltage  QRS  Nonspecific ST and T wave abnormality  Abnormal ECG  When compared with ECG of 10-FEB-2023 08:11,  No significant change was found      [ES]   e Sep 19, 2023   1001 CT Abdomen Pelvis Without Contrast  IMPRESSION:     1. Moderate stool burden and sigmoid diverticuli but no evidence of  diverticulitis at the time the study. Mild narrowing of the descending  colon lumen which may be physiologic but recommend direct visualization  with the patient can undergo this exam     2.Other findings as above   [ES]   1002 ECG 12 Lead Chest Pain  Vent. Rate :  89 BPM     Atrial Rate :  97 BPM     P-R Int :   * ms          QRS Dur :  84 ms      QT Int : 392 ms       P-R-T Axes :   *   2  33 degrees     QTc Int : 476 ms     Atrial fibrillation with a competing junctional pacemaker  Low voltage QRS  Nonspecific ST and T wave abnormality  Abnormal ECG  When compared with ECG of 10-FEB-2023 08:11,  No significant change was found   [ES]      ED Course User Index  [ES] Yusuf Varner MD                                           Medical Decision Making    History provided by:  Patient   used: No    Weakness - Generalized  Severity:  Severe  Onset quality:  Gradual  Timing:  Constant  Progression:  Worsening  Chronicity:  Chronic  Context: not alcohol use, not allergies, not change in medication, not decreased sleep, not dehydration, not drug use, not increased activity, not pinched nerve, not recent infection, not stress and not urinary tract infection    Relieved by:  Nothing  Worsened by:  Nothing  Ineffective treatments:  None tried  Associated symptoms: difficulty walking and melena    Associated symptoms: no abdominal pain, no anorexia, no aphasia, no arthralgias, no ataxia, no chest pain, no cough, no diarrhea, no dizziness, no drooling, no dysphagia, no dysuria, no numbness in extremities, no falls, no fever, no foul-smelling urine, no frequency, no headaches, no hematochezia, no lethargy, no  loss of consciousness, no myalgias, no nausea, no near-syncope, no seizures, no sensory-motor deficit, no shortness of breath, no stroke symptoms, no syncope, no urgency, no vision change and no vomiting    Risk factors: anemia and congestive heart failure    Risk factors: no coronary artery disease, no diabetes, no excessive menstruation, no family hx of stroke, no heart disease, no neurologic disease, no new medications and no recent stressors      Amount and/or Complexity of Data Reviewed  External Data Reviewed: labs, radiology, ECG and notes.  Labs: ordered. Decision-making details documented in ED Course.  Radiology: ordered and independent interpretation performed. Decision-making details documented in ED Course.  ECG/medicine tests: ordered and independent interpretation performed. Decision-making details documented in ED Course.    Risk  Decision regarding hospitalization.        Final diagnoses:   Gastrointestinal hemorrhage, unspecified gastrointestinal hemorrhage type       ED Disposition  ED Disposition       ED Disposition   Decision to Admit    Condition   --    Comment   Level of Care: Progressive Care [20]   Diagnosis: GI bleed [531153]   Certification: I Certify That Inpatient Hospital Services Are Medically Necessary For Greater Than 2 Midnights                 No follow-up provider specified.       Medication List        ASK your doctor about these medications      apixaban 5 MG tablet tablet  Commonly known as: ELIQUIS  Ask about: Which instructions should I use?     aspirin 81 MG EC tablet  Ask about: Which instructions should I use?     pantoprazole 40 MG EC tablet  Commonly known as: PROTONIX  Ask about: Which instructions should I use?     vitamin B-12 1000 MCG tablet  Commonly known as: CYANOCOBALAMIN  Ask about: Which instructions should I use?                 Yusuf Varner MD  09/19/23 1002

## 2023-09-19 NOTE — PLAN OF CARE
Goal Outcome Evaluation:              Outcome Evaluation: Patient resting in bed at this time. Patient denies complaints. Patient remains on room air. Call light within reach.

## 2023-09-19 NOTE — CASE MANAGEMENT/SOCIAL WORK
Discharge Planning Assessment  [facility]     Patient Name: Zaid Galarza  MRN: [id number]  Today's Date: 9/19/2023    Admit Date: 9/15/2023    Plan: SS received a consult for new admit [facility] services. SS spoke with pt at bedside on this date. Pt lives at [address] [place] [address] in [facility]. PCP is Rosi Thacker. Pt utilizes [facility] for PT/OT. Pt utilizes wheelchair, rolling walker, straight cane via [employer]. Pt states he has a living will (on file). Pt plans to return home at discharge with family to provide transportation. SS to follow and assist with discharge planning.   Discharge Needs Assessment       Row Name 09/19/23 1334       Living Environment    People in Home alone    Current Living Arrangements apartment    Potentially Unsafe Housing Conditions none    Primary Care Provided by self    Provides Primary Care For no one    Family Caregiver if Needed sibling(s)    Family Caregiver Names sisters, Ranjana and Susan    Quality of Family Relationships unable to assess    Able to Return to Prior Arrangements yes       Resource/Environmental Concerns    Resource/Environmental Concerns none    Transportation Concerns none       Transition Planning    Patient/Family Anticipates Transition to home    Patient/Family Anticipated Services at Transition home health care    Transportation Anticipated family or friend will provide       Discharge Needs Assessment    Equipment Currently Used at Home wheelchair;walker, rolling;cane, straight  via [employer]    Anticipated Changes Related to Illness none    Equipment Needed After Discharge none         Discharge Plan       Row Name 09/19/23 7532       Plan    Plan SS received a consult for new admit [facility] services. SS spoke with pt at bedside on this date. Pt lives at [address] [place] [address] in [facility]. PCP is Rosi Thacker. Pt utilizes [facility] for PT/OT. Pt utilizes wheelchair, rolling  walker, straight cane via [employer]. Pt states he has a living will (on file). Pt plans to return home at discharge with family to provide transportation. SS to follow and assist with discharge planning.    Patient/Family in Agreement with Plan yes        Continued Care and Services - Admitted Since 9/15/2023    Coordination has not been started for this encounter.    Expected Discharge Date and Time       Expected Discharge Date Expected Discharge Time    Sep 19, 2023       Demographic Summary       Row Name 09/19/23 1212       General Information    Admission Type inpatient    Referral Source nursing    Reason for Consult discharge planning  SS received a consult for new admit [facility] services.    Preferred Language English          MAGEN Wiley

## 2023-09-20 ENCOUNTER — READMISSION MANAGEMENT (OUTPATIENT)
Dept: CALL CENTER | Facility: HOSPITAL | Age: 73
End: 2023-09-20
Payer: MEDICARE

## 2023-09-20 VITALS
TEMPERATURE: 98.2 F | OXYGEN SATURATION: 93 % | WEIGHT: 250.66 LBS | RESPIRATION RATE: 20 BRPM | HEART RATE: 76 BPM | DIASTOLIC BLOOD PRESSURE: 80 MMHG | HEIGHT: 73 IN | BODY MASS INDEX: 33.22 KG/M2 | SYSTOLIC BLOOD PRESSURE: 145 MMHG

## 2023-09-20 LAB
DEPRECATED RDW RBC AUTO: 60.9 FL (ref 37–54)
ERYTHROCYTE [DISTWIDTH] IN BLOOD BY AUTOMATED COUNT: 19.7 % (ref 12.3–15.4)
HCT VFR BLD AUTO: 25.6 % (ref 37.5–51)
HCT VFR BLD AUTO: 26.2 % (ref 37.5–51)
HGB BLD-MCNC: 7.6 G/DL (ref 13–17.7)
HGB BLD-MCNC: 7.6 G/DL (ref 13–17.7)
MCH RBC QN AUTO: 25.3 PG (ref 26.6–33)
MCHC RBC AUTO-ENTMCNC: 29.7 G/DL (ref 31.5–35.7)
MCV RBC AUTO: 85.3 FL (ref 79–97)
PLATELET # BLD AUTO: 200 10*3/MM3 (ref 140–450)
PMV BLD AUTO: 11.6 FL (ref 6–12)
RBC # BLD AUTO: 3 10*6/MM3 (ref 4.14–5.8)
WBC NRBC COR # BLD: 11.01 10*3/MM3 (ref 3.4–10.8)

## 2023-09-20 PROCEDURE — 99239 HOSP IP/OBS DSCHRG MGMT >30: CPT | Performed by: STUDENT IN AN ORGANIZED HEALTH CARE EDUCATION/TRAINING PROGRAM

## 2023-09-20 PROCEDURE — 97110 THERAPEUTIC EXERCISES: CPT

## 2023-09-20 PROCEDURE — 85018 HEMOGLOBIN: CPT | Performed by: STUDENT IN AN ORGANIZED HEALTH CARE EDUCATION/TRAINING PROGRAM

## 2023-09-20 PROCEDURE — 85027 COMPLETE CBC AUTOMATED: CPT | Performed by: STUDENT IN AN ORGANIZED HEALTH CARE EDUCATION/TRAINING PROGRAM

## 2023-09-20 PROCEDURE — 94799 UNLISTED PULMONARY SVC/PX: CPT

## 2023-09-20 PROCEDURE — 97530 THERAPEUTIC ACTIVITIES: CPT

## 2023-09-20 PROCEDURE — 85014 HEMATOCRIT: CPT | Performed by: STUDENT IN AN ORGANIZED HEALTH CARE EDUCATION/TRAINING PROGRAM

## 2023-09-20 RX ORDER — FERROUS SULFATE 325(65) MG
325 TABLET ORAL 2 TIMES DAILY WITH MEALS
Qty: 60 TABLET | Refills: 0 | Status: SHIPPED | OUTPATIENT
Start: 2023-09-20 | End: 2023-10-20

## 2023-09-20 RX ADMIN — METOPROLOL TARTRATE 25 MG: 25 TABLET, FILM COATED ORAL at 10:05

## 2023-09-20 RX ADMIN — Medication 10 ML: at 10:05

## 2023-09-20 RX ADMIN — APIXABAN 5 MG: 5 TABLET, FILM COATED ORAL at 10:05

## 2023-09-20 RX ADMIN — FERROUS SULFATE TAB 325 MG (65 MG ELEMENTAL FE) 325 MG: 325 (65 FE) TAB at 10:04

## 2023-09-20 RX ADMIN — BUDESONIDE AND FORMOTEROL FUMARATE DIHYDRATE 2 PUFF: 160; 4.5 AEROSOL RESPIRATORY (INHALATION) at 09:50

## 2023-09-20 RX ADMIN — SERTRALINE 150 MG: 50 TABLET, FILM COATED ORAL at 10:04

## 2023-09-20 RX ADMIN — PANTOPRAZOLE SODIUM 40 MG: 40 INJECTION, POWDER, FOR SOLUTION INTRAVENOUS at 10:06

## 2023-09-20 NOTE — THERAPY TREATMENT NOTE
Acute Care - Physical Therapy Treatment Note  [facility]     Patient Name: Zaid Galarza  : 1950  MRN: [id number]  Today's Date: 2023   Onset of Illness/Injury or Date of Surgery: 09/15/23 (admission date)  Visit Dx:     ICD-10-CM ICD-9-CM   1. Gastrointestinal hemorrhage, unspecified gastrointestinal hemorrhage type  K92.2 578.9   2. Persistent atrial fibrillation  I48.19 427.31   3. Dilated cardiomyopathy  I42.0 425.4   4. Symptomatic anemia  D64.9 285.9     Patient Active Problem List   Diagnosis    Persistent atrial fibrillation    Dilated cardiomyopathy    Nonobstructive CAD per cath 10/3/2019    Anxiety disorder    Essential hypertension    Tobacco use    History of abdominal aortic aneurysm (AAA) repair    Dyslipidemia, goal LDL below 100    CVA (cerebral vascular accident)    Cerebrovascular accident (CVA), unspecified mechanism    Symptomatic anemia    GI bleed     Past Medical History:   Diagnosis Date    Atrial fibrillation     CHF (congestive heart failure)     COPD (chronic obstructive pulmonary disease)     Coronary artery disease     Hypertension     Tobacco abuse      Past Surgical History:   Procedure Laterality Date    ABDOMINAL AORTIC ANEURYSM REPAIR      CARDIAC CATHETERIZATION N/A 10/03/2019    Procedure: Left Heart Cath;  Surgeon: Vincent Phillips MD;  Location: [facility] INVASIVE LOCATION;  Service: Cardiology     PT Assessment (last 12 hours)       PT Evaluation and Treatment       Row Name 23 1045          Physical Therapy Time and Intention    Document Type therapy note (daily note)  -     Mode of Treatment physical therapy  -     Patient Effort good  -     Comment Pt sen for therapy this date, pleasant and cooperative with therapy. Pt worked on balance activity, some LE TE to target endurance, functional strength benefit. Pt also educated on various topics including perfusion of oxygen/breathing technique.  -       Row Name 23 1045          Bed Mobility     Bed Mobility supine-sit  -KH     Supine-Sit [place] (Bed Mobility) modified independence;independent  -       Row Name 09/20/23 1045          Transfers    Transfers sit-stand transfer;stand-sit transfer  -       Row Name 09/20/23 1045          Sit-Stand Transfer    Sit-Stand [place] (Transfers) standby assist;supervision;contact guard  -       Row Name 09/20/23 1045          Stand-Sit Transfer    Stand-Sit [place] (Transfers) standby assist;supervision;contact guard  -       Row Name 09/20/23 1045          Gait/Stairs (Locomotion)    Gait/Stairs Locomotion gait/ambulation assistive device;gait/ambulation independence  -     [place] Level (Gait) contact guard;standby assist;supervision  -     Patient was able to Ambulate yes  -     Distance in Feet (Gait) Grossly 8'  -       Row Name 09/20/23 1045          Motor Skills    Therapeutic Exercise other (see comments)  Pt worked on standing marches and squats for endurance focus  -       Row Name 09/20/23 1045          Plan of Care Review    Outcome Evaluation Pt did well with therapy this date, pt being D/C'd and is D/C'd from facility at this time.  -       Row Name 09/20/23 1045          Positioning and Restraints    Pre-Treatment Position in bed  -     Post Treatment Position chair  -     In Chair sitting;call light within reach  -               User Key  (r) = Recorded By, (t) = Taken By, (c) = Cosigned By      Initials Name Provider Type    Kirstin Platt PT Physical Therapist                    Physical Therapy Education       Title: PT OT SLP Therapies (Resolved)       Topic: Physical Therapy (Resolved)       Point: Mobility training (Resolved)       Learner Progress:  Not documented in this visit.              Point: Home exercise program (Resolved)       Learner Progress:  Not documented in this visit.              Point: Body mechanics (Resolved)       Learner Progress:  Not documented in this visit.               Point: Precautions (Resolved)       Learner Progress:  Not documented in this visit.                                  PT Recommendation and Plan  Anticipated Discharge Disposition (PT): home with home health  Planned Therapy Interventions (PT): balance training, gait training, patient/family education, transfer training  Therapy Frequency (PT): 2 times/wk (2-5x/wk PRN, as available)  Outcome Evaluation: Pt did well with therapy this date, pt being D/C'd and is D/C'd from facility at this time.       Time Calculation:    PT Charges       Row Name 09/20/23 1345             Time Calculation    Start Time 1045  -KH      PT Received On 09/20/23  -         Time Calculation- PT    Total Timed Code Minutes- PT 45 minute(s)  -                User Key  (r) = Recorded By, (t) = Taken By, (c) = Cosigned By      Initials Name Provider Type    Kirstin Platt, PT Physical Therapist                  Therapy Charges for Today       Code Description Service Date Service Provider Modifiers Qty    08315403476 HC PT THER PROC EA 15 MIN 9/20/2023 Kirstin Richards, PT GP 1    81499145142 HC PT THERAPEUTIC ACT EA 15 MIN 9/20/2023 Kirstin Richards, PT GP 1    89049591251 HC PT THER PROC EA 15 MIN 9/20/2023 Kirstin Richards, PT GP 1            PT G-Codes  AM-PAC 6 Clicks Score (PT): 17    Kirstin Richards, PT  9/20/2023

## 2023-09-20 NOTE — CASE MANAGEMENT/SOCIAL WORK
Discharge Planning Assessment   [facility]     Patient Name: Zaid Galarza  MRN: [id number]  Today's Date: 9/20/2023    Admit Date: 9/15/2023     Discharge Plan       Row Name 09/20/23 1233       Plan    Final Discharge Disposition Code 06 - home with home health care    Final Note Pt is being discharged home with physician order for home health services. [facility] [phone].  notified [facility] per Audra. Pt's family to provide transporation on this date. No other needs identifed at this time.          Continued Care and Services - Admitted Since 9/15/2023    Coordination has not been started for this encounter.       Expected Discharge Date and Time       Expected Discharge Date Expected Discharge Time    Sep 20, 2023             MAGEN Wiley

## 2023-09-20 NOTE — DISCHARGE PLACEMENT REQUEST
[address] [address]  Phone:  [phone]  Fax:  [phone] Date: Sep 20, 2023      Ambulatory Referral to Home Health (Hospital)     Patient:  Zaid Galarza MRN:  [id number]   [address] [address] :  1950  SSN:    Phone: [phone] Sex:  M      INSURANCE PAYOR PLAN GROUP # SUBSCRIBER ID   Primary:  Secondary:    MEDICARE  [employer] [id number]  [id number]    [id number]  [id number]      Referring Provider Information:  JEF MCKEON Phone: [phone] Fax: [phone]       Referral Information:   # Visits:  999 Referral Type: Home Health [42]   Urgency:  Routine Referral Reason: Specialty Services Required   Start Date: Sep 20, 2023 End Date:  To be determined by Insurer   Diagnosis: Persistent atrial fibrillation (I48.19 [ICD-10-CM] 427.31 [ICD-9-CM])  Dilated cardiomyopathy (I42.0 [ICD-10-CM] 425.4 [ICD-9-CM])  Symptomatic anemia (D64.9 [ICD-10-CM] 285.9 [ICD-9-CM])      Refer to Dept:   Refer to Provider:   Refer to Provider Phone:   Refer to Facility:       Face to Face Visit Date: 2023  Follow-up provider for Plan of Care? I treated the patient in an acute care facility and will not continue treatment after discharge.  Follow-up provider: OLESYA DUEÑAS [[id number]]  Reason/Clinical Findings: resumption of previous services (PT/OT)  Describe mobility limitations that make leaving home difficult: same as above  Nursing/Therapeutic Services Requested: Physical Therapy  Nursing/Therapeutic Services Requested: Occupational Therapy  Frequency: 1 Week 1     This document serves as a request of services and does not constitute Insurance authorization or approval of services.  To determine eligibility, please contact the members Insurance carrier to verify and review coverage.     If you have medical questions regarding this request for services. Please contact [address] at [phone] during  "normal business hours.        Authorizing Provider:Nitish Watts DO  Authorizing Provider's NPI: [id number]  Order Entered By: Nitish Watts DO 9/20/2023 12:30 PM     Electronically signed by: Nitish Watts DO 9/20/2023 12:30 PM     Geovanni Zaid TRACY (72 y.o. Male)       Date of Birth   1950    Social Security Number       Address   [address] [place] [address]    Home Phone   [phone]    MRN   [id number]       [facility]    Marital Status   Single                            Admission Date   9/15/23    Admission Type   Emergency    Admitting Provider   Maria Elena Dorsey MD    Attending Provider   Nitish Watts DO    Department, Room/Bed   [address], 3329/       Discharge Date       Discharge Disposition   Home-Health Care [facility] [place]    Discharge Destination                                 Attending Provider: Nitish Watts DO    Allergies: No Known Allergies    Isolation: None   Infection: None   Code Status: CPR    Ht: 185.4 cm (73\")   Wt: 114 kg (250 lb 10.6 oz)    Admission Cmt: None   Principal Problem: GI bleed [K92.2]                   Active Insurance as of 9/15/2023       Primary Coverage       Payor Plan Insurance Group Employer/Plan Group    MEDICARE MEDICARE A & B        Payor Plan Address Payor Plan Phone Number Payor Plan Fax Number Effective Dates    [address] [phone]  9/1/2016 - None Entered    [facility] [id number]         Subscriber Name Subscriber Birth Date Member ID       ZAID SALVADOR 1950 [id number]               Secondary Coverage       Payor Plan Insurance Group Employer/Plan Group    [id number]       Payor Plan Address Payor Plan Phone Number Payor Plan Fax Number Effective Dates    [address]   6/18/1988 - None Entered    [facility] [address]         Subscriber Name Subscriber Birth Date Member ID       ZAID SALVADOR 1950 [id number]                     Emergency Contacts       " " (Rel.) Home Phone Work Phone Mobile Phone    EVER MG (Sister) [phone] -- --    Esequiel Galarza (Brother) -- -- [phone]    MANE SAAVEDRA (Sister) [phone] -- --              Insurance Information                  MEDICARE/MEDICARE A & B Phone: [phone]    Subscriber: Zaid Galarza Subscriber#: [id number]    Group#: -- Precert#: --        ANTHEM BLUE CROSS/ANTHEM FEDERAL Phone: --    Subscriber: Zaid Galarza Subscriber#: [id number]    Group#: [id number] Precert#: --             History & Physical        Maria Elena Dorsey MD at 09/15/23 [facility]              [facility]    Patient Identification:  Name:  Zaid Galarza  Age:  72 y.o.  Sex:  male  :  1950  MRN:  [id number]   Visit Number:  [id number]  Admit Date: 9/15/2023   Room number:  109/09  Primary Care Physician:  Rosi Thacker APRN     Chief complaint:    Chief Complaint   Patient presents with    Abnormal Lab       History of presenting illness:  72 y.o. male with history significant for reduced ejection fraction heart failure, repeat 2D echo in September of last year shows normal EF, he was diagnosed with iron deficient anemia after he was admitted here in February of this year noted to have anemia requiring transfusion, he eventually had a work-up upper and lower endoscopy 3 months ago approximately which as per patient did not reveal any findings.  He is seen by hematology for iron deficiency anemia, he used to take iron pills but was discontinued approximately  of this year.       Patient reported that he has chronic dyspnea on exertion, he is undergoing physical therapy at home, for the past week or so he is more dyspneic with exertion, he denies melena until yesterday while doing physical therapy he had the urge to have bowel movement and had \"an accident did not make it to the bathroom \"large amount of stools described as black.  No iron pills or " Pepto-Bismol ingested recently.  Patient reported no history of black stools in the past.  While he was seen at cardiology clinic today for his CHF and nonobstructive coronary disease, patient was noted to have significant dyspnea on exertion, labs revealed hemoglobin of 4 because of this he was sent to our emergency room.      ---------------------------------------------------------------------------------------------------------------------   Review of Systems   Constitutional:  Positive for activity change (Acute worsening of dyspnea on exertion and increased fatigability) and fatigue. Negative for appetite change, diaphoresis, fever and unexpected weight change.   HENT: Negative.     Eyes: Negative.    Respiratory: Negative.     Cardiovascular: Negative.    Gastrointestinal:  Positive for blood in stool (Black stool yesterday x1). Negative for abdominal distention, abdominal pain, anal bleeding, constipation, diarrhea, nausea, rectal pain and vomiting.   Endocrine: Negative.    Genitourinary: Negative.    Musculoskeletal: Negative.    Skin:  Positive for pallor. Negative for color change, rash and wound.   Allergic/Immunologic: Negative.    Neurological: Negative.    Hematological: Negative.    Psychiatric/Behavioral: Negative.       ---------------------------------------------------------------------------------------------------------------------   Past Medical History:   Diagnosis Date    Atrial fibrillation     CHF (congestive heart failure)     COPD (chronic obstructive pulmonary disease)     Coronary artery disease     Hypertension     Tobacco abuse      Past Surgical History:   Procedure Laterality Date    ABDOMINAL AORTIC ANEURYSM REPAIR      CARDIAC CATHETERIZATION N/A 10/03/2019    Procedure: Left Heart Cath;  Surgeon: Vincent Phillips MD;  Location:  [facility];  Service: Cardiology     Family History   Problem Relation Age of Onset    Heart disease Mother     Stroke Father      Heart disease Father      Social History     Socioeconomic History    Marital status: Single   Tobacco Use    Smoking status: Former     Packs/day: 1.00     Types: Cigarettes    Smokeless tobacco: Never    Tobacco comments:     9/1/2022   Vaping Use    Vaping Use: Never used   Substance and Sexual Activity    Alcohol use: No    Drug use: No    Sexual activity: Defer     ---------------------------------------------------------------------------------------------------------------------   Allergies:  Patient has no known allergies.  ---------------------------------------------------------------------------------------------------------------------   Prior to Admission Medications       Prescriptions Last Dose Informant Patient Reported? Taking?    apixaban (ELIQUIS) 5 MG tablet tablet   No No    Take 1 tablet by mouth Every 12 (Twelve) Hours.    aspirin 81 MG EC tablet   Yes No    Take 1 tablet by mouth Daily.    atorvastatin (LIPITOR) 80 MG tablet   No No    Take 1 tablet by mouth Every Night.    cyanocobalamin (VITAMIN B-12) 1000 MCG tablet   Yes No    Take 1 tablet by mouth Daily.    ferrous sulfate 325 (65 FE) MG tablet   No No    Take 1 tablet by mouth 2 (Two) Times a Day.    Patient not taking:  Reported on 9/15/2023    metoprolol tartrate (LOPRESSOR) 25 MG tablet   No No    Take 1 tablet by mouth 2 (Two) Times a Day.    pantoprazole (Protonix) 40 MG EC tablet   No No    Take 1 tablet by mouth Daily.    sacubitril-valsartan (Entresto) 49-51 MG tablet   No No    Take 1 tablet by mouth 2 (Two) Times a Day.    sennosides-docusate (senna-docusate sodium) 8.6-50 MG per tablet   No No    Take 1 tablet by mouth 2 (Two) Times a Day As Needed for Constipation.    sertraline (ZOLOFT) 100 MG tablet   No No    Take 1.5 tablets by mouth Daily.    Symbicort 160-4.5 MCG/ACT inhaler  Pharmacy Yes No    Inhale 2 puffs 2 (Two) Times a Day.           ---------------------------------------------------------------------------------------------------------------------   Vital Signs:  Temp:  [97.8 °F (36.6 °C)-98.8 °F (37.1 °C)] 98.8 °F (37.1 °C)  Heart Rate:  [73-91] 85  Resp:  [16] 16  BP: ()/(34-64) 98/63    Mean Arterial Pressure (Non-Invasive) for the past 24 hrs (Last 3 readings):   Noninvasive MAP (mmHg)   09/15/23 1415 57     SpO2:  [96 %-98 %] 96 %  on   ;   Device (Oxygen Therapy): room air  Body mass index is 33.91 kg/m².    Wt Readings from Last 3 Encounters:   09/15/23 113 kg (250 lb)   09/15/23 114 kg (251 lb 3.2 oz)   07/18/23 111 kg (244 lb 3.2 oz)               ---------------------------------------------------------------------------------------------------------------------   Physical Exam:  Constitutional: Patient is awake and alert pale looking, dyspneic on exertion awake and alert he is not in respiratory distress   HENT:  Head: Normocephalic and atraumatic.  Mouth:  Moist mucous membranes.    Eyes: Pale palpebral conjunctivae and EOM are normal.  Pupils are equal, round, and reactive to light.  No scleral icterus.  Neck:  Neck supple.  No JVD present.  He has no hepatojugular reflux  Cardiovascular: I could not hear any obvious murmur, distant heart sound normal rate, regular rhythm and normal heart sounds  Pulmonary/Chest:  No respiratory distress, no wheezes, no crackles, with normal breath sounds and good air movement.  No rales or crackles, no adventitious sounds  Abdominal: No bruit no tenderness soft soft.  Bowel sounds are normal.  No distension and no tenderness.   Musculoskeletal:  No edema, no tenderness, and no deformity.  No red or swollen joints anywhere.    Neurological:  Alert and oriented to person, place, and time.  No cranial nerve deficit.  No tongue deviation.  No facial droop.  No slurred speech.   Skin:  Skin is warm and dry.  No rash noted.  No pallor.   Peripheral vascular:  No edema and strong pulses on all  4 extremities.  Genitourinary: He has no Agosto catheter  ---------------------------------------------------------------------------------------------------------------------  EKG:      Telemetry: Atrial fibrillation 86 bpm  I have personally looked at both the EKG and the telemetry strips.  --------------------------------------------------------------------------------------------------------------------  Results from last 7 days   Lab Units 09/15/23  1340 09/15/23  1202   CK TOTAL U/L  --  172   HSTROP T ng/L 22* 21*     Results from last 7 days   Lab Units 09/15/23  1340 09/15/23  1202   CRP mg/dL  --  <0.30   WBC 10*3/mm3 10.31 11.54*   HEMOGLOBIN g/dL 4.3* 4.6*   HEMATOCRIT % 15.2* 16.1*   MCV fL 83.1 84.3   MCHC g/dL 28.3* 28.6*   PLATELETS 10*3/mm3 258 284     Results from last 7 days   Lab Units 09/15/23  1340 09/15/23  1202   SODIUM mmol/L 138 134*   POTASSIUM mmol/L 4.4 4.2   MAGNESIUM mg/dL  --  1.8   CHLORIDE mmol/L 106 102   CO2 mmol/L 24.0 19.7*   BUN mg/dL 32* 30*   CREATININE mg/dL 1.80* 1.67*   CALCIUM mg/dL 8.5* 8.5*   GLUCOSE mg/dL 152* 134*   ALBUMIN g/dL 3.6 3.6   BILIRUBIN mg/dL 0.3 0.3   ALK PHOS U/L 99 98   AST (SGOT) U/L 21 20   ALT (SGPT) U/L 9 16   Estimated Creatinine Clearance: 48.9 mL/min (A) (by C-G formula based on SCr of 1.8 mg/dL (H)).    No results found for: HGBA1C, POCGLU  No results found for: AMMONIA        No results found for: BLOODCXNo results found for: RESPCXNo results found for: URINECXNo results found for: WOUNDCXNo results found for: BODYFLDCXNo results found for: STOOLCX  pH No results found for: PHART   pO2 No results found for: PO2ART   pCO2 No results found for: OMU1IPW   HCO3 No results found for: CVF4HZU     I have personally looked at the labs and they are summarized above.  ----------------------------------------------------------------------------------------------------------------------  Imaging Results (Last 24 Hours)       ** No results found for the last  24 hours. **          I have personally reviewed the radiology images and read the final radiology report.  ----------------------------------------------------------------------------------------------------------------------  Assessment and Plan:  -Acute worsening of chronic anemia with episode of melena x1 yesterday  -History of iron deficiency anemia, with history of transfusion 4 units packed RBC last June for severe anemia.  Upper and lower endoscopy apparently was negative  -Worsening of shortness of breath with no obvious heart failure decompensation, likely secondary to anemia  -History of reduced ejection fraction heart failure recovered EF 2D echo in September 2022 shows normal ejection fraction   -Acute kidney injury could be from hypotension while on nephrotoxic medication  -Chronic atrial fibrillation on anticoagulation  -Obesity with a BMI of 34 complicating all aspects of care  -Apparent history of abdominal aortic aneurysm status post aortoiliac graft 2009  -History of COPD  -History of dyslipidemia  -History of nonobstructive coronary disease  -History of prediabetes  -History of CVA in the past    Patient's last dose of Eliquis was this morning, he has no recurrence of obvious GI bleed since last night, will hold his Eliquis, H&H monitoring, continue with packed RBC transfusion, watch for volume overload, check CT of the abdomen pelvis, n.p.o. for now, IV PPI, surgery has been consulted, if CT is unremarkable and H&H continue to worsen will order tagged RBC scan, patient had upper and lower endoscopy approximately 3 months ago for work-up of anemia which apparently was nonyielding.  In the meantime we will hold off his Entresto and other nephrotoxic medication, gentle hydration.    Plan outlined to the patient together with her sister and brother-in-law in the presence of Dr. Varner who is present inside the room, family medicine patient agreed the plan.  Including cessation of anticoagulation  and aspirin until no active bleeding is confirmed, risk for embolic stroke discussed at length with patient and family and agreed.    Condition is guarded, he will be admitted to progressive care unit for close monitoring.  In case the patient need pressor support or continues to bleed.      Maria Elena Dorsey MD  09/15/23  15:43 EDT    Electronically signed by Maria Elena Dorsey MD at 09/17/23 0742       Current Facility-Administered Medications   Medication Dose Route Frequency Provider Last Rate Last Admin    acetaminophen (TYLENOL) tablet 650 mg  650 mg Oral Q6H PRN Nitish Watts DO   650 mg at 09/17/23 0834    apixaban (ELIQUIS) tablet 5 mg  5 mg Oral BID Nitish Watts DO   5 mg at 09/20/23 1005    atorvastatin (LIPITOR) tablet 80 mg  80 mg Oral Nightly Nitish Watts DO   80 mg at 09/19/23 2028    sennosides-docusate (PERICOLACE) 8.6-50 MG per tablet 2 tablet  2 tablet Oral BID Nitish Watts DO        And    polyethylene glycol (MIRALAX) packet 17 g  17 g Oral Daily PRN Nitish Watts DO        And    bisacodyl (DULCOLAX) EC tablet 5 mg  5 mg Oral Daily PRN Nitish Watts DO        And    bisacodyl (DULCOLAX) suppository 10 mg  10 mg Rectal Daily PRN Nitish Watts DO        budesonide-formoterol (SYMBICORT) 160-4.5 MCG/ACT inhaler 2 puff  2 puff Inhalation BID - RT Nitish Watts DO   2 puff at 09/20/23 0950    Calcium Replacement - Follow Nurse / BPA Driven Protocol   Does not apply PRN Nitish Watts DO        ferrous sulfate tablet 325 mg  325 mg Oral BID With Meals Nitish Watts DO   325 mg at 09/20/23 1004    Magnesium Standard Dose Replacement - Follow Nurse / BPA Driven Protocol   Does not apply PRN Nitish Watts DO        metoprolol tartrate (LOPRESSOR) tablet 25 mg  25 mg Oral BID Nitish Watts DO   25 mg at 09/20/23 1005    nitroglycerin (NITROSTAT) SL tablet 0.4 mg  0.4 mg Sublingual Q5 Min PRN Nitish Watts DO        pantoprazole (PROTONIX) injection 40 mg  40 mg Intravenous  Q12H Nitish Watts DO   40 mg at 23 1006    Phosphorus Replacement - Follow Nurse / BPA Driven Protocol   Does not apply PRN Nitish Watts DO        Potassium Replacement - Follow Nurse / BPA Driven Protocol   Does not apply PRN Nitish Watts DO        sertraline (ZOLOFT) tablet 150 mg  150 mg Oral Daily Nitish Watts DO   150 mg at 23 1004    sodium chloride 0.9 % flush 10 mL  10 mL Intravenous Q12H Nitish Watts DO   10 mL at 23 1005    sodium chloride 0.9 % flush 10 mL  10 mL Intravenous PRN Nitish Watts DO        sodium chloride 0.9 % flush 10 mL  10 mL Intravenous Q12H Nitish Watts DO   10 mL at 23 1005    sodium chloride 0.9 % flush 10 mL  10 mL Intravenous PRN Nitish Watts DO        sodium chloride 0.9 % infusion 40 mL  40 mL Intravenous PRN Nitish Watts DO        sodium chloride 0.9 % infusion 40 mL  40 mL Intravenous PRN Nitish Watts DO            Physician Progress Notes (most recent note)        Nitish Watts DO at 23 1543              [facility]     Patient Identification:  Name:  Zaid Galarza  Age:  72 y.o.  Sex:  male  :  1950  MRN:  [id number]  Visit Number:  [id number]  ROOM: [address]     Primary Care Provider:  Rosi Thacker APRN    Length of stay in inpatient status:  4    Subjective     Chief Compliant:    Chief Complaint   Patient presents with    Abnormal Lab       History of Presenting Illness: Patient seen and evaluated in follow-up for acute on chronic anemia.  Patient with some likely upper GI bleeding but also with significant recurrence of iron deficiency anemia post stopping of oral iron supplementation.  Patient time exam today resting comfort in bed with no acute complaints other than feeling tired but does report feeling improved from presentation.    Objective     Current Hospital Meds:  apixaban, 5 mg, Oral, BID  atorvastatin, 80 mg, Oral, Nightly  budesonide-formoterol,  2 puff, Inhalation, BID - RT  ferrous sulfate, 325 mg, Oral, BID With Meals  metoprolol tartrate, 25 mg, Oral, BID  pantoprazole, 40 mg, Intravenous, Q12H  senna-docusate sodium, 2 tablet, Oral, BID  sertraline, 150 mg, Oral, Daily  sodium chloride, 10 mL, Intravenous, Q12H  sodium chloride, 10 mL, Intravenous, Q12H         ----------------------------------------------------------------------------------------------------------------------  Vital Signs:  Temp:  [98.2 °F (36.8 °C)-99.6 °F (37.6 °C)] 98.2 °F (36.8 °C)  Heart Rate:  [68-88] 74  Resp:  [17-26] 24  BP: (114-149)/() 121/100  SpO2:  [91 %-99 %] 91 %  on   ;   Device (Oxygen Therapy): room air  Body mass index is 34.56 kg/m².      Intake/Output Summary (Last 24 hours) at 9/19/2023 1543  Last data filed at 9/19/2023 1200  Gross per 24 hour   Intake 350 ml   Output --   Net 350 ml      ----------------------------------------------------------------------------------------------------------------------  Physical exam:  Constitutional: Elderly chronically ill-appearing adult male sitting up in chair in room.HENT:  Head:  Normocephalic and atraumatic.  Mouth:  Moist mucous membranes.    Eyes:  Conjunctivae and EOM are normal. No scleral icterus.    Neck:  Neck supple.  No JVD present.    Cardiovascular:  Normal rate, regular rhythm and normal heart sounds with no murmur.  Pulmonary/Chest:  No respiratory distress, no wheezes, no crackles, with normal breath sounds and good air movement.  Abdominal:  Soft.  Bowel sounds are normal.  No distension and no tenderness.   Musculoskeletal:  No tenderness and no deformity.  No red or swollen joints anywhere.  Functional ROM intact.   Neurological:  Alert and oriented to person, place, and time.  No cranial nerve deficit or other focal neurological deficits.    Skin:  Skin is warm and dry. No rash or lesion noted.  Mild pallor present.   Peripheral vascular:  Pulses in all 4 extremities with no clubbing, no  cyanosis, no edema.  Psychiatric: Appropriate mood and affect, pleasant.   ----------------------------------------------------------------------------------------------------------------------  WBC/HGB/HCT/PLT   10.54/8.1/26.9/208 (09/19 0040)  BUN/CREAT/GLUC/ALT/AST/TORY/LIP    18/1.32/131/--/--/--/-- (09/19 0040)  LYTES - Na/K/Cl/CO2: --/4.0/--/-- (09/19 1311)        No results found for: URINECX  No results found for: BLOODCX    I have personally looked at the labs and they are summarized above.  ----------------------------------------------------------------------------------------------------------------------  Detailed radiology reports for the last 24 hours:  No radiology results for the last day  Assessment & Plan      Acute on chronic anemia  Acute episode of melena  Recurrent severe iron deficiency anemia    -Patient with progressive dyspnea at home and weakness with one-time episode of melena prior to presentation.    -Patient status post transfusion of 5 units of packed RBCs with improvement and stabilization of hemoglobin, currently 8.1 today.    -Patient chronically on Eliquis and aspirin due to history of atrial fibrillation and aortobiiliac grafts    -Patient recently taken off iron supplementation earlier this summer by hematology after iron levels normalized however since that time patient with decline in hemoglobin from 10.4-4.6 at this presentation.    -Iron panel not obtained until posttransfusion as it was not ordered at admission however despite receiving 4 units of blood which would result in significant iron administration patient's iron studies consistent with severe iron deficiency with iron of 14 and iron saturation of 4    -Given patient's history of A-fib w he was resumed on Eliquis but will hold aspirin as this will still provide protective benefit for patient scribes and reduce risk for recurrent bleeding.  Additionally as noted given patient's severe iron deficiency have started  back on iron supplementation twice daily as he previously was during which time patient suffered no issues or problems with anemia    Dysphagia    -Seen and evaluated by speech therapy and placed on modified diet, will continue.    Acute kidney injury    -Secondary to anemia, renal function continuing improved posttransfusion    Hx HFrEF with recovered EF    -Patient appears compensated at this time, continue current cardiac regiment.    Chronic atrial fibrillation  Status post aortobiiliac grafting  Checks nonobstructive coronary artery disease    -Currently rate controlled, resumed Eliquis as noted above and monitor for any evidence of GI bleeding.      Copied text in portions of the note has been reviewed and is accurate as of 23    VTE Prophylaxis:   Mechanical Order History:        Ordered        09/15/23 181  Place Sequential Compression Device  Once            09/15/23 1810  Maintain Sequential Compression Device  Continuous            09/15/23 181  Place Sequential Compression Device  Once            09/15/23 1810  Maintain Sequential Compression Device  Continuous                          Pharmalogical Order History:       None            Disposition likely home in the next 24 hours if no evidence of ongoing blood loss on resumed anticoagulation.    Nitish Watts DO  [facility] Hospitalist  23  15:43 EDT      Electronically signed by Nitish Watts DO at 23 1553          Physical Therapy Notes (most recent note)        Rimma Hart, PT at 23 1354  Version 1 of 1         Acute Care - Physical Therapy Treatment Note   [facility]     Patient Name: Zaid Galarza  : 1950  MRN: [id number]  Today's Date: 2023   Onset of Illness/Injury or Date of Surgery: 09/15/23 (admission date)  Visit Dx:     ICD-10-CM ICD-9-CM   1. Gastrointestinal hemorrhage, unspecified gastrointestinal hemorrhage type  K92.2 578.9     Patient Active Problem List   Diagnosis     Persistent atrial fibrillation    Dilated cardiomyopathy    Nonobstructive CAD per cath 10/3/2019    Anxiety disorder    Essential hypertension    Tobacco use    History of abdominal aortic aneurysm (AAA) repair    Dyslipidemia, goal LDL below 100    CVA (cerebral vascular accident)    Cerebrovascular accident (CVA), unspecified mechanism    Symptomatic anemia    GI bleed     Past Medical History:   Diagnosis Date    Atrial fibrillation     CHF (congestive heart failure)     COPD (chronic obstructive pulmonary disease)     Coronary artery disease     Hypertension     Tobacco abuse      Past Surgical History:   Procedure Laterality Date    ABDOMINAL AORTIC ANEURYSM REPAIR      CARDIAC CATHETERIZATION N/A 10/03/2019    Procedure: Left Heart Cath;  Surgeon: Vincent Phillips MD;  Location: [facility] CATH INVASIVE LOCATION;  Service: Cardiology     PT Assessment (last 12 hours)       PT Evaluation and Treatment       Row Name 09/19/23 1350          Physical Therapy Time and Intention    Subjective Information complains of;weakness;fatigue;dyspnea  -CT     Document Type therapy note (daily note)  -CT     Mode of Treatment physical therapy  -CT     Patient Effort adequate  -CT       Row Name 09/19/23 1350          General Information    Patient Profile Reviewed yes  -CT     Existing Precautions/Restrictions fall  -CT       Row Name 09/19/23 1350          Home Use of Assistive/Adaptive Equipment    Equipment Currently Used at Home cane, straight;wheelchair;walker, standard  -CT       Row Name 09/19/23 1350          Transfers    Transfers sit-stand transfer;stand-sit transfer  -CT       Row Name 09/19/23 1350          Sit-Stand Transfer    Sit-Stand [place] (Transfers) contact guard  -CT     Assistive Device (Sit-Stand Transfers) walker, front-wheeled  -CT       Row Name 09/19/23 1350          Stand-Sit Transfer    Stand-Sit [place] (Transfers) contact guard  -CT     Assistive Device (Stand-Sit Transfers) walker,  front-wheeled  -CT       Row Name 09/19/23 1350          Gait/Stairs (Locomotion)    Gait/Stairs Locomotion gait/ambulation independence;gait/ambulation assistive device  -CT     [place] Level (Gait) contact guard  -CT     Distance in Feet (Gait) 50  -CT     Pattern (Gait) swing-through  -CT     Deviations/Abnormal Patterns (Gait) gait speed decreased;weight shifting decreased  -CT       Row Name 09/19/23 1350          Motor Skills    Therapeutic Exercise --  BLE seated ther ex  -CT       Row Name 09/19/23 1350          Coping    Observed Emotional State calm;cooperative  -CT     Verbalized Emotional State acceptance  -CT       Row Name 09/19/23 1350          Plan of Care Review    Plan of Care Reviewed With patient  -CT       Row Name 09/19/23 1350          Positioning and Restraints    Pre-Treatment Position in bed  -CT     Post Treatment Position chair  -CT     In Chair sitting;call light within reach;encouraged to call for assist  -CT       Row Name 09/19/23 1350          Therapy Assessment/Plan (PT)    Criteria for Skilled Interventions Met (PT) yes;meets criteria  -CT     Therapy Frequency (PT) 2 times/wk  2-5x/wk PRN, as available  -CT          User Key  (r) = Recorded By, (t) = Taken By, (c) = Cosigned By      Initials Name Provider Type    CT Rimma Hart, MISSY Physical Therapist                 PT Recommendation and Plan  Therapy Frequency (PT): 2 times/wk (2-5x/wk PRN, as available)  Plan of Care Reviewed With: patient       Time Calculation:    PT Charges       Row Name 09/19/23 0951             Time Calculation    PT Received On 09/19/23  -CT         Time Calculation- PT    Total Timed Code Minutes- PT 39 minute(s)  -CT                User Key  (r) = Recorded By, (t) = Taken By, (c) = Cosigned By      Initials Name Provider Type    CT Rimma Hart, PT Physical Therapist               Therapy Charges for Today       Code Description Service Date Service Provider Modifiers Qty    15657293428   GAIT TRAINING EA 15 MIN 2023 Rimma Hart, PT GP 1    00728743399 HC PT THERAPEUTIC ACT EA 15 MIN 2023 Rimma Hart, PT GP 1    11086083049 HC PT THER PROC EA 15 MIN 2023 Rimma Hart, PT GP 1       PT G-Codes  AM-PAC 6 Clicks Score (PT): 17    Rimma Hart, PT  2023      Electronically signed by Rimma Hart, PT at 23 1354          Occupational Therapy Notes (most recent note)        Dequan oCulter, OT at 23 1135          Acute Care - Occupational Therapy Initial Evaluation  [facility]     Patient Name: Zaid Galarza  : 1950  MRN: [id number]  Today's Date: 2023             Admit Date: 9/15/2023     No diagnosis found.  Patient Active Problem List   Diagnosis    Persistent atrial fibrillation    Dilated cardiomyopathy    Nonobstructive CAD per cath 10/3/2019    Anxiety disorder    Essential hypertension    Tobacco use    History of abdominal aortic aneurysm (AAA) repair    Dyslipidemia, goal LDL below 100    CVA (cerebral vascular accident)    Cerebrovascular accident (CVA), unspecified mechanism    Symptomatic anemia    GI bleed     Past Medical History:   Diagnosis Date    Atrial fibrillation     CHF (congestive heart failure)     COPD (chronic obstructive pulmonary disease)     Coronary artery disease     Hypertension     Tobacco abuse      Past Surgical History:   Procedure Laterality Date    ABDOMINAL AORTIC ANEURYSM REPAIR      CARDIAC CATHETERIZATION N/A 10/03/2019    Procedure: Left Heart Cath;  Surgeon: Vincent Phillips MD;  Location: [facility] INVASIVE LOCATION;  Service: Cardiology         OT ASSESSMENT FLOWSHEET (last 12 hours)       OT Evaluation and Treatment       Row Name 23 1132                   OT Time and Intention    Document Type evaluation  -KR        Mode of Treatment occupational therapy  -KR        Patient Effort adequate  -KR           Living Environment    Current Living Arrangements home  -KR        People in Home alone   -KR           Home Use of Assistive/Adaptive Equipment    Equipment Currently Used at Home cane, straight;walker, rolling;wheelchair  -KR           Cognition    Affect/Mental Status (Cognition) WFL  -KR        Orientation Status (Cognition) oriented x 3  -KR        Follows Commands (Cognition) WFL  -KR           Range of Motion Comprehensive    Comment, General Range of Motion BUE WFL  -KR           Strength Comprehensive (MMT)    Comment, General Manual Muscle Testing (MMT) Assessment BUE 3-/5  -KR           Activities of Daily Living    BADL Assessment/Intervention bathing;upper body dressing;lower body dressing;grooming;feeding;toileting  -KR           Bathing Assessment/Intervention    [place] Level (Bathing) bathing skills;moderate assist (50% patient effort)  -KR           Upper Body Dressing Assessment/Training    [place] Level (Upper Body Dressing) upper body dressing skills;moderate assist (50% patient effort)  -KR           Lower Body Dressing Assessment/Training    [place] Level (Lower Body Dressing) lower body dressing skills;moderate assist (50% patient effort)  -KR           Grooming Assessment/Training    [place] Level (Grooming) grooming skills;minimum assist (75% patient effort)  -KR           Self-Feeding Assessment/Training    [place] Level (Feeding) feeding skills;set up  -KR           Toileting Assessment/Training    [place] Level (Toileting) toileting skills;moderate assist (50% patient effort)  -KR           Plan of Care Review    Plan of Care Reviewed With patient  -KR           Therapy Assessment/Plan (OT)    Planned Therapy Interventions (OT) activity tolerance training;BADL retraining;strengthening exercise  -KR           Therapy Plan Review/Discharge Plan (OT)    Anticipated Discharge Disposition (OT) home  -KR           OT Goals    Dressing Goal Selection (OT) dressing, OT goal 1  -KR        Strength Goal Selection (OT) strength, OT goal 1  -KR            Dressing Goal 1 (OT)    Activity/Device (Dressing Goal 1, OT) dressing skills, all  -KR        [place]/Cues Needed (Dressing Goal 1, OT) contact guard required  -KR        Time Frame (Dressing Goal 1, OT) by discharge  -KR           Strength Goal 1 (OT)    Strength Goal 1 (OT) BUE increase x1 to enhance self care/mobility skills  -KR        Time Frame (Strength Goal 1, OT) by discharge  -KR                  User Key  (r) = Recorded By, (t) = Taken By, (c) = Cosigned By      Initials Name Effective Dates    KR Dequan Coulter OT 21 -                      OT Recommendation and Plan  Planned Therapy Interventions (OT): activity tolerance training, BADL retraining, strengthening exercise  Plan of Care Review  Plan of Care Reviewed With: patient  Plan of Care Reviewed With: patient      Time Calculation:     Therapy Charges for Today       Code Description Service Date Service Provider Modifiers Qty    99803821558 HC OT EVAL MOD COMPLEXITY 4 2023 Dequan Coulter OT GO 1            Dequan Coulter OT  2023    Electronically signed by Dequan Coulter OT at 23 1135          Speech Language Pathology Notes (most recent note)        Pauline Chapman, MS CCC-SLP at 23 1000          Acute Care - Speech Language Pathology   Swallow Initial Evaluation [facility]  CLINICAL DYSPHAGIA ASSESSMENT     Patient Name: Zaid Galarza  : 1950  MRN: [id number]  Today's Date: 2023             Admit Date: 9/15/2023    Zaid Galarza  was seen at bedside this am on PCU to assess safety/efficacy of swallowing fnx, determine safest/least restrictive diet tolerance.     Mr. Galarza presented to [facility] ED 9/15/23 from outpatient cardiology clinic where he was being followed for his CHF and nonobstructive coronary artery disease. He was evidenced with significant dyspnea on exertion with labs revealing hemoglobin of 4. He was sent to ED for further evaluation and management. In ED, he reported  "chronic dyspnea on exertion, he was undergoing physical therapy at home, for the past week or so he was more dyspneic with exertion, he denied melena until day before ED presentation. While doing physical therapy he had the urge to have bowel movement and had \"an accident did not make it to the bathroom \"large amount of stools described as black.  No iron pills or Pepto-Bismol ingested recently.  Patient reported no history of black stools in the past. He was admitted for further evaluation and management.    PMH significant for reduced ejection fraction heart failure, repeat 2D echo in September of last year shows normal EF, he was diagnosed with iron deficient anemia after he was admitted here in February of this year noted to have anemia requiring transfusion, he eventually had a work-up upper and lower endoscopy 3 months ago approximately which as per patient did not reveal any findings. Atrial fibrillation, cardiomyopathy, CAD, anxiety, HTN, tobacco abuse, AAA, DLD, stroke. He is familiar to SLP department from previous MBS 9/13/22 w/aspiration of thin liquids only, however, this was alleviated with compensatory technique of chin tuck, which he reports performing independently until he was \"graduated\" from this strategy by home health. He denies any recent odynophagia or dysphagia.     He was referred to rule out dysphagia per h/o dysphagia, stroke, current acute medical status.    It is noted that Mr. Galarza was assessed by general surgeon this am with clearance to advance po diet as tolerated.     Social History     Socioeconomic History    Marital status: Single   Tobacco Use    Smoking status: Former     Packs/day: 1.00     Types: Cigarettes    Smokeless tobacco: Never    Tobacco comments:     9/1/2022   Vaping Use    Vaping Use: Never used   Substance and Sexual Activity    Alcohol use: No    Drug use: No    Sexual activity: Defer      Imaging:  EXAMINATION: XR CHEST 2 VW-      CLINICAL INDICATION: soa; " R06.02-Shortness of breath        COMPARISON: 02/08/2023      TECHNIQUE: XR CHEST 2 VW-      FINDINGS:   LUNGS: Lungs are adequately aerated.      HEART AND MEDIASTINUM: Heart and mediastinal contours are unremarkable        SKELETON: Bony and soft tissue structures are unremarkable.           IMPRESSION:  No radiographic evidence of acute cardiac or pulmonary disease.     This report was finalized on 9/15/2023 1:00 PM by Dr. Hossein Lenz MD.    Labs:  09/18/23 0816  Iron Profile  Collected: 09/18/23 0019  Final result  Specimen: Blood    Iron 14 Low  mcg/dL Transferrin 250 mg/dL   Iron Saturation (TSAT) 4 Low  % TIBC 373 mcg/dL          09/18/23 0616  Hemoglobin & Hematocrit, Blood  Collected: 09/18/23 0602  Final result  Specimen: Blood    Hemoglobin 8.5 Low  g/dL Hematocrit 29.9 Low  %          09/18/23 0104  Basic Metabolic Panel  Collected: 09/18/23 0019  Final result  Specimen: Blood    Glucose 91 mg/dL CO2 23.1 mmol/L   BUN 21 mg/dL Calcium 8.0 Low  mg/dL   Creatinine 1.34 High  mg/dL BUN/Creatinine Ratio 15.7   Sodium 139 mmol/L Anion Gap 9.9 mmol/L   Potassium 4.0 mmol/L  eGFR 56.6 Low  mL/min/1.73   Chloride 106 mmol/L            09/18/23 0035  CBC & Differential  Collected: 09/18/23 0019  Final result  Specimen: Blood           09/18/23 0035  CBC Auto Differential  Collected: 09/18/23 0019  Final result  Specimen: Blood    WBC 11.06 High  10*3/mm3 Lymphocyte % 7.8 Low  %   RBC 3.11 Low  10*6/mm3 Monocyte % 8.7 %   Hemoglobin 8.2 Low  g/dL Eosinophil % 2.4 %   Hematocrit 26.7 Low  % Basophil % 0.4 %   MCV 85.9 fL Immature Grans % 0.7 High  %   MCH 26.4 Low  pg Neutrophils, Absolute 8.85 High  10*3/mm3   MCHC 30.7 Low  g/dL Lymphocytes, Absolute 0.86 10*3/mm3   RDW 19.5 High  % Monocytes, Absolute 0.96 High  10*3/mm3   RDW-SD 60.7 High  fl Eosinophils, Absolute 0.27 10*3/mm3   MPV 11.2 fL Basophils, Absolute 0.04 10*3/mm3   Platelets 203 10*3/mm3 Immature Grans, Absolute 0.08 High  10*3/mm3  "  Neutrophil % 80.0 High  % nRBC 0.4 High  /100 WBC        Diet Orders (active) (From admission, onward)       Start     Ordered    09/18/23 0959  Diet: Regular/House Diet; Texture: Soft to Chew (NDD 3); Soft to Chew: Chopped Meat; Fluid Consistency: Thin (IDDSI 0)  Diet Effective Now         09/18/23 1000    09/18/23 0959  DIET MESSAGE Please send vegetable soup and crackers for lunch today. Please send Coke/Pepsi or Sprite and milk on all trays. Thank you  Once        Comments: Please send vegetable soup and crackers for lunch today. Please send Coke/Pepsi or Sprite and milk on all trays. Thank you    09/18/23 1000                  He was observed on ra w/o complications.     Mr. Galarza was sitting upright and centered in a bedside chair upon my entry. He was a/a and interactive, pleasant to greet me. He remembered working with another SLP from our department during his previous admission. He was very cooperative and eager to participate in assessment. He denied any recent odynophagia or dysphagia, however, he did report preferred modified po diet of \"soft foods. I try to avoid meats. They make me tired. If I eat them, I chop them up.\"     He accepted multiple po presentations of ice chips, solid cracker, puree, and thin liquids via spoon, cup, and straw.  He was able to self provide po trials.     Facial/oral structures were symmetrical upon observation. Lingual protrusion revealed no deviation. Oral mucosa were moist, pink, and clean. Secretions were clear, thin, and well controlled. OROM/VANESSA was wfl to imitate oral postures. Gag is not assessed. Volitional cough was intact w/ adequate  intensity, clear in quality, non-productive. Voice was adequate in intensity, clear in quality w/ intelligible speech.    Upon po presentations, adequate bolus anticipation and acceptance w/ good labial seal for bolus clearance via spoon bowl, cup rim stability and suction via straw. Bolus formation, manipulation and control " were wfl w/ rotary mastication pattern. A-p transit was timely w/o significant oral residue appreciated. No overt s/s aspiration before the swallow.      Pharyngeal swallow was timely w/ adequate hyolaryngeal elevation per palpation. No overt s/s aspiration evidenced across this evaluation. No silent aspiration suspected. Patient denied odynophagia.    Visit Dx:   No diagnosis found.  Patient Active Problem List   Diagnosis    Persistent atrial fibrillation    Dilated cardiomyopathy    Nonobstructive CAD per cath 10/3/2019    Anxiety disorder    Essential hypertension    Tobacco use    History of abdominal aortic aneurysm (AAA) repair    Dyslipidemia, goal LDL below 100    CVA (cerebral vascular accident)    Cerebrovascular accident (CVA), unspecified mechanism    Symptomatic anemia    GI bleed     Past Medical History:   Diagnosis Date    Atrial fibrillation     CHF (congestive heart failure)     COPD (chronic obstructive pulmonary disease)     Coronary artery disease     Hypertension     Tobacco abuse      Past Surgical History:   Procedure Laterality Date    ABDOMINAL AORTIC ANEURYSM REPAIR      CARDIAC CATHETERIZATION N/A 10/03/2019    Procedure: Left Heart Cath;  Surgeon: Vincent Phillips MD;  Location: [facility] CATH INVASIVE LOCATION;  Service: Cardiology     EDUCATION  The patient has been educated in the following areas:   Dysphagia (Swallowing Impairment) Oral Care/Hydration Modified Diet Instruction.     Impression: Mr. Galarza presented w/ wfl oropharyngeal swallow w/o s/s aspiration.No s/s indicative of silent aspiration.  No odynophagia reported.      He is recommended his preferred premorbid baseline modified po diet of soft to chew textures, chopped meats, thin liquids. Medications whole with thin liquids, single pill presentations only, please. Upright and centered for all po intake. No further formal SLP follow up.     SLP Recommendation and Plan   1. Soft to chew textures, chopped meats, thin  liquids.    2. Medicatoins whole in puree/thins.   3. Upright and centered for all po intake  4. JACOB precautions.  5. Oral care protocol.  No further formal SLP f/u warranted/recommended at this time.    D/w Mr. Galarza results and recommendations w/ verbal agreement.    Thank you for allowing me to participate in the care of your patient-   Pauline Chapman M.S., CCC/SLP           Time Calculation:       Therapy Charges for Today       Code Description Service Date Service Provider Modifiers Qty    48458640850 HC ST EVAL ORAL PHARYNG SWALLOW 4 9/18/2023 Pauline Chapman, MS CCC-SLP GN 1             Pauline Chapman MS CCC-SLP  9/18/2023    Electronically signed by Pauline Chapman, MS CCC-SLP at 09/18/23 1013       ADL Documentation (most recent)      Flowsheet Row Most Recent Value   Transferring 2 - assistive person   Toileting 2 - assistive person   Bathing 2 - assistive person   Dressing 0 - independent   Eating 0 - independent   Communication 0 - understands/communicates without difficulty   Swallowing 2 - difficulty swallowing liquids/foods   Equipment Currently Used at Home cane, straight, wheelchair, walker, standard            Discharge Summary    No notes of this type exist for this encounter.       Discharge Order (From admission, onward)       Start     Ordered    09/20/23 0957  Discharge patient  Once        Expected Discharge Date: 09/20/23   Discharge Disposition: Home-Health Care [facility]   Physician of Record for Attribution - Please select from Treatment Team: JEF MCKEON [[id number]]   Review needed by CMO to determine Physician of Record: No      Question Answer Comment   Physician of Record for Attribution - Please select from Treatment Team JEF MCKEON    Review needed by CMO to determine Physician of Record No        09/20/23 1001

## 2023-09-20 NOTE — PLAN OF CARE
Goal Outcome Evaluation:  Plan of Care Reviewed With: patient        Progress: no change  Outcome Evaluation: Pt received from PCU this shift. VSS on RA. Pt up to BSC with one assist. No bloody bowel movements overnight. Will continue POC.

## 2023-09-21 NOTE — OUTREACH NOTE
Prep Survey      Flowsheet Row Responses   [facility] facility patient discharged from? [facility]   Is LACE score < 7 ? No   Eligibility Readm Mgmt   Discharge diagnosis GI bleed   Does the patient have one of the following disease processes/diagnoses(primary or secondary)? Other   Does the patient have Home health ordered? Yes   What is the Home health agency?  [facility]   Is there a DME ordered? No   Prep survey completed? Yes            Alesha BECERRA - Registered Nurse

## 2023-09-23 NOTE — DISCHARGE SUMMARY
[facility] HOSPITALISTS DISCHARGE SUMMARY    Patient Identification:  Name:  Zaid Galarza  Age:  72 y.o.  Sex:  male  :  1950  MRN:  [id number]  Visit Number:  [id number]    Date of Admission: 9/15/2023  Date of Discharge: 2023    PCP: Rosi Thacker APRN    DISCHARGE DIAGNOSIS  Acute on chronic anemia  Acute episode of melena  Recurrent severe iron deficiency anemia  Dysphagia  Acute kidney injury  Hx HFrEF with recovered EF  Chronic atrial fibrillation  Status post aortobiiliac grafting  Checks nonobstructive coronary artery disease    CONSULTS   General surgery    PROCEDURES PERFORMED      HOSPITAL COURSE  Patient is a 72 y.o. male presented to [facility] complaining of dyspnea with exertion with noted hemoglobin of 4 at outside heart failure clinic.  Please see the admitting history and physical for further details.      Patient seen and evaluated in the emergency department after having significant dyspnea on exertion at outside heart failure clinic with lab work obtained showing hemoglobin of 4.  Patient only admitted to the hospitalist service for further evaluation and treatment for acute on chronic anemia.  Patient reports that dyspnea and weakness have progressively been worsening over the last several weeks to month and had noted a recent one-time episode of vomiting at home prior to presentation.  Patient was transfused approximately 5 units of packed red blood cells with improvement and stabilization of hemoglobin around 8.  Patient chronically on Eliquis and aspirin due to history of atrial fibrillation and aortobiiliac grafts.  Patient noted to have been recently talking off iron supplementation earlier in the year after being evaluated by hematology and noting normalization of iron levels and no anemia.  Since that time patient would likely continued slow decline in hemoglobin.  General surgery was consulted but deferred any endoscopic evaluation  at this time unless further episodes of bleeding or hemoglobin not stabilizing and ultimately patient did not require any of this while in hospital.  Repeat iron panel was obtained status post 5 units of packed red blood cells and still showed significant iron deficiency with an iron level of 14 and iron saturation of 4.  It was noted that patient with history of A-fib he could be continued on Eliquis but hold aspirin and this change was made to his medical regimen while here in hospital.  He was monitored for an appropriate amount of time to ensure no further decline in hemoglobin and it remained stable with improvement in patient's fatigability and dyspnea on exertion although still having some dyspnea not unsurprising with a hemoglobin of 8 however risk versus benefit outweighed for continued more aggressive transfusion.  As patient was feeling much improved and hemoglobin was remaining stable after being restarted on anticoagulation he was felt to achieve maximal benefit of continued hospitalization and discharged home in stable medical condition to follow-up with his PCP.    VITAL SIGNS:        on   ;        Body mass index is 33.07 kg/m².  Wt Readings from Last 3 Encounters:   09/19/23 114 kg (250 lb 10.6 oz)   09/15/23 114 kg (251 lb 3.2 oz)   07/18/23 111 kg (244 lb 3.2 oz)       PHYSICAL EXAM:  Constitutional: Elderly chronically ill-appearing adult male sitting up in chair in room.HENT:  Head:  Normocephalic and atraumatic.  Mouth:  Moist mucous membranes.    Eyes:  Conjunctivae and EOM are normal. No scleral icterus.    Neck:  Neck supple.  No JVD present.    Cardiovascular:  Normal rate, regular rhythm and normal heart sounds with no murmur.  Pulmonary/Chest:  No respiratory distress, no wheezes, no crackles, with normal breath sounds and good air movement.  Abdominal:  Soft.  Bowel sounds are normal.  No distension and no tenderness.   Musculoskeletal:  No tenderness and no deformity.  No red or swollen  joints anywhere.  Functional ROM intact.   Neurological:  Alert and oriented to person, place, and time.  No cranial nerve deficit or other focal neurological deficits.    Skin:  Skin is warm and dry. No rash or lesion noted.  Mild pallor present.   Peripheral vascular:  Pulses in all 4 extremities with no clubbing, no cyanosis, no edema.  Psychiatric: Appropriate mood and affect, pleasant.     DISCHARGE DISPOSITION   Stable    DISCHARGE MEDICATIONS:     Discharge Medications        New Medications        Instructions Start Date   FeroSul 325 (65 FE) MG tablet  Generic drug: ferrous sulfate   325 mg, Oral, 2 Times Daily With Meals             Continue These Medications        Instructions Start Date   apixaban 5 MG tablet tablet  Commonly known as: ELIQUIS   5 mg, Oral, 2 Times Daily      atorvastatin 80 MG tablet  Commonly known as: LIPITOR   80 mg, Oral, Nightly      Entresto 49-51 MG tablet  Generic drug: sacubitril-valsartan   1 tablet, Oral, 2 Times Daily      metoprolol tartrate 25 MG tablet  Commonly known as: LOPRESSOR   25 mg, Oral, 2 Times Daily      pantoprazole 40 MG EC tablet  Commonly known as: PROTONIX   40 mg, Oral, 2 Times Daily      sertraline 100 MG tablet  Commonly known as: ZOLOFT   150 mg, Oral, Daily      Symbicort 160-4.5 MCG/ACT inhaler  Generic drug: budesonide-formoterol   2 puffs, Inhalation, 2 Times Daily - RT      vitamin B-12 1000 MCG tablet  Commonly known as: CYANOCOBALAMIN   1,000 mcg, Oral, Daily             Stop These Medications      aspirin 81 MG EC tablet              Diet Instructions       Diet: Regular/House Diet; Soft to Chew (NDD 3); Chopped Meat; Thin (IDDSI 0)      Discharge Diet: Regular/House Diet    Texture: Soft to Chew (NDD 3)    Soft to Chew: Chopped Meat    Fluid Consistency: Thin (IDDSI 0)      Regular, soft to chew         Activity Instructions    Activity as Tolerated         Additional Instructions for the Follow-ups that You Need to Schedule       Ambulatory  Referral to Home Health (Hospital)   As directed      Face to Face Visit Date: 9/20/2023   Follow-up provider for Plan of Care?: I treated the patient in an acute care facility and will not continue treatment after discharge.   Follow-up provider: ROSI DUEÑAS [[id number]]   Reason/Clinical Findings: resumption of previous services (PT/OT)   Describe mobility limitations that make leaving home difficult: same as above   Nursing/Therapeutic Services Requested: Physical Therapy Occupational Therapy   Frequency: 1 Week 1        Discharge Follow-up with PCP   As directed       Currently Documented PCP:    Rosi Dueñas APRN    PCP Phone Number:    [phone]     Follow Up Details: 1-2 week post hospital follow up               Follow-up Information       Rosi Dueñas APRN .    Specialty: Nurse Practitioner  Why: 1-2 week post hospital follow up  Contact information:  [address] [place] [address] [facility]  [facility] [address]  [phone]                              TEST  RESULTS PENDING AT DISCHARGE       The ASCVD Risk score (Zander FIGUEROA, et al., 2019) failed to calculate for the following reasons:    The patient has a prior MI or stroke diagnosis     CODE STATUS  Code Status and Medical Interventions:   Ordered at: 09/15/23 1615     Level Of Support Discussed With:    Patient     Code Status (Patient has no pulse and is not breathing):    CPR (Attempt to Resuscitate)     Medical Interventions (Patient has pulse or is breathing):    Full Support       Nitish Watts DO  [facility] Hospitalist  09/23/23  15:54 EDT    Please note that this discharge summary required more than 30 minutes to complete.

## 2023-09-26 ENCOUNTER — READMISSION MANAGEMENT (OUTPATIENT)
Dept: CALL CENTER | Facility: HOSPITAL | Age: 73
End: 2023-09-26
Payer: MEDICARE

## 2023-09-26 NOTE — OUTREACH NOTE
Medical Week 1 Survey      Flowsheet Row Responses   [facility] patient discharged from? [facility]   Does the patient have one of the following disease processes/diagnoses(primary or secondary)? Other   Week 1 attempt successful? Yes   Call start time 0929   Call end time 0936   Discharge diagnosis GI bleed   Meds reviewed with patient/caregiver? Yes   Is the patient having any side effects they believe may be caused by any medication additions or changes? No   Does the patient have all medications ordered at discharge? Yes   Is the patient taking all medications as directed (includes completed medication regime)? Yes   Does the patient have a primary care provider?  Yes   Does the patient have an appointment with their PCP within 7 days of discharge? Yes   Has the patient kept scheduled appointments due by today? N/A   What is the Home health agency?  [facility]   Has home health visited the patient within 72 hours of discharge? Yes   Psychosocial issues? No   Did the patient receive a copy of their discharge instructions? Yes   Nursing interventions Reviewed instructions with patient   What is the patient's perception of their health status since discharge? Improving   Is the patient/caregiver able to teach back signs and symptoms related to disease process for when to call PCP? Yes   Is the patient/caregiver able to teach back signs and symptoms related to disease process for when to call 911? Yes   Is the patient/caregiver able to teach back the hierarchy of who to call/visit for symptoms/problems? PCP, Specialist, Home health nurse, Urgent Care, ED, 911 Yes   Additional teach back comments states stools still black and has SOB which is decreasing, states will discuss stool color today with PCPf   Week 1 call completed? Yes   Call end time 0936            Linda WATKINS - Registered Nurse

## 2023-10-06 RX ORDER — SACUBITRIL AND VALSARTAN 49; 51 MG/1; MG/1
1 TABLET, FILM COATED ORAL 2 TIMES DAILY
Qty: 56 TABLET | Refills: 0 | COMMUNITY
Start: 2023-10-06

## 2023-10-20 ENCOUNTER — OFFICE VISIT (OUTPATIENT)
Dept: CARDIOLOGY | Facility: CLINIC | Age: 73
End: 2023-10-20
Payer: MEDICARE

## 2023-10-20 VITALS
HEART RATE: 84 BPM | BODY MASS INDEX: 32.15 KG/M2 | SYSTOLIC BLOOD PRESSURE: 98 MMHG | OXYGEN SATURATION: 98 % | WEIGHT: 242.6 LBS | HEIGHT: 73 IN | DIASTOLIC BLOOD PRESSURE: 65 MMHG

## 2023-10-20 DIAGNOSIS — I48.19 PERSISTENT ATRIAL FIBRILLATION: Chronic | ICD-10-CM

## 2023-10-20 DIAGNOSIS — E78.5 DYSLIPIDEMIA, GOAL LDL BELOW 100: Chronic | ICD-10-CM

## 2023-10-20 DIAGNOSIS — I42.0 DILATED CARDIOMYOPATHY: Primary | Chronic | ICD-10-CM

## 2023-10-20 DIAGNOSIS — I25.10 CORONARY ARTERY DISEASE INVOLVING NATIVE CORONARY ARTERY OF NATIVE HEART WITHOUT ANGINA PECTORIS: Chronic | ICD-10-CM

## 2023-10-20 DIAGNOSIS — I10 ESSENTIAL HYPERTENSION: Chronic | ICD-10-CM

## 2023-10-20 PROCEDURE — 99214 OFFICE O/P EST MOD 30 MIN: CPT | Performed by: NURSE PRACTITIONER

## 2023-10-20 PROCEDURE — 3074F SYST BP LT 130 MM HG: CPT | Performed by: NURSE PRACTITIONER

## 2023-10-20 PROCEDURE — 1159F MED LIST DOCD IN RCRD: CPT | Performed by: NURSE PRACTITIONER

## 2023-10-20 PROCEDURE — 3078F DIAST BP <80 MM HG: CPT | Performed by: NURSE PRACTITIONER

## 2023-10-20 PROCEDURE — 1160F RVW MEDS BY RX/DR IN RCRD: CPT | Performed by: NURSE PRACTITIONER

## 2023-10-20 RX ORDER — SACUBITRIL AND VALSARTAN 24; 26 MG/1; MG/1
1 TABLET, FILM COATED ORAL 2 TIMES DAILY
Qty: 56 TABLET | Refills: 0 | Status: SHIPPED | OUTPATIENT
Start: 2023-10-20 | End: 2023-10-20 | Stop reason: SDUPTHER

## 2023-10-20 RX ORDER — SACUBITRIL AND VALSARTAN 24; 26 MG/1; MG/1
1 TABLET, FILM COATED ORAL 2 TIMES DAILY
Qty: 56 TABLET | Refills: 0 | COMMUNITY
Start: 2023-10-20

## 2023-10-20 NOTE — PROGRESS NOTES
"Chief Complaint  Follow-up (Pt denies CP, some SOA, fatigue) and Med Review (Tolerating all current medications.)    Subjective          Zaid Galarza presents to [facility] CARDIOLOGY for follow up.    History of Present Illness    Zaid was last seen in clinic on 9/15/2023.  He did report increased shortness of breath with exertion and he was pale.  I did have him get labs drawn and his hemoglobin was critically low.  He was admitted to the hospital and did receive transfusion.  He had had recent EGD colonoscopy which did not show any bleeding.  He is now scheduled for same on Monday.    At today's visit Zaid is accompanied by his sister.  He tells me that he has had his last dose of Eliquis earlier today.  He will not have any more until his work-up is finished on Monday.  He has not noticed any bright red blood per rectum or black tarry stools.      He does reports that he has had some dizziness, particularly when he rises quickly.  The dizziness does resolve spontaneously and quickly.  He denies any chest pain, shortness of breath or dyspnea on exertion.  He does tell me that he had labs drawn at his primary care provider's office.  These labs are not available to me at present.    Objective     Vital Signs:   BP 98/65   Pulse 84   Ht 185.4 cm (73\")   Wt 110 kg (242 lb 9.6 oz)   SpO2 98%   BMI 32.01 kg/m²       Physical Exam  Constitutional:       Appearance: Normal appearance. He is well-developed.   Cardiovascular:      Rate and Rhythm: Normal rate. Rhythm irregular.      Heart sounds: No murmur heard.     No friction rub. No gallop.   Pulmonary:      Effort: Pulmonary effort is normal. No respiratory distress.      Breath sounds: Normal breath sounds. No wheezing or rales.   Skin:     General: Skin is warm and dry.   Neurological:      Mental Status: He is alert and oriented to person, place, and time.   Psychiatric:         Mood and Affect: Mood normal.         Behavior: Behavior " normal.          Result Review :     CMP          9/17/2023    00:19 9/18/2023    00:19 9/19/2023    00:40 9/19/2023    13:11   CMP   Glucose 97  91  131     BUN 27  21  18     Creatinine 1.47  1.34  1.32     EGFR 50.7  56.6  57.7     Sodium 138  139  141     Potassium 4.1  4.0  3.5  4.0    Chloride 106  106  107     Calcium 8.1  8.0  8.0     BUN/Creatinine Ratio 18.4  15.7  13.6     Anion Gap 9.0  9.9  11.3       CBC          9/18/2023    00:19 9/18/2023    06:02 9/19/2023    00:40 9/20/2023    00:02 9/20/2023    08:48   CBC   WBC 11.06   10.54  11.01     RBC 3.11   3.12  3.00     Hemoglobin 8.2  8.5  8.1  7.6  7.6    Hematocrit 26.7  29.9  26.9  25.6  26.2    MCV 85.9   86.2  85.3     MCH 26.4   26.0  25.3     MCHC 30.7   30.1  29.7     RDW 19.5   19.7  19.7     Platelets 203   208  200       Lipid Panel          9/15/2023    12:02   Lipid Panel   Total Cholesterol 88    Triglycerides 67    HDL Cholesterol 39    VLDL Cholesterol 15    LDL Cholesterol  34    LDL/HDL Ratio 0.91               Most recent Cardiac Cath  Results for orders placed during the hospital encounter of 09/28/19    Cardiac Catheterization/Vascular Study    Narrative  Table formatting from the original result was not included.  DATE OF PROCEDURE: 10/3/2019    INDICATION FOR PROCEDURE: {Cardiomyopathy, severe LV dysfunction, fib , flutter    PROCEDURE PERFORMED:    1. Coronary Angiogram  2. Left heart catheretization    PROCEDURE COMMENTS:    Zaid Galarza was brought to the cath lab and placed on the cardiac catherization table in the postabsortive state. The patient was prepped and draped according to the cath lab protocol under strict aseptic and sterile condition. Patient's right femoral artery sight was prepped and draped. Under fluoroscopic guidance the right femoral artery was punctured using a 21 gauge needle utilizing the modified Seldinger technique. 6 Azeri sheath was introduced over a wire. After aspirating for blood it was  flushed with heparinized saline.    We advanced Torres type diagnostic catheters over the wire. The  left and right coronary arteries  were selectively engaged. Left and right coronary angiogram were obtained in multiple orthogonal projections.    After completion of the procedure the femoral sheath was removed in the cath lab and hemostasis was achieved by Angioseal. The patient tolerated the procedure without complications.      FINDINGS:    1. HEMODYNAMICS:  LVEDP 10 mmHg, no gradient across the aortic valve.      2. CORONARY ANGIOGRAPHY: Dominant dominant coronary artery system.    The left main coronary artery bifurcated into the LAD and left circumflex coronary.  The LAD coursed in the anterior interventricular groove, gave rise to diagonal branches and reached the apex.    Left circumflex coursed in the left atrial ventricular groove and gives rise to several marginal branches.    The right coronary artery course in the right atrial ventricular groove I gave rise to several acute marginal branches.    Left main: Normal    LAD: 20 to 30% diffuse disease in LADLeft circumflex: 30% focal mid circumflex  RCA: Large size dominant vessel proximal 40% and distal 40% focal stenosisPDA: Mild 20%    Final impression:  Mild to moderate CAD  3+ mitral regurgitation  RECOMMENDATIONS:  Aspirin  Beta-blocker  Statin  ACE inhibitor  Continue anticoagulant  Repeat ANGELITO after 6 to 8 weeks  If atrial thrombus resolved then proceed with  Ablation  I believe once the arrhythmia is controlled patient ejection fraction will tend to increase    Vincent Phillips MD  10/03/19  9:38 AM      Most recent Echocardiogram  Results for orders placed during the hospital encounter of 09/01/22    Adult Transthoracic Echo Complete W/ Cont if Necessary Per Protocol (With Agitated Saline)    Interpretation Summary  · Estimated left ventricular EF = 55% Left ventricular ejection fraction appears to be 51 - 55%. Left ventricular systolic function is  normal.  · Left ventricular diastolic function was normal.  · No significant mitral or aortic valve regurgitation. Moderate sclerosis of both is noted.  · There is anterior pericardial fat pad but no effusion  · Left atrium is mildly enlarged  · Since prev study of 9/29/19 EF has increased from 30% to 55% and mitral, aortic and tricuspid regurgitation is no longer seen.        Current Outpatient Medications   Medication Sig Dispense Refill    apixaban (ELIQUIS) 5 MG tablet tablet Take 1 tablet by mouth 2 (Two) Times a Day.      atorvastatin (LIPITOR) 80 MG tablet Take 1 tablet by mouth Every Night. 90 tablet 3    ferrous sulfate 325 (65 FE) MG tablet Take 1 tablet by mouth 2 (Two) Times a Day With Meals for 30 days. 60 tablet 0    metoprolol tartrate (LOPRESSOR) 25 MG tablet Take 1 tablet by mouth 2 (Two) Times a Day. 180 tablet 3    pantoprazole (PROTONIX) 40 MG EC tablet Take 1 tablet by mouth 2 (Two) Times a Day.      sertraline (ZOLOFT) 100 MG tablet Take 1.5 tablets by mouth Daily.      Symbicort 160-4.5 MCG/ACT inhaler Inhale 2 puffs 2 (Two) Times a Day.      vitamin B-12 (CYANOCOBALAMIN) 1000 MCG tablet Take 1 tablet by mouth Daily.      sacubitril-valsartan (Entresto) 24-26 MG tablet Take 1 tablet by mouth 2 (Two) Times a Day. 56 tablet 0     No current facility-administered medications for this visit.            Assessment and Plan    Problem List Items Addressed This Visit          Cardiac and Vasculature    Dilated cardiomyopathy - Primary (Chronic)    Overview     TTE: LVEF 26 to 30%, RV and LV mildly dilated, grade 3 diastolic dysfunction consistent with fixed restrictive pattern, mild TR, left atrial cavity mildly dilated right atrial cavity moderately dilated, moderate pulmonary hypertension with RVSP 47 mmHg  9/30/2019 ANGELITO: Severe cardiomyopathy likely from atrial flutter  2/11/2020 TTE LVEF 36 to 40%  5/6/2020 TTE LVEF 56 to 60%, mild concentric LVH  9/2/2022 TTE: LVEF 51 to 55%, moderate sclerosis  of both the mitral and aortic valve with no significant regurgitation noted.  Left atrium is mildly enlarged.         Persistent atrial fibrillation (Chronic)    Overview     [id number] is at least 6 for heart failure, hypertension, CVA, nonobstructive CAD, and age         Nonobstructive CAD per cath 10/3/2019 (Chronic)    Overview     10/4/2019 LHC: Nonobstructive coronary artery disease         Essential hypertension (Chronic)    Dyslipidemia, goal LDL below 100 (Chronic)    Overview     9/2/2022 total cholesterol 104, triglycerides 89, HDL 31, and LDL 55                Follow Up     Medications were reviewed with the patient.    Atrial fibrillation is stable.  We will wait for colonoscopy results for further disposition of continuation of Eliquis.  Patient is at very high risk of stroke.[id number] Score: 6 (10/20/2023 11:20 AM)    Continue atorvastatin for dyslipidemia.    Nonobstructive coronary artery disease is stable.  Continue atorvastatin and metoprolol.    With regard to nonischemic dilated cardiomyopathy with recovered LVEF, continue Entresto.  Continue metoprolol.  Entresto dose decreased today.    Return in about 6 months (around 4/20/2024).    Patient was given instructions and counseling regarding his condition or for health maintenance advice. Please see specific information pulled into the AVS if appropriate.

## 2023-11-20 ENCOUNTER — OFFICE VISIT (OUTPATIENT)
Dept: ONCOLOGY | Facility: CLINIC | Age: 73
End: 2023-11-20
Payer: MEDICARE

## 2023-11-20 ENCOUNTER — LAB (OUTPATIENT)
Dept: ONCOLOGY | Facility: CLINIC | Age: 73
End: 2023-11-20
Payer: MEDICARE

## 2023-11-20 VITALS
SYSTOLIC BLOOD PRESSURE: 90 MMHG | HEART RATE: 78 BPM | OXYGEN SATURATION: 99 % | HEIGHT: 72 IN | DIASTOLIC BLOOD PRESSURE: 63 MMHG | TEMPERATURE: 97.6 F | RESPIRATION RATE: 18 BRPM | WEIGHT: 242 LBS | BODY MASS INDEX: 32.78 KG/M2

## 2023-11-20 DIAGNOSIS — D50.9 IRON DEFICIENCY ANEMIA, UNSPECIFIED IRON DEFICIENCY ANEMIA TYPE: Primary | ICD-10-CM

## 2023-11-20 DIAGNOSIS — D64.9 NORMOCYTIC ANEMIA: ICD-10-CM

## 2023-11-20 DIAGNOSIS — D50.9 IRON DEFICIENCY ANEMIA, UNSPECIFIED IRON DEFICIENCY ANEMIA TYPE: ICD-10-CM

## 2023-11-20 LAB
ANISOCYTOSIS BLD QL: NORMAL
BASOPHILS # BLD AUTO: 0.04 10*3/MM3 (ref 0–0.2)
BASOPHILS NFR BLD AUTO: 0.5 % (ref 0–1.5)
DACRYOCYTES BLD QL SMEAR: NORMAL
DEPRECATED RDW RBC AUTO: 54.6 FL (ref 37–54)
EOSINOPHIL # BLD AUTO: 0.28 10*3/MM3 (ref 0–0.4)
EOSINOPHIL NFR BLD AUTO: 3.2 % (ref 0.3–6.2)
ERYTHROCYTE [DISTWIDTH] IN BLOOD BY AUTOMATED COUNT: 17.7 % (ref 12.3–15.4)
FERRITIN SERPL-MCNC: 32.05 NG/ML (ref 30–400)
HCT VFR BLD AUTO: 33.4 % (ref 37.5–51)
HGB BLD-MCNC: 9.6 G/DL (ref 13–17.7)
HYPOCHROMIA BLD QL: NORMAL
IMM GRANULOCYTES # BLD AUTO: 0.05 10*3/MM3 (ref 0–0.05)
IMM GRANULOCYTES NFR BLD AUTO: 0.6 % (ref 0–0.5)
IRON 24H UR-MRATE: 76 MCG/DL (ref 59–158)
IRON SATN MFR SERPL: 20 % (ref 20–50)
LYMPHOCYTES # BLD AUTO: 1.46 10*3/MM3 (ref 0.7–3.1)
LYMPHOCYTES NFR BLD AUTO: 16.9 % (ref 19.6–45.3)
MCH RBC QN AUTO: 24.2 PG (ref 26.6–33)
MCHC RBC AUTO-ENTMCNC: 28.7 G/DL (ref 31.5–35.7)
MCV RBC AUTO: 84.3 FL (ref 79–97)
MONOCYTES # BLD AUTO: 0.65 10*3/MM3 (ref 0.1–0.9)
MONOCYTES NFR BLD AUTO: 7.5 % (ref 5–12)
NEUTROPHILS NFR BLD AUTO: 6.17 10*3/MM3 (ref 1.7–7)
NEUTROPHILS NFR BLD AUTO: 71.3 % (ref 42.7–76)
NRBC BLD AUTO-RTO: 0 /100 WBC (ref 0–0.2)
OVALOCYTES BLD QL SMEAR: NORMAL
PLAT MORPH BLD: NORMAL
PLATELET # BLD AUTO: 250 10*3/MM3 (ref 140–450)
PMV BLD AUTO: 11.5 FL (ref 6–12)
POLYCHROMASIA BLD QL SMEAR: NORMAL
RBC # BLD AUTO: 3.96 10*6/MM3 (ref 4.14–5.8)
SCHISTOCYTES BLD QL SMEAR: NORMAL
TIBC SERPL-MCNC: 386 MCG/DL (ref 298–536)
TRANSFERRIN SERPL-MCNC: 259 MG/DL (ref 200–360)
WBC NRBC COR # BLD AUTO: 8.65 10*3/MM3 (ref 3.4–10.8)

## 2023-11-20 PROCEDURE — 83521 IG LIGHT CHAINS FREE EACH: CPT | Performed by: NURSE PRACTITIONER

## 2023-11-20 PROCEDURE — 85007 BL SMEAR W/DIFF WBC COUNT: CPT | Performed by: NURSE PRACTITIONER

## 2023-11-20 PROCEDURE — 1126F AMNT PAIN NOTED NONE PRSNT: CPT | Performed by: NURSE PRACTITIONER

## 2023-11-20 PROCEDURE — 86334 IMMUNOFIX E-PHORESIS SERUM: CPT | Performed by: NURSE PRACTITIONER

## 2023-11-20 PROCEDURE — 84155 ASSAY OF PROTEIN SERUM: CPT | Performed by: NURSE PRACTITIONER

## 2023-11-20 PROCEDURE — 85025 COMPLETE CBC W/AUTO DIFF WBC: CPT | Performed by: NURSE PRACTITIONER

## 2023-11-20 PROCEDURE — 1159F MED LIST DOCD IN RCRD: CPT | Performed by: NURSE PRACTITIONER

## 2023-11-20 PROCEDURE — 82728 ASSAY OF FERRITIN: CPT | Performed by: NURSE PRACTITIONER

## 2023-11-20 PROCEDURE — 99214 OFFICE O/P EST MOD 30 MIN: CPT | Performed by: NURSE PRACTITIONER

## 2023-11-20 PROCEDURE — 3074F SYST BP LT 130 MM HG: CPT | Performed by: NURSE PRACTITIONER

## 2023-11-20 PROCEDURE — 83540 ASSAY OF IRON: CPT | Performed by: NURSE PRACTITIONER

## 2023-11-20 PROCEDURE — 82784 ASSAY IGA/IGD/IGG/IGM EACH: CPT | Performed by: NURSE PRACTITIONER

## 2023-11-20 PROCEDURE — 3078F DIAST BP <80 MM HG: CPT | Performed by: NURSE PRACTITIONER

## 2023-11-20 PROCEDURE — 84466 ASSAY OF TRANSFERRIN: CPT | Performed by: NURSE PRACTITIONER

## 2023-11-20 PROCEDURE — 1160F RVW MEDS BY RX/DR IN RCRD: CPT | Performed by: NURSE PRACTITIONER

## 2023-11-20 PROCEDURE — 84165 PROTEIN E-PHORESIS SERUM: CPT | Performed by: NURSE PRACTITIONER

## 2023-11-20 RX ORDER — FERROUS SULFATE 325(65) MG
325 TABLET ORAL
COMMUNITY
Start: 2023-10-03

## 2023-11-20 RX ORDER — FERROUS SULFATE 325(65) MG
325 TABLET ORAL 2 TIMES DAILY
Qty: 60 TABLET | Refills: 4 | Status: SHIPPED | OUTPATIENT
Start: 2023-11-20 | End: 2023-11-22

## 2023-11-20 NOTE — PROGRESS NOTES
DATE:  11/20/2023    DIAGNOSIS: MUKESH    CHIEF COMPLAINT:  Improved Weakness/Fatigue, Shortness of Breath and Dizzziness    TREATMENT HISTORY:  Ferrous Sulfate BID    HISTORY OF PRESENT ILLNESS:   Zaid Galarza is a very pleasant 73 y.o. male who is being seen today at the request of TRAVIS Venegas for evaluation and treatment of MUKESH. Mr. Galarza reports following with his PCP every 3 months with lab testing. He recently had labs drawn at his PCP office on 06/06/2023 showed Hg (7.8) and he was told he should go to [facility] ED. He presented to [facility] ED and was admitted and during admission he received a total of 4 units PRBCs. Of note, prior to this admission is H/H were within normal. General surgery was consulted while inpatient and they recommended to have EGD/colonoscopy as an outpatient since patient had no sign of acute blood loss. Iron panel and Ferritin obtained while inpatient was consistent with MUKESH and he was not started on oral Iron replacement. He continues to deny any obvious blood loss from any source. He is scheduled with Dr. Mireles (GI) on 02/27/2023 for possible EGD/colonoscopy. He reports weakness/fatigue, shortness of breath on exertion and dizziness. He denies any chest pain. He denies any other specific complaints today.        Interval History:  Mr. Galarza presents today for follow up of MUKESH. He was hospitalized in September with Hg of 4. He was transfused 5 units PRBCs and was restarted on Ferrous Sulfate BID. Today, he reports that he is feeling much better. He reports improvement in weakness/fatigue, shortness of breath on exertion and dizziness. He underwent repeat colonoscopy after discharge per Dr. Mireles and reports this was unremarkable for bleeding. He denies any obvious blood loss from any source. He denies any other specific complaints today.     The following portions of the patient's history were reviewed and updated as appropriate: allergies, current medications, past family  history, past medical history, past social history, past surgical history and problem list.    PAST MEDICAL HISTORY:  Past Medical History:   Diagnosis Date    Atrial fibrillation     CHF (congestive heart failure)     COPD (chronic obstructive pulmonary disease)     Coronary artery disease     Hypertension     Tobacco abuse        PAST SURGICAL HISTORY:  Past Surgical History:   Procedure Laterality Date    ABDOMINAL AORTIC ANEURYSM REPAIR      CARDIAC CATHETERIZATION N/A 10/03/2019    Procedure: Left Heart Cath;  Surgeon: Vincent Phillips MD;  Location: [facility] CATH INVASIVE LOCATION;  Service: Cardiology       SOCIAL HISTORY:  Social History     Socioeconomic History    Marital status: Single   Tobacco Use    Smoking status: Former     Packs/day: 1     Types: Cigarettes    Smokeless tobacco: Never    Tobacco comments:     9/1/2022   Vaping Use    Vaping Use: Never used   Substance and Sexual Activity    Alcohol use: No    Drug use: No    Sexual activity: Defer         FAMILY HISTORY:  Family History   Problem Relation Age of Onset    Heart disease Mother     Stroke Father     Heart disease Father          MEDICATIONS:  The current medication list was reviewed in the EMR    Current Outpatient Medications:     apixaban (ELIQUIS) 5 MG tablet tablet, Take 1 tablet by mouth 2 (Two) Times a Day., Disp: , Rfl:     atorvastatin (LIPITOR) 80 MG tablet, Take 1 tablet by mouth Every Night., Disp: 90 tablet, Rfl: 3    ferrous sulfate 325 (65 FE) MG tablet, 1 tablet., Disp: , Rfl:     metoprolol tartrate (LOPRESSOR) 25 MG tablet, Take 1 tablet by mouth 2 (Two) Times a Day., Disp: 180 tablet, Rfl: 3    pantoprazole (PROTONIX) 40 MG EC tablet, Take 1 tablet by mouth 2 (Two) Times a Day., Disp: , Rfl:     sacubitril-valsartan (Entresto) 24-26 MG tablet, Take 1 tablet by mouth 2 (Two) Times a Day., Disp: 56 tablet, Rfl: 0    sertraline (ZOLOFT) 100 MG tablet, Take 1.5 tablets by mouth Daily., Disp: , Rfl:     Symbicort 160-4.5  MCG/ACT inhaler, Inhale 2 puffs 2 (Two) Times a Day., Disp: , Rfl:     vitamin B-12 (CYANOCOBALAMIN) 1000 MCG tablet, Take 1 tablet by mouth Daily., Disp: , Rfl:     ALLERGIES:  No Known Allergies      REVIEW OF SYSTEMS:    A comprehensive 14 point review of systems was performed.  Significant findings as mentioned above.  All other systems reviewed and are negative.        Physical Exam   Vital Signs:   Vitals:    11/20/23 1444   BP: 90/63   Pulse: 78   Resp: 18   Temp: 97.6 °F (36.4 °C)   SpO2: 99%     ECOG score: 1   General: Well developed, well nourished, alert and oriented x 3, in no acute distress. Ambulating with cane.   Head: ATNC   Eyes: PERRL, No evidence of conjunctivitis.   Nose: No nasal discharge.   Mouth: Oral mucosal membranes moist. No oral ulceration or hemorrhages.   Neck: Neck supple. No thyromegaly. No JVD.   Lungs: Clear in all fields to A&P without rales, rhonchi or wheezing.   Heart: S1, S2. Regular rate and rhythm. No murmurs, rubs, or gallops.   Abdomen: Soft. Bowel sounds are normoactive. Nontender with palpation. No Hepatosplenomegaly can be appreciated.   Extremities: No clubbing, cyanosis or edema bilaterally.   Integumentary: Warm, dry, intact.   Neurologic: Grossly non-focal exam.      Pain Score:  Pain Score    11/20/23 1444   PainSc: 0-No pain       PHQ-Score Total:  PHQ-9 Total Score:         PATHOLOGY:        ENDOSCOPY:        IMAGING:         RECENT LABS:  Lab Results   Component Value Date    WBC 11.01 (H) 09/20/2023    HGB 7.6 (L) 09/20/2023    HCT 26.2 (L) 09/20/2023    MCV 85.3 09/20/2023    RDW 19.7 (H) 09/20/2023     09/20/2023    NEUTRORELPCT 80.0 (H) 09/18/2023    LYMPHORELPCT 7.8 (L) 09/18/2023    MONORELPCT 8.7 09/18/2023    EOSRELPCT 2.4 09/18/2023    BASORELPCT 0.4 09/18/2023    NEUTROABS 8.85 (H) 09/18/2023    LYMPHSABS 0.86 09/18/2023       Lab Results   Component Value Date     09/19/2023    K 4.0 09/19/2023    CO2 22.7 09/19/2023     09/19/2023     BUN 18 09/19/2023    CREATININE 1.32 (H) 09/19/2023    EGFRIFNONA 61 02/09/2021    GLUCOSE 131 (H) 09/19/2023    CALCIUM 8.0 (L) 09/19/2023    ALKPHOS 99 09/15/2023    AST 21 09/15/2023    ALT 9 09/15/2023    BILITOT 0.3 09/15/2023    ALBUMIN 3.6 09/15/2023    PROTEINTOT 6.5 09/15/2023    MG 1.8 09/15/2023    PHOS 3.2 02/11/2023       Lab Results   Component Value Date     (H) 02/23/2023       Lab Results   Component Value Date    FERRITIN 45.19 07/18/2023    IRON 14 (L) 09/18/2023    TIBC 373 09/18/2023    LABIRON 4 (L) 09/18/2023    IFDIMHCW54 940 02/23/2023    FOLATE 11.10 02/23/2023    HAPTOGLOBIN 183 02/23/2023    RETICCTPCT 1.20 02/23/2023    RETIC 0.0515 02/23/2023      Work Up 02/23/2023      Sed Rate  0 - 20 mm/hr 33 High      C-Reactive Protein  0.00 - 0.50 mg/dL <0.30      Zinc  44 - 115 ug/dL 97      Copper  69 - 132 ug/dL 113      Tissue Transglutaminase IgA  0 - 3 U/mL <2      TSH  0.270 - 4.200 uIU/mL 2.040      Color, UA  Yellow, Straw Yellow     Appearance, UA  Clear Clear  Clear    pH, UA  5.0 - 8.0 6.5  6.0    Specific Gravity, UA  1.005 - 1.030 1.019  1.017    Glucose, UA  Negative Negative  Negative R    Ketones, UA  Negative Negative  Negative R    Bilirubin, UA  Negative Negative  Negative    Blood, UA  Negative Negative  Negative    Protein, UA  Negative Trace Abnormal   Negative R    Leuk Esterase, UA  Negative Moderate (2+) Abnormal   Negative    Nitrite, UA  Negative Negative  Negative    Urobilinogen, UA  0.2 - 1.0 E.U./dL 1.0 E.U./dL  0.2          ASSESSMENT & PLAN:  Zaid Galarza is a very pleasant 73 y.o. male with    1. MUKESH  - He recently had labs drawn at his PCP office on 06/06/2023 showed Hg (7.8) and he was told he should go to [facility] ED. He presented to [facility] ED and was admitted and during admission he received a total of 4 units PRBCs. Of note, prior to this admission is H/H were within normal. Iron panel and Ferritin obtained while inpatient was consistent with MUKESH.  -  He underwent EGD/colonscopy per Dr. Mireles and pathology was negative. He reports exam was negative for any bleeding. He denies any obvious blood loss from any source.   - He is not taking oral iron supplements.  - CBC from initial consultation showed Hg (10.4) stable/improved. Iron panel and Ferritin were low and most consistent with MUKESH. Therefore, he was started on Ferrous Sulfate 325 mg BID. B12 and Folate were replete. No evidence of hemolysis as Retic and Haptoglobin are normal with mildly elevated LDH. ESR is elevated and suggestive of chronic underlying inflammation. CRP normal. TSH normal. Copper, Zinc and Tissue Transglutaminase was normal. UA was unremarkable for blood. PBS showed normocytic, hypochromic anemia with anisocytosis, no schistocytes identified, normal total WBC count with borderline eosinophilia, no granulocytic dysplasia or blasts, adequate platelets.  - Hospitalized in September with Hg (4) and was transfused 5 units PRBCS. He restarted Ferrous Sulfate BID at discharge.   - Repeat colonoscopy per Dr. Mireles in October was negative for bleeding per patient report (no records currently available, will request today).  - Repeat CBC from today shows improved Hg (9.6) with normal WBC and platelets. Iron panel and Ferritin are replete. ENRRIQUE and SFLC are pending from today.  - Advised to continue Ferrous Sulfate BID (refills provided today).  - Will repeat CBC in one month. Will follow up in 2 months with CBC, CMP, Iron panel, Ferritn, B12 and Folate.      ACO / PURNIMA/Other  Quality measures  -  Zaid Galarza received 2023 flu vaccine.  -  Zaid Galarza reports a pain score of 0.    -  Current outpatient and discharge medications have been reconciled for the patient.  Reviewed by: TIFFANIE Baum      I spent 30 minutes caring for Zaid on this date of service. This time includes time spent by me in the following activities: preparing for the visit, reviewing tests, obtaining and/or  reviewing a separately obtained history, performing a medically appropriate examination and/or evaluation, counseling and educating the patient/family/caregiver, ordering medications, tests, or procedures, documenting information in the medical record, independently interpreting results and communicating that information with the patient/family/caregiver, and care coordination.                                 Electronically Signed by: TIFFANIE Shine , TIFFANIE November 20, 2023 15:16 EST       CC:   No ref. provider found  Rosi Thacker APRN

## 2023-11-20 NOTE — PROGRESS NOTES
Venipuncture Blood Specimen Collection  Venipuncture performed in right arm by Sheba Ramirez MA with good hemostasis. Patient tolerated the procedure well without complications.   11/20/23   Sheba Ramirez MA

## 2023-11-21 LAB
ALBUMIN SERPL ELPH-MCNC: 3.3 G/DL (ref 2.9–4.4)
ALBUMIN/GLOB SERPL: 1.1 {RATIO} (ref 0.7–1.7)
ALPHA1 GLOB SERPL ELPH-MCNC: 0.3 G/DL (ref 0–0.4)
ALPHA2 GLOB SERPL ELPH-MCNC: 0.7 G/DL (ref 0.4–1)
B-GLOBULIN SERPL ELPH-MCNC: 0.9 G/DL (ref 0.7–1.3)
GAMMA GLOB SERPL ELPH-MCNC: 1.3 G/DL (ref 0.4–1.8)
GLOBULIN SER-MCNC: 3.2 G/DL (ref 2.2–3.9)
IGA SERPL-MCNC: 286 MG/DL (ref 61–437)
IGG SERPL-MCNC: 1409 MG/DL (ref 603–1613)
IGM SERPL-MCNC: 122 MG/DL (ref 15–143)
INTERPRETATION SERPL IEP-IMP: ABNORMAL
KAPPA LC FREE SER-MCNC: 61.1 MG/L (ref 3.3–19.4)
KAPPA LC FREE/LAMBDA FREE SER: 1.96 {RATIO} (ref 0.26–1.65)
LABORATORY COMMENT REPORT: ABNORMAL
LAMBDA LC FREE SERPL-MCNC: 31.2 MG/L (ref 5.7–26.3)
M PROTEIN SERPL ELPH-MCNC: ABNORMAL G/DL
PROT SERPL-MCNC: 6.5 G/DL (ref 6–8.5)

## 2023-11-22 ENCOUNTER — OFFICE VISIT (OUTPATIENT)
Dept: CARDIOLOGY | Facility: CLINIC | Age: 73
End: 2023-11-22
Payer: MEDICARE

## 2023-11-22 VITALS
WEIGHT: 249.8 LBS | SYSTOLIC BLOOD PRESSURE: 90 MMHG | RESPIRATION RATE: 18 BRPM | BODY MASS INDEX: 33.83 KG/M2 | HEIGHT: 72 IN | OXYGEN SATURATION: 95 % | DIASTOLIC BLOOD PRESSURE: 58 MMHG | HEART RATE: 72 BPM

## 2023-11-22 DIAGNOSIS — I10 ESSENTIAL HYPERTENSION: Primary | Chronic | ICD-10-CM

## 2023-11-22 DIAGNOSIS — I48.19 PERSISTENT ATRIAL FIBRILLATION: Chronic | ICD-10-CM

## 2023-11-22 DIAGNOSIS — E78.5 DYSLIPIDEMIA, GOAL LDL BELOW 100: Chronic | ICD-10-CM

## 2023-11-22 DIAGNOSIS — I42.0 DILATED CARDIOMYOPATHY: Chronic | ICD-10-CM

## 2023-11-22 DIAGNOSIS — I25.10 CORONARY ARTERY DISEASE INVOLVING NATIVE CORONARY ARTERY OF NATIVE HEART WITHOUT ANGINA PECTORIS: Chronic | ICD-10-CM

## 2023-11-22 PROCEDURE — 1160F RVW MEDS BY RX/DR IN RCRD: CPT | Performed by: NURSE PRACTITIONER

## 2023-11-22 PROCEDURE — 3078F DIAST BP <80 MM HG: CPT | Performed by: NURSE PRACTITIONER

## 2023-11-22 PROCEDURE — 1159F MED LIST DOCD IN RCRD: CPT | Performed by: NURSE PRACTITIONER

## 2023-11-22 PROCEDURE — 99214 OFFICE O/P EST MOD 30 MIN: CPT | Performed by: NURSE PRACTITIONER

## 2023-11-22 PROCEDURE — 3074F SYST BP LT 130 MM HG: CPT | Performed by: NURSE PRACTITIONER

## 2023-11-22 RX ORDER — SACUBITRIL AND VALSARTAN 24; 26 MG/1; MG/1
1 TABLET, FILM COATED ORAL 2 TIMES DAILY
Qty: 56 TABLET | Refills: 0 | COMMUNITY
Start: 2023-11-22

## 2023-11-22 NOTE — PROGRESS NOTES
"Chief Complaint  Follow-up    Subjective          Zaid Galarza presents to [facility] CARDIOLOGY for follow up.    History of Present Illness    Zaid was last seen in clinic on 10/20/2023.  His blood pressure was low and his Entresto was decreased to the low dose.    At today's visit Zaid reports that he has been doing well.  He states that colonoscopy which was completed since his last visit did not show any active bleeding.  His hemoglobin has improved.  He denies any chest pain.  He denies palpitations, worsening shortness of breath, or worsening dyspnea on exertion.    His blood pressure is low today and he states that it is often this low but other than having some sensation of momentary dizziness if he stands too quickly he has no other symptoms.  He denies any syncope or near syncopal event.    Objective     Vital Signs:   BP 90/58 (BP Location: Left arm, Patient Position: Sitting, Cuff Size: Adult)   Pulse 72   Resp 18   Ht 182.9 cm (72\")   Wt 113 kg (249 lb 12.8 oz)   SpO2 95%   BMI 33.88 kg/m²       Physical Exam  Constitutional:       Appearance: Normal appearance. He is well-developed.   Cardiovascular:      Rate and Rhythm: Normal rate and regular rhythm.      Heart sounds: No murmur heard.     No friction rub. No gallop.   Pulmonary:      Effort: Pulmonary effort is normal. No respiratory distress.      Breath sounds: Wheezing present. No rales.   Skin:     General: Skin is warm and dry.   Neurological:      Mental Status: He is alert and oriented to person, place, and time.   Psychiatric:         Mood and Affect: Mood normal.         Behavior: Behavior normal.          Result Review :     CBC          9/19/2023    00:40 9/20/2023    00:02 9/20/2023    08:48 11/20/2023    14:52   CBC   WBC 10.54  11.01   8.65    RBC 3.12  3.00   3.96    Hemoglobin 8.1  7.6  7.6  9.6    Hematocrit 26.9  25.6  26.2  33.4    MCV 86.2  85.3   84.3    MCH 26.0  25.3   24.2    MCHC 30.1  29.7   " 28.7    RDW 19.7  19.7   17.7    Platelets 208  200   250                      Most recent Cardiac Cath  Results for orders placed during the hospital encounter of 09/28/19    Cardiac Catheterization/Vascular Study    Narrative  Table formatting from the original result was not included.  DATE OF PROCEDURE: 10/3/2019    INDICATION FOR PROCEDURE: {Cardiomyopathy, severe LV dysfunction, fib , flutter    PROCEDURE PERFORMED:    1. Coronary Angiogram  2. Left heart catheretization    PROCEDURE COMMENTS:    Zaid Galarza was brought to the cath lab and placed on the cardiac catherization table in the postabsortive state. The patient was prepped and draped according to the cath lab protocol under strict aseptic and sterile condition. Patient's right femoral artery sight was prepped and draped. Under fluoroscopic guidance the right femoral artery was punctured using a 21 gauge needle utilizing the modified Seldinger technique. 6 [place] sheath was introduced over a wire. After aspirating for blood it was flushed with heparinized saline.    We advanced Torres type diagnostic catheters over the wire. The  left and right coronary arteries  were selectively engaged. Left and right coronary angiogram were obtained in multiple orthogonal projections.    After completion of the procedure the femoral sheath was removed in the cath lab and hemostasis was achieved by Angioseal. The patient tolerated the procedure without complications.      FINDINGS:    1. HEMODYNAMICS:  LVEDP 10 mmHg, no gradient across the aortic valve.      2. CORONARY ANGIOGRAPHY: Dominant dominant coronary artery system.    The left main coronary artery bifurcated into the LAD and left circumflex coronary.  The LAD coursed in the anterior interventricular groove, gave rise to diagonal branches and reached the apex.    Left circumflex coursed in the left atrial ventricular groove and gives rise to several marginal branches.    The right coronary artery course  in the right atrial ventricular groove I gave rise to several acute marginal branches.    Left main: Normal    LAD: 20 to 30% diffuse disease in LADLeft circumflex: 30% focal mid circumflex  RCA: Large size dominant vessel proximal 40% and distal 40% focal stenosisPDA: Mild 20%    Final impression:  Mild to moderate CAD  3+ mitral regurgitation  RECOMMENDATIONS:  Aspirin  Beta-blocker  Statin  ACE inhibitor  Continue anticoagulant  Repeat ANGELITO after 6 to 8 weeks  If atrial thrombus resolved then proceed with  Ablation  I believe once the arrhythmia is controlled patient ejection fraction will tend to increase    Vincent Phillips MD  10/03/19  9:38 AM      Most recent Echocardiogram  Results for orders placed during the hospital encounter of 09/01/22    Adult Transthoracic Echo Complete W/ Cont if Necessary Per Protocol (With Agitated Saline)    Interpretation Summary  · Estimated left ventricular EF = 55% Left ventricular ejection fraction appears to be 51 - 55%. Left ventricular systolic function is normal.  · Left ventricular diastolic function was normal.  · No significant mitral or aortic valve regurgitation. Moderate sclerosis of both is noted.  · There is anterior pericardial fat pad but no effusion  · Left atrium is mildly enlarged  · Since prev study of 9/29/19 EF has increased from 30% to 55% and mitral, aortic and tricuspid regurgitation is no longer seen.        Current Outpatient Medications   Medication Sig Dispense Refill    apixaban (ELIQUIS) 5 MG tablet tablet Take 1 tablet by mouth 2 (Two) Times a Day. 180 tablet 3    atorvastatin (LIPITOR) 80 MG tablet Take 1 tablet by mouth Every Night. 90 tablet 3    ferrous sulfate 325 (65 FE) MG tablet 1 tablet.      metoprolol tartrate (LOPRESSOR) 25 MG tablet Take 0.5 tablets by mouth 2 (Two) Times a Day. 90 tablet 3    pantoprazole (PROTONIX) 40 MG EC tablet Take 1 tablet by mouth 2 (Two) Times a Day.      sacubitril-valsartan (Entresto) 24-26 MG tablet Take 1  tablet by mouth 2 (Two) Times a Day. 56 tablet 0    sertraline (ZOLOFT) 100 MG tablet Take 1.5 tablets by mouth Daily.      Symbicort 160-4.5 MCG/ACT inhaler Inhale 2 puffs 2 (Two) Times a Day.      vitamin B-12 (CYANOCOBALAMIN) 1000 MCG tablet Take 1 tablet by mouth Daily.       No current facility-administered medications for this visit.            Assessment and Plan    Problem List Items Addressed This Visit          Cardiac and Vasculature    Dilated cardiomyopathy (Chronic)    Overview     TTE: LVEF 26 to 30%, RV and LV mildly dilated, grade 3 diastolic dysfunction consistent with fixed restrictive pattern, mild TR, left atrial cavity mildly dilated right atrial cavity moderately dilated, moderate pulmonary hypertension with RVSP 47 mmHg  9/30/2019 ANGELITO: Severe cardiomyopathy likely from atrial flutter  2/11/2020 TTE LVEF 36 to 40%  5/6/2020 TTE LVEF 56 to 60%, mild concentric LVH  9/2/2022 TTE: LVEF 51 to 55%, moderate sclerosis of both the mitral and aortic valve with no significant regurgitation noted.  Left atrium is mildly enlarged.         Relevant Medications    sacubitril-valsartan (Entresto) 24-26 MG tablet    metoprolol tartrate (LOPRESSOR) 25 MG tablet    Other Relevant Orders    Adult Transthoracic Echo Complete W/ Cont if Necessary Per Protocol    Persistent atrial fibrillation (Chronic)    Overview     [id number] is at least 6 for heart failure, hypertension, CVA, nonobstructive CAD, and age         Relevant Medications    sacubitril-valsartan (Entresto) 24-26 MG tablet    metoprolol tartrate (LOPRESSOR) 25 MG tablet    Nonobstructive CAD per cath 10/3/2019 (Chronic)    Overview     10/4/2019 LHC: Nonobstructive coronary artery disease         Relevant Medications    sacubitril-valsartan (Entresto) 24-26 MG tablet    metoprolol tartrate (LOPRESSOR) 25 MG tablet    Essential hypertension - Primary (Chronic)    Relevant Medications    metoprolol tartrate (LOPRESSOR) 25 MG tablet    Dyslipidemia,  goal LDL below 100 (Chronic)    Overview     9/2/2022 total cholesterol 104, triglycerides 89, HDL 31, and LDL 55                Follow Up     Medications were reviewed with the patient.  No changes were made.    Nonobstructive coronary artery disease is stable.  Plan is to continue atorvastatin, metoprolol, and Entresto.    Continue current medications for nonischemic cardiomyopathy with recovered LVEF.    Continue Eliquis for stroke prophylaxis in the presence of atrial fibrillation.  [id number] is 6.  I will request colonoscopy report from Dr. Mireles's office.  Will    Return in about 3 months (around 2/22/2024).    Patient was given instructions and counseling regarding his condition or for health maintenance advice. Please see specific information pulled into the AVS if appropriate.

## 2023-11-30 RX ORDER — PANTOPRAZOLE SODIUM 40 MG/1
40 TABLET, DELAYED RELEASE ORAL 2 TIMES DAILY
Qty: 180 TABLET | Refills: 3 | Status: SHIPPED | OUTPATIENT
Start: 2023-11-30

## 2023-12-05 ENCOUNTER — HOSPITAL ENCOUNTER (OUTPATIENT)
Dept: CARDIOLOGY | Facility: HOSPITAL | Age: 73
Discharge: HOME | End: 2023-12-05
Admitting: NURSE PRACTITIONER
Payer: MEDICARE

## 2023-12-05 DIAGNOSIS — I42.0 DILATED CARDIOMYOPATHY: Chronic | ICD-10-CM

## 2023-12-05 LAB
BH CV ECHO MEAS - AO MAX PG: 5.3 MMHG
BH CV ECHO MEAS - AO MEAN PG: 3 MMHG
BH CV ECHO MEAS - AO ROOT DIAM: 3.9 CM
BH CV ECHO MEAS - AO V2 MAX: 115 CM/SEC
BH CV ECHO MEAS - AO V2 VTI: 21.2 CM
BH CV ECHO MEAS - EDV(CUBED): 94.2 ML
BH CV ECHO MEAS - EDV(MOD-SP4): 44.7 ML
BH CV ECHO MEAS - EF(MOD-BP): 72 %
BH CV ECHO MEAS - EF(MOD-SP4): 72 %
BH CV ECHO MEAS - ESV(CUBED): 50.2 ML
BH CV ECHO MEAS - ESV(MOD-SP4): 12.5 ML
BH CV ECHO MEAS - FS: 18.9 %
BH CV ECHO MEAS - IVS/LVPW: 0.97 CM
BH CV ECHO MEAS - IVSD: 1.66 CM
BH CV ECHO MEAS - LA DIMENSION: 3.8 CM
BH CV ECHO MEAS - LAT PEAK E' VEL: 9.7 CM/SEC
BH CV ECHO MEAS - LV DIASTOLIC VOL/BSA (35-75): 19.1 CM2
BH CV ECHO MEAS - LV MASS(C)D: 337.1 GRAMS
BH CV ECHO MEAS - LV SYSTOLIC VOL/BSA (12-30): 5.3 CM2
BH CV ECHO MEAS - LVIDD: 4.6 CM
BH CV ECHO MEAS - LVIDS: 3.7 CM
BH CV ECHO MEAS - LVOT AREA: 5.3 CM2
BH CV ECHO MEAS - LVOT DIAM: 2.6 CM
BH CV ECHO MEAS - LVPWD: 1.72 CM
BH CV ECHO MEAS - MED PEAK E' VEL: 7.7 CM/SEC
BH CV ECHO MEAS - MV A MAX VEL: 25.3 CM/SEC
BH CV ECHO MEAS - MV E MAX VEL: 91.7 CM/SEC
BH CV ECHO MEAS - MV E/A: 3.6
BH CV ECHO MEAS - PA ACC TIME: 0.1 SEC
BH CV ECHO MEAS - SI(MOD-SP4): 13.8 ML/M2
BH CV ECHO MEAS - SV(MOD-SP4): 32.2 ML
BH CV ECHO MEASUREMENTS AVERAGE E/E' RATIO: 10.54
LEFT ATRIUM VOLUME INDEX: 28.8 ML/M2

## 2023-12-05 PROCEDURE — 93306 TTE W/DOPPLER COMPLETE: CPT

## 2023-12-14 RX ORDER — SACUBITRIL AND VALSARTAN 24; 26 MG/1; MG/1
1 TABLET, FILM COATED ORAL 2 TIMES DAILY
Qty: 60 TABLET | Refills: 3 | COMMUNITY
Start: 2023-12-14

## 2023-12-20 ENCOUNTER — CLINICAL SUPPORT (OUTPATIENT)
Dept: ONCOLOGY | Facility: CLINIC | Age: 73
End: 2023-12-20
Payer: MEDICARE

## 2023-12-20 DIAGNOSIS — D50.9 IRON DEFICIENCY ANEMIA, UNSPECIFIED IRON DEFICIENCY ANEMIA TYPE: ICD-10-CM

## 2023-12-20 DIAGNOSIS — D64.9 NORMOCYTIC ANEMIA: ICD-10-CM

## 2023-12-20 LAB
ALBUMIN SERPL-MCNC: 3.7 G/DL (ref 3.5–5.2)
ALBUMIN/GLOB SERPL: 1.2 G/DL
ALP SERPL-CCNC: 124 U/L (ref 39–117)
ALT SERPL W P-5'-P-CCNC: 11 U/L (ref 1–41)
ANION GAP SERPL CALCULATED.3IONS-SCNC: 10.5 MMOL/L (ref 5–15)
ANISOCYTOSIS BLD QL: NORMAL
AST SERPL-CCNC: 17 U/L (ref 1–40)
BASOPHILS # BLD AUTO: 0.05 10*3/MM3 (ref 0–0.2)
BASOPHILS NFR BLD AUTO: 0.6 % (ref 0–1.5)
BILIRUB SERPL-MCNC: 0.2 MG/DL (ref 0–1.2)
BUN SERPL-MCNC: 21 MG/DL (ref 8–23)
BUN/CREAT SERPL: 16 (ref 7–25)
CALCIUM SPEC-SCNC: 8.6 MG/DL (ref 8.6–10.5)
CHLORIDE SERPL-SCNC: 102 MMOL/L (ref 98–107)
CO2 SERPL-SCNC: 26.5 MMOL/L (ref 22–29)
CREAT SERPL-MCNC: 1.31 MG/DL (ref 0.76–1.27)
DEPRECATED RDW RBC AUTO: 54.5 FL (ref 37–54)
EGFRCR SERPLBLD CKD-EPI 2021: 57.8 ML/MIN/1.73
EOSINOPHIL # BLD AUTO: 0.32 10*3/MM3 (ref 0–0.4)
EOSINOPHIL NFR BLD AUTO: 3.7 % (ref 0.3–6.2)
ERYTHROCYTE [DISTWIDTH] IN BLOOD BY AUTOMATED COUNT: 17.3 % (ref 12.3–15.4)
FERRITIN SERPL-MCNC: 47.06 NG/ML (ref 30–400)
GLOBULIN UR ELPH-MCNC: 3.2 GM/DL
GLUCOSE SERPL-MCNC: 128 MG/DL (ref 65–99)
HCT VFR BLD AUTO: 37.1 % (ref 37.5–51)
HGB BLD-MCNC: 10.5 G/DL (ref 13–17.7)
HYPOCHROMIA BLD QL: NORMAL
IMM GRANULOCYTES # BLD AUTO: 0.04 10*3/MM3 (ref 0–0.05)
IMM GRANULOCYTES NFR BLD AUTO: 0.5 % (ref 0–0.5)
IRON 24H UR-MRATE: 94 MCG/DL (ref 59–158)
IRON SATN MFR SERPL: 26 % (ref 20–50)
LYMPHOCYTES # BLD AUTO: 1.62 10*3/MM3 (ref 0.7–3.1)
LYMPHOCYTES NFR BLD AUTO: 18.9 % (ref 19.6–45.3)
MCH RBC QN AUTO: 24.9 PG (ref 26.6–33)
MCHC RBC AUTO-ENTMCNC: 28.3 G/DL (ref 31.5–35.7)
MCV RBC AUTO: 87.9 FL (ref 79–97)
MONOCYTES # BLD AUTO: 0.77 10*3/MM3 (ref 0.1–0.9)
MONOCYTES NFR BLD AUTO: 9 % (ref 5–12)
NEUTROPHILS NFR BLD AUTO: 5.78 10*3/MM3 (ref 1.7–7)
NEUTROPHILS NFR BLD AUTO: 67.3 % (ref 42.7–76)
NRBC BLD AUTO-RTO: 0 /100 WBC (ref 0–0.2)
OVALOCYTES BLD QL SMEAR: NORMAL
PLAT MORPH BLD: NORMAL
PLATELET # BLD AUTO: 201 10*3/MM3 (ref 140–450)
PMV BLD AUTO: 11.7 FL (ref 6–12)
POIKILOCYTOSIS BLD QL SMEAR: NORMAL
POTASSIUM SERPL-SCNC: 4.3 MMOL/L (ref 3.5–5.2)
PROT SERPL-MCNC: 6.9 G/DL (ref 6–8.5)
RBC # BLD AUTO: 4.22 10*6/MM3 (ref 4.14–5.8)
SODIUM SERPL-SCNC: 139 MMOL/L (ref 136–145)
TIBC SERPL-MCNC: 365 MCG/DL (ref 298–536)
TRANSFERRIN SERPL-MCNC: 245 MG/DL (ref 200–360)
WBC NRBC COR # BLD AUTO: 8.58 10*3/MM3 (ref 3.4–10.8)

## 2023-12-20 PROCEDURE — 82746 ASSAY OF FOLIC ACID SERUM: CPT | Performed by: NURSE PRACTITIONER

## 2023-12-20 PROCEDURE — 80053 COMPREHEN METABOLIC PANEL: CPT | Performed by: NURSE PRACTITIONER

## 2023-12-20 PROCEDURE — 85025 COMPLETE CBC W/AUTO DIFF WBC: CPT | Performed by: NURSE PRACTITIONER

## 2023-12-20 PROCEDURE — 82607 VITAMIN B-12: CPT | Performed by: NURSE PRACTITIONER

## 2023-12-20 PROCEDURE — 84466 ASSAY OF TRANSFERRIN: CPT | Performed by: NURSE PRACTITIONER

## 2023-12-20 PROCEDURE — 85007 BL SMEAR W/DIFF WBC COUNT: CPT | Performed by: NURSE PRACTITIONER

## 2023-12-20 PROCEDURE — 82728 ASSAY OF FERRITIN: CPT | Performed by: NURSE PRACTITIONER

## 2023-12-20 PROCEDURE — 83540 ASSAY OF IRON: CPT | Performed by: NURSE PRACTITIONER

## 2023-12-21 LAB
FOLATE SERPL-MCNC: 8.62 NG/ML (ref 4.78–24.2)
VIT B12 BLD-MCNC: 1373 PG/ML (ref 211–946)

## 2023-12-28 ENCOUNTER — TELEPHONE (OUTPATIENT)
Dept: CARDIOLOGY | Facility: CLINIC | Age: 73
End: 2023-12-28
Payer: MEDICARE

## 2023-12-28 NOTE — TELEPHONE ENCOUNTER
HANNAHK TO THE PATIENT HE IS AWARE AND EXPRESSED UNDERSTANDING OF THESE TEST RESULTS. THAT WOJCIECH RESULTED.        ----- Message from TIFFANIE Cox sent at 12/27/2023  5:47 PM EST -----  Please call patient about their echocardiogram.    Echocardiogram looked fine.  The squeezing function of the left ventricle was very good.  The left ventricle did show evidence of wall thickness which is often present in patients with undertreated or poorly treated hypertension.  We just want to make sure that we treat your hypertension to goal.  At present I do not think we have any problems with your blood pressure being high.    There was no hemodynamically significant valvular abnormalities.    Thanks, Wojciech

## 2024-01-26 ENCOUNTER — LAB (OUTPATIENT)
Dept: ONCOLOGY | Facility: CLINIC | Age: 74
End: 2024-01-26
Payer: MEDICARE

## 2024-01-26 ENCOUNTER — OFFICE VISIT (OUTPATIENT)
Dept: ONCOLOGY | Facility: CLINIC | Age: 74
End: 2024-01-26
Payer: MEDICARE

## 2024-01-26 VITALS
OXYGEN SATURATION: 96 % | HEIGHT: 73 IN | RESPIRATION RATE: 15 BRPM | DIASTOLIC BLOOD PRESSURE: 82 MMHG | BODY MASS INDEX: 32.96 KG/M2 | TEMPERATURE: 98.2 F | SYSTOLIC BLOOD PRESSURE: 115 MMHG | HEART RATE: 81 BPM

## 2024-01-26 DIAGNOSIS — D64.9 NORMOCYTIC ANEMIA: ICD-10-CM

## 2024-01-26 DIAGNOSIS — D50.9 IRON DEFICIENCY ANEMIA, UNSPECIFIED IRON DEFICIENCY ANEMIA TYPE: ICD-10-CM

## 2024-01-26 DIAGNOSIS — D50.9 IRON DEFICIENCY ANEMIA, UNSPECIFIED IRON DEFICIENCY ANEMIA TYPE: Primary | ICD-10-CM

## 2024-01-26 LAB
BASOPHILS # BLD AUTO: 0.03 10*3/MM3 (ref 0–0.2)
BASOPHILS NFR BLD AUTO: 0.4 % (ref 0–1.5)
DEPRECATED RDW RBC AUTO: 49.1 FL (ref 37–54)
EOSINOPHIL # BLD AUTO: 0.26 10*3/MM3 (ref 0–0.4)
EOSINOPHIL NFR BLD AUTO: 3.2 % (ref 0.3–6.2)
ERYTHROCYTE [DISTWIDTH] IN BLOOD BY AUTOMATED COUNT: 15.8 % (ref 12.3–15.4)
HCT VFR BLD AUTO: 39.7 % (ref 37.5–51)
HGB BLD-MCNC: 11.7 G/DL (ref 13–17.7)
IMM GRANULOCYTES # BLD AUTO: 0.02 10*3/MM3 (ref 0–0.05)
IMM GRANULOCYTES NFR BLD AUTO: 0.2 % (ref 0–0.5)
LYMPHOCYTES # BLD AUTO: 1.33 10*3/MM3 (ref 0.7–3.1)
LYMPHOCYTES NFR BLD AUTO: 16.5 % (ref 19.6–45.3)
MCH RBC QN AUTO: 25.6 PG (ref 26.6–33)
MCHC RBC AUTO-ENTMCNC: 29.5 G/DL (ref 31.5–35.7)
MCV RBC AUTO: 86.9 FL (ref 79–97)
MONOCYTES # BLD AUTO: 0.68 10*3/MM3 (ref 0.1–0.9)
MONOCYTES NFR BLD AUTO: 8.4 % (ref 5–12)
NEUTROPHILS NFR BLD AUTO: 5.74 10*3/MM3 (ref 1.7–7)
NEUTROPHILS NFR BLD AUTO: 71.3 % (ref 42.7–76)
NRBC BLD AUTO-RTO: 0 /100 WBC (ref 0–0.2)
PLATELET # BLD AUTO: 165 10*3/MM3 (ref 140–450)
PMV BLD AUTO: 12.2 FL (ref 6–12)
RBC # BLD AUTO: 4.57 10*6/MM3 (ref 4.14–5.8)
WBC NRBC COR # BLD AUTO: 8.06 10*3/MM3 (ref 3.4–10.8)

## 2024-01-26 PROCEDURE — 3079F DIAST BP 80-89 MM HG: CPT | Performed by: NURSE PRACTITIONER

## 2024-01-26 PROCEDURE — 1159F MED LIST DOCD IN RCRD: CPT | Performed by: NURSE PRACTITIONER

## 2024-01-26 PROCEDURE — 1126F AMNT PAIN NOTED NONE PRSNT: CPT | Performed by: NURSE PRACTITIONER

## 2024-01-26 PROCEDURE — 3074F SYST BP LT 130 MM HG: CPT | Performed by: NURSE PRACTITIONER

## 2024-01-26 PROCEDURE — 1160F RVW MEDS BY RX/DR IN RCRD: CPT | Performed by: NURSE PRACTITIONER

## 2024-01-26 PROCEDURE — 99214 OFFICE O/P EST MOD 30 MIN: CPT | Performed by: NURSE PRACTITIONER

## 2024-01-26 PROCEDURE — 85025 COMPLETE CBC W/AUTO DIFF WBC: CPT | Performed by: NURSE PRACTITIONER

## 2024-01-26 NOTE — PROGRESS NOTES
Venipuncture Blood Specimen Collection  Venipuncture performed in RIGHT ARM by Kush Agosto MA with good hemostasis. Patient tolerated the procedure well without complications.   01/26/24   Kush Agosto MA

## 2024-01-26 NOTE — PROGRESS NOTES
DATE:  1/26/2024    DIAGNOSIS: MUKESH    CHIEF COMPLAINT:  None.    TREATMENT HISTORY:  Ferrous Sulfate BID    HISTORY OF PRESENT ILLNESS:   Zaid Galarza is a very pleasant 73 y.o. male who is being seen today at the request of TRAVIS Venegas for evaluation and treatment of MUKESH. Mr. Galarza reports following with his PCP every 3 months with lab testing. He recently had labs drawn at his PCP office on 06/06/2023 showed Hg (7.8) and he was told he should go to [facility] ED. He presented to [facility] ED and was admitted and during admission he received a total of 4 units PRBCs. Of note, prior to this admission is H/H were within normal. General surgery was consulted while inpatient and they recommended to have EGD/colonoscopy as an outpatient since patient had no sign of acute blood loss. Iron panel and Ferritin obtained while inpatient was consistent with MUKESH and he was not started on oral Iron replacement. He continues to deny any obvious blood loss from any source. He is scheduled with Dr. Mireles (GI) on 02/27/2023 for possible EGD/colonoscopy. He reports weakness/fatigue, shortness of breath on exertion and dizziness. He denies any chest pain. He denies any other specific complaints today.        Interval History:  Mr. aGlarza presents today for follow up of MUKESH. He continues to take Ferrous Sulfate BID and is tolerating this well without any noticeable side effects. Today, he reports increased energy level and overall feeling well. He denies any worsening weakness/fatigue, shortness of breath on exertion or dizziness. He denies any obvious blood loss from any source. He denies any specific complaints today.     The following portions of the patient's history were reviewed and updated as appropriate: allergies, current medications, past family history, past medical history, past social history, past surgical history and problem list.    PAST MEDICAL HISTORY:  Past Medical History:   Diagnosis Date    Atrial  fibrillation     CHF (congestive heart failure)     COPD (chronic obstructive pulmonary disease)     Coronary artery disease     Hypertension     Tobacco abuse        PAST SURGICAL HISTORY:  Past Surgical History:   Procedure Laterality Date    ABDOMINAL AORTIC ANEURYSM REPAIR      CARDIAC CATHETERIZATION N/A 10/03/2019    Procedure: Left Heart Cath;  Surgeon: Vincent Phillips MD;  Location:  [facility];  Service: Cardiology       SOCIAL HISTORY:  Social History     Socioeconomic History    Marital status: Single   Tobacco Use    Smoking status: Former     Packs/day: 1     Types: Cigarettes    Smokeless tobacco: Never    Tobacco comments:     9/1/2022   Vaping Use    Vaping Use: Never used   Substance and Sexual Activity    Alcohol use: No    Drug use: No    Sexual activity: Defer         FAMILY HISTORY:  Family History   Problem Relation Age of Onset    Heart disease Mother     Stroke Father     Heart disease Father          MEDICATIONS:  The current medication list was reviewed in the EMR    Current Outpatient Medications:     apixaban (ELIQUIS) 5 MG tablet tablet, Take 1 tablet by mouth 2 (Two) Times a Day., Disp: 180 tablet, Rfl: 3    atorvastatin (LIPITOR) 80 MG tablet, Take 1 tablet by mouth Every Night., Disp: 90 tablet, Rfl: 3    ferrous sulfate 325 (65 FE) MG tablet, 1 tablet., Disp: , Rfl:     metoprolol tartrate (LOPRESSOR) 25 MG tablet, Take 0.5 tablets by mouth 2 (Two) Times a Day., Disp: 90 tablet, Rfl: 3    pantoprazole (PROTONIX) 40 MG EC tablet, Take 1 tablet by mouth 2 (Two) Times a Day., Disp: 180 tablet, Rfl: 3    sacubitril-valsartan (Entresto) 24-26 MG tablet, Take 1 tablet by mouth 2 (Two) Times a Day., Disp: 60 tablet, Rfl: 3    sertraline (ZOLOFT) 100 MG tablet, Take 1.5 tablets by mouth Daily., Disp: , Rfl:     Symbicort 160-4.5 MCG/ACT inhaler, Inhale 2 puffs 2 (Two) Times a Day., Disp: , Rfl:     vitamin B-12 (CYANOCOBALAMIN) 1000 MCG tablet, Take 1 tablet by mouth  Daily., Disp: , Rfl:     ALLERGIES:  No Known Allergies      REVIEW OF SYSTEMS:    A comprehensive 14 point review of systems was performed.  Significant findings as mentioned above.  All other systems reviewed and are negative.        Physical Exam   Vital Signs:   Vitals:    01/26/24 1355   BP: 115/82   Pulse: 81   Resp: 15   Temp: 98.2 °F (36.8 °C)   SpO2: 96%     ECOG score: 0   General: Well developed, well nourished, alert and oriented x 3, in no acute distress. Ambulating with cane.   Head: ATNC   Eyes: PERRL, No evidence of conjunctivitis.   Nose: No nasal discharge.   Mouth: Oral mucosal membranes moist. No oral ulceration or hemorrhages.   Neck: Neck supple. No thyromegaly. No JVD.   Lungs: Clear in all fields to A&P without rales, rhonchi or wheezing.   Heart: S1, S2. Regular rate and rhythm. No murmurs, rubs, or gallops.   Abdomen: Soft. Bowel sounds are normoactive. Nontender with palpation. No Hepatosplenomegaly can be appreciated.   Extremities: No clubbing, cyanosis or edema bilaterally.   Integumentary: Warm, dry, intact.   Neurologic: Grossly non-focal exam.      Pain Score:  Pain Score    01/26/24 1355   PainSc: 0-No pain       PHQ-Score Total:  PHQ-9 Total Score: 0       PATHOLOGY:        ENDOSCOPY:        IMAGING:         RECENT LABS:  Lab Results   Component Value Date    WBC 8.06 01/26/2024    HGB 11.7 (L) 01/26/2024    HCT 39.7 01/26/2024    MCV 86.9 01/26/2024    RDW 15.8 (H) 01/26/2024     01/26/2024    NEUTRORELPCT 71.3 01/26/2024    LYMPHORELPCT 16.5 (L) 01/26/2024    MONORELPCT 8.4 01/26/2024    EOSRELPCT 3.2 01/26/2024    BASORELPCT 0.4 01/26/2024    NEUTROABS 5.74 01/26/2024    LYMPHSABS 1.33 01/26/2024       Lab Results   Component Value Date     12/20/2023    K 4.3 12/20/2023    CO2 26.5 12/20/2023     12/20/2023    BUN 21 12/20/2023    CREATININE 1.31 (H) 12/20/2023    EGFRIFNONA 61 02/09/2021    GLUCOSE 128 (H) 12/20/2023    CALCIUM 8.6 12/20/2023    ALKPHOS 124  (H) 12/20/2023    AST 17 12/20/2023    ALT 11 12/20/2023    BILITOT 0.2 12/20/2023    ALBUMIN 3.7 12/20/2023    PROTEINTOT 6.9 12/20/2023    MG 1.8 09/15/2023    PHOS 3.2 02/11/2023       Lab Results   Component Value Date     (H) 02/23/2023       Lab Results   Component Value Date    FERRITIN 47.06 12/20/2023    IRON 94 12/20/2023    TIBC 365 12/20/2023    LABIRON 26 12/20/2023    OLNYBJAR02 1,373 (H) 12/20/2023    FOLATE 8.62 12/20/2023    HAPTOGLOBIN 183 02/23/2023    RETICCTPCT 1.20 02/23/2023    RETIC 0.0515 02/23/2023      Work Up 02/23/2023      Sed Rate  0 - 20 mm/hr 33 High      C-Reactive Protein  0.00 - 0.50 mg/dL <0.30      Zinc  44 - 115 ug/dL 97      Copper  69 - 132 ug/dL 113      Tissue Transglutaminase IgA  0 - 3 U/mL <2      TSH  0.270 - 4.200 uIU/mL 2.040      Color, UA  Yellow, Straw Yellow     Appearance, UA  Clear Clear  Clear    pH, UA  5.0 - 8.0 6.5  6.0    Specific Gravity, UA  1.005 - 1.030 1.019  1.017    Glucose, UA  Negative Negative  Negative R    Ketones, UA  Negative Negative  Negative R    Bilirubin, UA  Negative Negative  Negative    Blood, UA  Negative Negative  Negative    Protein, UA  Negative Trace Abnormal   Negative R    Leuk Esterase, UA  Negative Moderate (2+) Abnormal   Negative    Nitrite, UA  Negative Negative  Negative    Urobilinogen, UA  0.2 - 1.0 E.U./dL 1.0 E.U./dL  0.2          ASSESSMENT & PLAN:  Zaid Galarza is a very pleasant 73 y.o. male with    1. MUKESH  - He recently had labs drawn at his PCP office on 06/06/2023 showed Hg (7.8) and he was told he should go to [facility] ED. He presented to [facility] ED and was admitted and during admission he received a total of 4 units PRBCs. Of note, prior to this admission is H/H were within normal. Iron panel and Ferritin obtained while inpatient was consistent with MUKESH.  - He underwent EGD/colonscopy per Dr. Mireles and pathology was negative. He reports exam was negative for any bleeding. He denies any obvious blood loss from  any source.   - He is not taking oral iron supplements.  - CBC from initial consultation showed Hg (10.4) stable/improved. Iron panel and Ferritin were low and most consistent with MUKESH. Therefore, he was started on Ferrous Sulfate 325 mg BID. B12 and Folate were replete. No evidence of hemolysis as Retic and Haptoglobin are normal with mildly elevated LDH. ESR is elevated and suggestive of chronic underlying inflammation. CRP normal. TSH normal. Copper, Zinc and Tissue Transglutaminase was normal. UA was unremarkable for blood. PBS showed normocytic, hypochromic anemia with anisocytosis, no schistocytes identified, normal total WBC count with borderline eosinophilia, no granulocytic dysplasia or blasts, adequate platelets. SPEP/ENRRIQUE did not reveal any M-spike. SFLC showed elevated kappa/lambda ratio which could likely be related to CKD. Will repeat in 6 months. B12 and Folate are replete.  - Repeat colonoscopy per Dr. Mireles in October was negative for bleeding per patient report (no records currently available, will request today).  - Repeat CBC from today shows improved Hg (11.7) with normal WBC and platelets. Iron panel and Ferritin from December were replete.   - Advised to continue Ferrous Sulfate BID.  - Will have CBC monthly. Will follow up in 4 months with CBC, CMP, Iron panel and Ferritin.         ACO / PURNIMA/Other  Quality measures  -  Zaid Galarza received 2023 flu vaccine.  -  Zaid Galarza reports a pain score of 0.    -  Current outpatient and discharge medications have been reconciled for the patient.  Reviewed by: TIFFANIE Baum      I spent 30 minutes caring for Zaid on this date of service. This time includes time spent by me in the following activities: preparing for the visit, reviewing tests, obtaining and/or reviewing a separately obtained history, performing a medically appropriate examination and/or evaluation, counseling and educating the patient/family/caregiver, ordering  medications, tests, or procedures, documenting information in the medical record, independently interpreting results and communicating that information with the patient/family/caregiver, and care coordination.                                 Electronically Signed by: TIFFANIE Shine APRN January 26, 2024 14:28 EST       CC:   No ref. provider found  Rosi Thacker APRN

## 2024-02-22 ENCOUNTER — OFFICE VISIT (OUTPATIENT)
Dept: CARDIOLOGY | Facility: CLINIC | Age: 74
End: 2024-02-22
Payer: MEDICARE

## 2024-02-22 VITALS
SYSTOLIC BLOOD PRESSURE: 104 MMHG | HEIGHT: 73 IN | DIASTOLIC BLOOD PRESSURE: 69 MMHG | OXYGEN SATURATION: 91 % | BODY MASS INDEX: 31.81 KG/M2 | WEIGHT: 240 LBS | HEART RATE: 87 BPM

## 2024-02-22 DIAGNOSIS — I42.0 DILATED CARDIOMYOPATHY: Chronic | ICD-10-CM

## 2024-02-22 DIAGNOSIS — E78.5 DYSLIPIDEMIA, GOAL LDL BELOW 100: Chronic | ICD-10-CM

## 2024-02-22 DIAGNOSIS — I25.10 CORONARY ARTERY DISEASE INVOLVING NATIVE CORONARY ARTERY OF NATIVE HEART WITHOUT ANGINA PECTORIS: Chronic | ICD-10-CM

## 2024-02-22 DIAGNOSIS — I10 ESSENTIAL HYPERTENSION: Primary | Chronic | ICD-10-CM

## 2024-02-22 DIAGNOSIS — I48.19 PERSISTENT ATRIAL FIBRILLATION: Chronic | ICD-10-CM

## 2024-02-22 PROCEDURE — 3074F SYST BP LT 130 MM HG: CPT | Performed by: NURSE PRACTITIONER

## 2024-02-22 PROCEDURE — 1160F RVW MEDS BY RX/DR IN RCRD: CPT | Performed by: NURSE PRACTITIONER

## 2024-02-22 PROCEDURE — 3078F DIAST BP <80 MM HG: CPT | Performed by: NURSE PRACTITIONER

## 2024-02-22 PROCEDURE — 1159F MED LIST DOCD IN RCRD: CPT | Performed by: NURSE PRACTITIONER

## 2024-02-22 PROCEDURE — 99214 OFFICE O/P EST MOD 30 MIN: CPT | Performed by: NURSE PRACTITIONER

## 2024-02-22 RX ORDER — SACUBITRIL AND VALSARTAN 24; 26 MG/1; MG/1
1 TABLET, FILM COATED ORAL 2 TIMES DAILY
Qty: 180 TABLET | Refills: 3 | Status: SHIPPED | OUTPATIENT
Start: 2024-02-22

## 2024-02-22 RX ORDER — PANTOPRAZOLE SODIUM 40 MG/1
40 TABLET, DELAYED RELEASE ORAL 2 TIMES DAILY
Qty: 180 TABLET | Refills: 3 | Status: SHIPPED | OUTPATIENT
Start: 2024-02-22

## 2024-02-22 RX ORDER — ATORVASTATIN CALCIUM 80 MG/1
80 TABLET, FILM COATED ORAL NIGHTLY
Qty: 90 TABLET | Refills: 3 | Status: SHIPPED | OUTPATIENT
Start: 2024-02-22

## 2024-02-22 NOTE — PROGRESS NOTES
"Chief Complaint  Cardiomyopathy (Patient states short of breath, denies chest pain today )    Subjective          Zaid Galarza presents to [facility] for follow up.    History of Present Illness    Zaid was last seen in clinic on 11/22/2023.  His blood pressure was low and metoprolol was decreased to 12 and half milligrams twice daily.  Echocardiogram was ordered as well.    Echocardiogram was completed on 12/5/2023.  LVEF was hyperdynamic at greater than 70%.  He was noted to have grade 2 with high LAP pseudonormalization diastolic dysfunction.  There was no hemodynamically significant valvular disease.    At today's visit Zaid is in a wheelchair.  He states that he does walk with walker assist at home.  He is accompanied by his sister today.  He denies chest pain.  He does report that he gets short of breath with exertion.  He denies any lower extremity swelling.  He denies PND and orthopnea.  He denies any bleeding issues.    Objective     Vital Signs:   /69 (BP Location: Left arm, Patient Position: Sitting, Cuff Size: Adult)   Pulse 87   Ht 185.4 cm (73\")   Wt 109 kg (240 lb)   SpO2 91%   BMI 31.66 kg/m²       Physical Exam  Vitals reviewed.   Constitutional:       Appearance: Normal appearance. He is well-developed.   Cardiovascular:      Rate and Rhythm: Normal rate. Rhythm irregular.      Heart sounds: No murmur heard.     No friction rub. No gallop.   Pulmonary:      Effort: Pulmonary effort is normal. No respiratory distress.      Breath sounds: Normal breath sounds. No wheezing or rales.   Skin:     General: Skin is warm and dry.   Neurological:      Mental Status: He is alert and oriented to person, place, and time.   Psychiatric:         Mood and Affect: Mood normal.         Behavior: Behavior normal.          Result Review :                Most recent echocardiogram  Results for orders placed during the hospital encounter of 12/05/23    Adult Transthoracic " Echo Complete W/ Cont if Necessary Per Protocol    Interpretation Summary    Left ventricular systolic function is hyperdynamic (EF > 70%). Calculated left ventricular EF = 72% Left ventricular ejection fraction appears to be greater than 70%.    Left ventricular wall thickness is consistent with mild concentric hypertrophy.    Left ventricular diastolic function is consistent with (grade II w/high LAP) pseudonormalization.      Most recent Cardiac Cath  Results for orders placed during the hospital encounter of 09/28/19    Cardiac Catheterization/Vascular Study    Narrative  Table formatting from the original result was not included.  DATE OF PROCEDURE: 10/3/2019    INDICATION FOR PROCEDURE: {Cardiomyopathy, severe LV dysfunction, fib , flutter    PROCEDURE PERFORMED:    1. Coronary Angiogram  2. Left heart catheretization    PROCEDURE COMMENTS:    Zaid Galarza was brought to the cath lab and placed on the cardiac catherization table in the postabsortive state. The patient was prepped and draped according to the cath lab protocol under strict aseptic and sterile condition. Patient's right femoral artery sight was prepped and draped. Under fluoroscopic guidance the right femoral artery was punctured using a 21 gauge needle utilizing the modified Seldinger technique. 6 Hungarian sheath was introduced over a wire. After aspirating for blood it was flushed with heparinized saline.    We advanced Torres type diagnostic catheters over the wire. The  left and right coronary arteries  were selectively engaged. Left and right coronary angiogram were obtained in multiple orthogonal projections.    After completion of the procedure the femoral sheath was removed in the cath lab and hemostasis was achieved by Angioseal. The patient tolerated the procedure without complications.      FINDINGS:    1. HEMODYNAMICS:  LVEDP 10 mmHg, no gradient across the aortic valve.      2. CORONARY ANGIOGRAPHY: Dominant dominant coronary  artery system.    The left main coronary artery bifurcated into the LAD and left circumflex coronary.  The LAD coursed in the anterior interventricular groove, gave rise to diagonal branches and reached the apex.    Left circumflex coursed in the left atrial ventricular groove and gives rise to several marginal branches.    The right coronary artery course in the right atrial ventricular groove I gave rise to several acute marginal branches.    Left main: Normal    LAD: 20 to 30% diffuse disease in LADLeft circumflex: 30% focal mid circumflex  RCA: Large size dominant vessel proximal 40% and distal 40% focal stenosisPDA: Mild 20%    Final impression:  Mild to moderate CAD  3+ mitral regurgitation  RECOMMENDATIONS:  Aspirin  Beta-blocker  Statin  ACE inhibitor  Continue anticoagulant  Repeat ANGELITO after 6 to 8 weeks  If atrial thrombus resolved then proceed with  Ablation  I believe once the arrhythmia is controlled patient ejection fraction will tend to increase    Vincent Phillips MD  10/03/19  9:38 AM      Current Outpatient Medications   Medication Sig Dispense Refill    apixaban (ELIQUIS) 5 MG tablet tablet Take 1 tablet by mouth 2 (Two) Times a Day. 180 tablet 3    atorvastatin (LIPITOR) 80 MG tablet Take 1 tablet by mouth Every Night. 90 tablet 3    ferrous sulfate 325 (65 FE) MG tablet 1 tablet.      metoprolol tartrate (LOPRESSOR) 25 MG tablet Take 0.5 tablets by mouth 2 (Two) Times a Day. 90 tablet 3    pantoprazole (PROTONIX) 40 MG EC tablet Take 1 tablet by mouth 2 (Two) Times a Day. 180 tablet 3    sacubitril-valsartan (Entresto) 24-26 MG tablet Take 1 tablet by mouth 2 (Two) Times a Day. 180 tablet 3    sertraline (ZOLOFT) 100 MG tablet Take 1.5 tablets by mouth Daily.      Symbicort 160-4.5 MCG/ACT inhaler Inhale 2 puffs 2 (Two) Times a Day.      vitamin B-12 (CYANOCOBALAMIN) 1000 MCG tablet Take 1 tablet by mouth Daily.       No current facility-administered medications for this visit.             Assessment and Plan    Problem List Items Addressed This Visit          Cardiac and Vasculature    Dilated cardiomyopathy (Chronic)    Overview     TTE: LVEF 26 to 30%, RV and LV mildly dilated, grade 3 diastolic dysfunction consistent with fixed restrictive pattern, mild TR, left atrial cavity mildly dilated right atrial cavity moderately dilated, moderate pulmonary hypertension with RVSP 47 mmHg  9/30/2019 ANGELITO: Severe cardiomyopathy likely from atrial flutter  2/11/2020 TTE LVEF 36 to 40%  5/6/2020 TTE LVEF 56 to 60%, mild concentric LVH  9/2/2022 TTE: LVEF 51 to 55%, moderate sclerosis of both the mitral and aortic valve with no significant regurgitation noted.  Left atrium is mildly enlarged.         Relevant Medications    metoprolol tartrate (LOPRESSOR) 25 MG tablet    sacubitril-valsartan (Entresto) 24-26 MG tablet    Persistent atrial fibrillation (Chronic)    Overview     [id number] is at least 6 for heart failure, hypertension, CVA, nonobstructive CAD, and age         Relevant Medications    metoprolol tartrate (LOPRESSOR) 25 MG tablet    sacubitril-valsartan (Entresto) 24-26 MG tablet    Nonobstructive CAD per cath 10/3/2019 (Chronic)    Overview     10/4/2019 LHC: Nonobstructive coronary artery disease         Relevant Medications    metoprolol tartrate (LOPRESSOR) 25 MG tablet    sacubitril-valsartan (Entresto) 24-26 MG tablet    Essential hypertension - Primary (Chronic)    Relevant Medications    metoprolol tartrate (LOPRESSOR) 25 MG tablet    Dyslipidemia, goal LDL below 100 (Chronic)    Overview     9/2/2022 total cholesterol 104, triglycerides 89, HDL 31, and LDL 55  10/28/2023 total cholesterol 107, triglycerides 122, HDL 45, and LDL 40 ([facility])         Relevant Medications    atorvastatin (LIPITOR) 80 MG tablet           Follow Up     Medications were reviewed with the patient.    Dilated cardiomyopathy is stable.  He is to continue Entresto.  It is unlikely that his blood pressure  would not tolerate the addition of MRA.  He is on metoprolol tartrate due to his elevated heart rate.    Atrial fibrillation is stable.  Patient is to continue Eliquis for stroke prophylaxis, and metoprolol tartrate for rate control.    Continue atorvastatin for dyslipidemia.  Most recent lipid panel shows good control.    Nonobstructive coronary artery disease is stable.    Return in about 6 months (around 8/22/2024).    Patient was given instructions and counseling regarding his condition or for health maintenance advice. Please see specific information pulled into the AVS if appropriate.

## 2024-05-23 DIAGNOSIS — D50.9 IRON DEFICIENCY ANEMIA, UNSPECIFIED IRON DEFICIENCY ANEMIA TYPE: Primary | ICD-10-CM

## 2024-05-28 ENCOUNTER — LAB (OUTPATIENT)
Dept: ONCOLOGY | Facility: CLINIC | Age: 74
End: 2024-05-28
Payer: MEDICARE

## 2024-05-28 ENCOUNTER — OFFICE VISIT (OUTPATIENT)
Dept: ONCOLOGY | Facility: CLINIC | Age: 74
End: 2024-05-28
Payer: MEDICARE

## 2024-05-28 VITALS
WEIGHT: 262 LBS | DIASTOLIC BLOOD PRESSURE: 74 MMHG | TEMPERATURE: 97.1 F | OXYGEN SATURATION: 96 % | SYSTOLIC BLOOD PRESSURE: 122 MMHG | HEIGHT: 73 IN | HEART RATE: 74 BPM | RESPIRATION RATE: 18 BRPM | BODY MASS INDEX: 34.72 KG/M2

## 2024-05-28 DIAGNOSIS — D50.9 IRON DEFICIENCY ANEMIA, UNSPECIFIED IRON DEFICIENCY ANEMIA TYPE: ICD-10-CM

## 2024-05-28 DIAGNOSIS — D50.9 IRON DEFICIENCY ANEMIA, UNSPECIFIED IRON DEFICIENCY ANEMIA TYPE: Primary | ICD-10-CM

## 2024-05-28 DIAGNOSIS — D64.9 NORMOCYTIC ANEMIA: ICD-10-CM

## 2024-05-28 DIAGNOSIS — K90.9 INTESTINAL MALABSORPTION, UNSPECIFIED TYPE: ICD-10-CM

## 2024-05-28 LAB
ALBUMIN SERPL-MCNC: 3.8 G/DL (ref 3.5–5.2)
ALBUMIN/GLOB SERPL: 1.2 G/DL
ALP SERPL-CCNC: 136 U/L (ref 39–117)
ALT SERPL W P-5'-P-CCNC: 10 U/L (ref 1–41)
ANION GAP SERPL CALCULATED.3IONS-SCNC: 8.9 MMOL/L (ref 5–15)
AST SERPL-CCNC: 14 U/L (ref 1–40)
BASOPHILS # BLD AUTO: 0.04 10*3/MM3 (ref 0–0.2)
BASOPHILS NFR BLD AUTO: 0.3 % (ref 0–1.5)
BILIRUB SERPL-MCNC: 0.3 MG/DL (ref 0–1.2)
BUN SERPL-MCNC: 16 MG/DL (ref 8–23)
BUN/CREAT SERPL: 14.3 (ref 7–25)
CALCIUM SPEC-SCNC: 9 MG/DL (ref 8.6–10.5)
CHLORIDE SERPL-SCNC: 103 MMOL/L (ref 98–107)
CO2 SERPL-SCNC: 27.1 MMOL/L (ref 22–29)
CREAT SERPL-MCNC: 1.12 MG/DL (ref 0.76–1.27)
DEPRECATED RDW RBC AUTO: 43.4 FL (ref 37–54)
EGFRCR SERPLBLD CKD-EPI 2021: 69.8 ML/MIN/1.73
EOSINOPHIL # BLD AUTO: 0.27 10*3/MM3 (ref 0–0.4)
EOSINOPHIL NFR BLD AUTO: 2.2 % (ref 0.3–6.2)
ERYTHROCYTE [DISTWIDTH] IN BLOOD BY AUTOMATED COUNT: 14.1 % (ref 12.3–15.4)
FERRITIN SERPL-MCNC: 40.51 NG/ML (ref 30–400)
GLOBULIN UR ELPH-MCNC: 3.2 GM/DL
GLUCOSE SERPL-MCNC: 98 MG/DL (ref 65–99)
HCT VFR BLD AUTO: 39.3 % (ref 37.5–51)
HGB BLD-MCNC: 12.4 G/DL (ref 13–17.7)
IMM GRANULOCYTES # BLD AUTO: 0.05 10*3/MM3 (ref 0–0.05)
IMM GRANULOCYTES NFR BLD AUTO: 0.4 % (ref 0–0.5)
IRON 24H UR-MRATE: 35 MCG/DL (ref 59–158)
IRON SATN MFR SERPL: 10 % (ref 20–50)
LYMPHOCYTES # BLD AUTO: 1.83 10*3/MM3 (ref 0.7–3.1)
LYMPHOCYTES NFR BLD AUTO: 15 % (ref 19.6–45.3)
MCH RBC QN AUTO: 27.1 PG (ref 26.6–33)
MCHC RBC AUTO-ENTMCNC: 31.6 G/DL (ref 31.5–35.7)
MCV RBC AUTO: 85.8 FL (ref 79–97)
MONOCYTES # BLD AUTO: 1.1 10*3/MM3 (ref 0.1–0.9)
MONOCYTES NFR BLD AUTO: 9 % (ref 5–12)
NEUTROPHILS NFR BLD AUTO: 73.1 % (ref 42.7–76)
NEUTROPHILS NFR BLD AUTO: 8.87 10*3/MM3 (ref 1.7–7)
NRBC BLD AUTO-RTO: 0 /100 WBC (ref 0–0.2)
PLATELET # BLD AUTO: 205 10*3/MM3 (ref 140–450)
PMV BLD AUTO: 11.3 FL (ref 6–12)
POTASSIUM SERPL-SCNC: 4.2 MMOL/L (ref 3.5–5.2)
PROT SERPL-MCNC: 7 G/DL (ref 6–8.5)
RBC # BLD AUTO: 4.58 10*6/MM3 (ref 4.14–5.8)
SODIUM SERPL-SCNC: 139 MMOL/L (ref 136–145)
TIBC SERPL-MCNC: 337 MCG/DL (ref 298–536)
TRANSFERRIN SERPL-MCNC: 226 MG/DL (ref 200–360)
WBC NRBC COR # BLD AUTO: 12.16 10*3/MM3 (ref 3.4–10.8)

## 2024-05-28 PROCEDURE — 1159F MED LIST DOCD IN RCRD: CPT | Performed by: NURSE PRACTITIONER

## 2024-05-28 PROCEDURE — 1160F RVW MEDS BY RX/DR IN RCRD: CPT | Performed by: NURSE PRACTITIONER

## 2024-05-28 PROCEDURE — 3078F DIAST BP <80 MM HG: CPT | Performed by: NURSE PRACTITIONER

## 2024-05-28 PROCEDURE — 83540 ASSAY OF IRON: CPT | Performed by: NURSE PRACTITIONER

## 2024-05-28 PROCEDURE — 1126F AMNT PAIN NOTED NONE PRSNT: CPT | Performed by: NURSE PRACTITIONER

## 2024-05-28 PROCEDURE — 82728 ASSAY OF FERRITIN: CPT | Performed by: NURSE PRACTITIONER

## 2024-05-28 PROCEDURE — 3074F SYST BP LT 130 MM HG: CPT | Performed by: NURSE PRACTITIONER

## 2024-05-28 PROCEDURE — 99214 OFFICE O/P EST MOD 30 MIN: CPT | Performed by: NURSE PRACTITIONER

## 2024-05-28 PROCEDURE — 80053 COMPREHEN METABOLIC PANEL: CPT | Performed by: NURSE PRACTITIONER

## 2024-05-28 PROCEDURE — 84466 ASSAY OF TRANSFERRIN: CPT | Performed by: NURSE PRACTITIONER

## 2024-05-28 PROCEDURE — 36415 COLL VENOUS BLD VENIPUNCTURE: CPT | Performed by: NURSE PRACTITIONER

## 2024-05-28 PROCEDURE — 85025 COMPLETE CBC W/AUTO DIFF WBC: CPT | Performed by: NURSE PRACTITIONER

## 2024-05-28 RX ORDER — SODIUM CHLORIDE 9 MG/ML
20 INJECTION, SOLUTION INTRAVENOUS ONCE
OUTPATIENT
Start: 2024-06-04

## 2024-05-28 RX ORDER — SODIUM CHLORIDE 9 MG/ML
20 INJECTION, SOLUTION INTRAVENOUS ONCE
Status: CANCELLED | OUTPATIENT
Start: 2024-05-28

## 2024-05-28 NOTE — PROGRESS NOTES
DATE:  5/28/2024    DIAGNOSIS: MUKESH    CHIEF COMPLAINT:  Increasing weakness/fatigue    TREATMENT HISTORY:  Ferrous Sulfate BID- Discontinued today    HISTORY OF PRESENT ILLNESS:   Zaid Galarza is a very pleasant 73 y.o. male who is being seen today at the request of TRAVIS Venegas for evaluation and treatment of MUKESH. Mr. Galarza reports following with his PCP every 3 months with lab testing. He recently had labs drawn at his PCP office on 06/06/2023 showed Hg (7.8) and he was told he should go to [facility] ED. He presented to [facility] ED and was admitted and during admission he received a total of 4 units PRBCs. Of note, prior to this admission is H/H were within normal. General surgery was consulted while inpatient and they recommended to have EGD/colonoscopy as an outpatient since patient had no sign of acute blood loss. Iron panel and Ferritin obtained while inpatient was consistent with MUKESH and he was not started on oral Iron replacement. He continues to deny any obvious blood loss from any source. He is scheduled with Dr. Mireles (GI) on 02/27/2023 for possible EGD/colonoscopy. He reports weakness/fatigue, shortness of breath on exertion and dizziness. He denies any chest pain. He denies any other specific complaints today.        Interval History:  Mr. Galarza presents today for follow up of MUKESH. He continues to take Ferrous Sulfate BID and is tolerating this well without any noticeable side effects. He reports worsening weakness/fatigue over the last several weeks. He denies any worsening shortness of breath on exertion, chest pain or dizziness. He denies any obvious blood loss from any source. He denies any specific complaints today.     The following portions of the patient's history were reviewed and updated as appropriate: allergies, current medications, past family history, past medical history, past social history, past surgical history and problem list.    PAST MEDICAL HISTORY:  Past Medical  History:   Diagnosis Date    Atrial fibrillation     CHF (congestive heart failure)     COPD (chronic obstructive pulmonary disease)     Coronary artery disease     Hypertension     Tobacco abuse        PAST SURGICAL HISTORY:  Past Surgical History:   Procedure Laterality Date    ABDOMINAL AORTIC ANEURYSM REPAIR      CARDIAC CATHETERIZATION N/A 10/03/2019    Procedure: Left Heart Cath;  Surgeon: Vincent Phillips MD;  Location:  [facility] LOCATION;  Service: Cardiology       SOCIAL HISTORY:  Social History     Socioeconomic History    Marital status: Single   Tobacco Use    Smoking status: Former     Current packs/day: 1.00     Types: Cigarettes    Smokeless tobacco: Never    Tobacco comments:     9/1/2022   Vaping Use    Vaping status: Never Used   Substance and Sexual Activity    Alcohol use: No    Drug use: No    Sexual activity: Defer         FAMILY HISTORY:  Family History   Problem Relation Age of Onset    Heart disease Mother     Stroke Father     Heart disease Father          MEDICATIONS:  The current medication list was reviewed in the EMR    Current Outpatient Medications:     apixaban (ELIQUIS) 5 MG tablet tablet, Take 1 tablet by mouth 2 (Two) Times a Day., Disp: 180 tablet, Rfl: 3    atorvastatin (LIPITOR) 80 MG tablet, Take 1 tablet by mouth Every Night., Disp: 90 tablet, Rfl: 3    ferrous sulfate 325 (65 FE) MG tablet, 1 tablet., Disp: , Rfl:     metoprolol tartrate (LOPRESSOR) 25 MG tablet, Take 0.5 tablets by mouth 2 (Two) Times a Day., Disp: 90 tablet, Rfl: 3    pantoprazole (PROTONIX) 40 MG EC tablet, Take 1 tablet by mouth 2 (Two) Times a Day., Disp: 180 tablet, Rfl: 3    sacubitril-valsartan (Entresto) 24-26 MG tablet, Take 1 tablet by mouth 2 (Two) Times a Day., Disp: 180 tablet, Rfl: 3    sertraline (ZOLOFT) 100 MG tablet, Take 1.5 tablets by mouth Daily., Disp: , Rfl:     Symbicort 160-4.5 MCG/ACT inhaler, Inhale 2 puffs 2 (Two) Times a Day., Disp: , Rfl:     vitamin B-12  (CYANOCOBALAMIN) 1000 MCG tablet, Take 1 tablet by mouth Daily., Disp: , Rfl:     ALLERGIES:  No Known Allergies      REVIEW OF SYSTEMS:    A comprehensive 14 point review of systems was performed.  Significant findings as mentioned above.  All other systems reviewed and are negative.        Physical Exam   Vital Signs:   Vitals:    05/28/24 1413   BP: 122/74   Pulse: 74   Resp: 18   Temp: 97.1 °F (36.2 °C)   SpO2: 96%     ECOG score: 0   General: Well developed, well nourished, alert and oriented x 3, in no acute distress. Ambulating with cane.   Head: ATNC   Eyes: PERRL, No evidence of conjunctivitis.   Nose: No nasal discharge.   Mouth: Oral mucosal membranes moist. No oral ulceration or hemorrhages.   Neck: Neck supple. No thyromegaly. No JVD.   Lungs: Clear in all fields to A&P without rales, rhonchi or wheezing.   Heart: S1, S2. Regular rate and rhythm. No murmurs, rubs, or gallops.   Abdomen: Soft. Bowel sounds are normoactive. Nontender with palpation. No Hepatosplenomegaly can be appreciated.   Extremities: No clubbing, cyanosis or edema bilaterally.   Integumentary: Warm, dry, intact.   Neurologic: Grossly non-focal exam.      Pain Score:  Pain Score    05/28/24 1413   PainSc: 0-No pain       PHQ-Score Total:  PHQ-9 Total Score:         PATHOLOGY:        ENDOSCOPY:        IMAGING:         RECENT LABS:  Lab Results   Component Value Date    WBC 8.06 01/26/2024    HGB 11.7 (L) 01/26/2024    HCT 39.7 01/26/2024    MCV 86.9 01/26/2024    RDW 15.8 (H) 01/26/2024     01/26/2024    NEUTRORELPCT 71.3 01/26/2024    LYMPHORELPCT 16.5 (L) 01/26/2024    MONORELPCT 8.4 01/26/2024    EOSRELPCT 3.2 01/26/2024    BASORELPCT 0.4 01/26/2024    NEUTROABS 5.74 01/26/2024    LYMPHSABS 1.33 01/26/2024       Lab Results   Component Value Date     12/20/2023    K 4.3 12/20/2023    CO2 26.5 12/20/2023     12/20/2023    BUN 21 12/20/2023    CREATININE 1.31 (H) 12/20/2023    EGFRIFNONA 61 02/09/2021    GLUCOSE 128  (H) 12/20/2023    CALCIUM 8.6 12/20/2023    ALKPHOS 124 (H) 12/20/2023    AST 17 12/20/2023    ALT 11 12/20/2023    BILITOT 0.2 12/20/2023    ALBUMIN 3.7 12/20/2023    PROTEINTOT 6.9 12/20/2023    MG 1.8 09/15/2023    PHOS 3.2 02/11/2023       Lab Results   Component Value Date     (H) 02/23/2023       Lab Results   Component Value Date    FERRITIN 47.06 12/20/2023    IRON 94 12/20/2023    TIBC 365 12/20/2023    LABIRON 26 12/20/2023    SFYFGMTN72 1,373 (H) 12/20/2023    FOLATE 8.62 12/20/2023    HAPTOGLOBIN 183 02/23/2023    RETICCTPCT 1.20 02/23/2023    RETIC 0.0515 02/23/2023      Work Up 02/23/2023      Sed Rate  0 - 20 mm/hr 33 High      C-Reactive Protein  0.00 - 0.50 mg/dL <0.30      Zinc  44 - 115 ug/dL 97      Copper  69 - 132 ug/dL 113      Tissue Transglutaminase IgA  0 - 3 U/mL <2      TSH  0.270 - 4.200 uIU/mL 2.040      Color, UA  Yellow, Straw Yellow     Appearance, UA  Clear Clear  Clear    pH, UA  5.0 - 8.0 6.5  6.0    Specific Gravity, UA  1.005 - 1.030 1.019  1.017    Glucose, UA  Negative Negative  Negative R    Ketones, UA  Negative Negative  Negative R    Bilirubin, UA  Negative Negative  Negative    Blood, UA  Negative Negative  Negative    Protein, UA  Negative Trace Abnormal   Negative R    Leuk Esterase, UA  Negative Moderate (2+) Abnormal   Negative    Nitrite, UA  Negative Negative  Negative    Urobilinogen, UA  0.2 - 1.0 E.U./dL 1.0 E.U./dL  0.2          ASSESSMENT & PLAN:  Zaid Galarza is a very pleasant 73 y.o. male with    1. MUKESH  - He recently had labs drawn at his PCP office on 06/06/2023 showed Hg (7.8) and he was told he should go to [facility] ED. He presented to [facility] ED and was admitted and during admission he received a total of 4 units PRBCs. Of note, prior to this admission is H/H were within normal. Iron panel and Ferritin obtained while inpatient was consistent with MUKESH.  - He underwent EGD/colonscopy per Dr. Mireles and pathology was negative. He reports exam was negative  for any bleeding. He denies any obvious blood loss from any source.   - He is not taking oral iron supplements.  - CBC from initial consultation showed Hg (10.4) stable/improved. Iron panel and Ferritin were low and most consistent with MUKESH. Therefore, he was started on Ferrous Sulfate 325 mg BID. B12 and Folate were replete. No evidence of hemolysis as Retic and Haptoglobin are normal with mildly elevated LDH. ESR is elevated and suggestive of chronic underlying inflammation. CRP normal. TSH normal. Copper, Zinc and Tissue Transglutaminase was normal. UA was unremarkable for blood. PBS showed normocytic, hypochromic anemia with anisocytosis, no schistocytes identified, normal total WBC count with borderline eosinophilia, no granulocytic dysplasia or blasts, adequate platelets. SPEP/ENRRIQUE did not reveal any M-spike. SFLC showed elevated kappa/lambda ratio which could likely be related to CKD. Will repeat in 6 months. B12 and Folate are replete.  - Repeat colonoscopy per Dr. Mireles in October was negative for bleeding per patient report (no records currently available, will request again today).  - Repeat CBC from today shows improved Hg (12.4) with normal WBC and platelets. Iron panel and Ferritin from today are with consistent with MUKESH despite oral iron replacement. Therefore, will discontinue oral iron and replace with IV Feraheme, pending insurance approval for apparent malabsorption of oral iron.   - Will follow up in 3 months with CBC, CMP, Iron panel, Ferritin, SPEP/ENRRIQUE and SFLC.         ACO / PURNIMA/Other  Quality measures  -  Zaid Galarza received 2023 flu vaccine.  -  Zaid Galarza reports a pain score of 0.    -  Current outpatient and discharge medications have been reconciled for the patient.  Reviewed by: TIFFANIE Baum      I spent 30 minutes caring for Zaid on this date of service. This time includes time spent by me in the following activities: preparing for the visit, reviewing tests,  obtaining and/or reviewing a separately obtained history, performing a medically appropriate examination and/or evaluation, counseling and educating the patient/family/caregiver, ordering medications, tests, or procedures, documenting information in the medical record, independently interpreting results and communicating that information with the patient/family/caregiver, and care coordination.                                 Electronically Signed by: TIFFANIE Shine APRN May 28, 2024 14:18 EDT       CC:   No ref. provider found  Rosi Thacker APRN

## 2024-05-28 NOTE — PROGRESS NOTES
Venipuncture Blood Specimen Collection  Venipuncture performed in Left arm by Tisha Mcleod MA with good hemostasis. Patient tolerated the procedure well without complications.   05/28/24   Tisha Mcleod MA

## 2024-05-29 ENCOUNTER — INFUSION (OUTPATIENT)
Dept: ONCOLOGY | Facility: HOSPITAL | Age: 74
End: 2024-05-29
Payer: MEDICARE

## 2024-05-29 VITALS
OXYGEN SATURATION: 98 % | HEART RATE: 77 BPM | TEMPERATURE: 98 F | BODY MASS INDEX: 34.32 KG/M2 | WEIGHT: 260.1 LBS | RESPIRATION RATE: 18 BRPM | DIASTOLIC BLOOD PRESSURE: 78 MMHG | SYSTOLIC BLOOD PRESSURE: 122 MMHG

## 2024-05-29 DIAGNOSIS — K90.9 INTESTINAL MALABSORPTION, UNSPECIFIED TYPE: Primary | ICD-10-CM

## 2024-05-29 DIAGNOSIS — D50.9 IRON DEFICIENCY ANEMIA, UNSPECIFIED IRON DEFICIENCY ANEMIA TYPE: ICD-10-CM

## 2024-05-29 PROCEDURE — 25810000003 SODIUM CHLORIDE 0.9 % SOLUTION: Performed by: NURSE PRACTITIONER

## 2024-05-29 PROCEDURE — 25010000002 FERUMOXYTOL 510 MG/17ML SOLUTION: Performed by: NURSE PRACTITIONER

## 2024-05-29 PROCEDURE — 96365 THER/PROPH/DIAG IV INF INIT: CPT

## 2024-05-29 PROCEDURE — 96374 THER/PROPH/DIAG INJ IV PUSH: CPT

## 2024-05-29 RX ORDER — SODIUM CHLORIDE 9 MG/ML
20 INJECTION, SOLUTION INTRAVENOUS ONCE
Status: COMPLETED | OUTPATIENT
Start: 2024-05-29 | End: 2024-05-29

## 2024-05-29 RX ADMIN — FERUMOXYTOL 510 MG: 510 INJECTION INTRAVENOUS at 15:39

## 2024-05-29 RX ADMIN — SODIUM CHLORIDE 20 ML/HR: 9 INJECTION, SOLUTION INTRAVENOUS at 15:42

## 2024-06-04 ENCOUNTER — INFUSION (OUTPATIENT)
Dept: ONCOLOGY | Facility: HOSPITAL | Age: 74
End: 2024-06-04
Payer: MEDICARE

## 2024-06-04 VITALS
OXYGEN SATURATION: 96 % | BODY MASS INDEX: 34.64 KG/M2 | TEMPERATURE: 97.7 F | HEART RATE: 82 BPM | SYSTOLIC BLOOD PRESSURE: 106 MMHG | RESPIRATION RATE: 18 BRPM | WEIGHT: 262.5 LBS | DIASTOLIC BLOOD PRESSURE: 64 MMHG

## 2024-06-04 DIAGNOSIS — K90.9 INTESTINAL MALABSORPTION, UNSPECIFIED TYPE: Primary | ICD-10-CM

## 2024-06-04 DIAGNOSIS — D50.9 IRON DEFICIENCY ANEMIA, UNSPECIFIED IRON DEFICIENCY ANEMIA TYPE: ICD-10-CM

## 2024-06-04 PROCEDURE — 96365 THER/PROPH/DIAG IV INF INIT: CPT

## 2024-06-04 PROCEDURE — 96374 THER/PROPH/DIAG INJ IV PUSH: CPT

## 2024-06-04 PROCEDURE — 25010000002 FERUMOXYTOL 510 MG/17ML SOLUTION: Performed by: NURSE PRACTITIONER

## 2024-06-04 PROCEDURE — 25810000003 SODIUM CHLORIDE 0.9 % SOLUTION: Performed by: NURSE PRACTITIONER

## 2024-06-04 RX ORDER — SODIUM CHLORIDE 9 MG/ML
20 INJECTION, SOLUTION INTRAVENOUS ONCE
Status: COMPLETED | OUTPATIENT
Start: 2024-06-04 | End: 2024-06-04

## 2024-06-04 RX ADMIN — SODIUM CHLORIDE 20 ML/HR: 9 INJECTION, SOLUTION INTRAVENOUS at 15:26

## 2024-06-04 RX ADMIN — FERUMOXYTOL 510 MG: 510 INJECTION INTRAVENOUS at 15:24

## 2024-08-20 ENCOUNTER — LAB (OUTPATIENT)
Dept: ONCOLOGY | Facility: CLINIC | Age: 74
End: 2024-08-20
Payer: MEDICARE

## 2024-08-20 ENCOUNTER — TELEPHONE (OUTPATIENT)
Dept: ONCOLOGY | Facility: CLINIC | Age: 74
End: 2024-08-20

## 2024-08-20 ENCOUNTER — OFFICE VISIT (OUTPATIENT)
Dept: ONCOLOGY | Facility: CLINIC | Age: 74
End: 2024-08-20
Payer: MEDICARE

## 2024-08-20 VITALS
WEIGHT: 261.6 LBS | BODY MASS INDEX: 34.67 KG/M2 | TEMPERATURE: 98 F | HEART RATE: 80 BPM | DIASTOLIC BLOOD PRESSURE: 67 MMHG | OXYGEN SATURATION: 95 % | SYSTOLIC BLOOD PRESSURE: 114 MMHG | RESPIRATION RATE: 20 BRPM | HEIGHT: 73 IN

## 2024-08-20 DIAGNOSIS — D64.9 NORMOCYTIC ANEMIA: ICD-10-CM

## 2024-08-20 DIAGNOSIS — D50.9 IRON DEFICIENCY ANEMIA, UNSPECIFIED IRON DEFICIENCY ANEMIA TYPE: ICD-10-CM

## 2024-08-20 DIAGNOSIS — K90.9 INTESTINAL MALABSORPTION, UNSPECIFIED TYPE: ICD-10-CM

## 2024-08-20 DIAGNOSIS — D50.9 IRON DEFICIENCY ANEMIA, UNSPECIFIED IRON DEFICIENCY ANEMIA TYPE: Primary | ICD-10-CM

## 2024-08-20 LAB
ALBUMIN SERPL-MCNC: 3.8 G/DL (ref 3.5–5.2)
ALBUMIN/GLOB SERPL: 1.1 G/DL
ALP SERPL-CCNC: 136 U/L (ref 39–117)
ALT SERPL W P-5'-P-CCNC: 12 U/L (ref 1–41)
ANION GAP SERPL CALCULATED.3IONS-SCNC: 10.3 MMOL/L (ref 5–15)
AST SERPL-CCNC: 15 U/L (ref 1–40)
BASOPHILS # BLD AUTO: 0.04 10*3/MM3 (ref 0–0.2)
BASOPHILS NFR BLD AUTO: 0.4 % (ref 0–1.5)
BILIRUB SERPL-MCNC: 0.2 MG/DL (ref 0–1.2)
BUN SERPL-MCNC: 18 MG/DL (ref 8–23)
BUN/CREAT SERPL: 13.2 (ref 7–25)
CALCIUM SPEC-SCNC: 8.8 MG/DL (ref 8.6–10.5)
CHLORIDE SERPL-SCNC: 102 MMOL/L (ref 98–107)
CO2 SERPL-SCNC: 28.7 MMOL/L (ref 22–29)
CREAT SERPL-MCNC: 1.36 MG/DL (ref 0.76–1.27)
DEPRECATED RDW RBC AUTO: 46.3 FL (ref 37–54)
EGFRCR SERPLBLD CKD-EPI 2021: 55.3 ML/MIN/1.73
EOSINOPHIL # BLD AUTO: 0.37 10*3/MM3 (ref 0–0.4)
EOSINOPHIL NFR BLD AUTO: 3.6 % (ref 0.3–6.2)
ERYTHROCYTE [DISTWIDTH] IN BLOOD BY AUTOMATED COUNT: 14.6 % (ref 12.3–15.4)
FERRITIN SERPL-MCNC: 24.33 NG/ML (ref 30–400)
GLOBULIN UR ELPH-MCNC: 3.4 GM/DL
GLUCOSE SERPL-MCNC: 133 MG/DL (ref 65–99)
HCT VFR BLD AUTO: 36.5 % (ref 37.5–51)
HGB BLD-MCNC: 11.1 G/DL (ref 13–17.7)
IMM GRANULOCYTES # BLD AUTO: 0.05 10*3/MM3 (ref 0–0.05)
IMM GRANULOCYTES NFR BLD AUTO: 0.5 % (ref 0–0.5)
IRON 24H UR-MRATE: 27 MCG/DL (ref 59–158)
IRON SATN MFR SERPL: 7 % (ref 20–50)
LYMPHOCYTES # BLD AUTO: 1.57 10*3/MM3 (ref 0.7–3.1)
LYMPHOCYTES NFR BLD AUTO: 15.2 % (ref 19.6–45.3)
MCH RBC QN AUTO: 26.3 PG (ref 26.6–33)
MCHC RBC AUTO-ENTMCNC: 30.4 G/DL (ref 31.5–35.7)
MCV RBC AUTO: 86.5 FL (ref 79–97)
MONOCYTES # BLD AUTO: 0.76 10*3/MM3 (ref 0.1–0.9)
MONOCYTES NFR BLD AUTO: 7.3 % (ref 5–12)
NEUTROPHILS NFR BLD AUTO: 7.56 10*3/MM3 (ref 1.7–7)
NEUTROPHILS NFR BLD AUTO: 73 % (ref 42.7–76)
NRBC BLD AUTO-RTO: 0 /100 WBC (ref 0–0.2)
PLATELET # BLD AUTO: 229 10*3/MM3 (ref 140–450)
PMV BLD AUTO: 11.8 FL (ref 6–12)
POTASSIUM SERPL-SCNC: 4.6 MMOL/L (ref 3.5–5.2)
PROT SERPL-MCNC: 7.2 G/DL (ref 6–8.5)
RBC # BLD AUTO: 4.22 10*6/MM3 (ref 4.14–5.8)
SODIUM SERPL-SCNC: 141 MMOL/L (ref 136–145)
TIBC SERPL-MCNC: 362 MCG/DL (ref 298–536)
TRANSFERRIN SERPL-MCNC: 243 MG/DL (ref 200–360)
WBC NRBC COR # BLD AUTO: 10.35 10*3/MM3 (ref 3.4–10.8)

## 2024-08-20 PROCEDURE — 1126F AMNT PAIN NOTED NONE PRSNT: CPT | Performed by: NURSE PRACTITIONER

## 2024-08-20 PROCEDURE — 82728 ASSAY OF FERRITIN: CPT | Performed by: NURSE PRACTITIONER

## 2024-08-20 PROCEDURE — 85025 COMPLETE CBC W/AUTO DIFF WBC: CPT | Performed by: NURSE PRACTITIONER

## 2024-08-20 PROCEDURE — 82784 ASSAY IGA/IGD/IGG/IGM EACH: CPT | Performed by: NURSE PRACTITIONER

## 2024-08-20 PROCEDURE — 1160F RVW MEDS BY RX/DR IN RCRD: CPT | Performed by: NURSE PRACTITIONER

## 2024-08-20 PROCEDURE — 84165 PROTEIN E-PHORESIS SERUM: CPT | Performed by: NURSE PRACTITIONER

## 2024-08-20 PROCEDURE — 83540 ASSAY OF IRON: CPT | Performed by: NURSE PRACTITIONER

## 2024-08-20 PROCEDURE — 83521 IG LIGHT CHAINS FREE EACH: CPT | Performed by: NURSE PRACTITIONER

## 2024-08-20 PROCEDURE — 80053 COMPREHEN METABOLIC PANEL: CPT | Performed by: NURSE PRACTITIONER

## 2024-08-20 PROCEDURE — 3074F SYST BP LT 130 MM HG: CPT | Performed by: NURSE PRACTITIONER

## 2024-08-20 PROCEDURE — 99214 OFFICE O/P EST MOD 30 MIN: CPT | Performed by: NURSE PRACTITIONER

## 2024-08-20 PROCEDURE — 84466 ASSAY OF TRANSFERRIN: CPT | Performed by: NURSE PRACTITIONER

## 2024-08-20 PROCEDURE — 1159F MED LIST DOCD IN RCRD: CPT | Performed by: NURSE PRACTITIONER

## 2024-08-20 PROCEDURE — 3078F DIAST BP <80 MM HG: CPT | Performed by: NURSE PRACTITIONER

## 2024-08-20 PROCEDURE — 86334 IMMUNOFIX E-PHORESIS SERUM: CPT | Performed by: NURSE PRACTITIONER

## 2024-08-20 RX ORDER — SODIUM CHLORIDE 9 MG/ML
20 INJECTION, SOLUTION INTRAVENOUS ONCE
OUTPATIENT
Start: 2024-08-20

## 2024-08-20 NOTE — PROGRESS NOTES
Venipuncture Blood Specimen Collection  Venipuncture performed in right arm by Rita Estrella MA with good hemostasis. Patient tolerated the procedure well without complications.   08/20/24   Rita Estrella MA

## 2024-08-20 NOTE — PROGRESS NOTES
DATE:  8/20/2024    DIAGNOSIS: MUKESH    CHIEF COMPLAINT:  Fatigue-Improved    TREATMENT HISTORY:  Ferrous Sulfate BID- Discontinued today    Feraheme Day 1 05/29/2024  Day 8 06/04/2024    HISTORY OF PRESENT ILLNESS:   Zaid Galarza is a very pleasant 73 y.o. male who is being seen today at the request of TRAVIS Venegas for evaluation and treatment of MUKESH. Mr. Galarza reports following with his PCP every 3 months with lab testing. He recently had labs drawn at his PCP office on 06/06/2023 showed Hg (7.8) and he was told he should go to [facility] ED. He presented to [facility] ED and was admitted and during admission he received a total of 4 units PRBCs. Of note, prior to this admission is H/H were within normal. General surgery was consulted while inpatient and they recommended to have EGD/colonoscopy as an outpatient since patient had no sign of acute blood loss. Iron panel and Ferritin obtained while inpatient was consistent with MUKESH and he was not started on oral Iron replacement. He continues to deny any obvious blood loss from any source. He is scheduled with Dr. Mireles (GI) on 02/27/2023 for possible EGD/colonoscopy. He reports weakness/fatigue, shortness of breath on exertion and dizziness. He denies any chest pain. He denies any other specific complaints today.        Interval History:  Mr. Galarza presents today for follow up of MUKESH. He was replaced with IV Feraheme in June 2024 and reports improved energy level. He continues to struggle with chronic fatigue but denies any worsening.   He denies any worsening shortness of breath on exertion, chest pain or dizziness. He denies any obvious blood loss from any source. He denies any specific complaints today.     The following portions of the patient's history were reviewed and updated as appropriate: allergies, current medications, past family history, past medical history, past social history, past surgical history and problem list.    PAST MEDICAL  HISTORY:  Past Medical History:   Diagnosis Date    Atrial fibrillation     CHF (congestive heart failure)     COPD (chronic obstructive pulmonary disease)     Coronary artery disease     Hypertension     Tobacco abuse        PAST SURGICAL HISTORY:  Past Surgical History:   Procedure Laterality Date    ABDOMINAL AORTIC ANEURYSM REPAIR      CARDIAC CATHETERIZATION N/A 10/03/2019    Procedure: Left Heart Cath;  Surgeon: Vincent Phillips MD;  Location:  [facility]ATION;  Service: Cardiology       SOCIAL HISTORY:  Social History     Socioeconomic History    Marital status: Single   Tobacco Use    Smoking status: Former     Current packs/day: 1.00     Types: Cigarettes    Smokeless tobacco: Never    Tobacco comments:     9/1/2022   Vaping Use    Vaping status: Never Used   Substance and Sexual Activity    Alcohol use: No    Drug use: No    Sexual activity: Defer         FAMILY HISTORY:  Family History   Problem Relation Age of Onset    Heart disease Mother     Stroke Father     Heart disease Father          MEDICATIONS:  The current medication list was reviewed in the EMR    Current Outpatient Medications:     apixaban (ELIQUIS) 5 MG tablet tablet, Take 1 tablet by mouth 2 (Two) Times a Day., Disp: 180 tablet, Rfl: 3    atorvastatin (LIPITOR) 80 MG tablet, Take 1 tablet by mouth Every Night., Disp: 90 tablet, Rfl: 3    Cyanocobalamin (Vitamin B12) 1000 MCG tablet controlled-release, Take 1 tablet by mouth Daily., Disp: , Rfl:     ferrous sulfate 325 (65 FE) MG tablet, 1 tablet., Disp: , Rfl:     metoprolol tartrate (LOPRESSOR) 25 MG tablet, Take 0.5 tablets by mouth 2 (Two) Times a Day., Disp: 90 tablet, Rfl: 3    pantoprazole (PROTONIX) 40 MG EC tablet, Take 1 tablet by mouth 2 (Two) Times a Day., Disp: 180 tablet, Rfl: 3    sacubitril-valsartan (Entresto) 24-26 MG tablet, Take 1 tablet by mouth 2 (Two) Times a Day., Disp: 180 tablet, Rfl: 3    sertraline (ZOLOFT) 100 MG tablet, Take 1.5 tablets by mouth  Daily., Disp: , Rfl:     Symbicort 160-4.5 MCG/ACT inhaler, Inhale 2 puffs 2 (Two) Times a Day., Disp: , Rfl:     vitamin B-12 (CYANOCOBALAMIN) 1000 MCG tablet, Take 1 tablet by mouth Daily., Disp: , Rfl:     ALLERGIES:  No Known Allergies      REVIEW OF SYSTEMS:    A comprehensive 14 point review of systems was performed.  Significant findings as mentioned above.  All other systems reviewed and are negative.        Physical Exam   Vital Signs:   Vitals:    08/20/24 1045   BP: 114/67   Pulse: 80   Resp: 20   Temp: 98 °F (36.7 °C)   SpO2: 95%     ECOG score: 0   General: Well developed, well nourished, alert and oriented x 3, in no acute distress. Ambulating with cane.   Head: ATNC   Eyes: PERRL, No evidence of conjunctivitis.   Nose: No nasal discharge.   Mouth: Oral mucosal membranes moist. No oral ulceration or hemorrhages.   Neck: Neck supple. No thyromegaly. No JVD.   Lungs: Clear in all fields to A&P without rales, rhonchi or wheezing.   Heart: S1, S2. Regular rate and rhythm. No murmurs, rubs, or gallops.   Abdomen: Soft. Bowel sounds are normoactive. Nontender with palpation. No Hepatosplenomegaly can be appreciated.   Extremities: No clubbing, cyanosis or edema bilaterally.   Integumentary: Warm, dry, intact.   Neurologic: Grossly non-focal exam.      Pain Score:  Pain Score    08/20/24 1045   PainSc: 0-No pain       PHQ-Score Total:  PHQ-9 Total Score:         PATHOLOGY:        ENDOSCOPY:        IMAGING:         RECENT LABS:  Lab Results   Component Value Date    WBC 12.16 (H) 05/28/2024    HGB 12.4 (L) 05/28/2024    HCT 39.3 05/28/2024    MCV 85.8 05/28/2024    RDW 14.1 05/28/2024     05/28/2024    NEUTRORELPCT 73.1 05/28/2024    LYMPHORELPCT 15.0 (L) 05/28/2024    MONORELPCT 9.0 05/28/2024    EOSRELPCT 2.2 05/28/2024    BASORELPCT 0.3 05/28/2024    NEUTROABS 8.87 (H) 05/28/2024    LYMPHSABS 1.83 05/28/2024       Lab Results   Component Value Date     05/28/2024    K 4.2 05/28/2024    CO2 27.1  05/28/2024     05/28/2024    BUN 16 05/28/2024    CREATININE 1.12 05/28/2024    EGFRIFNONA 61 02/09/2021    GLUCOSE 98 05/28/2024    CALCIUM 9.0 05/28/2024    ALKPHOS 136 (H) 05/28/2024    AST 14 05/28/2024    ALT 10 05/28/2024    BILITOT 0.3 05/28/2024    ALBUMIN 3.8 05/28/2024    PROTEINTOT 7.0 05/28/2024    MG 1.8 09/15/2023    PHOS 3.2 02/11/2023       Lab Results   Component Value Date     (H) 02/23/2023       Lab Results   Component Value Date    FERRITIN 40.51 05/28/2024    IRON 35 (L) 05/28/2024    TIBC 337 05/28/2024    LABIRON 10 (L) 05/28/2024    [id number] 1,373 (H) 12/20/2023    FOLATE 8.62 12/20/2023    HAPTOGLOBIN 183 02/23/2023    RETICCTPCT 1.20 02/23/2023    RETIC 0.0515 02/23/2023      Work Up 02/23/2023      Sed Rate  0 - 20 mm/hr 33 High      C-Reactive Protein  0.00 - 0.50 mg/dL <0.30      Zinc  44 - 115 ug/dL 97      Copper  69 - 132 ug/dL 113      Tissue Transglutaminase IgA  0 - 3 U/mL <2      TSH  0.270 - 4.200 uIU/mL 2.040      Color, UA  Yellow, Straw Yellow     Appearance, UA  Clear Clear  Clear    pH, UA  5.0 - 8.0 6.5  6.0    Specific Gravity, UA  1.005 - 1.030 1.019  1.017    Glucose, UA  Negative Negative  Negative R    Ketones, UA  Negative Negative  Negative R    Bilirubin, UA  Negative Negative  Negative    Blood, UA  Negative Negative  Negative    Protein, UA  Negative Trace Abnormal   Negative R    Leuk Esterase, UA  Negative Moderate (2+) Abnormal   Negative    Nitrite, UA  Negative Negative  Negative    Urobilinogen, UA  0.2 - 1.0 E.U./dL 1.0 E.U./dL  0.2          ASSESSMENT & PLAN:  Zaid Galarza is a very pleasant 73 y.o. male with    1. MUKESH  - He recently had labs drawn at his PCP office on 06/06/2023 showed Hg (7.8) and he was told he should go to [facility] ED. He presented to [facility] ED and was admitted and during admission he received a total of 4 units PRBCs. Of note, prior to this admission is H/H were within normal. Iron panel and Ferritin obtained while  inpatient was consistent with MUKESH.  - He underwent EGD/colonscopy per Dr. Mireles and pathology was negative. He reports exam was negative for any bleeding. He denies any obvious blood loss from any source.   - He is not taking oral iron supplements.  - CBC from initial consultation showed Hg (10.4) stable/improved. Iron panel and Ferritin were low and most consistent with MUKESH. Therefore, he was started on Ferrous Sulfate 325 mg BID. B12 and Folate were replete. No evidence of hemolysis as Retic and Haptoglobin are normal with mildly elevated LDH. ESR is elevated and suggestive of chronic underlying inflammation. CRP normal. TSH normal. Copper, Zinc and Tissue Transglutaminase was normal. UA was unremarkable for blood. PBS showed normocytic, hypochromic anemia with anisocytosis, no schistocytes identified, normal total WBC count with borderline eosinophilia, no granulocytic dysplasia or blasts, adequate platelets. SPEP/ENRRIQUE did not reveal any M-spike. SFLC showed elevated kappa/lambda ratio which could likely be related to CKD. B12 and Folate are replete.  - Repeat colonoscopy per Dr. Mireles in October was negative for bleeding per patient report (no records currently available, will request again today).  - Ferrous sulfate was discontinued for malabsorption. He was replaced with IV Feraheme x 2 doses in June 2024.  - Repeat CBC from today shows low Hg (11.1) with normal WBC and platelets. Iron panel and Ferritin from today are with consistent with MUKESH. Therefore, will replace with IV Feraheme, pending insurance approval.  - SPEP/ENRRIQUE and SFLC pending from today. Discussed with patient that CKD and Eliquis 5 mg BID could likely be contributing to chronic anemia. Will await most recent records from Dr. Mireles. May consider referral to GI for possible Pill Cam study.  - Will follow up in 3 months with CBC, CMP, Iron panel and Ferritin.      ACO / PURNIMA/Other  Quality measures  -  Zaid MOLINA Geovanni received 2023 flu vaccine.  -   Zaid Galarza reports a pain score of 0.    -  Current outpatient and discharge medications have been reconciled for the patient.  Reviewed by: TIFFANIE Baum                      I spent 30 minutes caring for Zaid on this date of service. This time includes time spent by me in the following activities: preparing for the visit, reviewing tests, obtaining and/or reviewing a separately obtained history, performing a medically appropriate examination and/or evaluation, counseling and educating the patient/family/caregiver, ordering medications, tests, or procedures, documenting information in the medical record, independently interpreting results and communicating that information with the patient/family/caregiver, and care coordination.                                 Electronically Signed by: TIFFANIE Shine APRN August 20, 2024 10:47 EDT       CC:   No ref. provider found  Rosi Thacker APRN

## 2024-08-21 LAB
KAPPA LC FREE SER-MCNC: 70.6 MG/L (ref 3.3–19.4)
KAPPA LC FREE/LAMBDA FREE SER: 1.67 {RATIO} (ref 0.26–1.65)
LAMBDA LC FREE SERPL-MCNC: 42.3 MG/L (ref 5.7–26.3)

## 2024-08-22 ENCOUNTER — OFFICE VISIT (OUTPATIENT)
Dept: CARDIOLOGY | Facility: CLINIC | Age: 74
End: 2024-08-22
Payer: MEDICARE

## 2024-08-22 VITALS
WEIGHT: 261 LBS | OXYGEN SATURATION: 96 % | HEART RATE: 79 BPM | BODY MASS INDEX: 34.59 KG/M2 | SYSTOLIC BLOOD PRESSURE: 103 MMHG | HEIGHT: 73 IN | DIASTOLIC BLOOD PRESSURE: 70 MMHG

## 2024-08-22 DIAGNOSIS — I42.0 DILATED CARDIOMYOPATHY: Chronic | ICD-10-CM

## 2024-08-22 DIAGNOSIS — I48.19 PERSISTENT ATRIAL FIBRILLATION: Chronic | ICD-10-CM

## 2024-08-22 DIAGNOSIS — I10 ESSENTIAL HYPERTENSION: Primary | Chronic | ICD-10-CM

## 2024-08-22 DIAGNOSIS — E78.5 DYSLIPIDEMIA, GOAL LDL BELOW 100: Chronic | ICD-10-CM

## 2024-08-22 LAB
ALBUMIN SERPL ELPH-MCNC: 3.4 G/DL (ref 2.9–4.4)
ALBUMIN/GLOB SERPL: 1.1 {RATIO} (ref 0.7–1.7)
ALPHA1 GLOB SERPL ELPH-MCNC: 0.3 G/DL (ref 0–0.4)
ALPHA2 GLOB SERPL ELPH-MCNC: 0.8 G/DL (ref 0.4–1)
B-GLOBULIN SERPL ELPH-MCNC: 1 G/DL (ref 0.7–1.3)
GAMMA GLOB SERPL ELPH-MCNC: 1.3 G/DL (ref 0.4–1.8)
GLOBULIN SER-MCNC: 3.4 G/DL (ref 2.2–3.9)
IGA SERPL-MCNC: 306 MG/DL (ref 61–437)
IGG SERPL-MCNC: 1393 MG/DL (ref 603–1613)
IGM SERPL-MCNC: 120 MG/DL (ref 15–143)
INTERPRETATION SERPL IEP-IMP: NORMAL
LABORATORY COMMENT REPORT: NORMAL
M PROTEIN SERPL ELPH-MCNC: NORMAL G/DL
PROT SERPL-MCNC: 6.8 G/DL (ref 6–8.5)

## 2024-08-22 PROCEDURE — 1159F MED LIST DOCD IN RCRD: CPT | Performed by: NURSE PRACTITIONER

## 2024-08-22 PROCEDURE — 93000 ELECTROCARDIOGRAM COMPLETE: CPT | Performed by: NURSE PRACTITIONER

## 2024-08-22 PROCEDURE — 99214 OFFICE O/P EST MOD 30 MIN: CPT | Performed by: NURSE PRACTITIONER

## 2024-08-22 PROCEDURE — 3074F SYST BP LT 130 MM HG: CPT | Performed by: NURSE PRACTITIONER

## 2024-08-22 PROCEDURE — 1160F RVW MEDS BY RX/DR IN RCRD: CPT | Performed by: NURSE PRACTITIONER

## 2024-08-22 PROCEDURE — 3078F DIAST BP <80 MM HG: CPT | Performed by: NURSE PRACTITIONER

## 2024-08-22 RX ORDER — SACUBITRIL AND VALSARTAN 24; 26 MG/1; MG/1
1 TABLET, FILM COATED ORAL 2 TIMES DAILY
Qty: 180 TABLET | Refills: 3 | Status: SHIPPED | OUTPATIENT
Start: 2024-08-22

## 2024-08-22 RX ORDER — METOPROLOL SUCCINATE 25 MG/1
25 TABLET, EXTENDED RELEASE ORAL DAILY
Qty: 90 TABLET | Refills: 3 | Status: SHIPPED | OUTPATIENT
Start: 2024-08-22

## 2024-08-22 NOTE — PROGRESS NOTES
"Chief Complaint  Follow-up (Shortness of breath, denies chest pain , no new symptoms )    Subjective          Zaid Galarza presents to [facility] for follow up.    History of Present Illness    Zaid Galarza was last seen in clinic on 2/22/2024.  At that visit he was stable and no changes were made to his medications.    At today's visit Zaid reports that he has 2 more infusions scheduled with hematology for his anemia.    He denies any chest pain, palpitations.  He denies any bright red blood per rectum or black tarry stools.  He does have chronic and not worsening dyspnea on minimal l exertion.  He continues to use wheelchair for any activity that requires prolonged walking.  He uses a cane otherwise.  He does report that he can walk around his apartment without getting short of breath.    Objective     Vital Signs:   /70 (BP Location: Left arm, Patient Position: Sitting, Cuff Size: Large Adult)   Pulse 79   Ht 185.4 cm (73\")   Wt 118 kg (261 lb)   SpO2 96%   BMI 34.43 kg/m²       Physical Exam  Constitutional:       Appearance: Normal appearance. He is well-developed.   Cardiovascular:      Rate and Rhythm: Normal rate. Rhythm irregular.      Heart sounds: No murmur heard.     No friction rub. No gallop.   Pulmonary:      Effort: Pulmonary effort is normal. No respiratory distress.      Breath sounds: Normal breath sounds. No wheezing or rales.   Skin:     General: Skin is warm and dry.   Neurological:      Mental Status: He is alert and oriented to person, place, and time.   Psychiatric:         Mood and Affect: Mood normal.         Behavior: Behavior normal.          Result Review :     CMP          12/20/2023    13:05 5/28/2024    14:13 8/20/2024    10:43   CMP   Glucose 128  98  133    BUN 21  16  18    Creatinine 1.31  1.12  1.36    EGFR 57.8  69.8  55.3    Sodium 139  139  141    Potassium 4.3  4.2  4.6    Chloride 102  103  102    Calcium 8.6  9.0  8.8  "   Total Protein   6.8    Total Protein 6.9  7.0  7.2    Albumin 3.7  3.8  3.8     3.4    Globulin   3.4    Globulin 3.2  3.2  3.4    Total Bilirubin 0.2  0.3  0.2    Alkaline Phosphatase 124  136  136    AST (SGOT) 17  14  15    ALT (SGPT) 11  10  12    Albumin/Globulin Ratio 1.2  1.2  1.1    BUN/Creatinine Ratio 16.0  14.3  13.2    Anion Gap 10.5  8.9  10.3      CBC          1/26/2024    13:53 5/28/2024    14:13 8/20/2024    10:43   CBC   WBC 8.06  12.16  10.35    RBC 4.57  4.58  4.22    Hemoglobin 11.7  12.4  11.1    Hematocrit 39.7  39.3  36.5    MCV 86.9  85.8  86.5    MCH 25.6  27.1  26.3    MCHC 29.5  31.6  30.4    RDW 15.8  14.1  14.6    Platelets 165  205  229      Lipid Panel          9/15/2023    12:02   Lipid Panel   Total Cholesterol 88    Triglycerides 67    HDL Cholesterol 39    VLDL Cholesterol 15    LDL Cholesterol  34    LDL/HDL Ratio 0.91           ECG 12 Lead    Date/Time: 8/22/2024 5:37 PM  Performed by: Christy Malone APRN    Authorized by: Christy Malone APRN  Comparison: compared with previous ECG from 9/17/2023  Similar to previous ECG  Rhythm: atrial fibrillation  Rate: normal  BPM: 85  Other findings: non-specific ST-T wave changes  Comments: QTc 401           Most recent echocardiogram  Results for orders placed during the hospital encounter of 12/05/23    Adult Transthoracic Echo Complete W/ Cont if Necessary Per Protocol    Interpretation Summary    Left ventricular systolic function is hyperdynamic (EF > 70%). Calculated left ventricular EF = 72% Left ventricular ejection fraction appears to be greater than 70%.    Left ventricular wall thickness is consistent with mild concentric hypertrophy.    Left ventricular diastolic function is consistent with (grade II w/high LAP) pseudonormalization.      Most recent Stress Test       Most recent Cardiac Cath  Results for orders placed during the hospital encounter of 09/28/19    Cardiac Catheterization/Vascular Study    Narrative  Table  formatting from the original result was not included.  DATE OF PROCEDURE: 10/3/2019    INDICATION FOR PROCEDURE: {Cardiomyopathy, severe LV dysfunction, fib , flutter    PROCEDURE PERFORMED:    1. Coronary Angiogram  2. Left heart catheretization    PROCEDURE COMMENTS:    Zaid Galarza was brought to the cath lab and placed on the cardiac catherization table in the postabsortive state. The patient was prepped and draped according to the cath lab protocol under strict aseptic and sterile condition. Patient's right femoral artery sight was prepped and draped. Under fluoroscopic guidance the right femoral artery was punctured using a 21 gauge needle utilizing the modified Seldinger technique. 6 [place] sheath was introduced over a wire. After aspirating for blood it was flushed with heparinized saline.    We advanced Torres type diagnostic catheters over the wire. The  left and right coronary arteries  were selectively engaged. Left and right coronary angiogram were obtained in multiple orthogonal projections.    After completion of the procedure the femoral sheath was removed in the cath lab and hemostasis was achieved by Angioseal. The patient tolerated the procedure without complications.      FINDINGS:    1. HEMODYNAMICS:  LVEDP 10 mmHg, no gradient across the aortic valve.      2. CORONARY ANGIOGRAPHY: Dominant dominant coronary artery system.    The left main coronary artery bifurcated into the LAD and left circumflex coronary.  The LAD coursed in the anterior interventricular groove, gave rise to diagonal branches and reached the apex.    Left circumflex coursed in the left atrial ventricular groove and gives rise to several marginal branches.    The right coronary artery course in the right atrial ventricular groove I gave rise to several acute marginal branches.    Left main: Normal    LAD: 20 to 30% diffuse disease in LADLeft circumflex: 30% focal mid circumflex  RCA: Large size dominant vessel proximal  40% and distal 40% focal stenosisPDA: Mild 20%    Final impression:  Mild to moderate CAD  3+ mitral regurgitation  RECOMMENDATIONS:  Aspirin  Beta-blocker  Statin  ACE inhibitor  Continue anticoagulant  Repeat ANGELITO after 6 to 8 weeks  If atrial thrombus resolved then proceed with  Ablation  I believe once the arrhythmia is controlled patient ejection fraction will tend to increase    Vincent Phillips MD  10/03/19  9:38 AM      Current Outpatient Medications   Medication Sig Dispense Refill    apixaban (ELIQUIS) 5 MG tablet tablet Take 1 tablet by mouth 2 (Two) Times a Day. 180 tablet 3    atorvastatin (LIPITOR) 80 MG tablet Take 1 tablet by mouth Every Night. 90 tablet 3    Cyanocobalamin (Vitamin B12) 1000 MCG tablet controlled-release Take 1 tablet by mouth Daily.      ferrous sulfate 325 (65 FE) MG tablet 1 tablet.      pantoprazole (PROTONIX) 40 MG EC tablet Take 1 tablet by mouth 2 (Two) Times a Day. 180 tablet 3    sacubitril-valsartan (Entresto) 24-26 MG tablet Take 1 tablet by mouth 2 (Two) Times a Day. 180 tablet 3    sertraline (ZOLOFT) 100 MG tablet Take 1.5 tablets by mouth Daily.      Symbicort 160-4.5 MCG/ACT inhaler Inhale 2 puffs 2 (Two) Times a Day.      vitamin B-12 (CYANOCOBALAMIN) 1000 MCG tablet Take 1 tablet by mouth Daily.      metoprolol succinate XL (TOPROL-XL) 25 MG 24 hr tablet Take 1 tablet by mouth Daily. 90 tablet 3     No current facility-administered medications for this visit.            Assessment and Plan    Problem List Items Addressed This Visit          Cardiac and Vasculature    Dilated cardiomyopathy (Chronic)    Overview     TTE: LVEF 26 to 30%, RV and LV mildly dilated, grade 3 diastolic dysfunction consistent with fixed restrictive pattern, mild TR, left atrial cavity mildly dilated right atrial cavity moderately dilated, moderate pulmonary hypertension with RVSP 47 mmHg  9/30/2019 ANGELITO: Severe cardiomyopathy likely from atrial flutter  2/11/2020 TTE LVEF 36 to 40%  5/6/2020  TTE LVEF 56 to 60%, mild concentric LVH  9/2/2022 TTE: LVEF 51 to 55%, moderate sclerosis of both the mitral and aortic valve with no significant regurgitation noted.  Left atrium is mildly enlarged.         Relevant Medications    sacubitril-valsartan (Entresto) 24-26 MG tablet    metoprolol succinate XL (TOPROL-XL) 25 MG 24 hr tablet    Persistent atrial fibrillation (Chronic)    Overview     [id number] is at least 6 for heart failure, hypertension, CVA, nonobstructive CAD, and age         Relevant Medications    sacubitril-valsartan (Entresto) 24-26 MG tablet    metoprolol succinate XL (TOPROL-XL) 25 MG 24 hr tablet    Other Relevant Orders    ECG 12 Lead    Essential hypertension - Primary (Chronic)    Relevant Medications    metoprolol succinate XL (TOPROL-XL) 25 MG 24 hr tablet    Dyslipidemia, goal LDL below 100 (Chronic)    Overview     9/2/2022 total cholesterol 104, triglycerides 89, HDL 31, and LDL 55  10/28/2023 total cholesterol 107, triglycerides 122, HDL 45, and LDL 40 ([facility])                Follow Up     Medications were reviewed with the patient.    Nonobstructive coronary artery disease is stable.  Patient is to continue atorvastatin.  Since he is not on Eliquis and has anemia, no aspirin has been prescribed.    With regard to dilated cardiomyopathy with recovered LVEF he is to continue metoprolol succinate, Entresto.    Continue Eliquis for stroke prophylaxis in the presence of paroxysmal atrial fibrillation.  Metoprolol tartrate has been switched to metoprolol succinate.    Hypertension is controlled, I have asked him to monitor his blood pressures at home due to the change in metoprolol formulation today.    Return in about 6 months (around 2/22/2025).    Patient was given instructions and counseling regarding his condition or for health maintenance advice. Please see specific information pulled into the AVS if appropriate.

## 2024-08-29 ENCOUNTER — INFUSION (OUTPATIENT)
Dept: ONCOLOGY | Facility: HOSPITAL | Age: 74
End: 2024-08-29
Payer: MEDICARE

## 2024-08-29 VITALS
WEIGHT: 260 LBS | BODY MASS INDEX: 34.3 KG/M2 | TEMPERATURE: 98.1 F | DIASTOLIC BLOOD PRESSURE: 75 MMHG | RESPIRATION RATE: 18 BRPM | SYSTOLIC BLOOD PRESSURE: 105 MMHG | HEART RATE: 70 BPM | OXYGEN SATURATION: 95 %

## 2024-08-29 DIAGNOSIS — K90.9 INTESTINAL MALABSORPTION, UNSPECIFIED TYPE: Primary | ICD-10-CM

## 2024-08-29 DIAGNOSIS — D50.9 IRON DEFICIENCY ANEMIA, UNSPECIFIED IRON DEFICIENCY ANEMIA TYPE: ICD-10-CM

## 2024-08-29 PROCEDURE — 25010000002 FERUMOXYTOL 510 MG/17ML SOLUTION: Performed by: NURSE PRACTITIONER

## 2024-08-29 PROCEDURE — 96365 THER/PROPH/DIAG IV INF INIT: CPT

## 2024-08-29 PROCEDURE — 25810000003 SODIUM CHLORIDE 0.9 % SOLUTION: Performed by: NURSE PRACTITIONER

## 2024-08-29 RX ORDER — SODIUM CHLORIDE 9 MG/ML
20 INJECTION, SOLUTION INTRAVENOUS ONCE
Status: COMPLETED | OUTPATIENT
Start: 2024-08-29 | End: 2024-08-29

## 2024-08-29 RX ADMIN — FERUMOXYTOL 510 MG: 510 INJECTION INTRAVENOUS at 15:52

## 2024-08-29 RX ADMIN — SODIUM CHLORIDE 20 ML/HR: 9 INJECTION, SOLUTION INTRAVENOUS at 15:52

## 2024-09-05 ENCOUNTER — INFUSION (OUTPATIENT)
Dept: ONCOLOGY | Facility: HOSPITAL | Age: 74
End: 2024-09-05
Payer: MEDICARE

## 2024-09-05 VITALS
HEART RATE: 69 BPM | OXYGEN SATURATION: 97 % | DIASTOLIC BLOOD PRESSURE: 65 MMHG | TEMPERATURE: 98.2 F | SYSTOLIC BLOOD PRESSURE: 99 MMHG | RESPIRATION RATE: 18 BRPM

## 2024-09-05 DIAGNOSIS — K90.9 INTESTINAL MALABSORPTION, UNSPECIFIED TYPE: Primary | ICD-10-CM

## 2024-09-05 DIAGNOSIS — D50.9 IRON DEFICIENCY ANEMIA, UNSPECIFIED IRON DEFICIENCY ANEMIA TYPE: ICD-10-CM

## 2024-09-05 PROCEDURE — 25810000003 SODIUM CHLORIDE 0.9 % SOLUTION: Performed by: NURSE PRACTITIONER

## 2024-09-05 PROCEDURE — 96374 THER/PROPH/DIAG INJ IV PUSH: CPT

## 2024-09-05 PROCEDURE — 25010000002 FERUMOXYTOL 510 MG/17ML SOLUTION: Performed by: NURSE PRACTITIONER

## 2024-09-05 RX ORDER — SODIUM CHLORIDE 9 MG/ML
20 INJECTION, SOLUTION INTRAVENOUS ONCE
Status: COMPLETED | OUTPATIENT
Start: 2024-09-05 | End: 2024-09-05

## 2024-09-05 RX ADMIN — FERUMOXYTOL 510 MG: 510 INJECTION INTRAVENOUS at 15:41

## 2024-09-05 RX ADMIN — SODIUM CHLORIDE 20 ML/HR: 9 INJECTION, SOLUTION INTRAVENOUS at 15:41

## 2024-10-30 ENCOUNTER — APPOINTMENT (OUTPATIENT)
Dept: ULTRASOUND IMAGING | Facility: HOSPITAL | Age: 74
End: 2024-10-30
Payer: MEDICARE

## 2024-10-30 ENCOUNTER — HOSPITAL ENCOUNTER (EMERGENCY)
Facility: HOSPITAL | Age: 74
Discharge: HOME | End: 2024-10-30
Attending: EMERGENCY MEDICINE
Payer: MEDICARE

## 2024-10-30 ENCOUNTER — TELEPHONE (OUTPATIENT)
Dept: CARDIOLOGY | Facility: CLINIC | Age: 74
End: 2024-10-30
Payer: MEDICARE

## 2024-10-30 VITALS
OXYGEN SATURATION: 94 % | SYSTOLIC BLOOD PRESSURE: 112 MMHG | DIASTOLIC BLOOD PRESSURE: 70 MMHG | RESPIRATION RATE: 18 BRPM | WEIGHT: 260.14 LBS | TEMPERATURE: 98.1 F | HEART RATE: 89 BPM | HEIGHT: 72 IN | BODY MASS INDEX: 35.24 KG/M2

## 2024-10-30 DIAGNOSIS — R60.0 LEG EDEMA, LEFT: ICD-10-CM

## 2024-10-30 DIAGNOSIS — L60.9 NAIL PROBLEM: Primary | ICD-10-CM

## 2024-10-30 PROCEDURE — 99284 EMERGENCY DEPT VISIT MOD MDM: CPT

## 2024-10-30 PROCEDURE — 93971 EXTREMITY STUDY: CPT

## 2024-10-30 RX ORDER — CEPHALEXIN 500 MG/1
500 CAPSULE ORAL 4 TIMES DAILY
Qty: 40 CAPSULE | Refills: 0 | Status: SHIPPED | OUTPATIENT
Start: 2024-10-30

## 2024-10-30 RX ADMIN — CEPHALEXIN 500 MG: 250 CAPSULE ORAL at 19:17

## 2024-10-30 NOTE — ED TRIAGE NOTES
MEDICAL SCREENING:    Reason for Visit: Left Leg Swelling    Patient initially seen in triage.  The patient was advised further evaluation and diagnostic testing will be needed, some of the treatment and testing will be initiated in the lobby in order to begin the process.  The patient will be returned to the waiting area for the time being and possibly be re-assessed by a subsequent ED provider.  The patient will be brought back to the treatment area in as timely manner as possible.

## 2024-10-30 NOTE — TELEPHONE ENCOUNTER
"Patient called in and states he has noticed swelling to his left leg. He is concerned and not sure if he needs to see this office or his PCP, or possibly his \"foot doctor\" . Patient is requesting instruction from TIFFANIE Harrison.   "

## 2024-11-12 ENCOUNTER — TELEPHONE (OUTPATIENT)
Dept: CARDIOLOGY | Facility: CLINIC | Age: 74
End: 2024-11-12

## 2024-11-12 ENCOUNTER — LAB (OUTPATIENT)
Dept: ONCOLOGY | Facility: CLINIC | Age: 74
End: 2024-11-12
Payer: MEDICARE

## 2024-11-12 ENCOUNTER — OFFICE VISIT (OUTPATIENT)
Dept: CARDIOLOGY | Facility: CLINIC | Age: 74
End: 2024-11-12
Payer: MEDICARE

## 2024-11-12 ENCOUNTER — HOSPITAL ENCOUNTER (OUTPATIENT)
Facility: HOSPITAL | Age: 74
Discharge: HOME | End: 2024-11-12
Admitting: NURSE PRACTITIONER
Payer: MEDICARE

## 2024-11-12 ENCOUNTER — OFFICE VISIT (OUTPATIENT)
Dept: ONCOLOGY | Facility: CLINIC | Age: 74
End: 2024-11-12
Payer: MEDICARE

## 2024-11-12 VITALS
HEART RATE: 96 BPM | OXYGEN SATURATION: 97 % | HEIGHT: 72 IN | BODY MASS INDEX: 35.62 KG/M2 | DIASTOLIC BLOOD PRESSURE: 65 MMHG | WEIGHT: 263 LBS | SYSTOLIC BLOOD PRESSURE: 102 MMHG

## 2024-11-12 VITALS
TEMPERATURE: 98 F | SYSTOLIC BLOOD PRESSURE: 104 MMHG | BODY MASS INDEX: 35.49 KG/M2 | WEIGHT: 262 LBS | DIASTOLIC BLOOD PRESSURE: 63 MMHG | OXYGEN SATURATION: 100 % | HEIGHT: 72 IN | RESPIRATION RATE: 20 BRPM | HEART RATE: 75 BPM

## 2024-11-12 DIAGNOSIS — E78.5 DYSLIPIDEMIA, GOAL LDL BELOW 100: Chronic | ICD-10-CM

## 2024-11-12 DIAGNOSIS — K90.9 INTESTINAL MALABSORPTION, UNSPECIFIED TYPE: ICD-10-CM

## 2024-11-12 DIAGNOSIS — I42.0 DILATED CARDIOMYOPATHY: Chronic | ICD-10-CM

## 2024-11-12 DIAGNOSIS — R22.42 LOCALIZED SWELLING OF LEFT LOWER EXTREMITY: ICD-10-CM

## 2024-11-12 DIAGNOSIS — D50.9 IRON DEFICIENCY ANEMIA, UNSPECIFIED IRON DEFICIENCY ANEMIA TYPE: ICD-10-CM

## 2024-11-12 DIAGNOSIS — I48.19 PERSISTENT ATRIAL FIBRILLATION: Chronic | ICD-10-CM

## 2024-11-12 DIAGNOSIS — I10 ESSENTIAL HYPERTENSION: Primary | Chronic | ICD-10-CM

## 2024-11-12 DIAGNOSIS — D50.9 IRON DEFICIENCY ANEMIA, UNSPECIFIED IRON DEFICIENCY ANEMIA TYPE: Primary | ICD-10-CM

## 2024-11-12 LAB
ALBUMIN SERPL-MCNC: 3.6 G/DL (ref 3.5–5.2)
ALBUMIN/GLOB SERPL: 1.1 G/DL
ALP SERPL-CCNC: 135 U/L (ref 39–117)
ALT SERPL W P-5'-P-CCNC: 9 U/L (ref 1–41)
ANION GAP SERPL CALCULATED.3IONS-SCNC: 11.5 MMOL/L (ref 5–15)
AST SERPL-CCNC: 14 U/L (ref 1–40)
BASOPHILS # BLD AUTO: 0.04 10*3/MM3 (ref 0–0.2)
BASOPHILS NFR BLD AUTO: 0.4 % (ref 0–1.5)
BILIRUB SERPL-MCNC: 0.2 MG/DL (ref 0–1.2)
BUN SERPL-MCNC: 17 MG/DL (ref 8–23)
BUN/CREAT SERPL: 14.2 (ref 7–25)
CALCIUM SPEC-SCNC: 8.7 MG/DL (ref 8.6–10.5)
CHLORIDE SERPL-SCNC: 106 MMOL/L (ref 98–107)
CO2 SERPL-SCNC: 25.5 MMOL/L (ref 22–29)
CREAT SERPL-MCNC: 1.2 MG/DL (ref 0.76–1.27)
DEPRECATED RDW RBC AUTO: 53.4 FL (ref 37–54)
EGFRCR SERPLBLD CKD-EPI 2021: 63.9 ML/MIN/1.73
EOSINOPHIL # BLD AUTO: 0.35 10*3/MM3 (ref 0–0.4)
EOSINOPHIL NFR BLD AUTO: 3.5 % (ref 0.3–6.2)
ERYTHROCYTE [DISTWIDTH] IN BLOOD BY AUTOMATED COUNT: 17.2 % (ref 12.3–15.4)
FERRITIN SERPL-MCNC: 25.35 NG/ML (ref 30–400)
GLOBULIN UR ELPH-MCNC: 3.4 GM/DL
GLUCOSE SERPL-MCNC: 110 MG/DL (ref 65–99)
HCT VFR BLD AUTO: 31.9 % (ref 37.5–51)
HGB BLD-MCNC: 9.4 G/DL (ref 13–17.7)
IMM GRANULOCYTES # BLD AUTO: 0.05 10*3/MM3 (ref 0–0.05)
IMM GRANULOCYTES NFR BLD AUTO: 0.5 % (ref 0–0.5)
IRON 24H UR-MRATE: 24 MCG/DL (ref 59–158)
IRON SATN MFR SERPL: 7 % (ref 20–50)
LYMPHOCYTES # BLD AUTO: 1.5 10*3/MM3 (ref 0.7–3.1)
LYMPHOCYTES NFR BLD AUTO: 15.1 % (ref 19.6–45.3)
MCH RBC QN AUTO: 24.9 PG (ref 26.6–33)
MCHC RBC AUTO-ENTMCNC: 29.5 G/DL (ref 31.5–35.7)
MCV RBC AUTO: 84.4 FL (ref 79–97)
MONOCYTES # BLD AUTO: 0.78 10*3/MM3 (ref 0.1–0.9)
MONOCYTES NFR BLD AUTO: 7.8 % (ref 5–12)
NEUTROPHILS NFR BLD AUTO: 7.24 10*3/MM3 (ref 1.7–7)
NEUTROPHILS NFR BLD AUTO: 72.7 % (ref 42.7–76)
NRBC BLD AUTO-RTO: 0 /100 WBC (ref 0–0.2)
PLATELET # BLD AUTO: 231 10*3/MM3 (ref 140–450)
PMV BLD AUTO: 11.6 FL (ref 6–12)
POTASSIUM SERPL-SCNC: 4.6 MMOL/L (ref 3.5–5.2)
PROT SERPL-MCNC: 7 G/DL (ref 6–8.5)
RBC # BLD AUTO: 3.78 10*6/MM3 (ref 4.14–5.8)
SODIUM SERPL-SCNC: 143 MMOL/L (ref 136–145)
TIBC SERPL-MCNC: 341 MCG/DL (ref 298–536)
TRANSFERRIN SERPL-MCNC: 229 MG/DL (ref 200–360)
WBC NRBC COR # BLD AUTO: 9.96 10*3/MM3 (ref 3.4–10.8)

## 2024-11-12 PROCEDURE — 93971 EXTREMITY STUDY: CPT | Performed by: RADIOLOGY

## 2024-11-12 PROCEDURE — 1159F MED LIST DOCD IN RCRD: CPT | Performed by: NURSE PRACTITIONER

## 2024-11-12 PROCEDURE — 84466 ASSAY OF TRANSFERRIN: CPT | Performed by: NURSE PRACTITIONER

## 2024-11-12 PROCEDURE — 3078F DIAST BP <80 MM HG: CPT | Performed by: NURSE PRACTITIONER

## 2024-11-12 PROCEDURE — 93971 EXTREMITY STUDY: CPT

## 2024-11-12 PROCEDURE — 83540 ASSAY OF IRON: CPT | Performed by: NURSE PRACTITIONER

## 2024-11-12 PROCEDURE — 82728 ASSAY OF FERRITIN: CPT | Performed by: NURSE PRACTITIONER

## 2024-11-12 PROCEDURE — 1160F RVW MEDS BY RX/DR IN RCRD: CPT | Performed by: NURSE PRACTITIONER

## 2024-11-12 PROCEDURE — 3074F SYST BP LT 130 MM HG: CPT | Performed by: NURSE PRACTITIONER

## 2024-11-12 PROCEDURE — 80053 COMPREHEN METABOLIC PANEL: CPT | Performed by: NURSE PRACTITIONER

## 2024-11-12 PROCEDURE — 1126F AMNT PAIN NOTED NONE PRSNT: CPT | Performed by: NURSE PRACTITIONER

## 2024-11-12 PROCEDURE — 99214 OFFICE O/P EST MOD 30 MIN: CPT | Performed by: NURSE PRACTITIONER

## 2024-11-12 PROCEDURE — 85025 COMPLETE CBC W/AUTO DIFF WBC: CPT | Performed by: NURSE PRACTITIONER

## 2024-11-12 RX ORDER — FUROSEMIDE 40 MG/1
40 TABLET ORAL DAILY
Qty: 5 TABLET | Refills: 0 | Status: SHIPPED | OUTPATIENT
Start: 2024-11-12

## 2024-11-12 RX ORDER — METOPROLOL TARTRATE 25 MG/1
12.5 TABLET, FILM COATED ORAL 2 TIMES DAILY
COMMUNITY
Start: 2024-10-02

## 2024-11-12 RX ORDER — SODIUM CHLORIDE 9 MG/ML
20 INJECTION, SOLUTION INTRAVENOUS ONCE
OUTPATIENT
Start: 2024-11-12

## 2024-11-12 RX ORDER — ALBUTEROL SULFATE 90 UG/1
2 INHALANT RESPIRATORY (INHALATION) EVERY 4 HOURS PRN
COMMUNITY
Start: 2024-11-05

## 2024-11-12 RX ORDER — POTASSIUM CHLORIDE 1500 MG/1
20 TABLET, EXTENDED RELEASE ORAL 2 TIMES DAILY
Qty: 5 TABLET | Refills: 0 | Status: SHIPPED | OUTPATIENT
Start: 2024-11-12

## 2024-11-12 NOTE — PROGRESS NOTES
"Chief Complaint  Follow-up (Patient C/O swelling to the left leg, recent ER visit. Patient states even if he props the leg up he still has a lot of swelling )    Subjective          Zaid Galarza presents to [facility] for follow up.    History of Present Illness  History of Present Illness  The patient is a 74-year-old male with nonischemic cardiomyopathy, hypertension, persistent atrial fibrillation, and dyslipidemia. He also has nonobstructive coronary artery disease per catheterization in 2019. He is here for a follow-up after a recent visit to the emergency department (ED) on 10/30/2024 due to right toe pain and left lower extremity swelling.    He noticed a change in his left leg after showering last Tuesday. Despite visiting his primary care physician, the cause remains unknown. An ultrasound performed in the ED ruled out Deep Vein Thrombosis (DVT). He recalls falling on his knee a week prior while taking out the garbage but did not lose consciousness. He has been elevating his leg at home, but the swelling persists.    He has been seeing a podiatrist for a toe issue, which included a bleeding toenail. He has an upcoming appointment with the podiatrist on 11/26/2024. He has been soaking his right foot in Epsom salt and reports no pain when walking.    He is currently taking Entresto twice daily, metoprolol once daily, atorvastatin, and Eliquis. He reports no bleeding issues with Eliquis, no bright red blood in his stool, and no black tarry stools. He does not experience any chest pain but does have shortness of breath, which he believes is consistent with his usual condition. He does not see a pulmonologist.    He has difficulty bending down to  items and is hesitant to start driving again. He has an appointment with Heme/Onc TIFFANIE Hilliard.     Objective     Vital Signs:   /65   Pulse 96   Ht 182.9 cm (72\")   Wt 119 kg (263 lb)   SpO2 97%   BMI 35.67 " kg/m²       Physical Exam  Vitals reviewed.   Constitutional:       Appearance: Normal appearance. He is well-developed.   Cardiovascular:      Rate and Rhythm: Normal rate and regular rhythm.      Heart sounds: No murmur heard.     No friction rub. No gallop.   Pulmonary:      Effort: Pulmonary effort is normal. No respiratory distress.      Breath sounds: Normal breath sounds. No wheezing or rales.   Musculoskeletal:      Left lower leg: 3+ Pitting Edema present.      Comments: No visible injury or swelling to the left knee.  FROM without pain or tenderness   Skin:     General: Skin is warm and dry.   Neurological:      Mental Status: He is alert and oriented to person, place, and time.   Psychiatric:         Mood and Affect: Mood normal.         Behavior: Behavior normal.        Physical Exam  Left foot exhibits 3+ pitting edema. Both feet are cold, with more swelling on the left side than the right. No pitting edema on the right foot.    Result Review :                Most recent echocardiogram  Results for orders placed during the hospital encounter of 12/05/23    Adult Transthoracic Echo Complete W/ Cont if Necessary Per Protocol    Interpretation Summary    Left ventricular systolic function is hyperdynamic (EF > 70%). Calculated left ventricular EF = 72% Left ventricular ejection fraction appears to be greater than 70%.    Left ventricular wall thickness is consistent with mild concentric hypertrophy.    Left ventricular diastolic function is consistent with (grade II w/high LAP) pseudonormalization.      Most recent Stress Test       Most recent Cardiac Cath  Results for orders placed during the hospital encounter of 09/28/19    Cardiac Catheterization/Vascular Study    Narrative  Table formatting from the original result was not included.  DATE OF PROCEDURE: 10/3/2019    INDICATION FOR PROCEDURE: {Cardiomyopathy, severe LV dysfunction, fib , flutter    PROCEDURE PERFORMED:    1. Coronary Angiogram  2. Left  heart catheretization    PROCEDURE COMMENTS:    Zaid Galarza was brought to the cath lab and placed on the cardiac catherization table in the postabsortive state. The patient was prepped and draped according to the cath lab protocol under strict aseptic and sterile condition. Patient's right femoral artery sight was prepped and draped. Under fluoroscopic guidance the right femoral artery was punctured using a 21 gauge needle utilizing the modified Seldinger technique. 6 [place] sheath was introduced over a wire. After aspirating for blood it was flushed with heparinized saline.    We advanced Torres type diagnostic catheters over the wire. The  left and right coronary arteries  were selectively engaged. Left and right coronary angiogram were obtained in multiple orthogonal projections.    After completion of the procedure the femoral sheath was removed in the cath lab and hemostasis was achieved by Angioseal. The patient tolerated the procedure without complications.      FINDINGS:    1. HEMODYNAMICS:  LVEDP 10 mmHg, no gradient across the aortic valve.      2. CORONARY ANGIOGRAPHY: Dominant dominant coronary artery system.    The left main coronary artery bifurcated into the LAD and left circumflex coronary.  The LAD coursed in the anterior interventricular groove, gave rise to diagonal branches and reached the apex.    Left circumflex coursed in the left atrial ventricular groove and gives rise to several marginal branches.    The right coronary artery course in the right atrial ventricular groove I gave rise to several acute marginal branches.    Left main: Normal    LAD: 20 to 30% diffuse disease in LADLeft circumflex: 30% focal mid circumflex  RCA: Large size dominant vessel proximal 40% and distal 40% focal stenosisPDA: Mild 20%    Final impression:  Mild to moderate CAD  3+ mitral regurgitation  RECOMMENDATIONS:  Aspirin  Beta-blocker  Statin  ACE inhibitor  Continue anticoagulant  Repeat ANGELITO after 6 to 8  weeks  If atrial thrombus resolved then proceed with  Ablation  I believe once the arrhythmia is controlled patient ejection fraction will tend to increase    Vincent Phillips MD  10/03/19  9:38 AM      Current Outpatient Medications   Medication Sig Dispense Refill    apixaban (ELIQUIS) 5 MG tablet tablet Take 1 tablet by mouth 2 (Two) Times a Day. 180 tablet 3    atorvastatin (LIPITOR) 80 MG tablet Take 1 tablet by mouth Every Night. 90 tablet 3    cephalexin (KEFLEX) 500 MG capsule Take 1 capsule by mouth 4 (Four) Times a Day. 40 capsule 0    Cyanocobalamin (Vitamin B12) 1000 MCG tablet controlled-release Take 1 tablet by mouth Daily.      ferrous sulfate 325 (65 FE) MG tablet 1 tablet. (Patient not taking: Reported on 11/12/2024)      metoprolol succinate XL (TOPROL-XL) 25 MG 24 hr tablet Take 1 tablet by mouth Daily. 90 tablet 3    pantoprazole (PROTONIX) 40 MG EC tablet Take 1 tablet by mouth 2 (Two) Times a Day. 180 tablet 3    sacubitril-valsartan (Entresto) 24-26 MG tablet Take 1 tablet by mouth 2 (Two) Times a Day. 180 tablet 3    sertraline (ZOLOFT) 100 MG tablet Take 1.5 tablets by mouth Daily.      Symbicort 160-4.5 MCG/ACT inhaler Inhale 2 puffs 2 (Two) Times a Day.      albuterol sulfate  (90 Base) MCG/ACT inhaler Inhale 2 puffs Every 4 (Four) Hours As Needed.      furosemide (LASIX) 40 MG tablet Take 1 tablet by mouth Daily. 5 tablet 0    metoprolol tartrate (LOPRESSOR) 25 MG tablet Take 0.5 tablets by mouth 2 (Two) Times a Day.      potassium chloride (K-TAB) 20 MEQ tablet controlled-release ER tablet Take 1 tablet by mouth 2 (Two) Times a Day. 5 tablet 0    vitamin B-12 (CYANOCOBALAMIN) 1000 MCG tablet Take 1 tablet by mouth Daily.       No current facility-administered medications for this visit.            Assessment and Plan    Problem List Items Addressed This Visit          Cardiac and Vasculature    Dilated cardiomyopathy (Chronic)    Overview     TTE: LVEF 26 to 30%, RV and LV mildly  dilated, grade 3 diastolic dysfunction consistent with fixed restrictive pattern, mild TR, left atrial cavity mildly dilated right atrial cavity moderately dilated, moderate pulmonary hypertension with RVSP 47 mmHg  9/30/2019 ANGELITO: Severe cardiomyopathy likely from atrial flutter  2/11/2020 TTE LVEF 36 to 40%  5/6/2020 TTE LVEF 56 to 60%, mild concentric LVH  9/2/2022 TTE: LVEF 51 to 55%, moderate sclerosis of both the mitral and aortic valve with no significant regurgitation noted.  Left atrium is mildly enlarged.         Persistent atrial fibrillation (Chronic)    Overview     [id number] is at least 6 for heart failure, hypertension, CVA, nonobstructive CAD, and age         Essential hypertension - Primary (Chronic)    Relevant Medications    furosemide (LASIX) 40 MG tablet    Dyslipidemia, goal LDL below 100 (Chronic)    Overview     9/2/2022 total cholesterol 104, triglycerides 89, HDL 31, and LDL 55  10/28/2023 total cholesterol 107, triglycerides 122, HDL 45, and LDL 40 ([facility])          Other Visit Diagnoses       Localized swelling of left lower extremity        Relevant Orders    US Venous Doppler Lower Extremity Left (duplex)            Assessment & Plan  1. Left lower extremity swelling.  The patient's left lower extremity swelling is concerning for potential deep vein thrombosis (DVT). He reports no injury to the leg, but he did fall on his knee about a week before the swelling started. A venous Doppler of the left lower extremity will be ordered to rule out DVT. Lasix will be prescribed for 5 days, to be taken in the morning, along with potassium tablets. He should take the Lasix and potassium together in the morning to avoid nocturnal diuresis. The patient will be contacted later today with the results of the Doppler and again on Friday afternoon to assess the swelling.    2. Persistent atrial fibrillation.  He is currently on Eliquis and reports no issues with bleeding. He should continue his  current medication regimen.    3. Hypertension.  He is currently on metoprolol and should continue his current medication regimen.    4. Dyslipidemia.  He is currently on atorvastatin and should continue his current medication regimen.    5. Nonischemic cardiomyopathy.  He is currently on Entresto and should continue his current medication regimen.    Follow-up  Return in the week after Thanksgiving for follow-up.         Follow Up     Medications were reviewed with the patient.    Return in about 3 weeks (around 12/3/2024).    Patient was given instructions and counseling regarding his condition or for health maintenance advice. Please see specific information pulled into the AVS if appropriate.     Patient or patient representative verbalized consent for the use of Ambient Listening during the visit with  TIFFANIE Cox for chart documentation. 11/12/2024  12:53 EST

## 2024-11-12 NOTE — PROGRESS NOTES
DATE:  11/12/2024    DIAGNOSIS: MUKESH    CHIEF COMPLAINT:  Worsening Fatigue; Left lower extremity swelling    TREATMENT HISTORY:  Ferrous Sulfate BID- Discontinued today    Feraheme Day 1 05/29/2024  Day 8 06/04/2024  Feraheme Day 1 08/29/2024  Day 8 09/05/2024    HISTORY OF PRESENT ILLNESS:   Zaid Galarza is a very pleasant 74 y.o. male who is being seen today at the request of TRAVIS Venegas for evaluation and treatment of MUKESH. Mr. Galarza reports following with his PCP every 3 months with lab testing. He recently had labs drawn at his PCP office on 06/06/2023 showed Hg (7.8) and he was told he should go to [facility] ED. He presented to [facility] ED and was admitted and during admission he received a total of 4 units PRBCs. Of note, prior to this admission is H/H were within normal. General surgery was consulted while inpatient and they recommended to have EGD/colonoscopy as an outpatient since patient had no sign of acute blood loss. Iron panel and Ferritin obtained while inpatient was consistent with MUKESH and he was not started on oral Iron replacement. He continues to deny any obvious blood loss from any source. He is scheduled with Dr. Mireles (GI) on 02/27/2023 for possible EGD/colonoscopy. He reports weakness/fatigue, shortness of breath on exertion and dizziness. He denies any chest pain. He denies any other specific complaints today.        Interval History:  Mr. Galarza presents today for follow up of MUKESH. He was replaced with IV Feraheme in September 2024 and reports improved energy level at that time. However, he reports that for the last two weeks he has had worsening fatigue. He denies any worsening shortness of breath on exertion, chest pain or dizziness. He denies any obvious blood loss from any source. His main complaint today is left lower extremity swelling. He was evaluated by cardiology this morning and is scheduled for ultrasound later today. He denies any other specific complaints today.      The following portions of the patient's history were reviewed and updated as appropriate: allergies, current medications, past family history, past medical history, past social history, past surgical history and problem list.    PAST MEDICAL HISTORY:  Past Medical History:   Diagnosis Date    Atrial fibrillation     CHF (congestive heart failure)     COPD (chronic obstructive pulmonary disease)     Coronary artery disease     Hypertension     Tobacco abuse        PAST SURGICAL HISTORY:  Past Surgical History:   Procedure Laterality Date    ABDOMINAL AORTIC ANEURYSM REPAIR      CARDIAC CATHETERIZATION N/A 10/03/2019    Procedure: Left Heart Cath;  Surgeon: Vincent Phillips MD;  Location: [facility] CATH INVASIVE LOCATION;  Service: Cardiology       SOCIAL HISTORY:  Social History     Socioeconomic History    Marital status: Single   Tobacco Use    Smoking status: Former     Current packs/day: 1.00     Types: Cigarettes    Smokeless tobacco: Never    Tobacco comments:     9/1/2022   Vaping Use    Vaping status: Never Used   Substance and Sexual Activity    Alcohol use: No    Drug use: No    Sexual activity: Defer         FAMILY HISTORY:  Family History   Problem Relation Age of Onset    Heart disease Mother     Stroke Father     Heart disease Father          MEDICATIONS:  The current medication list was reviewed in the EMR    Current Outpatient Medications:     albuterol sulfate  (90 Base) MCG/ACT inhaler, Inhale 2 puffs Every 4 (Four) Hours As Needed., Disp: , Rfl:     apixaban (ELIQUIS) 5 MG tablet tablet, Take 1 tablet by mouth 2 (Two) Times a Day., Disp: 180 tablet, Rfl: 3    atorvastatin (LIPITOR) 80 MG tablet, Take 1 tablet by mouth Every Night., Disp: 90 tablet, Rfl: 3    Cyanocobalamin (Vitamin B12) 1000 MCG tablet controlled-release, Take 1 tablet by mouth Daily., Disp: , Rfl:     furosemide (LASIX) 40 MG tablet, Take 1 tablet by mouth Daily., Disp: 5 tablet, Rfl: 0    metoprolol succinate XL  (TOPROL-XL) 25 MG 24 hr tablet, Take 1 tablet by mouth Daily., Disp: 90 tablet, Rfl: 3    metoprolol tartrate (LOPRESSOR) 25 MG tablet, Take 0.5 tablets by mouth 2 (Two) Times a Day., Disp: , Rfl:     pantoprazole (PROTONIX) 40 MG EC tablet, Take 1 tablet by mouth 2 (Two) Times a Day., Disp: 180 tablet, Rfl: 3    potassium chloride (K-TAB) 20 MEQ tablet controlled-release ER tablet, Take 1 tablet by mouth 2 (Two) Times a Day., Disp: 5 tablet, Rfl: 0    sacubitril-valsartan (Entresto) 24-26 MG tablet, Take 1 tablet by mouth 2 (Two) Times a Day., Disp: 180 tablet, Rfl: 3    sertraline (ZOLOFT) 100 MG tablet, Take 1.5 tablets by mouth Daily., Disp: , Rfl:     Symbicort 160-4.5 MCG/ACT inhaler, Inhale 2 puffs 2 (Two) Times a Day., Disp: , Rfl:     vitamin B-12 (CYANOCOBALAMIN) 1000 MCG tablet, Take 1 tablet by mouth Daily., Disp: , Rfl:     cephalexin (KEFLEX) 500 MG capsule, Take 1 capsule by mouth 4 (Four) Times a Day., Disp: 40 capsule, Rfl: 0    ferrous sulfate 325 (65 FE) MG tablet, 1 tablet. (Patient not taking: Reported on 11/12/2024), Disp: , Rfl:     ALLERGIES:  No Known Allergies      REVIEW OF SYSTEMS:    A comprehensive 14 point review of systems was performed.  Significant findings as mentioned above.  All other systems reviewed and are negative.        Physical Exam   Vital Signs:   Vitals:    11/12/24 0958   BP: 104/63   Pulse: 75   Resp: 20   Temp: 98 °F (36.7 °C)   SpO2: 100%     ECOG score: 0   General: Well developed, well nourished, alert and oriented x 3, in no acute distress. Ambulating with cane.   Head: ATNC   Eyes: PERRL, No evidence of conjunctivitis.   Nose: No nasal discharge.   Mouth: Oral mucosal membranes moist. No oral ulceration or hemorrhages.   Neck: Neck supple. No thyromegaly. No JVD.   Lungs: Clear in all fields to A&P without rales, rhonchi or wheezing.   Heart: S1, S2. Regular rate and rhythm. No murmurs, rubs, or gallops.   Abdomen: Soft. Bowel sounds are normoactive. Nontender  with palpation. No Hepatosplenomegaly can be appreciated.   Extremities: No clubbing, cyanosis or edema bilaterally.   Integumentary: Warm, dry, intact.   Neurologic: Grossly non-focal exam.      Pain Score:  Pain Score    11/12/24 0958   PainSc: 0-No pain       PHQ-Score Total:  PHQ-9 Total Score:         PATHOLOGY:        ENDOSCOPY:        IMAGING:         RECENT LABS:  Lab Results   Component Value Date    WBC 10.35 08/20/2024    HGB 11.1 (L) 08/20/2024    HCT 36.5 (L) 08/20/2024    MCV 86.5 08/20/2024    RDW 14.6 08/20/2024     08/20/2024    NEUTRORELPCT 73.0 08/20/2024    LYMPHORELPCT 15.2 (L) 08/20/2024    MONORELPCT 7.3 08/20/2024    EOSRELPCT 3.6 08/20/2024    BASORELPCT 0.4 08/20/2024    NEUTROABS 7.56 (H) 08/20/2024    LYMPHSABS 1.57 08/20/2024       Lab Results   Component Value Date     08/20/2024    K 4.6 08/20/2024    CO2 28.7 08/20/2024     08/20/2024    BUN 18 08/20/2024    CREATININE 1.36 (H) 08/20/2024    EGFRIFNONA 61 02/09/2021    GLUCOSE 133 (H) 08/20/2024    CALCIUM 8.8 08/20/2024    ALKPHOS 136 (H) 08/20/2024    AST 15 08/20/2024    ALT 12 08/20/2024    BILITOT 0.2 08/20/2024    ALBUMIN 3.8 08/20/2024    ALBUMIN 3.4 08/20/2024    PROTEINTOT 7.2 08/20/2024    MG 1.8 09/15/2023    PHOS 3.2 02/11/2023       Lab Results   Component Value Date     (H) 02/23/2023       Lab Results   Component Value Date    FERRITIN 24.33 (L) 08/20/2024    IRON 27 (L) 08/20/2024    TIBC 362 08/20/2024    LABIRON 7 (L) 08/20/2024    JTRKWUOS00 1,373 (H) 12/20/2023    FOLATE 8.62 12/20/2023    HAPTOGLOBIN 183 02/23/2023    RETICCTPCT 1.20 02/23/2023    RETIC 0.0515 02/23/2023      Work Up 02/23/2023      Sed Rate  0 - 20 mm/hr 33 High      C-Reactive Protein  0.00 - 0.50 mg/dL <0.30      Zinc  44 - 115 ug/dL 97      Copper  69 - 132 ug/dL 113      Tissue Transglutaminase IgA  0 - 3 U/mL <2      TSH  0.270 - 4.200 uIU/mL 2.040      Color, UA  Yellow, Straw Yellow     Appearance, UA  Clear Clear   Clear    pH, UA  5.0 - 8.0 6.5  6.0    Specific Gravity, UA  1.005 - 1.030 1.019  1.017    Glucose, UA  Negative Negative  Negative R    Ketones, UA  Negative Negative  Negative R    Bilirubin, UA  Negative Negative  Negative    Blood, UA  Negative Negative  Negative    Protein, UA  Negative Trace Abnormal   Negative R    Leuk Esterase, UA  Negative Moderate (2+) Abnormal   Negative    Nitrite, UA  Negative Negative  Negative    Urobilinogen, UA  0.2 - 1.0 E.U./dL 1.0 E.U./dL  0.2          ASSESSMENT & PLAN:  Zaid Galarza is a very pleasant 74 y.o. male with    1. MUKESH  - He recently had labs drawn at his PCP office on 06/06/2023 showed Hg (7.8) and he was told he should go to [facility] ED. He presented to [facility] ED and was admitted and during admission he received a total of 4 units PRBCs. Of note, prior to this admission is H/H were within normal. Iron panel and Ferritin obtained while inpatient was consistent with MUKESH.  - He underwent EGD/colonscopy per Dr. Mireles in May 2023 and pathology was negative. He reports exam was negative for any bleeding. He denies any obvious blood loss from any source.   - He is not taking oral iron supplements.  - CBC from initial consultation showed Hg (10.4) stable/improved. Iron panel and Ferritin were low and most consistent with MUKESH. Therefore, he was started on Ferrous Sulfate 325 mg BID. B12 and Folate were replete. No evidence of hemolysis as Retic and Haptoglobin are normal with mildly elevated LDH. ESR is elevated and suggestive of chronic underlying inflammation. CRP normal. TSH normal. Copper, Zinc and Tissue Transglutaminase was normal. UA was unremarkable for blood. PBS showed normocytic, hypochromic anemia with anisocytosis, no schistocytes identified, normal total WBC count with borderline eosinophilia, no granulocytic dysplasia or blasts, adequate platelets. SPEP/ENRRIQUE did not reveal any M-spike. SFLC showed elevated kappa/lambda ratio which could likely be related to CKD.  B12 and Folate are replete.  - Ferrous sulfate was discontinued for malabsorption. He was replaced with IV Feraheme x 2 doses in September 2024.  - Repeat CBC from today shows low Hg (9.4) with normal WBC and platelets. CMP from today shows improved creatinine 1.20. Iron panel and Ferritin from today are with consistent with MUKESH. Therefore, will replace with IV Feraheme, pending insurance approval.  - SPEP/ENRRIQUE did not reveal M-spike and SFLC showed elevated ratio which is likely secondary to CKD. Discussed with patient that CKD and Eliquis 5 mg BID could likely be contributing to chronic anemia.  - Given the frequent need of IV iron discussed with patient the need for possible repeat EGD/colonoscopy or Pill Cam to be performed per GI and he is agreeable. Referral placed today.   - Will follow up in 3 months with CBC, CMP, Iron panel and Ferritin.      ACO / PURNIMA/Other  Quality measures  -  Zaid Galarza received 2024 flu vaccine.  -  Zaid Galarza reports a pain score of 0.   -  Current outpatient and discharge medications have been reconciled for the patient.  Reviewed by: TIFFANIE Baum        I spent 30 minutes caring for Zaid on this date of service. This time includes time spent by me in the following activities: preparing for the visit, reviewing tests, obtaining and/or reviewing a separately obtained history, performing a medically appropriate examination and/or evaluation, counseling and educating the patient/family/caregiver, ordering medications, tests, or procedures, documenting information in the medical record, independently interpreting results and communicating that information with the patient/family/caregiver, and care coordination.                                 Electronically Signed by: TIFFANIE Shine APRN November 12, 2024 10:07 EST       CC:   No ref. provider found  Annie Andrade APRN

## 2024-11-12 NOTE — PROGRESS NOTES
Venipuncture Blood Specimen Collection  Venipuncture performed in right arm by Rita Estrella MA with good hemostasis. Patient tolerated the procedure well without complications.   11/12/24   Rita Estrella MA

## 2024-11-12 NOTE — TELEPHONE ENCOUNTER
PT CALLED IN STATING HE COULD NOT GET LASIX FROM PHARMACY. I CALLED PHARMACY THEY SAID THEY FAXED A REQUEST FOR PA FORM TO US. INFORMED PT.

## 2024-11-13 ENCOUNTER — TELEPHONE (OUTPATIENT)
Dept: CARDIOLOGY | Facility: CLINIC | Age: 74
End: 2024-11-13
Payer: MEDICARE

## 2024-11-13 NOTE — TELEPHONE ENCOUNTER
CALLED PHARMACY TO ASK WHY [facility] COULD NOT  MEDICATION LASIX AS LASIX DOES NOT REQUIRE A PA. PHARMACY STATED THAT THEY WERE PICKED UP. CALLED [facility] TO CONFIRM WITH HIM, HE SAID THERE WAS CONFUSION AND HE THOUGHT THEY DIDN'T GIVE HIM THE LASIX. CONFIRMED PT PICKED UP BOTH SCRIPTS AND THAT THE PA REQUEST WAS FOR ANOTHER MEDICATION PRESCRIBED BY ANOTHER PROVIDER.

## 2024-11-14 ENCOUNTER — TELEPHONE (OUTPATIENT)
Dept: CARDIOLOGY | Facility: CLINIC | Age: 74
End: 2024-11-14
Payer: MEDICARE

## 2024-11-14 NOTE — TELEPHONE ENCOUNTER
Spoke with patient gave him the test results, he stated he feels like the swelling has gone down some, much easier to put his sock on     ----- Message from Christy Malone sent at 11/13/2024  4:11 PM EST -----  Please call patient about their normal result.    The venous Doppler did not show any evidence of DVT.    Please ask him how his swelling is doing.    Thanks, Christy

## 2024-11-15 ENCOUNTER — TELEPHONE (OUTPATIENT)
Dept: CARDIOLOGY | Facility: CLINIC | Age: 74
End: 2024-11-15
Payer: MEDICARE

## 2024-11-15 NOTE — TELEPHONE ENCOUNTER
Called the patient to see if the additional diuretic dose had helped his LE swelling    He states that his LE is looking much better and he is pleased with the results he has had with the new medication.

## 2024-11-22 ENCOUNTER — INFUSION (OUTPATIENT)
Dept: ONCOLOGY | Facility: HOSPITAL | Age: 74
End: 2024-11-22
Payer: MEDICARE

## 2024-11-22 VITALS
DIASTOLIC BLOOD PRESSURE: 57 MMHG | WEIGHT: 262 LBS | OXYGEN SATURATION: 96 % | RESPIRATION RATE: 18 BRPM | HEART RATE: 81 BPM | SYSTOLIC BLOOD PRESSURE: 98 MMHG | BODY MASS INDEX: 35.53 KG/M2 | TEMPERATURE: 97.8 F

## 2024-11-22 DIAGNOSIS — D50.9 IRON DEFICIENCY ANEMIA, UNSPECIFIED IRON DEFICIENCY ANEMIA TYPE: ICD-10-CM

## 2024-11-22 DIAGNOSIS — K90.9 INTESTINAL MALABSORPTION, UNSPECIFIED TYPE: Primary | ICD-10-CM

## 2024-11-22 PROCEDURE — 25010000002 FERUMOXYTOL 510 MG/17ML SOLUTION: Performed by: NURSE PRACTITIONER

## 2024-11-22 PROCEDURE — 96365 THER/PROPH/DIAG IV INF INIT: CPT

## 2024-11-22 RX ORDER — SODIUM CHLORIDE 9 MG/ML
20 INJECTION, SOLUTION INTRAVENOUS ONCE
Status: DISCONTINUED | OUTPATIENT
Start: 2024-11-22 | End: 2024-11-22

## 2024-11-22 RX ADMIN — FERUMOXYTOL 510 MG: 510 INJECTION INTRAVENOUS at 11:42

## 2024-11-25 ENCOUNTER — INFUSION (OUTPATIENT)
Dept: ONCOLOGY | Facility: HOSPITAL | Age: 74
End: 2024-11-25
Payer: MEDICARE

## 2024-11-25 VITALS
DIASTOLIC BLOOD PRESSURE: 72 MMHG | TEMPERATURE: 97.8 F | SYSTOLIC BLOOD PRESSURE: 114 MMHG | RESPIRATION RATE: 18 BRPM | OXYGEN SATURATION: 95 % | HEART RATE: 71 BPM

## 2024-11-25 DIAGNOSIS — K90.9 INTESTINAL MALABSORPTION, UNSPECIFIED TYPE: Primary | ICD-10-CM

## 2024-11-25 DIAGNOSIS — D50.9 IRON DEFICIENCY ANEMIA, UNSPECIFIED IRON DEFICIENCY ANEMIA TYPE: ICD-10-CM

## 2024-11-25 PROCEDURE — 96374 THER/PROPH/DIAG INJ IV PUSH: CPT

## 2024-11-25 PROCEDURE — 25010000002 FERUMOXYTOL 510 MG/17ML SOLUTION: Performed by: NURSE PRACTITIONER

## 2024-11-25 PROCEDURE — 96365 THER/PROPH/DIAG IV INF INIT: CPT

## 2024-11-25 RX ORDER — SODIUM CHLORIDE 9 MG/ML
20 INJECTION, SOLUTION INTRAVENOUS ONCE
Status: DISCONTINUED | OUTPATIENT
Start: 2024-11-25 | End: 2024-11-25

## 2024-11-25 RX ADMIN — FERUMOXYTOL 510 MG: 510 INJECTION INTRAVENOUS at 15:26

## 2024-12-05 ENCOUNTER — OFFICE VISIT (OUTPATIENT)
Dept: CARDIOLOGY | Facility: CLINIC | Age: 74
End: 2024-12-05
Payer: MEDICARE

## 2024-12-05 VITALS
SYSTOLIC BLOOD PRESSURE: 109 MMHG | WEIGHT: 261.8 LBS | HEART RATE: 85 BPM | OXYGEN SATURATION: 97 % | BODY MASS INDEX: 35.46 KG/M2 | DIASTOLIC BLOOD PRESSURE: 72 MMHG | HEIGHT: 72 IN

## 2024-12-05 DIAGNOSIS — I48.19 PERSISTENT ATRIAL FIBRILLATION: Chronic | ICD-10-CM

## 2024-12-05 DIAGNOSIS — E78.5 DYSLIPIDEMIA, GOAL LDL BELOW 100: Chronic | ICD-10-CM

## 2024-12-05 DIAGNOSIS — I42.0 DILATED CARDIOMYOPATHY: Primary | Chronic | ICD-10-CM

## 2024-12-05 DIAGNOSIS — I10 ESSENTIAL HYPERTENSION: Chronic | ICD-10-CM

## 2024-12-05 PROBLEM — Z87.891 FORMER TOBACCO USE: Status: ACTIVE | Noted: 2020-02-22

## 2024-12-05 NOTE — PROGRESS NOTES
"Chief Complaint  Follow-up (Denies CP, Complains of SOA,chronic fatigue.) and Med Management (Tolerating all current medications.)    Subjective          Zaid Galarza presents to [facility] for follow up.    History of Present Illness  History of Present Illness  The patient is a 74-year-old male who follows in our clinic with dilated cardiomyopathy, dyslipidemia, hypertension, nonobstructive coronary artery disease, and persistent atrial fibrillation. He is here today for a follow-up visit.    He was last seen in the clinic on 11/12/2024. At that time, he had unilateral left lower extremity swelling, and a venous Doppler was ordered, which was negative for DVT. On 09/05/2024, he was given Lasix and potassium replacement for the extremity swelling. No other changes were made to his medications.    He reports an improvement in his condition, noting that it is now easier to put on his socks. He does not experience any chest pain, and his breathing has improved. However, he does experience shortness of breath during physical activity, which resolves after a brief rest. He has not had any falls since his last visit. He is tolerating Entresto well and is taking metoprolol twice daily.    SOCIAL HISTORY  The patient quit smoking in 09/2022.         Objective     Vital Signs:   /72 (BP Location: Left arm, Patient Position: Sitting, Cuff Size: Large Adult)   Pulse 85   Ht 182.9 cm (72\")   Wt 119 kg (261 lb 12.8 oz)   SpO2 97%   BMI 35.51 kg/m²       Physical Exam  Vitals reviewed.   Constitutional:       Appearance: Normal appearance. He is well-developed.   Cardiovascular:      Rate and Rhythm: Normal rate. Rhythm irregular.      Heart sounds: No murmur heard.     No friction rub. No gallop.   Pulmonary:      Effort: Pulmonary effort is normal. No respiratory distress.      Breath sounds: Normal breath sounds. No wheezing or rales.   Skin:     General: Skin is warm and dry. "   Neurological:      Mental Status: He is alert and oriented to person, place, and time.   Psychiatric:         Mood and Affect: Mood normal.         Behavior: Behavior normal.          Result Review :     CMP          5/28/2024    14:13 8/20/2024    10:43 11/12/2024    09:55   CMP   Glucose 98  133  110    BUN 16  18  17    Creatinine 1.12  1.36  1.20    EGFR 69.8  55.3  63.9    Sodium 139  141  143    Potassium 4.2  4.6  4.6    Chloride 103  102  106    Calcium 9.0  8.8  8.7    Total Protein  6.8     Total Protein 7.0  7.2  7.0    Albumin 3.8  3.8     3.4  3.6    Globulin  3.4     Globulin 3.2  3.4  3.4    Total Bilirubin 0.3  0.2  0.2    Alkaline Phosphatase 136  136  135    AST (SGOT) 14  15  14    ALT (SGPT) 10  12  9    Albumin/Globulin Ratio 1.2  1.1  1.1    BUN/Creatinine Ratio 14.3  13.2  14.2    Anion Gap 8.9  10.3  11.5      CBC          5/28/2024    14:13 8/20/2024    10:43 11/12/2024    09:55   CBC   WBC 12.16  10.35  9.96    RBC 4.58  4.22  3.78    Hemoglobin 12.4  11.1  9.4    Hematocrit 39.3  36.5  31.9    MCV 85.8  86.5  84.4    MCH 27.1  26.3  24.9    MCHC 31.6  30.4  29.5    RDW 14.1  14.6  17.2    Platelets 205  229  231                 Most recent echocardiogram  Results for orders placed during the hospital encounter of 12/05/23    Adult Transthoracic Echo Complete W/ Cont if Necessary Per Protocol    Interpretation Summary    Left ventricular systolic function is hyperdynamic (EF > 70%). Calculated left ventricular EF = 72% Left ventricular ejection fraction appears to be greater than 70%.    Left ventricular wall thickness is consistent with mild concentric hypertrophy.    Left ventricular diastolic function is consistent with (grade II w/high LAP) pseudonormalization.      Most recent Stress Test       Most recent Cardiac Cath  Results for orders placed during the hospital encounter of 09/28/19    Cardiac Catheterization/Vascular Study    Narrative  Table formatting from the original result  was not included.  DATE OF PROCEDURE: 10/3/2019    INDICATION FOR PROCEDURE: {Cardiomyopathy, severe LV dysfunction, fib , flutter    PROCEDURE PERFORMED:    1. Coronary Angiogram  2. Left heart catheretization    PROCEDURE COMMENTS:    Zaid Galarza was brought to the cath lab and placed on the cardiac catherization table in the postabsortive state. The patient was prepped and draped according to the cath lab protocol under strict aseptic and sterile condition. Patient's right femoral artery sight was prepped and draped. Under fluoroscopic guidance the right femoral artery was punctured using a 21 gauge needle utilizing the modified Seldinger technique. 6 Martiniquais sheath was introduced over a wire. After aspirating for blood it was flushed with heparinized saline.    We advanced Torres type diagnostic catheters over the wire. The  left and right coronary arteries  were selectively engaged. Left and right coronary angiogram were obtained in multiple orthogonal projections.    After completion of the procedure the femoral sheath was removed in the cath lab and hemostasis was achieved by Angioseal. The patient tolerated the procedure without complications.      FINDINGS:    1. HEMODYNAMICS:  LVEDP 10 mmHg, no gradient across the aortic valve.      2. CORONARY ANGIOGRAPHY: Dominant dominant coronary artery system.    The left main coronary artery bifurcated into the LAD and left circumflex coronary.  The LAD coursed in the anterior interventricular groove, gave rise to diagonal branches and reached the apex.    Left circumflex coursed in the left atrial ventricular groove and gives rise to several marginal branches.    The right coronary artery course in the right atrial ventricular groove I gave rise to several acute marginal branches.    Left main: Normal    LAD: 20 to 30% diffuse disease in LADLeft circumflex: 30% focal mid circumflex  RCA: Large size dominant vessel proximal 40% and distal 40% focal stenosisPDA:  Mild 20%    Final impression:  Mild to moderate CAD  3+ mitral regurgitation  RECOMMENDATIONS:  Aspirin  Beta-blocker  Statin  ACE inhibitor  Continue anticoagulant  Repeat ANGELITO after 6 to 8 weeks  If atrial thrombus resolved then proceed with  Ablation  I believe once the arrhythmia is controlled patient ejection fraction will tend to increase    Vincent Phillips MD  10/03/19  9:38 AM      Most recent Coronary CT  No results found for this or any previous visit.         Current Outpatient Medications   Medication Sig Dispense Refill    albuterol sulfate  (90 Base) MCG/ACT inhaler Inhale 2 puffs Every 4 (Four) Hours As Needed.      apixaban (ELIQUIS) 5 MG tablet tablet Take 1 tablet by mouth 2 (Two) Times a Day. 180 tablet 3    atorvastatin (LIPITOR) 80 MG tablet Take 1 tablet by mouth Every Night. 90 tablet 3    ferrous sulfate 325 (65 FE) MG tablet 1 tablet. Takes iron infusions.      pantoprazole (PROTONIX) 40 MG EC tablet Take 1 tablet by mouth 2 (Two) Times a Day. 180 tablet 3    sacubitril-valsartan (Entresto) 24-26 MG tablet Take 1 tablet by mouth 2 (Two) Times a Day. 180 tablet 3    sertraline (ZOLOFT) 100 MG tablet Take 1.5 tablets by mouth Daily.      Symbicort 160-4.5 MCG/ACT inhaler Inhale 2 puffs 2 (Two) Times a Day.      vitamin B-12 (CYANOCOBALAMIN) 1000 MCG tablet Take 1 tablet by mouth Daily.      cephalexin (KEFLEX) 500 MG capsule Take 1 capsule by mouth 4 (Four) Times a Day. (Patient not taking: Reported on 12/5/2024) 40 capsule 0    Cyanocobalamin (Vitamin B12) 1000 MCG tablet controlled-release Take 1 tablet by mouth Daily. (Patient not taking: Reported on 12/5/2024)      furosemide (LASIX) 40 MG tablet Take 1 tablet by mouth Daily. (Patient not taking: Reported on 12/5/2024) 5 tablet 0    metoprolol succinate XL (TOPROL-XL) 25 MG 24 hr tablet Take 1 tablet by mouth Daily. 90 tablet 3    potassium chloride (K-TAB) 20 MEQ tablet controlled-release ER tablet Take 1 tablet by mouth 2 (Two)  Times a Day. (Patient not taking: Reported on 12/5/2024) 5 tablet 0     No current facility-administered medications for this visit.               Problem List Items Addressed This Visit          Cardiac and Vasculature    Dilated cardiomyopathy - Primary (Chronic)    Overview     TTE: LVEF 26 to 30%, RV and LV mildly dilated, grade 3 diastolic dysfunction consistent with fixed restrictive pattern, mild TR, left atrial cavity mildly dilated right atrial cavity moderately dilated, moderate pulmonary hypertension with RVSP 47 mmHg  9/30/2019 ANGELITO: Severe cardiomyopathy likely from atrial flutter  2/11/2020 TTE LVEF 36 to 40%  5/6/2020 TTE LVEF 56 to 60%, mild concentric LVH  9/2/2022 TTE: LVEF 51 to 55%, moderate sclerosis of both the mitral and aortic valve with no significant regurgitation noted.  Left atrium is mildly enlarged.         Current Assessment & Plan     Congestive heart failure due to hypertension.  Heart failure is stable.  NYHA Class II.  Continue current treatment regimen.  Heart failure will be reassessed in 6 months.             Persistent atrial fibrillation (Chronic)    Overview     [id number] is at least 6 for heart failure, hypertension, CVA, nonobstructive CAD, and age         Current Assessment & Plan     Atrial fibrillation is:Controlled  Goals of care: Medication compliance and tolerance  Plan: Continue current medication  Follow up: in 6 months           Essential hypertension (Chronic)    Overview     Hypertension is Controlled  Goals of Care:Medication compliance and tolerance, Systolic blood pressure <120, and Diastolic blood pressure  <80  Plan:  Continue current medications  Follow up:in 6 months           Dyslipidemia, goal LDL below 100 (Chronic)    Overview     9/2/2022 total cholesterol 104, triglycerides 89, HDL 31, and LDL 55  10/28/2023 total cholesterol 107, triglycerides 122, HDL 45, and [facility]  10/25/2024 total cholesterol 99, triglycerides 120, HDL 39, and  LDL 38 ([employer])         Current Assessment & Plan     Dyslipidemia is Controlled  Goals of care: Medication compliance and tolerance, Control progression of disease, and Maintain LDL <55  Plan: Continue current medications  Follow up: in 6 months                  Assessment & Plan  1. Dilated cardiomyopathy.  His condition appears stable with no new symptoms or complications reported. He reports no chest pain and his breathing is improving with physical activity. Continue current medications including Entresto, metoprolol.  LVEF was hyperdynamic on most recent echocardiogram 11/22/2023    2. Dyslipidemia.  Continue current treatment regimen. No changes made.    3. Hypertension.  A call will be made to his sister to confirm the metoprolol dosage, as there are two different dosages listed in his records (once a day and twice a day). He is currently taking metoprolol provided by [employer].  (I spoke with his sister who verified that he is taking the metoprolol succinate formulation of metoprolol)    4. Nonobstructive coronary artery disease.  No new symptoms reported. Continue current management.    5. Persistent atrial fibrillation.  Continue current medications. No new symptoms reported.  Continue Eliquis for stroke prophylaxis    Follow-up  Return in 6 months for follow-up.         Follow Up     Return in about 6 months (around 6/5/2025).    Patient was given instructions and counseling regarding his condition or for health maintenance advice. Please see specific information pulled into the AVS if appropriate.     Patient or patient representative verbalized consent for the use of Ambient Listening during the visit with  TIFFANIE Cox for chart documentation. 12/15/2024  17:09 EST

## 2024-12-16 NOTE — ASSESSMENT & PLAN NOTE
Atrial fibrillation is:Controlled  Goals of care: Medication compliance and tolerance  Plan: Continue current medication  Follow up: in 6 months

## 2024-12-16 NOTE — ASSESSMENT & PLAN NOTE
Congestive heart failure due to hypertension.  Heart failure is stable.  NYHA Class II.  Continue current treatment regimen.  Heart failure will be reassessed in 6 months.

## 2024-12-16 NOTE — ASSESSMENT & PLAN NOTE
Dyslipidemia is Controlled  Goals of care: Medication compliance and tolerance, Control progression of disease, and Maintain LDL <55  Plan: Continue current medications  Follow up: in 6 months

## 2025-01-30 ENCOUNTER — APPOINTMENT (OUTPATIENT)
Dept: GENERAL RADIOLOGY | Facility: HOSPITAL | Age: 75
End: 2025-01-30
Payer: MEDICARE

## 2025-01-30 ENCOUNTER — APPOINTMENT (OUTPATIENT)
Dept: CT IMAGING | Facility: HOSPITAL | Age: 75
End: 2025-01-30
Payer: MEDICARE

## 2025-01-30 ENCOUNTER — HOSPITAL ENCOUNTER (INPATIENT)
Facility: HOSPITAL | Age: 75
LOS: 1 days | Discharge: ANOTHER HOSPITAL | End: 2025-01-31
Admitting: FAMILY MEDICINE
Payer: MEDICARE

## 2025-01-30 DIAGNOSIS — J96.02 ACUTE RESPIRATORY FAILURE WITH HYPOXIA AND HYPERCAPNIA: Primary | ICD-10-CM

## 2025-01-30 DIAGNOSIS — J96.01 ACUTE RESPIRATORY FAILURE WITH HYPOXIA AND HYPERCAPNIA: Primary | ICD-10-CM

## 2025-01-30 LAB
A-A DO2: 25.8 MMHG (ref 0–300)
A-A DO2: 41.5 MMHG (ref 0–300)
ABO GROUP BLD: NORMAL
ALBUMIN SERPL-MCNC: 3.5 G/DL (ref 3.5–5.2)
ALBUMIN/GLOB SERPL: 1 G/DL
ALP SERPL-CCNC: 123 U/L (ref 39–117)
ALT SERPL W P-5'-P-CCNC: 6 U/L (ref 1–41)
ANION GAP SERPL CALCULATED.3IONS-SCNC: 10.3 MMOL/L (ref 5–15)
ANISOCYTOSIS BLD QL: NORMAL
ARTERIAL PATENCY WRIST A: POSITIVE
ARTERIAL PATENCY WRIST A: POSITIVE
AST SERPL-CCNC: 11 U/L (ref 1–40)
ATMOSPHERIC PRESS: 725 MMHG
ATMOSPHERIC PRESS: 727 MMHG
B PARAPERT DNA SPEC QL NAA+PROBE: NOT DETECTED
B PERT DNA SPEC QL NAA+PROBE: NOT DETECTED
BASE EXCESS BLDA CALC-SCNC: 1.5 MMOL/L (ref 0–2)
BASE EXCESS BLDA CALC-SCNC: 3.3 MMOL/L (ref 0–2)
BASOPHILS # BLD AUTO: 0.03 10*3/MM3 (ref 0–0.2)
BASOPHILS NFR BLD AUTO: 0.2 % (ref 0–1.5)
BDY SITE: ABNORMAL
BDY SITE: ABNORMAL
BILIRUB SERPL-MCNC: 0.3 MG/DL (ref 0–1.2)
BLD GP AB SCN SERPL QL: NEGATIVE
BUN SERPL-MCNC: 17 MG/DL (ref 8–23)
BUN/CREAT SERPL: 15 (ref 7–25)
C PNEUM DNA NPH QL NAA+NON-PROBE: NOT DETECTED
CALCIUM SPEC-SCNC: 8.3 MG/DL (ref 8.6–10.5)
CHLORIDE SERPL-SCNC: 103 MMOL/L (ref 98–107)
CO2 BLDA-SCNC: 28.4 MMOL/L (ref 22–33)
CO2 BLDA-SCNC: 28.9 MMOL/L (ref 22–33)
CO2 SERPL-SCNC: 26.7 MMOL/L (ref 22–29)
COHGB MFR BLD: 1.7 % (ref 0–5)
COHGB MFR BLD: 1.8 % (ref 0–5)
CREAT SERPL-MCNC: 1.13 MG/DL (ref 0.76–1.27)
CRP SERPL-MCNC: 0.69 MG/DL (ref 0–0.5)
D-LACTATE SERPL-SCNC: 1.4 MMOL/L (ref 0.5–2)
DEPRECATED RDW RBC AUTO: 52.5 FL (ref 37–54)
EGFRCR SERPLBLD CKD-EPI 2021: 68.6 ML/MIN/1.73
EOSINOPHIL # BLD AUTO: 0.03 10*3/MM3 (ref 0–0.4)
EOSINOPHIL NFR BLD AUTO: 0.2 % (ref 0.3–6.2)
ERYTHROCYTE [DISTWIDTH] IN BLOOD BY AUTOMATED COUNT: 18.5 % (ref 12.3–15.4)
FLUAV SUBTYP SPEC NAA+PROBE: NOT DETECTED
FLUBV RNA ISLT QL NAA+PROBE: NOT DETECTED
GEN 5 1HR TROPONIN T REFLEX: 14 NG/L
GLOBULIN UR ELPH-MCNC: 3.4 GM/DL
GLUCOSE SERPL-MCNC: 130 MG/DL (ref 65–99)
HADV DNA SPEC NAA+PROBE: NOT DETECTED
HCO3 BLDA-SCNC: 27 MMOL/L (ref 20–26)
HCO3 BLDA-SCNC: 27.7 MMOL/L (ref 20–26)
HCOV 229E RNA SPEC QL NAA+PROBE: NOT DETECTED
HCOV HKU1 RNA SPEC QL NAA+PROBE: NOT DETECTED
HCOV NL63 RNA SPEC QL NAA+PROBE: NOT DETECTED
HCOV OC43 RNA SPEC QL NAA+PROBE: NOT DETECTED
HCT VFR BLD AUTO: 25.2 % (ref 37.5–51)
HCT VFR BLD CALC: 21.3 % (ref 38–51)
HCT VFR BLD CALC: 22.3 % (ref 38–51)
HGB BLD-MCNC: 7.1 G/DL (ref 13–17.7)
HGB BLDA-MCNC: 7 G/DL (ref 14–18)
HGB BLDA-MCNC: 7.3 G/DL (ref 14–18)
HMPV RNA NPH QL NAA+NON-PROBE: NOT DETECTED
HOLD SPECIMEN: NORMAL
HOLD SPECIMEN: NORMAL
HPIV1 RNA ISLT QL NAA+PROBE: NOT DETECTED
HPIV2 RNA SPEC QL NAA+PROBE: NOT DETECTED
HPIV3 RNA NPH QL NAA+PROBE: NOT DETECTED
HPIV4 P GENE NPH QL NAA+PROBE: NOT DETECTED
IMM GRANULOCYTES # BLD AUTO: 0.08 10*3/MM3 (ref 0–0.05)
IMM GRANULOCYTES NFR BLD AUTO: 0.6 % (ref 0–0.5)
INHALED O2 CONCENTRATION: 21 %
INHALED O2 CONCENTRATION: 21 %
LYMPHOCYTES # BLD AUTO: 0.72 10*3/MM3 (ref 0.7–3.1)
LYMPHOCYTES NFR BLD AUTO: 5.6 % (ref 19.6–45.3)
Lab: ABNORMAL
M PNEUMO IGG SER IA-ACNC: NOT DETECTED
MCH RBC QN AUTO: 21.9 PG (ref 26.6–33)
MCHC RBC AUTO-ENTMCNC: 28.2 G/DL (ref 31.5–35.7)
MCV RBC AUTO: 77.8 FL (ref 79–97)
METHGB BLD QL: 0.4 % (ref 0–3)
METHGB BLD QL: 0.6 % (ref 0–3)
MODALITY: ABNORMAL
MODALITY: ABNORMAL
MONOCYTES # BLD AUTO: 0.75 10*3/MM3 (ref 0.1–0.9)
MONOCYTES NFR BLD AUTO: 5.9 % (ref 5–12)
NEUTROPHILS NFR BLD AUTO: 11.14 10*3/MM3 (ref 1.7–7)
NEUTROPHILS NFR BLD AUTO: 87.5 % (ref 42.7–76)
NOTIFIED BY: ABNORMAL
NOTIFIED WHO: ABNORMAL
NRBC BLD AUTO-RTO: 0 /100 WBC (ref 0–0.2)
NT-PROBNP SERPL-MCNC: 796.2 PG/ML (ref 0–900)
NT-PROBNP SERPL-MCNC: 825.5 PG/ML (ref 0–900)
OXYHGB MFR BLDV: 78.8 % (ref 94–99)
OXYHGB MFR BLDV: 93.6 % (ref 94–99)
PCO2 BLDA: 40.1 MM HG (ref 35–45)
PCO2 BLDA: 46.5 MM HG (ref 35–45)
PCO2 TEMP ADJ BLD: ABNORMAL MM[HG]
PCO2 TEMP ADJ BLD: ABNORMAL MM[HG]
PH BLDA: 7.37 PH UNITS (ref 7.35–7.45)
PH BLDA: 7.45 PH UNITS (ref 7.35–7.45)
PH, TEMP CORRECTED: ABNORMAL
PH, TEMP CORRECTED: ABNORMAL
PLATELET # BLD AUTO: 233 10*3/MM3 (ref 140–450)
PMV BLD AUTO: 11.5 FL (ref 6–12)
PO2 BLDA: 48.3 MM HG (ref 83–108)
PO2 BLDA: 71.8 MM HG (ref 83–108)
PO2 TEMP ADJ BLD: ABNORMAL MM[HG]
PO2 TEMP ADJ BLD: ABNORMAL MM[HG]
POTASSIUM SERPL-SCNC: 3.7 MMOL/L (ref 3.5–5.2)
PROCALCITONIN SERPL-MCNC: 0.06 NG/ML (ref 0–0.25)
PROT SERPL-MCNC: 6.9 G/DL (ref 6–8.5)
RBC # BLD AUTO: 3.24 10*6/MM3 (ref 4.14–5.8)
RH BLD: POSITIVE
RHINOVIRUS RNA SPEC NAA+PROBE: NOT DETECTED
RSV RNA NPH QL NAA+NON-PROBE: NOT DETECTED
SAO2 % BLDCOA: 80.7 % (ref 94–99)
SAO2 % BLDCOA: 95.7 % (ref 94–99)
SARS-COV-2 RNA RESP QL NAA+PROBE: NOT DETECTED
SMALL PLATELETS BLD QL SMEAR: ADEQUATE
SODIUM SERPL-SCNC: 140 MMOL/L (ref 136–145)
T&S EXPIRATION DATE: NORMAL
TROPONIN T NUMERIC DELTA: -1 NG/L
TROPONIN T SERPL HS-MCNC: 15 NG/L
VENTILATOR MODE: ABNORMAL
VENTILATOR MODE: ABNORMAL
WBC NRBC COR # BLD AUTO: 12.75 10*3/MM3 (ref 3.4–10.8)
WHOLE BLOOD HOLD COAG: NORMAL
WHOLE BLOOD HOLD SPECIMEN: NORMAL

## 2025-01-30 PROCEDURE — 94761 N-INVAS EAR/PLS OXIMETRY MLT: CPT

## 2025-01-30 PROCEDURE — 85007 BL SMEAR W/DIFF WBC COUNT: CPT | Performed by: NURSE PRACTITIONER

## 2025-01-30 PROCEDURE — P9016 RBC LEUKOCYTES REDUCED: HCPCS

## 2025-01-30 PROCEDURE — 25510000001 IOPAMIDOL PER 1 ML

## 2025-01-30 PROCEDURE — 71045 X-RAY EXAM CHEST 1 VIEW: CPT

## 2025-01-30 PROCEDURE — 93005 ELECTROCARDIOGRAM TRACING: CPT | Performed by: FAMILY MEDICINE

## 2025-01-30 PROCEDURE — 86850 RBC ANTIBODY SCREEN: CPT | Performed by: NURSE PRACTITIONER

## 2025-01-30 PROCEDURE — 74177 CT ABD & PELVIS W/CONTRAST: CPT

## 2025-01-30 PROCEDURE — 71275 CT ANGIOGRAPHY CHEST: CPT | Performed by: RADIOLOGY

## 2025-01-30 PROCEDURE — 82805 BLOOD GASES W/O2 SATURATION: CPT

## 2025-01-30 PROCEDURE — 86923 COMPATIBILITY TEST ELECTRIC: CPT

## 2025-01-30 PROCEDURE — 86900 BLOOD TYPING SEROLOGIC ABO: CPT

## 2025-01-30 PROCEDURE — 70450 CT HEAD/BRAIN W/O DYE: CPT | Performed by: RADIOLOGY

## 2025-01-30 PROCEDURE — 99285 EMERGENCY DEPT VISIT HI MDM: CPT

## 2025-01-30 PROCEDURE — 86140 C-REACTIVE PROTEIN: CPT | Performed by: NURSE PRACTITIONER

## 2025-01-30 PROCEDURE — 71275 CT ANGIOGRAPHY CHEST: CPT

## 2025-01-30 PROCEDURE — 36415 COLL VENOUS BLD VENIPUNCTURE: CPT

## 2025-01-30 PROCEDURE — 70450 CT HEAD/BRAIN W/O DYE: CPT

## 2025-01-30 PROCEDURE — 80053 COMPREHEN METABOLIC PANEL: CPT | Performed by: NURSE PRACTITIONER

## 2025-01-30 PROCEDURE — 36600 WITHDRAWAL OF ARTERIAL BLOOD: CPT

## 2025-01-30 PROCEDURE — 74177 CT ABD & PELVIS W/CONTRAST: CPT | Performed by: RADIOLOGY

## 2025-01-30 PROCEDURE — 86901 BLOOD TYPING SEROLOGIC RH(D): CPT | Performed by: NURSE PRACTITIONER

## 2025-01-30 PROCEDURE — 87040 BLOOD CULTURE FOR BACTERIA: CPT | Performed by: NURSE PRACTITIONER

## 2025-01-30 PROCEDURE — 82375 ASSAY CARBOXYHB QUANT: CPT

## 2025-01-30 PROCEDURE — 83605 ASSAY OF LACTIC ACID: CPT | Performed by: NURSE PRACTITIONER

## 2025-01-30 PROCEDURE — 85025 COMPLETE CBC W/AUTO DIFF WBC: CPT | Performed by: NURSE PRACTITIONER

## 2025-01-30 PROCEDURE — 83880 ASSAY OF NATRIURETIC PEPTIDE: CPT | Performed by: NURSE PRACTITIONER

## 2025-01-30 PROCEDURE — 93010 ELECTROCARDIOGRAM REPORT: CPT | Performed by: INTERNAL MEDICINE

## 2025-01-30 PROCEDURE — 71045 X-RAY EXAM CHEST 1 VIEW: CPT | Performed by: RADIOLOGY

## 2025-01-30 PROCEDURE — 84145 PROCALCITONIN (PCT): CPT | Performed by: NURSE PRACTITIONER

## 2025-01-30 PROCEDURE — 84484 ASSAY OF TROPONIN QUANT: CPT | Performed by: NURSE PRACTITIONER

## 2025-01-30 PROCEDURE — 93005 ELECTROCARDIOGRAM TRACING: CPT | Performed by: NURSE PRACTITIONER

## 2025-01-30 PROCEDURE — 36430 TRANSFUSION BLD/BLD COMPNT: CPT

## 2025-01-30 PROCEDURE — 86900 BLOOD TYPING SEROLOGIC ABO: CPT | Performed by: NURSE PRACTITIONER

## 2025-01-30 PROCEDURE — 0202U NFCT DS 22 TRGT SARS-COV-2: CPT | Performed by: NURSE PRACTITIONER

## 2025-01-30 PROCEDURE — 25010000002 ONDANSETRON PER 1 MG

## 2025-01-30 PROCEDURE — 94799 UNLISTED PULMONARY SVC/PX: CPT

## 2025-01-30 PROCEDURE — 83050 HGB METHEMOGLOBIN QUAN: CPT

## 2025-01-30 RX ORDER — ONDANSETRON 2 MG/ML
INJECTION INTRAMUSCULAR; INTRAVENOUS
Status: COMPLETED
Start: 2025-01-30 | End: 2025-01-30

## 2025-01-30 RX ORDER — IOPAMIDOL 755 MG/ML
100 INJECTION, SOLUTION INTRAVASCULAR
Status: COMPLETED | OUTPATIENT
Start: 2025-01-30 | End: 2025-01-30

## 2025-01-30 RX ORDER — ONDANSETRON 2 MG/ML
4 INJECTION INTRAMUSCULAR; INTRAVENOUS ONCE
Status: COMPLETED | OUTPATIENT
Start: 2025-01-30 | End: 2025-01-30

## 2025-01-30 RX ADMIN — IOPAMIDOL 100 ML: 755 INJECTION, SOLUTION INTRAVENOUS at 21:25

## 2025-01-30 RX ADMIN — ONDANSETRON 4 MG: 2 INJECTION INTRAMUSCULAR; INTRAVENOUS at 20:45

## 2025-01-30 NOTE — ED PROVIDER NOTES
Subjective   History of Present Illness  Patient is a 74-year-old male who presents today for shortness of breath and weakness.  Patient reports that he is also felt some short of breath.  Patient reports a history of anemia and states that they have investigated what is causing his low hemoglobin numerous times and have been unable to find a cause.  Patient reports some shortness of breath denies chest pain.  Patient denies fever.        Review of Systems   HENT: Negative.     Eyes: Negative.    Respiratory:  Positive for shortness of breath.    Cardiovascular: Negative.    Gastrointestinal: Negative.    Endocrine: Negative.    Genitourinary: Negative.    Musculoskeletal: Negative.    Skin: Negative.    Allergic/Immunologic: Negative.    Neurological:  Positive for weakness.   Hematological: Negative.    Psychiatric/Behavioral: Negative.         Past Medical History:   Diagnosis Date    Atrial fibrillation     CHF (congestive heart failure)     COPD (chronic obstructive pulmonary disease)     Coronary artery disease     Hypertension     Tobacco abuse        No Known Allergies    Past Surgical History:   Procedure Laterality Date    ABDOMINAL AORTIC ANEURYSM REPAIR      CARDIAC CATHETERIZATION N/A 10/03/2019    Procedure: Left Heart Cath;  Surgeon: Vincent Phillips MD;  Location: [facility] INVASIVE LOCATION;  Service: Cardiology       Family History   Problem Relation Age of Onset    Heart disease Mother     Stroke Father     Heart disease Father        Social History     Socioeconomic History    Marital status: Single   Tobacco Use    Smoking status: Former     Current packs/day: 1.00     Types: Cigarettes    Smokeless tobacco: Never    Tobacco comments:     9/1/2022   Vaping Use    Vaping status: Never Used   Substance and Sexual Activity    Alcohol use: No    Drug use: No    Sexual activity: Defer           Objective   Physical Exam  Vitals and nursing note reviewed.   Constitutional:       Appearance: He is  well-developed.   HENT:      Head: Normocephalic.      Right Ear: External ear normal.      Left Ear: External ear normal.   Eyes:      Conjunctiva/sclera: Conjunctivae normal.      Pupils: Pupils are equal, round, and reactive to light.   Cardiovascular:      Rate and Rhythm: Normal rate and regular rhythm.      Heart sounds: Normal heart sounds.   Pulmonary:      Effort: Pulmonary effort is normal.      Breath sounds: Wheezing present.   Abdominal:      General: Bowel sounds are normal.      Palpations: Abdomen is soft.   Musculoskeletal:         General: Normal range of motion.      Cervical back: Normal range of motion and neck supple.   Skin:     General: Skin is warm and dry.      Capillary Refill: Capillary refill takes less than 2 seconds.      Coloration: Skin is pale.   Neurological:      Mental Status: He is alert and oriented to person, place, and time.   Psychiatric:         Behavior: Behavior normal.         Thought Content: Thought content normal.       Results for orders placed or performed during the hospital encounter of 01/30/25   ECG 12 Lead Dyspnea    Collection Time: 01/30/25  4:42 PM   Result Value Ref Range    QT Interval 388 ms    QTC Interval 430 ms   Comprehensive Metabolic Panel    Collection Time: 01/30/25  4:59 PM    Specimen: Arm, Right; Blood   Result Value Ref Range    Glucose 130 (H) 65 - 99 mg/dL    BUN 17 8 - 23 mg/dL    Creatinine 1.13 0.76 - 1.27 mg/dL    Sodium 140 136 - 145 mmol/L    Potassium 3.7 3.5 - 5.2 mmol/L    Chloride 103 98 - 107 mmol/L    CO2 26.7 22.0 - 29.0 mmol/L    Calcium 8.3 (L) 8.6 - 10.5 mg/dL    Total Protein 6.9 6.0 - 8.5 g/dL    Albumin 3.5 3.5 - 5.2 g/dL    ALT (SGPT) 6 1 - 41 U/L    AST (SGOT) 11 1 - 40 U/L    Alkaline Phosphatase 123 (H) 39 - 117 U/L    Total Bilirubin 0.3 0.0 - 1.2 mg/dL    Globulin 3.4 gm/dL    A/G Ratio 1.0 g/dL    BUN/Creatinine Ratio 15.0 7.0 - 25.0    Anion Gap 10.3 5.0 - 15.0 mmol/L    eGFR 68.6 >60.0 mL/min/1.73   C-reactive  Protein    Collection Time: 01/30/25  4:59 PM    Specimen: Arm, Right; Blood   Result Value Ref Range    C-Reactive Protein 0.69 (H) 0.00 - 0.50 mg/dL   Lactic Acid, Plasma    Collection Time: 01/30/25  4:59 PM    Specimen: Arm, Right; Blood   Result Value Ref Range    Lactate 1.4 0.5 - 2.0 mmol/L   CBC Auto Differential    Collection Time: 01/30/25  4:59 PM    Specimen: Arm, Right; Blood   Result Value Ref Range    WBC 12.75 (H) 3.40 - 10.80 10*3/mm3    RBC 3.24 (L) 4.14 - 5.80 10*6/mm3    Hemoglobin 7.1 (L) 13.0 - 17.7 g/dL    Hematocrit 25.2 (L) 37.5 - 51.0 %    MCV 77.8 (L) 79.0 - 97.0 fL    MCH 21.9 (L) 26.6 - 33.0 pg    MCHC 28.2 (L) 31.5 - 35.7 g/dL    RDW 18.5 (H) 12.3 - 15.4 %    RDW-SD 52.5 37.0 - 54.0 fl    MPV 11.5 6.0 - 12.0 fL    Platelets 233 140 - 450 10*3/mm3    Neutrophil % 87.5 (H) 42.7 - 76.0 %    Lymphocyte % 5.6 (L) 19.6 - 45.3 %    Monocyte % 5.9 5.0 - 12.0 %    Eosinophil % 0.2 (L) 0.3 - 6.2 %    Basophil % 0.2 0.0 - 1.5 %    Immature Grans % 0.6 (H) 0.0 - 0.5 %    Neutrophils, Absolute 11.14 (H) 1.70 - 7.00 10*3/mm3    Lymphocytes, Absolute 0.72 0.70 - 3.10 10*3/mm3    Monocytes, Absolute 0.75 0.10 - 0.90 10*3/mm3    Eosinophils, Absolute 0.03 0.00 - 0.40 10*3/mm3    Basophils, Absolute 0.03 0.00 - 0.20 10*3/mm3    Immature Grans, Absolute 0.08 (H) 0.00 - 0.05 10*3/mm3    nRBC 0.0 0.0 - 0.2 /100 WBC   High Sensitivity Troponin T    Collection Time: 01/30/25  4:59 PM    Specimen: Arm, Right; Blood   Result Value Ref Range    HS Troponin T 15 <22 ng/L   BNP    Collection Time: 01/30/25  4:59 PM    Specimen: Arm, Right; Blood   Result Value Ref Range    proBNP 796.2 0.0 - 900.0 pg/mL   Procalcitonin    Collection Time: 01/30/25  4:59 PM    Specimen: Arm, Right; Blood   Result Value Ref Range    Procalcitonin 0.06 0.00 - 0.25 ng/mL   Scan Slide    Collection Time: 01/30/25  4:59 PM    Specimen: Arm, Right; Blood   Result Value Ref Range    Anisocytosis Slight/1+ None Seen    Platelet Estimate  Adequate Normal   Type & Screen    Collection Time: 01/30/25  4:59 PM    Specimen: Arm, Right; Blood   Result Value Ref Range    ABO Type A     RH type Positive     Antibody Screen Negative     T&S Expiration Date 2/2/2025 11:59:59 PM    Green Top (Gel)    Collection Time: 01/30/25  4:59 PM   Result Value Ref Range    Extra Tube Hold for add-ons.    Lavender Top    Collection Time: 01/30/25  4:59 PM   Result Value Ref Range    Extra Tube hold for add-on    Gold Top - SST    Collection Time: 01/30/25  4:59 PM   Result Value Ref Range    Extra Tube Hold for add-ons.    Light Blue Top    Collection Time: 01/30/25  4:59 PM   Result Value Ref Range    Extra Tube Hold for add-ons.    Respiratory Panel PCR w/COVID-19(SARS-CoV-2) [facility] In-House, NP Swab in UTM/VTM, 2 HR TAT - Swab, Nasopharynx    Collection Time: 01/30/25  5:01 PM    Specimen: Nasopharynx; Swab   Result Value Ref Range    ADENOVIRUS, PCR Not Detected Not Detected    Coronavirus 229E Not Detected Not Detected    Coronavirus HKU1 Not Detected Not Detected    Coronavirus NL63 Not Detected Not Detected    Coronavirus OC43 Not Detected Not Detected    COVID19 Not Detected Not Detected - Ref. Range    Human Metapneumovirus Not Detected Not Detected    Human Rhinovirus/Enterovirus Not Detected Not Detected    Influenza A PCR Not Detected Not Detected    Influenza B PCR Not Detected Not Detected    Parainfluenza Virus 1 Not Detected Not Detected    Parainfluenza Virus 2 Not Detected Not Detected    Parainfluenza Virus 3 Not Detected Not Detected    Parainfluenza Virus 4 Not Detected Not Detected    RSV, PCR Not Detected Not Detected    Bordetella pertussis pcr Not Detected Not Detected    Bordetella parapertussis PCR Not Detected Not Detected    Chlamydophila pneumoniae PCR Not Detected Not Detected    Mycoplasma pneumo by PCR Not Detected Not Detected   Blood Gas, Arterial With Co-Ox    Collection Time: 01/30/25  5:01 PM    Specimen: Arterial  Blood   Result Value Ref Range    Site Left Radial     Jaspreet's Test Positive     pH, Arterial 7.447 7.350 - 7.450 pH units    pCO2, Arterial 40.1 35.0 - 45.0 mm Hg    pO2, Arterial 71.8 (L) 83.0 - 108.0 mm Hg    HCO3, Arterial 27.7 (H) 20.0 - 26.0 mmol/L    Base Excess, Arterial 3.3 (H) 0.0 - 2.0 mmol/L    O2 Saturation, Arterial 95.7 94.0 - 99.0 %    Hemoglobin, Blood Gas 7.0 (L) 14 - 18 g/dL    Hematocrit, Blood Gas 21.3 (L) 38.0 - 51.0 %    Oxyhemoglobin 93.6 (L) 94 - 99 %    Methemoglobin 0.40 0.00 - 3.00 %    Carboxyhemoglobin 1.8 0 - 5 %    A-a DO2 25.8 0.0 - 300.0 mmHg    CO2 Content 28.9 22 - 33 mmol/L    Barometric Pressure for Blood Gas 727 mmHg    Modality Room Air     FIO2 21 %    Ventilator Mode NA     Collected by [id number]     pH, Temp Corrected      pCO2, Temperature Corrected      pO2, Temperature Corrected     High Sensitivity Troponin T 1Hr    Collection Time: 01/30/25  6:30 PM    Specimen: Arm, Left; Blood   Result Value Ref Range    HS Troponin T 14 <22 ng/L    Troponin T Numeric Delta -1 Abnormal if >/=3 ng/L   BNP    Collection Time: 01/30/25  6:30 PM    Specimen: Arm, Left; Blood   Result Value Ref Range    proBNP 825.5 0.0 - 900.0 pg/mL   Prepare RBC, 1 Units    Collection Time: 01/30/25  8:23 PM   Result Value Ref Range    Product Code O2842P80     Unit Number [id number]     UNIT  ABO A     UNIT  RH POS     Crossmatch Interpretation Compatible     Dispense Status IS     Blood Expiration Date [id number]     Blood Type Barcode 6200    Blood Gas, Arterial With Co-Ox    Collection Time: 01/30/25  8:45 PM    Specimen: Arterial Blood   Result Value Ref Range    Site Left Radial     Jaspreet's Test Positive     pH, Arterial 7.372 7.350 - 7.450 pH units    pCO2, Arterial 46.5 (H) 35.0 - 45.0 mm Hg    pO2, Arterial 48.3 (C) 83.0 - 108.0 mm Hg    HCO3, Arterial 27.0 (H) 20.0 - 26.0 mmol/L    Base Excess, Arterial 1.5 0.0 - 2.0 mmol/L    O2 Saturation, Arterial 80.7 (L) 94.0 - 99.0 %    Hemoglobin,  Blood Gas 7.3 (L) 14 - 18 g/dL    Hematocrit, Blood Gas 22.3 (L) 38.0 - 51.0 %    Oxyhemoglobin 78.8 (L) 94 - 99 %    Methemoglobin 0.60 0.00 - 3.00 %    Carboxyhemoglobin 1.7 0 - 5 %    A-a DO2 41.5 0.0 - 300.0 mmHg    CO2 Content 28.4 22 - 33 mmol/L    Barometric Pressure for Blood Gas 725 mmHg    Modality Room Air     FIO2 21 %    Ventilator Mode NA     Notified Who ER DR AND RN     Notified By [id number]     Notified Time 01/30/2025 20:49     Collected by [id number]     pH, Temp Corrected      pCO2, Temperature Corrected      pO2, Temperature Corrected     Blood Gas, Arterial With Co-Ox    Collection Time: 01/31/25  5:02 AM    Specimen: Arterial Blood   Result Value Ref Range    Site Left Radial     Jaspreet's Test N/A     pH, Arterial 7.271 (L) 7.350 - 7.450 pH units    pCO2, Arterial 66.0 (H) 35.0 - 45.0 mm Hg    pO2, Arterial 70.6 (L) 83.0 - 108.0 mm Hg    HCO3, Arterial 30.4 (H) 20.0 - 26.0 mmol/L    Base Excess, Arterial 2.8 (H) 0.0 - 2.0 mmol/L    O2 Saturation, Arterial 92.1 (L) 94.0 - 99.0 %    Hemoglobin, Blood Gas 8.0 (L) 14 - 18 g/dL    Hematocrit, Blood Gas 24.5 (L) 38.0 - 51.0 %    Oxyhemoglobin 90.2 (L) 94 - 99 %    Methemoglobin 0.30 0.00 - 3.00 %    Carboxyhemoglobin 1.8 0 - 5 %    A-a DO2 71.8 0.0 - 300.0 mmHg    CO2 Content 32.4 22 - 33 mmol/L    Barometric Pressure for Blood Gas 721 mmHg    Modality Nasal Cannula     FIO2 32 %    Flow Rate 3.0 lpm    Ventilator Mode NA     Notified Who ER     Notified By [id number]     Notified Time 01/31/2025 05:06     Collected by [id number]     pH, Temp Corrected      pCO2, Temperature Corrected      pO2, Temperature Corrected       CT Abdomen Pelvis With Contrast    Result Date: 1/30/2025   1. No acute abnormality within the abdomen and pelvis.   This report was finalized on 1/30/2025 10:28 PM by TIP GUERRERO MD.      CT Angiogram Chest Pulmonary Embolism    Result Date: 1/30/2025   1. No evidence for pulmonary embolus.  2. No acute intrathoracic process.  This report  was finalized on 1/30/2025 10:27 PM by TIP GUERRERO MD.      CT Head Without Contrast    Result Date: 1/30/2025   1.  No CT demonstrable acute intracranial abnormality.  If there is further concern for intracranial pathology or acute stroke, MRI of the brain may be performed for complete assessment.   This report was finalized on 1/30/2025 10:27 PM by TIP GUERRERO MD.      XR Chest AP    Result Date: 1/30/2025    Radiographic findings most consistent with CHF.  This report was finalized on 1/30/2025 5:19 PM by Dr. Dequan Cotter MD.         Procedures           ED Course  ED Course as of 01/31/25 0543   Thu Jan 30, 2025   1934 ECG demonstrates atrial fibrillation at a rate of 74 bpm.  QRS and QTc are normal.  There are no acute ST-T wave changes. [RA]   Fri Jan 31, 2025   0020 Awaiting callback from hospitalist [KH]   0455 Spoke with Dr. Thacker who accepts the patient to the hospitalist team. [KH]      ED Course User Index  [KH] Rita Rosario APRN  [RA] Donn Encinas MD                                           Electronically signed by TIFFANIE Campo, 01/30/25, 10:00 PM EST.              Medical Decision Making  Amount and/or Complexity of Data Reviewed  Labs: ordered.  Radiology: ordered.  ECG/medicine tests: ordered.    Risk  Prescription drug management.  Decision regarding hospitalization.        Final diagnoses:   Acute respiratory failure with hypoxia and hypercapnia       ED Disposition  ED Disposition       ED Disposition   Decision to Admit    Condition   --    Comment   Level of Care: Progressive Care [20]   Diagnosis: Acute respiratory failure with hypoxia and hypercapnia [[id number]]   Admitting Physician: JOELLEN THACKER [[id number]]   Attending Physician: JOELLEN THACKER [[id number]]   Certification: I Certify That Inpatient Hospital Services Are Medically Necessary For Greater Than 2 Midnights                 No follow-up provider specified.       Medication List       No changes were made to your prescriptions during this visit.            Rita Rosario, APRN  01/31/25 0543

## 2025-01-31 ENCOUNTER — APPOINTMENT (OUTPATIENT)
Dept: CARDIOLOGY | Facility: HOSPITAL | Age: 75
End: 2025-01-31
Payer: MEDICARE

## 2025-01-31 VITALS
SYSTOLIC BLOOD PRESSURE: 117 MMHG | WEIGHT: 258 LBS | OXYGEN SATURATION: 95 % | HEART RATE: 80 BPM | DIASTOLIC BLOOD PRESSURE: 83 MMHG | TEMPERATURE: 99.2 F | HEIGHT: 73 IN | BODY MASS INDEX: 34.19 KG/M2 | RESPIRATION RATE: 24 BRPM

## 2025-01-31 PROBLEM — J96.02 ACUTE RESPIRATORY FAILURE WITH HYPOXIA AND HYPERCAPNIA: Status: ACTIVE | Noted: 2025-01-31

## 2025-01-31 PROBLEM — J96.01 ACUTE RESPIRATORY FAILURE WITH HYPOXIA AND HYPERCAPNIA: Status: ACTIVE | Noted: 2025-01-31

## 2025-01-31 LAB
A-A DO2: 71.8 MMHG (ref 0–300)
A-A DO2: 94.9 MMHG (ref 0–300)
A-A DO2: 95.6 MMHG (ref 0–300)
ALBUMIN SERPL-MCNC: 3.7 G/DL (ref 3.5–5.2)
ALBUMIN/GLOB SERPL: 1.2 G/DL
ALP SERPL-CCNC: 123 U/L (ref 39–117)
ALT SERPL W P-5'-P-CCNC: 6 U/L (ref 1–41)
ANION GAP SERPL CALCULATED.3IONS-SCNC: 9.5 MMOL/L (ref 5–15)
ANISOCYTOSIS BLD QL: NORMAL
ARTERIAL PATENCY WRIST A: ABNORMAL
AST SERPL-CCNC: 14 U/L (ref 1–40)
ATMOSPHERIC PRESS: 721 MMHG
ATMOSPHERIC PRESS: 721 MMHG
ATMOSPHERIC PRESS: 722 MMHG
AV MEAN PRESS GRAD SYS DOP V1V2: 3 MMHG
AV VMAX SYS DOP: 128 CM/SEC
BASE EXCESS BLDA CALC-SCNC: 2.8 MMOL/L (ref 0–2)
BASE EXCESS BLDA CALC-SCNC: 3 MMOL/L (ref 0–2)
BASE EXCESS BLDA CALC-SCNC: 3.7 MMOL/L (ref 0–2)
BASOPHILS # BLD AUTO: 0.04 10*3/MM3 (ref 0–0.2)
BASOPHILS NFR BLD AUTO: 0.3 % (ref 0–1.5)
BDY SITE: ABNORMAL
BH BB BLOOD EXPIRATION DATE: NORMAL
BH BB BLOOD TYPE BARCODE: 6200
BH BB DISPENSE STATUS: NORMAL
BH BB PRODUCT CODE: NORMAL
BH BB UNIT NUMBER: NORMAL
BH CV ECHO MEAS - ACS: 1.9 CM
BH CV ECHO MEAS - AO MAX PG: 6.6 MMHG
BH CV ECHO MEAS - AO ROOT DIAM: 3.9 CM
BH CV ECHO MEAS - AO V2 VTI: 24.3 CM
BH CV ECHO MEAS - AVA(I,D): 2.27 CM2
BH CV ECHO MEAS - EDV(CUBED): 145.5 ML
BH CV ECHO MEAS - EDV(MOD-SP2): 149 ML
BH CV ECHO MEAS - EDV(MOD-SP4): 171 ML
BH CV ECHO MEAS - EF(MOD-SP2): 62.7 %
BH CV ECHO MEAS - EF(MOD-SP4): 62.3 %
BH CV ECHO MEAS - ESV(CUBED): 39 ML
BH CV ECHO MEAS - ESV(MOD-SP2): 55.6 ML
BH CV ECHO MEAS - ESV(MOD-SP4): 64.5 ML
BH CV ECHO MEAS - FS: 35.6 %
BH CV ECHO MEAS - IVS/LVPW: 1.02 CM
BH CV ECHO MEAS - IVSD: 1.22 CM
BH CV ECHO MEAS - LA DIMENSION: 4.9 CM
BH CV ECHO MEAS - LAT PEAK E' VEL: 15.1 CM/SEC
BH CV ECHO MEAS - LV DIASTOLIC VOL/BSA (35-75): 71.3 CM2
BH CV ECHO MEAS - LV MASS(C)D: 256.4 GRAMS
BH CV ECHO MEAS - LV MAX PG: 2.8 MMHG
BH CV ECHO MEAS - LV MEAN PG: 2 MMHG
BH CV ECHO MEAS - LV SYSTOLIC VOL/BSA (12-30): 26.9 CM2
BH CV ECHO MEAS - LV V1 MAX: 84.2 CM/SEC
BH CV ECHO MEAS - LV V1 VTI: 15.9 CM
BH CV ECHO MEAS - LVIDD: 5.3 CM
BH CV ECHO MEAS - LVIDS: 3.4 CM
BH CV ECHO MEAS - LVOT AREA: 3.5 CM2
BH CV ECHO MEAS - LVOT DIAM: 2.1 CM
BH CV ECHO MEAS - LVPWD: 1.2 CM
BH CV ECHO MEAS - MED PEAK E' VEL: 9 CM/SEC
BH CV ECHO MEAS - MR MAX PG: 29.4 MMHG
BH CV ECHO MEAS - MR MAX VEL: 271 CM/SEC
BH CV ECHO MEAS - MV A MAX VEL: 54.4 CM/SEC
BH CV ECHO MEAS - MV DEC SLOPE: 667 CM/SEC2
BH CV ECHO MEAS - MV DEC TIME: 0.17 SEC
BH CV ECHO MEAS - MV E MAX VEL: 114 CM/SEC
BH CV ECHO MEAS - MV E/A: 2.1
BH CV ECHO MEAS - MV MAX PG: 6.3 MMHG
BH CV ECHO MEAS - MV MEAN PG: 3 MMHG
BH CV ECHO MEAS - MV V2 VTI: 32.3 CM
BH CV ECHO MEAS - MVA(VTI): 1.7 CM2
BH CV ECHO MEAS - PA ACC TIME: 0.1 SEC
BH CV ECHO MEAS - PA V2 MAX: 99 CM/SEC
BH CV ECHO MEAS - PI END-D VEL: 106 CM/SEC
BH CV ECHO MEAS - RAP SYSTOLE: 10 MMHG
BH CV ECHO MEAS - RVSP: 38.5 MMHG
BH CV ECHO MEAS - SV(LVOT): 55.1 ML
BH CV ECHO MEAS - SV(MOD-SP2): 93.4 ML
BH CV ECHO MEAS - SV(MOD-SP4): 106.5 ML
BH CV ECHO MEAS - SVI(LVOT): 23 ML/M2
BH CV ECHO MEAS - SVI(MOD-SP2): 39 ML/M2
BH CV ECHO MEAS - SVI(MOD-SP4): 44.4 ML/M2
BH CV ECHO MEAS - TAPSE (>1.6): 2.19 CM
BH CV ECHO MEAS - TR MAX PG: 28.5 MMHG
BH CV ECHO MEAS - TR MAX VEL: 267 CM/SEC
BH CV ECHO MEASUREMENTS AVERAGE E/E' RATIO: 9.46
BILIRUB SERPL-MCNC: 0.3 MG/DL (ref 0–1.2)
BUN SERPL-MCNC: 18 MG/DL (ref 8–23)
BUN/CREAT SERPL: 15.9 (ref 7–25)
CALCIUM SPEC-SCNC: 8.4 MG/DL (ref 8.6–10.5)
CHLORIDE SERPL-SCNC: 104 MMOL/L (ref 98–107)
CO2 BLDA-SCNC: 32.1 MMOL/L (ref 22–33)
CO2 BLDA-SCNC: 32.4 MMOL/L (ref 22–33)
CO2 BLDA-SCNC: 32.4 MMOL/L (ref 22–33)
CO2 SERPL-SCNC: 26.5 MMOL/L (ref 22–29)
COHGB MFR BLD: 1.7 % (ref 0–5)
COHGB MFR BLD: 1.8 % (ref 0–5)
COHGB MFR BLD: 2.1 % (ref 0–5)
CREAT SERPL-MCNC: 1.13 MG/DL (ref 0.76–1.27)
CROSSMATCH INTERPRETATION: NORMAL
DACRYOCYTES BLD QL SMEAR: NORMAL
DEPRECATED RDW RBC AUTO: 56.1 FL (ref 37–54)
EGFRCR SERPLBLD CKD-EPI 2021: 68.6 ML/MIN/1.73
EOSINOPHIL # BLD AUTO: 0.01 10*3/MM3 (ref 0–0.4)
EOSINOPHIL NFR BLD AUTO: 0.1 % (ref 0.3–6.2)
EPAP: 4
EPAP: 8
ERYTHROCYTE [DISTWIDTH] IN BLOOD BY AUTOMATED COUNT: 18.8 % (ref 12.3–15.4)
GAS FLOW AIRWAY: 3 LPM
GLOBULIN UR ELPH-MCNC: 3.2 GM/DL
GLUCOSE SERPL-MCNC: 127 MG/DL (ref 65–99)
HCO3 BLDA-SCNC: 30.2 MMOL/L (ref 20–26)
HCO3 BLDA-SCNC: 30.4 MMOL/L (ref 20–26)
HCO3 BLDA-SCNC: 30.5 MMOL/L (ref 20–26)
HCT VFR BLD AUTO: 28.3 % (ref 37.5–51)
HCT VFR BLD CALC: 23.8 % (ref 38–51)
HCT VFR BLD CALC: 24.5 % (ref 38–51)
HCT VFR BLD CALC: 25.2 % (ref 38–51)
HGB BLD-MCNC: 8.1 G/DL (ref 13–17.7)
HGB BLDA-MCNC: 7.8 G/DL (ref 14–18)
HGB BLDA-MCNC: 8 G/DL (ref 14–18)
HGB BLDA-MCNC: 8.2 G/DL (ref 14–18)
HYPOCHROMIA BLD QL: NORMAL
IMM GRANULOCYTES # BLD AUTO: 0.1 10*3/MM3 (ref 0–0.05)
IMM GRANULOCYTES NFR BLD AUTO: 0.7 % (ref 0–0.5)
INHALED O2 CONCENTRATION: 32 %
INHALED O2 CONCENTRATION: 36 %
INHALED O2 CONCENTRATION: 36 %
IPAP: 20
IPAP: 20
LARGE PLATELETS: NORMAL
LEFT ATRIUM VOLUME INDEX: 41.4 ML/M2
LV EF BIPLANE MOD: 59.8 %
LYMPHOCYTES # BLD AUTO: 0.92 10*3/MM3 (ref 0.7–3.1)
LYMPHOCYTES NFR BLD AUTO: 6.3 % (ref 19.6–45.3)
Lab: ABNORMAL
MCH RBC QN AUTO: 23.3 PG (ref 26.6–33)
MCHC RBC AUTO-ENTMCNC: 28.6 G/DL (ref 31.5–35.7)
MCV RBC AUTO: 81.3 FL (ref 79–97)
METHGB BLD QL: 0.2 % (ref 0–3)
METHGB BLD QL: 0.3 % (ref 0–3)
METHGB BLD QL: 0.6 % (ref 0–3)
MODALITY: ABNORMAL
MONOCYTES # BLD AUTO: 1.36 10*3/MM3 (ref 0.1–0.9)
MONOCYTES NFR BLD AUTO: 9.3 % (ref 5–12)
NEUTROPHILS NFR BLD AUTO: 12.18 10*3/MM3 (ref 1.7–7)
NEUTROPHILS NFR BLD AUTO: 83.3 % (ref 42.7–76)
NOTIFIED BY: ABNORMAL
NOTIFIED BY: ABNORMAL
NOTIFIED WHO: ABNORMAL
NOTIFIED WHO: ABNORMAL
NRBC BLD AUTO-RTO: 0.1 /100 WBC (ref 0–0.2)
OVALOCYTES BLD QL SMEAR: NORMAL
OXYHGB MFR BLDV: 90.2 % (ref 94–99)
OXYHGB MFR BLDV: 93.1 % (ref 94–99)
OXYHGB MFR BLDV: 93.5 % (ref 94–99)
PCO2 BLDA: 60 MM HG (ref 35–45)
PCO2 BLDA: 62.1 MM HG (ref 35–45)
PCO2 BLDA: 66 MM HG (ref 35–45)
PCO2 TEMP ADJ BLD: ABNORMAL MM[HG]
PH BLDA: 7.27 PH UNITS (ref 7.35–7.45)
PH BLDA: 7.29 PH UNITS (ref 7.35–7.45)
PH BLDA: 7.32 PH UNITS (ref 7.35–7.45)
PH, TEMP CORRECTED: ABNORMAL
PLATELET # BLD AUTO: 213 10*3/MM3 (ref 140–450)
PMV BLD AUTO: 11.3 FL (ref 6–12)
PO2 BLDA: 70.6 MM HG (ref 83–108)
PO2 BLDA: 79.6 MM HG (ref 83–108)
PO2 BLDA: 80.9 MM HG (ref 83–108)
PO2 TEMP ADJ BLD: ABNORMAL MM[HG]
POTASSIUM SERPL-SCNC: 4.2 MMOL/L (ref 3.5–5.2)
PROT SERPL-MCNC: 6.9 G/DL (ref 6–8.5)
QT INTERVAL: 388 MS
QT INTERVAL: 400 MS
QTC INTERVAL: 430 MS
QTC INTERVAL: 470 MS
RBC # BLD AUTO: 3.48 10*6/MM3 (ref 4.14–5.8)
SAO2 % BLDCOA: 92.1 % (ref 94–99)
SAO2 % BLDCOA: 95.3 % (ref 94–99)
SAO2 % BLDCOA: 95.7 % (ref 94–99)
SET MECH RESP RATE: 22
SET MECH RESP RATE: 22
SODIUM SERPL-SCNC: 140 MMOL/L (ref 136–145)
UNIT  ABO: NORMAL
UNIT  RH: NORMAL
VENTILATOR MODE: ABNORMAL
WBC NRBC COR # BLD AUTO: 14.61 10*3/MM3 (ref 3.4–10.8)

## 2025-01-31 PROCEDURE — 94799 UNLISTED PULMONARY SVC/PX: CPT

## 2025-01-31 PROCEDURE — 36600 WITHDRAWAL OF ARTERIAL BLOOD: CPT

## 2025-01-31 PROCEDURE — 97166 OT EVAL MOD COMPLEX 45 MIN: CPT

## 2025-01-31 PROCEDURE — 93306 TTE W/DOPPLER COMPLETE: CPT | Performed by: INTERNAL MEDICINE

## 2025-01-31 PROCEDURE — 94761 N-INVAS EAR/PLS OXIMETRY MLT: CPT

## 2025-01-31 PROCEDURE — 99222 1ST HOSP IP/OBS MODERATE 55: CPT | Performed by: INTERNAL MEDICINE

## 2025-01-31 PROCEDURE — 82375 ASSAY CARBOXYHB QUANT: CPT

## 2025-01-31 PROCEDURE — 85007 BL SMEAR W/DIFF WBC COUNT: CPT | Performed by: HOSPITALIST

## 2025-01-31 PROCEDURE — 94660 CPAP INITIATION&MGMT: CPT

## 2025-01-31 PROCEDURE — 87205 SMEAR GRAM STAIN: CPT | Performed by: OPHTHALMOLOGY

## 2025-01-31 PROCEDURE — 87077 CULTURE AEROBIC IDENTIFY: CPT | Performed by: OPHTHALMOLOGY

## 2025-01-31 PROCEDURE — 87186 SC STD MICRODIL/AGAR DIL: CPT | Performed by: OPHTHALMOLOGY

## 2025-01-31 PROCEDURE — 82805 BLOOD GASES W/O2 SATURATION: CPT

## 2025-01-31 PROCEDURE — 83050 HGB METHEMOGLOBIN QUAN: CPT

## 2025-01-31 PROCEDURE — 87070 CULTURE OTHR SPECIMN AEROBIC: CPT | Performed by: OPHTHALMOLOGY

## 2025-01-31 PROCEDURE — 93306 TTE W/DOPPLER COMPLETE: CPT

## 2025-01-31 PROCEDURE — 99223 1ST HOSP IP/OBS HIGH 75: CPT | Performed by: HOSPITALIST

## 2025-01-31 PROCEDURE — 25010000002 HEPARIN (PORCINE) PER 1000 UNITS: Performed by: FAMILY MEDICINE

## 2025-01-31 PROCEDURE — 85025 COMPLETE CBC W/AUTO DIFF WBC: CPT | Performed by: HOSPITALIST

## 2025-01-31 PROCEDURE — 97162 PT EVAL MOD COMPLEX 30 MIN: CPT

## 2025-01-31 PROCEDURE — 25010000002 FUROSEMIDE PER 20 MG: Performed by: INTERNAL MEDICINE

## 2025-01-31 PROCEDURE — 80053 COMPREHEN METABOLIC PANEL: CPT | Performed by: HOSPITALIST

## 2025-01-31 RX ORDER — ALBUTEROL SULFATE 0.83 MG/ML
2.5 SOLUTION RESPIRATORY (INHALATION) EVERY 4 HOURS PRN
Status: CANCELLED | OUTPATIENT
Start: 2025-01-31

## 2025-01-31 RX ORDER — AMOXICILLIN 250 MG
2 CAPSULE ORAL 2 TIMES DAILY PRN
Status: DISCONTINUED | OUTPATIENT
Start: 2025-01-31 | End: 2025-01-31 | Stop reason: HOSPADM

## 2025-01-31 RX ORDER — CIPROFLOXACIN 500 MG/1
500 TABLET, FILM COATED ORAL EVERY 12 HOURS SCHEDULED
Status: DISCONTINUED | OUTPATIENT
Start: 2025-01-31 | End: 2025-01-31 | Stop reason: HOSPADM

## 2025-01-31 RX ORDER — LANOLIN ALCOHOL/MO/W.PET/CERES
1000 CREAM (GRAM) TOPICAL DAILY
Status: CANCELLED | OUTPATIENT
Start: 2025-01-31

## 2025-01-31 RX ORDER — BUDESONIDE AND FORMOTEROL FUMARATE DIHYDRATE 160; 4.5 UG/1; UG/1
2 AEROSOL RESPIRATORY (INHALATION)
Status: CANCELLED | OUTPATIENT
Start: 2025-01-31

## 2025-01-31 RX ORDER — SODIUM CHLORIDE 0.9 % (FLUSH) 0.9 %
10 SYRINGE (ML) INJECTION AS NEEDED
Status: DISCONTINUED | OUTPATIENT
Start: 2025-01-31 | End: 2025-01-31 | Stop reason: HOSPADM

## 2025-01-31 RX ORDER — ATORVASTATIN CALCIUM 40 MG/1
80 TABLET, FILM COATED ORAL NIGHTLY
Status: CANCELLED | OUTPATIENT
Start: 2025-01-31

## 2025-01-31 RX ORDER — BISACODYL 10 MG
10 SUPPOSITORY, RECTAL RECTAL DAILY PRN
Status: DISCONTINUED | OUTPATIENT
Start: 2025-01-31 | End: 2025-01-31 | Stop reason: HOSPADM

## 2025-01-31 RX ORDER — BRIMONIDINE TARTRATE 2 MG/ML
1 SOLUTION/ DROPS OPHTHALMIC EVERY 8 HOURS SCHEDULED
Status: DISCONTINUED | OUTPATIENT
Start: 2025-01-31 | End: 2025-01-31 | Stop reason: HOSPADM

## 2025-01-31 RX ORDER — ERYTHROMYCIN 5 MG/G
OINTMENT OPHTHALMIC NIGHTLY
Status: DISCONTINUED | OUTPATIENT
Start: 2025-01-31 | End: 2025-01-31 | Stop reason: HOSPADM

## 2025-01-31 RX ORDER — ACETAMINOPHEN 325 MG/1
650 TABLET ORAL EVERY 6 HOURS PRN
Status: DISCONTINUED | OUTPATIENT
Start: 2025-01-31 | End: 2025-01-31 | Stop reason: HOSPADM

## 2025-01-31 RX ORDER — FUROSEMIDE 10 MG/ML
40 INJECTION INTRAMUSCULAR; INTRAVENOUS ONCE
Status: COMPLETED | OUTPATIENT
Start: 2025-01-31 | End: 2025-01-31

## 2025-01-31 RX ORDER — SODIUM CHLORIDE 9 MG/ML
40 INJECTION, SOLUTION INTRAVENOUS AS NEEDED
Status: DISCONTINUED | OUTPATIENT
Start: 2025-01-31 | End: 2025-01-31 | Stop reason: HOSPADM

## 2025-01-31 RX ORDER — POLYETHYLENE GLYCOL 3350 17 G/17G
17 POWDER, FOR SOLUTION ORAL DAILY PRN
Status: DISCONTINUED | OUTPATIENT
Start: 2025-01-31 | End: 2025-01-31 | Stop reason: HOSPADM

## 2025-01-31 RX ORDER — PANTOPRAZOLE SODIUM 40 MG/1
40 TABLET, DELAYED RELEASE ORAL
Status: DISCONTINUED | OUTPATIENT
Start: 2025-01-31 | End: 2025-01-31 | Stop reason: HOSPADM

## 2025-01-31 RX ORDER — BISACODYL 5 MG/1
5 TABLET, DELAYED RELEASE ORAL DAILY PRN
Status: DISCONTINUED | OUTPATIENT
Start: 2025-01-31 | End: 2025-01-31 | Stop reason: HOSPADM

## 2025-01-31 RX ORDER — SODIUM CHLORIDE 0.9 % (FLUSH) 0.9 %
10 SYRINGE (ML) INJECTION EVERY 12 HOURS SCHEDULED
Status: DISCONTINUED | OUTPATIENT
Start: 2025-01-31 | End: 2025-01-31 | Stop reason: HOSPADM

## 2025-01-31 RX ORDER — METOPROLOL SUCCINATE 25 MG/1
25 TABLET, EXTENDED RELEASE ORAL DAILY
Status: CANCELLED | OUTPATIENT
Start: 2025-01-31

## 2025-01-31 RX ORDER — HEPARIN SODIUM 5000 [USP'U]/ML
5000 INJECTION, SOLUTION INTRAVENOUS; SUBCUTANEOUS EVERY 8 HOURS SCHEDULED
Status: DISCONTINUED | OUTPATIENT
Start: 2025-01-31 | End: 2025-01-31 | Stop reason: HOSPADM

## 2025-01-31 RX ORDER — NITROGLYCERIN 0.4 MG/1
0.4 TABLET SUBLINGUAL
Status: DISCONTINUED | OUTPATIENT
Start: 2025-01-31 | End: 2025-01-31 | Stop reason: HOSPADM

## 2025-01-31 RX ORDER — CIPROFLOXACIN HYDROCHLORIDE 3.5 MG/ML
1 SOLUTION/ DROPS TOPICAL
Status: DISCONTINUED | OUTPATIENT
Start: 2025-01-31 | End: 2025-01-31 | Stop reason: HOSPADM

## 2025-01-31 RX ADMIN — CIPROFLOXACIN HYDROCHLORIDE 1 DROP: 3 SOLUTION/ DROPS OPHTHALMIC at 16:19

## 2025-01-31 RX ADMIN — HEPARIN SODIUM 5000 UNITS: 5000 INJECTION INTRAVENOUS; SUBCUTANEOUS at 14:15

## 2025-01-31 RX ADMIN — CIPROFLOXACIN 500 MG: 500 TABLET, FILM COATED ORAL at 14:15

## 2025-01-31 RX ADMIN — PANTOPRAZOLE SODIUM 40 MG: 40 TABLET, DELAYED RELEASE ORAL at 07:05

## 2025-01-31 RX ADMIN — FUROSEMIDE 40 MG: 10 INJECTION, SOLUTION INTRAMUSCULAR; INTRAVENOUS at 10:47

## 2025-01-31 RX ADMIN — CIPROFLOXACIN HYDROCHLORIDE 1 DROP: 3 SOLUTION/ DROPS OPHTHALMIC at 14:15

## 2025-01-31 RX ADMIN — BRIMONIDINE TARTRATE 1 DROP: 2 SOLUTION OPHTHALMIC at 14:15

## 2025-01-31 RX ADMIN — ACETAMINOPHEN 650 MG: 325 TABLET ORAL at 12:29

## 2025-01-31 RX ADMIN — Medication 10 ML: at 09:03

## 2025-01-31 NOTE — ED NOTES
Red shoes, black zip up jacket, glasses, purse and cellular device placed in a patient's belonging bag and sent to floor with patient.

## 2025-01-31 NOTE — PROGRESS NOTES
Patient was seen and examined face-to-face.  I have reviewed and Dr. Thacker history and physical and agree with the assessment and plan without change

## 2025-01-31 NOTE — ED NOTES
CONSENT FOR TRANSFUSION OF BLOOD AND/OR BLOOD COMPONENTS SIGNED BY PATIENT AND PLACED ON PATIENT CHART.

## 2025-01-31 NOTE — ED NOTES
Provider called to bedside d/t patient becoming pale, diaphoretic, nauseous and dry heaving. Pt O2 sat also noted to be low around 80-90% on room air. Provider made aware. Resp called at this time for blood gas.

## 2025-01-31 NOTE — THERAPY EVALUATION
Acute Care - Physical Therapy Initial Evaluation   Joey     Patient Name: Zaid Galarza  : 1950  MRN: [id number]  Today's Date: 2025   Onset of Illness/Injury or Date of Surgery: 25 (admission date)  Visit Dx:     ICD-10-CM ICD-9-CM   1. Acute respiratory failure with hypoxia and hypercapnia  J96.01 518.81    J96.02      Patient Active Problem List   Diagnosis    Persistent atrial fibrillation    Dilated cardiomyopathy    Nonobstructive CAD per cath 10/3/2019    Anxiety disorder    Essential hypertension    Former tobacco use    History of abdominal aortic aneurysm (AAA) repair    Dyslipidemia, goal LDL below 100    CVA (cerebral vascular accident)    Cerebrovascular accident (CVA), unspecified mechanism    Symptomatic anemia    GI bleed    Iron deficiency anemia    Intestinal malabsorption    Acute respiratory failure with hypoxia and hypercapnia     Past Medical History:   Diagnosis Date    Atrial fibrillation     CHF (congestive heart failure)     COPD (chronic obstructive pulmonary disease)     Coronary artery disease     Hypertension     Tobacco abuse      Past Surgical History:   Procedure Laterality Date    ABDOMINAL AORTIC ANEURYSM REPAIR      CARDIAC CATHETERIZATION N/A 10/03/2019    Procedure: Left Heart Cath;  Surgeon: Vincent Phillips MD;  Location: [facility] INVASIVE LOCATION;  Service: Cardiology     PT Assessment (Last 12 Hours)       PT Evaluation and Treatment       Row Name 25 1305          Physical Therapy Time and Intention    Document Type evaluation  -     Mode of Treatment physical therapy  -     Patient Effort good  -     Comment Pt seen for therapy evaluation this date, pleasant and cooperative with therapy. Pt may get transferred to . Pt states he lives alone, able to ambulate at home per report (states he uses W/C, but then reports he ambulated to mailbox yesterday). Pt states he had recent fall involving retrieving garbage can.  -       Row Name  01/31/25 1305          General Information    Patient Profile Reviewed yes  -     Onset of Illness/Injury or Date of Surgery 01/30/25  admission date  -     Patient Observations alert;cooperative;agree to therapy  -     General Observations of Patient Pt seen supine in hospital bed, pleasant and cooperative with therapy. Bipap in place  -     Prior Level of Function --  reports he ambulated with W/C  -     Equipment Currently Used at Home wheelchair  -     Existing Precautions/Restrictions fall  -       Row Name 01/31/25 1305          Cognition    Personal Safety Interventions supervised activity  -       Row Name 01/31/25 1305          Strength Comprehensive (MMT)    Comment, General Manual Muscle Testing (MMT) Assessment Grossly 5/5 MMT, R knee flex 4+ to 5/5;  -       Row Name 01/31/25 1305          Bed Mobility    Bed Mobility supine-sit;sit-supine  -     Supine-Sit [place] (Bed Mobility) standby assist;supervision  -     Sit-Supine [place] (Bed Mobility) standby assist;supervision  -       Row Name 01/31/25 1305          Transfers    Transfers sit-stand transfer;stand-sit transfer  -       Row Name 01/31/25 1305          Sit-Stand Transfer    Sit-Stand [place] (Transfers) contact guard;minimum assist (75% patient effort)  -     Assistive Device (Sit-Stand Transfers) other (see comments)  HHAx2  -       Row Name 01/31/25 1305          Stand-Sit Transfer    Stand-Sit [place] (Transfers) contact guard  -     Assistive Device (Stand-Sit Transfers) other (see comments)  HHAx2  -       Row Name 01/31/25 1305          Gait/Stairs (Locomotion)    Comment, (Gait/Stairs) Pt able to take a lateral step  -       Row Name 01/31/25 1305          Plan of Care Review    Outcome Evaluation Pt seen for evaluation this date, pleasant and cooperative with therapy. Pt demonstrates mobility that may benefit from therapy interventions. Pt may be D/Cing to , however  recommending therapy d/t fall risk and impaired mobility.  -       Row Name 01/31/25 1305          Positioning and Restraints    Pre-Treatment Position in bed  -     Post Treatment Position bed  -     In Bed supine;call light within reach;with other staff;side rails up x3  -       Row Name 01/31/25 1305          Therapy Assessment/Plan (PT)    Functional Level at Time of Evaluation (PT) Recommend CGA when OOB  -     PT Diagnosis (PT) impaired/decreased functional mobility  -     Rehab Potential (PT) fair  -     Criteria for Skilled Interventions Met (PT) yes  -     Therapy Frequency (PT) 2 times/wk  -       Row Name 01/31/25 1305          Physical Therapy Goals    Gait Training Goal Selection (PT) gait training, PT goal 1  -       Row Name 01/31/25 1305          Gait Training Goal 1 (PT)    Activity/Assistive Device (Gait Training Goal 1, PT) gait (walking locomotion);assistive device use;decrease fall risk;walker, rolling  -               User Key  (r) = Recorded By, (t) = Taken By, (c) = Cosigned By      Initials Name Provider Type    Kirstin Platt, PT Physical Therapist                    Physical Therapy Education        No education to display                  PT Recommendation and Plan  Anticipated Discharge Disposition (PT): sub acute care setting, skilled nursing facility, inpatient rehabilitation facility, other (see comments) (pending)  Planned Therapy Interventions (PT): gait training, transfer training, strengthening, other (see comments) (add interventions PRN, as appropriate)  Therapy Frequency (PT): 2 times/wk  Outcome Evaluation: Pt seen for evaluation this date, pleasant and cooperative with therapy. Pt demonstrates mobility that may benefit from therapy interventions. Pt may be D/Cing to , however recommending therapy d/t fall risk and impaired mobility.       Time Calculation:    PT Charges       Row Name 01/31/25 0686             Time Calculation    Start Time 8875   -SHARA      PT Received On 01/31/25  -SHARA      PT Goal Re-Cert Due Date 02/14/25  -SHARA                User Key  (r) = Recorded By, (t) = Taken By, (c) = Cosigned By      Initials Name Provider Type    Kirstin Platt, PT Physical Therapist                  Therapy Charges for Today       Code Description Service Date Service Provider Modifiers Qty    78920880922 HC PT EVAL MOD COMPLEXITY 4 1/31/2025 Kirstin Richards, PT GP 1            PT G-Codes  AM-PAC 6 Clicks Score (PT): 12    Kirstin Richards, PT  1/31/2025

## 2025-01-31 NOTE — THERAPY EVALUATION
Patient Name: Zaid Galarza  : 1950    MRN: [id number]                              Today's Date: 2025       Admit Date: 2025    Visit Dx:     ICD-10-CM ICD-9-CM   1. Acute respiratory failure with hypoxia and hypercapnia  J96.01 518.81    J96.02      Patient Active Problem List   Diagnosis    Persistent atrial fibrillation    Dilated cardiomyopathy    Nonobstructive CAD per cath 10/3/2019    Anxiety disorder    Essential hypertension    Former tobacco use    History of abdominal aortic aneurysm (AAA) repair    Dyslipidemia, goal LDL below 100    CVA (cerebral vascular accident)    Cerebrovascular accident (CVA), unspecified mechanism    Symptomatic anemia    GI bleed    Iron deficiency anemia    Intestinal malabsorption    Acute respiratory failure with hypoxia and hypercapnia     Past Medical History:   Diagnosis Date    Atrial fibrillation     CHF (congestive heart failure)     COPD (chronic obstructive pulmonary disease)     Coronary artery disease     Hypertension     Tobacco abuse      Past Surgical History:   Procedure Laterality Date    ABDOMINAL AORTIC ANEURYSM REPAIR      CARDIAC CATHETERIZATION N/A 10/03/2019    Procedure: Left Heart Cath;  Surgeon: Vincent Phillips MD;  Location: [facility] CATH INVASIVE LOCATION;  Service: Cardiology      General Information       Row Name 25 1423          OT Time and Intention    Subjective Information no complaints  -LA     Document Type evaluation  -LA     Mode of Treatment occupational therapy  -LA     Patient Effort good  -LA     Comment Patient seen for OT evaluation this date. Patient pleasant and cooperative with good motivation to return to [facility]. Nsg reports that patient may be sent to  later this date. Patient does live alone and was able to complete ADLs without assistance. Patient uses w/c at home but was able to ambulate short distances.  -LA       Row Name 25 1423          General Information    Patient Profile Reviewed yes   -LA     Prior Level of Function independent:;ADL's  -LA     Existing Precautions/Restrictions fall  -LA     Barriers to Rehab medically complex;previous functional deficit  -[facility] Name 01/31/25 1423          Occupational Profile    Reason for Services/Referral (Occupational Profile) OT to assess for changes with level of independence/safety with ADLs  -LA     Patient Goals (Occupational Profile) Return home  -[facility] Name 01/31/25 1423          Living Environment    People in Home alone  -[facility] Name 01/31/25 1423          Cognition    Orientation Status (Cognition) oriented x 4  -[facility] Name 01/31/25 1423          Safety Issues/Impairments Affecting Functional Mobility    Impairments Affecting Function (Mobility) endurance/activity tolerance  Patint on Bipap  -LA               User Key  (r) = Recorded By, (t) = Taken By, (c) = Cosigned By      Initials Name Provider Type    Awilda Pink OT Occupational Therapist                     Mobility/ADL's       [facility] Name 01/31/25 1429          Bed Mobility    Supine-Sit [place] (Bed Mobility) standby assist;supervision  -LA     Sit-Supine [place] (Bed Mobility) standby assist;supervision  -LA       Row Name 01/31/25 1429          Transfers    Transfers bed-chair transfer;sit-stand transfer  -LA       Row Name 01/31/25 1429          Bed-Chair Transfer    Bed-Chair [place] (Transfers) contact guard;minimum assist (75% patient effort)  -LA       Row Name 01/31/25 1429          Sit-Stand Transfer    Sit-Stand [place] (Transfers) contact guard;minimum assist (75% patient effort)  -[facility] Name 01/31/25 1429          Stand-Sit Transfer    Stand-Sit [place] (Transfers) contact guard  -LA       Row Name 01/31/25 1429          Functional Mobility    Patient was able to Ambulate yes  -LA       Row Name 01/31/25 1429          Activities of Daily Living    BADL Assessment/Intervention bathing;upper body dressing;lower body  dressing;grooming;feeding;toileting  -LA       Row Name 01/31/25 1429          Bathing Assessment/Intervention    [place] Level (Bathing) minimum assist (75% patient effort)  -LA       Row Name 01/31/25 1429          Upper Body Dressing Assessment/Training    [place] Level (Upper Body Dressing) minimum assist (75% patient effort)  -LA       Row Name 01/31/25 1429          Lower Body Dressing Assessment/Training    [place] Level (Lower Body Dressing) minimum assist (75% patient effort)  -LA       Row Name 01/31/25 1429          Grooming Assessment/Training    [place] Level (Grooming) minimum assist (75% patient effort)  -LA       Row Name 01/31/25 1429          Self-Feeding Assessment/Training    [place] Level (Feeding) set up  -LA       Row Name 01/31/25 1429          Toileting Assessment/Training    [place] Level (Toileting) minimum assist (75% patient effort)  -LA               User Key  (r) = Recorded By, (t) = Taken By, (c) = Cosigned By      Initials Name Provider Type    Awilda Pink OT Occupational Therapist                   Obj/Interventions       Row Name 01/31/25 1430          Sensory Assessment (Somatosensory)    Sensory Assessment (Somatosensory) sensation intact  -LA       Row Name 01/31/25 1430          Vision Assessment/Intervention    Visual Impairment/Limitations WFL  -LA       Row Name 01/31/25 1430          Range of Motion Comprehensive    General Range of Motion no range of motion deficits identified;bilateral upper extremity ROM WFL  -LA     Comment, General Range of Motion BUE ROM grossly WFL  -LA       Row Name 01/31/25 1430          Strength Comprehensive (MMT)    General Manual Muscle Testing (MMT) Assessment no strength deficits identified  -LA     Comment, General Manual Muscle Testing (MMT) Assessment UE MMT grossly 4+/5  -LA       Row Name 01/31/25 1430          Motor Skills    Motor Skills coordination;functional endurance  -LA     Coordination WFL   -LA     Functional Endurance fair  -LA       Row Name 01/31/25 1430          Balance    Balance Assessment sitting static balance;sitting dynamic balance  -LA     Static Sitting Balance independent  -LA     Dynamic Sitting Balance independent  -LA               User Key  (r) = Recorded By, (t) = Taken By, (c) = Cosigned By      Initials Name Provider Type    Awilda Pink OT Occupational Therapist                   Goals/Plan       Row Name 01/31/25 1433          Bathing Goal 1 (OT)    Activity/Device (Bathing Goal 1, OT) bathing skills, all  -LA     [place] Level/Cues Needed (Bathing Goal 1, OT) set-up required  -LA     Time Frame (Bathing Goal 1, OT) by discharge  -LA       Row Name 01/31/25 1433          Dressing Goal 1 (OT)    Activity/Device (Dressing Goal 1, OT) dressing skills, all  -LA     [place]/Cues Needed (Dressing Goal 1, OT) set-up required  -LA     Time Frame (Dressing Goal 1, OT) by discharge  -LA       Row Name 01/31/25 1433          Therapy Assessment/Plan (OT)    Planned Therapy Interventions (OT) activity tolerance training;patient/caregiver education/training;adaptive equipment training;ROM/therapeutic exercise;BADL retraining;occupation/activity based interventions;strengthening exercise;transfer/mobility retraining;functional balance retraining  -LA               User Key  (r) = Recorded By, (t) = Taken By, (c) = Cosigned By      Initials Name Provider Type    Awilda Pink OT Occupational Therapist                   Clinical Impression       Row Name 01/31/25 1431          Plan of Care Review    Plan of Care Reviewed With patient  -LA     Outcome Evaluation OT evaluation completed this date. Patient pleasant and cooperative. Patient does demonstrate a mild decline in independence/safety with ADLs. Patient would benefit from OT services to maximize independence/safety prior to discharge.  -LA       Row Name 01/31/25 1431          Therapy Assessment/Plan (OT)     Patient/Family Therapy Goal Statement (OT) Return home  -LA     Rehab Potential (OT) fair  -LA     Criteria for Skilled Therapeutic Interventions Met (OT) meets criteria;yes;skilled treatment is necessary  -LA     Therapy Frequency (OT) other (see comments)  PRN to follow for changes/progress toward goals  -LA       Row Name 01/31/25 1431          Therapy Plan Review/Discharge Plan (OT)    Equipment Needs Upon Discharge (OT) --  TBD  -LA     Anticipated Discharge Disposition (OT) home with home health  -LA       Row Name 01/31/25 1431          Positioning and Restraints    Pre-Treatment Position in bed  -LA     Post Treatment Position bed  -LA     In Bed supine;call light within reach;encouraged to call for assist;exit alarm on;with other staff  -LA               User Key  (r) = Recorded By, (t) = Taken By, (c) = Cosigned By      Initials Name Provider Type    Awilda Pink, CUATE Occupational Therapist                   Outcome Measures       Row Name 01/31/25 0845          How much help from another person do you currently need...    Turning from your back to your side while in flat bed without using bedrails? 3  -KA     Moving from lying on back to sitting on the side of a flat bed without bedrails? 2  -KA     Moving to and from a bed to a chair (including a wheelchair)? 2  -KA     Standing up from a chair using your arms (e.g., wheelchair, bedside chair)? 2  -KA     Climbing 3-5 steps with a railing? 1  -KA     To walk in hospital room? 2  -KA     AM-PAC 6 Clicks Score (PT) 12  -KA               User Key  (r) = Recorded By, (t) = Taken By, (c) = Cosigned By      Initials Name Provider Type    Betariz Taylor, RN Registered Nurse                    Occupational Therapy Education        No education to display                  OT Recommendation and Plan  Planned Therapy Interventions (OT): activity tolerance training, patient/caregiver education/training, adaptive equipment training, ROM/therapeutic exercise,  BADL retraining, occupation/activity based interventions, strengthening exercise, transfer/mobility retraining, functional balance retraining  Therapy Frequency (OT): other (see comments) (PRN to follow for changes/progress toward goals)  Plan of Care Review  Plan of Care Reviewed With: patient  Outcome Evaluation: OT evaluation completed this date. Patient pleasant and cooperative. Patient does demonstrate a mild decline in independence/safety with ADLs. Patient would benefit from OT services to maximize independence/safety prior to discharge.     Time Calculation:          Therapy Charges for Today       Code Description Service Date Service Provider Modifiers Qty    [id number]  OT EVAL MOD COMPLEXITY 4 1/31/2025 Awilda Vogel OT GO 1                 Awilda Vogel OT  1/31/2025

## 2025-01-31 NOTE — CONSULTS
"@[email or web address](Error - No data available.)@    Referring Provider:  Milan Thacker M.D.    Chief complaint:  discharge, redness, poor vision left eye       History of present illness: 75 yo WM who had an injection in the left eye 2 days ago for \"retinal fluid build up\" by Dr. Sanders on 1/29/25.  Pt notes 2 days of redness, discharge, and poor vision left eye but he says his vision was poor even before the injection.  Pt was admitted this am 1/31/25 with acute respiratory failure, hyperkalemia, and anemia.  His head Ct was ok.    Past ocular history:  injection OS for \"fluid build up in the retina\" 1/29/25      Past Medical History:  Past Medical History:   Diagnosis Date    Atrial fibrillation     CHF (congestive heart failure)     COPD (chronic obstructive pulmonary disease)     Coronary artery disease     Hypertension     Tobacco abuse    ,   Past Surgical History:   Procedure Laterality Date    ABDOMINAL AORTIC ANEURYSM REPAIR      CARDIAC CATHETERIZATION N/A 10/03/2019    Procedure: Left Heart Cath;  Surgeon: Vincent Phillips MD;  Location: [facility] CATH INVASIVE LOCATION;  Service: Cardiology   ,   Family History   Problem Relation Age of Onset    Heart disease Mother     Stroke Father     Heart disease Father    ,   Social History     Tobacco Use    Smoking status: Former     Current packs/day: 1.00     Types: Cigarettes    Smokeless tobacco: Never    Tobacco comments:     9/1/2022   Vaping Use    Vaping status: Never Used   Substance Use Topics    Alcohol use: No    Drug use: No   ,   Medications Prior to Admission   Medication Sig Dispense Refill Last Dose/Taking    albuterol sulfate  (90 Base) MCG/ACT inhaler Inhale 2 puffs Every 4 (Four) Hours As Needed.   Past Week    apixaban (ELIQUIS) 5 MG tablet tablet Take 1 tablet by mouth 2 (Two) Times a Day. 180 tablet 3 Past Week    atorvastatin (LIPITOR) 80 MG tablet Take 1 tablet by mouth Every Night. 90 tablet 3 Past Week    metoprolol succinate XL " (TOPROL-XL) 25 MG 24 hr tablet Take 1 tablet by mouth Daily. 90 tablet 3 Past Week    pantoprazole (PROTONIX) 40 MG EC tablet Take 1 tablet by mouth 2 (Two) Times a Day. 180 tablet 3 Past Week    sacubitril-valsartan (Entresto) 24-26 MG tablet Take 1 tablet by mouth 2 (Two) Times a Day. 180 tablet 3 Past Week    sertraline (ZOLOFT) 100 MG tablet Take 1.5 tablets by mouth Daily.   Past Week    Symbicort 160-4.5 MCG/ACT inhaler Inhale 2 puffs 2 (Two) Times a Day.   Past Week    vitamin B-12 (CYANOCOBALAMIN) 1000 MCG tablet Take 1 tablet by mouth Daily.   Past Week   , Scheduled Meds:  heparin (porcine), 5,000 Units, Subcutaneous, Q8H  pantoprazole, 40 mg, Oral, BID AC  sodium chloride, 10 mL, Intravenous, Q12H    , Continuous Infusions:   , PRN Meds:    acetaminophen    senna-docusate sodium **AND** polyethylene glycol **AND** bisacodyl **AND** bisacodyl    nitroglycerin    sodium chloride    sodium chloride and Allergies:  Patient has no known allergies.      Eye medications:  none        Review of Systems  All systems were reviewed:   Gen:  + headache  Eyes:  See above  ENT:  Negative  Cardiovascular:  chf  Respiratory: wearing a bipap for sob  Gastrointestinal: neg  Genitourinary:neg  Musculoskeletal:  +arthritis  Integument: neg  Neurological:  +weakness left side since stroke  Psychiatric:  Neg  Endocrine:  neg  Hematologic / Lymphatic:neg  Allergies / Immunologic: neg    Mood:  Normal  Orientation:  Alert and oriented x 3      Vital Signs   Temp:  [98 °F (36.7 °C)-99.2 °F (37.3 °C)] 99.2 °F (37.3 °C)  Heart Rate:  [68-83] 75  Resp:  [15-27] 22  BP: ()/() 124/74        01/30/25  1634 01/31/25  0800   Weight: 119 kg (262 lb 5.6 oz) 117 kg (258 lb)         Objective:    Vision at near without correction:     Right eye 20/ 200    Left eye  hand motions one inch    Eye pressure by tonopen:      Right eye: 18    Left eye:30    Pupils:  Pupils equal, round, reactive with no afferent pupil defect    Fields:   Full to finger counting by confrontation right eye, constricted with hand motions left eye.    Mot:  Ductions and versions full and patient orthotropic    Pen light exam:    Lids:  Normal  Lashes:  normal od, 2 + blepharitis os  Conjunctivae:    White no discharge OD, 2 + injected with thick ropy brownish discharge OS.  Cornea:  Clear, tr inf spk ou, white ring around peripheral cornea left eye only  Anterior chamber:  Deep and quiet OD.  Hypopyon present left eye, appearing inferiorly and as ring peripherally.  Lens:  ns cataracts OU    Dilation performed both eyes with tropicamide and phenylephrine    Dilated eye exam:  Disc:  Pink and sharp right eye  C/D ratio:  0.3  Vessels:  Normal od  Macula:  Normal od  Periphery:  Flat 360 degrees with no holes or tears OD  Vitreous:  Clear od with complete pvd od    DFE left eye:  There is no view of the posterior pole in the left eye.      Assessment:   Enophthalmitis left eye  Severe conjunctivitis left eye  H/o recent intraocular injection left eye  Respiratory failure  Ocular hypertension left eye      Plan:    Culture and sensitivity taken today of discharge left eye.  Emergent transfer to  for evaluation and management of endophthalmitis left eye, will likely need injection of intravitreal antibiotic and vitrectomy left eye  I have spoken to hospitalist who will arrange for transfer immediately.  If any delay, immediately start the following but best if there is no delay.  Start ciloxan drops left eye every 2 hours while awake.  Start erythromycin ointment left eye qhs  Will attempt to get copy of prior eye records.    Start oral ciprofloxacin 500 mg bid  Start brimonidine os tid  Clean eyelashes left eye with baby shampoo each morning and as needed during the day.  Notify me immediately for any worsening or failure to improve  Follow up in eye clinic one week  appt feb 7 9 am              I discussed the patients findings and my recommendations with patient and  hospitalist

## 2025-01-31 NOTE — H&P
Hospitalist History and Physical        Patient Identification  Name: Zaid Galarza  Age/Sex: 74 y.o. male  :  1950        MRN: [id number]  Visit Number: [id number]  Admit Date: 2025   PCP: Annie Andrade APRN          Chief complaint generalized weakness, short of breath    History of Present Illness:  Patient is a 74 y.o. male with history of afib on eliquis, grade II diastolic CHF, COPD but not oxygen dependent, CAD, HTN, tobacco abuse, stroke, iron deficiency anemia for which he states he has been scoped multiple times with no evidence of bleeding though his doctors suspect he may have a slow oozing bleed somewhere in his GI tract, and intestinal malabsorption, who presents with complaints of worsening generalized weakness and shortness of breath over the last month. He denies increased cough, sputum production or wheezing. He denies lower extremity edema, stating he did in the past but this resolved with diuretics. He denies any fever or chills. In the ED, vitals were initially fairly stable. ABG was normal. Troponin was negative x2, BNP was normal for age, renal function was stable at 1.13, calcium was mildly low at 8.3, alk phos was stable at 123 and other LFTs were normal, CRP, procal and lactate were unremarkable, WBC was marginal at 12.75 and platelets were normal, but hemoglobin was low at 7.1 (down from 9.4 when last checked in 2024). Respiratory PCR panel was negative. CT head, CT chest PE protocol, and CT abdomen were unremarkable. Patient was ordered 1 unit pRBC; before it was even infusing, however, patient started to become more hypoxic. ABG confirmed hypoxemia with PO2 48 and sat 80.7%, with PCO2 also rising to 46.5 though pH was still compensated at 7.372. He was escalated to 7.271 by early this morning. Repeat ABG now showed PO2 adequate at 70.6 and sat 92.1%, but with CO2 now elevated to 66 and pH dropped to 7.271 indicating acute hypoxic respiratory failure and respiratory  acidosis. Now patient is on bipap. He is being admitted for further management of respiratory failure. During my exam, patient's left eye was matted shut with purulent drainage. He said he had a recent stye and got an injection from his optometrist. When I pryed the eye open, the conjunctiva was significantly erythematous. Patient reports difficulty seeing out of the affected eye.     Review of Systems  Review of Systems   Constitutional:  Positive for activity change and fatigue. Negative for appetite change, chills, diaphoresis, fever and unexpected weight change.   HENT:  Negative for congestion, postnasal drip, rhinorrhea, sinus pressure, sinus pain and sore throat.    Eyes:  Positive for pain (left), discharge (left), redness (left) and visual disturbance (left).   Respiratory:  Positive for shortness of breath. Negative for cough and wheezing.    Cardiovascular:  Negative for chest pain, palpitations and leg swelling.   Gastrointestinal:  Negative for abdominal distention, abdominal pain, constipation, diarrhea, nausea and vomiting.   Genitourinary:  Negative for difficulty urinating, dysuria, flank pain, frequency and hematuria.   Musculoskeletal:  Negative for arthralgias, back pain, joint swelling, myalgias, neck pain and neck stiffness.   Neurological:  Positive for weakness (generalized). Negative for dizziness, tremors, seizures, syncope, light-headedness, numbness and headaches.   Hematological:  Negative for adenopathy. Bruises/bleeds easily.   Psychiatric/Behavioral:  Negative for agitation, behavioral problems and confusion.        History  Past Medical History:   Diagnosis Date    Atrial fibrillation     CHF (congestive heart failure)     COPD (chronic obstructive pulmonary disease)     Coronary artery disease     Hypertension     Tobacco abuse      Past Surgical History:   Procedure Laterality Date    ABDOMINAL AORTIC ANEURYSM REPAIR      CARDIAC CATHETERIZATION N/A 10/03/2019    Procedure: Left  Heart Cath;  Surgeon: Vincent Phillips MD;  Location:  [facility];  Service: Cardiology     Family History   Problem Relation Age of Onset    Heart disease Mother     Stroke Father     Heart disease Father      Social History     Tobacco Use    Smoking status: Former     Current packs/day: 1.00     Types: Cigarettes    Smokeless tobacco: Never    Tobacco comments:     9/1/2022   Vaping Use    Vaping status: Never Used   Substance Use Topics    Alcohol use: No    Drug use: No     (Not in a hospital admission)    Allergies:  Patient has no known allergies.    Objective     Vital Signs  Temp:  [98 °F (36.7 °C)-98.4 °F (36.9 °C)] 98 °F (36.7 °C)  Heart Rate:  [68-83] 74  Resp:  [16-20] 18  BP: ()/(49-86) 130/74  Body mass index is 35.57 kg/m².    Physical Exam:  Physical Exam  Constitutional:       General: He is not in acute distress.     Appearance: He is obese. He is ill-appearing (chronically so).   HENT:      Head: Normocephalic and atraumatic.      Right Ear: External ear normal.      Left Ear: External ear normal.      Nose: Nose normal.      Mouth/Throat:      Mouth: Mucous membranes are moist.      Pharynx: Oropharynx is clear.   Eyes:      Comments: Right eye normal. Left eye matted shut with purulent drainage. Wiped away drainage and pryed eye open at bedside. Conjuctiva very erythematous. Patient reports difficulty seeing out of affected eye.    Cardiovascular:      Rate and Rhythm: Normal rate and regular rhythm.      Pulses: Normal pulses.      Heart sounds: Normal heart sounds. No murmur heard.  Pulmonary:      Comments: Mildly diminished breath sounds, but no wheezing, rhonchi or rales appreciated.   Abdominal:      General: Abdomen is flat. Bowel sounds are normal. There is no distension.      Palpations: Abdomen is soft.      Tenderness: There is no abdominal tenderness.   Musculoskeletal:         General: Normal range of motion.      Cervical back: Normal range of motion and  "neck supple. No tenderness.      Right lower leg: No edema.      Left lower leg: No edema.   Lymphadenopathy:      Cervical: No cervical adenopathy.   Skin:     General: Skin is warm and dry.      Capillary Refill: Capillary refill takes less than 2 seconds.      Coloration: Skin is not jaundiced.      Findings: No bruising.      Comments: Right hand dressed, partially saturated with blood from where patient accidentally pulled out IV; blood is on his shirt from this as well.    Neurological:      General: No focal deficit present.      Mental Status: He is alert and oriented to person, place, and time.   Psychiatric:         Mood and Affect: Mood normal.         Behavior: Behavior normal.         Thought Content: Thought content normal.           Results Review:       Lab Results:  Results from last 7 days   Lab Units 01/30/25  1659   WBC 10*3/mm3 12.75*   HEMOGLOBIN g/dL 7.1*   PLATELETS 10*3/mm3 233     Results from last 7 days   Lab Units 01/30/25  1659   CRP mg/dL 0.69*     Results from last 7 days   Lab Units 01/30/25  1659   SODIUM mmol/L 140   POTASSIUM mmol/L 3.7   CHLORIDE mmol/L 103   CO2 mmol/L 26.7   BUN mg/dL 17   CREATININE mg/dL 1.13   CALCIUM mg/dL 8.3*   GLUCOSE mg/dL 130*         No results found for: \"HGBA1C\"  Results from last 7 days   Lab Units 01/30/25  1659   BILIRUBIN mg/dL 0.3   ALK PHOS U/L 123*   AST (SGOT) U/L 11   ALT (SGPT) U/L 6     Results from last 7 days   Lab Units 01/30/25  1830 01/30/25  1659   HSTROP T ng/L 14 15             Results from last 7 days   Lab Units 01/31/25  0502   PH, ARTERIAL pH units 7.271*   PO2 ART mm Hg 70.6*   PCO2, ARTERIAL mm Hg 66.0*   HCO3 ART mmol/L 30.4*       I have reviewed the patient's laboratory results.    Imaging:  Imaging Results (Last 72 Hours)       Procedure Component Value Units Date/Time    CT Abdomen Pelvis With Contrast [[id number]] Collected: 01/30/25 2228     Updated: 01/30/25 2230    Narrative:      REASON FOR EXAMINATION: Abdominal " pain.     COMPARISON: None available.     TECHNIQUE:  Sequential trans-axial images were obtained with a multi-detector  helical CT after administration of iodinated contrast. Coronal and  sagittal reconstructions were obtained. CT scan performed according to  as low as reasonably achieved dose protocol.     FINDINGS:     The visualized lung bases are unremarkable.     The contrast-enhanced images of the liver, spleen, pancreas, kidneys,  adrenals and gallbladder are normal.     Decompressed stomach.  No dilatation or obstruction.  Extensive  diverticulosis of the sigmoid colon without diverticulitis.  The  appendix is not seen.     There is no abdominal lymphadenopathy. There is no pneumoperitoneum or  ascites. The retroperitoneal region appears unremarkable.     The abdominal aorta appears unremarkable without aneurysm.  Aortoiliac  stent graft.     Left inguinal hernia containing fat.     Multiple bladder diverticula.     There is no acute osseous abnormality.       Impression:         1. No acute abnormality within the abdomen and pelvis.        This report was finalized on 1/30/2025 10:28 PM by TIP GUERRERO MD.       CT Angiogram Chest Pulmonary Embolism [[id number]] Collected: 01/30/25 2227     Updated: 01/30/25 2230    Narrative:      REASON FOR EXAMINATION: Mental status change.     COMPARISON: None available.     TECHNIQUE: Multi-detector CTA imaging of the chest is performed. 75 cc  Nonionic contrast administered.  Coronal and sagittal as well as 3D  Maximum Intensity Projected MIP reconstructions  were obtained. CT Scan  performed according to as low as reasonably achievable dose protocol.     FINDINGS:  There are no lung nodules, interstitial lung disease, pleural effusions  or pneumothorax. The central airway is normal.     There is no mediastinal lymphadenopathy.     No acute osseous abnormality.     Limited upper abdominal images are unremarkable save for a small hepatic  cyst.     CTA:  The heart  is within normal limits for size without pericardial effusion.  Pulmonary arteries are unremarkable without evidence for proximal  segmental or larger pulmonary embolus.  The thoracic aorta is within normal limits without aneurysm or  dissection. The great vessels and superior vena cava are normal.       Impression:         1. No evidence for pulmonary embolus.     2. No acute intrathoracic process.     This report was finalized on 1/30/2025 10:27 PM by TIP GUERRERO MD.       CT Head Without Contrast [[id number]] Collected: 01/30/25 2221     Updated: 01/30/25 2229    Narrative:      REASON FOR EXAMINATION: Restlessness.  Mental status change.     COMPARISON: None available.     TECHNIQUE: CT images were obtained from the foramen magnum to the vertex  without the use of intravenous contrast on a multidetector CT.  CT scan  performed according to as low as reasonably achieved dose protocol.     FINDINGS:  There is no acute intracranial hemorrhage, extra-axial collection,  hydrocephalus, mass effect or midline shift.  There is no cortical edema  in large vascular distribution to suggest acute infarction.  There are  hypodensities within periventricular white matter compatible with  chronic microvascular ischemic changes. There is prominence of the  cortical cerebral sulci and ventricles.       The limited visualized orbits and paranasal sinuses are unremarkable.  The mastoid air cells are clear, bilaterally.     There are no calvarial abnormalities seen.       Impression:         1.  No CT demonstrable acute intracranial abnormality.     If there is further concern for intracranial pathology or acute stroke,  MRI of the brain may be performed for complete assessment.        This report was finalized on 1/30/2025 10:27 PM by TIP GUERRERO MD.       XR Chest AP [[id number]] Collected: 01/30/25 1719     Updated: 01/30/25 1722    Narrative:      EXAM:    XR Chest, 1 View     EXAM DATE:    1/30/2025 5:14 PM      CLINICAL HISTORY:    shortness of breath     TECHNIQUE:    Frontal view of the chest.     COMPARISON:    9/15/2023     FINDINGS:    Lungs and pleural spaces:  Interstitial thickening.  Pulmonary  vascular congestion.  No consolidation.  No pneumothorax.    Heart:  Cardiomegaly.    Mediastinum:  Unremarkable.  Normal mediastinal contour.    Bones/joints:  Unremarkable.  No acute fracture.  No effusions.       Impression:        Radiographic findings most consistent with CHF.     This report was finalized on 1/30/2025 5:19 PM by Dr. Dequan Cotter MD.               I have personally reviewed the patient's radiologic imaging.        EKG:   Undetermined rhythm, HR 74, QTc 430  Nonspecific ST and T wave abnormality  Abnormal ECG  When compared with ECG of 17-Sep-2023 11:25,  Current undetermined rhythm precludes rhythm comparison, needs review  Nonspecific T wave abnormality now evident in Lateral leads    I have personally reviewed the patient's EKG.        Assessment & Plan     - Acute respiratory failure with hypoxia and hypercapnia, etiology unclear. Has underlying COPD and CHF but does not appear to be in acute exacerbation of either. Possibly has component of obesity hypoventilation? Lungs clear on exam and CT chest PE protocol ruled out pneumonia, pulmonary edema and PE. Initially O2 sat was adequate on RA, then became hypoxic later on (before any blood transfusion was given). One thought was that hypoxia was being driven by anemia, but even after blood given, could not titrate down from 3L NC. Escalated to bipap when repeat ABG showed interval development of hypercarbic respiratory failure. Repeat ABG on bipap is scheduled for 7am. Consult pulmonology for further guidance.   - Symptomatic anemia, acute on chronic: s/p 1 unit pRBC transfusion. Repeat labs pending. No reports of hematemesis or melanotic stools. No reports of bleeding elsewhere. Chart mentions intestinal malabsorption, so perhaps anemia is more an  issue of not absorbing iron rather than losing blood.   - History atrial fibrillation on eliquis: still anticoagulated despite anemia issue above, presumably because felt to be high risk for stroke since already suffered one in the past (according to chart). Hold eliquis for now in light of acute on chronic symptomatic anemia.   - Left eye severe conjunctivitis: patient reports significant purulent drainage and visual impairment. Recently received injection (of what exactly is unclear) from optometrist for this issue. Consult ophthalmology for further guidance.     DVT/GI Prophylaxis: anticoagulated on eliquis though holding for now. Continue BID PPI.     Estimated Length of Stay >2 midnights    I discussed the patient's findings, assessment and plan with the patient and ED provider TIFFANIE Dey    Patient is high risk due to acute respiratory failure with hypoxia an hypercapnia, symptomatic anemia    Milan Thacker MD  01/31/25  05:45 EST

## 2025-01-31 NOTE — CONSULTS
Pulmonary and critical care consultation note    Referring Provider: Hospitalist  Reason for Consultation: Shortness of breath and hypoxic respiratory failure      Chief complaint -shortness of breath      History of present illness: Most of the history is obtained from patient's medical chart and sister at bedside.  On my assessment patient was on BiPAP.  Patient is a 74-year-old male with past medical history positive for atrial fibrillation on anticoagulation (Eliquis), grade 2 diastolic congestive heart failure, COPD, coronary artery disease, hypertension, smoker, stroke, iron deficiency anemia, intestinal malabsorption and generalized weakness.  Presented to our ER with complaints of shortness of breath.    Pulmonary and critical care consulted for hypoxic and hypercarbic respiratory failure.  On my assessment patient was on BiPAP.  I changed the BiPAP settings.  Changed the EPAP to 4 to achieve better minute ventilation.  Patient's minute ventilation got better after I adjusted the BiPAP.  Reviewed all the labs medications ins and outs and vitals.  Reviewed images.  Chest x-ray showed volume overload.  Gave Lasix.  Spoke to sister at bedside and answered her questions to her satisfaction.  Ordered a repeat ABG.       Review of Systems  Could not be obtained as patient was on continuous BiPAP.        History  Past Medical History:   Diagnosis Date    Atrial fibrillation     CHF (congestive heart failure)     COPD (chronic obstructive pulmonary disease)     Coronary artery disease     Hypertension     Tobacco abuse    ,   Past Surgical History:   Procedure Laterality Date    ABDOMINAL AORTIC ANEURYSM REPAIR      CARDIAC CATHETERIZATION N/A 10/03/2019    Procedure: Left Heart Cath;  Surgeon: Vincent Phillips MD;  Location: [facility] CATH INVASIVE LOCATION;  Service: Cardiology   ,   Family History   Problem Relation Age of Onset    Heart disease Mother     Stroke Father     Heart disease Father    ,   Social History      Tobacco Use    Smoking status: Former     Current packs/day: 1.00     Types: Cigarettes    Smokeless tobacco: Never    Tobacco comments:     9/1/2022   Vaping Use    Vaping status: Never Used   Substance Use Topics    Alcohol use: No    Drug use: No   ,   Medications Prior to Admission   Medication Sig Dispense Refill Last Dose/Taking    albuterol sulfate  (90 Base) MCG/ACT inhaler Inhale 2 puffs Every 4 (Four) Hours As Needed.       apixaban (ELIQUIS) 5 MG tablet tablet Take 1 tablet by mouth 2 (Two) Times a Day. 180 tablet 3     atorvastatin (LIPITOR) 80 MG tablet Take 1 tablet by mouth Every Night. 90 tablet 3     cephalexin (KEFLEX) 500 MG capsule Take 1 capsule by mouth 4 (Four) Times a Day. (Patient not taking: Reported on 12/5/2024) 40 capsule 0     Cyanocobalamin (Vitamin B12) 1000 MCG tablet controlled-release Take 1 tablet by mouth Daily. (Patient not taking: Reported on 12/5/2024)       ferrous sulfate 325 (65 FE) MG tablet 1 tablet. Takes iron infusions.       furosemide (LASIX) 40 MG tablet Take 1 tablet by mouth Daily. (Patient not taking: Reported on 12/5/2024) 5 tablet 0     metoprolol succinate XL (TOPROL-XL) 25 MG 24 hr tablet Take 1 tablet by mouth Daily. 90 tablet 3     pantoprazole (PROTONIX) 40 MG EC tablet Take 1 tablet by mouth 2 (Two) Times a Day. 180 tablet 3     potassium chloride (K-TAB) 20 MEQ tablet controlled-release ER tablet Take 1 tablet by mouth 2 (Two) Times a Day. (Patient not taking: Reported on 12/5/2024) 5 tablet 0     sacubitril-valsartan (Entresto) 24-26 MG tablet Take 1 tablet by mouth 2 (Two) Times a Day. 180 tablet 3     sertraline (ZOLOFT) 100 MG tablet Take 1.5 tablets by mouth Daily.       Symbicort 160-4.5 MCG/ACT inhaler Inhale 2 puffs 2 (Two) Times a Day.       vitamin B-12 (CYANOCOBALAMIN) 1000 MCG tablet Take 1 tablet by mouth Daily.      , Scheduled Meds:  furosemide, 40 mg, Intravenous, Once  pantoprazole, 40 mg, Oral, BID AC  sodium chloride, 10 mL,  Intravenous, Q12H    , Continuous Infusions:    and Allergies:  Patient has no known allergies.    Objective     Vital Signs   Temp:  [98 °F (36.7 °C)-99.2 °F (37.3 °C)] 99.2 °F (37.3 °C)  Heart Rate:  [68-83] 81  Resp:  [16-27] 18  BP: ()/() 137/79    Physical Exam:             General-acutely ill in appearance, not in any acute distress    HEENT-atraumatic    Neck-supple    Respiratory-respirations normal-on auscultation no wheezing no crackles, wearing BiPAP    Cardiovascular-NSR  GI-nontender nondistended bowel sounds positive    CNS-nonfocal    Musculoskeletal -no edema  Extremities- no obvious deformity noticed     Psychiatric-mood good, good eye contact, alert awake oriented  Skin- no visible rash                                                                   Results Review:    LABS:    Lab Results   Component Value Date    GLUCOSE 127 (H) 01/31/2025    BUN 18 01/31/2025    CREATININE 1.13 01/31/2025    EGFRIFNONA 61 02/09/2021    BCR 15.9 01/31/2025    CO2 26.5 01/31/2025    CALCIUM 8.4 (L) 01/31/2025    PROTENTOTREF 6.8 08/20/2024    ALBUMIN 3.7 01/31/2025    LABIL2 1.1 08/20/2024    AST 14 01/31/2025    ALT 6 01/31/2025    WBC 14.61 (H) 01/31/2025    HGB 8.1 (L) 01/31/2025    HCT 28.3 (L) 01/31/2025    MCV 81.3 01/31/2025     01/31/2025     01/31/2025    K 4.2 01/31/2025     01/31/2025    ANIONGAP 9.5 01/31/2025       Lab Results   Component Value Date    INR 1.28 (H) 09/04/2022    INR 1.12 (H) 09/01/2022    INR 1.07 09/30/2019    PROTIME 16.3 (H) 09/04/2022    PROTIME 14.7 09/01/2022    PROTIME 14.5 09/30/2019                I reviewed the patient's new clinical results.  I reviewed the patient's new imaging results and agree with the interpretation.  Microbiology Results (last 10 days)       Procedure Component Value - Date/Time    Wound Culture - Wound, Eye, Left [[id number]] Collected: 01/31/25 1228    Lab Status: Preliminary result Specimen: Wound from Eye, Left  Updated: 01/31/25 1457     Gram Stain Many (4+) WBCs seen      Rare (1+) Gram positive cocci in pairs and clusters      Rare (1+) Gram positive bacilli      Rare (1+) Yeast    Respiratory Panel PCR w/COVID-19(SARS-CoV-2) DINA/MAGED/SYDNEE/PAD/COR/ALEXIA In-House, NP Swab in UTM/VTM, 2 HR TAT - Swab, Nasopharynx [[id number]]  (Normal) Collected: 01/30/25 1701    Lab Status: Final result Specimen: Swab from Nasopharynx Updated: 01/30/25 1824     ADENOVIRUS, PCR Not Detected     Coronavirus 229E Not Detected     Coronavirus HKU1 Not Detected     Coronavirus NL63 Not Detected     Coronavirus OC43 Not Detected     COVID19 Not Detected     Human Metapneumovirus Not Detected     Human Rhinovirus/Enterovirus Not Detected     Influenza A PCR Not Detected     Influenza B PCR Not Detected     Parainfluenza Virus 1 Not Detected     Parainfluenza Virus 2 Not Detected     Parainfluenza Virus 3 Not Detected     Parainfluenza Virus 4 Not Detected     RSV, PCR Not Detected     Bordetella pertussis pcr Not Detected     Bordetella parapertussis PCR Not Detected     Chlamydophila pneumoniae PCR Not Detected     Mycoplasma pneumo by PCR Not Detected    Narrative:      In the setting of a positive respiratory panel with a viral infection PLUS a negative procalcitonin without other underlying concern for bacterial infection, consider observing off antibiotics or discontinuation of antibiotics and continue supportive care. If the respiratory panel is positive for atypical bacterial infection (Bordetella pertussis, Chlamydophila pneumoniae, or Mycoplasma pneumoniae), consider antibiotic de-escalation to target atypical bacterial infection.    Blood Culture - Blood, Arm, Left [[id number]]  (Normal) Collected: 01/30/25 1659    Lab Status: Preliminary result Specimen: Blood from Arm, Left Updated: 01/31/25 1715     Blood Culture No growth at 24 hours    Blood Culture - Blood, Hand, Right [[id number]]  (Normal) Collected: 01/30/25 1659    Lab Status:  Preliminary result Specimen: Blood from Hand, Right Updated: 01/31/25 6480     Blood Culture No growth at 24 hours             Assessment & Plan     Neurology-alert awake oriented no active issues going on.  Able to protect his airway.  Currently on BiPAP.     Respiratory-  Hypoxic and hypercarbic respiratory failure-likely due to COPD exacerbation and volume overloaded state.  Volume overloaded state decreases the compliance which is likely causing hypoxia and poor lung compliance leading to hypercarbic state.    Continue BiPAP.  BiPAP settings reviewed and adjusted to achieve better minute ventilation so that hypercarbia can get better.  Decrease the EPAP.  Repeat ABG ordered and reviewed.  Showed improvement.    Continue steroids  Continue nebs  FiO2 requirement reviewed and adjusted to maintain saturation 88 to 92%  Chest x-ray-reviewed.    Pulmonary hypertension-likely due to COPD and volume overloaded state.  Repeat echo after euvolemic state is achieved.  If pulmonary hypertension is still present recommend right heart cath.  ABG-reviewed    Procalitonin Results:      Lab 01/30/25  1659   PROCALCITONIN 0.06      Latest microbiology reviewed    Cardiology- hemodynamically -stable  Continue to monitor HR- rate and rhythm, BP    Results for orders placed during the hospital encounter of 01/30/25    Adult Transthoracic Echo Complete w/ Color, Spectral and Contrast if necessary per protocol    Interpretation Summary    Left ventricular systolic function is normal. Calculated left ventricular EF = 59.8%    Left ventricular wall thickness is consistent with mild concentric hypertrophy.    Left ventricular diastolic function was indeterminate.    The left atrial cavity is mild to moderately dilated.    Estimated right ventricular systolic pressure from tricuspid regurgitation is mildly elevated (35-45 mmHg).    Mild pulmonary hypertension is present.    Mild dilation of the aortic root is present.  Atrial  fibrillation-continue anticoagulation  Nephrology- Cr and BUN reviewed  I/O-reviewed    GI-n.p.o. as long as remains on BiPAP.    Hematology- CBC  Reviewed  ID  Culture reviewed  Medication list reviewed    Endrocrinology- Maintain Blood sugar 140 -180      Electrolytes-reviewed  Mag and phos replace as per protocol      DVT prophylaxis-continue      Family member present-Sister Case was discussed with her and answered her questions to her satisfaction        Echo-latest reviewed          Acute respiratory failure with hypoxia and hypercapnia          Ash Pena MD  01/31/25  10:14 EST

## 2025-01-31 NOTE — PLAN OF CARE
Goal Outcome Evaluation:              Outcome Evaluation: Pt seen for evaluation this date, pleasant and cooperative with therapy. Pt demonstrates mobility that may benefit from therapy interventions. Pt may be D/Cing to , however recommending therapy d/t fall risk and impaired mobility.    Anticipated Discharge Disposition (PT): sub acute care setting, skilled nursing facility, inpatient rehabilitation facility, other (see comments) (pending)

## 2025-02-02 LAB
BACTERIA SPEC AEROBE CULT: ABNORMAL
BACTERIA SPEC AEROBE CULT: ABNORMAL
GRAM STN SPEC: ABNORMAL

## 2025-02-04 LAB
BACTERIA SPEC AEROBE CULT: NORMAL
BACTERIA SPEC AEROBE CULT: NORMAL

## 2025-02-06 ENCOUNTER — TELEPHONE (OUTPATIENT)
Dept: CARDIOLOGY | Facility: CLINIC | Age: 75
End: 2025-02-06
Payer: MEDICARE

## 2025-02-06 NOTE — TELEPHONE ENCOUNTER
"MASON CALLED AND JUST WANTED WOJCIECH TO KNOW HE WAS FLOWN TO [facility] FOR AN \"EYE INFECTION. IT DOES LOOK LIKE HW WAS ADMITTED FROM ED TO HOSPITAL FOR ACUTE RESPIRATORY FAILURE. PT JUST WANTED PROVIDER TO KNOW   "

## 2025-03-04 ENCOUNTER — TELEPHONE (OUTPATIENT)
Dept: UROLOGY | Facility: CLINIC | Age: 75
End: 2025-03-04

## 2025-03-04 NOTE — TELEPHONE ENCOUNTER
Caller: Zaid Galarza    Relationship to patient: Self    Best call back number: [phone]      Type of visit: POST OP    Requested date: ANY     If rescheduling, when is the original appointment: 3/12/25 @ 10AM    Additional notes:PT TRANSPORTATION CANNOT BRING HIM ON 3/12/25.    NO AVAILABILITY, UNABLE TO WARM [place] TO NON CLINICAL

## 2025-03-06 ENCOUNTER — TELEPHONE (OUTPATIENT)
Dept: PULMONOLOGY | Facility: CLINIC | Age: 75
End: 2025-03-06
Payer: MEDICARE

## 2025-03-06 NOTE — TELEPHONE ENCOUNTER
[facility] received a fax that requires your attention. The document has been indexed to the patient’s chart for your review.      Reason for sending: DISCHARGE REPORT    Documents Description: DISCHARGE REPORT [facility] 2/20/25    Name of Sender: [facility]    Date Indexed: 03/06/25    Notes (if needed):

## 2025-03-10 ENCOUNTER — HOSPITAL ENCOUNTER (EMERGENCY)
Facility: HOSPITAL | Age: 75
Discharge: HOME | End: 2025-03-10
Attending: STUDENT IN AN ORGANIZED HEALTH CARE EDUCATION/TRAINING PROGRAM | Admitting: STUDENT IN AN ORGANIZED HEALTH CARE EDUCATION/TRAINING PROGRAM
Payer: MEDICARE

## 2025-03-10 ENCOUNTER — APPOINTMENT (OUTPATIENT)
Dept: CT IMAGING | Facility: HOSPITAL | Age: 75
End: 2025-03-10
Payer: MEDICARE

## 2025-03-10 VITALS
HEART RATE: 80 BPM | BODY MASS INDEX: 34.19 KG/M2 | RESPIRATION RATE: 19 BRPM | OXYGEN SATURATION: 100 % | HEIGHT: 73 IN | DIASTOLIC BLOOD PRESSURE: 86 MMHG | WEIGHT: 257.94 LBS | SYSTOLIC BLOOD PRESSURE: 131 MMHG | TEMPERATURE: 98.9 F

## 2025-03-10 DIAGNOSIS — R33.9 URINARY RETENTION: ICD-10-CM

## 2025-03-10 DIAGNOSIS — N30.90 CYSTITIS: Primary | ICD-10-CM

## 2025-03-10 LAB
ALBUMIN SERPL-MCNC: 3.6 G/DL (ref 3.5–5.2)
ALBUMIN/GLOB SERPL: 1.1 G/DL
ALP SERPL-CCNC: 91 U/L (ref 39–117)
ALT SERPL W P-5'-P-CCNC: 12 U/L (ref 1–41)
ANION GAP SERPL CALCULATED.3IONS-SCNC: 7.7 MMOL/L (ref 5–15)
ANISOCYTOSIS BLD QL: NORMAL
AST SERPL-CCNC: 14 U/L (ref 1–40)
BACTERIA UR QL AUTO: ABNORMAL /HPF
BASOPHILS # BLD AUTO: 0.03 10*3/MM3 (ref 0–0.2)
BASOPHILS NFR BLD AUTO: 0.2 % (ref 0–1.5)
BILIRUB SERPL-MCNC: 0.3 MG/DL (ref 0–1.2)
BILIRUB UR QL STRIP: NEGATIVE
BUN SERPL-MCNC: 20 MG/DL (ref 8–23)
BUN/CREAT SERPL: 17.7 (ref 7–25)
CALCIUM SPEC-SCNC: 8.8 MG/DL (ref 8.6–10.5)
CHLORIDE SERPL-SCNC: 101 MMOL/L (ref 98–107)
CLARITY UR: ABNORMAL
CO2 SERPL-SCNC: 26.3 MMOL/L (ref 22–29)
COLOR UR: YELLOW
CREAT SERPL-MCNC: 1.13 MG/DL (ref 0.76–1.27)
CRP SERPL-MCNC: 4.58 MG/DL (ref 0–0.5)
D-LACTATE SERPL-SCNC: 0.8 MMOL/L (ref 0.5–2)
DEPRECATED RDW RBC AUTO: 73.5 FL (ref 37–54)
EGFRCR SERPLBLD CKD-EPI 2021: 68.6 ML/MIN/1.73
EOSINOPHIL # BLD AUTO: 0.2 10*3/MM3 (ref 0–0.4)
EOSINOPHIL NFR BLD AUTO: 1.7 % (ref 0.3–6.2)
ERYTHROCYTE [DISTWIDTH] IN BLOOD BY AUTOMATED COUNT: 23.7 % (ref 12.3–15.4)
GLOBULIN UR ELPH-MCNC: 3.2 GM/DL
GLUCOSE SERPL-MCNC: 139 MG/DL (ref 65–99)
GLUCOSE UR STRIP-MCNC: NEGATIVE MG/DL
HCT VFR BLD AUTO: 36.7 % (ref 37.5–51)
HGB BLD-MCNC: 10.9 G/DL (ref 13–17.7)
HGB UR QL STRIP.AUTO: ABNORMAL
HOLD SPECIMEN: NORMAL
HOLD SPECIMEN: NORMAL
HYALINE CASTS UR QL AUTO: ABNORMAL /LPF
HYPOCHROMIA BLD QL: NORMAL
IMM GRANULOCYTES # BLD AUTO: 0.08 10*3/MM3 (ref 0–0.05)
IMM GRANULOCYTES NFR BLD AUTO: 0.7 % (ref 0–0.5)
KETONES UR QL STRIP: NEGATIVE
LEUKOCYTE ESTERASE UR QL STRIP.AUTO: ABNORMAL
LYMPHOCYTES # BLD AUTO: 0.7 10*3/MM3 (ref 0.7–3.1)
LYMPHOCYTES NFR BLD AUTO: 5.8 % (ref 19.6–45.3)
MCH RBC QN AUTO: 25.5 PG (ref 26.6–33)
MCHC RBC AUTO-ENTMCNC: 29.7 G/DL (ref 31.5–35.7)
MCV RBC AUTO: 85.7 FL (ref 79–97)
MONOCYTES # BLD AUTO: 0.79 10*3/MM3 (ref 0.1–0.9)
MONOCYTES NFR BLD AUTO: 6.5 % (ref 5–12)
NEUTROPHILS NFR BLD AUTO: 10.28 10*3/MM3 (ref 1.7–7)
NEUTROPHILS NFR BLD AUTO: 85.1 % (ref 42.7–76)
NITRITE UR QL STRIP: NEGATIVE
NRBC BLD AUTO-RTO: 0 /100 WBC (ref 0–0.2)
OVALOCYTES BLD QL SMEAR: NORMAL
PH UR STRIP.AUTO: 6 [PH] (ref 5–8)
PLATELET # BLD AUTO: 166 10*3/MM3 (ref 140–450)
PMV BLD AUTO: 11.1 FL (ref 6–12)
POTASSIUM SERPL-SCNC: 4.2 MMOL/L (ref 3.5–5.2)
PROCALCITONIN SERPL-MCNC: 0.2 NG/ML (ref 0–0.25)
PROT SERPL-MCNC: 6.8 G/DL (ref 6–8.5)
PROT UR QL STRIP: ABNORMAL
RBC # BLD AUTO: 4.28 10*6/MM3 (ref 4.14–5.8)
RBC # UR STRIP: ABNORMAL /HPF
REF LAB TEST METHOD: ABNORMAL
SMALL PLATELETS BLD QL SMEAR: ADEQUATE
SODIUM SERPL-SCNC: 135 MMOL/L (ref 136–145)
SP GR UR STRIP: 1.02 (ref 1–1.03)
SQUAMOUS #/AREA URNS HPF: ABNORMAL /HPF
UROBILINOGEN UR QL STRIP: ABNORMAL
WBC # UR STRIP: ABNORMAL /HPF
WBC NRBC COR # BLD AUTO: 12.08 10*3/MM3 (ref 3.4–10.8)
WHOLE BLOOD HOLD COAG: NORMAL
WHOLE BLOOD HOLD SPECIMEN: NORMAL

## 2025-03-10 PROCEDURE — 36415 COLL VENOUS BLD VENIPUNCTURE: CPT

## 2025-03-10 PROCEDURE — 80053 COMPREHEN METABOLIC PANEL: CPT | Performed by: PHYSICIAN ASSISTANT

## 2025-03-10 PROCEDURE — 85007 BL SMEAR W/DIFF WBC COUNT: CPT | Performed by: PHYSICIAN ASSISTANT

## 2025-03-10 PROCEDURE — 74176 CT ABD & PELVIS W/O CONTRAST: CPT

## 2025-03-10 PROCEDURE — 83605 ASSAY OF LACTIC ACID: CPT | Performed by: PHYSICIAN ASSISTANT

## 2025-03-10 PROCEDURE — 74176 CT ABD & PELVIS W/O CONTRAST: CPT | Performed by: RADIOLOGY

## 2025-03-10 PROCEDURE — 87086 URINE CULTURE/COLONY COUNT: CPT | Performed by: PHYSICIAN ASSISTANT

## 2025-03-10 PROCEDURE — 81001 URINALYSIS AUTO W/SCOPE: CPT | Performed by: PHYSICIAN ASSISTANT

## 2025-03-10 PROCEDURE — 96365 THER/PROPH/DIAG IV INF INIT: CPT

## 2025-03-10 PROCEDURE — 87040 BLOOD CULTURE FOR BACTERIA: CPT | Performed by: PHYSICIAN ASSISTANT

## 2025-03-10 PROCEDURE — 84145 PROCALCITONIN (PCT): CPT | Performed by: PHYSICIAN ASSISTANT

## 2025-03-10 PROCEDURE — 99284 EMERGENCY DEPT VISIT MOD MDM: CPT

## 2025-03-10 PROCEDURE — 86140 C-REACTIVE PROTEIN: CPT | Performed by: PHYSICIAN ASSISTANT

## 2025-03-10 PROCEDURE — 85025 COMPLETE CBC W/AUTO DIFF WBC: CPT | Performed by: PHYSICIAN ASSISTANT

## 2025-03-10 PROCEDURE — 25810000003 SODIUM CHLORIDE 0.9 % SOLUTION: Performed by: PHYSICIAN ASSISTANT

## 2025-03-10 PROCEDURE — 25010000002 CEFTRIAXONE PER 250 MG: Performed by: PHYSICIAN ASSISTANT

## 2025-03-10 PROCEDURE — 51702 INSERT TEMP BLADDER CATH: CPT

## 2025-03-10 RX ORDER — SODIUM CHLORIDE 0.9 % (FLUSH) 0.9 %
10 SYRINGE (ML) INJECTION AS NEEDED
Status: DISCONTINUED | OUTPATIENT
Start: 2025-03-10 | End: 2025-03-10 | Stop reason: HOSPADM

## 2025-03-10 RX ADMIN — CEFTRIAXONE 2000 MG: 2 INJECTION, POWDER, FOR SOLUTION INTRAMUSCULAR; INTRAVENOUS at 14:00

## 2025-03-10 RX ADMIN — SODIUM CHLORIDE 1000 ML: 9 INJECTION, SOLUTION INTRAVENOUS at 14:00

## 2025-03-10 NOTE — ED NOTES
Unable to obtain urine sample with catheter insertion, unable to get urine return until irrigation fluids were started. Patient had multiple large blood clots flush through catheter tubing. Unable to obtain urine sample related to irrigation dilution of urine. Provider made aware, provider agreeable to waiting for urine sample until later time.

## 2025-03-10 NOTE — ED PROVIDER NOTES
Subjective   History of Present Illness  74-year-old male with past medical history of atrial fibrillation chronically anticoagulated on Eliquis, CHF, COPD, coronary artery disease, and hypertension presents to the emergency room for hematuria.  Patient states that his home health nurse came out this morning and states that he had blood in his catheter bag was not present last night.  Patient states he had his catheter placed 3 weeks ago at the [facility] where he was transferred secondary to a left eye infection.  He states since that time he has been on amoxicillin.  He states he is unsure why Agosto catheter was placed but is requesting to see a local urologist.  He denies any fevers, chills, or bodyaches.  Denies any other complaints or concerns at this time.    Does appear patient was at [facility] where he was transferred from our facility for and endophthalmitis of the left eye.  While hospitalized patient had postvoid residual of 900 cc therefore Agosto catheter was placed and patient was supposed to follow-up outpatient with urology, however to date has been unable to do so.    History provided by:  Patient   used: No        Review of Systems   Constitutional: Negative.  Negative for fever.   HENT: Negative.     Respiratory: Negative.     Cardiovascular: Negative.  Negative for chest pain.   Gastrointestinal: Negative.  Negative for abdominal pain.   Endocrine: Negative.    Genitourinary:  Positive for hematuria. Negative for dysuria.   Skin: Negative.    Neurological: Negative.    Psychiatric/Behavioral: Negative.     All other systems reviewed and are negative.      Past Medical History:   Diagnosis Date    Atrial fibrillation     CHF (congestive heart failure)     COPD (chronic obstructive pulmonary disease)     Coronary artery disease     Hypertension     Tobacco abuse        No Known Allergies    Past Surgical History:   Procedure Laterality Date     ABDOMINAL AORTIC ANEURYSM REPAIR      CARDIAC CATHETERIZATION N/A 10/03/2019    Procedure: Left Heart Cath;  Surgeon: Vincent Phillips MD;  Location: [facility] CATH INVASIVE LOCATION;  Service: Cardiology       Family History   Problem Relation Age of Onset    Heart disease Mother     Stroke Father     Heart disease Father        Social History     Socioeconomic History    Marital status: Single   Tobacco Use    Smoking status: Former     Current packs/day: 1.00     Types: Cigarettes    Smokeless tobacco: Never    Tobacco comments:     9/1/2022   Vaping Use    Vaping status: Never Used   Substance and Sexual Activity    Alcohol use: No    Drug use: No    Sexual activity: Defer           Objective   Physical Exam  Vitals and nursing note reviewed.   Constitutional:       General: He is not in acute distress.     Appearance: He is well-developed. He is not diaphoretic.   HENT:      Head: Normocephalic and atraumatic.      Right Ear: External ear normal.      Left Ear: External ear normal.      Nose: Nose normal.   Eyes:      Conjunctiva/sclera: Conjunctivae normal.      Pupils: Pupils are equal, round, and reactive to light.   Neck:      Vascular: No JVD.      Trachea: No tracheal deviation.   Cardiovascular:      Rate and Rhythm: Normal rate and regular rhythm.      Heart sounds: Normal heart sounds. No murmur heard.  Pulmonary:      Effort: Pulmonary effort is normal. No respiratory distress.      Breath sounds: Normal breath sounds. No wheezing.   Abdominal:      General: Bowel sounds are normal.      Palpations: Abdomen is soft.      Tenderness: There is no abdominal tenderness.   Musculoskeletal:         General: No deformity. Normal range of motion.      Cervical back: Normal range of motion and neck supple.   Skin:     General: Skin is warm and dry.      Coloration: Skin is not pale.      Findings: No erythema or rash.   Neurological:      Mental Status: He is alert and oriented to person, place, and time.       Cranial Nerves: No cranial nerve deficit.   Psychiatric:         Behavior: Behavior normal.         Thought Content: Thought content normal.         Procedures           ED Course  ED Course as of 03/10/25 1822   Mon Mar 10, 2025   1326 CT Abdomen Pelvis Stone Protocol [TK]   1518 Spoke with Lester Crowell, urology PA who will see patient in clinic on Wednesday at 11 AM. [TK]   1519 Patient's urine has completely cleared after CBI and Agosto catheter change. [TK]   1523 Discussed pt with Dr. Hylton, ED attending who recommends switching amoxicillin to Augmentin at this time.  Patient will follow-up with urology for further evaluation and treatment. [TK]      ED Course User Index  [TK] Deanna Lei, PANereidaC                                           Results for orders placed or performed during the hospital encounter of 03/10/25   Comprehensive Metabolic Panel    Collection Time: 03/10/25 11:42 AM    Specimen: Arm, Left; Blood   Result Value Ref Range    Glucose 139 (H) 65 - 99 mg/dL    BUN 20 8 - 23 mg/dL    Creatinine 1.13 0.76 - 1.27 mg/dL    Sodium 135 (L) 136 - 145 mmol/L    Potassium 4.2 3.5 - 5.2 mmol/L    Chloride 101 98 - 107 mmol/L    CO2 26.3 22.0 - 29.0 mmol/L    Calcium 8.8 8.6 - 10.5 mg/dL    Total Protein 6.8 6.0 - 8.5 g/dL    Albumin 3.6 3.5 - 5.2 g/dL    ALT (SGPT) 12 1 - 41 U/L    AST (SGOT) 14 1 - 40 U/L    Alkaline Phosphatase 91 39 - 117 U/L    Total Bilirubin 0.3 0.0 - 1.2 mg/dL    Globulin 3.2 gm/dL    A/G Ratio 1.1 g/dL    BUN/Creatinine Ratio 17.7 7.0 - 25.0    Anion Gap 7.7 5.0 - 15.0 mmol/L    eGFR 68.6 >60.0 mL/min/1.73   C-reactive Protein    Collection Time: 03/10/25 11:42 AM    Specimen: Arm, Left; Blood   Result Value Ref Range    C-Reactive Protein 4.58 (H) 0.00 - 0.50 mg/dL   CBC Auto Differential    Collection Time: 03/10/25 11:42 AM    Specimen: Arm, Left; Blood   Result Value Ref Range    WBC 12.08 (H) 3.40 - 10.80 10*3/mm3    RBC 4.28 4.14 - 5.80 10*6/mm3    Hemoglobin 10.9  (L) 13.0 - 17.7 g/dL    Hematocrit 36.7 (L) 37.5 - 51.0 %    MCV 85.7 79.0 - 97.0 fL    MCH 25.5 (L) 26.6 - 33.0 pg    MCHC 29.7 (L) 31.5 - 35.7 g/dL    RDW 23.7 (H) 12.3 - 15.4 %    RDW-SD 73.5 (H) 37.0 - 54.0 fl    MPV 11.1 6.0 - 12.0 fL    Platelets 166 140 - 450 10*3/mm3    Neutrophil % 85.1 (H) 42.7 - 76.0 %    Lymphocyte % 5.8 (L) 19.6 - 45.3 %    Monocyte % 6.5 5.0 - 12.0 %    Eosinophil % 1.7 0.3 - 6.2 %    Basophil % 0.2 0.0 - 1.5 %    Immature Grans % 0.7 (H) 0.0 - 0.5 %    Neutrophils, Absolute 10.28 (H) 1.70 - 7.00 10*3/mm3    Lymphocytes, Absolute 0.70 0.70 - 3.10 10*3/mm3    Monocytes, Absolute 0.79 0.10 - 0.90 10*3/mm3    Eosinophils, Absolute 0.20 0.00 - 0.40 10*3/mm3    Basophils, Absolute 0.03 0.00 - 0.20 10*3/mm3    Immature Grans, Absolute 0.08 (H) 0.00 - 0.05 10*3/mm3    nRBC 0.0 0.0 - 0.2 /100 WBC   Scan Slide    Collection Time: 03/10/25 11:42 AM    Specimen: Arm, Left; Blood   Result Value Ref Range    Anisocytosis Large/3+ None Seen    Hypochromia Slight/1+ None Seen    Ovalocytes Slight/1+ None Seen    Platelet Estimate Adequate Normal   Procalcitonin    Collection Time: 03/10/25 11:42 AM    Specimen: Arm, Left; Blood   Result Value Ref Range    Procalcitonin 0.20 0.00 - 0.25 ng/mL   Green Top (Gel)    Collection Time: 03/10/25 11:42 AM   Result Value Ref Range    Extra Tube Hold for add-ons.    Lavender Top    Collection Time: 03/10/25 11:42 AM   Result Value Ref Range    Extra Tube hold for add-on    Gold Top - SST    Collection Time: 03/10/25 11:42 AM   Result Value Ref Range    Extra Tube Hold for add-ons.    Light Blue Top    Collection Time: 03/10/25 11:42 AM   Result Value Ref Range    Extra Tube Hold for add-ons.    Lactic Acid, Plasma    Collection Time: 03/10/25  1:48 PM    Specimen: Blood   Result Value Ref Range    Lactate 0.8 0.5 - 2.0 mmol/L   Urinalysis With Culture If Indicated - Urine, Catheter    Collection Time: 03/10/25  1:50 PM    Specimen: Urine, Catheter   Result  Value Ref Range    Color, UA Yellow Yellow, Straw    Appearance, UA Cloudy (A) Clear    pH, UA 6.0 5.0 - 8.0    Specific Gravity, UA 1.019 1.005 - 1.030    Glucose, UA Negative Negative    Ketones, UA Negative Negative    Bilirubin, UA Negative Negative    Blood, UA Large (3+) (A) Negative    Protein, UA >=300 mg/dL (3+) (A) Negative    Leuk Esterase, UA Moderate (2+) (A) Negative    Nitrite, UA Negative Negative    Urobilinogen, UA 0.2 E.U./dL 0.2 - 1.0 E.U./dL   Urinalysis, Microscopic Only - Urine, Catheter    Collection Time: 03/10/25  1:50 PM    Specimen: Urine, Catheter   Result Value Ref Range    RBC, UA Too Numerous to Count (A) None Seen, 0-2 /HPF    WBC, UA 21-50 (A) None Seen, 0-2 /HPF    Bacteria, UA None Seen None Seen /HPF    Squamous Epithelial Cells, UA 0-2 None Seen, 0-2 /HPF    Hyaline Casts, UA None Seen None Seen /LPF    Methodology Manual Light Microscopy        CT Abdomen Pelvis Stone Protocol   Final Result   1.  Decompressed urinary bladder but with marked urinary bladder wall   thickening and surrounding inflammation most consistent with acute   cystitis.   2.  Agosto catheter decompresses urinary bladder.   3. Other incidental/nonacute findings above.       This report was finalized on 3/10/2025 12:26 PM by Dr. Dequan Cotter MD.                          Medical Decision Making  74-year-old male with past medical history of atrial fibrillation chronically anticoagulated on Eliquis, CHF, COPD, coronary artery disease, and hypertension presents to the emergency room for hematuria.  Patient states that his home health nurse came out this morning and states that he had blood in his catheter bag was not present last night.  Patient states he had his catheter placed 3 weeks ago at the [facility] where he was transferred secondary to a left eye infection.  He states since that time he has been on amoxicillin.  He states he is unsure why Agosto catheter was placed but is requesting to  see a local urologist.  He denies any fevers, chills, or bodyaches.  Denies any other complaints or concerns at this time.    Does appear patient was at [facility] where he was transferred from our facility for and endophthalmitis of the left eye.  While hospitalized patient had postvoid residual of 900 cc therefore Agosto catheter was placed and patient was supposed to follow-up outpatient with urology, however to date has been unable to do so.      Problems Addressed:  Cystitis: complicated acute illness or injury  Urinary retention: complicated acute illness or injury    Amount and/or Complexity of Data Reviewed  Labs: ordered. Decision-making details documented in ED Course.  Radiology: ordered. Decision-making details documented in ED Course.    Risk  Prescription drug management.        Final diagnoses:   Cystitis   Urinary retention       ED Disposition  ED Disposition       ED Disposition   Discharge    Condition   Stable    Comment   --               Lester Crowell PA-C  [id number]  [place] [address]  [phone]    On 3/11/2025  11:00am         Medication List        New Prescriptions      amoxicillin-clavulanate 875-125 MG per tablet  Commonly known as: AUGMENTIN  Take 1 tablet by mouth Every 12 (Twelve) Hours for 10 days.               Where to Get Your Medications        These medications were sent to [facility] [id number] - [place] - [address] AT [facility] [address] - [phone]  - [phone]   [address] [place] [address]      Phone: [phone]   amoxicillin-clavulanate 875-125 MG per tablet            Deanna Lei PA-C  03/10/25 0886

## 2025-03-11 LAB — BACTERIA SPEC AEROBE CULT: NO GROWTH

## 2025-03-12 ENCOUNTER — PATIENT OUTREACH (OUTPATIENT)
Dept: CASE MANAGEMENT | Facility: OTHER | Age: 75
End: 2025-03-12
Payer: MEDICARE

## 2025-03-12 ENCOUNTER — OFFICE VISIT (OUTPATIENT)
Dept: UROLOGY | Facility: CLINIC | Age: 75
End: 2025-03-12
Payer: MEDICARE

## 2025-03-12 VITALS
HEIGHT: 73 IN | DIASTOLIC BLOOD PRESSURE: 75 MMHG | BODY MASS INDEX: 31.14 KG/M2 | SYSTOLIC BLOOD PRESSURE: 115 MMHG | WEIGHT: 235 LBS

## 2025-03-12 DIAGNOSIS — R33.9 URINARY RETENTION: Primary | ICD-10-CM

## 2025-03-12 DIAGNOSIS — R31.0 GROSS HEMATURIA: ICD-10-CM

## 2025-03-12 NOTE — PROGRESS NOTES
"Chief Complaint:    Chief Complaint   Patient presents with    Urinary Retention     New patient / Er follow up     gross hematuria       Vital Signs:   /75 (BP Location: Right arm, Patient Position: Sitting, Cuff Size: Large Adult)   Ht 185.4 cm (73\")   Wt 107 kg (235 lb)   BMI 31.00 kg/m²   Body mass index is 31 kg/m².      HPI:  Zaid Galarza is a 74 y.o. male who presents today for initial evaluation     History of Present Illness  Mr. Galarza presents to the clinic today for emergency department follow-up.  Patient has a past medical history sniffer atrial fibrillation, congestive heart failure, COPD, coronary artery disease, hypertension, tobacco abuse, and recent infection of the left.  He went to the [facility] for evaluation and treatment and reports that he was scheduled for a surgical procedure however they canceled it due to risks.  They did do a bedside procedure while he was awake and he complained of difficulty pain afterwards.  He was never given any anesthesia.  I had placed an indwelling Agosto catheter at that time and got roughly 800 900 mL out.  He was discharged home and due to blood in the urine his home health nurse advised him to go to the ER for evaluation here [facility] yesterday.  He had a CT scan abdomen pelvis completed at that time that showed decompressed urinary bladder with some bladder wall thickening consistent with cystitis.  Ureters and kidneys are unremarkable.  No obvious blood clots were noted.  He was started on continuous bladder irrigation and gross hematuria completely resolved.  Agosto catheter was rechanged to a simple to a Wolof.  He returns today and states he is desirous to have Agosto catheter removed.  Prior to hospitalization in [place] he reports that he did have some difficulty urinating and was having frequency, urgency, and weak urinary stream.  He reported getting up several times throughout the night.  He denies any " known family history of any prostate issues.  I do not have any PSAs on file.  Urine in bag today is yellow with no signs of blood.  Urine culture taken at time of ER visit showed no growth.  He is currently taking amoxicillin for his infection.      Past Medical History:  Past Medical History:   Diagnosis Date    Atrial fibrillation     CHF (congestive heart failure)     COPD (chronic obstructive pulmonary disease)     Coronary artery disease     Hypertension     Tobacco abuse        Current Meds:  Current Outpatient Medications   Medication Sig Dispense Refill    albuterol sulfate  (90 Base) MCG/ACT inhaler Inhale 2 puffs Every 4 (Four) Hours As Needed.      amoxicillin-clavulanate (AUGMENTIN) 875-125 MG per tablet Take 1 tablet by mouth Every 12 (Twelve) Hours for 10 days. 20 tablet 0    apixaban (ELIQUIS) 5 MG tablet tablet Take 1 tablet by mouth 2 (Two) Times a Day. 180 tablet 3    atorvastatin (LIPITOR) 80 MG tablet Take 1 tablet by mouth Every Night. 90 tablet 3    metoprolol succinate XL (TOPROL-XL) 25 MG 24 hr tablet Take 1 tablet by mouth Daily. 90 tablet 3    pantoprazole (PROTONIX) 40 MG EC tablet Take 1 tablet by mouth 2 (Two) Times a Day. 180 tablet 3    sacubitril-valsartan (Entresto) 24-26 MG tablet Take 1 tablet by mouth 2 (Two) Times a Day. 180 tablet 3    sertraline (ZOLOFT) 100 MG tablet Take 1.5 tablets by mouth Daily.      Symbicort 160-4.5 MCG/ACT inhaler Inhale 2 puffs 2 (Two) Times a Day.      vitamin B-12 (CYANOCOBALAMIN) 1000 MCG tablet Take 1 tablet by mouth Daily.       No current facility-administered medications for this visit.        Allergies:   No Known Allergies     Past Surgical History:  Past Surgical History:   Procedure Laterality Date    ABDOMINAL AORTIC ANEURYSM REPAIR      CARDIAC CATHETERIZATION N/A 10/03/2019    Procedure: Left Heart Cath;  Surgeon: Vincent Phillips MD;  Location: [facility] CATH INVASIVE LOCATION;  Service: Cardiology       Social History:  Social  History     Socioeconomic History    Marital status: Single   Tobacco Use    Smoking status: Former     Current packs/day: 1.00     Types: Cigarettes    Smokeless tobacco: Never    Tobacco comments:     9/1/2022   Vaping Use    Vaping status: Never Used   Substance and Sexual Activity    Alcohol use: No    Drug use: No    Sexual activity: Defer       Family History:  Family History   Problem Relation Age of Onset    Heart disease Mother     Stroke Father     Heart disease Father        Review of Systems:  Review of Systems   Constitutional:  Positive for fatigue. Negative for chills and fever.   HENT:  Negative for congestion and sinus pressure.    Eyes:  Positive for pain and redness.   Respiratory:  Negative for chest tightness and shortness of breath.    Cardiovascular:  Negative for chest pain.   Gastrointestinal:  Negative for abdominal pain, constipation, diarrhea, nausea and vomiting.   Genitourinary:  Positive for difficulty urinating, hematuria and urgency. Negative for dysuria, flank pain and frequency.   Musculoskeletal:  Negative for back pain and neck pain.   Skin:  Negative for rash.   Allergic/Immunologic: Negative for food allergies.   Neurological:  Negative for dizziness and headaches.   Hematological:  Bruises/bleeds easily.   Psychiatric/Behavioral:  The patient is nervous/anxious.        Physical Exam:  Physical Exam  Constitutional:       General: He is not in acute distress.     Appearance: Normal appearance.   HENT:      Head: Normocephalic and atraumatic.      Ears:      Comments: Swelling and redness of the left eye.  No abnormal discharge.  Decreased peripheral vision     Nose: Nose normal.      Mouth/Throat:      Mouth: Mucous membranes are moist.   Eyes:      Conjunctiva/sclera: Conjunctivae normal.   Cardiovascular:      Rate and Rhythm: Normal rate.      Pulses: Normal pulses.   Pulmonary:      Effort: Pulmonary effort is normal.   Abdominal:      Palpations: Abdomen is soft.    Genitourinary:     Comments: Indwelling Agosto catheter in place with yellow urine noted in collection tubing and bag.  Musculoskeletal:      Cervical back: Normal range of motion.   Skin:     General: Skin is warm.   Neurological:      General: No focal deficit present.      Mental Status: He is alert and oriented to person, place, and time.      Motor: Weakness present.      Comments: Uses a cane for ambulation   Psychiatric:         Mood and Affect: Mood normal.         Behavior: Behavior normal.         Thought Content: Thought content normal.         Judgment: Judgment normal.           Recent Image (CT and/or KUB):   CT Abdomen and Pelvis: No results found for this or any previous visit.     CT Stone Protocol: Results for orders placed during the hospital encounter of 03/10/25    CT Abdomen Pelvis Stone Protocol    Narrative  EXAM:  CT Abdomen and Pelvis Without Intravenous Contrast    EXAM DATE:  3/10/2025 11:44 AM    CLINICAL HISTORY:  acute hematuria    TECHNIQUE:  Axial computed tomography images of the abdomen and pelvis without  intravenous contrast.  Sagittal and coronal reformatted images were  created and reviewed.  This CT exam was performed using one or more of  the following dose reduction techniques:  automated exposure control,  adjustment of the mA and/or kV according to patient size, and/or use of  iterative reconstruction technique.    COMPARISON:  1/30/2025    FINDINGS:  Lung bases:  Chronic lung changes.  No consolidation.  Heart:  Cardiomegaly and coronary artery calcifications are stable.    ABDOMEN:  Liver:  Stable hepatic cysts.  Gallbladder and bile ducts:  Unremarkable.  No calcified stones.  No  ductal dilation.  Pancreas:  Unremarkable.  No ductal dilation.  Spleen:  Unremarkable.  No splenomegaly.  Adrenals:  Unremarkable.  No mass.  Kidneys and ureters:  Unremarkable.  No obstructing stones.  No  hydronephrosis.  Stomach and bowel:  Sigmoid diverticulosis without diverticulitis.   No  obstruction.    PELVIS:  Appendix:  Normal appendix.  Bladder:  Decompressed urinary bladder but with marked urinary bladder  wall thickening and surrounding inflammation most consistent with acute  cystitis.  Agosto catheter decompresses urinary bladder.  No stones.  Reproductive:  Unremarkable as visualized.    ABDOMEN and PELVIS:  Intraperitoneal space:  Unremarkable.  No significant fluid  collection.  No pneumoperitoneum.  Bones/joints:  Stable degenerative changes lumbar spine with  multilevel stenosis. No acute bony findings identified.  No dislocation.  Soft tissues:  Unremarkable.  Vasculature:  EVAR changes are noted of the abdominal aorta.  No  abdominal aortic aneurysm.  Lymph nodes:  Unremarkable.  No enlarged lymph nodes.    Impression  1.  Decompressed urinary bladder but with marked urinary bladder wall  thickening and surrounding inflammation most consistent with acute  cystitis.  2.  Agosto catheter decompresses urinary bladder.  3. Other incidental/nonacute findings above.    This report was finalized on 3/10/2025 12:26 PM by Dr. Dequan Cotter MD.     KUB: No results found for this or any previous visit.       Labs:  Brief Urine Lab Results  (Last result in the past 365 days)        Color   Clarity   Blood   Leuk Est   Nitrite   Protein   CREAT   Urine HCG        03/10/25 1350 Yellow   Cloudy   Large (3+)   Moderate (2+)   Negative   >=300 mg/dL (3+)                 Admission on 03/10/2025, Discharged on 03/10/2025   Component Date Value Ref Range Status    Color, UA 03/10/2025 Yellow  Yellow, Straw Final    Appearance, UA 03/10/2025 Cloudy (A)  Clear Final    pH, UA 03/10/2025 6.0  5.0 - 8.0 Final    Specific Gravity, UA 03/10/2025 1.019  1.005 - 1.030 Final    Glucose, UA 03/10/2025 Negative  Negative Final    Ketones, UA 03/10/2025 Negative  Negative Final    Bilirubin, UA 03/10/2025 Negative  Negative Final    Blood, UA 03/10/2025 Large (3+) (A)  Negative Final    Protein, UA 03/10/2025  >=300 mg/dL (3+) (A)  Negative Final    Leuk Esterase, UA 03/10/2025 Moderate (2+) (A)  Negative Final    Nitrite, UA 03/10/2025 Negative  Negative Final    Urobilinogen, UA 03/10/2025 0.2 E.U./dL  0.2 - 1.0 E.U./dL Final    Glucose 03/10/2025 139 (H)  65 - 99 mg/dL Final    BUN 03/10/2025 20  8 - 23 mg/dL Final    Creatinine 03/10/2025 1.13  0.76 - 1.27 mg/dL Final    Sodium 03/10/2025 135 (L)  136 - 145 mmol/L Final    Potassium 03/10/2025 4.2  3.5 - 5.2 mmol/L Final    Chloride 03/10/2025 101  98 - 107 mmol/L Final    CO2 03/10/2025 26.3  22.0 - 29.0 mmol/L Final    Calcium 03/10/2025 8.8  8.6 - 10.5 mg/dL Final    Total Protein 03/10/2025 6.8  6.0 - 8.5 g/dL Final    Albumin 03/10/2025 3.6  3.5 - 5.2 g/dL Final    ALT (SGPT) 03/10/2025 12  1 - 41 U/L Final    AST (SGOT) 03/10/2025 14  1 - 40 U/L Final    Alkaline Phosphatase 03/10/2025 91  39 - 117 U/L Final    Total Bilirubin 03/10/2025 0.3  0.0 - 1.2 mg/dL Final    Globulin 03/10/2025 3.2  gm/dL Final    A/G Ratio 03/10/2025 1.1  g/dL Final    BUN/Creatinine Ratio 03/10/2025 17.7  7.0 - 25.0 Final    Anion Gap 03/10/2025 7.7  5.0 - 15.0 mmol/L Final    eGFR 03/10/2025 68.6  >60.0 mL/min/1.73 Final    C-Reactive Protein 03/10/2025 4.58 (H)  0.00 - 0.50 mg/dL Final    Extra Tube 03/10/2025 Hold for add-ons.   Final    Auto resulted.    Extra Tube 03/10/2025 hold for add-on   Final    Auto resulted    Extra Tube 03/10/2025 Hold for add-ons.   Final    Auto resulted.    Extra Tube 03/10/2025 Hold for add-ons.   Final    Auto resulted    WBC 03/10/2025 12.08 (H)  3.40 - 10.80 10*3/mm3 Final    RBC 03/10/2025 4.28  4.14 - 5.80 10*6/mm3 Final    Hemoglobin 03/10/2025 10.9 (L)  13.0 - 17.7 g/dL Final    Hematocrit 03/10/2025 36.7 (L)  37.5 - 51.0 % Final    MCV 03/10/2025 85.7  79.0 - 97.0 fL Final    MCH 03/10/2025 25.5 (L)  26.6 - 33.0 pg Final    MCHC 03/10/2025 29.7 (L)  31.5 - 35.7 g/dL Final    RDW 03/10/2025 23.7 (H)  12.3 - 15.4 % Final    RDW-SD 03/10/2025  73.5 (H)  37.0 - 54.0 fl Final    MPV 03/10/2025 11.1  6.0 - 12.0 fL Final    Platelets 03/10/2025 166  140 - 450 10*3/mm3 Final    Neutrophil % 03/10/2025 85.1 (H)  42.7 - 76.0 % Final    Lymphocyte % 03/10/2025 5.8 (L)  19.6 - 45.3 % Final    Monocyte % 03/10/2025 6.5  5.0 - 12.0 % Final    Eosinophil % 03/10/2025 1.7  0.3 - 6.2 % Final    Basophil % 03/10/2025 0.2  0.0 - 1.5 % Final    Immature Grans % 03/10/2025 0.7 (H)  0.0 - 0.5 % Final    Neutrophils, Absolute 03/10/2025 10.28 (H)  1.70 - 7.00 10*3/mm3 Final    Lymphocytes, Absolute 03/10/2025 0.70  0.70 - 3.10 10*3/mm3 Final    Monocytes, Absolute 03/10/2025 0.79  0.10 - 0.90 10*3/mm3 Final    Eosinophils, Absolute 03/10/2025 0.20  0.00 - 0.40 10*3/mm3 Final    Basophils, Absolute 03/10/2025 0.03  0.00 - 0.20 10*3/mm3 Final    Immature Grans, Absolute 03/10/2025 0.08 (H)  0.00 - 0.05 10*3/mm3 Final    nRBC 03/10/2025 0.0  0.0 - 0.2 /100 WBC Final    Anisocytosis 03/10/2025 Large/3+  None Seen Final    Hypochromia 03/10/2025 Slight/1+  None Seen Final    Ovalocytes 03/10/2025 Slight/1+  None Seen Final    Platelet Estimate 03/10/2025 Adequate  Normal Final    Blood Culture 03/10/2025 No growth at 24 hours   Preliminary    Blood Culture 03/10/2025 No growth at 2 days   Preliminary    Lactate 03/10/2025 0.8  0.5 - 2.0 mmol/L Final    Procalcitonin 03/10/2025 0.20  0.00 - 0.25 ng/mL Final    RBC, UA 03/10/2025 Too Numerous to Count (A)  None Seen, 0-2 /HPF Final    WBC, UA 03/10/2025 21-50 (A)  None Seen, 0-2 /HPF Final    Bacteria, UA 03/10/2025 None Seen  None Seen /HPF Final    Squamous Epithelial Cells, UA 03/10/2025 0-2  None Seen, 0-2 /HPF Final    Hyaline Casts, UA 03/10/2025 None Seen  None Seen /LPF Final    Methodology 03/10/2025 Manual Light Microscopy   Final    Urine Culture 03/10/2025 No growth   Final   No results displayed because visit has over 200 results.           Procedure: None  Procedures     I have reviewed and agree with the above [facility],  PSH, FMH, social history, medications, allergies, and labs.     Assessment/Plan:   Problem List Items Addressed This Visit       Urinary retention - Primary     Other Visit Diagnoses         Gross hematuria                Health Maintenance:   Health Maintenance Due   Topic Date Due    BMI FOLLOWUP  Never done    HEPATITIS C SCREENING  Never done        Smoking Counseling: Former smoker.  Never used smokeless tobacco.  Counseling given.      Patient was given instructions and counseling regarding his condition or for health maintenance advice. Please see specific information pulled into the AVS if appropriate.    Patient Education:   Urinary retention -discussed with the patient the pathophysiology of this condition in detail.  Discussed causes which can include but are not limited to medications, anesthesias, surgical interventions, prostatic enlargement, trauma, urinary tract infection, neurogenic bladder, and other urological abnormalities.  Patient had urinary symptoms prior to Agosto catheter placement and most likely has some prostatic enlargement with outlet obstruction.  Given current condition do not recommend to start any alpha blockade given risks of worsening eyesight.  Patient did desire to have his Agosto catheter removed in office today and we will remove it.  Patient follow-up in 1 week for reevaluation.  Gross hematuria -discussed with the patient the pathophysiology of this condition in detail.  Patient had an episode of gross hematuria that resolved after continuous bladder irrigation.  Did discuss further workup which can include a cystoscopy for lower tract investigation.  Urine is crystal clear in office today and will remove the catheter for voiding trial.  If patient has persistent blood we will schedule him for a cystoscopy otherwise we will proceed forward with observation at this time.  He verbalized understanding.    Visit Diagnoses:    ICD-10-CM ICD-9-CM   1. Urinary retention  R33.9  788.20   2. Gross hematuria  R31.0 599.71     A total of 30 minutes were spent coordinating this patient’s care in clinic today; 15 minutes of which were face-to-face with the patient, reviewing medical history and counseling on the current treatment and followup plan.  All questions were answered to patient's satisfaction.    Meds Ordered During Visit:  No orders of the defined types were placed in this encounter.      Follow Up Appointment: 1 week  No follow-ups on file.      This document has been electronically signed by Lester Crowell PA-C   March 12, 2025 17:10 EDT    Part of this note may be an electronic transcription/translation of spoken language to printed text using the Dragon Dictation System.

## 2025-03-12 NOTE — OUTREACH NOTE
AMBULATORY CASE MANAGEMENT NOTE    Names and Relationships of Patient/Support Persons: Contact: Zaid Galarza; Relationship: Self -     Patient Outreach    RN-ACM outreach to patient for follow-up of ED visit 3/10/25, dx cystitis and urinary retention. Patient reports following up with urology today and [facility] cath was dc'd. Pt will notify provider/seek care for any reoccurrence of urinary retention. Education provided, understanding voiced. Pt reports feeling very well, denies additional outreach needs at present.     Adult Patient Profile  Questions/Answers      Flowsheet Row Most Recent Value   Symptoms/Conditions Managed at Home genitourinary   Barriers to Managing Health none   Genitourinary Symptoms/Conditions other (see comments)  [Had been unable to void,  f/c placed but dc'd as of today's f/u [facility] urology]   Genitourinary Management Strategies coping strategies   Genitourinary Self-Management Outcome well   Identifying Health Goals keep illness under control   Taking Medications Not Prescribed no   Missed Doses of Prescribed Medications During Past Week no   Taken Prescribed Medications at Different Time or Schedule During Past Week no   Taken More or Less Medication Than Prescribed no   Barriers to Taking Medication as Prescribed none          SDOH updated and reviewed with the patient during this program:  Financial Resource Strain: Low Risk  (3/12/2025)    Overall Financial Resource Strain (CARDIA)     Difficulty of Paying Living Expenses: Not hard at all      --     Food Insecurity: No Food Insecurity (3/12/2025)    Hunger Vital Sign     Worried About Running Out of Food in the Last Year: Never true     Ran Out of Food in the Last Year: Never true      --     Housing Stability: Low Risk  (3/12/2025)    Housing Stability Vital Sign     Unable to Pay for Housing in the Last Year: No     Number of Times Moved in the Last Year: 1     Homeless in the Last Year: No      --     Physical Activity: Inactive  (3/12/2025)    Exercise Vital Sign     Days of Exercise per Week: 0 days     Minutes of Exercise per Session: 0 min      --     Tobacco Use: Medium Risk (3/12/2025)    Patient History     Smoking Tobacco Use: Former     Smokeless Tobacco Use: Never      --     Transportation Needs: No Transportation Needs (3/12/2025)    PRAPARE - Transportation     Lack of Transportation (Medical): No     Lack of Transportation (Non-Medical): No     Education Documentation  Unresolved/Worsening Symptoms, taught by Isabella Funk, RN at 3/12/2025 12:45 PM.  Learner: Patient  Readiness: Acceptance  Method: Explanation  Response: Verbalizes Understanding    Prevention Measures, taught by Isabella Funk RN at 3/12/2025 12:45 PM.  Learner: Patient  Readiness: Acceptance  Method: Explanation  Response: Verbalizes Understanding    Medication Management, taught by Isabella Funk, RN at 3/12/2025 12:45 PM.  Learner: Patient  Readiness: Acceptance  Method: Explanation  Response: Verbalizes Understanding    Signs/Symptoms, taught by Isabella Funk RN at 3/12/2025 12:45 PM.  Learner: Patient  Readiness: Acceptance  Method: Explanation  Response: Verbalizes Understanding    Signs/Symptoms, taught by Isabella Funk, RN at 3/12/2025 12:45 PM.  Learner: Patient  Readiness: Acceptance  Method: Explanation  Response: Verbalizes Understanding          Isabella MONSON  Ambulatory Case Management    3/12/2025, 12:45 EDT

## 2025-03-13 ENCOUNTER — TELEPHONE (OUTPATIENT)
Dept: UROLOGY | Facility: CLINIC | Age: 75
End: 2025-03-13
Payer: MEDICARE

## 2025-03-13 NOTE — TELEPHONE ENCOUNTER
Lyle Duvall [facility] called and said that Zaid had barely voided since his cath was removed yesterday I told her it was ok to place another one.

## 2025-03-15 LAB
BACTERIA SPEC AEROBE CULT: NORMAL
BACTERIA SPEC AEROBE CULT: NORMAL

## 2025-03-23 ENCOUNTER — APPOINTMENT (OUTPATIENT)
Dept: CT IMAGING | Facility: HOSPITAL | Age: 75
End: 2025-03-23
Payer: MEDICARE

## 2025-03-23 ENCOUNTER — HOSPITAL ENCOUNTER (EMERGENCY)
Facility: HOSPITAL | Age: 75
Discharge: ANOTHER HOSPITAL | End: 2025-03-23
Payer: MEDICARE

## 2025-03-23 ENCOUNTER — APPOINTMENT (OUTPATIENT)
Dept: GENERAL RADIOLOGY | Facility: HOSPITAL | Age: 75
End: 2025-03-23
Payer: MEDICARE

## 2025-03-23 VITALS
SYSTOLIC BLOOD PRESSURE: 150 MMHG | HEIGHT: 73 IN | BODY MASS INDEX: 31.68 KG/M2 | RESPIRATION RATE: 18 BRPM | WEIGHT: 239 LBS | HEART RATE: 81 BPM | OXYGEN SATURATION: 96 % | TEMPERATURE: 99.1 F | DIASTOLIC BLOOD PRESSURE: 100 MMHG

## 2025-03-23 DIAGNOSIS — R31.9 HEMATURIA, UNSPECIFIED TYPE: ICD-10-CM

## 2025-03-23 DIAGNOSIS — N39.0 UTI (URINARY TRACT INFECTION), BACTERIAL: ICD-10-CM

## 2025-03-23 DIAGNOSIS — R33.9 URINARY RETENTION: Primary | ICD-10-CM

## 2025-03-23 DIAGNOSIS — A49.9 UTI (URINARY TRACT INFECTION), BACTERIAL: ICD-10-CM

## 2025-03-23 LAB
ALBUMIN SERPL-MCNC: 3.6 G/DL (ref 3.5–5.2)
ALBUMIN/GLOB SERPL: 0.9 G/DL
ALP SERPL-CCNC: 87 U/L (ref 39–117)
ALT SERPL W P-5'-P-CCNC: 10 U/L (ref 1–41)
ANION GAP SERPL CALCULATED.3IONS-SCNC: 13.1 MMOL/L (ref 5–15)
AST SERPL-CCNC: 19 U/L (ref 1–40)
BASOPHILS # BLD AUTO: 0.04 10*3/MM3 (ref 0–0.2)
BASOPHILS NFR BLD AUTO: 0.2 % (ref 0–1.5)
BILIRUB SERPL-MCNC: 0.4 MG/DL (ref 0–1.2)
BUN SERPL-MCNC: 24 MG/DL (ref 8–23)
BUN/CREAT SERPL: 19.2 (ref 7–25)
CALCIUM SPEC-SCNC: 9.3 MG/DL (ref 8.6–10.5)
CHLORIDE SERPL-SCNC: 100 MMOL/L (ref 98–107)
CO2 SERPL-SCNC: 23.9 MMOL/L (ref 22–29)
CREAT SERPL-MCNC: 1.25 MG/DL (ref 0.76–1.27)
D-LACTATE SERPL-SCNC: 1.3 MMOL/L (ref 0.5–2)
DEPRECATED RDW RBC AUTO: 71.1 FL (ref 37–54)
EGFRCR SERPLBLD CKD-EPI 2021: 60.8 ML/MIN/1.73
EOSINOPHIL # BLD AUTO: 0.16 10*3/MM3 (ref 0–0.4)
EOSINOPHIL NFR BLD AUTO: 0.9 % (ref 0.3–6.2)
ERYTHROCYTE [DISTWIDTH] IN BLOOD BY AUTOMATED COUNT: 23 % (ref 12.3–15.4)
GLOBULIN UR ELPH-MCNC: 3.9 GM/DL
GLUCOSE SERPL-MCNC: 139 MG/DL (ref 65–99)
HCT VFR BLD AUTO: 37.7 % (ref 37.5–51)
HGB BLD-MCNC: 11.3 G/DL (ref 13–17.7)
IMM GRANULOCYTES # BLD AUTO: 0.14 10*3/MM3 (ref 0–0.05)
IMM GRANULOCYTES NFR BLD AUTO: 0.8 % (ref 0–0.5)
LYMPHOCYTES # BLD AUTO: 1.26 10*3/MM3 (ref 0.7–3.1)
LYMPHOCYTES NFR BLD AUTO: 6.9 % (ref 19.6–45.3)
MCH RBC QN AUTO: 25.5 PG (ref 26.6–33)
MCHC RBC AUTO-ENTMCNC: 30 G/DL (ref 31.5–35.7)
MCV RBC AUTO: 84.9 FL (ref 79–97)
MONOCYTES # BLD AUTO: 1.3 10*3/MM3 (ref 0.1–0.9)
MONOCYTES NFR BLD AUTO: 7.1 % (ref 5–12)
NEUTROPHILS NFR BLD AUTO: 15.38 10*3/MM3 (ref 1.7–7)
NEUTROPHILS NFR BLD AUTO: 84.1 % (ref 42.7–76)
NRBC BLD AUTO-RTO: 0 /100 WBC (ref 0–0.2)
PLATELET # BLD AUTO: 206 10*3/MM3 (ref 140–450)
PMV BLD AUTO: ABNORMAL FL
POTASSIUM SERPL-SCNC: 4.2 MMOL/L (ref 3.5–5.2)
PROT SERPL-MCNC: 7.5 G/DL (ref 6–8.5)
RBC # BLD AUTO: 4.44 10*6/MM3 (ref 4.14–5.8)
SODIUM SERPL-SCNC: 137 MMOL/L (ref 136–145)
WBC NRBC COR # BLD AUTO: 18.28 10*3/MM3 (ref 3.4–10.8)

## 2025-03-23 PROCEDURE — 25810000003 SODIUM CHLORIDE 0.9 % SOLUTION

## 2025-03-23 PROCEDURE — 51798 US URINE CAPACITY MEASURE: CPT

## 2025-03-23 PROCEDURE — 85025 COMPLETE CBC W/AUTO DIFF WBC: CPT

## 2025-03-23 PROCEDURE — 87154 CUL TYP ID BLD PTHGN 6+ TRGT: CPT

## 2025-03-23 PROCEDURE — 99285 EMERGENCY DEPT VISIT HI MDM: CPT

## 2025-03-23 PROCEDURE — 87205 SMEAR GRAM STAIN: CPT

## 2025-03-23 PROCEDURE — 80053 COMPREHEN METABOLIC PANEL: CPT

## 2025-03-23 PROCEDURE — 71045 X-RAY EXAM CHEST 1 VIEW: CPT | Performed by: RADIOLOGY

## 2025-03-23 PROCEDURE — 74178 CT ABD&PLV WO CNTR FLWD CNTR: CPT | Performed by: RADIOLOGY

## 2025-03-23 PROCEDURE — 87186 SC STD MICRODIL/AGAR DIL: CPT

## 2025-03-23 PROCEDURE — 71045 X-RAY EXAM CHEST 1 VIEW: CPT

## 2025-03-23 PROCEDURE — 87040 BLOOD CULTURE FOR BACTERIA: CPT

## 2025-03-23 PROCEDURE — 25010000002 CEFTRIAXONE PER 250 MG

## 2025-03-23 PROCEDURE — 74178 CT ABD&PLV WO CNTR FLWD CNTR: CPT

## 2025-03-23 PROCEDURE — 87070 CULTURE OTHR SPECIMN AEROBIC: CPT

## 2025-03-23 PROCEDURE — 87077 CULTURE AEROBIC IDENTIFY: CPT

## 2025-03-23 PROCEDURE — 96365 THER/PROPH/DIAG IV INF INIT: CPT

## 2025-03-23 PROCEDURE — 36415 COLL VENOUS BLD VENIPUNCTURE: CPT

## 2025-03-23 PROCEDURE — 51702 INSERT TEMP BLADDER CATH: CPT

## 2025-03-23 PROCEDURE — 83605 ASSAY OF LACTIC ACID: CPT

## 2025-03-23 PROCEDURE — 25510000001 IOPAMIDOL 61 % SOLUTION

## 2025-03-23 RX ORDER — LEVOFLOXACIN 750 MG/1
750 TABLET, FILM COATED ORAL DAILY
Qty: 7 TABLET | Refills: 0 | Status: SHIPPED | OUTPATIENT
Start: 2025-03-23 | End: 2025-03-30

## 2025-03-23 RX ORDER — ACETAMINOPHEN 500 MG
1000 TABLET ORAL ONCE
Status: DISCONTINUED | OUTPATIENT
Start: 2025-03-23 | End: 2025-03-23

## 2025-03-23 RX ORDER — IOPAMIDOL 612 MG/ML
100 INJECTION, SOLUTION INTRAVASCULAR
Status: COMPLETED | OUTPATIENT
Start: 2025-03-23 | End: 2025-03-23

## 2025-03-23 RX ORDER — ACETAMINOPHEN 325 MG/1
650 TABLET ORAL ONCE
Status: COMPLETED | OUTPATIENT
Start: 2025-03-23 | End: 2025-03-23

## 2025-03-23 RX ADMIN — SODIUM CHLORIDE 2000 MG: 9 INJECTION, SOLUTION INTRAVENOUS at 07:00

## 2025-03-23 RX ADMIN — SODIUM CHLORIDE 1000 ML: 9 INJECTION, SOLUTION INTRAVENOUS at 07:00

## 2025-03-23 RX ADMIN — IOPAMIDOL 80 ML: 612 INJECTION, SOLUTION INTRAVENOUS at 09:50

## 2025-03-23 RX ADMIN — ACETAMINOPHEN 650 MG: 325 TABLET, FILM COATED ORAL at 07:17

## 2025-03-23 NOTE — ED NOTES
Patient received a bed at [facility] in [place], patient will be going to [address], Ems will receive pt room number upon their arrival. Number to call report is

## 2025-03-23 NOTE — ED NOTES
Called [facility] for possible transfer, they stated that they do not have urology coverage for this weekend, called haley Salas who also stated that they have no urology coverage for today

## 2025-03-23 NOTE — ED PROVIDER NOTES
Subjective   History of Present Illness  This 74-year-old male has indwelling Agosto catheter.  He is on anticoagulant.  He has developed lower abdominal pain blood.  She urethra.  On exam he has suprapubic pain feels like he has got urinary retention.  Will switch his Agosto catheter out and he is get still blood-tinged urine but not as bad as when he came in.    Christianne patient has a temperature of 101.  Will start going down the sepsis pathway on him.    He does have an infected left eye.  He was told by the ophthalmologist that when the eye hurt bad enough they would take it out.        Review of Systems   Eyes:         Infected left eye.  The cornea is opaque.  He was told that manipulation was a possibility for his future.  He says the ophthalmologist told him that when the eye was bothering him enough they would take it out.   Gastrointestinal:  Positive for abdominal pain.       Past Medical History:   Diagnosis Date    Atrial fibrillation     CHF (congestive heart failure)     COPD (chronic obstructive pulmonary disease)     Coronary artery disease     Hypertension     Tobacco abuse        No Known Allergies    Past Surgical History:   Procedure Laterality Date    ABDOMINAL AORTIC ANEURYSM REPAIR      CARDIAC CATHETERIZATION N/A 10/03/2019    Procedure: Left Heart Cath;  Surgeon: Vincent Phillips MD;  Location: [facility] CATH INVASIVE LOCATION;  Service: Cardiology       Family History   Problem Relation Age of Onset    Heart disease Mother     Stroke Father     Heart disease Father        Social History     Socioeconomic History    Marital status: Single   Tobacco Use    Smoking status: Former     Current packs/day: 1.00     Types: Cigarettes    Smokeless tobacco: Never    Tobacco comments:     9/1/2022   Vaping Use    Vaping status: Never Used   Substance and Sexual Activity    Alcohol use: No    Drug use: No    Sexual activity: Defer           Objective   Physical Exam  HENT:      Head: Normocephalic.       Right Ear: External ear normal.      Left Ear: External ear normal.      Nose: Nose normal.      Mouth/Throat:      Mouth: Mucous membranes are moist.   Eyes:      Pupils: Pupils are equal, round, and reactive to light.      Comments: Opaque cornea of the left eye.  Mucus discharge in the orbit.   Cardiovascular:      Rate and Rhythm: Normal rate.   Pulmonary:      Effort: Pulmonary effort is normal.      Breath sounds: Normal breath sounds.   Abdominal:      General: Abdomen is flat.      Palpations: Abdomen is soft.      Comments: Suprapubic tenderness of the abdomen.  Resolved after Agosto catheter change.   Musculoskeletal:         General: Normal range of motion.      Cervical back: Normal range of motion.   Skin:     General: Skin is warm.      Capillary Refill: Capillary refill takes less than 2 seconds.   Neurological:      General: No focal deficit present.      Mental Status: He is alert.         Procedures           ED Course  ED Course as of 03/23/25 1345   Sun Mar 23, 2025   0909 Glucose(!): 139 [RA]   0909 WBC(!): 18.28 [RA]   1211 CT Abdomen Pelvis With & Without Contrast [RA]   1331 Discussed the case with Dr. Patrick, [facility], [place], and he kindly accepts consultation.  I await a call from hospitalist service to discuss admission [RA]   1344 [facility], [place], contact me via the transfer line to inform me that Dr. Verma, hospitalist, has accepted the patient for MedSurg/telemetry bed.  They will contact us when they have a bed allocation available. [RA]      ED Course User Index  [RA] Donn Encinas MD                                                       Medical Decision Making  Patient comes in with a complaint of abdominal pain associated with urinary retention secondary to a blood clots in the Agosto catheter.  The catheter is changed his abdominal pain gets better.  Recheck on his temperature of 100 on admission he is now 101+ orally.    In response to this we are  giving getting blood cultures I am starting him on Rocephin as it appears that here his urine is infected.  Getting a chest x-ray lactic acids and fluid boluses.    Problems Addressed:  Hematuria, unspecified type: complicated acute illness or injury  Urinary retention: complicated acute illness or injury  UTI (urinary tract infection), bacterial: complicated acute illness or injury    Amount and/or Complexity of Data Reviewed  Labs: ordered. Decision-making details documented in ED Course.  Radiology: ordered. Decision-making details documented in ED Course.    Risk  OTC drugs.  Prescription drug management.        Final diagnoses:   Urinary retention   Hematuria, unspecified type   UTI (urinary tract infection), bacterial       ED Disposition  ED Disposition       ED Disposition   Transfer to Another Facility     Condition   --    Comment   --               Annie Andrade, APRN  [address] [address]  [phone]    Schedule an appointment as soon as possible for a visit in 3 days      Lester Crowell, YOSELIN  [address] [facility]  [address]  [place] [address]  [phone]               Medication List        New Prescriptions      levoFLOXacin 750 MG tablet  Commonly known as: LEVAQUIN  Take 1 tablet by mouth Daily for 7 days.               Where to Get Your Medications        These medications were sent to [id number] - [place] - [address] AT [facility] [address] - [phone]  - [phone]   [address] [place] [address]      Phone: [phone]   levoFLOXacin 750 MG tablet            Donn Encinas MD  03/23/25 9191

## 2025-03-24 LAB
BACTERIA BLD CULT: ABNORMAL
BOTTLE TYPE: ABNORMAL

## 2025-03-26 LAB
BACTERIA SPEC AEROBE CULT: ABNORMAL
BACTERIA SPEC AEROBE CULT: NORMAL
GRAM STN SPEC: ABNORMAL
GRAM STN SPEC: NORMAL
GRAM STN SPEC: NORMAL
ISOLATED FROM: ABNORMAL

## 2025-03-28 LAB — BACTERIA SPEC AEROBE CULT: NORMAL

## 2025-03-31 ENCOUNTER — OFFICE VISIT (OUTPATIENT)
Dept: UROLOGY | Facility: CLINIC | Age: 75
End: 2025-03-31
Payer: MEDICARE

## 2025-03-31 VITALS
HEIGHT: 73 IN | WEIGHT: 239.8 LBS | DIASTOLIC BLOOD PRESSURE: 72 MMHG | HEART RATE: 94 BPM | SYSTOLIC BLOOD PRESSURE: 114 MMHG | BODY MASS INDEX: 31.78 KG/M2

## 2025-03-31 DIAGNOSIS — R31.9 URINARY TRACT INFECTION WITH HEMATURIA, SITE UNSPECIFIED: ICD-10-CM

## 2025-03-31 DIAGNOSIS — N40.1 BENIGN PROSTATIC HYPERPLASIA WITH URINARY RETENTION: ICD-10-CM

## 2025-03-31 DIAGNOSIS — N39.0 URINARY TRACT INFECTION WITH HEMATURIA, SITE UNSPECIFIED: ICD-10-CM

## 2025-03-31 DIAGNOSIS — R33.8 BENIGN PROSTATIC HYPERPLASIA WITH URINARY RETENTION: ICD-10-CM

## 2025-03-31 DIAGNOSIS — R33.9 URINARY RETENTION: Primary | ICD-10-CM

## 2025-03-31 PROCEDURE — 3078F DIAST BP <80 MM HG: CPT

## 2025-03-31 PROCEDURE — 1160F RVW MEDS BY RX/DR IN RCRD: CPT

## 2025-03-31 PROCEDURE — 99214 OFFICE O/P EST MOD 30 MIN: CPT

## 2025-03-31 PROCEDURE — 3074F SYST BP LT 130 MM HG: CPT

## 2025-03-31 PROCEDURE — 1159F MED LIST DOCD IN RCRD: CPT

## 2025-03-31 RX ORDER — TAMSULOSIN HYDROCHLORIDE 0.4 MG/1
1 CAPSULE ORAL DAILY
Qty: 90 CAPSULE | Refills: 3 | Status: SHIPPED | OUTPATIENT
Start: 2025-03-31

## 2025-03-31 NOTE — PROGRESS NOTES
"Chief Complaint:    Chief Complaint   Patient presents with    Blood in Urine       Vital Signs:   /72   Pulse 94   Ht 185.4 cm (72.99\")   Wt 109 kg (239 lb 12.8 oz)   BMI 31.64 kg/m²   Body mass index is 31.64 kg/m².      HPI:  Zaid Galarza is a 74 y.o. male who presents today for hospital follow up    History of Present Illness  Mr. Galarza presents to the clinic today for hospital follow-up.  Patient was initially seen by me in office after he was found to have high postvoid residuals and indwelling Agosto catheter was placed.  He was not started on any alpha blockade medications due to worsening concerns of an eye infection and decreased vision.  He had been on medications for his eye infection and was seen in the emergency department prior to my initial evaluation due to gross hematuria that had resolved.  On his first office visit with me on 3/12/2025 the patient was desirous to undergo voiding trial and I did remove his catheter.  He did have home health that came by the next day and he was unable to urinate voluntarily and a repeat indwelling Agosto catheter was placed.  He presented to the emergency department here [facility] on 3/23/2025 secondary to gross hematuria and generalized weakness.  It is of note he had a urine culture on 3/10/2023 that showed no growth.  Blood cultures taken at time of emergency department visit on 3/23/2025 did show Pseudomonas aeruginosa.  He was then transferred to [facility] and was seen by Dr. Abdlala at that time and had overall improvement in his hematuria.  Urine culture did finalized with Pseudomonas as well.  He was continued with IV antibiotics and was started on Flomax.  He is currently on IV medications for 10 days and is scheduled to finish this by next Monday.  He did undergo voiding trial during his hospitalization in [place] however was unable to urinate requiring straight catheterization and repeat Agosto catheter was " replaced. Urine in collection tubing and bag today is completely yellow.        Past Medical History:  Past Medical History:   Diagnosis Date    Atrial fibrillation     CHF (congestive heart failure)     COPD (chronic obstructive pulmonary disease)     Coronary artery disease     Hypertension     Tobacco abuse        Current Meds:  Current Outpatient Medications   Medication Sig Dispense Refill    albuterol sulfate  (90 Base) MCG/ACT inhaler Inhale 2 puffs Every 4 (Four) Hours As Needed.      apixaban (ELIQUIS) 5 MG tablet tablet Take 1 tablet by mouth 2 (Two) Times a Day. 180 tablet 3    atorvastatin (LIPITOR) 80 MG tablet Take 1 tablet by mouth Every Night. 90 tablet 3    metoprolol succinate XL (TOPROL-XL) 25 MG 24 hr tablet Take 1 tablet by mouth Daily. 90 tablet 3    pantoprazole (PROTONIX) 40 MG EC tablet Take 1 tablet by mouth 2 (Two) Times a Day. 180 tablet 3    sacubitril-valsartan (Entresto) 24-26 MG tablet Take 1 tablet by mouth 2 (Two) Times a Day. 180 tablet 3    sertraline (ZOLOFT) 100 MG tablet Take 1.5 tablets by mouth Daily.      Symbicort 160-4.5 MCG/ACT inhaler Inhale 2 puffs 2 (Two) Times a Day.      vitamin B-12 (CYANOCOBALAMIN) 1000 MCG tablet Take 1 tablet by mouth Daily.      tamsulosin (FLOMAX) 0.4 MG capsule 24 hr capsule Take 1 capsule by mouth Daily. 90 capsule 3     No current facility-administered medications for this visit.        Allergies:   No Known Allergies     Past Surgical History:  Past Surgical History:   Procedure Laterality Date    ABDOMINAL AORTIC ANEURYSM REPAIR      CARDIAC CATHETERIZATION N/A 10/03/2019    Procedure: Left Heart Cath;  Surgeon: Vincent Phillips MD;  Location: [facility] CATH INVASIVE LOCATION;  Service: Cardiology       Social History:  Social History     Socioeconomic History    Marital status: Single   Tobacco Use    Smoking status: Former     Current packs/day: 1.00     Types: Cigarettes    Smokeless tobacco: Never    Tobacco comments:     9/1/2022    Vaping Use    Vaping status: Never Used   Substance and Sexual Activity    Alcohol use: No    Drug use: No    Sexual activity: Defer       Family History:  Family History   Problem Relation Age of Onset    Heart disease Mother     Stroke Father     Heart disease Father        Review of Systems:  Review of Systems   Constitutional:  Positive for fatigue. Negative for chills, fever and unexpected weight change.   HENT:  Negative for congestion and sinus pressure.    Eyes:  Positive for pain and redness.   Respiratory:  Negative for chest tightness and shortness of breath.    Cardiovascular:  Negative for chest pain.   Gastrointestinal:  Negative for abdominal pain, constipation, diarrhea, nausea and vomiting.   Genitourinary:  Positive for difficulty urinating, hematuria and urgency. Negative for dysuria, flank pain and frequency.   Musculoskeletal:  Negative for back pain and neck pain.   Skin:  Negative for rash.   Allergic/Immunologic: Negative for food allergies.   Neurological:  Negative for dizziness and headaches.   Hematological:  Bruises/bleeds easily.   Psychiatric/Behavioral:  Negative for confusion and suicidal ideas. The patient is nervous/anxious.        Physical Exam:  Physical Exam  Constitutional:       General: He is not in acute distress.     Appearance: Normal appearance.   HENT:      Head: Normocephalic and atraumatic.      Ears:      Comments: Swelling and redness of the left eye.  No abnormal discharge.  Decreased peripheral vision     Nose: Nose normal.      Mouth/Throat:      Mouth: Mucous membranes are moist.   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      Pulses: Normal pulses.   Pulmonary:      Effort: Pulmonary effort is normal.      Breath sounds: Normal breath sounds.   Abdominal:      Palpations: Abdomen is soft.   Genitourinary:     Comments: Indwelling Agosto catheter in place with yellow urine noted in collection tubing and  bag.  Musculoskeletal:      Cervical back: Normal range of motion.   Skin:     General: Skin is warm.   Neurological:      General: No focal deficit present.      Mental Status: He is alert and oriented to person, place, and time.      Motor: Weakness present.      Comments: Uses a cane for ambulation   Psychiatric:         Mood and Affect: Mood normal.         Behavior: Behavior normal.         Thought Content: Thought content normal.         Judgment: Judgment normal.           Recent Image (CT and/or KUB):   CT Abdomen and Pelvis: No results found for this or any previous visit.     CT Stone Protocol: Results for orders placed during the hospital encounter of 03/10/25    CT Abdomen Pelvis Stone Protocol    Narrative  EXAM:  CT Abdomen and Pelvis Without Intravenous Contrast    EXAM DATE:  3/10/2025 11:44 AM    CLINICAL HISTORY:  acute hematuria    TECHNIQUE:  Axial computed tomography images of the abdomen and pelvis without  intravenous contrast.  Sagittal and coronal reformatted images were  created and reviewed.  This CT exam was performed using one or more of  the following dose reduction techniques:  automated exposure control,  adjustment of the mA and/or kV according to patient size, and/or use of  iterative reconstruction technique.    COMPARISON:  1/30/2025    FINDINGS:  Lung bases:  Chronic lung changes.  No consolidation.  Heart:  Cardiomegaly and coronary artery calcifications are stable.    ABDOMEN:  Liver:  Stable hepatic cysts.  Gallbladder and bile ducts:  Unremarkable.  No calcified stones.  No  ductal dilation.  Pancreas:  Unremarkable.  No ductal dilation.  Spleen:  Unremarkable.  No splenomegaly.  Adrenals:  Unremarkable.  No mass.  Kidneys and ureters:  Unremarkable.  No obstructing stones.  No  hydronephrosis.  Stomach and bowel:  Sigmoid diverticulosis without diverticulitis.  No  obstruction.    PELVIS:  Appendix:  Normal appendix.  Bladder:  Decompressed urinary bladder but with marked  urinary bladder  wall thickening and surrounding inflammation most consistent with acute  cystitis.  Agosto catheter decompresses urinary bladder.  No stones.  Reproductive:  Unremarkable as visualized.    ABDOMEN and PELVIS:  Intraperitoneal space:  Unremarkable.  No significant fluid  collection.  No pneumoperitoneum.  Bones/joints:  Stable degenerative changes lumbar spine with  multilevel stenosis. No acute bony findings identified.  No dislocation.  Soft tissues:  Unremarkable.  Vasculature:  EVAR changes are noted of the abdominal aorta.  No  abdominal aortic aneurysm.  Lymph nodes:  Unremarkable.  No enlarged lymph nodes.    Impression  1.  Decompressed urinary bladder but with marked urinary bladder wall  thickening and surrounding inflammation most consistent with acute  cystitis.  2.  Agosto catheter decompresses urinary bladder.  3. Other incidental/nonacute findings above.    This report was finalized on 3/10/2025 12:26 PM by Dr. Dequan Cotter MD.     KUB: No results found for this or any previous visit.       Labs:  Brief Urine Lab Results  (Last result in the past 365 days)        Color   Clarity   Blood   Leuk Est   Nitrite   Protein   CREAT   Urine HCG        03/10/25 1350 Yellow   Cloudy   Large (3+)   Moderate (2+)   Negative   >=300 mg/dL (3+)                 Admission on 03/23/2025, Discharged on 03/23/2025   Component Date Value Ref Range Status    Glucose 03/23/2025 139 (H)  65 - 99 mg/dL Final    BUN 03/23/2025 24 (H)  8 - 23 mg/dL Final    Creatinine 03/23/2025 1.25  0.76 - 1.27 mg/dL Final    Sodium 03/23/2025 137  136 - 145 mmol/L Final    Potassium 03/23/2025 4.2  3.5 - 5.2 mmol/L Final    Slight hemolysis detected by analyzer. Result may be falsely elevated.    Chloride 03/23/2025 100  98 - 107 mmol/L Final    CO2 03/23/2025 23.9  22.0 - 29.0 mmol/L Final    Calcium 03/23/2025 9.3  8.6 - 10.5 mg/dL Final    Total Protein 03/23/2025 7.5  6.0 - 8.5 g/dL Final    Albumin 03/23/2025 3.6  3.5  - 5.2 g/dL Final    ALT (SGPT) 03/23/2025 10  1 - 41 U/L Final    AST (SGOT) 03/23/2025 19  1 - 40 U/L Final    Alkaline Phosphatase 03/23/2025 87  39 - 117 U/L Final    Total Bilirubin 03/23/2025 0.4  0.0 - 1.2 mg/dL Final    Globulin 03/23/2025 3.9  gm/dL Final    A/G Ratio 03/23/2025 0.9  g/dL Final    BUN/Creatinine Ratio 03/23/2025 19.2  7.0 - 25.0 Final    Anion Gap 03/23/2025 13.1  5.0 - 15.0 mmol/L Final    eGFR 03/23/2025 60.8  >60.0 mL/min/1.73 Final    WBC 03/23/2025 18.28 (H)  3.40 - 10.80 10*3/mm3 Final    RBC 03/23/2025 4.44  4.14 - 5.80 10*6/mm3 Final    Hemoglobin 03/23/2025 11.3 (L)  13.0 - 17.7 g/dL Final    Hematocrit 03/23/2025 37.7  37.5 - 51.0 % Final    MCV 03/23/2025 84.9  79.0 - 97.0 fL Final    MCH 03/23/2025 25.5 (L)  26.6 - 33.0 pg Final    MCHC 03/23/2025 30.0 (L)  31.5 - 35.7 g/dL Final    RDW 03/23/2025 23.0 (H)  12.3 - 15.4 % Final    RDW-SD 03/23/2025 71.1 (H)  37.0 - 54.0 fl Final    MPV 03/23/2025    Final    Unable to calculate.        Platelets 03/23/2025 206  140 - 450 10*3/mm3 Final    Neutrophil % 03/23/2025 84.1 (H)  42.7 - 76.0 % Final    Lymphocyte % 03/23/2025 6.9 (L)  19.6 - 45.3 % Final    Monocyte % 03/23/2025 7.1  5.0 - 12.0 % Final    Eosinophil % 03/23/2025 0.9  0.3 - 6.2 % Final    Basophil % 03/23/2025 0.2  0.0 - 1.5 % Final    Immature Grans % 03/23/2025 0.8 (H)  0.0 - 0.5 % Final    Neutrophils, Absolute 03/23/2025 15.38 (H)  1.70 - 7.00 10*3/mm3 Final    Lymphocytes, Absolute 03/23/2025 1.26  0.70 - 3.10 10*3/mm3 Final    Monocytes, Absolute 03/23/2025 1.30 (H)  0.10 - 0.90 10*3/mm3 Final    Eosinophils, Absolute 03/23/2025 0.16  0.00 - 0.40 10*3/mm3 Final    Basophils, Absolute 03/23/2025 0.04  0.00 - 0.20 10*3/mm3 Final    Immature Grans, Absolute 03/23/2025 0.14 (H)  0.00 - 0.05 10*3/mm3 Final    nRBC 03/23/2025 0.0  0.0 - 0.2 /100 WBC Final    Lactate 03/23/2025 1.3  0.5 - 2.0 mmol/L Final    Blood Culture 03/23/2025 No growth at 5 days   Final    Blood  Culture 03/23/2025 Pseudomonas aeruginosa (C)   Final    Isolated from 03/23/2025 Aerobic Bottle   Final    Gram Stain 03/23/2025 Aerobic Bottle Gram negative bacilli (C)   Final    Wound Culture 03/23/2025 No growth at 3 days   Final    Gram Stain 03/23/2025 Rare (1+) WBCs seen   Final    Gram Stain 03/23/2025 No organisms seen   Final    BCID, PCR 03/23/2025 Pseudomonas aeruginosa. Identification by BCID2 PCR (A)  Negative by BCID PCR. Culture to Follow. Final    BOTTLE TYPE 03/23/2025 Aerobic Bottle   Final   Admission on 03/10/2025, Discharged on 03/10/2025   Component Date Value Ref Range Status    Color, UA 03/10/2025 Yellow  Yellow, Straw Final    Appearance, UA 03/10/2025 Cloudy (A)  Clear Final    pH, UA 03/10/2025 6.0  5.0 - 8.0 Final    Specific Gravity, UA 03/10/2025 1.019  1.005 - 1.030 Final    Glucose, UA 03/10/2025 Negative  Negative Final    Ketones, UA 03/10/2025 Negative  Negative Final    Bilirubin, UA 03/10/2025 Negative  Negative Final    Blood, UA 03/10/2025 Large (3+) (A)  Negative Final    Protein, UA 03/10/2025 >=300 mg/dL (3+) (A)  Negative Final    Leuk Esterase, UA 03/10/2025 Moderate (2+) (A)  Negative Final    Nitrite, UA 03/10/2025 Negative  Negative Final    Urobilinogen, UA 03/10/2025 0.2 E.U./dL  0.2 - 1.0 E.U./dL Final    Glucose 03/10/2025 139 (H)  65 - 99 mg/dL Final    BUN 03/10/2025 20  8 - 23 mg/dL Final    Creatinine 03/10/2025 1.13  0.76 - 1.27 mg/dL Final    Sodium 03/10/2025 135 (L)  136 - 145 mmol/L Final    Potassium 03/10/2025 4.2  3.5 - 5.2 mmol/L Final    Chloride 03/10/2025 101  98 - 107 mmol/L Final    CO2 03/10/2025 26.3  22.0 - 29.0 mmol/L Final    Calcium 03/10/2025 8.8  8.6 - 10.5 mg/dL Final    Total Protein 03/10/2025 6.8  6.0 - 8.5 g/dL Final    Albumin 03/10/2025 3.6  3.5 - 5.2 g/dL Final    ALT (SGPT) 03/10/2025 12  1 - 41 U/L Final    AST (SGOT) 03/10/2025 14  1 - 40 U/L Final    Alkaline Phosphatase 03/10/2025 91  39 - 117 U/L Final    Total Bilirubin  03/10/2025 0.3  0.0 - 1.2 mg/dL Final    Globulin 03/10/2025 3.2  gm/dL Final    A/G Ratio 03/10/2025 1.1  g/dL Final    BUN/Creatinine Ratio 03/10/2025 17.7  7.0 - 25.0 Final    Anion Gap 03/10/2025 7.7  5.0 - 15.0 mmol/L Final    eGFR 03/10/2025 68.6  >60.0 mL/min/1.73 Final    C-Reactive Protein 03/10/2025 4.58 (H)  0.00 - 0.50 mg/dL Final    Extra Tube 03/10/2025 Hold for add-ons.   Final    Auto resulted.    Extra Tube 03/10/2025 hold for add-on   Final    Auto resulted    Extra Tube 03/10/2025 Hold for add-ons.   Final    Auto resulted.    Extra Tube 03/10/2025 Hold for add-ons.   Final    Auto resulted    WBC 03/10/2025 12.08 (H)  3.40 - 10.80 10*3/mm3 Final    RBC 03/10/2025 4.28  4.14 - 5.80 10*6/mm3 Final    Hemoglobin 03/10/2025 10.9 (L)  13.0 - 17.7 g/dL Final    Hematocrit 03/10/2025 36.7 (L)  37.5 - 51.0 % Final    MCV 03/10/2025 85.7  79.0 - 97.0 fL Final    MCH 03/10/2025 25.5 (L)  26.6 - 33.0 pg Final    MCHC 03/10/2025 29.7 (L)  31.5 - 35.7 g/dL Final    RDW 03/10/2025 23.7 (H)  12.3 - 15.4 % Final    RDW-SD 03/10/2025 73.5 (H)  37.0 - 54.0 fl Final    MPV 03/10/2025 11.1  6.0 - 12.0 fL Final    Platelets 03/10/2025 166  140 - 450 10*3/mm3 Final    Neutrophil % 03/10/2025 85.1 (H)  42.7 - 76.0 % Final    Lymphocyte % 03/10/2025 5.8 (L)  19.6 - 45.3 % Final    Monocyte % 03/10/2025 6.5  5.0 - 12.0 % Final    Eosinophil % 03/10/2025 1.7  0.3 - 6.2 % Final    Basophil % 03/10/2025 0.2  0.0 - 1.5 % Final    Immature Grans % 03/10/2025 0.7 (H)  0.0 - 0.5 % Final    Neutrophils, Absolute 03/10/2025 10.28 (H)  1.70 - 7.00 10*3/mm3 Final    Lymphocytes, Absolute 03/10/2025 0.70  0.70 - 3.10 10*3/mm3 Final    Monocytes, Absolute 03/10/2025 0.79  0.10 - 0.90 10*3/mm3 Final    Eosinophils, Absolute 03/10/2025 0.20  0.00 - 0.40 10*3/mm3 Final    Basophils, Absolute 03/10/2025 0.03  0.00 - 0.20 10*3/mm3 Final    Immature Grans, Absolute 03/10/2025 0.08 (H)  0.00 - 0.05 10*3/mm3 Final    nRBC 03/10/2025 0.0  0.0  - 0.2 /100 WBC Final    Anisocytosis 03/10/2025 Large/3+  None Seen Final    Hypochromia 03/10/2025 Slight/1+  None Seen Final    Ovalocytes 03/10/2025 Slight/1+  None Seen Final    Platelet Estimate 03/10/2025 Adequate  Normal Final    Blood Culture 03/10/2025 No growth at 5 days   Final    Blood Culture 03/10/2025 No growth at 5 days   Final    Lactate 03/10/2025 0.8  0.5 - 2.0 mmol/L Final    Procalcitonin 03/10/2025 0.20  0.00 - 0.25 ng/mL Final    RBC, UA 03/10/2025 Too Numerous to Count (A)  None Seen, 0-2 /HPF Final    WBC, UA 03/10/2025 21-50 (A)  None Seen, 0-2 /HPF Final    Bacteria, UA 03/10/2025 None Seen  None Seen /HPF Final    Squamous Epithelial Cells, UA 03/10/2025 0-2  None Seen, 0-2 /HPF Final    Hyaline Casts, UA 03/10/2025 None Seen  None Seen /LPF Final    Methodology 03/10/2025 Manual Light Microscopy   Final    Urine Culture 03/10/2025 No growth   Final   No results displayed because visit has over 200 results.           Procedure: None  Procedures     I have reviewed and agree with the above PMH, PSH, FMH, social history, medications, allergies, and labs.     Assessment/Plan:   Problem List Items Addressed This Visit       Urinary retention - Primary    Relevant Medications    tamsulosin (FLOMAX) 0.4 MG capsule 24 hr capsule     Other Visit Diagnoses         Benign prostatic hyperplasia with urinary retention        Relevant Medications    tamsulosin (FLOMAX) 0.4 MG capsule 24 hr capsule      Urinary tract infection with hematuria, site unspecified                  Health Maintenance:   Health Maintenance Due   Topic Date Due    COVID-19 Vaccine (5 - 2024-25 season) 04/05/2025        Smoking Counseling: Former smoker.  Never used smokeless tobacco.  Counseling given.      Patient was given instructions and counseling regarding his condition or for health maintenance advice. Please see specific information pulled into the AVS if appropriate.    Patient Education:   Urinary retention and gross  hematuria -patient's urinary retention and gross hematuria is most likely secondary to acute urinary tract infection as well as prostatic enlargement.  Patient is currently started on alpha blockade and will recommend to continue with medications as prescribed.  He has had several voiding trials and failed these previously so would recommend a cystoscopy in office for lower tract investigation.  Did discuss the nature of this procedure in detail including risk and benefits.  Given acute infection requiring IV medications we will plan for cystoscopy after appropriate antibiotic therapy.  Will schedule him back next Wednesday on April 9.  He verbalized understanding and agreed to plan of care.  Visit Diagnoses:    ICD-10-CM ICD-9-CM   1. Urinary retention  R33.9 788.20   2. Benign prostatic hyperplasia with urinary retention  N40.1 600.01    R33.8 788.20   3. Urinary tract infection with hematuria, site unspecified  N39.0 599.0    R31.9 599.70       A total of 30 minutes were spent coordinating this patient’s care in clinic today; 20 minutes of which were face-to-face with the patient, reviewing medical history and counseling on the current treatment and followup plan.  All questions were answered to patient's satisfaction.    Meds Ordered During Visit:  New Medications Ordered This Visit   Medications    tamsulosin (FLOMAX) 0.4 MG capsule 24 hr capsule     Sig: Take 1 capsule by mouth Daily.     Dispense:  90 capsule     Refill:  3       Follow Up Appointment: 1 week  No follow-ups on file.      This document has been electronically signed by Lester Crowell PA-C   March 31, 2025 11:44 EDT    Part of this note may be an electronic transcription/translation of spoken language to printed text using the Dragon Dictation System.

## 2025-04-09 ENCOUNTER — PROCEDURE VISIT (OUTPATIENT)
Dept: UROLOGY | Facility: CLINIC | Age: 75
End: 2025-04-09
Payer: COMMERCIAL

## 2025-04-09 VITALS
HEIGHT: 73 IN | WEIGHT: 240 LBS | HEART RATE: 85 BPM | BODY MASS INDEX: 31.81 KG/M2 | SYSTOLIC BLOOD PRESSURE: 128 MMHG | DIASTOLIC BLOOD PRESSURE: 74 MMHG

## 2025-04-09 DIAGNOSIS — R33.9 URINARY RETENTION: Primary | ICD-10-CM

## 2025-04-09 DIAGNOSIS — Z48.816 SURGICAL AFTERCARE, GENITOURINARY SYSTEM: ICD-10-CM

## 2025-04-09 RX ORDER — GENTAMICIN 40 MG/ML
80 INJECTION, SOLUTION INTRAMUSCULAR; INTRAVENOUS ONCE
Status: COMPLETED | OUTPATIENT
Start: 2025-04-09 | End: 2025-04-09

## 2025-04-09 RX ADMIN — GENTAMICIN 80 MG: 40 INJECTION, SOLUTION INTRAMUSCULAR; INTRAVENOUS at 10:16

## 2025-04-09 NOTE — H&P (VIEW-ONLY)
"Chief Complaint:    Chief Complaint   Patient presents with    Urinary Retention       Vital Signs:   /74   Pulse 85   Ht 185.4 cm (72.99\")   Wt 109 kg (240 lb)   BMI 31.67 kg/m²   Body mass index is 31.67 kg/m².      HPI:  Zaid Galarza is a 74 y.o. male who presents today for follow up    History of Present Illness  Mr. Galarza returns to the clinic today for follow-up for urinary retention.  He has had intermittent placement of Agosto catheters over the past several months as well as an acute urinary tract infection.  He has received appropriate antibiotic course and now presents for cystoscopy for evaluation of transurethral resection of the prostate.  He has been on Flomax with minimal improvement.      Past Medical History:  Past Medical History:   Diagnosis Date    Atrial fibrillation     CHF (congestive heart failure)     COPD (chronic obstructive pulmonary disease)     Coronary artery disease     Hypertension     Tobacco abuse        Current Meds:  Current Outpatient Medications   Medication Sig Dispense Refill    albuterol sulfate  (90 Base) MCG/ACT inhaler Inhale 2 puffs Every 4 (Four) Hours As Needed.      apixaban (ELIQUIS) 5 MG tablet tablet Take 1 tablet by mouth 2 (Two) Times a Day. 180 tablet 3    atorvastatin (LIPITOR) 80 MG tablet Take 1 tablet by mouth Every Night. 90 tablet 3    metoprolol succinate XL (TOPROL-XL) 25 MG 24 hr tablet Take 1 tablet by mouth Daily. 90 tablet 3    pantoprazole (PROTONIX) 40 MG EC tablet Take 1 tablet by mouth 2 (Two) Times a Day. 180 tablet 3    sacubitril-valsartan (Entresto) 24-26 MG tablet Take 1 tablet by mouth 2 (Two) Times a Day. 180 tablet 3    sertraline (ZOLOFT) 100 MG tablet Take 1.5 tablets by mouth Daily.      Symbicort 160-4.5 MCG/ACT inhaler Inhale 2 puffs 2 (Two) Times a Day.      tamsulosin (FLOMAX) 0.4 MG capsule 24 hr capsule Take 1 capsule by mouth Daily. 90 capsule 3    vitamin B-12 (CYANOCOBALAMIN) 1000 MCG tablet Take 1 " tablet by mouth Daily.       No current facility-administered medications for this visit.        Allergies:   No Known Allergies     Past Surgical History:  Past Surgical History:   Procedure Laterality Date    ABDOMINAL AORTIC ANEURYSM REPAIR      CARDIAC CATHETERIZATION N/A 10/03/2019    Procedure: Left Heart Cath;  Surgeon: Vincent Phillips MD;  Location: [facility] CATH INVASIVE LOCATION;  Service: Cardiology       Social History:  Social History     Socioeconomic History    Marital status: Single   Tobacco Use    Smoking status: Former     Current packs/day: 1.00     Types: Cigarettes    Smokeless tobacco: Never    Tobacco comments:     9/1/2022   Vaping Use    Vaping status: Never Used   Substance and Sexual Activity    Alcohol use: No    Drug use: No    Sexual activity: Defer       Family History:  Family History   Problem Relation Age of Onset    Heart disease Mother     Stroke Father     Heart disease Father        Review of Systems:  Review of Systems   Constitutional:  Negative for fatigue, fever and unexpected weight change.   Eyes:  Positive for visual disturbance.   Respiratory:  Negative for chest tightness and shortness of breath.    Cardiovascular:  Negative for chest pain.   Gastrointestinal:  Negative for abdominal pain, constipation, diarrhea, nausea and vomiting.   Genitourinary:  Positive for difficulty urinating. Negative for dysuria, frequency and urgency.   Skin:  Negative for rash.   Psychiatric/Behavioral:  Negative for confusion and suicidal ideas.        Physical Exam:  Physical Exam  Constitutional:       General: He is not in acute distress.     Appearance: Normal appearance.   HENT:      Head: Normocephalic and atraumatic.      Nose: Nose normal.      Mouth/Throat:      Mouth: Mucous membranes are moist.   Eyes:      Comments: Loss of eyesight in the left eye.   Cardiovascular:      Rate and Rhythm: Normal rate.      Pulses: Normal pulses.   Pulmonary:      Effort: Pulmonary effort is  normal.   Abdominal:      Palpations: Abdomen is soft.   Genitourinary:     Comments: Balanitis.  Musculoskeletal:         General: Normal range of motion.      Cervical back: Normal range of motion.   Skin:     General: Skin is warm.   Neurological:      General: No focal deficit present.      Mental Status: He is alert and oriented to person, place, and time.   Psychiatric:         Mood and Affect: Mood normal.         Behavior: Behavior normal.         Thought Content: Thought content normal.         Judgment: Judgment normal.           Recent Image (CT and/or KUB):   CT Abdomen and Pelvis: Results for orders placed during the hospital encounter of 03/23/25    CT Abdomen Pelvis With & Without Contrast    Narrative  Comparison: 1/30/2025    Modality:  CT Abdomen and Pelvis With and Without Contrast    Technique: CT scan of the abdomen and pelvis has been done without and  with administration of intravenous contrast media.    Findings:    Lung base: Unremarkable.    Liver: Normal size, shape and attenuation.  Stable right hepatic lobe  hypoattenuation lesions measuring up to 11 mm.    Gallbladder and Biliary tree: No biliary dilatation.  Unremarkable  gallbladder.    Pancreas: No masses or ductal dilatation.    Spleen: Unremarkable.    Adrenal Glands: Unremarkable.    Kidneys and Ureters: No stones, masses or hydronephrosis.  Similar 1.7  cm hypoattenuating right renal lesion.    Urinary Bladder: Agosto catheter is seen within the renal bladder.  There  is significant wall thickening and fat stranding surrounding the urinary  bladder.  A posterior urinary bladder wall diverticulum is also seen.    Peritoneum and Retroperitoneum: No lymphadenopathy.  No ascites or free  air.    Vessels: Changes of aortobiiliac stenting.    Bowel: No wall thickening or bowel dilatation.  Normal appendix.  Sigmoid diverticulosis.    Bones and Soft Tissues: No lytic or sclerotic bony lesion.  The  extra-abdominal and paraspinal soft  tissues are normal.    Impression  Impression:    Agosto catheter is seen in the urinary bladder with diffuse thickening of  the urinary bladder wall and surrounding fat stranding.  Findings may  represent cystitis.  However, this is more prominent in comparison to  the prior CT dated 3/10/2025.  Underlying malignancy he cannot be ruled  out.  Urology consult is recommended.        This report was finalized on 3/23/2025 10:55 AM by Mallika Hess MD.     CT Stone Protocol: Results for orders placed during the hospital encounter of 03/10/25    CT Abdomen Pelvis Stone Protocol    Narrative  EXAM:  CT Abdomen and Pelvis Without Intravenous Contrast    EXAM DATE:  3/10/2025 11:44 AM    CLINICAL HISTORY:  acute hematuria    TECHNIQUE:  Axial computed tomography images of the abdomen and pelvis without  intravenous contrast.  Sagittal and coronal reformatted images were  created and reviewed.  This CT exam was performed using one or more of  the following dose reduction techniques:  automated exposure control,  adjustment of the mA and/or kV according to patient size, and/or use of  iterative reconstruction technique.    COMPARISON:  1/30/2025    FINDINGS:  Lung bases:  Chronic lung changes.  No consolidation.  Heart:  Cardiomegaly and coronary artery calcifications are stable.    ABDOMEN:  Liver:  Stable hepatic cysts.  Gallbladder and bile ducts:  Unremarkable.  No calcified stones.  No  ductal dilation.  Pancreas:  Unremarkable.  No ductal dilation.  Spleen:  Unremarkable.  No splenomegaly.  Adrenals:  Unremarkable.  No mass.  Kidneys and ureters:  Unremarkable.  No obstructing stones.  No  hydronephrosis.  Stomach and bowel:  Sigmoid diverticulosis without diverticulitis.  No  obstruction.    PELVIS:  Appendix:  Normal appendix.  Bladder:  Decompressed urinary bladder but with marked urinary bladder  wall thickening and surrounding inflammation most consistent with acute  cystitis.  Agosto catheter decompresses  urinary bladder.  No stones.  Reproductive:  Unremarkable as visualized.    ABDOMEN and PELVIS:  Intraperitoneal space:  Unremarkable.  No significant fluid  collection.  No pneumoperitoneum.  Bones/joints:  Stable degenerative changes lumbar spine with  multilevel stenosis. No acute bony findings identified.  No dislocation.  Soft tissues:  Unremarkable.  Vasculature:  EVAR changes are noted of the abdominal aorta.  No  abdominal aortic aneurysm.  Lymph nodes:  Unremarkable.  No enlarged lymph nodes.    Impression  1.  Decompressed urinary bladder but with marked urinary bladder wall  thickening and surrounding inflammation most consistent with acute  cystitis.  2.  Agosto catheter decompresses urinary bladder.  3. Other incidental/nonacute findings above.    This report was finalized on 3/10/2025 12:26 PM by Dr. Dequan Cotter MD.     KUB: No results found for this or any previous visit.       Labs:  Brief Urine Lab Results  (Last result in the past 365 days)        Color   Clarity   Blood   Leuk Est   Nitrite   Protein   CREAT   Urine HCG        03/10/25 1350 Yellow   Cloudy   Large (3+)   Moderate (2+)   Negative   >=300 mg/dL (3+)                 Admission on 03/23/2025, Discharged on 03/23/2025   Component Date Value Ref Range Status    Glucose 03/23/2025 139 (H)  65 - 99 mg/dL Final    BUN 03/23/2025 24 (H)  8 - 23 mg/dL Final    Creatinine 03/23/2025 1.25  0.76 - 1.27 mg/dL Final    Sodium 03/23/2025 137  136 - 145 mmol/L Final    Potassium 03/23/2025 4.2  3.5 - 5.2 mmol/L Final    Slight hemolysis detected by analyzer. Result may be falsely elevated.    Chloride 03/23/2025 100  98 - 107 mmol/L Final    CO2 03/23/2025 23.9  22.0 - 29.0 mmol/L Final    Calcium 03/23/2025 9.3  8.6 - 10.5 mg/dL Final    Total Protein 03/23/2025 7.5  6.0 - 8.5 g/dL Final    Albumin 03/23/2025 3.6  3.5 - 5.2 g/dL Final    ALT (SGPT) 03/23/2025 10  1 - 41 U/L Final    AST (SGOT) 03/23/2025 19  1 - 40 U/L Final    Alkaline  Phosphatase 03/23/2025 87  39 - 117 U/L Final    Total Bilirubin 03/23/2025 0.4  0.0 - 1.2 mg/dL Final    Globulin 03/23/2025 3.9  gm/dL Final    A/G Ratio 03/23/2025 0.9  g/dL Final    BUN/Creatinine Ratio 03/23/2025 19.2  7.0 - 25.0 Final    Anion Gap 03/23/2025 13.1  5.0 - 15.0 mmol/L Final    eGFR 03/23/2025 60.8  >60.0 mL/min/1.73 Final    WBC 03/23/2025 18.28 (H)  3.40 - 10.80 10*3/mm3 Final    RBC 03/23/2025 4.44  4.14 - 5.80 10*6/mm3 Final    Hemoglobin 03/23/2025 11.3 (L)  13.0 - 17.7 g/dL Final    Hematocrit 03/23/2025 37.7  37.5 - 51.0 % Final    MCV 03/23/2025 84.9  79.0 - 97.0 fL Final    MCH 03/23/2025 25.5 (L)  26.6 - 33.0 pg Final    MCHC 03/23/2025 30.0 (L)  31.5 - 35.7 g/dL Final    RDW 03/23/2025 23.0 (H)  12.3 - 15.4 % Final    RDW-SD 03/23/2025 71.1 (H)  37.0 - 54.0 fl Final    MPV 03/23/2025    Final    Unable to calculate.        Platelets 03/23/2025 206  140 - 450 10*3/mm3 Final    Neutrophil % 03/23/2025 84.1 (H)  42.7 - 76.0 % Final    Lymphocyte % 03/23/2025 6.9 (L)  19.6 - 45.3 % Final    Monocyte % 03/23/2025 7.1  5.0 - 12.0 % Final    Eosinophil % 03/23/2025 0.9  0.3 - 6.2 % Final    Basophil % 03/23/2025 0.2  0.0 - 1.5 % Final    Immature Grans % 03/23/2025 0.8 (H)  0.0 - 0.5 % Final    Neutrophils, Absolute 03/23/2025 15.38 (H)  1.70 - 7.00 10*3/mm3 Final    Lymphocytes, Absolute 03/23/2025 1.26  0.70 - 3.10 10*3/mm3 Final    Monocytes, Absolute 03/23/2025 1.30 (H)  0.10 - 0.90 10*3/mm3 Final    Eosinophils, Absolute 03/23/2025 0.16  0.00 - 0.40 10*3/mm3 Final    Basophils, Absolute 03/23/2025 0.04  0.00 - 0.20 10*3/mm3 Final    Immature Grans, Absolute 03/23/2025 0.14 (H)  0.00 - 0.05 10*3/mm3 Final    nRBC 03/23/2025 0.0  0.0 - 0.2 /100 WBC Final    Lactate 03/23/2025 1.3  0.5 - 2.0 mmol/L Final    Blood Culture 03/23/2025 No growth at 5 days   Final    Blood Culture 03/23/2025 Pseudomonas aeruginosa (C)   Final    Isolated from 03/23/2025 Aerobic Bottle   Final    Gram Stain  03/23/2025 Aerobic Bottle Gram negative bacilli (C)   Final    Wound Culture 03/23/2025 No growth at 3 days   Final    Gram Stain 03/23/2025 Rare (1+) WBCs seen   Final    Gram Stain 03/23/2025 No organisms seen   Final    BCID, PCR 03/23/2025 Pseudomonas aeruginosa. Identification by BCID2 PCR (A)  Negative by BCID PCR. Culture to Follow. Final    BOTTLE TYPE 03/23/2025 Aerobic Bottle   Final   Admission on 03/10/2025, Discharged on 03/10/2025   Component Date Value Ref Range Status    Color, UA 03/10/2025 Yellow  Yellow, Straw Final    Appearance, UA 03/10/2025 Cloudy (A)  Clear Final    pH, UA 03/10/2025 6.0  5.0 - 8.0 Final    Specific Gravity, UA 03/10/2025 1.019  1.005 - 1.030 Final    Glucose, UA 03/10/2025 Negative  Negative Final    Ketones, UA 03/10/2025 Negative  Negative Final    Bilirubin, UA 03/10/2025 Negative  Negative Final    Blood, UA 03/10/2025 Large (3+) (A)  Negative Final    Protein, UA 03/10/2025 >=300 mg/dL (3+) (A)  Negative Final    Leuk Esterase, UA 03/10/2025 Moderate (2+) (A)  Negative Final    Nitrite, UA 03/10/2025 Negative  Negative Final    Urobilinogen, UA 03/10/2025 0.2 E.U./dL  0.2 - 1.0 E.U./dL Final    Glucose 03/10/2025 139 (H)  65 - 99 mg/dL Final    BUN 03/10/2025 20  8 - 23 mg/dL Final    Creatinine 03/10/2025 1.13  0.76 - 1.27 mg/dL Final    Sodium 03/10/2025 135 (L)  136 - 145 mmol/L Final    Potassium 03/10/2025 4.2  3.5 - 5.2 mmol/L Final    Chloride 03/10/2025 101  98 - 107 mmol/L Final    CO2 03/10/2025 26.3  22.0 - 29.0 mmol/L Final    Calcium 03/10/2025 8.8  8.6 - 10.5 mg/dL Final    Total Protein 03/10/2025 6.8  6.0 - 8.5 g/dL Final    Albumin 03/10/2025 3.6  3.5 - 5.2 g/dL Final    ALT (SGPT) 03/10/2025 12  1 - 41 U/L Final    AST (SGOT) 03/10/2025 14  1 - 40 U/L Final    Alkaline Phosphatase 03/10/2025 91  39 - 117 U/L Final    Total Bilirubin 03/10/2025 0.3  0.0 - 1.2 mg/dL Final    Globulin 03/10/2025 3.2  gm/dL Final    A/G Ratio 03/10/2025 1.1  g/dL Final     BUN/Creatinine Ratio 03/10/2025 17.7  7.0 - 25.0 Final    Anion Gap 03/10/2025 7.7  5.0 - 15.0 mmol/L Final    eGFR 03/10/2025 68.6  >60.0 mL/min/1.73 Final    C-Reactive Protein 03/10/2025 4.58 (H)  0.00 - 0.50 mg/dL Final    Extra Tube 03/10/2025 Hold for add-ons.   Final    Auto resulted.    Extra Tube 03/10/2025 hold for add-on   Final    Auto resulted    Extra Tube 03/10/2025 Hold for add-ons.   Final    Auto resulted.    Extra Tube 03/10/2025 Hold for add-ons.   Final    Auto resulted    WBC 03/10/2025 12.08 (H)  3.40 - 10.80 10*3/mm3 Final    RBC 03/10/2025 4.28  4.14 - 5.80 10*6/mm3 Final    Hemoglobin 03/10/2025 10.9 (L)  13.0 - 17.7 g/dL Final    Hematocrit 03/10/2025 36.7 (L)  37.5 - 51.0 % Final    MCV 03/10/2025 85.7  79.0 - 97.0 fL Final    MCH 03/10/2025 25.5 (L)  26.6 - 33.0 pg Final    MCHC 03/10/2025 29.7 (L)  31.5 - 35.7 g/dL Final    RDW 03/10/2025 23.7 (H)  12.3 - 15.4 % Final    RDW-SD 03/10/2025 73.5 (H)  37.0 - 54.0 fl Final    MPV 03/10/2025 11.1  6.0 - 12.0 fL Final    Platelets 03/10/2025 166  140 - 450 10*3/mm3 Final    Neutrophil % 03/10/2025 85.1 (H)  42.7 - 76.0 % Final    Lymphocyte % 03/10/2025 5.8 (L)  19.6 - 45.3 % Final    Monocyte % 03/10/2025 6.5  5.0 - 12.0 % Final    Eosinophil % 03/10/2025 1.7  0.3 - 6.2 % Final    Basophil % 03/10/2025 0.2  0.0 - 1.5 % Final    Immature Grans % 03/10/2025 0.7 (H)  0.0 - 0.5 % Final    Neutrophils, Absolute 03/10/2025 10.28 (H)  1.70 - 7.00 10*3/mm3 Final    Lymphocytes, Absolute 03/10/2025 0.70  0.70 - 3.10 10*3/mm3 Final    Monocytes, Absolute 03/10/2025 0.79  0.10 - 0.90 10*3/mm3 Final    Eosinophils, Absolute 03/10/2025 0.20  0.00 - 0.40 10*3/mm3 Final    Basophils, Absolute 03/10/2025 0.03  0.00 - 0.20 10*3/mm3 Final    Immature Grans, Absolute 03/10/2025 0.08 (H)  0.00 - 0.05 10*3/mm3 Final    nRBC 03/10/2025 0.0  0.0 - 0.2 /100 WBC Final    Anisocytosis 03/10/2025 Large/3+  None Seen Final    Hypochromia 03/10/2025 Slight/1+  None Seen  Final    Ovalocytes 03/10/2025 Slight/1+  None Seen Final    Platelet Estimate 03/10/2025 Adequate  Normal Final    Blood Culture 03/10/2025 No growth at 5 days   Final    Blood Culture 03/10/2025 No growth at 5 days   Final    Lactate 03/10/2025 0.8  0.5 - 2.0 mmol/L Final    Procalcitonin 03/10/2025 0.20  0.00 - 0.25 ng/mL Final    RBC, UA 03/10/2025 Too Numerous to Count (A)  None Seen, 0-2 /HPF Final    WBC, UA 03/10/2025 21-50 (A)  None Seen, 0-2 /HPF Final    Bacteria, UA 03/10/2025 None Seen  None Seen /HPF Final    Squamous Epithelial Cells, UA 03/10/2025 0-2  None Seen, 0-2 /HPF Final    Hyaline Casts, UA 03/10/2025 None Seen  None Seen /LPF Final    Methodology 03/10/2025 Manual Light Microscopy   Final    Urine Culture 03/10/2025 No growth   Final   No results displayed because visit has over 200 results.           Procedure:Patient presents today for cystourethroscopy.  I went ahead and obtained an informed consent including the risks of anesthesia, bleeding, infection, etc.  After prepping and draping in a sterile fashion in the low dorsal lithotomy position, the urethra was gently anesthetized with 10 mL of 2% viscous Xylocaine jelly.  After an appropriate period of topical anesthesia, I used the Olympus digital 14 [place] flexible cystoscope to examine the anterior urethra which was completely normal.  Patient had a significantly large median bar with some lateral lobe enlargement as well.  As into the bladder there was some notable catheter cystitis.  The ureteral orifices were visualized and normal in position and configuration. There were no stones, tumors, or foreign bodies.  The blue light was enabled and was negative allowing us to see small mucosal lesions. The patient was given 80 mg of gentamicin in an intramuscular fashion as prophylaxis for the cystoscopy and released from the clinic.   Procedures     Assessment/Plan:   Problem List Items Addressed This Visit       Urinary retention -  Primary    Relevant Orders    Case Request (Completed)    CBC (No Diff)    Basic Metabolic Panel    PSA DIAGNOSTIC     Other Visit Diagnoses         Surgical aftercare, genitourinary system        Relevant Medications    gentamicin (GARAMYCIN) injection 80 mg (Completed)            Health Maintenance:   Health Maintenance Due   Topic Date Due    ANNUAL PHYSICAL  Never done    COVID-19 Vaccine (5 - 2024-25 season) 04/05/2025        Smoking Counseling: Former smoker.  Never used smokeless tobacco.  Counseling given.    Patient was given instructions and counseling regarding his condition or for health maintenance advice. Please see specific information pulled into the AVS if appropriate.    Patient Education:   Urinary retention/prostatic enlargement -patient has been on Flomax and then able to urinate.  He had significant large median bar with lateral lobe enlargement as well.  There was notable bleeding in the prostatic fossa.  I am going to check a PSA and his preop labs and we will schedule him for a transurethral resection of the prostate by Dr. Calabrese on 4/18/2025.    Visit Diagnoses:    ICD-10-CM ICD-9-CM   1. Urinary retention  R33.9 788.20   2. Surgical aftercare, genitourinary system  Z48.816 V58.76       Meds Ordered During Visit:  New Medications Ordered This Visit   Medications    gentamicin (GARAMYCIN) injection 80 mg       Follow Up Appointment: TURP  No follow-ups on file.      This document has been electronically signed by Lester Crowell PA-C   April 9, 2025 12:35 EDT    Part of this note may be an electronic transcription/translation of spoken language to printed text using the Dragon Dictation System.

## 2025-04-09 NOTE — PROGRESS NOTES
"Chief Complaint:    Chief Complaint   Patient presents with    Urinary Retention       Vital Signs:   /74   Pulse 85   Ht 185.4 cm (72.99\")   Wt 109 kg (240 lb)   BMI 31.67 kg/m²   Body mass index is 31.67 kg/m².      HPI:  Zaid Galarza is a 74 y.o. male who presents today for follow up    History of Present Illness  Mr. Galarza returns to the clinic today for follow-up for urinary retention.  He has had intermittent placement of Agosto catheters over the past several months as well as an acute urinary tract infection.  He has received appropriate antibiotic course and now presents for cystoscopy for evaluation of transurethral resection of the prostate.  He has been on Flomax with minimal improvement.      Past Medical History:  Past Medical History:   Diagnosis Date    Atrial fibrillation     CHF (congestive heart failure)     COPD (chronic obstructive pulmonary disease)     Coronary artery disease     Hypertension     Tobacco abuse        Current Meds:  Current Outpatient Medications   Medication Sig Dispense Refill    albuterol sulfate  (90 Base) MCG/ACT inhaler Inhale 2 puffs Every 4 (Four) Hours As Needed.      apixaban (ELIQUIS) 5 MG tablet tablet Take 1 tablet by mouth 2 (Two) Times a Day. 180 tablet 3    atorvastatin (LIPITOR) 80 MG tablet Take 1 tablet by mouth Every Night. 90 tablet 3    metoprolol succinate XL (TOPROL-XL) 25 MG 24 hr tablet Take 1 tablet by mouth Daily. 90 tablet 3    pantoprazole (PROTONIX) 40 MG EC tablet Take 1 tablet by mouth 2 (Two) Times a Day. 180 tablet 3    sacubitril-valsartan (Entresto) 24-26 MG tablet Take 1 tablet by mouth 2 (Two) Times a Day. 180 tablet 3    sertraline (ZOLOFT) 100 MG tablet Take 1.5 tablets by mouth Daily.      Symbicort 160-4.5 MCG/ACT inhaler Inhale 2 puffs 2 (Two) Times a Day.      tamsulosin (FLOMAX) 0.4 MG capsule 24 hr capsule Take 1 capsule by mouth Daily. 90 capsule 3    vitamin B-12 (CYANOCOBALAMIN) 1000 MCG tablet Take 1 " tablet by mouth Daily.       No current facility-administered medications for this visit.        Allergies:   No Known Allergies     Past Surgical History:  Past Surgical History:   Procedure Laterality Date    ABDOMINAL AORTIC ANEURYSM REPAIR      CARDIAC CATHETERIZATION N/A 10/03/2019    Procedure: Left Heart Cath;  Surgeon: Vincent Phillips MD;  Location: [facility] CATH INVASIVE LOCATION;  Service: Cardiology       Social History:  Social History     Socioeconomic History    Marital status: Single   Tobacco Use    Smoking status: Former     Current packs/day: 1.00     Types: Cigarettes    Smokeless tobacco: Never    Tobacco comments:     9/1/2022   Vaping Use    Vaping status: Never Used   Substance and Sexual Activity    Alcohol use: No    Drug use: No    Sexual activity: Defer       Family History:  Family History   Problem Relation Age of Onset    Heart disease Mother     Stroke Father     Heart disease Father        Review of Systems:  Review of Systems   Constitutional:  Negative for fatigue, fever and unexpected weight change.   Eyes:  Positive for visual disturbance.   Respiratory:  Negative for chest tightness and shortness of breath.    Cardiovascular:  Negative for chest pain.   Gastrointestinal:  Negative for abdominal pain, constipation, diarrhea, nausea and vomiting.   Genitourinary:  Positive for difficulty urinating. Negative for dysuria, frequency and urgency.   Skin:  Negative for rash.   Psychiatric/Behavioral:  Negative for confusion and suicidal ideas.        Physical Exam:  Physical Exam  Constitutional:       General: He is not in acute distress.     Appearance: Normal appearance.   HENT:      Head: Normocephalic and atraumatic.      Nose: Nose normal.      Mouth/Throat:      Mouth: Mucous membranes are moist.   Eyes:      Comments: Loss of eyesight in the left eye.   Cardiovascular:      Rate and Rhythm: Normal rate.      Pulses: Normal pulses.   Pulmonary:      Effort: Pulmonary effort is  normal.   Abdominal:      Palpations: Abdomen is soft.   Genitourinary:     Comments: Balanitis.  Musculoskeletal:         General: Normal range of motion.      Cervical back: Normal range of motion.   Skin:     General: Skin is warm.   Neurological:      General: No focal deficit present.      Mental Status: He is alert and oriented to person, place, and time.   Psychiatric:         Mood and Affect: Mood normal.         Behavior: Behavior normal.         Thought Content: Thought content normal.         Judgment: Judgment normal.           Recent Image (CT and/or KUB):   CT Abdomen and Pelvis: Results for orders placed during the hospital encounter of 03/23/25    CT Abdomen Pelvis With & Without Contrast    Narrative  Comparison: 1/30/2025    Modality:  CT Abdomen and Pelvis With and Without Contrast    Technique: CT scan of the abdomen and pelvis has been done without and  with administration of intravenous contrast media.    Findings:    Lung base: Unremarkable.    Liver: Normal size, shape and attenuation.  Stable right hepatic lobe  hypoattenuation lesions measuring up to 11 mm.    Gallbladder and Biliary tree: No biliary dilatation.  Unremarkable  gallbladder.    Pancreas: No masses or ductal dilatation.    Spleen: Unremarkable.    Adrenal Glands: Unremarkable.    Kidneys and Ureters: No stones, masses or hydronephrosis.  Similar 1.7  cm hypoattenuating right renal lesion.    Urinary Bladder: Agosto catheter is seen within the renal bladder.  There  is significant wall thickening and fat stranding surrounding the urinary  bladder.  A posterior urinary bladder wall diverticulum is also seen.    Peritoneum and Retroperitoneum: No lymphadenopathy.  No ascites or free  air.    Vessels: Changes of aortobiiliac stenting.    Bowel: No wall thickening or bowel dilatation.  Normal appendix.  Sigmoid diverticulosis.    Bones and Soft Tissues: No lytic or sclerotic bony lesion.  The  extra-abdominal and paraspinal soft  tissues are normal.    Impression  Impression:    Agosto catheter is seen in the urinary bladder with diffuse thickening of  the urinary bladder wall and surrounding fat stranding.  Findings may  represent cystitis.  However, this is more prominent in comparison to  the prior CT dated 3/10/2025.  Underlying malignancy he cannot be ruled  out.  Urology consult is recommended.        This report was finalized on 3/23/2025 10:55 AM by Mallika Hess MD.     CT Stone Protocol: Results for orders placed during the hospital encounter of 03/10/25    CT Abdomen Pelvis Stone Protocol    Narrative  EXAM:  CT Abdomen and Pelvis Without Intravenous Contrast    EXAM DATE:  3/10/2025 11:44 AM    CLINICAL HISTORY:  acute hematuria    TECHNIQUE:  Axial computed tomography images of the abdomen and pelvis without  intravenous contrast.  Sagittal and coronal reformatted images were  created and reviewed.  This CT exam was performed using one or more of  the following dose reduction techniques:  automated exposure control,  adjustment of the mA and/or kV according to patient size, and/or use of  iterative reconstruction technique.    COMPARISON:  1/30/2025    FINDINGS:  Lung bases:  Chronic lung changes.  No consolidation.  Heart:  Cardiomegaly and coronary artery calcifications are stable.    ABDOMEN:  Liver:  Stable hepatic cysts.  Gallbladder and bile ducts:  Unremarkable.  No calcified stones.  No  ductal dilation.  Pancreas:  Unremarkable.  No ductal dilation.  Spleen:  Unremarkable.  No splenomegaly.  Adrenals:  Unremarkable.  No mass.  Kidneys and ureters:  Unremarkable.  No obstructing stones.  No  hydronephrosis.  Stomach and bowel:  Sigmoid diverticulosis without diverticulitis.  No  obstruction.    PELVIS:  Appendix:  Normal appendix.  Bladder:  Decompressed urinary bladder but with marked urinary bladder  wall thickening and surrounding inflammation most consistent with acute  cystitis.  Agosto catheter decompresses  urinary bladder.  No stones.  Reproductive:  Unremarkable as visualized.    ABDOMEN and PELVIS:  Intraperitoneal space:  Unremarkable.  No significant fluid  collection.  No pneumoperitoneum.  Bones/joints:  Stable degenerative changes lumbar spine with  multilevel stenosis. No acute bony findings identified.  No dislocation.  Soft tissues:  Unremarkable.  Vasculature:  EVAR changes are noted of the abdominal aorta.  No  abdominal aortic aneurysm.  Lymph nodes:  Unremarkable.  No enlarged lymph nodes.    Impression  1.  Decompressed urinary bladder but with marked urinary bladder wall  thickening and surrounding inflammation most consistent with acute  cystitis.  2.  Agosto catheter decompresses urinary bladder.  3. Other incidental/nonacute findings above.    This report was finalized on 3/10/2025 12:26 PM by Dr. Dequan Cotter MD.     KUB: No results found for this or any previous visit.       Labs:  Brief Urine Lab Results  (Last result in the past 365 days)        Color   Clarity   Blood   Leuk Est   Nitrite   Protein   CREAT   Urine HCG        03/10/25 1350 Yellow   Cloudy   Large (3+)   Moderate (2+)   Negative   >=300 mg/dL (3+)                 Admission on 03/23/2025, Discharged on 03/23/2025   Component Date Value Ref Range Status    Glucose 03/23/2025 139 (H)  65 - 99 mg/dL Final    BUN 03/23/2025 24 (H)  8 - 23 mg/dL Final    Creatinine 03/23/2025 1.25  0.76 - 1.27 mg/dL Final    Sodium 03/23/2025 137  136 - 145 mmol/L Final    Potassium 03/23/2025 4.2  3.5 - 5.2 mmol/L Final    Slight hemolysis detected by analyzer. Result may be falsely elevated.    Chloride 03/23/2025 100  98 - 107 mmol/L Final    CO2 03/23/2025 23.9  22.0 - 29.0 mmol/L Final    Calcium 03/23/2025 9.3  8.6 - 10.5 mg/dL Final    Total Protein 03/23/2025 7.5  6.0 - 8.5 g/dL Final    Albumin 03/23/2025 3.6  3.5 - 5.2 g/dL Final    ALT (SGPT) 03/23/2025 10  1 - 41 U/L Final    AST (SGOT) 03/23/2025 19  1 - 40 U/L Final    Alkaline  Phosphatase 03/23/2025 87  39 - 117 U/L Final    Total Bilirubin 03/23/2025 0.4  0.0 - 1.2 mg/dL Final    Globulin 03/23/2025 3.9  gm/dL Final    A/G Ratio 03/23/2025 0.9  g/dL Final    BUN/Creatinine Ratio 03/23/2025 19.2  7.0 - 25.0 Final    Anion Gap 03/23/2025 13.1  5.0 - 15.0 mmol/L Final    eGFR 03/23/2025 60.8  >60.0 mL/min/1.73 Final    WBC 03/23/2025 18.28 (H)  3.40 - 10.80 10*3/mm3 Final    RBC 03/23/2025 4.44  4.14 - 5.80 10*6/mm3 Final    Hemoglobin 03/23/2025 11.3 (L)  13.0 - 17.7 g/dL Final    Hematocrit 03/23/2025 37.7  37.5 - 51.0 % Final    MCV 03/23/2025 84.9  79.0 - 97.0 fL Final    MCH 03/23/2025 25.5 (L)  26.6 - 33.0 pg Final    MCHC 03/23/2025 30.0 (L)  31.5 - 35.7 g/dL Final    RDW 03/23/2025 23.0 (H)  12.3 - 15.4 % Final    RDW-SD 03/23/2025 71.1 (H)  37.0 - 54.0 fl Final    MPV 03/23/2025    Final    Unable to calculate.        Platelets 03/23/2025 206  140 - 450 10*3/mm3 Final    Neutrophil % 03/23/2025 84.1 (H)  42.7 - 76.0 % Final    Lymphocyte % 03/23/2025 6.9 (L)  19.6 - 45.3 % Final    Monocyte % 03/23/2025 7.1  5.0 - 12.0 % Final    Eosinophil % 03/23/2025 0.9  0.3 - 6.2 % Final    Basophil % 03/23/2025 0.2  0.0 - 1.5 % Final    Immature Grans % 03/23/2025 0.8 (H)  0.0 - 0.5 % Final    Neutrophils, Absolute 03/23/2025 15.38 (H)  1.70 - 7.00 10*3/mm3 Final    Lymphocytes, Absolute 03/23/2025 1.26  0.70 - 3.10 10*3/mm3 Final    Monocytes, Absolute 03/23/2025 1.30 (H)  0.10 - 0.90 10*3/mm3 Final    Eosinophils, Absolute 03/23/2025 0.16  0.00 - 0.40 10*3/mm3 Final    Basophils, Absolute 03/23/2025 0.04  0.00 - 0.20 10*3/mm3 Final    Immature Grans, Absolute 03/23/2025 0.14 (H)  0.00 - 0.05 10*3/mm3 Final    nRBC 03/23/2025 0.0  0.0 - 0.2 /100 WBC Final    Lactate 03/23/2025 1.3  0.5 - 2.0 mmol/L Final    Blood Culture 03/23/2025 No growth at 5 days   Final    Blood Culture 03/23/2025 Pseudomonas aeruginosa (C)   Final    Isolated from 03/23/2025 Aerobic Bottle   Final    Gram Stain  03/23/2025 Aerobic Bottle Gram negative bacilli (C)   Final    Wound Culture 03/23/2025 No growth at 3 days   Final    Gram Stain 03/23/2025 Rare (1+) WBCs seen   Final    Gram Stain 03/23/2025 No organisms seen   Final    BCID, PCR 03/23/2025 Pseudomonas aeruginosa. Identification by BCID2 PCR (A)  Negative by BCID PCR. Culture to Follow. Final    BOTTLE TYPE 03/23/2025 Aerobic Bottle   Final   Admission on 03/10/2025, Discharged on 03/10/2025   Component Date Value Ref Range Status    Color, UA 03/10/2025 Yellow  Yellow, Straw Final    Appearance, UA 03/10/2025 Cloudy (A)  Clear Final    pH, UA 03/10/2025 6.0  5.0 - 8.0 Final    Specific Gravity, UA 03/10/2025 1.019  1.005 - 1.030 Final    Glucose, UA 03/10/2025 Negative  Negative Final    Ketones, UA 03/10/2025 Negative  Negative Final    Bilirubin, UA 03/10/2025 Negative  Negative Final    Blood, UA 03/10/2025 Large (3+) (A)  Negative Final    Protein, UA 03/10/2025 >=300 mg/dL (3+) (A)  Negative Final    Leuk Esterase, UA 03/10/2025 Moderate (2+) (A)  Negative Final    Nitrite, UA 03/10/2025 Negative  Negative Final    Urobilinogen, UA 03/10/2025 0.2 E.U./dL  0.2 - 1.0 E.U./dL Final    Glucose 03/10/2025 139 (H)  65 - 99 mg/dL Final    BUN 03/10/2025 20  8 - 23 mg/dL Final    Creatinine 03/10/2025 1.13  0.76 - 1.27 mg/dL Final    Sodium 03/10/2025 135 (L)  136 - 145 mmol/L Final    Potassium 03/10/2025 4.2  3.5 - 5.2 mmol/L Final    Chloride 03/10/2025 101  98 - 107 mmol/L Final    CO2 03/10/2025 26.3  22.0 - 29.0 mmol/L Final    Calcium 03/10/2025 8.8  8.6 - 10.5 mg/dL Final    Total Protein 03/10/2025 6.8  6.0 - 8.5 g/dL Final    Albumin 03/10/2025 3.6  3.5 - 5.2 g/dL Final    ALT (SGPT) 03/10/2025 12  1 - 41 U/L Final    AST (SGOT) 03/10/2025 14  1 - 40 U/L Final    Alkaline Phosphatase 03/10/2025 91  39 - 117 U/L Final    Total Bilirubin 03/10/2025 0.3  0.0 - 1.2 mg/dL Final    Globulin 03/10/2025 3.2  gm/dL Final    A/G Ratio 03/10/2025 1.1  g/dL Final     BUN/Creatinine Ratio 03/10/2025 17.7  7.0 - 25.0 Final    Anion Gap 03/10/2025 7.7  5.0 - 15.0 mmol/L Final    eGFR 03/10/2025 68.6  >60.0 mL/min/1.73 Final    C-Reactive Protein 03/10/2025 4.58 (H)  0.00 - 0.50 mg/dL Final    Extra Tube 03/10/2025 Hold for add-ons.   Final    Auto resulted.    Extra Tube 03/10/2025 hold for add-on   Final    Auto resulted    Extra Tube 03/10/2025 Hold for add-ons.   Final    Auto resulted.    Extra Tube 03/10/2025 Hold for add-ons.   Final    Auto resulted    WBC 03/10/2025 12.08 (H)  3.40 - 10.80 10*3/mm3 Final    RBC 03/10/2025 4.28  4.14 - 5.80 10*6/mm3 Final    Hemoglobin 03/10/2025 10.9 (L)  13.0 - 17.7 g/dL Final    Hematocrit 03/10/2025 36.7 (L)  37.5 - 51.0 % Final    MCV 03/10/2025 85.7  79.0 - 97.0 fL Final    MCH 03/10/2025 25.5 (L)  26.6 - 33.0 pg Final    MCHC 03/10/2025 29.7 (L)  31.5 - 35.7 g/dL Final    RDW 03/10/2025 23.7 (H)  12.3 - 15.4 % Final    RDW-SD 03/10/2025 73.5 (H)  37.0 - 54.0 fl Final    MPV 03/10/2025 11.1  6.0 - 12.0 fL Final    Platelets 03/10/2025 166  140 - 450 10*3/mm3 Final    Neutrophil % 03/10/2025 85.1 (H)  42.7 - 76.0 % Final    Lymphocyte % 03/10/2025 5.8 (L)  19.6 - 45.3 % Final    Monocyte % 03/10/2025 6.5  5.0 - 12.0 % Final    Eosinophil % 03/10/2025 1.7  0.3 - 6.2 % Final    Basophil % 03/10/2025 0.2  0.0 - 1.5 % Final    Immature Grans % 03/10/2025 0.7 (H)  0.0 - 0.5 % Final    Neutrophils, Absolute 03/10/2025 10.28 (H)  1.70 - 7.00 10*3/mm3 Final    Lymphocytes, Absolute 03/10/2025 0.70  0.70 - 3.10 10*3/mm3 Final    Monocytes, Absolute 03/10/2025 0.79  0.10 - 0.90 10*3/mm3 Final    Eosinophils, Absolute 03/10/2025 0.20  0.00 - 0.40 10*3/mm3 Final    Basophils, Absolute 03/10/2025 0.03  0.00 - 0.20 10*3/mm3 Final    Immature Grans, Absolute 03/10/2025 0.08 (H)  0.00 - 0.05 10*3/mm3 Final    nRBC 03/10/2025 0.0  0.0 - 0.2 /100 WBC Final    Anisocytosis 03/10/2025 Large/3+  None Seen Final    Hypochromia 03/10/2025 Slight/1+  None Seen  Final    Ovalocytes 03/10/2025 Slight/1+  None Seen Final    Platelet Estimate 03/10/2025 Adequate  Normal Final    Blood Culture 03/10/2025 No growth at 5 days   Final    Blood Culture 03/10/2025 No growth at 5 days   Final    Lactate 03/10/2025 0.8  0.5 - 2.0 mmol/L Final    Procalcitonin 03/10/2025 0.20  0.00 - 0.25 ng/mL Final    RBC, UA 03/10/2025 Too Numerous to Count (A)  None Seen, 0-2 /HPF Final    WBC, UA 03/10/2025 21-50 (A)  None Seen, 0-2 /HPF Final    Bacteria, UA 03/10/2025 None Seen  None Seen /HPF Final    Squamous Epithelial Cells, UA 03/10/2025 0-2  None Seen, 0-2 /HPF Final    Hyaline Casts, UA 03/10/2025 None Seen  None Seen /LPF Final    Methodology 03/10/2025 Manual Light Microscopy   Final    Urine Culture 03/10/2025 No growth   Final   No results displayed because visit has over 200 results.           Procedure:Patient presents today for cystourethroscopy.  I went ahead and obtained an informed consent including the risks of anesthesia, bleeding, infection, etc.  After prepping and draping in a sterile fashion in the low dorsal lithotomy position, the urethra was gently anesthetized with 10 mL of 2% viscous Xylocaine jelly.  After an appropriate period of topical anesthesia, I used the Olympus digital 14 [place] flexible cystoscope to examine the anterior urethra which was completely normal.  Patient had a significantly large median bar with some lateral lobe enlargement as well.  As into the bladder there was some notable catheter cystitis.  The ureteral orifices were visualized and normal in position and configuration. There were no stones, tumors, or foreign bodies.  The blue light was enabled and was negative allowing us to see small mucosal lesions. The patient was given 80 mg of gentamicin in an intramuscular fashion as prophylaxis for the cystoscopy and released from the clinic.   Procedures     Assessment/Plan:   Problem List Items Addressed This Visit       Urinary retention -  Primary    Relevant Orders    Case Request (Completed)    CBC (No Diff)    Basic Metabolic Panel    PSA DIAGNOSTIC     Other Visit Diagnoses         Surgical aftercare, genitourinary system        Relevant Medications    gentamicin (GARAMYCIN) injection 80 mg (Completed)            Health Maintenance:   Health Maintenance Due   Topic Date Due    ANNUAL PHYSICAL  Never done    COVID-19 Vaccine (5 - 2024-25 season) 04/05/2025        Smoking Counseling: Former smoker.  Never used smokeless tobacco.  Counseling given.    Patient was given instructions and counseling regarding his condition or for health maintenance advice. Please see specific information pulled into the AVS if appropriate.    Patient Education:   Urinary retention/prostatic enlargement -patient has been on Flomax and then able to urinate.  He had significant large median bar with lateral lobe enlargement as well.  There was notable bleeding in the prostatic fossa.  I am going to check a PSA and his preop labs and we will schedule him for a transurethral resection of the prostate by Dr. Calabrese on 4/18/2025.    Visit Diagnoses:    ICD-10-CM ICD-9-CM   1. Urinary retention  R33.9 788.20   2. Surgical aftercare, genitourinary system  Z48.816 V58.76       Meds Ordered During Visit:  New Medications Ordered This Visit   Medications    gentamicin (GARAMYCIN) injection 80 mg       Follow Up Appointment: TURP  No follow-ups on file.      This document has been electronically signed by Lester Crowell PA-C   April 9, 2025 12:35 EDT    Part of this note may be an electronic transcription/translation of spoken language to printed text using the Dragon Dictation System.

## 2025-04-11 ENCOUNTER — PATIENT OUTREACH (OUTPATIENT)
Dept: CASE MANAGEMENT | Facility: OTHER | Age: 75
End: 2025-04-11
Payer: COMMERCIAL

## 2025-04-11 NOTE — OUTREACH NOTE
AMBULATORY CASE MANAGEMENT NOTE    Names and Relationships of Patient/Support Persons: Contact: Zaid Galarza; Relationship: Self -     Patient Outreach    RN-ACM outreach to patient after monitoring period as patient has had acute health events over the last 30 days. Patient reports becoming septic and being transferred from [facility] to [facility] where he was inpatient 3/23/25 - 3/28/25. Patient dc'd to home where he and family administered IV antibiotics via PICC line for 10 days. Patient reports feeling much improved today although not yet at baseline health. Patient reports he does still have an anchored conway catheter, but that he is scheduled to have cystoscopy and transurethral resection of the prostate on 4/18/25, is hopeful to have f/c discontinued after procedure. Patient utilizes [employer] for transportation as well as homemaking services (twice weekly). Pt reports being glad to have a leg bag now for his f/c so that he can be more independent; v/u of precautions to keep urinary drainage bag below level of bladder for avoidance of backflow. Patient voices appreciation of outreach and is agreeable to future [facility] calls. Care plan created, future outreach scheduled.     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]   Vision Problems blindness/vision loss   HEENT Management Strategies coping strategies   HEENT Self-Management Outcome unsure   HEENT Comment Patient had been scheduled for eye surgery when at [facility] but this was cancelled,  pt unsure why   Identifying Health Goals keep illness under control   Taking Medications Not Prescribed no   Missed Doses of Prescribed Medications During Past Week no   Taken Prescribed  Medications at Different Time or Schedule During Past Week no   Taken More or Less Medication Than Prescribed no   Barriers to Taking Medication as Prescribed none            Education Documentation  Unresolved/Worsening Symptoms, taught by Isabella Funk, RN at 4/11/2025 11:21 AM.  Learner: Patient  Readiness: Acceptance  Method: Explanation  Response: Verbalizes Understanding    Prevention Measures, taught by Isabella Funk, RN at 4/11/2025 11:21 AM.  Learner: Patient  Readiness: Acceptance  Method: Explanation  Response: Verbalizes Understanding    Medication Management, taught by Isabella Funk, RN at 4/11/2025 11:21 AM.  Learner: Patient  Readiness: Acceptance  Method: Explanation  Response: Verbalizes Understanding    Fluid Intake, taught by Isabella Funk, RN at 4/11/2025 11:21 AM.  Learner: Patient  Readiness: Acceptance  Method: Explanation  Response: Verbalizes Understanding    Signs/Symptoms, taught by Isabella Funk, RN at 4/11/2025 11:21 AM.  Learner: Patient  Readiness: Acceptance  Method: Explanation  Response: Verbalizes Understanding    Participation with Disease Management Plan, taught by Isabella Funk, RN at 4/11/2025 11:21 AM.  Learner: Patient  Readiness: Acceptance  Method: Explanation  Response: Verbalizes Understanding          Isabella MONSON  Ambulatory Case Management    4/11/2025, 11:23 EDT

## 2025-04-16 ENCOUNTER — TELEPHONE (OUTPATIENT)
Dept: CARDIOLOGY | Facility: CLINIC | Age: 75
End: 2025-04-16

## 2025-04-16 ENCOUNTER — OFFICE VISIT (OUTPATIENT)
Dept: CARDIOLOGY | Facility: CLINIC | Age: 75
End: 2025-04-16
Payer: COMMERCIAL

## 2025-04-16 VITALS
DIASTOLIC BLOOD PRESSURE: 69 MMHG | BODY MASS INDEX: 30.88 KG/M2 | HEIGHT: 73 IN | WEIGHT: 233 LBS | SYSTOLIC BLOOD PRESSURE: 102 MMHG | HEART RATE: 82 BPM

## 2025-04-16 DIAGNOSIS — E78.5 DYSLIPIDEMIA, GOAL LDL BELOW 100: Chronic | ICD-10-CM

## 2025-04-16 DIAGNOSIS — I42.0 DILATED CARDIOMYOPATHY: Chronic | ICD-10-CM

## 2025-04-16 DIAGNOSIS — I25.10 CORONARY ARTERY DISEASE INVOLVING NATIVE CORONARY ARTERY OF NATIVE HEART WITHOUT ANGINA PECTORIS: Primary | Chronic | ICD-10-CM

## 2025-04-16 DIAGNOSIS — I10 ESSENTIAL HYPERTENSION: Chronic | ICD-10-CM

## 2025-04-16 DIAGNOSIS — I48.19 PERSISTENT ATRIAL FIBRILLATION: Chronic | ICD-10-CM

## 2025-04-16 DIAGNOSIS — Z01.810 PRE-OPERATIVE CARDIOVASCULAR EXAMINATION: ICD-10-CM

## 2025-04-16 RX ORDER — METOPROLOL SUCCINATE 25 MG/1
25 TABLET, EXTENDED RELEASE ORAL DAILY
Qty: 90 TABLET | Refills: 3 | Status: SHIPPED | OUTPATIENT
Start: 2025-04-16

## 2025-04-16 RX ORDER — ACETAMINOPHEN 500 MG
500 TABLET ORAL EVERY 6 HOURS PRN
COMMUNITY

## 2025-04-16 RX ORDER — ATORVASTATIN CALCIUM 80 MG/1
80 TABLET, FILM COATED ORAL NIGHTLY
Qty: 90 TABLET | Refills: 3 | Status: SHIPPED | OUTPATIENT
Start: 2025-04-16

## 2025-04-16 RX ORDER — SACUBITRIL AND VALSARTAN 24; 26 MG/1; MG/1
1 TABLET, FILM COATED ORAL 2 TIMES DAILY
Qty: 180 TABLET | Refills: 3 | Status: SHIPPED | OUTPATIENT
Start: 2025-04-16

## 2025-04-16 NOTE — TELEPHONE ENCOUNTER
[facility] received a fax that requires your attention. The document has been indexed to the patient’s chart for your review.      Reason for sending: PT WAS SEEN TODAY 4/16/25    Documents Description: REFERRAL, INSURANCE, MED HISTORY AND DEMO    Name of Sender: TER[employer]    Date Indexed: 4/16/25

## 2025-04-16 NOTE — PROGRESS NOTES
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    History of Present Illness  History of Present Illness  The patient is a 74-year-old male who follows up in the clinic for nonobstructive coronary artery disease, dilated cardiomyopathy with recovered LVEF, hypertension, and dyslipidemia. He is also status post AAA repair. He was last seen in the clinic on 12/15/2024. He had been given Lasix and potassium replacement for lower extremity swelling prior to that appointment and reported improvement in his swelling. Since he was last seen, he was hospitalized at [facility] with a urinary tract infection. He is now scheduled for a TURP procedure with Dr. Calabrese and requires cardiac clearance today.    He reports no chest pain or shortness of breath at rest but experiences dyspnea upon exertion. He has leg edema. He maintains independence in his activities of daily living, including meal preparation. He receives Meals on Wheels every Thursday. He has been receiving physical therapy at [facility], which he reports as beneficial.    He has a recent history of pneumonia, sepsis and  urinary tract infection, which led to hospitalization. During his hospital stay, he required catheter placement. Despite multiple attempts after catheter removal, he has been unable to void.  His most recent attempt at catheter removal resulted in only one day of successful voiding. He is scheduled for a TURP procedure on 04/28/2025 with Dr. Calabrese.    He has a corneal problem and is seeing ophthalmology for this.     He is currently on Eliquis, which he  "obtains through [facility].    SOCIAL HISTORY  Exercise: Physical therapy at a center.         Objective     Vital Signs:   /69 (BP Location: Left arm, Patient Position: Sitting, Cuff Size: Adult)   Pulse 82   Ht 185.4 cm (73\")   Wt 106 kg (233 lb)   BMI 30.74 kg/m²       Physical Exam  Vitals reviewed.   Constitutional:       Appearance: Normal appearance. He is well-developed.   Cardiovascular:      Rate and Rhythm: Normal rate. Rhythm irregular.      Heart sounds: No murmur heard.     No friction rub. No gallop.   Pulmonary:      Effort: Pulmonary effort is normal. No respiratory distress.      Breath sounds: Rhonchi present. No wheezing or rales.   Skin:     General: Skin is warm and dry.   Neurological:      Mental Status: He is alert and oriented to person, place, and time.   Psychiatric:         Mood and Affect: Mood normal.         Behavior: Behavior normal.          Result Review :{ Labs  Result Review  Imaging  Med Tab  Media :23}   The following data was reviewed by: TIFFANIE Cox on 04/16/2025:  CMP          1/31/2025    09:07 3/10/2025    11:42 3/23/2025    05:32   CMP   Glucose 127  139  139    BUN 18  20  24    Creatinine 1.13  1.13  1.25    EGFR 68.6  68.6  60.8    Sodium 140  135  137    Potassium 4.2  4.2  4.2    Chloride 104  101  100    Calcium 8.4  8.8  9.3    Total Protein 6.9  6.8  7.5    Albumin 3.7  3.6  3.6    Globulin 3.2  3.2  3.9    Total Bilirubin 0.3  0.3  0.4    Alkaline Phosphatase 123  91  87    AST (SGOT) 14  14  19    ALT (SGPT) 6  12  10    Albumin/Globulin Ratio 1.2  1.1  0.9    BUN/Creatinine Ratio 15.9  17.7  19.2    Anion Gap 9.5  7.7  13.1      CBC          1/31/2025    09:07 3/10/2025    11:42 3/23/2025    05:32   CBC   WBC 14.61  12.08  18.28    RBC 3.48  4.28  4.44    Hemoglobin 8.1  10.9  11.3    Hematocrit 28.3  36.7  37.7    MCV 81.3  85.7  84.9    MCH 23.3  25.5  25.5    MCHC 28.6  29.7  30.0    RDW 18.8  23.7  23.0    Platelets 213  166  206  "            ECG 12 Lead    Date/Time: 4/16/2025 10:29 AM  Performed by: Christy Malone APRN    Authorized by: Christy Malone APRN  Comparison: compared with previous ECG from 1/31/2025  Rhythm: atrial fibrillation  Ectopy: unifocal PVCs  Rate: normal  BPM: 76  T inversion: III  Other findings: non-specific ST-T wave changes and low voltage  Comments: QTc 411       Interpretation Summary         Left ventricular systolic function is normal. Calculated left ventricular EF = 59.8%    Left ventricular wall thickness is consistent with mild concentric hypertrophy.    Left ventricular diastolic function was indeterminate.    The left atrial cavity is mild to moderately dilated.    Estimated right ventricular systolic pressure from tricuspid regurgitation is mildly elevated (35-45 mmHg).    Mild pulmonary hypertension is present.    Mild dilation of the aortic root is present.          Most recent Cardiac Cath  Results for orders placed during the hospital encounter of 09/28/19    Cardiac Catheterization/Vascular Study    Narrative  Table formatting from the original result was not included.  DATE OF PROCEDURE: 10/3/2019    INDICATION FOR PROCEDURE: {Cardiomyopathy, severe LV dysfunction, fib , flutter    PROCEDURE PERFORMED:    1. Coronary Angiogram  2. Left heart catheretization    PROCEDURE COMMENTS:    Zaid Galarza was brought to the cath lab and placed on the cardiac catherization table in the postabsortive state. The patient was prepped and draped according to the cath lab protocol under strict aseptic and sterile condition. Patient's right femoral artery sight was prepped and draped. Under fluoroscopic guidance the right femoral artery was punctured using a 21 gauge needle utilizing the modified Seldinger technique. 6 Gabonese sheath was introduced over a wire. After aspirating for blood it was flushed with heparinized saline.    We advanced Torres type diagnostic catheters over the wire. The  left and right  coronary arteries  were selectively engaged. Left and right coronary angiogram were obtained in multiple orthogonal projections.    After completion of the procedure the femoral sheath was removed in the cath lab and hemostasis was achieved by Angioseal. The patient tolerated the procedure without complications.      FINDINGS:    1. HEMODYNAMICS:  LVEDP 10 mmHg, no gradient across the aortic valve.      2. CORONARY ANGIOGRAPHY: Dominant dominant coronary artery system.    The left main coronary artery bifurcated into the LAD and left circumflex coronary.  The LAD coursed in the anterior interventricular groove, gave rise to diagonal branches and reached the apex.    Left circumflex coursed in the left atrial ventricular groove and gives rise to several marginal branches.    The right coronary artery course in the right atrial ventricular groove I gave rise to several acute marginal branches.    Left main: Normal    LAD: 20 to 30% diffuse disease in LADLeft circumflex: 30% focal mid circumflex  RCA: Large size dominant vessel proximal 40% and distal 40% focal stenosisPDA: Mild 20%    Final impression:  Mild to moderate CAD  3+ mitral regurgitation  RECOMMENDATIONS:  Aspirin  Beta-blocker  Statin  ACE inhibitor  Continue anticoagulant  Repeat ANGELITO after 6 to 8 weeks  If atrial thrombus resolved then proceed with  Ablation  I believe once the arrhythmia is controlled patient ejection fraction will tend to increase    Vincent Phillips MD  10/03/19  9:38 AM      Current Outpatient Medications   Medication Sig Dispense Refill    acetaminophen (TYLENOL) 500 MG tablet Take 1 tablet by mouth Every 6 (Six) Hours As Needed for Mild Pain.      albuterol sulfate  (90 Base) MCG/ACT inhaler Inhale 2 puffs Every 4 (Four) Hours As Needed.      apixaban (ELIQUIS) 5 MG tablet tablet Take 1 tablet by mouth 2 (Two) Times a Day. 180 tablet 3    atorvastatin (LIPITOR) 80 MG tablet Take 1 tablet by mouth Every Night. 90 tablet 3     metoprolol succinate XL (TOPROL-XL) 25 MG 24 hr tablet Take 1 tablet by mouth Daily. 90 tablet 3    pantoprazole (PROTONIX) 40 MG EC tablet Take 1 tablet by mouth 2 (Two) Times a Day. 180 tablet 3    sacubitril-valsartan (Entresto) 24-26 MG tablet Take 1 tablet by mouth 2 (Two) Times a Day. 180 tablet 3    sertraline (ZOLOFT) 100 MG tablet Take 1.5 tablets by mouth Daily.      Symbicort 160-4.5 MCG/ACT inhaler Inhale 2 puffs 2 (Two) Times a Day.      tamsulosin (FLOMAX) 0.4 MG capsule 24 hr capsule Take 1 capsule by mouth Daily. 90 capsule 3    vitamin B-12 (CYANOCOBALAMIN) 1000 MCG tablet Take 1 tablet by mouth Daily.       No current facility-administered medications for this visit.            {CC Problem List  Visit Diagnosis  ROS  Review (Popup)  Health Maintenance  Quality  BestPractice  Medications  SmartSets  SnapShot Encounters  Media :23}   Problem List Items Addressed This Visit          Cardiac and Vasculature    Dilated cardiomyopathy (Chronic)    Overview   TTE: LVEF 26 to 30%, RV and LV mildly dilated, grade 3 diastolic dysfunction consistent with fixed restrictive pattern, mild TR, left atrial cavity mildly dilated right atrial cavity moderately dilated, moderate pulmonary hypertension with RVSP 47 mmHg  9/30/2019 ANGELITO: Severe cardiomyopathy likely from atrial flutter  2/11/2020 TTE LVEF 36 to 40%  5/6/2020 TTE LVEF 56 to 60%, mild concentric LVH  9/2/2022 TTE: LVEF 51 to 55%, moderate sclerosis of both the mitral and aortic valve with no significant regurgitation noted.  Left atrium is mildly enlarged.         Current Assessment & Plan                Relevant Medications    metoprolol succinate XL (TOPROL-XL) 25 MG 24 hr tablet    sacubitril-valsartan (Entresto) 24-26 MG tablet    Persistent atrial fibrillation (Chronic)    Overview   [id number] is at least 6 for heart failure, hypertension, CVA, nonobstructive CAD, and age         Relevant Medications    metoprolol succinate XL  (TOPROL-XL) 25 MG 24 hr tablet    sacubitril-valsartan (Entresto) 24-26 MG tablet    Nonobstructive CAD per cath 10/3/2019 - Primary (Chronic)    Overview   10/4/2019 [facility]: Nonobstructive coronary artery disease         Relevant Medications    metoprolol succinate XL (TOPROL-XL) 25 MG 24 hr tablet    sacubitril-valsartan (Entresto) 24-26 MG tablet    Other Relevant Orders    ECG 12 Lead    Essential hypertension (Chronic)    Overview   Hypertension is Controlled  Goals of Care:Medication compliance and tolerance, Systolic blood pressure <120, and Diastolic blood pressure  <80  Plan:  Continue current medications  Follow up:in 6 months           Current Assessment & Plan            Relevant Medications    metoprolol succinate XL (TOPROL-XL) 25 MG 24 hr tablet    Dyslipidemia, goal LDL below 100 (Chronic)    Overview   9/2/2022 total cholesterol 104, triglycerides 89, HDL 31, and LDL 55  10/28/2023 total cholesterol 107, triglycerides 122, HDL 45, and LDL 40 (ReviewZAP)  10/25/2024 total cholesterol 99, triglycerides 120, HDL 39, and LDL 38 (ReviewZAP)         Current Assessment & Plan             Relevant Medications    atorvastatin (LIPITOR) 80 MG tablet     Other Visit Diagnoses         Pre-operative cardiovascular examination        Relevant Orders    ECG 12 Lead            Assessment & Plan  1. Atrial Fibrillation: EKG indicates persistent atrial fibrillation.  - Discontinue Eliquis 3 days before the TURP procedure, last dose on 04/25/2025  - Miss six doses in total  - Resume Eliquis post-surgery once stable     2. Nonobstructive Coronary Artery Disease.  - Continue current medication regimen    3. Dilated Cardiomyopathy with Recovered LVEF.  - Continue current medication regimen    4. Hypertension.  - Continue current medication regimen    5. Dyslipidemia.  - Continue current medication regimen    6. Status Post AAA Repair.  - Continue monitoring    7. Urinary Tract Infection: Hospitalized at [facility]  [place].  - Scheduled for TURP procedure with Dr. Calabrese    8.  Perioperative risk assessment  Patient is at moderate risk for major adverse cardiac event in the perioperative period.      9. Medication Management.  - Prescriptions for Entresto, metoprolol succinate, Eliquis, and atorvastatin refilled for 3 months with 3 refills, totaling a year's supply  - Prescriptions sent to [facility]    Follow-up  - Follow up in 7 weeks or sooner if complications arise       Follow Up {Instructions Charge Capture  Follow-up Communications :23}    Return in about 7 weeks (around 6/4/2025).    Patient was given instructions and counseling regarding his condition or for health maintenance advice. Please see specific information pulled into the AVS if appropriate.     Patient or patient representative verbalized consent for the use of Ambient Listening during the visit with  TIFFANIE Cox for chart documentation. 4/20/2025  17:54 EDT

## 2025-04-23 NOTE — PROGRESS NOTES
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    History of Present Illness  The patient is a 74-year-old male who follows up in the clinic for nonobstructive coronary artery disease, dilated cardiomyopathy with recovered LVEF, hypertension, and dyslipidemia. He is also status post AAA repair. He was last seen in the clinic on 12/15/2024. He had been given Lasix and potassium replacement for lower extremity swelling prior to that appointment and reported improvement in his swelling. Since he was last seen, he was hospitalized at [facility] with a urinary tract infection. He is now scheduled for a TURP procedure with Dr. Calabrese and requires cardiac clearance today.    He reports no chest pain or shortness of breath at rest but experiences dyspnea upon exertion. He has leg edema. He maintains independence in his activities of daily living, including meal preparation. He receives Meals on Wheels every Thursday. He has been receiving physical therapy at [facility], which he reports as beneficial.    He has a recent history of pneumonia, sepsis and  urinary tract infection, which led to hospitalization. During his hospital stay, he required catheter placement. Despite multiple attempts after catheter removal, he has been unable to void.  His most recent attempt at catheter removal resulted in only one day of successful voiding. He is scheduled for a TURP procedure on 04/28/2025 with Dr. Calabrese.    He has a corneal problem and is seeing ophthalmology for this.     He is currently on Eliquis, which he obtains through [facility].    SOCIAL HISTORY  Exercise: Physical therapy at a center.         Objective     Vital Signs:   /69 (BP Location:  "Left arm, Patient Position: Sitting, Cuff Size: Adult)   Pulse 82   Ht 185.4 cm (73\")   Wt 106 kg (233 lb)   BMI 30.74 kg/m²       Physical Exam  Vitals reviewed.   Constitutional:       Appearance: Normal appearance. He is well-developed.   Cardiovascular:      Rate and Rhythm: Normal rate. Rhythm irregular.      Heart sounds: No murmur heard.     No friction rub. No gallop.   Pulmonary:      Effort: Pulmonary effort is normal. No respiratory distress.      Breath sounds: Rhonchi present. No wheezing or rales.   Skin:     General: Skin is warm and dry.   Neurological:      Mental Status: He is alert and oriented to person, place, and time.   Psychiatric:         Mood and Affect: Mood normal.         Behavior: Behavior normal.          Result Review :   The following data was reviewed by: TIFFANIE Cox on 04/16/2025:  CMP          1/31/2025    09:07 3/10/2025    11:42 3/23/2025    05:32   CMP   Glucose 127  139  139    BUN 18  20  24    Creatinine 1.13  1.13  1.25    EGFR 68.6  68.6  60.8    Sodium 140  135  137    Potassium 4.2  4.2  4.2    Chloride 104  101  100    Calcium 8.4  8.8  9.3    Total Protein 6.9  6.8  7.5    Albumin 3.7  3.6  3.6    Globulin 3.2  3.2  3.9    Total Bilirubin 0.3  0.3  0.4    Alkaline Phosphatase 123  91  87    AST (SGOT) 14  14  19    ALT (SGPT) 6  12  10    Albumin/Globulin Ratio 1.2  1.1  0.9    BUN/Creatinine Ratio 15.9  17.7  19.2    Anion Gap 9.5  7.7  13.1      CBC          1/31/2025    09:07 3/10/2025    11:42 3/23/2025    05:32   CBC   WBC 14.61  12.08  18.28    RBC 3.48  4.28  4.44    Hemoglobin 8.1  10.9  11.3    Hematocrit 28.3  36.7  37.7    MCV 81.3  85.7  84.9    MCH 23.3  25.5  25.5    MCHC 28.6  29.7  30.0    RDW 18.8  23.7  23.0    Platelets 213  166  206             ECG 12 Lead    Date/Time: 4/16/2025 10:29 AM  Performed by: Christy Malone APRN    Authorized by: Christy Malone APRN  Comparison: compared with previous ECG from 1/31/2025  Rhythm: atrial " fibrillation  Ectopy: unifocal PVCs  Rate: normal  BPM: 76  T inversion: III  Other findings: non-specific ST-T wave changes and low voltage  Comments: QTc 411       Interpretation Summary         Left ventricular systolic function is normal. Calculated left ventricular EF = 59.8%    Left ventricular wall thickness is consistent with mild concentric hypertrophy.    Left ventricular diastolic function was indeterminate.    The left atrial cavity is mild to moderately dilated.    Estimated right ventricular systolic pressure from tricuspid regurgitation is mildly elevated (35-45 mmHg).    Mild pulmonary hypertension is present.    Mild dilation of the aortic root is present.          Most recent Cardiac Cath  Results for orders placed during the hospital encounter of 09/28/19    Cardiac Catheterization/Vascular Study    Narrative  Table formatting from the original result was not included.  DATE OF PROCEDURE: 10/3/2019    INDICATION FOR PROCEDURE: {Cardiomyopathy, severe LV dysfunction, fib , flutter    PROCEDURE PERFORMED:    1. Coronary Angiogram  2. Left heart catheretization    PROCEDURE COMMENTS:    Zaid Galarza was brought to the cath lab and placed on the cardiac catherization table in the postabsortive state. The patient was prepped and draped according to the cath lab protocol under strict aseptic and sterile condition. Patient's right femoral artery sight was prepped and draped. Under fluoroscopic guidance the right femoral artery was punctured using a 21 gauge needle utilizing the modified Seldinger technique. 6 [place] sheath was introduced over a wire. After aspirating for blood it was flushed with heparinized saline.    We advanced Torres type diagnostic catheters over the wire. The  left and right coronary arteries  were selectively engaged. Left and right coronary angiogram were obtained in multiple orthogonal projections.    After completion of the procedure the femoral sheath was removed in the  cath lab and hemostasis was achieved by Angioseal. The patient tolerated the procedure without complications.      FINDINGS:    1. HEMODYNAMICS:  LVEDP 10 mmHg, no gradient across the aortic valve.      2. CORONARY ANGIOGRAPHY: Dominant dominant coronary artery system.    The left main coronary artery bifurcated into the LAD and left circumflex coronary.  The LAD coursed in the anterior interventricular groove, gave rise to diagonal branches and reached the apex.    Left circumflex coursed in the left atrial ventricular groove and gives rise to several marginal branches.    The right coronary artery course in the right atrial ventricular groove I gave rise to several acute marginal branches.    Left main: Normal    LAD: 20 to 30% diffuse disease in LADLeft circumflex: 30% focal mid circumflex  RCA: Large size dominant vessel proximal 40% and distal 40% focal stenosisPDA: Mild 20%    Final impression:  Mild to moderate CAD  3+ mitral regurgitation  RECOMMENDATIONS:  Aspirin  Beta-blocker  Statin  ACE inhibitor  Continue anticoagulant  Repeat ANGELITO after 6 to 8 weeks  If atrial thrombus resolved then proceed with  Ablation  I believe once the arrhythmia is controlled patient ejection fraction will tend to increase    Vincent Phillips MD  10/03/19  9:38 AM      Current Outpatient Medications   Medication Sig Dispense Refill    acetaminophen (TYLENOL) 500 MG tablet Take 1 tablet by mouth Every 6 (Six) Hours As Needed for Mild Pain.      albuterol sulfate  (90 Base) MCG/ACT inhaler Inhale 2 puffs Every 4 (Four) Hours As Needed.      apixaban (ELIQUIS) 5 MG tablet tablet Take 1 tablet by mouth 2 (Two) Times a Day. 180 tablet 3    atorvastatin (LIPITOR) 80 MG tablet Take 1 tablet by mouth Every Night. 90 tablet 3    metoprolol succinate XL (TOPROL-XL) 25 MG 24 hr tablet Take 1 tablet by mouth Daily. 90 tablet 3    pantoprazole (PROTONIX) 40 MG EC tablet Take 1 tablet by mouth 2 (Two) Times a Day. 180 tablet 3     sacubitril-valsartan (Entresto) 24-26 MG tablet Take 1 tablet by mouth 2 (Two) Times a Day. 180 tablet 3    sertraline (ZOLOFT) 100 MG tablet Take 1.5 tablets by mouth Daily.      Symbicort 160-4.5 MCG/ACT inhaler Inhale 2 puffs 2 (Two) Times a Day.      tamsulosin (FLOMAX) 0.4 MG capsule 24 hr capsule Take 1 capsule by mouth Daily. 90 capsule 3    vitamin B-12 (CYANOCOBALAMIN) 1000 MCG tablet Take 1 tablet by mouth Daily.       No current facility-administered medications for this visit.               Problem List Items Addressed This Visit          Cardiac and Vasculature    Dilated cardiomyopathy (Chronic)    Overview   TTE: LVEF 26 to 30%, RV and LV mildly dilated, grade 3 diastolic dysfunction consistent with fixed restrictive pattern, mild TR, left atrial cavity mildly dilated right atrial cavity moderately dilated, moderate pulmonary hypertension with RVSP 47 mmHg  9/30/2019 ANGELITO: Severe cardiomyopathy likely from atrial flutter  2/11/2020 TTE LVEF 36 to 40%  5/6/2020 TTE LVEF 56 to 60%, mild concentric LVH  9/2/2022 TTE: LVEF 51 to 55%, moderate sclerosis of both the mitral and aortic valve with no significant regurgitation noted.  Left atrium is mildly enlarged.         Current Assessment & Plan                Relevant Medications    metoprolol succinate XL (TOPROL-XL) 25 MG 24 hr tablet    sacubitril-valsartan (Entresto) 24-26 MG tablet    Persistent atrial fibrillation (Chronic)    Overview   [id number] is at least 6 for heart failure, hypertension, CVA, nonobstructive CAD, and age         Relevant Medications    metoprolol succinate XL (TOPROL-XL) 25 MG 24 hr tablet    sacubitril-valsartan (Entresto) 24-26 MG tablet    Nonobstructive CAD per cath 10/3/2019 - Primary (Chronic)    Overview   10/4/2019 [facility]: Nonobstructive coronary artery disease         Relevant Medications    metoprolol succinate XL (TOPROL-XL) 25 MG 24 hr tablet    sacubitril-valsartan (Entresto) 24-26 MG tablet    Other Relevant Orders     ECG 12 Lead    Essential hypertension (Chronic)    Overview   Hypertension is Controlled  Goals of Care:Medication compliance and tolerance, Systolic blood pressure <120, and Diastolic blood pressure  <80  Plan:  Continue current medications  Follow up:in 6 months           Current Assessment & Plan            Relevant Medications    metoprolol succinate XL (TOPROL-XL) 25 MG 24 hr tablet    Dyslipidemia, goal LDL below 100 (Chronic)    Overview   9/2/2022 total cholesterol 104, triglycerides 89, HDL 31, and LDL 55  10/28/2023 total cholesterol 107, triglycerides 122, HDL 45, and LDL 40 ([facility])  10/25/2024 total cholesterol 99, triglycerides 120, HDL 39, and LDL 38 ([facility])         Current Assessment & Plan             Relevant Medications    atorvastatin (LIPITOR) 80 MG tablet     Other Visit Diagnoses         Pre-operative cardiovascular examination        Relevant Orders    ECG 12 Lead            Assessment & Plan  1. Atrial Fibrillation: EKG indicates persistent atrial fibrillation.  - Discontinue Eliquis 3 days before the TURP procedure, last dose on 04/25/2025  - Miss six doses in total  - Resume Eliquis post-surgery once stable     2. Nonobstructive Coronary Artery Disease.  - Continue current medication regimen    3. Dilated Cardiomyopathy with Recovered LVEF.  - Continue current medication regimen    4. Hypertension.  - Continue current medication regimen    5. Dyslipidemia.  - Continue current medication regimen    6. Status Post AAA Repair.  - Continue monitoring    7. Urinary Tract Infection: Hospitalized at [facility].  - Scheduled for TURP procedure with Dr. Calabrese    8.  Perioperative risk assessment  Patient is at moderate risk for major adverse cardiac event in the perioperative period.      9. Medication Management.  - Prescriptions for Entresto, metoprolol succinate, Eliquis, and atorvastatin refilled for 3 months with 3 refills, totaling a year's supply  - Prescriptions [facility]    Follow-up  - Follow up in 7 weeks or sooner if complications arise       Follow Up     Return in about 7 weeks (around 6/4/2025).    Patient was given instructions and counseling regarding his condition or for health maintenance advice. Please see specific information pulled into the AVS if appropriate.     Patient or patient representative verbalized consent for the use of Ambient Listening during the visit with  TIFFANIE Cox for chart documentation. 4/20/2025  17:54 EDT

## 2025-04-25 ENCOUNTER — DOCUMENTATION (OUTPATIENT)
Dept: UROLOGY | Facility: CLINIC | Age: 75
End: 2025-04-25
Payer: COMMERCIAL

## 2025-04-28 ENCOUNTER — APPOINTMENT (OUTPATIENT)
Dept: GENERAL RADIOLOGY | Facility: HOSPITAL | Age: 75
End: 2025-04-28
Payer: COMMERCIAL

## 2025-04-28 ENCOUNTER — HOSPITAL ENCOUNTER (OUTPATIENT)
Facility: HOSPITAL | Age: 75
Setting detail: HOSPITAL OUTPATIENT SURGERY
Discharge: HOME | End: 2025-04-28
Attending: UROLOGY | Admitting: UROLOGY
Payer: COMMERCIAL

## 2025-04-28 ENCOUNTER — ANESTHESIA EVENT (OUTPATIENT)
Dept: PERIOP | Facility: HOSPITAL | Age: 75
End: 2025-04-28
Payer: COMMERCIAL

## 2025-04-28 ENCOUNTER — ANESTHESIA (OUTPATIENT)
Dept: PERIOP | Facility: HOSPITAL | Age: 75
End: 2025-04-28
Payer: COMMERCIAL

## 2025-04-28 ENCOUNTER — TELEPHONE (OUTPATIENT)
Dept: ONCOLOGY | Facility: CLINIC | Age: 75
End: 2025-04-28

## 2025-04-28 VITALS
DIASTOLIC BLOOD PRESSURE: 75 MMHG | OXYGEN SATURATION: 98 % | HEIGHT: 73 IN | SYSTOLIC BLOOD PRESSURE: 121 MMHG | RESPIRATION RATE: 18 BRPM | TEMPERATURE: 97.4 F | HEART RATE: 68 BPM | BODY MASS INDEX: 30.75 KG/M2 | WEIGHT: 232 LBS

## 2025-04-28 DIAGNOSIS — R33.9 URINARY RETENTION: Primary | ICD-10-CM

## 2025-04-28 PROCEDURE — 25010000002 FENTANYL CITRATE (PF) 50 MCG/ML SOLUTION: Performed by: NURSE ANESTHETIST, CERTIFIED REGISTERED

## 2025-04-28 PROCEDURE — 25010000002 ONDANSETRON PER 1 MG: Performed by: NURSE ANESTHETIST, CERTIFIED REGISTERED

## 2025-04-28 PROCEDURE — 52601 PROSTATECTOMY (TURP): CPT | Performed by: UROLOGY

## 2025-04-28 PROCEDURE — 25010000002 PROPOFOL 200 MG/20ML EMULSION: Performed by: NURSE ANESTHETIST, CERTIFIED REGISTERED

## 2025-04-28 PROCEDURE — 25010000002 AMPICILLIN-SULBACTAM PER 1.5 G: Performed by: UROLOGY

## 2025-04-28 PROCEDURE — 25010000002 GENTAMICIN PER 80 MG: Performed by: UROLOGY

## 2025-04-28 PROCEDURE — 25010000002 FAMOTIDINE (PF) 20 MG/2ML SOLUTION: Performed by: NURSE ANESTHETIST, CERTIFIED REGISTERED

## 2025-04-28 PROCEDURE — 25810000003 LACTATED RINGERS PER 1000 ML: Performed by: ANESTHESIOLOGY

## 2025-04-28 RX ORDER — ONDANSETRON 2 MG/ML
4 INJECTION INTRAMUSCULAR; INTRAVENOUS AS NEEDED
Status: DISCONTINUED | OUTPATIENT
Start: 2025-04-28 | End: 2025-04-28 | Stop reason: HOSPADM

## 2025-04-28 RX ORDER — SODIUM CHLORIDE 9 MG/ML
40 INJECTION, SOLUTION INTRAVENOUS AS NEEDED
Status: DISCONTINUED | OUTPATIENT
Start: 2025-04-28 | End: 2025-04-28 | Stop reason: HOSPADM

## 2025-04-28 RX ORDER — IPRATROPIUM BROMIDE AND ALBUTEROL SULFATE 2.5; .5 MG/3ML; MG/3ML
3 SOLUTION RESPIRATORY (INHALATION) ONCE AS NEEDED
Status: DISCONTINUED | OUTPATIENT
Start: 2025-04-28 | End: 2025-04-28 | Stop reason: HOSPADM

## 2025-04-28 RX ORDER — MEPERIDINE HYDROCHLORIDE 25 MG/ML
12.5 INJECTION INTRAMUSCULAR; INTRAVENOUS; SUBCUTANEOUS
Status: DISCONTINUED | OUTPATIENT
Start: 2025-04-28 | End: 2025-04-28 | Stop reason: HOSPADM

## 2025-04-28 RX ORDER — SODIUM CHLORIDE 0.9 % (FLUSH) 0.9 %
10 SYRINGE (ML) INJECTION AS NEEDED
Status: DISCONTINUED | OUTPATIENT
Start: 2025-04-28 | End: 2025-04-28 | Stop reason: HOSPADM

## 2025-04-28 RX ORDER — MIDAZOLAM HYDROCHLORIDE 1 MG/ML
0.5 INJECTION, SOLUTION INTRAMUSCULAR; INTRAVENOUS
Status: DISCONTINUED | OUTPATIENT
Start: 2025-04-28 | End: 2025-04-28 | Stop reason: HOSPADM

## 2025-04-28 RX ORDER — FAMOTIDINE 10 MG/ML
INJECTION, SOLUTION INTRAVENOUS AS NEEDED
Status: DISCONTINUED | OUTPATIENT
Start: 2025-04-28 | End: 2025-04-28 | Stop reason: SURG

## 2025-04-28 RX ORDER — SODIUM CHLORIDE 0.9 % (FLUSH) 0.9 %
10 SYRINGE (ML) INJECTION EVERY 12 HOURS SCHEDULED
Status: DISCONTINUED | OUTPATIENT
Start: 2025-04-28 | End: 2025-04-28 | Stop reason: HOSPADM

## 2025-04-28 RX ORDER — FENTANYL CITRATE 50 UG/ML
INJECTION, SOLUTION INTRAMUSCULAR; INTRAVENOUS AS NEEDED
Status: DISCONTINUED | OUTPATIENT
Start: 2025-04-28 | End: 2025-04-28 | Stop reason: SURG

## 2025-04-28 RX ORDER — SODIUM CHLORIDE, SODIUM LACTATE, POTASSIUM CHLORIDE, CALCIUM CHLORIDE 600; 310; 30; 20 MG/100ML; MG/100ML; MG/100ML; MG/100ML
125 INJECTION, SOLUTION INTRAVENOUS ONCE
Status: COMPLETED | OUTPATIENT
Start: 2025-04-28 | End: 2025-04-28

## 2025-04-28 RX ORDER — CIPROFLOXACIN 500 MG/1
500 TABLET, FILM COATED ORAL 2 TIMES DAILY
Qty: 6 TABLET | Refills: 0 | Status: SHIPPED | OUTPATIENT
Start: 2025-04-28

## 2025-04-28 RX ORDER — OXYCODONE AND ACETAMINOPHEN 5; 325 MG/1; MG/1
1 TABLET ORAL ONCE AS NEEDED
Status: DISCONTINUED | OUTPATIENT
Start: 2025-04-28 | End: 2025-04-28 | Stop reason: HOSPADM

## 2025-04-28 RX ORDER — SODIUM CHLORIDE, SODIUM LACTATE, POTASSIUM CHLORIDE, CALCIUM CHLORIDE 600; 310; 30; 20 MG/100ML; MG/100ML; MG/100ML; MG/100ML
100 INJECTION, SOLUTION INTRAVENOUS ONCE AS NEEDED
Status: DISCONTINUED | OUTPATIENT
Start: 2025-04-28 | End: 2025-04-28 | Stop reason: HOSPADM

## 2025-04-28 RX ORDER — GENTAMICIN SULFATE 80 MG/100ML
80 INJECTION, SOLUTION INTRAVENOUS
Status: COMPLETED | OUTPATIENT
Start: 2025-04-28 | End: 2025-04-28

## 2025-04-28 RX ORDER — FENTANYL CITRATE 50 UG/ML
50 INJECTION, SOLUTION INTRAMUSCULAR; INTRAVENOUS
Status: DISCONTINUED | OUTPATIENT
Start: 2025-04-28 | End: 2025-04-28 | Stop reason: HOSPADM

## 2025-04-28 RX ORDER — PROPOFOL 10 MG/ML
INJECTION, EMULSION INTRAVENOUS AS NEEDED
Status: DISCONTINUED | OUTPATIENT
Start: 2025-04-28 | End: 2025-04-28 | Stop reason: SURG

## 2025-04-28 RX ORDER — LIDOCAINE HYDROCHLORIDE 20 MG/ML
JELLY TOPICAL AS NEEDED
Status: DISCONTINUED | OUTPATIENT
Start: 2025-04-28 | End: 2025-04-28 | Stop reason: HOSPADM

## 2025-04-28 RX ORDER — HYDROCODONE BITARTRATE AND ACETAMINOPHEN 10; 325 MG/1; MG/1
1 TABLET ORAL EVERY 6 HOURS PRN
Qty: 16 TABLET | Refills: 0 | Status: SHIPPED | OUTPATIENT
Start: 2025-04-28

## 2025-04-28 RX ORDER — MAGNESIUM HYDROXIDE 1200 MG/15ML
LIQUID ORAL AS NEEDED
Status: DISCONTINUED | OUTPATIENT
Start: 2025-04-28 | End: 2025-04-28 | Stop reason: HOSPADM

## 2025-04-28 RX ORDER — ONDANSETRON 2 MG/ML
INJECTION INTRAMUSCULAR; INTRAVENOUS AS NEEDED
Status: DISCONTINUED | OUTPATIENT
Start: 2025-04-28 | End: 2025-04-28 | Stop reason: SURG

## 2025-04-28 RX ADMIN — PROPOFOL 100 MG: 10 INJECTION, EMULSION INTRAVENOUS at 08:57

## 2025-04-28 RX ADMIN — SODIUM CHLORIDE, POTASSIUM CHLORIDE, SODIUM LACTATE AND CALCIUM CHLORIDE: 600; 310; 30; 20 INJECTION, SOLUTION INTRAVENOUS at 08:53

## 2025-04-28 RX ADMIN — FENTANYL CITRATE 50 MCG: 50 INJECTION INTRAMUSCULAR; INTRAVENOUS at 08:53

## 2025-04-28 RX ADMIN — FENTANYL CITRATE 50 MCG: 50 INJECTION, SOLUTION INTRAMUSCULAR; INTRAVENOUS at 09:43

## 2025-04-28 RX ADMIN — FENTANYL CITRATE 50 MCG: 50 INJECTION INTRAMUSCULAR; INTRAVENOUS at 09:18

## 2025-04-28 RX ADMIN — ONDANSETRON 4 MG: 2 INJECTION INTRAMUSCULAR; INTRAVENOUS at 08:53

## 2025-04-28 RX ADMIN — GENTAMICIN SULFATE 80 MG: 80 INJECTION, SOLUTION INTRAVENOUS at 08:53

## 2025-04-28 RX ADMIN — Medication 3 G: at 08:53

## 2025-04-28 RX ADMIN — FAMOTIDINE 20 MG: 10 INJECTION, SOLUTION INTRAVENOUS at 08:53

## 2025-04-28 NOTE — ANESTHESIA PROCEDURE NOTES
Airway  Reason: elective    Date/Time: 4/28/2025 8:58 AM  Airway not difficult    General Information and Staff    Patient location during procedure: OR  CRNA/CAA: Tisha Canada CRNA    Indications and Patient Condition  Indications for airway management: airway protection    Preoxygenated: yes  MILS maintained throughout    Mask difficulty assessment: 0 - not attempted    Final Airway Details    Final airway type: supraglottic airway      Successful airway: unique  Size: 4   Number of attempts at approach: 1  Assessment: lips, teeth, and gum same as pre-op

## 2025-04-28 NOTE — ANESTHESIA POSTPROCEDURE EVALUATION
Patient: Zaid Galarza    Procedure Summary       Date: 04/28/25 Room / Location: [facility] OR 06 / [facility] OR    Anesthesia Start: 0853 Anesthesia Stop: 0920    Procedure: Cystoscopy and transurethral resection of the prostate Diagnosis:       Urinary retention      (Urinary retention [R33.9])    Surgeons: Liang Calabrese MD Provider: Humza Schreiber MD    Anesthesia Type: general ASA Status: 3            Anesthesia Type: general    Vitals  Vitals Value Taken Time   /75 04/28/25 09:50   Temp 97.2 °F (36.2 °C) 04/28/25 09:21   Pulse 77 04/28/25 09:50   Resp 16 04/28/25 09:46   SpO2 86 % 04/28/25 09:50   Vitals shown include unfiled device data.        Post Anesthesia Care and Evaluation    Patient location during evaluation: PACU  Patient participation: complete - patient participated  Level of consciousness: responsive to verbal stimuli  Pain score: 1  Pain management: adequate    Airway patency: patent  Anesthetic complications: No anesthetic complications  PONV Status: none  Cardiovascular status: hemodynamically stable  Respiratory status: nasal cannula  Hydration status: acceptable

## 2025-04-28 NOTE — OP NOTE
CYSTOSCOPY TRANSURETHRAL RESECTION OF PROSTATE  Procedure Note    Zaid Galarza  4/28/2025    Pre-op Diagnosis:   Urinary retention [R33.9]    Post-op Diagnosis:     Post-Op Diagnosis Codes:     * Urinary retention [R33.9]  74-year-old white male with urinary retention.  He had an [place] he had in [place].  He has never had a PSA and he failed to get it preoperatively as he recommended he understand that means he is at chance to have have an occult prostate cancer and we would have no knowledge of it preoperatively.  Additionally, he understands the risks of anesthesia, bleeding, infection, urinary incontinence etc. following an extensive informed consent he is brought the procedure suite prepped and draped in the low dorsolithotomy position I anesthetized the urethra with 10 cc of 2% viscous Xylocaine jelly and advanced the resectoscope with the bipolar Olympus 24 Moroccan advanced through normal urethra he had a significant median bar normal bladder diverticulum directly by the trigone that was unremarkable normal orthotopic orifice ease I utilized the electrosurgical button and with the bipolar electricity took down the median bar lateral tissue finger.  A full cleanout I was very pleased with the appearance at the end of the procedure.  Hemostasis was excellent the 22 Moroccan three-way Agosto catheter was inserted for continuous flow he was given both Unasyn and gentamicin as prophylaxis I plan to see him back on Thursday for catheter removal  Procedure(s):  Cystoscopy and transurethral resection of the prostate    Surgeon(s):  Liang Calabrese MD    Anesthesia: see anesthesia record    Staff:   Circulator: Tesha Mcleod RN  Scrub Person: Trini Scott LPN; Silvia Gay    Estimated Blood Loss: none  Urine Voided: * No values recorded between 4/28/2025  8:54 AM and 4/28/2025  9:20 AM *    Specimens:                None      Drains: 22 [place]oc[place] three-way Agosto catheter    Findings: Medium  bar diverticulum behind the trigone    Blood: N/A    Complications: None    Grafts and Implants: None    Liang Calabrese MD     Date: 4/28/2025  Time: 09:29 EDT       Alert and oriented, no focal deficits, no motor or sensory deficits. Pt speaking in clear full sentences.

## 2025-04-28 NOTE — TELEPHONE ENCOUNTER
Caller: Zaid Galarza    Relationship to patient: Self    Best call back number: [phone]    Chief complaint: SCHEDULING    Type of visit: LAB, FOLLOW UP    Requested date: NEXT AVLIABLE     If rescheduling, when is the original appointment: 2-26     PT REQUESTING A APPT LETTER MAILED OUT

## 2025-04-28 NOTE — ANESTHESIA PREPROCEDURE EVALUATION
Anesthesia Evaluation     Patient summary reviewed and Nursing notes reviewed   no history of anesthetic complications:   NPO Solid Status: > 8 hours  NPO Liquid Status: > 8 hours           Airway   Mallampati: III  TM distance: >3 FB  Neck ROM: full  No difficulty expected  Dental    (+) edentulous    Pulmonary    (+) a smoker Current, Abstained day of surgery, COPD,shortness of breath, decreased breath sounds  Cardiovascular   Exercise tolerance: poor (<4 METS)    ECG reviewed  PT is on anticoagulation therapy  Patient on routine beta blocker and Beta blocker given within 24 hours of surgery  Rhythm: irregular    (+) hypertension, CAD, dysrhythmias Atrial Fib, CHF , hyperlipidemia    ROS comment: Aortic Aneurysm    Neuro/Psych  (+) CVA, psychiatric history  GI/Hepatic/Renal/Endo    (+) GI bleeding resolved, hepatitis C, liver disease    Musculoskeletal (-) negative ROS    Abdominal     Abdomen: soft.   Substance History - negative use     OB/GYN          Other - negative ROS       ROS/Med Hx Other: 1/31/25       ·  Left ventricular systolic function is normal. Calculated left ventricular EF = 59.8%  ·  Left ventricular wall thickness is consistent with mild concentric hypertrophy.  ·  Left ventricular diastolic function was indeterminate.  ·  The left atrial cavity is mild to moderately dilated.  ·  Estimated right ventricular systolic pressure from tricuspid regurgitation is mildly elevated (35-45 mmHg).  ·  Mild pulmonary hypertension is present.  ·  Mild dilation of the aortic root is present.                     Anesthesia Plan    ASA 3     general     intravenous induction     Anesthetic plan, risks, benefits, and alternatives have been provided, discussed and informed consent has been obtained with: patient.    Use of blood products discussed with  Consented to blood products.    Plan discussed with CRNA.

## 2025-05-01 ENCOUNTER — PATIENT OUTREACH (OUTPATIENT)
Dept: CASE MANAGEMENT | Facility: OTHER | Age: 75
End: 2025-05-01
Payer: COMMERCIAL

## 2025-05-01 ENCOUNTER — OFFICE VISIT (OUTPATIENT)
Dept: UROLOGY | Facility: CLINIC | Age: 75
End: 2025-05-01
Payer: COMMERCIAL

## 2025-05-01 VITALS
DIASTOLIC BLOOD PRESSURE: 80 MMHG | HEART RATE: 89 BPM | WEIGHT: 232 LBS | SYSTOLIC BLOOD PRESSURE: 135 MMHG | HEIGHT: 73 IN | BODY MASS INDEX: 30.75 KG/M2

## 2025-05-01 DIAGNOSIS — R33.9 URINARY RETENTION: Primary | ICD-10-CM

## 2025-05-01 PROCEDURE — 99024 POSTOP FOLLOW-UP VISIT: CPT | Performed by: UROLOGY

## 2025-05-01 NOTE — OUTREACH NOTE
AMBULATORY CASE MANAGEMENT NOTE    Names and Relationships of Patient/Support Persons: Contact: Zaid Galarza; Relationship: Self -     Patient Outreach    Scheduled outreach with patient for program progression. Patient had cystoscopy and transurethral resection of the prostate gland. Patient saw Dr. Calabrese today and conway catheter was dc'd. Patient states he hasn't urinated yet but that it hasn't been very long. Pt to advise provider if unable to urinate. Patient reports feeling well overall and is attending PT/OT; states he feels that he is getting stronger. No updated plans yet for ulcer on eye; pt's eye may have to be removed at some point but presently reports very good vision in unaffected eye. Education provided, understanding voiced. Care plan goals reviewed, updated. Future outreach scheduled.     Education Documentation  Unresolved/Worsening Symptoms, taught by Isabella Funk, RN at 5/1/2025  3:49 PM.  Learner: Patient  Readiness: Acceptance  Method: Explanation  Response: Verbalizes Understanding    Provider Follow-Up, taught by Isabella Funk, RN at 5/1/2025  3:49 PM.  Learner: Patient  Readiness: Acceptance  Method: Explanation  Response: Verbalizes Understanding    Prevention Measures, taught by Isabella Funk, RN at 5/1/2025  3:49 PM.  Learner: Patient  Readiness: Acceptance  Method: Explanation  Response: Verbalizes Understanding    Signs/Symptoms, taught by Isabella Funk, RN at 5/1/2025  3:49 PM.  Learner: Patient  Readiness: Acceptance  Method: Explanation  Response: Verbalizes Understanding    Signs/Symptoms, taught by Isabella Funk, RN at 5/1/2025  3:49 PM.  Learner: Patient  Readiness: Acceptance  Method: Explanation  Response: Verbalizes Understanding          Isabella MONSON  Ambulatory Case Management    5/1/2025, 15:49 EDT

## 2025-05-01 NOTE — PLAN OF CARE
Problem: General Plan of Care  Goal: Make and Keep All Appointments  Outcome: Progressing  Goal: Find Help in My Community  Outcome: Progressing  Goal: Protect My Health  Outcome: Progressing  Goal: Manage My Emotions  Outcome: Progressing  Goal: Maintain My Quality of Life  Outcome: Progressing  Intervention: My Quality of Life To Do List  Description: Why is this important?Having a long-term illness can be scary.It can also be stressful for you and your caregiver.These steps may help.  Flowsheets (Taken 5/1/2025 1543)  My Quality of Life To Do List:   do one enjoyable thing every day   discuss my treatment options with the doctor or nurse   do something different, like talking to a new person or going to a new place, every day   learn something new by asking, reading or searching the internet every day   make shared treatment decisions with doctor   strengthen or fix relationships with loved ones   spend time outdoors at least 3 times a week  Goal: Optimal Care Coordination  Outcome: Progressing  Intervention: Develop Relationship to Effect Behavior Change  Flowsheets (Taken 5/1/2025 1547)  Develop Relationship to Effect Behavior Change:   acceptance conveyed   care explained   choices provided   collaboration with team encouraged   questions encouraged   questions answered   empathic listening provided   emotional support provided   rapport fostered   reassurance provided   self-reliance encouraged   verbalization of feelings encouraged  Intervention: Mutually Develop and Foster Achievement of Patient Goals  Flowsheets (Taken 5/1/2025 1547)  Mutually Develop and Foster Achievement of Patient Goals:   choices provided   difficulty of making lifelong changes acknowledged   self-reflection promoted   resources needed to meet goals identified   self-reliance encouraged   verbalization of feelings encouraged   health risks reviewed   reassurance provided   collaboration with team encouraged   decision-making  supported   problem-solving facilitated   questions answered  Intervention: Support Psychosocial Response to Risk or Actual Health Condition  Flowsheets (Taken 5/1/2025 8956)  Support Psychosocial Response to Risk or Actual Health Condition:   verbalization of feelings encouraged   self-reflection promoted   active listening utilized   decision-making supported   healthy lifestyle promoted

## 2025-05-01 NOTE — PROGRESS NOTES
Chief Complaint:      Chief Complaint   Patient presents with    Post-op     TURP       HPI:   74-year-old white male with urinary retention. He had an [place] he had in [place]. He has never had a PSA and he failed to get it preoperatively as he recommended he understand that means he is at chance to have have an occult prostate cancer and we would have no knowledge of it preoperatively. Additionally, he understands the risks of anesthesia, bleeding, infection, urinary incontinence etc. following an extensive informed consent he is brought the procedure suite prepped and draped in the low dorsolithotomy position I anesthetized the urethra with 10 cc of 2% viscous Xylocaine jelly and advanced the resectoscope with the bipolar Olympus 24 French advanced through normal urethra he had a significant median bar normal bladder diverticulum directly by the trigone that was unremarkable normal orthotopic orifice ease I utilized the electrosurgical button and with the bipolar electricity took down the median bar lateral tissue finger. A full cleanout I was very pleased with the appearance at the end of the procedure. Hemostasis was excellent the 22 French three-way Agosto catheter was inserted for continuous flow he was given both Unasyn and gentamicin as prophylaxis he is doing well see him back in 1 week it is extremely important to get his PSA    Past Medical History:     Past Medical History:   Diagnosis Date    Atrial fibrillation     CHF (congestive heart failure)     COPD (chronic obstructive pulmonary disease)     Coronary artery disease     GERD (gastroesophageal reflux disease)     History of transfusion     Hyperlipidemia     Hypertension     Stroke 09/2022    Tobacco abuse        Current Meds:     Current Outpatient Medications   Medication Sig Dispense Refill    acetaminophen (TYLENOL) 500 MG tablet Take 1 tablet by mouth Every 6 (Six) Hours As Needed for Mild Pain.      albuterol sulfate  (90 Base)  MCG/ACT inhaler Inhale 2 puffs Every 4 (Four) Hours As Needed.      atorvastatin (LIPITOR) 80 MG tablet Take 1 tablet by mouth Every Night. 90 tablet 3    ciprofloxacin (Cipro) 500 MG tablet Take 1 tablet by mouth 2 (Two) Times a Day. 6 tablet 0    HYDROcodone-acetaminophen (NORCO)  MG per tablet Take 1 tablet by mouth Every 6 (Six) Hours As Needed for Moderate Pain (Pain). 16 tablet 0    metoprolol succinate XL (TOPROL-XL) 25 MG 24 hr tablet Take 1 tablet by mouth Daily. 90 tablet 3    pantoprazole (PROTONIX) 40 MG EC tablet Take 1 tablet by mouth 2 (Two) Times a Day. 180 tablet 3    sacubitril-valsartan (Entresto) 24-26 MG tablet Take 1 tablet by mouth 2 (Two) Times a Day. 180 tablet 3    sertraline (ZOLOFT) 100 MG tablet Take 1.5 tablets by mouth Daily.      Symbicort 160-4.5 MCG/ACT inhaler Inhale 2 puffs 2 (Two) Times a Day.      vitamin B-12 (CYANOCOBALAMIN) 1000 MCG tablet Take 1 tablet by mouth Daily.       No current facility-administered medications for this visit.        Allergies:      No Known Allergies     Past Surgical History:     Past Surgical History:   Procedure Laterality Date    ABDOMINAL AORTIC ANEURYSM REPAIR      CARDIAC CATHETERIZATION N/A 10/03/2019    Procedure: Left Heart Cath;  Surgeon: Vincent Phillips MD;  Location: [facility] CATH INVASIVE LOCATION;  Service: Cardiology    CYSTOSCOPY TRANSURETHRAL RESECTION OF PROSTATE N/A 4/28/2025    Procedure: Cystoscopy and transurethral resection of the prostate;  Surgeon: Liang Calabrese MD;  Location: [facility] OR;  Service: Urology;  Laterality: N/A;       Social History:     Social History     Socioeconomic History    Marital status: Single   Tobacco Use    Smoking status: Former     Current packs/day: 1.00     Types: Cigarettes    Smokeless tobacco: Never    Tobacco comments:     9/1/2022   Vaping Use    Vaping status: Never Used   Substance and Sexual Activity    Alcohol use: No    Drug use: No    Sexual activity: Defer       Family  History:     Family History   Problem Relation Age of Onset    Heart disease Mother     Stroke Father     Heart disease Father        Review of Systems:     Review of Systems   Constitutional: Negative.    HENT: Negative.     Eyes: Negative.    Respiratory: Negative.     Cardiovascular: Negative.    Gastrointestinal: Negative.    Endocrine: Negative.    Musculoskeletal: Negative.    Allergic/Immunologic: Negative.    Neurological: Negative.    Hematological: Negative.    Psychiatric/Behavioral: Negative.         Physical Exam:     Physical Exam  Vitals and nursing note reviewed.   Constitutional:       Appearance: He is well-developed.   HENT:      Head: Normocephalic and atraumatic.   Eyes:      Conjunctiva/sclera: Conjunctivae normal.      Pupils: Pupils are equal, round, and reactive to light.   Cardiovascular:      Rate and Rhythm: Normal rate and regular rhythm.      Heart sounds: Normal heart sounds.   Pulmonary:      Effort: Pulmonary effort is normal.      Breath sounds: Normal breath sounds.   Abdominal:      General: Bowel sounds are normal.      Palpations: Abdomen is soft.   Musculoskeletal:         General: Normal range of motion.      Cervical back: Normal range of motion.   Skin:     General: Skin is warm and dry.   Neurological:      Mental Status: He is alert and oriented to person, place, and time.      Deep Tendon Reflexes: Reflexes are normal and symmetric.   Psychiatric:         Behavior: Behavior normal.         Thought Content: Thought content normal.         Judgment: Judgment normal.         I have reviewed the following portions of the patient's history: Allergies, current medications, past family history, past medical history, past social history, past surgical history, problem list, and ROS and confirm it is accurate.    Recent Image (CT and/or KUB):      CT Abdomen and Pelvis: Results for orders placed during the hospital encounter of 03/23/25    CT Abdomen Pelvis With & Without  Contrast    Narrative  Comparison: 1/30/2025    Modality:  CT Abdomen and Pelvis With and Without Contrast    Technique: CT scan of the abdomen and pelvis has been done without and  with administration of intravenous contrast media.    Findings:    Lung base: Unremarkable.    Liver: Normal size, shape and attenuation.  Stable right hepatic lobe  hypoattenuation lesions measuring up to 11 mm.    Gallbladder and Biliary tree: No biliary dilatation.  Unremarkable  gallbladder.    Pancreas: No masses or ductal dilatation.    Spleen: Unremarkable.    Adrenal Glands: Unremarkable.    Kidneys and Ureters: No stones, masses or hydronephrosis.  Similar 1.7  cm hypoattenuating right renal lesion.    Urinary Bladder: Agosto catheter is seen within the renal bladder.  There  is significant wall thickening and fat stranding surrounding the urinary  bladder.  A posterior urinary bladder wall diverticulum is also seen.    Peritoneum and Retroperitoneum: No lymphadenopathy.  No ascites or free  air.    Vessels: Changes of aortobiiliac stenting.    Bowel: No wall thickening or bowel dilatation.  Normal appendix.  Sigmoid diverticulosis.    Bones and Soft Tissues: No lytic or sclerotic bony lesion.  The  extra-abdominal and paraspinal soft tissues are normal.    Impression  Impression:    Agosto catheter is seen in the urinary bladder with diffuse thickening of  the urinary bladder wall and surrounding fat stranding.  Findings may  represent cystitis.  However, this is more prominent in comparison to  the prior CT dated 3/10/2025.  Underlying malignancy he cannot be ruled  out.  Urology consult is recommended.        This report was finalized on 3/23/2025 10:55 AM by Mallika Hess MD.       CT Stone Protocol: Results for orders placed during the hospital encounter of 03/10/25    CT Abdomen Pelvis Stone Protocol    Narrative  EXAM:  CT Abdomen and Pelvis Without Intravenous Contrast    EXAM DATE:  3/10/2025 11:44 AM    CLINICAL  HISTORY:  acute hematuria    TECHNIQUE:  Axial computed tomography images of the abdomen and pelvis without  intravenous contrast.  Sagittal and coronal reformatted images were  created and reviewed.  This CT exam was performed using one or more of  the following dose reduction techniques:  automated exposure control,  adjustment of the mA and/or kV according to patient size, and/or use of  iterative reconstruction technique.    COMPARISON:  1/30/2025    FINDINGS:  Lung bases:  Chronic lung changes.  No consolidation.  Heart:  Cardiomegaly and coronary artery calcifications are stable.    ABDOMEN:  Liver:  Stable hepatic cysts.  Gallbladder and bile ducts:  Unremarkable.  No calcified stones.  No  ductal dilation.  Pancreas:  Unremarkable.  No ductal dilation.  Spleen:  Unremarkable.  No splenomegaly.  Adrenals:  Unremarkable.  No mass.  Kidneys and ureters:  Unremarkable.  No obstructing stones.  No  hydronephrosis.  Stomach and bowel:  Sigmoid diverticulosis without diverticulitis.  No  obstruction.    PELVIS:  Appendix:  Normal appendix.  Bladder:  Decompressed urinary bladder but with marked urinary bladder  wall thickening and surrounding inflammation most consistent with acute  cystitis.  Agosto catheter decompresses urinary bladder.  No stones.  Reproductive:  Unremarkable as visualized.    ABDOMEN and PELVIS:  Intraperitoneal space:  Unremarkable.  No significant fluid  collection.  No pneumoperitoneum.  Bones/joints:  Stable degenerative changes lumbar spine with  multilevel stenosis. No acute bony findings identified.  No dislocation.  Soft tissues:  Unremarkable.  Vasculature:  EVAR changes are noted of the abdominal aorta.  No  abdominal aortic aneurysm.  Lymph nodes:  Unremarkable.  No enlarged lymph nodes.    Impression  1.  Decompressed urinary bladder but with marked urinary bladder wall  thickening and surrounding inflammation most consistent with acute  cystitis.  2.  Agosto catheter decompresses  urinary bladder.  3. Other incidental/nonacute findings above.    This report was finalized on 3/10/2025 12:26 PM by Dr. Dequan Cotter MD.       KUB: No results found for this or any previous visit.       Labs (past 3 months):      Admission on 03/23/2025, Discharged on 03/23/2025   Component Date Value Ref Range Status    Glucose 03/23/2025 139 (H)  65 - 99 mg/dL Final    BUN 03/23/2025 24 (H)  8 - 23 mg/dL Final    Creatinine 03/23/2025 1.25  0.76 - 1.27 mg/dL Final    Sodium 03/23/2025 137  136 - 145 mmol/L Final    Potassium 03/23/2025 4.2  3.5 - 5.2 mmol/L Final    Slight hemolysis detected by analyzer. Result may be falsely elevated.    Chloride 03/23/2025 100  98 - 107 mmol/L Final    CO2 03/23/2025 23.9  22.0 - 29.0 mmol/L Final    Calcium 03/23/2025 9.3  8.6 - 10.5 mg/dL Final    Total Protein 03/23/2025 7.5  6.0 - 8.5 g/dL Final    Albumin 03/23/2025 3.6  3.5 - 5.2 g/dL Final    ALT (SGPT) 03/23/2025 10  1 - 41 U/L Final    AST (SGOT) 03/23/2025 19  1 - 40 U/L Final    Alkaline Phosphatase 03/23/2025 87  39 - 117 U/L Final    Total Bilirubin 03/23/2025 0.4  0.0 - 1.2 mg/dL Final    Globulin 03/23/2025 3.9  gm/dL Final    A/G Ratio 03/23/2025 0.9  g/dL Final    BUN/Creatinine Ratio 03/23/2025 19.2  7.0 - 25.0 Final    Anion Gap 03/23/2025 13.1  5.0 - 15.0 mmol/L Final    eGFR 03/23/2025 60.8  >60.0 mL/min/1.73 Final    WBC 03/23/2025 18.28 (H)  3.40 - 10.80 10*3/mm3 Final    RBC 03/23/2025 4.44  4.14 - 5.80 10*6/mm3 Final    Hemoglobin 03/23/2025 11.3 (L)  13.0 - 17.7 g/dL Final    Hematocrit 03/23/2025 37.7  37.5 - 51.0 % Final    MCV 03/23/2025 84.9  79.0 - 97.0 fL Final    MCH 03/23/2025 25.5 (L)  26.6 - 33.0 pg Final    MCHC 03/23/2025 30.0 (L)  31.5 - 35.7 g/dL Final    RDW 03/23/2025 23.0 (H)  12.3 - 15.4 % Final    RDW-SD 03/23/2025 71.1 (H)  37.0 - 54.0 fl Final    MPV 03/23/2025    Final    Unable to calculate.        Platelets 03/23/2025 206  140 - 450 10*3/mm3 Final    Neutrophil % 03/23/2025  84.1 (H)  42.7 - 76.0 % Final    Lymphocyte % 03/23/2025 6.9 (L)  19.6 - 45.3 % Final    Monocyte % 03/23/2025 7.1  5.0 - 12.0 % Final    Eosinophil % 03/23/2025 0.9  0.3 - 6.2 % Final    Basophil % 03/23/2025 0.2  0.0 - 1.5 % Final    Immature Grans % 03/23/2025 0.8 (H)  0.0 - 0.5 % Final    Neutrophils, Absolute 03/23/2025 15.38 (H)  1.70 - 7.00 10*3/mm3 Final    Lymphocytes, Absolute 03/23/2025 1.26  0.70 - 3.10 10*3/mm3 Final    Monocytes, Absolute 03/23/2025 1.30 (H)  0.10 - 0.90 10*3/mm3 Final    Eosinophils, Absolute 03/23/2025 0.16  0.00 - 0.40 10*3/mm3 Final    Basophils, Absolute 03/23/2025 0.04  0.00 - 0.20 10*3/mm3 Final    Immature Grans, Absolute 03/23/2025 0.14 (H)  0.00 - 0.05 10*3/mm3 Final    nRBC 03/23/2025 0.0  0.0 - 0.2 /100 WBC Final    Lactate 03/23/2025 1.3  0.5 - 2.0 mmol/L Final    Blood Culture 03/23/2025 No growth at 5 days   Final    Blood Culture 03/23/2025 Pseudomonas aeruginosa (C)   Final    Isolated from 03/23/2025 Aerobic Bottle   Final    Gram Stain 03/23/2025 Aerobic Bottle Gram negative bacilli (C)   Final    Wound Culture 03/23/2025 No growth at 3 days   Final    Gram Stain 03/23/2025 Rare (1+) WBCs seen   Final    Gram Stain 03/23/2025 No organisms seen   Final    BCID, PCR 03/23/2025 Pseudomonas aeruginosa. Identification by BCID2 PCR (A)  Negative by BCID PCR. Culture to Follow. Final    BOTTLE TYPE 03/23/2025 Aerobic Bottle   Final   Admission on 03/10/2025, Discharged on 03/10/2025   Component Date Value Ref Range Status    Color, UA 03/10/2025 Yellow  Yellow, Straw Final    Appearance, UA 03/10/2025 Cloudy (A)  Clear Final    pH, UA 03/10/2025 6.0  5.0 - 8.0 Final    Specific Gravity, UA 03/10/2025 1.019  1.005 - 1.030 Final    Glucose, UA 03/10/2025 Negative  Negative Final    Ketones, UA 03/10/2025 Negative  Negative Final    Bilirubin, UA 03/10/2025 Negative  Negative Final    Blood, UA 03/10/2025 Large (3+) (A)  Negative Final    Protein, UA 03/10/2025 >=300 mg/dL  (3+) (A)  Negative Final    Leuk Esterase, UA 03/10/2025 Moderate (2+) (A)  Negative Final    Nitrite, UA 03/10/2025 Negative  Negative Final    Urobilinogen, UA 03/10/2025 0.2 E.U./dL  0.2 - 1.0 E.U./dL Final    Glucose 03/10/2025 139 (H)  65 - 99 mg/dL Final    BUN 03/10/2025 20  8 - 23 mg/dL Final    Creatinine 03/10/2025 1.13  0.76 - 1.27 mg/dL Final    Sodium 03/10/2025 135 (L)  136 - 145 mmol/L Final    Potassium 03/10/2025 4.2  3.5 - 5.2 mmol/L Final    Chloride 03/10/2025 101  98 - 107 mmol/L Final    CO2 03/10/2025 26.3  22.0 - 29.0 mmol/L Final    Calcium 03/10/2025 8.8  8.6 - 10.5 mg/dL Final    Total Protein 03/10/2025 6.8  6.0 - 8.5 g/dL Final    Albumin 03/10/2025 3.6  3.5 - 5.2 g/dL Final    ALT (SGPT) 03/10/2025 12  1 - 41 U/L Final    AST (SGOT) 03/10/2025 14  1 - 40 U/L Final    Alkaline Phosphatase 03/10/2025 91  39 - 117 U/L Final    Total Bilirubin 03/10/2025 0.3  0.0 - 1.2 mg/dL Final    Globulin 03/10/2025 3.2  gm/dL Final    A/G Ratio 03/10/2025 1.1  g/dL Final    BUN/Creatinine Ratio 03/10/2025 17.7  7.0 - 25.0 Final    Anion Gap 03/10/2025 7.7  5.0 - 15.0 mmol/L Final    eGFR 03/10/2025 68.6  >60.0 mL/min/1.73 Final    C-Reactive Protein 03/10/2025 4.58 (H)  0.00 - 0.50 mg/dL Final    Extra Tube 03/10/2025 Hold for add-ons.   Final    Auto resulted.    Extra Tube 03/10/2025 hold for add-on   Final    Auto resulted    Extra Tube 03/10/2025 Hold for add-ons.   Final    Auto resulted.    Extra Tube 03/10/2025 Hold for add-ons.   Final    Auto resulted    WBC 03/10/2025 12.08 (H)  3.40 - 10.80 10*3/mm3 Final    RBC 03/10/2025 4.28  4.14 - 5.80 10*6/mm3 Final    Hemoglobin 03/10/2025 10.9 (L)  13.0 - 17.7 g/dL Final    Hematocrit 03/10/2025 36.7 (L)  37.5 - 51.0 % Final    MCV 03/10/2025 85.7  79.0 - 97.0 fL Final    MCH 03/10/2025 25.5 (L)  26.6 - 33.0 pg Final    MCHC 03/10/2025 29.7 (L)  31.5 - 35.7 g/dL Final    RDW 03/10/2025 23.7 (H)  12.3 - 15.4 % Final    RDW-SD 03/10/2025 73.5 (H)  37.0  - 54.0 fl Final    MPV 03/10/2025 11.1  6.0 - 12.0 fL Final    Platelets 03/10/2025 166  140 - 450 10*3/mm3 Final    Neutrophil % 03/10/2025 85.1 (H)  42.7 - 76.0 % Final    Lymphocyte % 03/10/2025 5.8 (L)  19.6 - 45.3 % Final    Monocyte % 03/10/2025 6.5  5.0 - 12.0 % Final    Eosinophil % 03/10/2025 1.7  0.3 - 6.2 % Final    Basophil % 03/10/2025 0.2  0.0 - 1.5 % Final    Immature Grans % 03/10/2025 0.7 (H)  0.0 - 0.5 % Final    Neutrophils, Absolute 03/10/2025 10.28 (H)  1.70 - 7.00 10*3/mm3 Final    Lymphocytes, Absolute 03/10/2025 0.70  0.70 - 3.10 10*3/mm3 Final    Monocytes, Absolute 03/10/2025 0.79  0.10 - 0.90 10*3/mm3 Final    Eosinophils, Absolute 03/10/2025 0.20  0.00 - 0.40 10*3/mm3 Final    Basophils, Absolute 03/10/2025 0.03  0.00 - 0.20 10*3/mm3 Final    Immature Grans, Absolute 03/10/2025 0.08 (H)  0.00 - 0.05 10*3/mm3 Final    nRBC 03/10/2025 0.0  0.0 - 0.2 /100 WBC Final    Anisocytosis 03/10/2025 Large/3+  None Seen Final    Hypochromia 03/10/2025 Slight/1+  None Seen Final    Ovalocytes 03/10/2025 Slight/1+  None Seen Final    Platelet Estimate 03/10/2025 Adequate  Normal Final    Blood Culture 03/10/2025 No growth at 5 days   Final    Blood Culture 03/10/2025 No growth at 5 days   Final    Lactate 03/10/2025 0.8  0.5 - 2.0 mmol/L Final    Procalcitonin 03/10/2025 0.20  0.00 - 0.25 ng/mL Final    RBC, UA 03/10/2025 Too Numerous to Count (A)  None Seen, 0-2 /HPF Final    WBC, UA 03/10/2025 21-50 (A)  None Seen, 0-2 /HPF Final    Bacteria, UA 03/10/2025 None Seen  None Seen /HPF Final    Squamous Epithelial Cells, UA 03/10/2025 0-2  None Seen, 0-2 /HPF Final    Hyaline Casts, UA 03/10/2025 None Seen  None Seen /LPF Final    Methodology 03/10/2025 Manual Light Microscopy   Final    Urine Culture 03/10/2025 No growth   Final        Procedure:       Assessment/Plan:   BPH: Discussed the pathophysiology of BPH and obstruction.  We discussed the static and dynamic effects of BPH as well as using 5  alpha reductase inhibitors versus alpha blockade.  We discussed the indications for transurethral surgery as well and/ or other therapeutic options available including all of the newer techniques.  Status post successful TURP most important thing is that he had his PSA done he has not had one done for a while and he did not get it as prescribed prior prior to the surgery            This document has been electronically signed by WILLIAMS BROWNING MD May 1, 2025 13:24 EDT    Dictated Utilizing Dragon Dictation: Part of this note may be an electronic transcription/translation of spoken language to printed text using the Dragon Dictation System.

## 2025-05-06 ENCOUNTER — OFFICE VISIT (OUTPATIENT)
Dept: PSYCHIATRY | Facility: CLINIC | Age: 75
End: 2025-05-06
Payer: COMMERCIAL

## 2025-05-06 DIAGNOSIS — F41.9 MODERATE ANXIETY: Primary | ICD-10-CM

## 2025-05-06 DIAGNOSIS — F33.1 MAJOR DEPRESSIVE DISORDER, RECURRENT EPISODE, MODERATE: ICD-10-CM

## 2025-05-06 PROCEDURE — 90791 PSYCH DIAGNOSTIC EVALUATION: CPT | Performed by: COUNSELOR

## 2025-05-08 ENCOUNTER — PATIENT ROUNDING (BHMG ONLY) (OUTPATIENT)
Dept: PSYCHIATRY | Facility: CLINIC | Age: 75
End: 2025-05-08
Payer: COMMERCIAL

## 2025-05-08 ENCOUNTER — OFFICE VISIT (OUTPATIENT)
Dept: UROLOGY | Facility: CLINIC | Age: 75
End: 2025-05-08
Payer: COMMERCIAL

## 2025-05-08 VITALS
BODY MASS INDEX: 30.75 KG/M2 | WEIGHT: 232 LBS | DIASTOLIC BLOOD PRESSURE: 84 MMHG | HEART RATE: 89 BPM | SYSTOLIC BLOOD PRESSURE: 134 MMHG | HEIGHT: 73 IN

## 2025-05-08 DIAGNOSIS — R33.9 URINARY RETENTION: Primary | ICD-10-CM

## 2025-05-08 PROCEDURE — 99024 POSTOP FOLLOW-UP VISIT: CPT | Performed by: UROLOGY

## 2025-05-08 NOTE — PROGRESS NOTES
May 8, 2025    Hello, may I speak with Zaid Galarza?    My name is Yesenia      I am  with [facility]  [address] [place] [address]  [place] [address] [phone].    Before we get started may I verify your date of birth? 1950    I am calling to officially welcome you to our practice and ask about your recent visit. Is this a good time to talk? yes    Tell me about your visit with us. What things went well?  it was good.       We're always looking for ways to make our patients' experiences even better. Do you have recommendations on ways we may improve?  no    Overall were you satisfied with your first visit to our practice? yes       I appreciate you taking the time to speak with me today. Is there anything else I can do for you? no      Thank you, and have a great day.

## 2025-05-08 NOTE — PROGRESS NOTES
Chief Complaint:      Chief Complaint   Patient presents with    Post-op     TURP       HPI:   74 y.o. male status post TURP has a great stream we will see him back in 3 weeks overall doing very well.  It is important to get his PSA at his next check    Past Medical History:     Past Medical History:   Diagnosis Date    Atrial fibrillation     CHF (congestive heart failure)     COPD (chronic obstructive pulmonary disease)     Coronary artery disease     GERD (gastroesophageal reflux disease)     History of transfusion     Hyperlipidemia     Hypertension     Stroke 09/2022    Tobacco abuse        Current Meds:     Current Outpatient Medications   Medication Sig Dispense Refill    acetaminophen (TYLENOL) 500 MG tablet Take 1 tablet by mouth Every 6 (Six) Hours As Needed for Mild Pain.      albuterol sulfate  (90 Base) MCG/ACT inhaler Inhale 2 puffs Every 4 (Four) Hours As Needed.      atorvastatin (LIPITOR) 80 MG tablet Take 1 tablet by mouth Every Night. 90 tablet 3    ciprofloxacin (Cipro) 500 MG tablet Take 1 tablet by mouth 2 (Two) Times a Day. 6 tablet 0    HYDROcodone-acetaminophen (NORCO)  MG per tablet Take 1 tablet by mouth Every 6 (Six) Hours As Needed for Moderate Pain (Pain). 16 tablet 0    metoprolol succinate XL (TOPROL-XL) 25 MG 24 hr tablet Take 1 tablet by mouth Daily. 90 tablet 3    pantoprazole (PROTONIX) 40 MG EC tablet Take 1 tablet by mouth 2 (Two) Times a Day. 180 tablet 3    sacubitril-valsartan (Entresto) 24-26 MG tablet Take 1 tablet by mouth 2 (Two) Times a Day. 180 tablet 3    sertraline (ZOLOFT) 100 MG tablet Take 1.5 tablets by mouth Daily.      Symbicort 160-4.5 MCG/ACT inhaler Inhale 2 puffs 2 (Two) Times a Day.      vitamin B-12 (CYANOCOBALAMIN) 1000 MCG tablet Take 1 tablet by mouth Daily.       No current facility-administered medications for this visit.        Allergies:      No Known Allergies     Past Surgical History:     Past Surgical History:   Procedure Laterality  Date    ABDOMINAL AORTIC ANEURYSM REPAIR      CARDIAC CATHETERIZATION N/A 10/03/2019    Procedure: Left Heart Cath;  Surgeon: Vincent Phillips MD;  Location: [facility] CATH INVASIVE LOCATION;  Service: Cardiology    CYSTOSCOPY TRANSURETHRAL RESECTION OF PROSTATE N/A 4/28/2025    Procedure: Cystoscopy and transurethral resection of the prostate;  Surgeon: Liang Calabrese MD;  Location: [facility] OR;  Service: Urology;  Laterality: N/A;       Social History:     Social History     Socioeconomic History    Marital status: Single   Tobacco Use    Smoking status: Former     Current packs/day: 1.00     Types: Cigarettes    Smokeless tobacco: Never    Tobacco comments:     9/1/2022   Vaping Use    Vaping status: Never Used   Substance and Sexual Activity    Alcohol use: No    Drug use: No    Sexual activity: Defer       Family History:     Family History   Problem Relation Age of Onset    Heart disease Mother     Stroke Father     Heart disease Father        Review of Systems:     Review of Systems   Constitutional: Negative.    HENT: Negative.     Eyes: Negative.    Respiratory: Negative.     Cardiovascular: Negative.    Gastrointestinal: Negative.    Endocrine: Negative.    Musculoskeletal: Negative.    Allergic/Immunologic: Negative.    Neurological: Negative.    Hematological: Negative.    Psychiatric/Behavioral: Negative.         Physical Exam:     Physical Exam  Vitals and nursing note reviewed.   Constitutional:       Appearance: He is well-developed.   HENT:      Head: Normocephalic and atraumatic.   Eyes:      Conjunctiva/sclera: Conjunctivae normal.      Pupils: Pupils are equal, round, and reactive to light.   Cardiovascular:      Rate and Rhythm: Normal rate and regular rhythm.      Heart sounds: Normal heart sounds.   Pulmonary:      Effort: Pulmonary effort is normal.      Breath sounds: Normal breath sounds.   Abdominal:      General: Bowel sounds are normal.      Palpations: Abdomen is soft.    Musculoskeletal:         General: Normal range of motion.      Cervical back: Normal range of motion.   Skin:     General: Skin is warm and dry.   Neurological:      Mental Status: He is alert and oriented to person, place, and time.      Deep Tendon Reflexes: Reflexes are normal and symmetric.   Psychiatric:         Behavior: Behavior normal.         Thought Content: Thought content normal.         Judgment: Judgment normal.         I have reviewed the following portions of the patient's history: Allergies, current medications, past family history, past medical history, past social history, past surgical history, problem list, and ROS and confirm it is accurate.    Recent Image (CT and/or KUB):      CT Abdomen and Pelvis: Results for orders placed during the hospital encounter of 03/23/25    CT Abdomen Pelvis With & Without Contrast    Narrative  Comparison: 1/30/2025    Modality:  CT Abdomen and Pelvis With and Without Contrast    Technique: CT scan of the abdomen and pelvis has been done without and  with administration of intravenous contrast media.    Findings:    Lung base: Unremarkable.    Liver: Normal size, shape and attenuation.  Stable right hepatic lobe  hypoattenuation lesions measuring up to 11 mm.    Gallbladder and Biliary tree: No biliary dilatation.  Unremarkable  gallbladder.    Pancreas: No masses or ductal dilatation.    Spleen: Unremarkable.    Adrenal Glands: Unremarkable.    Kidneys and Ureters: No stones, masses or hydronephrosis.  Similar 1.7  cm hypoattenuating right renal lesion.    Urinary Bladder: Agosto catheter is seen within the renal bladder.  There  is significant wall thickening and fat stranding surrounding the urinary  bladder.  A posterior urinary bladder wall diverticulum is also seen.    Peritoneum and Retroperitoneum: No lymphadenopathy.  No ascites or free  air.    Vessels: Changes of aortobiiliac stenting.    Bowel: No wall thickening or bowel dilatation.  Normal  appendix.  Sigmoid diverticulosis.    Bones and Soft Tissues: No lytic or sclerotic bony lesion.  The  extra-abdominal and paraspinal soft tissues are normal.    Impression  Impression:    Agosto catheter is seen in the urinary bladder with diffuse thickening of  the urinary bladder wall and surrounding fat stranding.  Findings may  represent cystitis.  However, this is more prominent in comparison to  the prior CT dated 3/10/2025.  Underlying malignancy he cannot be ruled  out.  Urology consult is recommended.        This report was finalized on 3/23/2025 10:55 AM by Mallika Hess MD.       CT Stone Protocol: Results for orders placed during the hospital encounter of 03/10/25    CT Abdomen Pelvis Stone Protocol    Narrative  EXAM:  CT Abdomen and Pelvis Without Intravenous Contrast    EXAM DATE:  3/10/2025 11:44 AM    CLINICAL HISTORY:  acute hematuria    TECHNIQUE:  Axial computed tomography images of the abdomen and pelvis without  intravenous contrast.  Sagittal and coronal reformatted images were  created and reviewed.  This CT exam was performed using one or more of  the following dose reduction techniques:  automated exposure control,  adjustment of the mA and/or kV according to patient size, and/or use of  iterative reconstruction technique.    COMPARISON:  1/30/2025    FINDINGS:  Lung bases:  Chronic lung changes.  No consolidation.  Heart:  Cardiomegaly and coronary artery calcifications are stable.    ABDOMEN:  Liver:  Stable hepatic cysts.  Gallbladder and bile ducts:  Unremarkable.  No calcified stones.  No  ductal dilation.  Pancreas:  Unremarkable.  No ductal dilation.  Spleen:  Unremarkable.  No splenomegaly.  Adrenals:  Unremarkable.  No mass.  Kidneys and ureters:  Unremarkable.  No obstructing stones.  No  hydronephrosis.  Stomach and bowel:  Sigmoid diverticulosis without diverticulitis.  No  obstruction.    PELVIS:  Appendix:  Normal appendix.  Bladder:  Decompressed urinary bladder but with  marked urinary bladder  wall thickening and surrounding inflammation most consistent with acute  cystitis.  Agosto catheter decompresses urinary bladder.  No stones.  Reproductive:  Unremarkable as visualized.    ABDOMEN and PELVIS:  Intraperitoneal space:  Unremarkable.  No significant fluid  collection.  No pneumoperitoneum.  Bones/joints:  Stable degenerative changes lumbar spine with  multilevel stenosis. No acute bony findings identified.  No dislocation.  Soft tissues:  Unremarkable.  Vasculature:  EVAR changes are noted of the abdominal aorta.  No  abdominal aortic aneurysm.  Lymph nodes:  Unremarkable.  No enlarged lymph nodes.    Impression  1.  Decompressed urinary bladder but with marked urinary bladder wall  thickening and surrounding inflammation most consistent with acute  cystitis.  2.  Agosto catheter decompresses urinary bladder.  3. Other incidental/nonacute findings above.    This report was finalized on 3/10/2025 12:26 PM by Dr. Dequan Cotter MD.       KUB: No results found for this or any previous visit.       Labs (past 3 months):      Admission on 03/23/2025, Discharged on 03/23/2025   Component Date Value Ref Range Status    Glucose 03/23/2025 139 (H)  65 - 99 mg/dL Final    BUN 03/23/2025 24 (H)  8 - 23 mg/dL Final    Creatinine 03/23/2025 1.25  0.76 - 1.27 mg/dL Final    Sodium 03/23/2025 137  136 - 145 mmol/L Final    Potassium 03/23/2025 4.2  3.5 - 5.2 mmol/L Final    Slight hemolysis detected by analyzer. Result may be falsely elevated.    Chloride 03/23/2025 100  98 - 107 mmol/L Final    CO2 03/23/2025 23.9  22.0 - 29.0 mmol/L Final    Calcium 03/23/2025 9.3  8.6 - 10.5 mg/dL Final    Total Protein 03/23/2025 7.5  6.0 - 8.5 g/dL Final    Albumin 03/23/2025 3.6  3.5 - 5.2 g/dL Final    ALT (SGPT) 03/23/2025 10  1 - 41 U/L Final    AST (SGOT) 03/23/2025 19  1 - 40 U/L Final    Alkaline Phosphatase 03/23/2025 87  39 - 117 U/L Final    Total Bilirubin 03/23/2025 0.4  0.0 - 1.2 mg/dL Final     Globulin 03/23/2025 3.9  gm/dL Final    A/G Ratio 03/23/2025 0.9  g/dL Final    BUN/Creatinine Ratio 03/23/2025 19.2  7.0 - 25.0 Final    Anion Gap 03/23/2025 13.1  5.0 - 15.0 mmol/L Final    eGFR 03/23/2025 60.8  >60.0 mL/min/1.73 Final    WBC 03/23/2025 18.28 (H)  3.40 - 10.80 10*3/mm3 Final    RBC 03/23/2025 4.44  4.14 - 5.80 10*6/mm3 Final    Hemoglobin 03/23/2025 11.3 (L)  13.0 - 17.7 g/dL Final    Hematocrit 03/23/2025 37.7  37.5 - 51.0 % Final    MCV 03/23/2025 84.9  79.0 - 97.0 fL Final    MCH 03/23/2025 25.5 (L)  26.6 - 33.0 pg Final    MCHC 03/23/2025 30.0 (L)  31.5 - 35.7 g/dL Final    RDW 03/23/2025 23.0 (H)  12.3 - 15.4 % Final    RDW-SD 03/23/2025 71.1 (H)  37.0 - 54.0 fl Final    MPV 03/23/2025    Final    Unable to calculate.        Platelets 03/23/2025 206  140 - 450 10*3/mm3 Final    Neutrophil % 03/23/2025 84.1 (H)  42.7 - 76.0 % Final    Lymphocyte % 03/23/2025 6.9 (L)  19.6 - 45.3 % Final    Monocyte % 03/23/2025 7.1  5.0 - 12.0 % Final    Eosinophil % 03/23/2025 0.9  0.3 - 6.2 % Final    Basophil % 03/23/2025 0.2  0.0 - 1.5 % Final    Immature Grans % 03/23/2025 0.8 (H)  0.0 - 0.5 % Final    Neutrophils, Absolute 03/23/2025 15.38 (H)  1.70 - 7.00 10*3/mm3 Final    Lymphocytes, Absolute 03/23/2025 1.26  0.70 - 3.10 10*3/mm3 Final    Monocytes, Absolute 03/23/2025 1.30 (H)  0.10 - 0.90 10*3/mm3 Final    Eosinophils, Absolute 03/23/2025 0.16  0.00 - 0.40 10*3/mm3 Final    Basophils, Absolute 03/23/2025 0.04  0.00 - 0.20 10*3/mm3 Final    Immature Grans, Absolute 03/23/2025 0.14 (H)  0.00 - 0.05 10*3/mm3 Final    nRBC 03/23/2025 0.0  0.0 - 0.2 /100 WBC Final    Lactate 03/23/2025 1.3  0.5 - 2.0 mmol/L Final    Blood Culture 03/23/2025 No growth at 5 days   Final    Blood Culture 03/23/2025 Pseudomonas aeruginosa (C)   Final    Isolated from 03/23/2025 Aerobic Bottle   Final    Gram Stain 03/23/2025 Aerobic Bottle Gram negative bacilli (C)   Final    Wound Culture 03/23/2025 No growth at 3 days    Final    Gram Stain 03/23/2025 Rare (1+) WBCs seen   Final    Gram Stain 03/23/2025 No organisms seen   Final    BCID, PCR 03/23/2025 Pseudomonas aeruginosa. Identification by BCID2 PCR (A)  Negative by BCID PCR. Culture to Follow. Final    BOTTLE TYPE 03/23/2025 Aerobic Bottle   Final   Admission on 03/10/2025, Discharged on 03/10/2025   Component Date Value Ref Range Status    Color, UA 03/10/2025 Yellow  Yellow, Straw Final    Appearance, UA 03/10/2025 Cloudy (A)  Clear Final    pH, UA 03/10/2025 6.0  5.0 - 8.0 Final    Specific Gravity, UA 03/10/2025 1.019  1.005 - 1.030 Final    Glucose, UA 03/10/2025 Negative  Negative Final    Ketones, UA 03/10/2025 Negative  Negative Final    Bilirubin, UA 03/10/2025 Negative  Negative Final    Blood, UA 03/10/2025 Large (3+) (A)  Negative Final    Protein, UA 03/10/2025 >=300 mg/dL (3+) (A)  Negative Final    Leuk Esterase, UA 03/10/2025 Moderate (2+) (A)  Negative Final    Nitrite, UA 03/10/2025 Negative  Negative Final    Urobilinogen, UA 03/10/2025 0.2 E.U./dL  0.2 - 1.0 E.U./dL Final    Glucose 03/10/2025 139 (H)  65 - 99 mg/dL Final    BUN 03/10/2025 20  8 - 23 mg/dL Final    Creatinine 03/10/2025 1.13  0.76 - 1.27 mg/dL Final    Sodium 03/10/2025 135 (L)  136 - 145 mmol/L Final    Potassium 03/10/2025 4.2  3.5 - 5.2 mmol/L Final    Chloride 03/10/2025 101  98 - 107 mmol/L Final    CO2 03/10/2025 26.3  22.0 - 29.0 mmol/L Final    Calcium 03/10/2025 8.8  8.6 - 10.5 mg/dL Final    Total Protein 03/10/2025 6.8  6.0 - 8.5 g/dL Final    Albumin 03/10/2025 3.6  3.5 - 5.2 g/dL Final    ALT (SGPT) 03/10/2025 12  1 - 41 U/L Final    AST (SGOT) 03/10/2025 14  1 - 40 U/L Final    Alkaline Phosphatase 03/10/2025 91  39 - 117 U/L Final    Total Bilirubin 03/10/2025 0.3  0.0 - 1.2 mg/dL Final    Globulin 03/10/2025 3.2  gm/dL Final    A/G Ratio 03/10/2025 1.1  g/dL Final    BUN/Creatinine Ratio 03/10/2025 17.7  7.0 - 25.0 Final    Anion Gap 03/10/2025 7.7  5.0 - 15.0 mmol/L Final     eGFR 03/10/2025 68.6  >60.0 mL/min/1.73 Final    C-Reactive Protein 03/10/2025 4.58 (H)  0.00 - 0.50 mg/dL Final    Extra Tube 03/10/2025 Hold for add-ons.   Final    Auto resulted.    Extra Tube 03/10/2025 hold for add-on   Final    Auto resulted    Extra Tube 03/10/2025 Hold for add-ons.   Final    Auto resulted.    Extra Tube 03/10/2025 Hold for add-ons.   Final    Auto resulted    WBC 03/10/2025 12.08 (H)  3.40 - 10.80 10*3/mm3 Final    RBC 03/10/2025 4.28  4.14 - 5.80 10*6/mm3 Final    Hemoglobin 03/10/2025 10.9 (L)  13.0 - 17.7 g/dL Final    Hematocrit 03/10/2025 36.7 (L)  37.5 - 51.0 % Final    MCV 03/10/2025 85.7  79.0 - 97.0 fL Final    MCH 03/10/2025 25.5 (L)  26.6 - 33.0 pg Final    MCHC 03/10/2025 29.7 (L)  31.5 - 35.7 g/dL Final    RDW 03/10/2025 23.7 (H)  12.3 - 15.4 % Final    RDW-SD 03/10/2025 73.5 (H)  37.0 - 54.0 fl Final    MPV 03/10/2025 11.1  6.0 - 12.0 fL Final    Platelets 03/10/2025 166  140 - 450 10*3/mm3 Final    Neutrophil % 03/10/2025 85.1 (H)  42.7 - 76.0 % Final    Lymphocyte % 03/10/2025 5.8 (L)  19.6 - 45.3 % Final    Monocyte % 03/10/2025 6.5  5.0 - 12.0 % Final    Eosinophil % 03/10/2025 1.7  0.3 - 6.2 % Final    Basophil % 03/10/2025 0.2  0.0 - 1.5 % Final    Immature Grans % 03/10/2025 0.7 (H)  0.0 - 0.5 % Final    Neutrophils, Absolute 03/10/2025 10.28 (H)  1.70 - 7.00 10*3/mm3 Final    Lymphocytes, Absolute 03/10/2025 0.70  0.70 - 3.10 10*3/mm3 Final    Monocytes, Absolute 03/10/2025 0.79  0.10 - 0.90 10*3/mm3 Final    Eosinophils, Absolute 03/10/2025 0.20  0.00 - 0.40 10*3/mm3 Final    Basophils, Absolute 03/10/2025 0.03  0.00 - 0.20 10*3/mm3 Final    Immature Grans, Absolute 03/10/2025 0.08 (H)  0.00 - 0.05 10*3/mm3 Final    nRBC 03/10/2025 0.0  0.0 - 0.2 /100 WBC Final    Anisocytosis 03/10/2025 Large/3+  None Seen Final    Hypochromia 03/10/2025 Slight/1+  None Seen Final    Ovalocytes 03/10/2025 Slight/1+  None Seen Final    Platelet Estimate 03/10/2025 Adequate  Normal  Final    Blood Culture 03/10/2025 No growth at 5 days   Final    Blood Culture 03/10/2025 No growth at 5 days   Final    Lactate 03/10/2025 0.8  0.5 - 2.0 mmol/L Final    Procalcitonin 03/10/2025 0.20  0.00 - 0.25 ng/mL Final    RBC, UA 03/10/2025 Too Numerous to Count (A)  None Seen, 0-2 /HPF Final    WBC, UA 03/10/2025 21-50 (A)  None Seen, 0-2 /HPF Final    Bacteria, UA 03/10/2025 None Seen  None Seen /HPF Final    Squamous Epithelial Cells, UA 03/10/2025 0-2  None Seen, 0-2 /HPF Final    Hyaline Casts, UA 03/10/2025 None Seen  None Seen /LPF Final    Methodology 03/10/2025 Manual Light Microscopy   Final    Urine Culture 03/10/2025 No growth   Final        Procedure:       Assessment/Plan:   BPH: Discussed the pathophysiology of BPH and obstruction.  We discussed the static and dynamic effects of BPH as well as using 5 alpha reductase inhibitors versus alpha blockade.  We discussed the indications for transurethral surgery as well and/ or other therapeutic options available including all of the newer techniques.  Status post very successful TURP with a great stream and no further symptomatology it is important he gets his PSA at his next check in 3 weeks        This document has been electronically signed by WILLIAMS BROWNING MD May 8, 2025 09:38 EDT    Dictated Utilizing Dragon Dictation: Part of this note may be an electronic transcription/translation of spoken language to printed text using the Dragon Dictation System.

## 2025-05-13 ENCOUNTER — TELEPHONE (OUTPATIENT)
Dept: UROLOGY | Facility: CLINIC | Age: 75
End: 2025-05-13
Payer: COMMERCIAL

## 2025-05-13 NOTE — TELEPHONE ENCOUNTER
I called the pt for Doc because he needed labs drawn specifically his PSA. He said all appts and travel would have to be set up with [facility]. I called them at [phone] and left a vm for their clinical staff they said they may have drawn the labs. If not he can have it done when he comes back at his end of the month appt.

## 2025-05-15 ENCOUNTER — TELEPHONE (OUTPATIENT)
Dept: ONCOLOGY | Facility: CLINIC | Age: 75
End: 2025-05-15

## 2025-05-15 NOTE — TELEPHONE ENCOUNTER
Caller: NIRANJAN    Relationship: [facility]    Best call back number: [phone]    What is the best time to reach you: ANYTIME    Who are you requesting to speak with (clinical staff, provider,  specific staff member): SCHEDULING        What was the call regarding: CALLER FROM [facility] SAYS THE 5/20 LAB AND FOLLOW UP NEED TO BE CANCELED FOR THIS PATIENT.

## 2025-05-22 ENCOUNTER — OFFICE VISIT (OUTPATIENT)
Dept: PSYCHIATRY | Facility: CLINIC | Age: 75
End: 2025-05-22
Payer: COMMERCIAL

## 2025-05-22 DIAGNOSIS — Z72.52 HIGH RISK HOMOSEXUAL BEHAVIOR: ICD-10-CM

## 2025-05-22 DIAGNOSIS — F41.9 MODERATE ANXIETY: Primary | ICD-10-CM

## 2025-05-22 DIAGNOSIS — F33.1 MAJOR DEPRESSIVE DISORDER, RECURRENT EPISODE, MODERATE: ICD-10-CM

## 2025-05-22 PROCEDURE — 90837 PSYTX W PT 60 MINUTES: CPT | Performed by: COUNSELOR

## 2025-05-22 NOTE — PROGRESS NOTES
Subjective   Zaid Galarza is a 74 y.o. male who is here today for initial behavioral health evaluation starting at *** and ending at ***.    Chief Complaint:      History of Present Illness    Past Psych History:    Previous Psych Meds:     Substance Abuse:    Social History:     Visit Diagnoses:  No diagnosis found.      Family Psychiatric History:  family history includes Heart disease in his father and mother; Stroke in his father.    Medical/Surgical History:  Past Medical History:   Diagnosis Date    Atrial fibrillation     CHF (congestive heart failure)     COPD (chronic obstructive pulmonary disease)     Coronary artery disease     GERD (gastroesophageal reflux disease)     History of transfusion     Hyperlipidemia     Hypertension     Stroke 09/2022    Tobacco abuse      Past Surgical History:   Procedure Laterality Date    ABDOMINAL AORTIC ANEURYSM REPAIR      CARDIAC CATHETERIZATION N/A 10/03/2019    Procedure: Left Heart Cath;  Surgeon: Vincent Phillips MD;  Location: [facility] CATH INVASIVE LOCATION;  Service: Cardiology    CYSTOSCOPY TRANSURETHRAL RESECTION OF PROSTATE N/A 4/28/2025    Procedure: Cystoscopy and transurethral resection of the prostate;  Surgeon: Liang Calabrese MD;  Location: [facility] OR;  Service: Urology;  Laterality: N/A;       No Known Allergies        Current Medications:   Current Outpatient Medications   Medication Sig Dispense Refill    acetaminophen (TYLENOL) 500 MG tablet Take 1 tablet by mouth Every 6 (Six) Hours As Needed for Mild Pain.      albuterol sulfate  (90 Base) MCG/ACT inhaler Inhale 2 puffs Every 4 (Four) Hours As Needed.      atorvastatin (LIPITOR) 80 MG tablet Take 1 tablet by mouth Every Night. 90 tablet 3    ciprofloxacin (Cipro) 500 MG tablet Take 1 tablet by mouth 2 (Two) Times a Day. 6 tablet 0    HYDROcodone-acetaminophen (NORCO)  MG per tablet Take 1 tablet by mouth Every 6 (Six) Hours As Needed for Moderate Pain (Pain). 16 tablet 0     metoprolol succinate XL (TOPROL-XL) 25 MG 24 hr tablet Take 1 tablet by mouth Daily. 90 tablet 3    pantoprazole (PROTONIX) 40 MG EC tablet Take 1 tablet by mouth 2 (Two) Times a Day. 180 tablet 3    sacubitril-valsartan (Entresto) 24-26 MG tablet Take 1 tablet by mouth 2 (Two) Times a Day. 180 tablet 3    sertraline (ZOLOFT) 100 MG tablet Take 1.5 tablets by mouth Daily.      Symbicort 160-4.5 MCG/ACT inhaler Inhale 2 puffs 2 (Two) Times a Day.      vitamin B-12 (CYANOCOBALAMIN) 1000 MCG tablet Take 1 tablet by mouth Daily.       No current facility-administered medications for this visit.         Objective   There were no vitals taken for this visit.    Mental Status Exam:   Hygiene:   {good/fair/poor:660703013}  Cooperation:  {Cooperation:325258310}  Eye Contact:  {Eye Contact:116319344}  Psychomotor Behavior:  {Psychomotor Behavior:87872}  Affect:  {Affect:541914392}  Hopelessness: {Hoplessness:216999146}  Speech:  {Speech:140286998}  Thought Process:  {Thought Process:770014089}  Thought Content:  {Thought Content:704778142}  Suicidal:  {Suicidal:115955876}  Homicidal:  {Homidical:796952679}  Hallucinations:  {Hallucinations:629510615}  Delusion:  {Delusion:385078492}  Memory:  {Memory:973264674}  Orientation:  {Orientation:142716807}  Reliability:  {good/fair/poor:956140292}  Insight:  {good/fair/poor/none:732782002}  Judgement:  {good/fair/poor/impaired:800311974}  Impulse Control:  {good/fair/poor/impaired:141660284}  Physical/Medical Issues:  {No/Yes:452389860}      DIAGNOSTIC IMPRESSION: No diagnosis found.    PROBLEM LIST:       STRENGTHS:   Patient appears motivated for treatment is currently engaged and compliant.    WEAKNESSES:  Ineffective coping skills, disease management      SHORT-TERM GOALS: Patient will be compliant with clinic appointments.  Patient will be engaged in therapy, medication compliant with minimal side effects. Patient  will report decreased frequency and severity of symptoms.      LONG-TERM GOALS: Patient will have minimal symptoms of  with continued medication management. Patient will be compliant with treatment and appointments.       PLAN:   Patient will continue with individual outpatient treatment and pharmacotherapy as scheduled.     No follow-ups on file.     The patient was instructed to call clinic as needed or go to ER if in crisis.          This document electronically signed by Marianela Carvajal, FABIANOC, NCC, CSAT    Marianela Carvajal  Licensed Professional Clinical Counselor  Certified Sexual Addiction Therapist
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
## 2025-06-24 ENCOUNTER — OFFICE VISIT (OUTPATIENT)
Dept: PSYCHIATRY | Facility: CLINIC | Age: 75
End: 2025-06-24
Payer: COMMERCIAL

## 2025-06-24 DIAGNOSIS — F33.1 MAJOR DEPRESSIVE DISORDER, RECURRENT EPISODE, MODERATE: ICD-10-CM

## 2025-06-24 DIAGNOSIS — F41.9 MODERATE ANXIETY: Primary | ICD-10-CM

## 2025-06-24 DIAGNOSIS — Z72.52 HIGH RISK HOMOSEXUAL BEHAVIOR: ICD-10-CM

## 2025-06-24 PROCEDURE — 90837 PSYTX W PT 60 MINUTES: CPT | Performed by: COUNSELOR

## 2025-06-25 NOTE — PROGRESS NOTES
"    PROGRESS NOTE  Data:  Zaid Galarza came in 6/24/2025 for his regularly scheduled therapy session starting at 11 AM and ending at 12:15 PM, with Marianela Carvajal [facility].     (Scales based on 0 - 10 with 10 being the worst)  Depression: 5 Anxiety: 5     HPI:   \"I am still enjoying talking to men on [place], I know they live but I still enjoy it I do not need anyone to feel sorry for me.  I take medicine for anxiety because I worry about stupid things over and over.\"    \"I have just been doing the usual things.\"  \"I got upset with somebody at the center, a female staff was rubbing my shoulder while she was talking to someone else.  I wanted to tell her to stop but I did not.  She was trying to be comforting.\"    \"Previously was a  in my teens.  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  Some say is purple or black.  I put on myself it was difficult because my hands shake.\"    \"The doctor told me I will never see out of my left eye again.  I have 2/10 pain all the time.  I wear this eye patch and Tylenol helps.  He said if I cannot manage the pain he will take my eye out.  He said it is because it is an infection that caused an ulcer on my cornea.  I have had it for a few months.  I am accepting what it is, it is what it is.  I have still got my right eye but I worry what if I lose my right eye?  Sitting around and being said is not going to help at all.\"      \"Everybody uses everybody.  I have never experienced love from anybody.  The person I respect the most was Nader Mercer and of course I did not know him personally.\"    \"I have known Salvador since 1993.  He said he was going to come to my house with his wife Thao.  She would come to see my neighbor who is a quasi friend who arranges encarnacion.  Thao knows no secrets.  Salvador said, 'We can do like we did before while Thao is with her friend.'  I didn't agree.  Once Salvador " "suggested that I move in with him, Thao and their 7 y/o son. 'You can move in and we could be a family.'  I did it for 2 years.  Thao complained.  He would be with me and with Thao. She weighed 350 pounds. At the same time they couldn't live without each other.\"    \"Why do I come here? For justification.  I agree it's not fulfilling.  Adrenaline is a natural thing, I cannot control it.\"    \"If you respect someone you would never do that.  I loved Kelsy Post and my mother.  I did not cry when my mother  in May 2011 because I had suspended my life and my work while she was in the nursing home for 8 years.  When she  I as free.  I had not had any sexual acting out during that time, I just masturbated.\"    \"Saturday my sister from [place] called me and asked, 'Are you minding everybody?  That felt like a rage and I said, 'Don't ever ask me that.\"  \"My family do not want to listen to me because of who I am.  My family intimidates me.  They say, 'I love you, anyway.'  I asked them, 'Why do you have to say \"anyway\"?  My family love me only conditionally.\"    \"I have peace sometimes and sometimes I worry about minor things.\"    \"Mentally I get really, really tired in my head aches.  It is like all my energy is sapped being with other people.  I have to edit everybody, because of my double life and those 2 worlds do not go together.\"    \"I have felt toxic shame my whole life.\"    \"I feel the other day going out to put something in the garbage can.  He took me 30 minutes to get up.  I do not want to tell anyone because it would prove that I could not take care of myself.  Do not tell my family.\"    Clinical Maneuvering/Intervention:  Assisted patient in processing above session content; acknowledged and normalized patient’s thoughts, feelings, and concerns.     When patient admitted that he comes to therapy for justification I responded, \"you are at the wrong office.\"  Inquired patient's definition of love and respect. "  Shared how love is about mutual caring, kindness, respect and self-sacrifice.  Patient said he has never experienced that.  Discussed how trauma and sex and adrenaline have all bonded together and his experience.    Allowed patient to freely discuss issues without interruption or judgment. Provided safe, confidential environment to facilitate the development of positive therapeutic relationship and encourage open, honest communication. Assisted patient in identifying risk factors which would indicate the need for higher level of care including thoughts to harm self or others and/or self-harming behavior and encouraged patient to contact this office, call 911, or present to the nearest emergency room should any of these events occur. Discussed crisis intervention services and means to access.  Patient adamantly and convincingly denies current suicidal or homicidal ideation or perceptual disturbance.        Assessment     Patient reports he has never experienced unconditional love and respect even from his family members.    Diagnosis:   Encounter Diagnoses   Name Primary?    Moderate anxiety Yes    Major depressive disorder, recurrent episode, moderate     High risk homosexual behavior        Moderate anxiety [F41.9]    Mental Status Exam  Hygiene:  good  Dress:  casual  Attitude:  Cooperative  Motor Activity:  Appropriate  Speech:  Normal  Mood:  within normal limits  Affect:  calm and pleasant  Thought Processes:  Goal directed and Linear  Thought Content:  normal  Suicidal Thoughts:  does not  Homicidal Thoughts:  does not  Crisis Safety Plan: yes, to come to the emergency room.  Hallucinations:  does not          Patient's Support Network Includes:  extended family    Progress toward goal: Not at goal    Functional Status: Moderate impairment     Prognosis: Good with Ongoing Treatment       Plan         Patient will adhere to medication regimen as prescribed and report any side effects. Patient will contact this  office, call 911 or present to the nearest emergency room should suicidal or homicidal ideations occur. Provide Cognitive Behavioral Therapy and Integrative Therapy to improve functioning, maintain stability, and avoid decompensation and the need for higher level of care.            Return in about 3 weeks (around 7/15/2025).            This document electronically signed by Marianela Carvajal, KYMBERLY, NCC, CSAT  June 25, 2025 20:26 EDT    Plan

## 2025-07-09 ENCOUNTER — OFFICE VISIT (OUTPATIENT)
Dept: PSYCHIATRY | Facility: CLINIC | Age: 75
End: 2025-07-09
Payer: COMMERCIAL

## 2025-07-09 DIAGNOSIS — Z72.52 HIGH RISK HOMOSEXUAL BEHAVIOR: ICD-10-CM

## 2025-07-09 DIAGNOSIS — F41.9 MILD ANXIETY: ICD-10-CM

## 2025-07-09 DIAGNOSIS — F32.A MILD DEPRESSION: Primary | ICD-10-CM

## 2025-07-09 PROCEDURE — 90834 PSYTX W PT 45 MINUTES: CPT | Performed by: COUNSELOR
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
## 2025-08-19 ENCOUNTER — PATIENT OUTREACH (OUTPATIENT)
Dept: CASE MANAGEMENT | Facility: OTHER | Age: 75
End: 2025-08-19
Payer: COMMERCIAL

## (undated) DEVICE — Device: Brand: MEDEX

## (undated) DEVICE — URINE DRAINAGE BAG, TOP VENT, LUER-LOCK SAMPLING, ANTI-REFLUX DEVICE, DRAIN SPOUT, 2000 ML: Brand: DOVER

## (undated) DEVICE — ADULT, RADIOTRANSPARENT ELEMENT, COMPATIBLE W/ ZOLL: Brand: DEFIBRILLATION ELECTRODES

## (undated) DEVICE — GLV SURG PREMIERPRO MIC LTX PF SZ8 BRN

## (undated) DEVICE — CATH F5 INF JR 4 100CM: Brand: INFINITI

## (undated) DEVICE — TUBING, SUCTION, 3/16" X 10', STRAIGHT: Brand: MEDLINE

## (undated) DEVICE — EVAC BLDR ELLIK 1P/U

## (undated) DEVICE — LN INJ CONTRST FLXCIL HP F/M LL 1200PSI10

## (undated) DEVICE — PINNACLE INTRODUCER SHEATH: Brand: PINNACLE

## (undated) DEVICE — ST EXT IV SMARTSITE 2VLV SP M LL 5ML IV1

## (undated) DEVICE — GW INQWIRE FC PTFE J/3MM .035 180

## (undated) DEVICE — HF-RESECTION ELECTRODE PLASMABUTTON BUTTON, 24 FR., 12°-30°, ESG TURIS: Brand: OLYMPUS

## (undated) DEVICE — SYRINGE,TOOMEY,IRRIGATION,70CC,STERILE: Brand: MEDLINE

## (undated) DEVICE — GOWN,REINF,POLY,ECL,PP SLV,XXL: Brand: MEDLINE

## (undated) DEVICE — COR CYSTO: Brand: MEDLINE INDUSTRIES, INC.

## (undated) DEVICE — CANNULA,OXY,ADULT,SUPER SOFT,W/14'TUB,UC: Brand: MEDLINE INDUSTRIES, INC.

## (undated) DEVICE — ANGIO-SEAL VIP VASCULAR CLOSURE DEVICE: Brand: ANGIO-SEAL

## (undated) DEVICE — Device

## (undated) DEVICE — ST INF PRI SMRTSTE 20DRP 2VLV 24ML 117

## (undated) DEVICE — Y-TYPE TUR/BLADDER IRRIGATION SET, REGULATING CLAMP

## (undated) DEVICE — CATH F5 INF JL 4 100CM: Brand: INFINITI

## (undated) DEVICE — DRSNG SURESITE WNDW 4X4.5

## (undated) DEVICE — CATH F5 INF JL 5 100CM: Brand: INFINITI

## (undated) DEVICE — CATH F5 INF PIG145 110CM 6SH: Brand: INFINITI

## (undated) DEVICE — PK CATH CARD 70

## (undated) DEVICE — ADULT DISPOSABLE SINGLE-PATIENT USE PULSE OXIMETER SENSOR: Brand: NONIN

## (undated) DEVICE — HF-RESECTION ELECTRODE PLASMALOOP LOOP, MEDIUM, 24 FR., 12°-30°, ESG TURIS: Brand: OLYMPUS